# Patient Record
Sex: FEMALE | URBAN - METROPOLITAN AREA
[De-identification: names, ages, dates, MRNs, and addresses within clinical notes are randomized per-mention and may not be internally consistent; named-entity substitution may affect disease eponyms.]

---

## 2017-03-19 ENCOUNTER — EMERGENCY (EMERGENCY)
Facility: HOSPITAL | Age: 72
LOS: 1 days | Discharge: HOME | End: 2017-03-19
Attending: EMERGENCY MEDICINE | Admitting: EMERGENCY MEDICINE
Payer: MEDICARE

## 2017-03-19 VITALS
TEMPERATURE: 98 F | OXYGEN SATURATION: 98 % | HEART RATE: 89 BPM | DIASTOLIC BLOOD PRESSURE: 83 MMHG | SYSTOLIC BLOOD PRESSURE: 165 MMHG | RESPIRATION RATE: 16 BRPM

## 2017-03-19 VITALS
TEMPERATURE: 97 F | RESPIRATION RATE: 18 BRPM | OXYGEN SATURATION: 97 % | HEART RATE: 91 BPM | SYSTOLIC BLOOD PRESSURE: 153 MMHG | DIASTOLIC BLOOD PRESSURE: 90 MMHG

## 2017-03-19 LAB
ALBUMIN SERPL ELPH-MCNC: 4.4 G/DL — SIGNIFICANT CHANGE UP (ref 3.3–5)
ALP SERPL-CCNC: 55 U/L — SIGNIFICANT CHANGE UP (ref 40–120)
ALT FLD-CCNC: 35 U/L — HIGH (ref 4–33)
APTT BLD: 33.7 SEC — SIGNIFICANT CHANGE UP (ref 27.5–37.4)
AST SERPL-CCNC: 29 U/L — SIGNIFICANT CHANGE UP (ref 4–32)
BASE EXCESS BLDV CALC-SCNC: 4.4 MMOL/L — SIGNIFICANT CHANGE UP
BASOPHILS # BLD AUTO: 0.03 K/UL — SIGNIFICANT CHANGE UP (ref 0–0.2)
BASOPHILS NFR BLD AUTO: 0.5 % — SIGNIFICANT CHANGE UP (ref 0–2)
BILIRUB SERPL-MCNC: 0.2 MG/DL — SIGNIFICANT CHANGE UP (ref 0.2–1.2)
BLOOD GAS VENOUS - CREATININE: 0.69 MG/DL — SIGNIFICANT CHANGE UP (ref 0.5–1.3)
BUN SERPL-MCNC: 14 MG/DL — SIGNIFICANT CHANGE UP (ref 7–23)
CALCIUM SERPL-MCNC: 10.1 MG/DL — SIGNIFICANT CHANGE UP (ref 8.4–10.5)
CHLORIDE BLDV-SCNC: 101 MMOL/L — SIGNIFICANT CHANGE UP (ref 96–108)
CHLORIDE SERPL-SCNC: 96 MMOL/L — LOW (ref 98–107)
CO2 SERPL-SCNC: 24 MMOL/L — SIGNIFICANT CHANGE UP (ref 22–31)
CREAT SERPL-MCNC: 0.78 MG/DL — SIGNIFICANT CHANGE UP (ref 0.5–1.3)
EOSINOPHIL # BLD AUTO: 0.08 K/UL — SIGNIFICANT CHANGE UP (ref 0–0.5)
EOSINOPHIL NFR BLD AUTO: 1.2 % — SIGNIFICANT CHANGE UP (ref 0–6)
GAS PNL BLDV: 132 MMOL/L — LOW (ref 136–146)
GLUCOSE BLDV-MCNC: 135 — HIGH (ref 70–99)
GLUCOSE SERPL-MCNC: 136 MG/DL — HIGH (ref 70–99)
HCO3 BLDV-SCNC: 27 MMOL/L — SIGNIFICANT CHANGE UP (ref 20–27)
HCT VFR BLD CALC: 36.7 % — SIGNIFICANT CHANGE UP (ref 34.5–45)
HCT VFR BLDV CALC: 36.2 % — SIGNIFICANT CHANGE UP (ref 34.5–45)
HGB BLD-MCNC: 11.7 G/DL — SIGNIFICANT CHANGE UP (ref 11.5–15.5)
HGB BLDV-MCNC: 11.7 G/DL — SIGNIFICANT CHANGE UP (ref 11.5–15.5)
IMM GRANULOCYTES NFR BLD AUTO: 0.3 % — SIGNIFICANT CHANGE UP (ref 0–1.5)
INR BLD: 0.98 — SIGNIFICANT CHANGE UP (ref 0.88–1.17)
LACTATE BLDV-MCNC: 1.3 MMOL/L — SIGNIFICANT CHANGE UP (ref 0.5–2)
LYMPHOCYTES # BLD AUTO: 1.6 K/UL — SIGNIFICANT CHANGE UP (ref 1–3.3)
LYMPHOCYTES # BLD AUTO: 24.6 % — SIGNIFICANT CHANGE UP (ref 13–44)
MCHC RBC-ENTMCNC: 27.7 PG — SIGNIFICANT CHANGE UP (ref 27–34)
MCHC RBC-ENTMCNC: 31.9 % — LOW (ref 32–36)
MCV RBC AUTO: 86.8 FL — SIGNIFICANT CHANGE UP (ref 80–100)
MONOCYTES # BLD AUTO: 0.47 K/UL — SIGNIFICANT CHANGE UP (ref 0–0.9)
MONOCYTES NFR BLD AUTO: 7.2 % — SIGNIFICANT CHANGE UP (ref 2–14)
NEUTROPHILS # BLD AUTO: 4.3 K/UL — SIGNIFICANT CHANGE UP (ref 1.8–7.4)
NEUTROPHILS NFR BLD AUTO: 66.2 % — SIGNIFICANT CHANGE UP (ref 43–77)
PCO2 BLDV: 52 MMHG — HIGH (ref 41–51)
PH BLDV: 7.37 PH — SIGNIFICANT CHANGE UP (ref 7.32–7.43)
PLATELET # BLD AUTO: 245 K/UL — SIGNIFICANT CHANGE UP (ref 150–400)
PMV BLD: 10.1 FL — SIGNIFICANT CHANGE UP (ref 7–13)
PO2 BLDV: 27 MMHG — LOW (ref 35–40)
POTASSIUM BLDV-SCNC: 3.9 MMOL/L — SIGNIFICANT CHANGE UP (ref 3.4–4.5)
POTASSIUM SERPL-MCNC: 4.3 MMOL/L — SIGNIFICANT CHANGE UP (ref 3.5–5.3)
POTASSIUM SERPL-SCNC: 4.3 MMOL/L — SIGNIFICANT CHANGE UP (ref 3.5–5.3)
PROT SERPL-MCNC: 7.6 G/DL — SIGNIFICANT CHANGE UP (ref 6–8.3)
PROTHROM AB SERPL-ACNC: 11 SEC — SIGNIFICANT CHANGE UP (ref 9.8–13.1)
RBC # BLD: 4.23 M/UL — SIGNIFICANT CHANGE UP (ref 3.8–5.2)
RBC # FLD: 14.6 % — HIGH (ref 10.3–14.5)
SAO2 % BLDV: 43.3 % — LOW (ref 60–85)
SODIUM SERPL-SCNC: 138 MMOL/L — SIGNIFICANT CHANGE UP (ref 135–145)
WBC # BLD: 6.5 K/UL — SIGNIFICANT CHANGE UP (ref 3.8–10.5)
WBC # FLD AUTO: 6.5 K/UL — SIGNIFICANT CHANGE UP (ref 3.8–10.5)

## 2017-03-19 PROCEDURE — 71020: CPT | Mod: 26

## 2017-03-19 PROCEDURE — 99284 EMERGENCY DEPT VISIT MOD MDM: CPT | Mod: GC

## 2017-03-19 RX ORDER — LEVOFLOXACIN 5 MG/ML
1 INJECTION, SOLUTION INTRAVENOUS
Qty: 5 | Refills: 0
Start: 2017-03-19 | End: 2017-03-24

## 2017-03-19 NOTE — ED ADULT NURSE NOTE - OBJECTIVE STATEMENT
Received in spot 26. Alert and oriented x3. Primarily Lithuanian speaking, granddaughter at bedside to translate. Co weakness, sore throat, productive cough with blood tinged sputum, and intermittent nose bleeds since Monday. Denies taking anticoagulants. Slight wheezing noted. Denies fevers, chills, n/v/d. Temp 98 in exam room. Labs sent. IV placed. VSS. Comfort measures provided. Awaiting MD walker.

## 2017-03-19 NOTE — ED PROVIDER NOTE - PLAN OF CARE
Please follow up with your primary care doctor this week  Take levaquin once a day for 5 days  Return to ED for worsening symptoms

## 2017-03-19 NOTE — ED ADULT NURSE NOTE - CHIEF COMPLAINT QUOTE
Pt c/o cough with blood in phlegm, sore throat, nose bleed, weakness, labored breathing and chill  since Monday.  Denies chest pain, dizziness

## 2017-03-19 NOTE — ED PROVIDER NOTE - CARE PLAN
Principal Discharge DX:	Pneumonia  Instructions for follow-up, activity and diet:	Please follow up with your primary care doctor this week  Take levaquin once a day for 5 days  Return to ED for worsening symptoms

## 2017-03-19 NOTE — ED ADULT TRIAGE NOTE - CHIEF COMPLAINT QUOTE
Pt c/o cough with blood in phlegm, nose bleed, weakness, labored breathing and chill  since Monday.  Denies chest pain, dizziness Pt c/o cough with blood in phlegm, sore throat, nose bleed, weakness, labored breathing and chill  since Monday.  Denies chest pain, dizziness

## 2017-03-19 NOTE — ED PROVIDER NOTE - OBJECTIVE STATEMENT
71yo female h/o DM, htn p/w 1 week of cough with blood streaked mucus, runny nose, sore throat, shortness of breath, waekness. Pt initially had fevers but has not had fever for last 4 days but has persistent cough. Did not see PMD bec of snow. Pt currently not having shortness of breath. No chest pain. 73yo female h/o DM, htn p/w 1 week of cough with blood streaked mucus, runny nose, sore throat, shortness of breath, waekness. Pt initially had fevers but has not had fever for last 4 days but has persistent cough. Did not see PMD bec of snow. Pt currently not having shortness of breath. No chest pain.    Attendinyo female presents with generalized weakness.  Has had cough, congestion for a few days.  Did not come to hospital because of the bad weather.  Feeling better today and weather has improved so she came in for evaluation.

## 2017-03-20 LAB
SPECIMEN SOURCE: SIGNIFICANT CHANGE UP
SPECIMEN SOURCE: SIGNIFICANT CHANGE UP

## 2017-03-24 LAB
BACTERIA BLD CULT: SIGNIFICANT CHANGE UP
BACTERIA BLD CULT: SIGNIFICANT CHANGE UP

## 2017-12-23 ENCOUNTER — INPATIENT (INPATIENT)
Facility: HOSPITAL | Age: 72
LOS: 2 days | Discharge: HOME | End: 2017-12-26
Attending: INTERNAL MEDICINE | Admitting: INTERNAL MEDICINE
Payer: MEDICARE

## 2017-12-23 VITALS
SYSTOLIC BLOOD PRESSURE: 165 MMHG | HEART RATE: 111 BPM | DIASTOLIC BLOOD PRESSURE: 83 MMHG | TEMPERATURE: 99 F | OXYGEN SATURATION: 100 % | RESPIRATION RATE: 20 BRPM

## 2017-12-23 DIAGNOSIS — H26.9 UNSPECIFIED CATARACT: Chronic | ICD-10-CM

## 2017-12-23 DIAGNOSIS — R06.2 WHEEZING: ICD-10-CM

## 2017-12-23 DIAGNOSIS — I10 ESSENTIAL (PRIMARY) HYPERTENSION: ICD-10-CM

## 2017-12-23 DIAGNOSIS — Z29.9 ENCOUNTER FOR PROPHYLACTIC MEASURES, UNSPECIFIED: ICD-10-CM

## 2017-12-23 DIAGNOSIS — J18.9 PNEUMONIA, UNSPECIFIED ORGANISM: ICD-10-CM

## 2017-12-23 DIAGNOSIS — E03.9 HYPOTHYROIDISM, UNSPECIFIED: ICD-10-CM

## 2017-12-23 DIAGNOSIS — E11.9 TYPE 2 DIABETES MELLITUS WITHOUT COMPLICATIONS: ICD-10-CM

## 2017-12-23 DIAGNOSIS — R07.89 OTHER CHEST PAIN: ICD-10-CM

## 2017-12-23 DIAGNOSIS — E78.5 HYPERLIPIDEMIA, UNSPECIFIED: ICD-10-CM

## 2017-12-23 LAB
ALBUMIN SERPL ELPH-MCNC: 4.1 G/DL — SIGNIFICANT CHANGE UP (ref 3.3–5)
ALP SERPL-CCNC: 64 U/L — SIGNIFICANT CHANGE UP (ref 40–120)
ALT FLD-CCNC: 21 U/L — SIGNIFICANT CHANGE UP (ref 4–33)
AST SERPL-CCNC: 18 U/L — SIGNIFICANT CHANGE UP (ref 4–32)
BASE EXCESS BLDV CALC-SCNC: -0.2 MMOL/L — SIGNIFICANT CHANGE UP
BASE EXCESS BLDV CALC-SCNC: 1 MMOL/L — SIGNIFICANT CHANGE UP
BASOPHILS # BLD AUTO: 0.07 K/UL — SIGNIFICANT CHANGE UP (ref 0–0.2)
BASOPHILS NFR BLD AUTO: 0.7 % — SIGNIFICANT CHANGE UP (ref 0–2)
BILIRUB SERPL-MCNC: 0.3 MG/DL — SIGNIFICANT CHANGE UP (ref 0.2–1.2)
BLOOD GAS VENOUS - CREATININE: 0.76 MG/DL — SIGNIFICANT CHANGE UP (ref 0.5–1.3)
BLOOD GAS VENOUS - CREATININE: 0.77 MG/DL — SIGNIFICANT CHANGE UP (ref 0.5–1.3)
BUN SERPL-MCNC: 12 MG/DL — SIGNIFICANT CHANGE UP (ref 7–23)
CALCIUM SERPL-MCNC: 9.3 MG/DL — SIGNIFICANT CHANGE UP (ref 8.4–10.5)
CHLORIDE BLDV-SCNC: 104 MMOL/L — SIGNIFICANT CHANGE UP (ref 96–108)
CHLORIDE BLDV-SCNC: 97 MMOL/L — SIGNIFICANT CHANGE UP (ref 96–108)
CHLORIDE SERPL-SCNC: 95 MMOL/L — LOW (ref 98–107)
CK MB BLD-MCNC: 4.53 NG/ML — SIGNIFICANT CHANGE UP (ref 1–4.7)
CK MB BLD-MCNC: SIGNIFICANT CHANGE UP (ref 0–2.5)
CK SERPL-CCNC: 109 U/L — SIGNIFICANT CHANGE UP (ref 25–170)
CK SERPL-CCNC: 115 U/L — SIGNIFICANT CHANGE UP (ref 25–170)
CO2 SERPL-SCNC: 24 MMOL/L — SIGNIFICANT CHANGE UP (ref 22–31)
CREAT SERPL-MCNC: 0.91 MG/DL — SIGNIFICANT CHANGE UP (ref 0.5–1.3)
EOSINOPHIL # BLD AUTO: 0.24 K/UL — SIGNIFICANT CHANGE UP (ref 0–0.5)
EOSINOPHIL NFR BLD AUTO: 2.3 % — SIGNIFICANT CHANGE UP (ref 0–6)
GAS PNL BLDV: 131 MMOL/L — LOW (ref 136–146)
GAS PNL BLDV: 132 MMOL/L — LOW (ref 136–146)
GLUCOSE BLDC GLUCOMTR-MCNC: 370 MG/DL — HIGH (ref 70–99)
GLUCOSE BLDV-MCNC: 226 — HIGH (ref 70–99)
GLUCOSE BLDV-MCNC: 345 — HIGH (ref 70–99)
GLUCOSE SERPL-MCNC: 357 MG/DL — HIGH (ref 70–99)
HCO3 BLDV-SCNC: 23 MMOL/L — SIGNIFICANT CHANGE UP (ref 20–27)
HCO3 BLDV-SCNC: 24 MMOL/L — SIGNIFICANT CHANGE UP (ref 20–27)
HCT VFR BLD CALC: 37.8 % — SIGNIFICANT CHANGE UP (ref 34.5–45)
HCT VFR BLDV CALC: 38.3 % — SIGNIFICANT CHANGE UP (ref 34.5–45)
HCT VFR BLDV CALC: 38.6 % — SIGNIFICANT CHANGE UP (ref 34.5–45)
HGB BLD-MCNC: 12.4 G/DL — SIGNIFICANT CHANGE UP (ref 11.5–15.5)
HGB BLDV-MCNC: 12.4 G/DL — SIGNIFICANT CHANGE UP (ref 11.5–15.5)
HGB BLDV-MCNC: 12.5 G/DL — SIGNIFICANT CHANGE UP (ref 11.5–15.5)
IMM GRANULOCYTES # BLD AUTO: 0.04 # — SIGNIFICANT CHANGE UP
IMM GRANULOCYTES NFR BLD AUTO: 0.4 % — SIGNIFICANT CHANGE UP (ref 0–1.5)
LACTATE BLDV-MCNC: 2.2 MMOL/L — HIGH (ref 0.5–2)
LACTATE BLDV-MCNC: 3.8 MMOL/L — HIGH (ref 0.5–2)
LYMPHOCYTES # BLD AUTO: 1.04 K/UL — SIGNIFICANT CHANGE UP (ref 1–3.3)
LYMPHOCYTES # BLD AUTO: 10 % — LOW (ref 13–44)
MCHC RBC-ENTMCNC: 29.5 PG — SIGNIFICANT CHANGE UP (ref 27–34)
MCHC RBC-ENTMCNC: 32.8 % — SIGNIFICANT CHANGE UP (ref 32–36)
MCV RBC AUTO: 89.8 FL — SIGNIFICANT CHANGE UP (ref 80–100)
MONOCYTES # BLD AUTO: 0.69 K/UL — SIGNIFICANT CHANGE UP (ref 0–0.9)
MONOCYTES NFR BLD AUTO: 6.6 % — SIGNIFICANT CHANGE UP (ref 2–14)
NEUTROPHILS # BLD AUTO: 8.31 K/UL — HIGH (ref 1.8–7.4)
NEUTROPHILS NFR BLD AUTO: 80 % — HIGH (ref 43–77)
NRBC # FLD: 0 — SIGNIFICANT CHANGE UP
PCO2 BLDV: 46 MMHG — SIGNIFICANT CHANGE UP (ref 41–51)
PCO2 BLDV: 46 MMHG — SIGNIFICANT CHANGE UP (ref 41–51)
PH BLDV: 7.35 PH — SIGNIFICANT CHANGE UP (ref 7.32–7.43)
PH BLDV: 7.37 PH — SIGNIFICANT CHANGE UP (ref 7.32–7.43)
PLATELET # BLD AUTO: 196 K/UL — SIGNIFICANT CHANGE UP (ref 150–400)
PMV BLD: 10 FL — SIGNIFICANT CHANGE UP (ref 7–13)
PO2 BLDV: 28 MMHG — LOW (ref 35–40)
PO2 BLDV: 31 MMHG — LOW (ref 35–40)
POTASSIUM BLDV-SCNC: 3.6 MMOL/L — SIGNIFICANT CHANGE UP (ref 3.4–4.5)
POTASSIUM BLDV-SCNC: 4 MMOL/L — SIGNIFICANT CHANGE UP (ref 3.4–4.5)
POTASSIUM SERPL-MCNC: 4.4 MMOL/L — SIGNIFICANT CHANGE UP (ref 3.5–5.3)
POTASSIUM SERPL-SCNC: 4.4 MMOL/L — SIGNIFICANT CHANGE UP (ref 3.5–5.3)
PROT SERPL-MCNC: 7.3 G/DL — SIGNIFICANT CHANGE UP (ref 6–8.3)
RBC # BLD: 4.21 M/UL — SIGNIFICANT CHANGE UP (ref 3.8–5.2)
RBC # FLD: 13.8 % — SIGNIFICANT CHANGE UP (ref 10.3–14.5)
SAO2 % BLDV: 43.2 % — LOW (ref 60–85)
SAO2 % BLDV: 54.3 % — LOW (ref 60–85)
SODIUM SERPL-SCNC: 134 MMOL/L — LOW (ref 135–145)
TROPONIN T SERPL-MCNC: < 0.06 NG/ML — SIGNIFICANT CHANGE UP (ref 0–0.06)
TROPONIN T SERPL-MCNC: < 0.06 NG/ML — SIGNIFICANT CHANGE UP (ref 0–0.06)
WBC # BLD: 10.39 K/UL — SIGNIFICANT CHANGE UP (ref 3.8–10.5)
WBC # FLD AUTO: 10.39 K/UL — SIGNIFICANT CHANGE UP (ref 3.8–10.5)

## 2017-12-23 PROCEDURE — 93010 ELECTROCARDIOGRAM REPORT: CPT

## 2017-12-23 PROCEDURE — 99223 1ST HOSP IP/OBS HIGH 75: CPT | Mod: AI

## 2017-12-23 PROCEDURE — 71020: CPT | Mod: 26

## 2017-12-23 RX ORDER — ACETAMINOPHEN 500 MG
650 TABLET ORAL EVERY 6 HOURS
Refills: 0 | Status: DISCONTINUED | OUTPATIENT
Start: 2017-12-23 | End: 2017-12-26

## 2017-12-23 RX ORDER — SODIUM CHLORIDE 9 MG/ML
500 INJECTION INTRAMUSCULAR; INTRAVENOUS; SUBCUTANEOUS ONCE
Refills: 0 | Status: COMPLETED | OUTPATIENT
Start: 2017-12-23 | End: 2017-12-23

## 2017-12-23 RX ORDER — SODIUM CHLORIDE 9 MG/ML
1000 INJECTION INTRAMUSCULAR; INTRAVENOUS; SUBCUTANEOUS
Refills: 0 | Status: DISCONTINUED | OUTPATIENT
Start: 2017-12-23 | End: 2017-12-25

## 2017-12-23 RX ORDER — DEXTROSE 50 % IN WATER 50 %
1 SYRINGE (ML) INTRAVENOUS ONCE
Refills: 0 | Status: DISCONTINUED | OUTPATIENT
Start: 2017-12-23 | End: 2017-12-26

## 2017-12-23 RX ORDER — LEVOTHYROXINE SODIUM 125 MCG
50 TABLET ORAL DAILY
Refills: 0 | Status: DISCONTINUED | OUTPATIENT
Start: 2017-12-23 | End: 2017-12-26

## 2017-12-23 RX ORDER — AZITHROMYCIN 500 MG/1
500 TABLET, FILM COATED ORAL ONCE
Refills: 0 | Status: COMPLETED | OUTPATIENT
Start: 2017-12-23 | End: 2017-12-23

## 2017-12-23 RX ORDER — AZITHROMYCIN 500 MG/1
500 TABLET, FILM COATED ORAL EVERY 24 HOURS
Refills: 0 | Status: DISCONTINUED | OUTPATIENT
Start: 2017-12-24 | End: 2017-12-26

## 2017-12-23 RX ORDER — GLUCAGON INJECTION, SOLUTION 0.5 MG/.1ML
1 INJECTION, SOLUTION SUBCUTANEOUS ONCE
Refills: 0 | Status: DISCONTINUED | OUTPATIENT
Start: 2017-12-23 | End: 2017-12-26

## 2017-12-23 RX ORDER — ALBUTEROL 90 UG/1
1 AEROSOL, METERED ORAL EVERY 4 HOURS
Refills: 0 | Status: DISCONTINUED | OUTPATIENT
Start: 2017-12-23 | End: 2017-12-26

## 2017-12-23 RX ORDER — HEPARIN SODIUM 5000 [USP'U]/ML
5000 INJECTION INTRAVENOUS; SUBCUTANEOUS EVERY 12 HOURS
Refills: 0 | Status: DISCONTINUED | OUTPATIENT
Start: 2017-12-23 | End: 2017-12-26

## 2017-12-23 RX ORDER — DEXTROSE 50 % IN WATER 50 %
25 SYRINGE (ML) INTRAVENOUS ONCE
Refills: 0 | Status: DISCONTINUED | OUTPATIENT
Start: 2017-12-23 | End: 2017-12-26

## 2017-12-23 RX ORDER — DEXTROSE 50 % IN WATER 50 %
12.5 SYRINGE (ML) INTRAVENOUS ONCE
Refills: 0 | Status: DISCONTINUED | OUTPATIENT
Start: 2017-12-23 | End: 2017-12-26

## 2017-12-23 RX ORDER — SIMVASTATIN 20 MG/1
20 TABLET, FILM COATED ORAL AT BEDTIME
Refills: 0 | Status: DISCONTINUED | OUTPATIENT
Start: 2017-12-23 | End: 2017-12-26

## 2017-12-23 RX ORDER — SODIUM CHLORIDE 9 MG/ML
1000 INJECTION, SOLUTION INTRAVENOUS
Refills: 0 | Status: DISCONTINUED | OUTPATIENT
Start: 2017-12-23 | End: 2017-12-26

## 2017-12-23 RX ORDER — CEFTRIAXONE 500 MG/1
1 INJECTION, POWDER, FOR SOLUTION INTRAMUSCULAR; INTRAVENOUS EVERY 24 HOURS
Refills: 0 | Status: DISCONTINUED | OUTPATIENT
Start: 2017-12-24 | End: 2017-12-26

## 2017-12-23 RX ORDER — LOSARTAN POTASSIUM 100 MG/1
50 TABLET, FILM COATED ORAL DAILY
Refills: 0 | Status: DISCONTINUED | OUTPATIENT
Start: 2017-12-23 | End: 2017-12-26

## 2017-12-23 RX ORDER — CEFTRIAXONE 500 MG/1
1 INJECTION, POWDER, FOR SOLUTION INTRAMUSCULAR; INTRAVENOUS ONCE
Refills: 0 | Status: COMPLETED | OUTPATIENT
Start: 2017-12-23 | End: 2017-12-23

## 2017-12-23 RX ORDER — IPRATROPIUM/ALBUTEROL SULFATE 18-103MCG
3 AEROSOL WITH ADAPTER (GRAM) INHALATION ONCE
Refills: 0 | Status: COMPLETED | OUTPATIENT
Start: 2017-12-23 | End: 2017-12-23

## 2017-12-23 RX ORDER — INSULIN LISPRO 100/ML
VIAL (ML) SUBCUTANEOUS
Refills: 0 | Status: DISCONTINUED | OUTPATIENT
Start: 2017-12-23 | End: 2017-12-24

## 2017-12-23 RX ORDER — ALBUTEROL 90 UG/1
2.5 AEROSOL, METERED ORAL EVERY 6 HOURS
Refills: 0 | Status: DISCONTINUED | OUTPATIENT
Start: 2017-12-23 | End: 2017-12-26

## 2017-12-23 RX ORDER — INSULIN GLARGINE 100 [IU]/ML
5 INJECTION, SOLUTION SUBCUTANEOUS AT BEDTIME
Refills: 0 | Status: DISCONTINUED | OUTPATIENT
Start: 2017-12-23 | End: 2017-12-24

## 2017-12-23 RX ADMIN — AZITHROMYCIN 250 MILLIGRAM(S): 500 TABLET, FILM COATED ORAL at 14:44

## 2017-12-23 RX ADMIN — INSULIN GLARGINE 5 UNIT(S): 100 INJECTION, SOLUTION SUBCUTANEOUS at 22:37

## 2017-12-23 RX ADMIN — Medication 125 MILLIGRAM(S): at 12:23

## 2017-12-23 RX ADMIN — SODIUM CHLORIDE 70 MILLILITER(S): 9 INJECTION INTRAMUSCULAR; INTRAVENOUS; SUBCUTANEOUS at 21:20

## 2017-12-23 RX ADMIN — Medication 5: at 22:38

## 2017-12-23 RX ADMIN — SODIUM CHLORIDE 500 MILLILITER(S): 9 INJECTION INTRAMUSCULAR; INTRAVENOUS; SUBCUTANEOUS at 14:13

## 2017-12-23 RX ADMIN — Medication 3 MILLILITER(S): at 12:23

## 2017-12-23 RX ADMIN — SODIUM CHLORIDE 500 MILLILITER(S): 9 INJECTION INTRAMUSCULAR; INTRAVENOUS; SUBCUTANEOUS at 12:23

## 2017-12-23 RX ADMIN — HEPARIN SODIUM 5000 UNIT(S): 5000 INJECTION INTRAVENOUS; SUBCUTANEOUS at 18:04

## 2017-12-23 RX ADMIN — CEFTRIAXONE 100 GRAM(S): 500 INJECTION, POWDER, FOR SOLUTION INTRAMUSCULAR; INTRAVENOUS at 14:22

## 2017-12-23 RX ADMIN — ALBUTEROL 2.5 MILLIGRAM(S): 90 AEROSOL, METERED ORAL at 22:14

## 2017-12-23 RX ADMIN — SIMVASTATIN 20 MILLIGRAM(S): 20 TABLET, FILM COATED ORAL at 22:38

## 2017-12-23 NOTE — ED PROVIDER NOTE - ATTENDING CONTRIBUTION TO CARE
Pt was seen and evaluated by me. Pt states over the past few months having a cough which at times has been productive. Pt was diagnosed with PNA in March but continues to have cough and episodes of SOB. Pt denies any fever, chills, nausea, vomiting, or abd pain. Pt admits to episodes of chest discomfort with cough. Expiratory wheezing with tachycardia. Abd soft, non-tender.

## 2017-12-23 NOTE — ED PROVIDER NOTE - OBJECTIVE STATEMENT
The patient reports that she has had an ongoing cough for multiple months that is typically productive of clear/whitish sputum. However, the past two days the patient has also had a runny nose and her cough has become productive of a darker colored sputum which is new. She has been taking tylenol for fevers but notes that she still feels very feverish. She has a history of pneumonia and notes that this feels similar to previous pneumonia. No hemoptysis, no neck rigidity, no recent medication changes, no recent ill contacts. She lives at home with her family. She has never had TB before and has not had any weight loss. The patient reports that she has had an ongoing cough for multiple months that is typically productive of clear/whitish sputum. However, the past two days the patient has also had a runny nose and her cough has become productive of a darker colored sputum which is new. She has been taking Tylenol for fevers but notes that she still feels very feverish. She has a history of pneumonia and notes that this feels similar to previous pneumonia. No hemoptysis, no neck rigidity, no recent medication changes, no recent ill contacts. She lives at home with her family. She has never had TB before and has not had any weight loss.

## 2017-12-23 NOTE — ED PROVIDER NOTE - CHIEF COMPLAINT
The patient is a 72y Female complaining of The patient is a 72y Female complaining of cough for months

## 2017-12-23 NOTE — H&P ADULT - HISTORY OF PRESENT ILLNESS
73 yo Arabic speaking female seen with Grand-daughter Magdalena in Ed who translated,  h/o DM II, HTN, HLD, Hypothyroidism c/o pNA 3 months ago and sick since.  C/o R sided chesty pain/ epigastric pain w/o N/V/D/C.  Patient notes wheezing with SOB but denies Tobacco/ Asthma or CHF.  CXR in ED- R Mid Lung PNA  In ED -Patient started on Ceftriaxone and Azythro.

## 2017-12-23 NOTE — H&P ADULT - ATTENDING COMMENTS
patient seen and examined in ED patient seen and examined in ED.  CXR- R mid lung PNA- Clear costrophrnic angles- await official results.  ID PNA Ceftriaxone and Azithro. iVf hydration.  Dyspnea sec to Wheezing with reactive airways vs chf- echo.  [facility] Card enz neg x 2..Echo eval.  DVT proph hep sq

## 2017-12-23 NOTE — H&P ADULT - NSHPREVIEWOFSYSTEMS_GEN_ALL_CORE
CONSTITUTIONAL: No fever, weight loss, or fatigue  EYES: No eye pain, visual disturbances, or discharge  ENMT:  No difficulty hearing, tinnitus, vertigo; No sinus or throat pain  NECK: No pain or stiffness  BREASTS: No pain, masses, or nipple discharge  RESPIRATORY: POS cough, POS wheezing, NO chills or hemoptysis; No shortness of breath  CARDIOVASCULAR: c/o R sided  chest pain 5/10- non NOW, No palpitations, dizziness, or leg swelling  GASTROINTESTINAL: No abdominal or epigastric pain. No nausea, vomiting, or hematemesis; No diarrhea or constipation. No melena or hematochezia.  GENITOURINARY: No dysuria, frequency, hematuria, or incontinence  NEUROLOGICAL: No headaches, memory loss, loss of strength, numbness, or tremors  SKIN: No itching, burning, rashes, or lesions   LYMPH NODES: No enlarged glands  ENDOCRINE: No heat or cold intolerance; No hair loss  MUSCULOSKELETAL: No muscle or back pain  PSYCHIATRIC: No depression, anxiety, mood swings, or difficulty sleeping  HEME/LYMPH: No easy bruising, or bleeding gums  ALLERGY AND IMMUNOLOGIC: No hives or eczema

## 2017-12-23 NOTE — H&P ADULT - NSHPPHYSICALEXAM_GEN_ALL_CORE
PHYSICAL EXAM:  Vital Signs Last 24 Hrs  T(C): 37.4 (23 Dec 2017 12:22), Max: 37.4 (23 Dec 2017 12:22)  T(F): 99.4 (23 Dec 2017 12:22), Max: 99.4 (23 Dec 2017 12:22)  HR: 104 (23 Dec 2017 12:22) (104 - 111)  BP: 157/78 (23 Dec 2017 12:22) (157/78 - 165/83)  BP(mean): --  RR: 20 (23 Dec 2017 12:22) (20 - 20)  SpO2: 98% (23 Dec 2017 12:22) (98% - 100%)  GENERAL: NAD, well-developed, No Chest pain or SOB now  HEAD:  Atraumatic, Normocephalic  EYES: EOMI, PERRLA, conjunctiva and sclera clear  NECK: Supple, no JVD  CHEST/LUNG: Wheezzing B/L  HEART: Regular rate and rhythm; no murmurs, rubs, or gallops  ABDOMEN: Soft, Nontender, Nondistended; Bowel sounds present  EXTREMITIES:  warm and well perfused, no clubbing, cyanosis, or edema  PSYCH: AAOx3  NEUROLOGY: non-focal  SKIN: no rashes or lesions

## 2017-12-23 NOTE — ED PROVIDER NOTE - PHYSICAL EXAMINATION
Gen: NAD, chronically ill appearing, conversational  Eyes: PERRL, EOMI   HENT: Normocephalic, atraumatic. External ears normal, sinus non-ttp, + rhinorrhea in bilateral nares with pale posterior oropharyngeal tissue and post-nasal drainage, no cobblestoning, no erythema, moist mucous membranes.   CV: RRR, no M/R/G  Resp: CTAB, non-labored, speaking without difficulty on room air but occasionally coughing up white sputum  Abd: soft, non tender, non rigid, no guarding or rebound tenderness  Skin: dry, wwp   Neuro: AOx3, speech is fluent and appropriate  Psych: Mood concerned, affect euthymic Gen: NAD, chronically ill appearing, conversational  Eyes: PERRL, EOMI   HENT: Normocephalic, atraumatic. External ears normal, sinus non-ttp, + rhinorrhea in bilateral nares with pale posterior oropharyngeal tissue and post-nasal drainage, no cobblestoning, no erythema, moist mucous membranes.   CV: tachycardic rate, regular rhythm, no M/R/G  Resp: CTAB, non-labored, speaking without difficulty on room air but occasionally coughing up white sputum  Abd: soft, non tender, non rigid, no guarding or rebound tenderness  Skin: dry, wwp   Neuro: AOx3, speech is fluent and appropriate  Psych: Mood concerned, affect euthymic

## 2017-12-23 NOTE — ED ADULT TRIAGE NOTE - CHIEF COMPLAINT QUOTE
pt coming for increase SOB/prod. cough/ with body aches/ some dizziness as per her daughter pt was Dx. with PNA in March was sent home and now pt's symptoms increase.

## 2017-12-23 NOTE — ED PROVIDER NOTE - MEDICAL DECISION MAKING DETAILS
72yF presenting with acute on chronic cough w/ subjective fevers, currently taking tylenol for symptom relief. Is tachycardic on examination with other post-nasal drainage more consistent with URI/sinus infxn. Given hx of pneumonia will order x-ray for further evaluation as well as basic labs. Pt denies urinary sx at this time. F/u studies, reassess, dispo. 72yF presenting with acute on chronic cough w/ subjective fevers, currently taking tylenol for symptom relief. Is tachycardic on examination with other post-nasal drainage more consistent with URI/sinus infxn. Given hx of pneumonia will order x-ray for further evaluation as well as basic labs. Pt denies urinary sx at this time. F/u studies, reassess, dispo pending xr.

## 2017-12-23 NOTE — H&P ADULT - ASSESSMENT
73 yo Croatian speaking female seen with Grand-daughter Magdalena in Ed who translated,  h/o DM II, HTN, HLD, Hypothyroidism c/o pNA 3 months ago and sick since.  C/o R sided chesty pain/ epigastric pain w/o N/V/D/C.  Patient notes wheezing with SOB but denies Tobacco/ Asthma or CHF.  CXR in ED- R Mid Lung PNA  In ED -Patient started on Ceftriaxone and Azythro.    R PNa  SOB and Wheezing  R chest Pain.  DM/HTN/HLD

## 2017-12-23 NOTE — ED PROVIDER NOTE - PROGRESS NOTE DETAILS
Pt done with nebulizer tx, does feel improved states she can breath easier. Has finished 0.5L ivf, still tachycardic @~110 bpm. TIFFANY Reyes PGY1 Pt x-ray consistent w/ pna, will order abs, admission. TRAVIS Erickson, TIFFANY PGY1

## 2017-12-23 NOTE — ED PROVIDER NOTE - NS ED ROS FT
General: + fevers and chills  HENT: No head trauma, ear pain, runny nose, or sore throat  Eyes: No visual changes  CP: No chest pain, palpitations, or light headedness  Resp: + shortness of breath, +cough  GI: No abdominal pain, nausea, or vomiting  : No urinary fz, dysuria  Neuro: No numbness, tingling, or weakness  Endo: + hx of diabetes  Heme: No hx of easy bleeding or bruising

## 2017-12-23 NOTE — H&P ADULT - NSHPLABSRESULTS_GEN_ALL_CORE
LABS:                        12.4   10.39 )-----------( 196      ( 23 Dec 2017 12:00 )             37.8     12-23    134<L>  |  95<L>  |  12  ----------------------------<  357<H>  4.4   |  24  |  0.91    Ca    9.3      23 Dec 2017 12:00    TPro  7.3  /  Alb  4.1  /  TBili  0.3  /  DBili  x   /  AST  18  /  ALT  21  /  AlkPhos  64  12-23      VBG 12-23 @ 15:30  pH: 7.35/pCO2: 46 /pO2: 28/HCO3: 23/lactate: 3.8  VBG 12-23 @ 12:00  pH: 7.37/pCO2: 46 /pO2: 31/HCO3: 24/lactate: 2.2    Microbiology     EKG:ord    RADIOLOGY & ADDITIONAL TESTS:  < from: Xray Chest 2 Views PA/Lat (12.23.17 @ 13:28) >    INTERPRETATION:  Patchy right middle lobe opacity, compatible with   pneumonia.

## 2017-12-24 LAB
ALBUMIN SERPL ELPH-MCNC: 3.9 G/DL — SIGNIFICANT CHANGE UP (ref 3.3–5)
ALP SERPL-CCNC: 53 U/L — SIGNIFICANT CHANGE UP (ref 40–120)
ALT FLD-CCNC: 20 U/L — SIGNIFICANT CHANGE UP (ref 4–33)
AST SERPL-CCNC: 15 U/L — SIGNIFICANT CHANGE UP (ref 4–32)
BASOPHILS # BLD AUTO: 0.03 K/UL — SIGNIFICANT CHANGE UP (ref 0–0.2)
BASOPHILS NFR BLD AUTO: 0.3 % — SIGNIFICANT CHANGE UP (ref 0–2)
BILIRUB SERPL-MCNC: 0.2 MG/DL — SIGNIFICANT CHANGE UP (ref 0.2–1.2)
BUN SERPL-MCNC: 18 MG/DL — SIGNIFICANT CHANGE UP (ref 7–23)
BUN SERPL-MCNC: 18 MG/DL — SIGNIFICANT CHANGE UP (ref 7–23)
CALCIUM SERPL-MCNC: 8.5 MG/DL — SIGNIFICANT CHANGE UP (ref 8.4–10.5)
CALCIUM SERPL-MCNC: 8.5 MG/DL — SIGNIFICANT CHANGE UP (ref 8.4–10.5)
CHLORIDE SERPL-SCNC: 100 MMOL/L — SIGNIFICANT CHANGE UP (ref 98–107)
CHLORIDE SERPL-SCNC: 100 MMOL/L — SIGNIFICANT CHANGE UP (ref 98–107)
CK SERPL-CCNC: 116 U/L — SIGNIFICANT CHANGE UP (ref 25–170)
CO2 SERPL-SCNC: 21 MMOL/L — LOW (ref 22–31)
CO2 SERPL-SCNC: 21 MMOL/L — LOW (ref 22–31)
CREAT SERPL-MCNC: 0.75 MG/DL — SIGNIFICANT CHANGE UP (ref 0.5–1.3)
CREAT SERPL-MCNC: 0.75 MG/DL — SIGNIFICANT CHANGE UP (ref 0.5–1.3)
EOSINOPHIL # BLD AUTO: 0 K/UL — SIGNIFICANT CHANGE UP (ref 0–0.5)
EOSINOPHIL NFR BLD AUTO: 0 % — SIGNIFICANT CHANGE UP (ref 0–6)
GLUCOSE BLDC GLUCOMTR-MCNC: 176 MG/DL — HIGH (ref 70–99)
GLUCOSE BLDC GLUCOMTR-MCNC: 228 MG/DL — HIGH (ref 70–99)
GLUCOSE BLDC GLUCOMTR-MCNC: 314 MG/DL — HIGH (ref 70–99)
GLUCOSE BLDC GLUCOMTR-MCNC: 456 MG/DL — CRITICAL HIGH (ref 70–99)
GLUCOSE SERPL-MCNC: 358 MG/DL — HIGH (ref 70–99)
GLUCOSE SERPL-MCNC: 358 MG/DL — HIGH (ref 70–99)
HBA1C BLD-MCNC: 7.8 % — HIGH (ref 4–5.6)
HCT VFR BLD CALC: 35 % — SIGNIFICANT CHANGE UP (ref 34.5–45)
HCT VFR BLD CALC: 35 % — SIGNIFICANT CHANGE UP (ref 34.5–45)
HGB BLD-MCNC: 11 G/DL — LOW (ref 11.5–15.5)
HGB BLD-MCNC: 11 G/DL — LOW (ref 11.5–15.5)
IMM GRANULOCYTES # BLD AUTO: 0.06 # — SIGNIFICANT CHANGE UP
IMM GRANULOCYTES NFR BLD AUTO: 0.5 % — SIGNIFICANT CHANGE UP (ref 0–1.5)
LYMPHOCYTES # BLD AUTO: 1.21 K/UL — SIGNIFICANT CHANGE UP (ref 1–3.3)
LYMPHOCYTES # BLD AUTO: 11 % — LOW (ref 13–44)
MAGNESIUM SERPL-MCNC: 1.8 MG/DL — SIGNIFICANT CHANGE UP (ref 1.6–2.6)
MCHC RBC-ENTMCNC: 27.8 PG — SIGNIFICANT CHANGE UP (ref 27–34)
MCHC RBC-ENTMCNC: 27.8 PG — SIGNIFICANT CHANGE UP (ref 27–34)
MCHC RBC-ENTMCNC: 31.4 % — LOW (ref 32–36)
MCHC RBC-ENTMCNC: 31.4 % — LOW (ref 32–36)
MCV RBC AUTO: 88.6 FL — SIGNIFICANT CHANGE UP (ref 80–100)
MCV RBC AUTO: 88.6 FL — SIGNIFICANT CHANGE UP (ref 80–100)
MONOCYTES # BLD AUTO: 0.34 K/UL — SIGNIFICANT CHANGE UP (ref 0–0.9)
MONOCYTES NFR BLD AUTO: 3.1 % — SIGNIFICANT CHANGE UP (ref 2–14)
NEUTROPHILS # BLD AUTO: 9.32 K/UL — HIGH (ref 1.8–7.4)
NEUTROPHILS NFR BLD AUTO: 85.1 % — HIGH (ref 43–77)
NRBC # FLD: 0 — SIGNIFICANT CHANGE UP
NRBC # FLD: 0 — SIGNIFICANT CHANGE UP
PHOSPHATE SERPL-MCNC: 2.5 MG/DL — SIGNIFICANT CHANGE UP (ref 2.5–4.5)
PLATELET # BLD AUTO: 187 K/UL — SIGNIFICANT CHANGE UP (ref 150–400)
PLATELET # BLD AUTO: 187 K/UL — SIGNIFICANT CHANGE UP (ref 150–400)
PMV BLD: 10.9 FL — SIGNIFICANT CHANGE UP (ref 7–13)
PMV BLD: 10.9 FL — SIGNIFICANT CHANGE UP (ref 7–13)
POTASSIUM SERPL-MCNC: 4 MMOL/L — SIGNIFICANT CHANGE UP (ref 3.5–5.3)
POTASSIUM SERPL-MCNC: 4 MMOL/L — SIGNIFICANT CHANGE UP (ref 3.5–5.3)
POTASSIUM SERPL-SCNC: 4 MMOL/L — SIGNIFICANT CHANGE UP (ref 3.5–5.3)
POTASSIUM SERPL-SCNC: 4 MMOL/L — SIGNIFICANT CHANGE UP (ref 3.5–5.3)
PROT SERPL-MCNC: 6.8 G/DL — SIGNIFICANT CHANGE UP (ref 6–8.3)
RBC # BLD: 3.95 M/UL — SIGNIFICANT CHANGE UP (ref 3.8–5.2)
RBC # BLD: 3.95 M/UL — SIGNIFICANT CHANGE UP (ref 3.8–5.2)
RBC # FLD: 13.8 % — SIGNIFICANT CHANGE UP (ref 10.3–14.5)
RBC # FLD: 13.8 % — SIGNIFICANT CHANGE UP (ref 10.3–14.5)
SODIUM SERPL-SCNC: 135 MMOL/L — SIGNIFICANT CHANGE UP (ref 135–145)
SODIUM SERPL-SCNC: 135 MMOL/L — SIGNIFICANT CHANGE UP (ref 135–145)
SPECIMEN SOURCE: SIGNIFICANT CHANGE UP
SPECIMEN SOURCE: SIGNIFICANT CHANGE UP
TROPONIN T SERPL-MCNC: < 0.06 NG/ML — SIGNIFICANT CHANGE UP (ref 0–0.06)
WBC # BLD: 10.96 K/UL — HIGH (ref 3.8–10.5)
WBC # BLD: 10.96 K/UL — HIGH (ref 3.8–10.5)
WBC # FLD AUTO: 10.96 K/UL — HIGH (ref 3.8–10.5)
WBC # FLD AUTO: 10.96 K/UL — HIGH (ref 3.8–10.5)

## 2017-12-24 PROCEDURE — 99233 SBSQ HOSP IP/OBS HIGH 50: CPT

## 2017-12-24 RX ORDER — INSULIN LISPRO 100/ML
VIAL (ML) SUBCUTANEOUS AT BEDTIME
Refills: 0 | Status: DISCONTINUED | OUTPATIENT
Start: 2017-12-24 | End: 2017-12-26

## 2017-12-24 RX ORDER — INSULIN GLARGINE 100 [IU]/ML
10 INJECTION, SOLUTION SUBCUTANEOUS AT BEDTIME
Refills: 0 | Status: DISCONTINUED | OUTPATIENT
Start: 2017-12-24 | End: 2017-12-26

## 2017-12-24 RX ORDER — INSULIN LISPRO 100/ML
VIAL (ML) SUBCUTANEOUS
Refills: 0 | Status: DISCONTINUED | OUTPATIENT
Start: 2017-12-24 | End: 2017-12-26

## 2017-12-24 RX ADMIN — Medication 1 TABLET(S): at 12:33

## 2017-12-24 RX ADMIN — ALBUTEROL 2.5 MILLIGRAM(S): 90 AEROSOL, METERED ORAL at 10:01

## 2017-12-24 RX ADMIN — SIMVASTATIN 20 MILLIGRAM(S): 20 TABLET, FILM COATED ORAL at 22:08

## 2017-12-24 RX ADMIN — Medication 2: at 08:54

## 2017-12-24 RX ADMIN — ALBUTEROL 2.5 MILLIGRAM(S): 90 AEROSOL, METERED ORAL at 15:51

## 2017-12-24 RX ADMIN — AZITHROMYCIN 250 MILLIGRAM(S): 500 TABLET, FILM COATED ORAL at 15:32

## 2017-12-24 RX ADMIN — Medication 3: at 12:33

## 2017-12-24 RX ADMIN — ALBUTEROL 2.5 MILLIGRAM(S): 90 AEROSOL, METERED ORAL at 03:55

## 2017-12-24 RX ADMIN — ALBUTEROL 2.5 MILLIGRAM(S): 90 AEROSOL, METERED ORAL at 22:06

## 2017-12-24 RX ADMIN — Medication 6: at 17:30

## 2017-12-24 RX ADMIN — LOSARTAN POTASSIUM 50 MILLIGRAM(S): 100 TABLET, FILM COATED ORAL at 05:04

## 2017-12-24 RX ADMIN — HEPARIN SODIUM 5000 UNIT(S): 5000 INJECTION INTRAVENOUS; SUBCUTANEOUS at 05:04

## 2017-12-24 RX ADMIN — INSULIN GLARGINE 10 UNIT(S): 100 INJECTION, SOLUTION SUBCUTANEOUS at 22:14

## 2017-12-24 RX ADMIN — Medication 50 MICROGRAM(S): at 05:04

## 2017-12-24 RX ADMIN — CEFTRIAXONE 100 GRAM(S): 500 INJECTION, POWDER, FOR SOLUTION INTRAMUSCULAR; INTRAVENOUS at 15:32

## 2017-12-25 ENCOUNTER — TRANSCRIPTION ENCOUNTER (OUTPATIENT)
Age: 72
End: 2017-12-25

## 2017-12-25 DIAGNOSIS — C34.11 MALIGNANT NEOPLASM OF UPPER LOBE, RIGHT BRONCHUS OR LUNG: ICD-10-CM

## 2017-12-25 LAB
BASOPHILS # BLD AUTO: 0.05 K/UL — SIGNIFICANT CHANGE UP (ref 0–0.2)
BASOPHILS NFR BLD AUTO: 0.5 % — SIGNIFICANT CHANGE UP (ref 0–2)
BUN SERPL-MCNC: 15 MG/DL — SIGNIFICANT CHANGE UP (ref 7–23)
CALCIUM SERPL-MCNC: 8.6 MG/DL — SIGNIFICANT CHANGE UP (ref 8.4–10.5)
CHLORIDE SERPL-SCNC: 103 MMOL/L — SIGNIFICANT CHANGE UP (ref 98–107)
CO2 SERPL-SCNC: 24 MMOL/L — SIGNIFICANT CHANGE UP (ref 22–31)
CREAT SERPL-MCNC: 0.76 MG/DL — SIGNIFICANT CHANGE UP (ref 0.5–1.3)
EOSINOPHIL # BLD AUTO: 0.29 K/UL — SIGNIFICANT CHANGE UP (ref 0–0.5)
EOSINOPHIL NFR BLD AUTO: 3.1 % — SIGNIFICANT CHANGE UP (ref 0–6)
GLUCOSE BLDC GLUCOMTR-MCNC: 151 MG/DL — HIGH (ref 70–99)
GLUCOSE BLDC GLUCOMTR-MCNC: 162 MG/DL — HIGH (ref 70–99)
GLUCOSE BLDC GLUCOMTR-MCNC: 351 MG/DL — HIGH (ref 70–99)
GLUCOSE SERPL-MCNC: 198 MG/DL — HIGH (ref 70–99)
HCT VFR BLD CALC: 33.8 % — LOW (ref 34.5–45)
HGB BLD-MCNC: 10.9 G/DL — LOW (ref 11.5–15.5)
IMM GRANULOCYTES # BLD AUTO: 0.04 # — SIGNIFICANT CHANGE UP
IMM GRANULOCYTES NFR BLD AUTO: 0.4 % — SIGNIFICANT CHANGE UP (ref 0–1.5)
LYMPHOCYTES # BLD AUTO: 2.21 K/UL — SIGNIFICANT CHANGE UP (ref 1–3.3)
LYMPHOCYTES # BLD AUTO: 23.8 % — SIGNIFICANT CHANGE UP (ref 13–44)
MCHC RBC-ENTMCNC: 28.4 PG — SIGNIFICANT CHANGE UP (ref 27–34)
MCHC RBC-ENTMCNC: 32.2 % — SIGNIFICANT CHANGE UP (ref 32–36)
MCV RBC AUTO: 88 FL — SIGNIFICANT CHANGE UP (ref 80–100)
MONOCYTES # BLD AUTO: 0.72 K/UL — SIGNIFICANT CHANGE UP (ref 0–0.9)
MONOCYTES NFR BLD AUTO: 7.7 % — SIGNIFICANT CHANGE UP (ref 2–14)
NEUTROPHILS # BLD AUTO: 5.99 K/UL — SIGNIFICANT CHANGE UP (ref 1.8–7.4)
NEUTROPHILS NFR BLD AUTO: 64.5 % — SIGNIFICANT CHANGE UP (ref 43–77)
NRBC # FLD: 0 — SIGNIFICANT CHANGE UP
PLATELET # BLD AUTO: 201 K/UL — SIGNIFICANT CHANGE UP (ref 150–400)
PMV BLD: 10.3 FL — SIGNIFICANT CHANGE UP (ref 7–13)
POTASSIUM SERPL-MCNC: 3.5 MMOL/L — SIGNIFICANT CHANGE UP (ref 3.5–5.3)
POTASSIUM SERPL-SCNC: 3.5 MMOL/L — SIGNIFICANT CHANGE UP (ref 3.5–5.3)
RBC # BLD: 3.84 M/UL — SIGNIFICANT CHANGE UP (ref 3.8–5.2)
RBC # FLD: 14 % — SIGNIFICANT CHANGE UP (ref 10.3–14.5)
SODIUM SERPL-SCNC: 140 MMOL/L — SIGNIFICANT CHANGE UP (ref 135–145)
WBC # BLD: 9.3 K/UL — SIGNIFICANT CHANGE UP (ref 3.8–10.5)
WBC # FLD AUTO: 9.3 K/UL — SIGNIFICANT CHANGE UP (ref 3.8–10.5)

## 2017-12-25 PROCEDURE — 99233 SBSQ HOSP IP/OBS HIGH 50: CPT

## 2017-12-25 PROCEDURE — 71250 CT THORAX DX C-: CPT | Mod: 26

## 2017-12-25 RX ADMIN — SODIUM CHLORIDE 70 MILLILITER(S): 9 INJECTION INTRAMUSCULAR; INTRAVENOUS; SUBCUTANEOUS at 02:04

## 2017-12-25 RX ADMIN — Medication 50 MICROGRAM(S): at 05:43

## 2017-12-25 RX ADMIN — ALBUTEROL 2.5 MILLIGRAM(S): 90 AEROSOL, METERED ORAL at 10:50

## 2017-12-25 RX ADMIN — Medication 2: at 13:21

## 2017-12-25 RX ADMIN — Medication 1 TABLET(S): at 13:21

## 2017-12-25 RX ADMIN — HEPARIN SODIUM 5000 UNIT(S): 5000 INJECTION INTRAVENOUS; SUBCUTANEOUS at 05:43

## 2017-12-25 RX ADMIN — ALBUTEROL 2.5 MILLIGRAM(S): 90 AEROSOL, METERED ORAL at 04:03

## 2017-12-25 RX ADMIN — ALBUTEROL 2.5 MILLIGRAM(S): 90 AEROSOL, METERED ORAL at 21:52

## 2017-12-25 RX ADMIN — AZITHROMYCIN 250 MILLIGRAM(S): 500 TABLET, FILM COATED ORAL at 15:43

## 2017-12-25 RX ADMIN — LOSARTAN POTASSIUM 50 MILLIGRAM(S): 100 TABLET, FILM COATED ORAL at 05:43

## 2017-12-25 RX ADMIN — Medication 10: at 08:50

## 2017-12-25 RX ADMIN — CEFTRIAXONE 100 GRAM(S): 500 INJECTION, POWDER, FOR SOLUTION INTRAMUSCULAR; INTRAVENOUS at 15:43

## 2017-12-25 RX ADMIN — ALBUTEROL 2.5 MILLIGRAM(S): 90 AEROSOL, METERED ORAL at 16:49

## 2017-12-25 RX ADMIN — INSULIN GLARGINE 10 UNIT(S): 100 INJECTION, SOLUTION SUBCUTANEOUS at 22:56

## 2017-12-25 RX ADMIN — Medication 6: at 18:03

## 2017-12-25 RX ADMIN — SIMVASTATIN 20 MILLIGRAM(S): 20 TABLET, FILM COATED ORAL at 22:55

## 2017-12-25 NOTE — DISCHARGE NOTE ADULT - MEDICATION SUMMARY - MEDICATIONS TO TAKE
I will START or STAY ON the medications listed below when I get home from the hospital:    acetaminophen 325 mg oral tablet  -- 2 tab(s) by mouth every 6 hours, As needed, Mild Pain (1 - 3) and moderate pain  -- Indication: For Mild pain    losartan 50 mg oral tablet  -- 1 tab(s) by mouth once a day  -- Indication: For HTN (Hypertension)    repaglinide 2 mg oral tablet  -- 1 tab(s) by mouth 3 times a day (before meals)  -- Indication: For Diabetes    Janumet 50 mg-1000 mg oral tablet  -- 1 tab(s) by mouth 2 times a day  -- Indication: For Diabetes    simvastatin 20 mg oral tablet  -- 1 tab(s) by mouth once a day (at bedtime)  -- Indication: For Hyperlipidemia    albuterol 90 mcg/inh inhalation aerosol  -- 1 puff(s) inhaled every 4 hours  -- Indication: For Shortness of breath    ferrous sulfate 325 mg (65 mg elemental iron) oral tablet  -- 1 tab(s) by mouth once a day  -- Indication: For Supplement    levothyroxine 50 mcg (0.05 mg) oral tablet  -- 1 tab(s) by mouth once a day  -- Indication: For Hypothyroidism    HYDROcodone-homatropine 5 mg-1.5 mg/5 mL oral syrup  -- 5 milliliter(s) by mouth every 6 hours, As needed, Cough MDD:5 mL  -- Indication: For Cough     Calcium 600+D oral tablet  -- 1 tab(s) by mouth 2 times a day  -- Indication: For Supplement

## 2017-12-25 NOTE — DISCHARGE NOTE ADULT - HOSPITAL COURSE
71 yo Czech speaking female seen with Grand-daughter Magdalena in Ed who translated,  h/o DM II, HTN, HLD, Hypothyroidism c/o pNA 3 months ago and sick since.    Hospital Course       Community acquired pneumonia  - Ceftrixone and Azythro.   - Bcx negative   - CT chest - Right hilar/right upper lobe consolidative opacity, with partial right upper lobe collapse and narrowing of the right upper lobe bronchus. Findings are suspicious for malignancy.  - Dr Liao discussed results  w/ pt and family. They do not want any intervention. "What will be will be"        Chest pain, atypical.  - EKG  - CE negative  - Likely related to cough       Acquired hypothyroidism.  - Levothyroxane.       DM  - ISS  - HA1C HA1C 7.8       HTN  - Losartan.       HLD  - simvastatin.      Wheezing  - albuterol neb prn- reactive airways.       Prophylactic measure  - hep sq.       Pt medically stable for discharge home w/ PCP f/u 73 yo Danish speaking female seen with Grand-daughter Magdalena in Ed who translated,  h/o DM II, HTN, HLD, Hypothyroidism c/o pNA 3 months ago and sick since.    Hospital Course     Community acquired pneumonia  -S/P IV Solumedrol in ED  -CTX/Azithromycin  -BCx negative   -CT chest - R hilar/ upper lobe consolidative opacity, with partial RUL collapse and narrowing of the RUL bronchus. Findings are suspicious for malignancy.  -Dr. Liao discussed results  w/ pt and family. They do not want any intervention. "What will be will be"  -No further intervention  -Stable, F/U outpatient PCP    Chest pain, atypical.  -EKG, CE negative  -Likely related to cough   -Stable, F/U outpatient PCP    Acquired hypothyroidism.  -Synthroid    -Stable, F/U outpatient PCP    DM  -ISS  -HgbA1c 7.8   -Stable, F/U outpatient PCP    HTN  -Losartan.   -Stable, F/U outpatient PCP    HLD  -Simvastatin.  -Stable, F/U outpatient PCP    Wheezing  -Albuterol nebulizers/inhalers PRN - reactive airways.   -Stable, F/U outpatient PCP    Pt medically stable for discharge home w/ PCP F/U 71 yo Faroese speaking female seen with Grand-daughter Magdalena in Ed who translated,  h/o DM II, HTN, HLD, Hypothyroidism c/o pNA 3 months ago and sick since.    Hospital Course     Lung CA  -CT chest - R hilar/ upper lobe consolidative opacity, with partial RUL collapse and narrowing of the RUL bronchus. Findings are suspicious for malignancy.  -Dr. Liao discussed results  w/ pt and family. They do not want any intervention. "What will be will be"  -No further intervention  -Stable, F/U outpatient PCP    Chest pain, atypical.  -EKG, CE negative  -Likely related to cough   -Stable, F/U outpatient PCP  resolved prior to discharge     Acquired hypothyroidism.  -Synthroid    -Stable, F/U outpatient PCP    DM  -ISS  -HgbA1c 7.8   -Stable, F/U outpatient PCP    HTN  -Losartan.   -Stable, F/U outpatient PCP    HLD  -Simvastatin.  -Stable, F/U outpatient PCP    Wheezing  -Albuterol nebulizers/inhalers PRN - reactive airways.   -Stable, F/U outpatient PCP    Pt medically stable for discharge home w/ PCP F/U

## 2017-12-25 NOTE — DISCHARGE NOTE ADULT - SECONDARY DIAGNOSIS.
Lung abnormality HTN (Hypertension) Acquired hypothyroidism Type 2 diabetes mellitus without complication, without long-term current use of insulin

## 2017-12-25 NOTE — DISCHARGE NOTE ADULT - PLAN OF CARE
Please continue and complete your entire course of antibiotic. Please follow up with your primary care provider within 1 week of discharge. Call to schedule your appointment At this time you decline biopsy for further work up.   Please follow up with your primary care provider if you choose to have this evaluated in the future Suspicious for malignancy Continue synthroid Your HA1C on admission was   Continue a consistent carb diet Resolution Continue current medication regimen At this time you decline biopsy for further work up. Please follow up with your primary care provider if you choose to have this evaluated in the future. Continue current medication regimen. Stable. Monitor blood pressure routinely. Please follow up with your primary care provider within 1 week of discharge. Call to schedule your appointment. Please continue and complete your entire course of antibiotic. Please follow up with your primary care provider within 1 week of discharge. Call to schedule your appointment. Your HA1C on admission was 7.8. Continue a consistent carb diet. Continue home medications. Please follow up with your primary care provider within 1 week of discharge. Call to schedule your appointment. Continue Synthroid. Please follow up with your primary care provider within 1 week of discharge. Call to schedule your appointment.

## 2017-12-25 NOTE — DISCHARGE NOTE ADULT - PATIENT PORTAL LINK FT
“You can access the [facility] Patient Portal, offered by [facility], by registering with the following website: [email or web address]”

## 2017-12-25 NOTE — DISCHARGE NOTE ADULT - PROVIDER TOKENS
FREE:[LAST:[Dr. Lindsay Herrmann],PHONE:[(   )    -],FAX:[(   )    -],ADDRESS:[[facility]   PCP]]

## 2017-12-25 NOTE — DISCHARGE NOTE ADULT - CARE PLAN
Principal Discharge DX:	Community acquired pneumonia  Goal:	Resolution  Instructions for follow-up, activity and diet:	Please continue and complete your entire course of antibiotic. Please follow up with your primary care provider within 1 week of discharge. Call to schedule your appointment  Secondary Diagnosis:	Type 2 diabetes mellitus without complication, without long-term current use of insulin  Instructions for follow-up, activity and diet:	Your HA1C on admission was   Continue a consistent carb diet  Secondary Diagnosis:	Acquired hypothyroidism  Instructions for follow-up, activity and diet:	Continue synthroid  Secondary Diagnosis:	HTN (Hypertension)  Instructions for follow-up, activity and diet:	Continue current medication regimen  Secondary Diagnosis:	Lung abnormality  Goal:	Suspicious for malignancy  Instructions for follow-up, activity and diet:	At this time you decline biopsy for further work up.   Please follow up with your primary care provider if you choose to have this evaluated in the future Principal Discharge DX:	Community acquired pneumonia  Goal:	Resolution  Instructions for follow-up, activity and diet:	Please continue and complete your entire course of antibiotic. Please follow up with your primary care provider within 1 week of discharge. Call to schedule your appointment.  Secondary Diagnosis:	Type 2 diabetes mellitus without complication, without long-term current use of insulin  Instructions for follow-up, activity and diet:	Your HA1C on admission was 7.8. Continue a consistent carb diet. Continue home medications. Please follow up with your primary care provider within 1 week of discharge. Call to schedule your appointment.  Secondary Diagnosis:	Acquired hypothyroidism  Instructions for follow-up, activity and diet:	Continue Synthroid. Please follow up with your primary care provider within 1 week of discharge. Call to schedule your appointment.  Secondary Diagnosis:	HTN (Hypertension)  Instructions for follow-up, activity and diet:	Continue current medication regimen. Stable. Monitor blood pressure routinely. Please follow up with your primary care provider within 1 week of discharge. Call to schedule your appointment.  Secondary Diagnosis:	Lung abnormality  Goal:	Suspicious for malignancy  Instructions for follow-up, activity and diet:	At this time you decline biopsy for further work up. Please follow up with your primary care provider if you choose to have this evaluated in the future.

## 2017-12-26 VITALS
HEART RATE: 97 BPM | OXYGEN SATURATION: 99 % | SYSTOLIC BLOOD PRESSURE: 148 MMHG | DIASTOLIC BLOOD PRESSURE: 85 MMHG | TEMPERATURE: 99 F | RESPIRATION RATE: 18 BRPM

## 2017-12-26 LAB
GLUCOSE BLDC GLUCOMTR-MCNC: 178 MG/DL — HIGH (ref 70–99)
GLUCOSE BLDC GLUCOMTR-MCNC: 298 MG/DL — HIGH (ref 70–99)

## 2017-12-26 PROCEDURE — 99239 HOSP IP/OBS DSCHRG MGMT >30: CPT

## 2017-12-26 RX ORDER — ACETAMINOPHEN 500 MG
2 TABLET ORAL
Qty: 0 | Refills: 0 | DISCHARGE
Start: 2017-12-26

## 2017-12-26 RX ORDER — HYDROCODONE BITARTRATE AND HOMATROPINE METHYLBROMIDE 5; 1.5 MG/5ML; MG/5ML
5 SOLUTION ORAL
Qty: 5 | Refills: 0
Start: 2017-12-26

## 2017-12-26 RX ORDER — ALBUTEROL 90 UG/1
1 AEROSOL, METERED ORAL
Qty: 1 | Refills: 0
Start: 2017-12-26

## 2017-12-26 RX ADMIN — ALBUTEROL 2.5 MILLIGRAM(S): 90 AEROSOL, METERED ORAL at 10:19

## 2017-12-26 RX ADMIN — LOSARTAN POTASSIUM 50 MILLIGRAM(S): 100 TABLET, FILM COATED ORAL at 05:34

## 2017-12-26 RX ADMIN — ALBUTEROL 2.5 MILLIGRAM(S): 90 AEROSOL, METERED ORAL at 04:34

## 2017-12-26 RX ADMIN — HEPARIN SODIUM 5000 UNIT(S): 5000 INJECTION INTRAVENOUS; SUBCUTANEOUS at 05:34

## 2017-12-26 RX ADMIN — Medication 50 MICROGRAM(S): at 05:34

## 2017-12-26 RX ADMIN — Medication 6: at 12:39

## 2017-12-26 RX ADMIN — Medication 1 TABLET(S): at 11:02

## 2017-12-26 RX ADMIN — Medication 2: at 08:37

## 2017-12-26 NOTE — PROGRESS NOTE ADULT - PROBLEM SELECTOR PROBLEM 5
HTN (Hypertension)
Type 2 diabetes mellitus without complication, without long-term current use of insulin
Type 2 diabetes mellitus without complication, without long-term current use of insulin

## 2017-12-26 NOTE — PROGRESS NOTE ADULT - PROBLEM SELECTOR PLAN 5
c/w Losartan  monitor bp
uncontrolled A1C 7.8  Hyperglycemia, likely also due to solumedrol given in ER  monitor FS  hold oral hypoglycemics in the hospital   c/w lantus for now   corrective insulin coverage  resume home regimen upon d/c
uncontrolled A1C 7.8  Hyperglycemia, likely also due to solumedrol given in ER  monitor FS  hold oral hypoglycemics in the hospital   c/w lantus for now   corrective insulin coverage

## 2017-12-26 NOTE — PROGRESS NOTE ADULT - ASSESSMENT
73 yo Latvian speaking female h/o DM II, HTN, HLD, Hypothyroidism c/o pNA 3 months ago and sick since.  C/o R sided chesty pain/ epigastric pain w/o N/V/D/C.
71 yo Romansh speaking female h/o DM II, HTN, HLD, Hypothyroidism c/o pNA 3 months ago and sick since.  C/o R sided chesty pain/ epigastric pain w/o N/V/D/C.
73 yo Romansh speaking female h/o DM II, HTN, HLD, Hypothyroidism c/o pNA 3 months ago and sick since.  C/o R sided chesty pain/ epigastric pain w/o N/V/D/C.

## 2017-12-26 NOTE — DIETITIAN INITIAL EVALUATION ADULT. - OTHER INFO
Nutrition consult ordered for Nutrition education. 73 y/o female admitted with the DX of pneumonia, DM, HTN , observed to have 100% po, with no N/V/D or significant weight changes reported   at this time. Pt seems to have no prior knowledge about therapeutic diet, nutrition  education provided in detail  in [facility] to the pt. Diet/medication  compliance encouraged.  Labs reviewed, recommend to add DASH/TLC to current diet.

## 2017-12-26 NOTE — PROGRESS NOTE ADULT - SUBJECTIVE AND OBJECTIVE BOX
Patient is a 72y old  Female who presents with a chief complaint of Sob and chest pain (25 Dec 2017 18:03)      SUBJECTIVE / OVERNIGHT EVENTS:  Pt feels well, no complaints     MEDICATIONS  (STANDING):  ALBUTerol    0.083% 2.5 milliGRAM(s) Nebulizer every 6 hours  ALBUTerol    90 MICROgram(s) HFA Inhaler 1 Puff(s) Inhalation every 4 hours  azithromycin  IVPB 500 milliGRAM(s) IV Intermittent every 24 hours  calcium carbonate 1250 mG + Vitamin D (OsCal 500 + D) 1 Tablet(s) Oral daily  cefTRIAXone   IVPB 1 Gram(s) IV Intermittent every 24 hours  dextrose 5%. 1000 milliLiter(s) (50 mL/Hr) IV Continuous <Continuous>  dextrose 50% Injectable 12.5 Gram(s) IV Push once  dextrose 50% Injectable 25 Gram(s) IV Push once  dextrose 50% Injectable 25 Gram(s) IV Push once  heparin  Injectable 5000 Unit(s) SubCutaneous every 12 hours  insulin glargine Injectable (LANTUS) 10 Unit(s) SubCutaneous at bedtime  insulin lispro (HumaLOG) corrective regimen sliding scale   SubCutaneous three times a day before meals  insulin lispro (HumaLOG) corrective regimen sliding scale   SubCutaneous at bedtime  levothyroxine 50 MICROGram(s) Oral daily  losartan 50 milliGRAM(s) Oral daily  simvastatin 20 milliGRAM(s) Oral at bedtime    MEDICATIONS  (PRN):  acetaminophen   Tablet 650 milliGRAM(s) Oral every 6 hours PRN For Temp greater than 38 C (100.4 F)  acetaminophen   Tablet. 650 milliGRAM(s) Oral every 6 hours PRN Mild Pain (1 - 3) and moderate pain  dextrose Gel 1 Dose(s) Oral once PRN Blood Glucose LESS THAN 70 milliGRAM(s)/deciliter  glucagon  Injectable 1 milliGRAM(s) IntraMuscular once PRN Glucose LESS THAN 70 milligrams/deciliter  HYDROcodone/homatropine Syrup 5 milliLiter(s) Oral every 6 hours PRN Cough      T(C): 36.7 (12-25-17 @ 12:16), Max: 36.9 (12-24-17 @ 22:28)  HR: 87 (12-25-17 @ 16:49) (80 - 102)  BP: 154/73 (12-25-17 @ 12:16) (145/73 - 156/68)  RR: 17 (12-25-17 @ 12:16) (17 - 18)  SpO2: 97% (12-25-17 @ 16:49) (97% - 99%)  CAPILLARY BLOOD GLUCOSE      POCT Blood Glucose.: 283 mg/dL (25 Dec 2017 17:02)  POCT Blood Glucose.: 162 mg/dL (25 Dec 2017 11:14)  POCT Blood Glucose.: 351 mg/dL (25 Dec 2017 08:24)  POCT Blood Glucose.: 151 mg/dL (25 Dec 2017 08:21)  POCT Blood Glucose.: 176 mg/dL (24 Dec 2017 22:05)    I&O's Summary      PHYSICAL EXAM:  GENERAL: NAD, well-developed  HEAD:  Atraumatic, Normocephalic  EYES: EOMI, PERRLA, conjunctiva and sclera clear  NECK: Supple, No JVD  CHEST/LUNG: Clear to auscultation bilaterally; No wheeze  HEART: s1 s2, regular rhythm and rate   ABDOMEN: Soft, Nontender, Nondistended; Bowel sounds present  EXTREMITIES:  2+ Peripheral Pulses, No clubbing, cyanosis, or edema  PSYCH: AAOx3, calm   NEUROLOGY: non-focal  SKIN: No rashes or lesions    LABS:                        10.9   9.30  )-----------( 201      ( 25 Dec 2017 05:35 )             33.8     12-25    140  |  103  |  15  ----------------------------<  198<H>  3.5   |  24  |  0.76    Ca    8.6      25 Dec 2017 05:35  Phos  2.5     12-24  Mg     1.8     12-24    TPro  6.8  /  Alb  3.9  /  TBili  0.2  /  DBili  x   /  AST  15  /  ALT  20  /  AlkPhos  53  12-24      CARDIAC MARKERS ( 24 Dec 2017 05:40 )  x     / < 0.06 ng/mL / 116 u/L / x     / x              RADIOLOGY & ADDITIONAL TESTS:    Imaging Personally Reviewed:    Consultant(s) Notes Reviewed:      Care Discussed with Consultants/Other Providers:
Patient is a 72y old  Female who presents with a chief complaint of Shortness of breath and chest pain (25 Dec 2017 18:03)      SUBJECTIVE / OVERNIGHT EVENTS:  Pt was interviewed via translation service # [id number]. Pt feels well, wants to go home. no sob/cp/fever/cough. ambulating independently     MEDICATIONS  (STANDING):  ALBUTerol    0.083% 2.5 milliGRAM(s) Nebulizer every 6 hours  ALBUTerol    90 MICROgram(s) HFA Inhaler 1 Puff(s) Inhalation every 4 hours  azithromycin  IVPB 500 milliGRAM(s) IV Intermittent every 24 hours  calcium carbonate 1250 mG + Vitamin D (OsCal 500 + D) 1 Tablet(s) Oral daily  cefTRIAXone   IVPB 1 Gram(s) IV Intermittent every 24 hours  dextrose 5%. 1000 milliLiter(s) (50 mL/Hr) IV Continuous <Continuous>  dextrose 50% Injectable 12.5 Gram(s) IV Push once  dextrose 50% Injectable 25 Gram(s) IV Push once  dextrose 50% Injectable 25 Gram(s) IV Push once  heparin  Injectable 5000 Unit(s) SubCutaneous every 12 hours  insulin glargine Injectable (LANTUS) 10 Unit(s) SubCutaneous at bedtime  insulin lispro (HumaLOG) corrective regimen sliding scale   SubCutaneous three times a day before meals  insulin lispro (HumaLOG) corrective regimen sliding scale   SubCutaneous at bedtime  levothyroxine 50 MICROGram(s) Oral daily  losartan 50 milliGRAM(s) Oral daily  simvastatin 20 milliGRAM(s) Oral at bedtime    MEDICATIONS  (PRN):  acetaminophen   Tablet 650 milliGRAM(s) Oral every 6 hours PRN For Temp greater than 38 C (100.4 F)  acetaminophen   Tablet. 650 milliGRAM(s) Oral every 6 hours PRN Mild Pain (1 - 3) and moderate pain  dextrose Gel 1 Dose(s) Oral once PRN Blood Glucose LESS THAN 70 milliGRAM(s)/deciliter  glucagon  Injectable 1 milliGRAM(s) IntraMuscular once PRN Glucose LESS THAN 70 milligrams/deciliter  HYDROcodone/homatropine Syrup 5 milliLiter(s) Oral every 6 hours PRN Cough      T(C): 37.2 (12-26-17 @ 14:23), Max: 37.2 (12-26-17 @ 14:23)  HR: 97 (12-26-17 @ 14:23) (80 - 100)  BP: 148/85 (12-26-17 @ 14:23) (148/85 - 168/83)  RR: 18 (12-26-17 @ 14:23) (18 - 18)  SpO2: 99% (12-26-17 @ 14:23) (93% - 99%)  CAPILLARY BLOOD GLUCOSE      POCT Blood Glucose.: 298 mg/dL (26 Dec 2017 11:56)  POCT Blood Glucose.: 178 mg/dL (26 Dec 2017 08:16)  POCT Blood Glucose.: 169 mg/dL (25 Dec 2017 22:45)  POCT Blood Glucose.: 283 mg/dL (25 Dec 2017 17:02)    I&O's Summary      PHYSICAL EXAM:  GENERAL: NAD, well-developed  HEAD:  Atraumatic, Normocephalic  EYES: EOMI, PERRLA, conjunctiva and sclera clear  NECK: Supple, No JVD  CHEST/LUNG: Clear to auscultation bilaterally; No wheeze  HEART: s1 s2, regular rhythm and rate   ABDOMEN: Soft, Nontender, Nondistended; Bowel sounds present  EXTREMITIES:  2+ Peripheral Pulses, No clubbing, cyanosis, or edema  PSYCH: AAOx3, calm   NEUROLOGY: non-focal  SKIN: No rashes or lesions    LABS:                        10.9   9.30  )-----------( 201      ( 25 Dec 2017 05:35 )             33.8     12-25    140  |  103  |  15  ----------------------------<  198<H>  3.5   |  24  |  0.76    Ca    8.6      25 Dec 2017 05:35                RADIOLOGY & ADDITIONAL TESTS:    Imaging Personally Reviewed:    Consultant(s) Notes Reviewed:      Care Discussed with Consultants/Other Providers:
Patient is a 72y old  Female who presents with a chief complaint of Sob and chest pain (23 Dec 2017 16:47)      SUBJECTIVE / OVERNIGHT EVENTS:  Pt feels well, no complaints.     MEDICATIONS  (STANDING):  ALBUTerol    0.083% 2.5 milliGRAM(s) Nebulizer every 6 hours  ALBUTerol    90 MICROgram(s) HFA Inhaler 1 Puff(s) Inhalation every 4 hours  azithromycin  IVPB 500 milliGRAM(s) IV Intermittent every 24 hours  calcium carbonate 1250 mG + Vitamin D (OsCal 500 + D) 1 Tablet(s) Oral daily  cefTRIAXone   IVPB 1 Gram(s) IV Intermittent every 24 hours  dextrose 5%. 1000 milliLiter(s) (50 mL/Hr) IV Continuous <Continuous>  dextrose 50% Injectable 12.5 Gram(s) IV Push once  dextrose 50% Injectable 25 Gram(s) IV Push once  dextrose 50% Injectable 25 Gram(s) IV Push once  heparin  Injectable 5000 Unit(s) SubCutaneous every 12 hours  insulin glargine Injectable (LANTUS) 5 Unit(s) SubCutaneous at bedtime  insulin lispro (HumaLOG) corrective regimen sliding scale   SubCutaneous three times a day before meals  levothyroxine 50 MICROGram(s) Oral daily  losartan 50 milliGRAM(s) Oral daily  simvastatin 20 milliGRAM(s) Oral at bedtime  sodium chloride 0.9%. 1000 milliLiter(s) (70 mL/Hr) IV Continuous <Continuous>    MEDICATIONS  (PRN):  acetaminophen   Tablet 650 milliGRAM(s) Oral every 6 hours PRN For Temp greater than 38 C (100.4 F)  acetaminophen   Tablet. 650 milliGRAM(s) Oral every 6 hours PRN Mild Pain (1 - 3) and moderate pain  dextrose Gel 1 Dose(s) Oral once PRN Blood Glucose LESS THAN 70 milliGRAM(s)/deciliter  glucagon  Injectable 1 milliGRAM(s) IntraMuscular once PRN Glucose LESS THAN 70 milligrams/deciliter  HYDROcodone/homatropine Syrup 5 milliLiter(s) Oral every 6 hours PRN Cough      T(C): 36.8 (12-24-17 @ 15:08), Max: 36.8 (12-24-17 @ 15:08)  HR: 79 (12-24-17 @ 15:51) (72 - 100)  BP: 166/78 (12-24-17 @ 15:08) (133/71 - 166/78)  RR: 18 (12-24-17 @ 15:08) (18 - 18)  SpO2: 97% (12-24-17 @ 15:08) (97% - 100%)  CAPILLARY BLOOD GLUCOSE      POCT Blood Glucose.: 314 mg/dL (24 Dec 2017 17:59)  POCT Blood Glucose.: 456 mg/dL (24 Dec 2017 16:55)  POCT Blood Glucose.: 256 mg/dL (24 Dec 2017 12:09)  POCT Blood Glucose.: 228 mg/dL (24 Dec 2017 08:29)  POCT Blood Glucose.: 370 mg/dL (23 Dec 2017 21:10)    I&O's Summary      PHYSICAL EXAM:  GENERAL: NAD, well-developed  HEAD:  Atraumatic, Normocephalic  EYES: EOMI, PERRLA, conjunctiva and sclera clear  NECK: Supple, No JVD  CHEST/LUNG: Clear to auscultation bilaterally; No wheeze  HEART: s1 s2, regular rhythm and rate   ABDOMEN: Soft, Nontender, Nondistended; Bowel sounds present  EXTREMITIES:  2+ Peripheral Pulses, No clubbing, cyanosis, or edema  PSYCH: AAOx3, calm   NEUROLOGY: non-focal  SKIN: No rashes or lesions    LABS:                        11.0   10.96 )-----------( 187      ( 24 Dec 2017 05:40 )             35.0     12-24    135  |  100  |  18  ----------------------------<  358<H>  4.0   |  21<L>  |  0.75    Ca    8.5      24 Dec 2017 05:40  Phos  2.5     12-24  Mg     1.8     12-24    TPro  6.8  /  Alb  3.9  /  TBili  0.2  /  DBili  x   /  AST  15  /  ALT  20  /  AlkPhos  53  12-24      CARDIAC MARKERS ( 24 Dec 2017 05:40 )  x     / < 0.06 ng/mL / 116 u/L / x     / x      CARDIAC MARKERS ( 23 Dec 2017 18:05 )  x     / < 0.06 ng/mL / 109 u/L / x     / x      CARDIAC MARKERS ( 23 Dec 2017 12:00 )  x     / < 0.06 ng/mL / 115 u/L / 4.53 ng/mL / x              RADIOLOGY & ADDITIONAL TESTS:    Imaging Personally Reviewed:    Consultant(s) Notes Reviewed:      Care Discussed with Consultants/Other Providers:

## 2017-12-26 NOTE — PROGRESS NOTE ADULT - PROBLEM SELECTOR PLAN 2
Pt was treated with Ceftrixone and Azithro  no clinical signs of PNA  no indication for further abx
likely muscle skeletal from cough
c/w Ceftrixone and Azithro

## 2017-12-26 NOTE — PROGRESS NOTE ADULT - PROBLEM SELECTOR PLAN 1
c/w Ceftrixone and Azithro  CT chest to further characterize opacity in the lung
CT -chest suspecting lung ca  I discussed with pt again today via translation phone. She understands this is likely cancer. But she still refused further work up. She deems to have a capacity to make her medical decisions. Family also agrees that no bx or further work up.
CT -chest suspecting lung ca  I d/w pt and her son together at bedside. Pt refused further work up. Agreed by her son

## 2017-12-26 NOTE — DIETITIAN INITIAL EVALUATION ADULT. - NS AS NUTRI INTERV MEALS SNACK
General/healthful diet/Composition of meals/snacks/Energy - modified diet/Carbohydrate - modified diet

## 2017-12-26 NOTE — PROGRESS NOTE ADULT - PROBLEM SELECTOR PROBLEM 4
Acquired hypothyroidism
Type 2 diabetes mellitus without complication, without long-term current use of insulin
Acquired hypothyroidism

## 2017-12-26 NOTE — PROGRESS NOTE ADULT - PROBLEM SELECTOR PLAN 4
c/w levothyroxine
c/w levothyroxine
uncontrolled A1C 7.8  Hyperglycemia, likely also due to solumedrol given in ER  monitor FS  hold oral hypoglycemics in the hospital   c/w lantus for now   corrective insulin coverage

## 2017-12-26 NOTE — PROGRESS NOTE ADULT - PROBLEM SELECTOR PROBLEM 1
Malignant neoplasm of upper lobe of right lung
Community acquired pneumonia
Malignant neoplasm of upper lobe of right lung

## 2017-12-26 NOTE — PROGRESS NOTE ADULT - PROBLEM SELECTOR PLAN 3
likely muscle skeletal from cough vs pain related to cancer  no pain now
likely muscle skeletal from cough vs pain related to cancer
c/w levothyroxine

## 2017-12-26 NOTE — PROGRESS NOTE ADULT - PROBLEM SELECTOR PLAN 8
albuterol neb prn- reactive airways  no wheezing at this time
albuterol neb prn- reactive airways  no wheezing at this time
hep sq

## 2017-12-27 RX ORDER — HYDROCODONE BITARTRATE AND HOMATROPINE METHYLBROMIDE 5; 1.5 MG/5ML; MG/5ML
5 SOLUTION ORAL
Qty: 120 | Refills: 0
Start: 2017-12-27 | End: 2017-12-31

## 2017-12-28 LAB
BACTERIA BLD CULT: SIGNIFICANT CHANGE UP
BACTERIA BLD CULT: SIGNIFICANT CHANGE UP

## 2018-01-01 NOTE — DIETITIAN INITIAL EVALUATION ADULT. - PROBLEM SELECTOR PROBLEM 7
Occupational Therapy   Progress Note     Nani Sow   MRN: [id number]     OT Date of Treatment: 18   OT Start Time: 925  OT Stop Time: 945  OT Total Time (min): 20 min    Billable Minutes:  Therapeutic Activity 20    Precautions: standard,      Subjective   RN reports that patient is ok for OT.    Objective   Patient found with: PEG Tube, pulse ox (continuous), telemetry; Pt found supine in open crib.    Pain Assessment:  Crying: none  HR:WFL  O2 Sats:WFL  Expression: neutral     No apparent pain noted throughout session    Eye openin% of session  States of alertness:Active alert, quiet alert,   Stress signs: flailing of UE's    Treatment: Provided static touch for positive sensory input and containment. Transitioned pt to supported sitting on therapist lap and in crib for increased head control. Provided visual and auditory stimulation with therapist face and voice to promote attention and tracking.  Faciliated B LE gentle posterior pelvic tilts with B hip adduction and ankle dorsiflexion to promote flexion x10 reps.  Pacifier provided for positive oral stimulation and for calming and organizing. Pt positioned in sidelying to transition in prone. Provided containment in prone and facilitated cervical rotation and weight bearing of BUE's.  Placed crib toy to L side and provided gentle stretch to  encourage turning head to L side. Pt re-swaddled and positioned in L sidelying in open crib.   No family present for education.     Assessment   Summary/Analysis of evaluation: Pt continues to prefer to keep her head rotated to the right however tolerated gentle stretch and gazed towards crib toy when repositioned. Pt's head control in supported sitting was fair. She demonstrated hands to midline with facilitation of shoulder protraction in supported sitting. Pt demonstrated sucking and hands to mouth in prone and improved head control with added chest support. Pt tolerated handling fairly well with  minimal stress cues and vitals remained WFL.   Progress toward previous goals: Continue goals; progressing  Multidisciplinary Problems     Occupational Therapy Goals        Problem: Occupational Therapy Goal    Goal Priority Disciplines Outcome Interventions   Occupational Therapy Goal     OT, PT/OT Ongoing (interventions implemented as appropriate)    Description:  Goals to be met by: 9/12/18    Pt to be properly positioned 100% of time by family & staff   Pt will remain in quiet organized state for 50% of session   Pt will tolerate tactile stimulation with <50% signs of stress during 3 consecutive sessions   Pt eyes will remain open for 25% of session   Parents will demonstrate dev handling caregiving techniques while pt is calm & organized   Pt will tolerate prom to all 4 extremities with no tightness noted   Pt will bring hands to mouth & midline 2-3 times per session   Pt will maintain eye contact for 3-5 seconds for 3 trials in a session     Added nippling goals 8/18/18  PT WILL NIPPLE 100% OF FEEDS WITH GOOD SUCK & COORDINATION    PT WILL NIPPLE WITH 100% OF FEEDS WITH GOOD LATCH & SEAL       FAMILY WILL INDEPENDENTLY NIPPLE PT WITH ORAL STIMULATION AS NEEDED                              Patient would benefit from continued OT for oral/developmental stimulation, positioning, ROM, and family training.    Plan   Continue OT a minimum of 5 x/week to address oral/dev stimulation, positioning, family training, PROM.    Plan of Care Expires: 11/11/18    Shahab Kumari OT 2018   Wheezing

## 2019-05-01 ENCOUNTER — OUTPATIENT (OUTPATIENT)
Dept: OUTPATIENT SERVICES | Facility: HOSPITAL | Age: 74
LOS: 1 days | End: 2019-05-01
Payer: MEDICARE

## 2019-05-01 DIAGNOSIS — H26.9 UNSPECIFIED CATARACT: Chronic | ICD-10-CM

## 2019-05-05 ENCOUNTER — EMERGENCY (EMERGENCY)
Facility: HOSPITAL | Age: 74
LOS: 1 days | Discharge: HOME | End: 2019-05-05
Attending: EMERGENCY MEDICINE | Admitting: EMERGENCY MEDICINE
Payer: MEDICARE

## 2019-05-05 VITALS
DIASTOLIC BLOOD PRESSURE: 82 MMHG | SYSTOLIC BLOOD PRESSURE: 153 MMHG | TEMPERATURE: 98 F | OXYGEN SATURATION: 96 % | HEART RATE: 92 BPM | RESPIRATION RATE: 18 BRPM

## 2019-05-05 DIAGNOSIS — H26.9 UNSPECIFIED CATARACT: Chronic | ICD-10-CM

## 2019-05-05 PROCEDURE — 99285 EMERGENCY DEPT VISIT HI MDM: CPT | Mod: GC,25

## 2019-05-05 NOTE — ED ADULT TRIAGE NOTE - CHIEF COMPLAINT QUOTE
Pt who completed a course of rx'ed medications for TB presents with c/o sob and productive cough, denies hemoptysis.

## 2019-05-06 VITALS
DIASTOLIC BLOOD PRESSURE: 75 MMHG | OXYGEN SATURATION: 98 % | HEART RATE: 85 BPM | RESPIRATION RATE: 17 BRPM | SYSTOLIC BLOOD PRESSURE: 139 MMHG | TEMPERATURE: 98 F

## 2019-05-06 LAB
ALBUMIN SERPL ELPH-MCNC: 4 G/DL — SIGNIFICANT CHANGE UP (ref 3.3–5)
ALP SERPL-CCNC: 54 U/L — SIGNIFICANT CHANGE UP (ref 40–120)
ALT FLD-CCNC: 16 U/L — SIGNIFICANT CHANGE UP (ref 4–33)
ANION GAP SERPL CALC-SCNC: 16 MMO/L — HIGH (ref 7–14)
APPEARANCE UR: CLEAR — SIGNIFICANT CHANGE UP
AST SERPL-CCNC: 18 U/L — SIGNIFICANT CHANGE UP (ref 4–32)
B PERT DNA SPEC QL NAA+PROBE: NOT DETECTED — SIGNIFICANT CHANGE UP
BASE EXCESS BLDV CALC-SCNC: 2.6 MMOL/L — SIGNIFICANT CHANGE UP
BASOPHILS # BLD AUTO: 0.03 K/UL — SIGNIFICANT CHANGE UP (ref 0–0.2)
BASOPHILS NFR BLD AUTO: 0.4 % — SIGNIFICANT CHANGE UP (ref 0–2)
BILIRUB SERPL-MCNC: < 0.2 MG/DL — LOW (ref 0.2–1.2)
BILIRUB UR-MCNC: NEGATIVE — SIGNIFICANT CHANGE UP
BLOOD GAS VENOUS - CREATININE: 0.52 MG/DL — SIGNIFICANT CHANGE UP (ref 0.5–1.3)
BLOOD GAS VENOUS - FIO2: 21 — SIGNIFICANT CHANGE UP
BLOOD UR QL VISUAL: NEGATIVE — SIGNIFICANT CHANGE UP
BUN SERPL-MCNC: 20 MG/DL — SIGNIFICANT CHANGE UP (ref 7–23)
C PNEUM DNA SPEC QL NAA+PROBE: NOT DETECTED — SIGNIFICANT CHANGE UP
CALCIUM SERPL-MCNC: 9.4 MG/DL — SIGNIFICANT CHANGE UP (ref 8.4–10.5)
CHLORIDE BLDV-SCNC: 102 MMOL/L — SIGNIFICANT CHANGE UP (ref 96–108)
CHLORIDE SERPL-SCNC: 103 MMOL/L — SIGNIFICANT CHANGE UP (ref 98–107)
CO2 SERPL-SCNC: 25 MMOL/L — SIGNIFICANT CHANGE UP (ref 22–31)
COLOR SPEC: COLORLESS — SIGNIFICANT CHANGE UP
CREAT SERPL-MCNC: 0.67 MG/DL — SIGNIFICANT CHANGE UP (ref 0.5–1.3)
EOSINOPHIL # BLD AUTO: 0.08 K/UL — SIGNIFICANT CHANGE UP (ref 0–0.5)
EOSINOPHIL NFR BLD AUTO: 1.1 % — SIGNIFICANT CHANGE UP (ref 0–6)
FLUAV H1 2009 PAND RNA SPEC QL NAA+PROBE: NOT DETECTED — SIGNIFICANT CHANGE UP
FLUAV H1 RNA SPEC QL NAA+PROBE: NOT DETECTED — SIGNIFICANT CHANGE UP
FLUAV H3 RNA SPEC QL NAA+PROBE: NOT DETECTED — SIGNIFICANT CHANGE UP
FLUAV SUBTYP SPEC NAA+PROBE: NOT DETECTED — SIGNIFICANT CHANGE UP
FLUBV RNA SPEC QL NAA+PROBE: NOT DETECTED — SIGNIFICANT CHANGE UP
GAS PNL BLDV: 134 MMOL/L — LOW (ref 136–146)
GLUCOSE BLDV-MCNC: 204 MG/DL — HIGH (ref 70–99)
GLUCOSE SERPL-MCNC: 217 MG/DL — HIGH (ref 70–99)
GLUCOSE UR-MCNC: 30 — SIGNIFICANT CHANGE UP
HADV DNA SPEC QL NAA+PROBE: NOT DETECTED — SIGNIFICANT CHANGE UP
HCO3 BLDV-SCNC: 26 MMOL/L — SIGNIFICANT CHANGE UP (ref 20–27)
HCOV PNL SPEC NAA+PROBE: SIGNIFICANT CHANGE UP
HCT VFR BLD CALC: 34.7 % — SIGNIFICANT CHANGE UP (ref 34.5–45)
HCT VFR BLDV CALC: 33.8 % — LOW (ref 34.5–45)
HGB BLD-MCNC: 11 G/DL — LOW (ref 11.5–15.5)
HGB BLDV-MCNC: 10.9 G/DL — LOW (ref 11.5–15.5)
HMPV RNA SPEC QL NAA+PROBE: NOT DETECTED — SIGNIFICANT CHANGE UP
HPIV1 RNA SPEC QL NAA+PROBE: NOT DETECTED — SIGNIFICANT CHANGE UP
HPIV2 RNA SPEC QL NAA+PROBE: NOT DETECTED — SIGNIFICANT CHANGE UP
HPIV3 RNA SPEC QL NAA+PROBE: NOT DETECTED — SIGNIFICANT CHANGE UP
HPIV4 RNA SPEC QL NAA+PROBE: NOT DETECTED — SIGNIFICANT CHANGE UP
IMM GRANULOCYTES NFR BLD AUTO: 0.4 % — SIGNIFICANT CHANGE UP (ref 0–1.5)
KETONES UR-MCNC: NEGATIVE — SIGNIFICANT CHANGE UP
LACTATE BLDV-MCNC: 2.7 MMOL/L — HIGH (ref 0.5–2)
LEUKOCYTE ESTERASE UR-ACNC: NEGATIVE — SIGNIFICANT CHANGE UP
LYMPHOCYTES # BLD AUTO: 1.47 K/UL — SIGNIFICANT CHANGE UP (ref 1–3.3)
LYMPHOCYTES # BLD AUTO: 19.9 % — SIGNIFICANT CHANGE UP (ref 13–44)
MCHC RBC-ENTMCNC: 27.4 PG — SIGNIFICANT CHANGE UP (ref 27–34)
MCHC RBC-ENTMCNC: 31.7 % — LOW (ref 32–36)
MCV RBC AUTO: 86.5 FL — SIGNIFICANT CHANGE UP (ref 80–100)
MONOCYTES # BLD AUTO: 0.82 K/UL — SIGNIFICANT CHANGE UP (ref 0–0.9)
MONOCYTES NFR BLD AUTO: 11.1 % — SIGNIFICANT CHANGE UP (ref 2–14)
NEUTROPHILS # BLD AUTO: 4.95 K/UL — SIGNIFICANT CHANGE UP (ref 1.8–7.4)
NEUTROPHILS NFR BLD AUTO: 67.1 % — SIGNIFICANT CHANGE UP (ref 43–77)
NITRITE UR-MCNC: NEGATIVE — SIGNIFICANT CHANGE UP
NRBC # FLD: 0 K/UL — SIGNIFICANT CHANGE UP (ref 0–0)
NT-PROBNP SERPL-SCNC: 12.09 PG/ML — SIGNIFICANT CHANGE UP
PCO2 BLDV: 49 MMHG — SIGNIFICANT CHANGE UP (ref 41–51)
PH BLDV: 7.37 PH — SIGNIFICANT CHANGE UP (ref 7.32–7.43)
PH UR: 6.5 — SIGNIFICANT CHANGE UP (ref 5–8)
PLATELET # BLD AUTO: 216 K/UL — SIGNIFICANT CHANGE UP (ref 150–400)
PMV BLD: 10.2 FL — SIGNIFICANT CHANGE UP (ref 7–13)
PO2 BLDV: 39 MMHG — SIGNIFICANT CHANGE UP (ref 35–40)
POTASSIUM BLDV-SCNC: 3.6 MMOL/L — SIGNIFICANT CHANGE UP (ref 3.4–4.5)
POTASSIUM SERPL-MCNC: 4.2 MMOL/L — SIGNIFICANT CHANGE UP (ref 3.5–5.3)
POTASSIUM SERPL-SCNC: 4.2 MMOL/L — SIGNIFICANT CHANGE UP (ref 3.5–5.3)
PROT SERPL-MCNC: 6.9 G/DL — SIGNIFICANT CHANGE UP (ref 6–8.3)
PROT UR-MCNC: NEGATIVE — SIGNIFICANT CHANGE UP
RBC # BLD: 4.01 M/UL — SIGNIFICANT CHANGE UP (ref 3.8–5.2)
RBC # FLD: 13.8 % — SIGNIFICANT CHANGE UP (ref 10.3–14.5)
RSV RNA SPEC QL NAA+PROBE: NOT DETECTED — SIGNIFICANT CHANGE UP
RV+EV RNA SPEC QL NAA+PROBE: NOT DETECTED — SIGNIFICANT CHANGE UP
SAO2 % BLDV: 68.4 % — SIGNIFICANT CHANGE UP (ref 60–85)
SODIUM SERPL-SCNC: 144 MMOL/L — SIGNIFICANT CHANGE UP (ref 135–145)
SP GR SPEC: 1.01 — SIGNIFICANT CHANGE UP (ref 1–1.04)
TROPONIN T, HIGH SENSITIVITY: 11 NG/L — SIGNIFICANT CHANGE UP (ref ?–14)
TROPONIN T, HIGH SENSITIVITY: 12 NG/L — SIGNIFICANT CHANGE UP (ref ?–14)
UROBILINOGEN FLD QL: NORMAL — SIGNIFICANT CHANGE UP
WBC # BLD: 7.38 K/UL — SIGNIFICANT CHANGE UP (ref 3.8–10.5)
WBC # FLD AUTO: 7.38 K/UL — SIGNIFICANT CHANGE UP (ref 3.8–10.5)

## 2019-05-06 PROCEDURE — 71045 X-RAY EXAM CHEST 1 VIEW: CPT | Mod: 26

## 2019-05-06 PROCEDURE — 99234 HOSP IP/OBS SM DT SF/LOW 45: CPT

## 2019-05-06 PROCEDURE — 71275 CT ANGIOGRAPHY CHEST: CPT | Mod: 26

## 2019-05-06 PROCEDURE — 99285 EMERGENCY DEPT VISIT HI MDM: CPT | Mod: GC

## 2019-05-06 RX ORDER — AZITHROMYCIN 500 MG/1
1 TABLET, FILM COATED ORAL
Qty: 4 | Refills: 0
Start: 2019-05-06 | End: 2019-05-09

## 2019-05-06 RX ORDER — SITAGLIPTIN 50 MG/1
50 TABLET, FILM COATED ORAL
Refills: 0 | Status: DISCONTINUED | OUTPATIENT
Start: 2019-05-06 | End: 2019-05-09

## 2019-05-06 RX ORDER — LEVOTHYROXINE SODIUM 125 MCG
88 TABLET ORAL DAILY
Refills: 0 | Status: DISCONTINUED | OUTPATIENT
Start: 2019-05-06 | End: 2019-05-09

## 2019-05-06 RX ORDER — SODIUM CHLORIDE 9 MG/ML
1000 INJECTION INTRAMUSCULAR; INTRAVENOUS; SUBCUTANEOUS ONCE
Refills: 0 | Status: COMPLETED | OUTPATIENT
Start: 2019-05-06 | End: 2019-05-06

## 2019-05-06 RX ORDER — METFORMIN HYDROCHLORIDE 850 MG/1
1000 TABLET ORAL
Refills: 0 | Status: DISCONTINUED | OUTPATIENT
Start: 2019-05-06 | End: 2019-05-09

## 2019-05-06 RX ORDER — SIMVASTATIN 20 MG/1
20 TABLET, FILM COATED ORAL AT BEDTIME
Refills: 0 | Status: DISCONTINUED | OUTPATIENT
Start: 2019-05-06 | End: 2019-05-09

## 2019-05-06 RX ORDER — LOSARTAN POTASSIUM 100 MG/1
25 TABLET, FILM COATED ORAL DAILY
Refills: 0 | Status: DISCONTINUED | OUTPATIENT
Start: 2019-05-06 | End: 2019-05-09

## 2019-05-06 RX ORDER — AZITHROMYCIN 500 MG/1
500 TABLET, FILM COATED ORAL ONCE
Refills: 0 | Status: COMPLETED | OUTPATIENT
Start: 2019-05-06 | End: 2019-05-06

## 2019-05-06 RX ADMIN — AZITHROMYCIN 500 MILLIGRAM(S): 500 TABLET, FILM COATED ORAL at 16:40

## 2019-05-06 RX ADMIN — LOSARTAN POTASSIUM 25 MILLIGRAM(S): 100 TABLET, FILM COATED ORAL at 07:34

## 2019-05-06 RX ADMIN — Medication 88 MICROGRAM(S): at 07:33

## 2019-05-06 RX ADMIN — METFORMIN HYDROCHLORIDE 1000 MILLIGRAM(S): 850 TABLET ORAL at 09:36

## 2019-05-06 RX ADMIN — SODIUM CHLORIDE 2000 MILLILITER(S): 9 INJECTION INTRAMUSCULAR; INTRAVENOUS; SUBCUTANEOUS at 00:53

## 2019-05-06 RX ADMIN — SITAGLIPTIN 50 MILLIGRAM(S): 50 TABLET, FILM COATED ORAL at 09:36

## 2019-05-06 NOTE — ED ADULT NURSE NOTE - OBJECTIVE STATEMENT
Pt with TB being treated, presents to ED with new onset CP and cough that has become worse today. pt a&ox4 and ambulatory with assistance. Pt skin intact, respirations even and unlabored, abd soft and non-distended. will draw labs and medicate as per ordered. will continue to monitor.

## 2019-05-06 NOTE — ED CDU PROVIDER INITIAL DAY NOTE - OBJECTIVE STATEMENT
translation performed by granddaughter as per pt request:  74 yoF, PMHx of htn, hld, dm, 10 months of TB tx finishing end of april, had a prior admission few months back for possible lung CA workup, however at that time pt  refused furtehr testing,  presents to ED with report of CP+ sobthat started around 22:30. Has had cough for months know with clear sputum but worse over last 24 hours. no change in sputum.  denies any headaches, neck pain, fevers, chills, n/v/d, abdominal pain, urinary symptoms, numbness/weakness/tingling, recent travel, weight loss, night sweats, sick contacts, social history. CTA in er showing multiple nodules, sent to cdu for stress test, pulm consult

## 2019-05-06 NOTE — ED CDU PROVIDER DISPOSITION NOTE - ATTENDING CONTRIBUTION TO CARE
Dr. Miller: I performed a face to face bedside interview with patient regarding history of present illness, review of symptoms and past medical history. I completed an independent physical exam.  I have discussed patient's plan of care with PA.   I agree with note as stated above, having amended the EMR as needed to reflect my findings.   This includes HISTORY OF PRESENT ILLNESS, HIV, PAST MEDICAL/SURGICAL/FAMILY/SOCIAL HISTORY, ALLERGIES AND HOME MEDICATIONS, REVIEW OF SYSTEMS, PHYSICAL EXAM, and any PROGRESS NOTES during the time I functioned as the attending physician for this patient.

## 2019-05-06 NOTE — CONSULT NOTE ADULT - SUBJECTIVE AND OBJECTIVE BOX
Forrest Rosenthal MD  Interventional Cardiology / Advance Heart Failure and Cardiac Transplant Specialist  [place] Office : [address] [place] [address] [place] [address]  Tel:   [place] Office : [address] [facility] [place] [address]  Tel: [phone]  Cell : [phone]    HISTORY OF PRESENT ILLNESS:  74F PMH HTN, HLD, DMT2 Pulmonary TB (s/p 10 months of TB tx finishing end of april; Has letter from [facility] stating this) presents to ED with report of CP and SOB that started last night. Patient states that over the last couple of weeks to months she has been having cough productive of clear sputum with associated SOB; however, over the last 24 hours, she feels these symptoms have worsened. She recently completed TB treatment with a physician in [place]. She has not experienced any known weight gain or loss, no hemoptysis, no night sweats.     In ED: T: 97.9  BP: 166/87  HR: 85  RR: 18  SpO2: 98% RA. Patient was provided with CTangio of the chest which demonstrates worsening Right sided consolidation, a new spiculated mass with central cavitation and RUL mass lesion. These findings are compared to her last CT chest from 2017. Patient states that she does not wish or any invasive diagnostic testing including bronchoscopy, VATS, or biopsy.     Cardiology called for chest pains, pt says that she doesnt have exertional chest pains    PAST MEDICAL & SURGICAL HISTORY:  Hypothyroidism  DM (Diabetes Mellitus)  Dyslipidemia  HTN (Hypertension)  Cataract of left eye    	    MEDICATIONS:  losartan 25 milliGRAM(s) Oral daily            levothyroxine 88 MICROGram(s) Oral daily  metFORMIN 1000 milliGRAM(s) Oral two times a day  simvastatin 20 milliGRAM(s) Oral at bedtime  sitaGLIPtin 50 milliGRAM(s) Oral <User Schedule>        FAMILY HISTORY:  Family history of heart attack (Mother): mother        Allergies    No Known Allergies    Intolerances    	      PHYSICAL EXAM:  T(C): --  HR: --  BP: --  RR: --  SpO2: --  Wt(kg): --  I&O's Summary      Appearance: Normal	  HEENT:   Normal oral mucosa, PERRL, EOMI	  Cardiovascular: Normal S1 S2, No JVD, No murmurs, No edema  Respiratory: Lungs clear to auscultation	  Gastrointestinal:  Soft, Non-tender, + BS	  Extremities: Normal range of motion, No clubbing, cyanosis or edema    LABS:	 	    CARDIAC MARKERS:                    proBNP:   Lipid Profile:   HgA1c:   TSH:     ASSESSMENT/PLAN:
HPI:  74F PMH HTN, HLD, DMT2 Pulmonary TB (s/p 10 months of TB tx finishing end of april; Has letter from [facility] stating this) presents to ED with report of CP and SOB that started last night. Patient states that over the last couple of weeks to months she has been having cough productive of clear sputum with associated SOB; however, over the last 24 hours, she feels these symptoms have worsened. She recently completed TB treatment with a physician in [place]. She has not experienced any known weight gain or loss, no hemoptysis, no night sweats.     In ED: T: 97.9  BP: 166/87  HR: 85  RR: 18  SpO2: 98% RA. Patient was provided with CTangio of the chest which demonstrates worsening Right sided consolidation, a new spiculated mass with central cavitation and RUL mass lesion. These findings are compared to her last CT chest from . Patient states that she does not wish or any invasive diagnostic testing including bronchoscopy, VATS, or biopsy. Pulmonary was consulted for further management options.     PAST MEDICAL & SURGICAL HISTORY:  Hypothyroidism  DM (Diabetes Mellitus)  Dyslipidemia  HTN (Hypertension)  Cataract of left eye      FAMILY HISTORY:  Family history of heart attack (Mother): mother      SOCIAL HISTORY:  Smoking: Denies  EtOH Use: Denies  Marital Status:   Occupation: None  Exposures: Wood and Coal burning in [place]  Recent Travel: No international travel. Has been to [place] recently to be treated for pulmonary TB    Allergies    No Known Allergies    Intolerances        HOME MEDICATIONS:    REVIEW OF SYSTEMS:  CONSTITUTIONAL: No weakness, fevers or chills  EYES/ENT: No visual changes;  No vertigo or throat pain   NECK: No pain or stiffness  RESPIRATORY: No cough, wheezing, hemoptysis; No shortness of breath  CARDIOVASCULAR: No chest pain or palpitations  GASTROINTESTINAL: No abdominal or epigastric pain. No nausea, vomiting, or hematemesis; No diarrhea or constipation. No melena or hematochezia.  GENITOURINARY: No dysuria, frequency or hematuria  NEUROLOGICAL: No numbness or weakness  SKIN: No itching, burning, rashes, or lesions   All other review of systems is negative unless indicated above.    OBJECTIVE:  ICU Vital Signs Last 24 Hrs  T(C): 36.6 (06 May 2019 15:12), Max: 36.8 (05 May 2019 23:41)  T(F): 97.9 (06 May 2019 15:12), Max: 98.2 (05 May 2019 23:41)  HR: 85 (06 May 2019 15:12) (77 - 93)  BP: 139/75 (06 May 2019 15:12) (139/75 - 166/87)  BP(mean): --  ABP: --  ABP(mean): --  RR: 17 (06 May 2019 15:12) (16 - 18)  SpO2: 98% (06 May 2019 15:12) (96% - 100%)        CAPILLARY BLOOD GLUCOSE      POCT Blood Glucose.: 161 mg/dL (06 May 2019 12:45)      PHYSICAL EXAM:  General: WN/WD. Frail and tired appearing  Neurology: A&Ox3, nonfocal, MOHR x 4  Eyes: PERRLA/ EOMI, Gross vision intact  ENT/Neck: Neck supple, trachea midline, No JVD, Gross hearing intact  Respiratory: CTA B/L, No wheezing, rales, rhonchi  CV: RRR, +S1/S2, -S3/S4, no murmurs, rubs or gallops  Abdominal: Soft, NT, ND +BS, No HSM  MSK: 4+/5 strength UE/LE bilaterally  Extremities: No edema, 2+ peripheral pulses  Skin: No Rashes, Hematoma, Ecchymosis      HOSPITAL MEDICATIONS:  MEDICATIONS  (STANDING):  levothyroxine 88 MICROGram(s) Oral daily  losartan 25 milliGRAM(s) Oral daily  metFORMIN 1000 milliGRAM(s) Oral two times a day  simvastatin 20 milliGRAM(s) Oral at bedtime  sitaGLIPtin 50 milliGRAM(s) Oral <User Schedule>    MEDICATIONS  (PRN):      LABS:                        11.0   7.38  )-----------( 216      ( 06 May 2019 00:53 )             34.7         144  |  103  |  20  ----------------------------<  217<H>  4.2   |  25  |  0.67    Ca    9.4      06 May 2019 00:53    TPro  6.9  /  Alb  4.0  /  TBili  < 0.2<L>  /  DBili  x   /  AST  18  /  ALT  16  /  AlkPhos  54  -06      Urinalysis Basic - ( 06 May 2019 03:30 )    Color: COLORLESS / Appearance: CLEAR / S.015 / pH: 6.5  Gluc: 30 / Ketone: NEGATIVE  / Bili: NEGATIVE / Urobili: NORMAL   Blood: NEGATIVE / Protein: NEGATIVE / Nitrite: NEGATIVE   Leuk Esterase: NEGATIVE / RBC: x / WBC x   Sq Epi: x / Non Sq Epi: x / Bacteria: x        Venous Blood Gas:  05-06 @ 00:53  7.37/49/39/26/68.4  VBG Lactate: 2.7      MICROBIOLOGY:     RADIOLOGY:  [x] Reviewed by me    CTA 2019  - No PE  - Increased RUL consolidation  - RUL spiculated lesion with cavitation  - RUL mass

## 2019-05-06 NOTE — ED CDU PROVIDER DISPOSITION NOTE - CLINICAL COURSE
74F with pmh of HTN, HL, TB (finished treatment 1 month ago, s/p negative TB testing later ) p/w chest pain . also with chronic cough, increased x 1d. No fever. In ED pt had CT showing chronic lung disease, multiple nodules, EKG nonischemic, trop 11->12. Pt accepted to CDU  for tele monitoring, stress test and pulmonary consult. Pt was evaluated by pulm,  recommended discharge home on Zpack and outpatient f/u. Pt was unable to get a stress test as machine was down and won't be fixed for another 2 days. Pt was seen by cardiology and was cleared for discharge as can get outpatient stress test scheduled. Pt discharged home with Z-pack and cardiology and pulmonary follow up.

## 2019-05-06 NOTE — CONSULT NOTE ADULT - ATTENDING COMMENTS
Patient is poor historian but hx of recently treated TB for ten months and followed by [facility], now comes to ER with acutely worsening cough. CT chest shows diffuse nodular disease, cavity RUL, and atelectesis. Last film at [facility] from 2017. Patient is afebfile, denies fevers, chills, sweats, weight loss. Patient refusing any invasive workup at this time. Recommend empiric treatment of bronchitis with zithromax, patient should be given copy of recent CT scan to show Dept of [facility] physician. I called and left message with [facility] physician.

## 2019-05-06 NOTE — ED PROVIDER NOTE - CLINICAL SUMMARY MEDICAL DECISION MAKING FREE TEXT BOX
Mariya PGY1- 74 yoF, recent tb tx, 10 months, finished in april, new cp and sob tonight, pain with coughing, sob at rest, no fever, +N/V  lungs cta, heart rrr, abd soft, extremities wwp, normal neuro exam  cxr, ekg, labs, ivf, rvp, CTAPE  likely admit for r/o ACS, eval for pna, uti, flu, PE

## 2019-05-06 NOTE — ED CDU PROVIDER INITIAL DAY NOTE - PROGRESS NOTE DETAILS
Received signout from ERIS Alegria: 74yF w/pmhx HTN, HLD, DM, recently finished 10 month treatment for TB with negative repeat labwork and sputum cultures presenting with shortness of breath. CTa showing multiple nodules, sent tot CDU for stress test (given chest pain as well) and pulmonary consult. This morning pt is feeling well, paged pulm fellow. re-paged pulm fellow Spoke with pulm fellow who will see pt in the CDU Pt seen by Pulm, pt is refusing any invasive diagnostic procedures, recommend d/c home with z-pack. She can follow up with pulm clinic and also would her to bring CD images to the [facility] provider she saw we diagnosed and treated her TB. Cardiologist  saw pt in the ED< unable to have nuclear stress test today as equipment is not functioning. He feels pt can have outpt stress test scheduled and provided her with his office information, no need for further CDU stay. Discussed d/c home with pt who agrees with this plan.

## 2019-05-06 NOTE — ED PROVIDER NOTE - OBJECTIVE STATEMENT
74 yoF, PMHx of htn, hld, dm, 10 months of TB tx finishing end of april presents to ED with report of CP that started around 22:30. Associated with SOB. Has had cough for months know with clear sputum but worse over last 24 hours. no change in sputum. no fever. n/v over last 24 hours. Eating as normal. no abd pain. no changes in bowel/bladder. no recent travel. No hx of VTE. no calf pain/swelling. no recent cardiac w/u

## 2019-05-06 NOTE — CONSULT NOTE ADULT - ASSESSMENT
1) Chest pains - atypical with cough, EKG ok , stress test machine broken , can get stress test as out pt    2) DM2 - on metformin     3) Hypothyroidism - on synthroid
74F PMH HTN, HLD, DMT2 Pulmonary TB (s/p 10 months of TB tx finishing end of april; Has letter from [facility] stating this) presents to ED with report of CP and SOB with CTA demonstrating no evidence of PE and worsening right sided consolidation, a new spiculated mass with central cavitation and RUL mass lesion. These findings are difficult to interpret in the context of her recent pulmonary TB infection and treatment; however, are concerning for neoplastic and infectious causes.     Patient was adamant  that she would not want any invasive testing to elucidate these new CT chest findings. She understands that this may mean that she has a diagnosis of cancer and will not get treatment, or, will get delayed treatment if she changes her mind later. For now, due to increasing consolidation, SOB, and cough, we will attempt to allay her symptoms with antibiotic treatment.     # SOB, Cough  - Will treat with 7d of Azithromycin po, this can be done as outpatient  - Would recommend further neoplastic workup for the RUL mass and cavitary lesion; however, patient is deferring for now  - Spoke with patient about getting in touch with her Pulmonologist/[facility] and relay these new findings  - Spoke with CDU provider regarding obtaining medical records        Tex Lopez MD  PGY-4  Pulmonary and Critical Care Fellow  Pager: [phone]

## 2019-05-06 NOTE — ED PROVIDER NOTE - PHYSICAL EXAMINATION
PHYSICAL EXAM:  GENERAL: NAD, well-groomed, well-developed  HEAD:  Atraumatic, Normocephalic  EYES: EOMI, PERRLA, conjunctiva and sclera clear  ENMT: No tonsillar erythema, exudates, or enlargement; tacky mucous membranes  NECK: Supple, No JVD  HEART: Regular rate and rhythm; No murmurs, rubs, or gallops  RESPIRATORY: CTA B/L, No W/R/R, coughng during exam  ABDOMEN: Soft, Nontender, Nondistended  NEURO: A&Ox3, nonfocal, moving all extremities  EXTREMITIES:  2+ Peripheral Pulses, No clubbing, cyanosis, or edema  SKIN: warm, dry, normal color, no rash

## 2019-05-06 NOTE — ED PROVIDER NOTE - PROGRESS NOTE DETAILS
Haver[place] PGY1- CT concerning for malignant mass, pt and family aware as that had been discussed after abnormal ct scan 1 year prior, will CDU for stress test, pulm consult, 2nd trop pending

## 2019-05-06 NOTE — ED PROVIDER NOTE - ATTENDING CONTRIBUTION TO CARE
MD Perez:  I performed a face to face bedside interview with patient regarding history of present illness, review of symptoms and past medical history. I completed an independent physical exam(documented below).  I have discussed patient's plan of care with resident.   I agree with note as stated above, having amended the EMR as needed to reflect my findings. I have discussed the assessment and plan of care.  This includes during the time I functioned as the attending physician for this patient.  PE:  Gen: Alert, NAD  Head: NC, AT,  EOMI, normal lids/conjunctiva  ENT:  normal hearing, patent oropharynx without erythema/exudate  Neck: +supple, no tenderness/meningismus/JVD, +Trachea midline  Chest: no chest wall tenderness, equal chest rise  Pulm: Bilateral BS, normal resp effort, mild crackles in bases  CV: tachy, no M/R/G, +dist pulses  Abd: +BS, soft, NT/ND  Rectal: deferred  Mskel: no edema/erythema/cyanosis  Skin: no rash  Neuro: AAOx3  MDM:   73yo F w/ pmh o htn, hld, DM, s/p recent completion of 10month TB treatment, presents c/o acute on chronic cough productive of clear sputum, associated w/ chills (no fevers), chest pain only when coughing, and sob even at rest. No hx of dvt/pe, but pt's CT chest from 2017 had findings concerning for malignancy and partial R upper lobe collapse and pt and her granddaughter are stating they were not aware of these findings, and it is unclear if these findings represented what was eventually treated as TB therefore will get labs and CTA here to r/o PE and evaluate lung parenchyma. If symptoms cannot be explained with workup, likely admit for r/o atypical acs.

## 2019-05-06 NOTE — ED CDU PROVIDER DISPOSITION NOTE - NS_OBSORDERDATE_ED_A_ED
Problem: BH Patient Care Overview (Adult)  Goal: Plan of Care Review  Outcome: Ongoing (interventions implemented as appropriate)    12/01/17 0228   Coping/Psychosocial Response Interventions   Plan Of Care Reviewed With patient   Coping/Psychosocial   Patient Agreement with Plan of Care agrees   Patient Care Overview   Progress no change         Problem:  Overarching Goals  Goal: Adheres to Safety Considerations for Self and Others  Outcome: Ongoing (interventions implemented as appropriate)  Goal: Optimized Coping Skills in Response to Life Stressors  Outcome: Ongoing (interventions implemented as appropriate)  Goal: Develops/Participates in Therapeutic [place] to Support Successful Transition  Outcome: Ongoing (interventions implemented as appropriate)       06-May-2019 05:11

## 2019-05-06 NOTE — ED CDU PROVIDER INITIAL DAY NOTE - DETAILS
pt with known possible lung CA, cta showing multiple nodules, presented to ER  for c/p, sob, sent to cdu for stress, Pulm consult

## 2019-05-06 NOTE — ED CDU PROVIDER INITIAL DAY NOTE - ATTENDING CONTRIBUTION TO CARE
CDU MD Perez:  I performed a face to face bedside interview with patient regarding history of present illness, review of symptoms and past medical history. I completed an independent physical exam.  I have discussed patient's plan of care with PA.   I agree with note as stated above, having amended the EMR as needed to reflect my findings. I have discussed the assessment and plan of care.  This includes during the time I functioned as the attending physician for this patient.   PE:  Gen: Alert, NAD  Head: NC, AT,  EOMI, normal lids/conjunctiva  ENT:  normal hearing, patent oropharynx without erythema/exudate  Neck: +supple, no tenderness/meningismus/JVD, +Trachea midline  Chest: no chest wall tenderness, equal chest rise  Pulm: Bilateral BS, normal resp effort, mild crackles in bases  CV: tachy, no M/R/G, +dist pulses  Abd: +BS, soft, NT/ND  Rectal: deferred  Mskel: no edema/erythema/cyanosis  Skin: no rash  Neuro: AAOx3

## 2019-05-06 NOTE — ED CDU PROVIDER DISPOSITION NOTE - NSFOLLOWUPINSTRUCTIONS_ED_ALL_ED_FT
Follow up in the Pulmonary clinic within one week, call [phone] to schedule an appointment   Follow up with  within one week to schedule a stress test, referral information provided  Follow up with your primary care provider within 48 hours  Take Azithromycin 250mg once daily for 4 days  Return to the ER with any worsening or concerning symptoms, increased chest pain, shortness of breath, cough, weakness, fever/chills or any other concerns

## 2019-05-07 DIAGNOSIS — Z71.89 OTHER SPECIFIED COUNSELING: ICD-10-CM

## 2019-05-07 LAB
BACTERIA UR CULT: SIGNIFICANT CHANGE UP
SPECIMEN SOURCE: SIGNIFICANT CHANGE UP

## 2019-10-25 ENCOUNTER — EMERGENCY (EMERGENCY)
Facility: HOSPITAL | Age: 74
LOS: 1 days | Discharge: HOME | End: 2019-10-25
Attending: EMERGENCY MEDICINE | Admitting: EMERGENCY MEDICINE
Payer: MEDICARE

## 2019-10-25 VITALS
RESPIRATION RATE: 28 BRPM | DIASTOLIC BLOOD PRESSURE: 70 MMHG | SYSTOLIC BLOOD PRESSURE: 170 MMHG | OXYGEN SATURATION: 98 % | HEART RATE: 103 BPM | TEMPERATURE: 98 F

## 2019-10-25 DIAGNOSIS — H26.9 UNSPECIFIED CATARACT: Chronic | ICD-10-CM

## 2019-10-25 PROCEDURE — 99284 EMERGENCY DEPT VISIT MOD MDM: CPT | Mod: GC

## 2019-10-25 RX ORDER — ACETYLCYSTEINE 200 MG/ML
4 VIAL (ML) MISCELLANEOUS ONCE
Refills: 0 | Status: COMPLETED | OUTPATIENT
Start: 2019-10-25 | End: 2019-10-25

## 2019-10-25 RX ORDER — ACETAMINOPHEN WITH CODEINE 300MG-30MG
15 TABLET ORAL ONCE
Refills: 0 | Status: DISCONTINUED | OUTPATIENT
Start: 2019-10-25 | End: 2019-10-25

## 2019-10-25 RX ADMIN — Medication 100 MILLIGRAM(S): at 23:13

## 2019-10-25 RX ADMIN — Medication 15 MILLILITER(S): at 23:26

## 2019-10-25 RX ADMIN — Medication 4 MILLILITER(S): at 23:42

## 2019-10-25 NOTE — ED PROVIDER NOTE - OBJECTIVE STATEMENT
Rhea: 74 F, trouble breathing, coughing clear sputum. Cough x years (h/o TB dx'd by sputum expectoration, s/p 10-month treatment ended 6 months ago (had test-of-cure), had yellow sputum, now has clear sputum). Mild F last night. Chills. Myalgias. Constipation. Throat hurts when coughs. No travel. CP worse with cough. Pt. had RUL mass in 5/19; refused bronch. Rhea: 74 F, trouble breathing, coughing clear sputum. Cough x years (h/o TB dx'd by sputum expectoration, s/p 10-month treatment ended 6 months ago (had test-of-cure), had yellow sputum, now has clear sputum). Mild F last night. Chills. Myalgias. Constipation. Throat hurts when coughs. No travel. CP worse with cough. Pt. had RUL mass in 5/19; refused bronch. No F/night sweats/wt. loss.

## 2019-10-25 NOTE — ED PROVIDER NOTE - PHYSICAL EXAMINATION
Well appearing, well nourished, awake, alert, oriented to person, place, time/situation and in mild cough distress.    Airway patent    Eyes without scleral injection. No jaundice.    Strong pulse.    Respirations unlabored.    Abdomen soft, non-tender, no guarding.    Spine appears normal, range of motion is not limited, no muscle or joint tenderness    Alert and oriented, no gross motor or sensory deficits.    Skin normal color for race, warm, dry and intact. No evidence of rash.    No SI/HI.

## 2019-10-25 NOTE — ED PROVIDER NOTE - CLINICAL SUMMARY MEDICAL DECISION MAKING FREE TEXT BOX
Rhea: Likely lung mass causing cough. Refusing Bx. DDx includes recurrent TB. Check CT angio. Symptom control.

## 2019-10-25 NOTE — ED PROVIDER NOTE - NS ED ROS FT
Well appearing, well nourished, awake, alert, oriented to person, place, time/situation and in no apparent distress.    Airway patent    Eyes without scleral injection. No jaundice.    Strong pulse.    Respirations unlabored. Decreased breath sounds in L lung.    Abdomen soft, non-tender, no guarding.    Spine appears normal, range of motion is not limited, no muscle or joint tenderness    Alert and oriented, no gross motor or sensory deficits.    Skin normal color for race, warm, dry and intact. No evidence of rash.    No SI/HI.

## 2019-10-25 NOTE — ED PROVIDER NOTE - NSFOLLOWUPCLINICS_GEN_ALL_ED_FT
[facility]  Pulmonary Medicine  [address] [place] [address]  Phone: [phone]  Fax: [phone]

## 2019-10-25 NOTE — ED ADULT NURSE NOTE - OBJECTIVE STATEMENT
Pt received in rm #1 74Y F Aox3, ambulatory, primarily carleen speaking- son at bedside for translation. Pt c/o intermittent fevers, cough x2 days and chest pain starting today. Pmhx HTN, DM. Pt son reports pt thinks she may have the flu. Pt now endorsing chest pain when coughing. Pt denies any sob, dizziness, n/v/d. Pt VS as charted, NSR on cardiac monitor, pt resp even and unlabored b/l. Pt appears in NAD, son at bedside at this time, waiting on MD walker will continue to monitor.

## 2019-10-25 NOTE — ED PROVIDER NOTE - PATIENT PORTAL LINK FT
You can access the FollowMyHealth Patient Portal offered by [facility] by registering at the following website: [email or web address]. By joining [facility] FollowMyHealth portal, you will also be able to view your health information using other applications (apps) compatible with our system.

## 2019-10-25 NOTE — ED PROVIDER NOTE - NSFOLLOWUPINSTRUCTIONS_ED_ALL_ED_FT
You were evaluated for a cough, it was found that your lung mass has increased and is likely the source of your cough.     Please follow up with a pulmonologist, a number You were evaluated for a cough, it was found that your lung mass has increased and is likely the source of your cough.     Please follow up with a pulmonologist, a number has been provided above.    Take all medications as prescribed.    Take the Tylenol and Codeine syrup 15ml every 6 hours as needed for cough.    Return to the emergency department for any new or worsening symptoms, such as: fevers, shortness of breath or chest pain.

## 2019-10-25 NOTE — ED PROVIDER NOTE - ATTENDING CONTRIBUTION TO CARE
I performed a face-to-face evaluation of the patient and performed a history and physical examination. I agree with the history and physical examination.    Rhea: Likely lung mass causing cough. Refusing Bx. DDx includes recurrent TB. Check CT angio. Symptom control.

## 2019-10-26 VITALS
RESPIRATION RATE: 17 BRPM | DIASTOLIC BLOOD PRESSURE: 94 MMHG | OXYGEN SATURATION: 98 % | TEMPERATURE: 98 F | HEART RATE: 105 BPM | SYSTOLIC BLOOD PRESSURE: 177 MMHG

## 2019-10-26 LAB
ALBUMIN SERPL ELPH-MCNC: 4.4 G/DL — SIGNIFICANT CHANGE UP (ref 3.3–5)
ALP SERPL-CCNC: 72 U/L — SIGNIFICANT CHANGE UP (ref 40–120)
ALT FLD-CCNC: 13 U/L — SIGNIFICANT CHANGE UP (ref 4–33)
ANION GAP SERPL CALC-SCNC: 15 MMO/L — HIGH (ref 7–14)
APTT BLD: 37.2 SEC — HIGH (ref 27.5–36.3)
AST SERPL-CCNC: 15 U/L — SIGNIFICANT CHANGE UP (ref 4–32)
B PERT DNA SPEC QL NAA+PROBE: NOT DETECTED — SIGNIFICANT CHANGE UP
BASOPHILS # BLD AUTO: 0.06 K/UL — SIGNIFICANT CHANGE UP (ref 0–0.2)
BASOPHILS NFR BLD AUTO: 0.6 % — SIGNIFICANT CHANGE UP (ref 0–2)
BILIRUB SERPL-MCNC: < 0.2 MG/DL — LOW (ref 0.2–1.2)
BUN SERPL-MCNC: 20 MG/DL — SIGNIFICANT CHANGE UP (ref 7–23)
C PNEUM DNA SPEC QL NAA+PROBE: NOT DETECTED — SIGNIFICANT CHANGE UP
CALCIUM SERPL-MCNC: 9.5 MG/DL — SIGNIFICANT CHANGE UP (ref 8.4–10.5)
CHLORIDE SERPL-SCNC: 95 MMOL/L — LOW (ref 98–107)
CO2 SERPL-SCNC: 25 MMOL/L — SIGNIFICANT CHANGE UP (ref 22–31)
CREAT SERPL-MCNC: 0.83 MG/DL — SIGNIFICANT CHANGE UP (ref 0.5–1.3)
EOSINOPHIL # BLD AUTO: 0.1 K/UL — SIGNIFICANT CHANGE UP (ref 0–0.5)
EOSINOPHIL NFR BLD AUTO: 1 % — SIGNIFICANT CHANGE UP (ref 0–6)
FLUAV H1 2009 PAND RNA SPEC QL NAA+PROBE: NOT DETECTED — SIGNIFICANT CHANGE UP
FLUAV H1 RNA SPEC QL NAA+PROBE: NOT DETECTED — SIGNIFICANT CHANGE UP
FLUAV H3 RNA SPEC QL NAA+PROBE: NOT DETECTED — SIGNIFICANT CHANGE UP
FLUAV SUBTYP SPEC NAA+PROBE: NOT DETECTED — SIGNIFICANT CHANGE UP
FLUBV RNA SPEC QL NAA+PROBE: NOT DETECTED — SIGNIFICANT CHANGE UP
GLUCOSE SERPL-MCNC: 301 MG/DL — HIGH (ref 70–99)
HADV DNA SPEC QL NAA+PROBE: NOT DETECTED — SIGNIFICANT CHANGE UP
HCOV PNL SPEC NAA+PROBE: SIGNIFICANT CHANGE UP
HCT VFR BLD CALC: 40.2 % — SIGNIFICANT CHANGE UP (ref 34.5–45)
HGB BLD-MCNC: 12 G/DL — SIGNIFICANT CHANGE UP (ref 11.5–15.5)
HMPV RNA SPEC QL NAA+PROBE: NOT DETECTED — SIGNIFICANT CHANGE UP
HPIV1 RNA SPEC QL NAA+PROBE: NOT DETECTED — SIGNIFICANT CHANGE UP
HPIV2 RNA SPEC QL NAA+PROBE: NOT DETECTED — SIGNIFICANT CHANGE UP
HPIV3 RNA SPEC QL NAA+PROBE: NOT DETECTED — SIGNIFICANT CHANGE UP
HPIV4 RNA SPEC QL NAA+PROBE: NOT DETECTED — SIGNIFICANT CHANGE UP
IMM GRANULOCYTES NFR BLD AUTO: 0.3 % — SIGNIFICANT CHANGE UP (ref 0–1.5)
INR BLD: 1.02 — SIGNIFICANT CHANGE UP (ref 0.88–1.17)
LYMPHOCYTES # BLD AUTO: 1.67 K/UL — SIGNIFICANT CHANGE UP (ref 1–3.3)
LYMPHOCYTES # BLD AUTO: 17 % — SIGNIFICANT CHANGE UP (ref 13–44)
MCHC RBC-ENTMCNC: 26 PG — LOW (ref 27–34)
MCHC RBC-ENTMCNC: 29.9 % — LOW (ref 32–36)
MCV RBC AUTO: 87.2 FL — SIGNIFICANT CHANGE UP (ref 80–100)
MONOCYTES # BLD AUTO: 0.89 K/UL — SIGNIFICANT CHANGE UP (ref 0–0.9)
MONOCYTES NFR BLD AUTO: 9 % — SIGNIFICANT CHANGE UP (ref 2–14)
NEUTROPHILS # BLD AUTO: 7.09 K/UL — SIGNIFICANT CHANGE UP (ref 1.8–7.4)
NEUTROPHILS NFR BLD AUTO: 72.1 % — SIGNIFICANT CHANGE UP (ref 43–77)
NRBC # FLD: 0 K/UL — SIGNIFICANT CHANGE UP (ref 0–0)
PLATELET # BLD AUTO: 242 K/UL — SIGNIFICANT CHANGE UP (ref 150–400)
PMV BLD: 10.5 FL — SIGNIFICANT CHANGE UP (ref 7–13)
POTASSIUM SERPL-MCNC: 4.4 MMOL/L — SIGNIFICANT CHANGE UP (ref 3.5–5.3)
POTASSIUM SERPL-SCNC: 4.4 MMOL/L — SIGNIFICANT CHANGE UP (ref 3.5–5.3)
PROT SERPL-MCNC: 7.7 G/DL — SIGNIFICANT CHANGE UP (ref 6–8.3)
PROTHROM AB SERPL-ACNC: 11.3 SEC — SIGNIFICANT CHANGE UP (ref 9.8–13.1)
RBC # BLD: 4.61 M/UL — SIGNIFICANT CHANGE UP (ref 3.8–5.2)
RBC # FLD: 14.6 % — HIGH (ref 10.3–14.5)
RSV RNA SPEC QL NAA+PROBE: NOT DETECTED — SIGNIFICANT CHANGE UP
RV+EV RNA SPEC QL NAA+PROBE: DETECTED — HIGH
SODIUM SERPL-SCNC: 135 MMOL/L — SIGNIFICANT CHANGE UP (ref 135–145)
WBC # BLD: 9.84 K/UL — SIGNIFICANT CHANGE UP (ref 3.8–10.5)
WBC # FLD AUTO: 9.84 K/UL — SIGNIFICANT CHANGE UP (ref 3.8–10.5)

## 2019-10-26 PROCEDURE — 71275 CT ANGIOGRAPHY CHEST: CPT | Mod: 26

## 2019-10-26 PROCEDURE — 71046 X-RAY EXAM CHEST 2 VIEWS: CPT | Mod: 26

## 2019-10-26 RX ORDER — ACETAMINOPHEN WITH CODEINE 300MG-30MG
15 TABLET ORAL
Qty: 250 | Refills: 0
Start: 2019-10-26

## 2019-10-26 NOTE — ED ADULT NURSE REASSESSMENT NOTE - NS ED NURSE REASSESS COMMENT FT1
Contact  via phone. Pt blood gas results unable to cross over onto lab results. Pt blood gas printed by lab and sent via tube system. MD Machuca aware and placed in chart.

## 2019-10-26 NOTE — ED POST DISCHARGE NOTE - REASON FOR FOLLOW-UP
Other Pt family contacted ED.  Rx for acetaminophin-codeine was electronically sent to [facility] pharmacy but pharmacy did not receive prescription.  Called [facility] pharmacy and confirmed Rx was not received.  ERIS Patel resent Rx to [facility].

## 2019-10-26 NOTE — ED ADULT NURSE REASSESSMENT NOTE - NS ED NURSE REASSESS COMMENT FT1
Received report from [facility] [place]. Pt respirations even and unlabored b/l. Pt appears in NAD, pt taken to CXR by transport at this time, will continue to monitor upon return.

## 2021-05-29 ENCOUNTER — INPATIENT (INPATIENT)
Facility: HOSPITAL | Age: 76
LOS: 6 days | Discharge: HOME HEALTH | DRG: 205 | End: 2021-06-05
Attending: INTERNAL MEDICINE | Admitting: INTERNAL MEDICINE
Payer: COMMERCIAL

## 2021-05-29 VITALS
RESPIRATION RATE: 24 BRPM | DIASTOLIC BLOOD PRESSURE: 80 MMHG | OXYGEN SATURATION: 95 % | TEMPERATURE: 98 F | HEART RATE: 110 BPM | SYSTOLIC BLOOD PRESSURE: 124 MMHG

## 2021-05-29 DIAGNOSIS — R06.02 SHORTNESS OF BREATH: ICD-10-CM

## 2021-05-29 DIAGNOSIS — H26.9 UNSPECIFIED CATARACT: Chronic | ICD-10-CM

## 2021-05-29 LAB
ALBUMIN SERPL ELPH-MCNC: 4.2 G/DL — SIGNIFICANT CHANGE UP (ref 3.3–5)
ALP SERPL-CCNC: 62 U/L — SIGNIFICANT CHANGE UP (ref 40–120)
ALT FLD-CCNC: 15 U/L — SIGNIFICANT CHANGE UP (ref 10–45)
ANION GAP SERPL CALC-SCNC: 17 MMOL/L — SIGNIFICANT CHANGE UP (ref 5–17)
APPEARANCE UR: CLEAR — SIGNIFICANT CHANGE UP
AST SERPL-CCNC: 19 U/L — SIGNIFICANT CHANGE UP (ref 10–40)
BACTERIA # UR AUTO: NEGATIVE — SIGNIFICANT CHANGE UP
BASE EXCESS BLDV CALC-SCNC: -1.8 MMOL/L — SIGNIFICANT CHANGE UP (ref -2–2)
BASOPHILS # BLD AUTO: 0.04 K/UL — SIGNIFICANT CHANGE UP (ref 0–0.2)
BASOPHILS NFR BLD AUTO: 0.4 % — SIGNIFICANT CHANGE UP (ref 0–2)
BILIRUB SERPL-MCNC: 0.2 MG/DL — SIGNIFICANT CHANGE UP (ref 0.2–1.2)
BILIRUB UR-MCNC: NEGATIVE — SIGNIFICANT CHANGE UP
BUN SERPL-MCNC: 20 MG/DL — SIGNIFICANT CHANGE UP (ref 7–23)
CA-I SERPL-SCNC: 1.19 MMOL/L — SIGNIFICANT CHANGE UP (ref 1.12–1.3)
CALCIUM SERPL-MCNC: 9.1 MG/DL — SIGNIFICANT CHANGE UP (ref 8.4–10.5)
CHLORIDE BLDV-SCNC: 102 MMOL/L — SIGNIFICANT CHANGE UP (ref 96–108)
CHLORIDE SERPL-SCNC: 97 MMOL/L — SIGNIFICANT CHANGE UP (ref 96–108)
CO2 BLDV-SCNC: 27 MMOL/L — SIGNIFICANT CHANGE UP (ref 22–30)
CO2 SERPL-SCNC: 20 MMOL/L — LOW (ref 22–31)
COLOR SPEC: SIGNIFICANT CHANGE UP
CREAT SERPL-MCNC: 0.68 MG/DL — SIGNIFICANT CHANGE UP (ref 0.5–1.3)
DIFF PNL FLD: NEGATIVE — SIGNIFICANT CHANGE UP
EOSINOPHIL # BLD AUTO: 0.07 K/UL — SIGNIFICANT CHANGE UP (ref 0–0.5)
EOSINOPHIL NFR BLD AUTO: 0.6 % — SIGNIFICANT CHANGE UP (ref 0–6)
EPI CELLS # UR: 1 /HPF — SIGNIFICANT CHANGE UP
GAS PNL BLDA: SIGNIFICANT CHANGE UP
GAS PNL BLDV: 132 MMOL/L — LOW (ref 135–145)
GAS PNL BLDV: SIGNIFICANT CHANGE UP
GAS PNL BLDV: SIGNIFICANT CHANGE UP
GLUCOSE BLDC GLUCOMTR-MCNC: 384 MG/DL — HIGH (ref 70–99)
GLUCOSE BLDC GLUCOMTR-MCNC: 413 MG/DL — HIGH (ref 70–99)
GLUCOSE BLDV-MCNC: 301 MG/DL — HIGH (ref 70–99)
GLUCOSE SERPL-MCNC: 313 MG/DL — HIGH (ref 70–99)
GLUCOSE UR QL: ABNORMAL
HCO3 BLDV-SCNC: 26 MMOL/L — SIGNIFICANT CHANGE UP (ref 21–29)
HCT VFR BLD CALC: 36.8 % — SIGNIFICANT CHANGE UP (ref 34.5–45)
HCT VFR BLDA CALC: 34 % — LOW (ref 39–50)
HGB BLD CALC-MCNC: 11.1 G/DL — LOW (ref 11.5–15.5)
HGB BLD-MCNC: 10.5 G/DL — LOW (ref 11.5–15.5)
HYALINE CASTS # UR AUTO: 1 /LPF — SIGNIFICANT CHANGE UP (ref 0–2)
IMM GRANULOCYTES NFR BLD AUTO: 0.7 % — SIGNIFICANT CHANGE UP (ref 0–1.5)
KETONES UR-MCNC: SIGNIFICANT CHANGE UP
LACTATE BLDV-MCNC: 3.8 MMOL/L — HIGH (ref 0.7–2)
LEUKOCYTE ESTERASE UR-ACNC: NEGATIVE — SIGNIFICANT CHANGE UP
LYMPHOCYTES # BLD AUTO: 1.51 K/UL — SIGNIFICANT CHANGE UP (ref 1–3.3)
LYMPHOCYTES # BLD AUTO: 14 % — SIGNIFICANT CHANGE UP (ref 13–44)
MCHC RBC-ENTMCNC: 22.4 PG — LOW (ref 27–34)
MCHC RBC-ENTMCNC: 28.5 GM/DL — LOW (ref 32–36)
MCV RBC AUTO: 78.6 FL — LOW (ref 80–100)
MONOCYTES # BLD AUTO: 0.36 K/UL — SIGNIFICANT CHANGE UP (ref 0–0.9)
MONOCYTES NFR BLD AUTO: 3.3 % — SIGNIFICANT CHANGE UP (ref 2–14)
NEUTROPHILS # BLD AUTO: 8.73 K/UL — HIGH (ref 1.8–7.4)
NEUTROPHILS NFR BLD AUTO: 81 % — HIGH (ref 43–77)
NITRITE UR-MCNC: NEGATIVE — SIGNIFICANT CHANGE UP
NRBC # BLD: 0 /100 WBCS — SIGNIFICANT CHANGE UP (ref 0–0)
PCO2 BLDV: 59 MMHG — HIGH (ref 35–50)
PH BLDV: 7.26 — LOW (ref 7.35–7.45)
PH UR: 6 — SIGNIFICANT CHANGE UP (ref 5–8)
PLATELET # BLD AUTO: 272 K/UL — SIGNIFICANT CHANGE UP (ref 150–400)
PO2 BLDV: 34 MMHG — SIGNIFICANT CHANGE UP (ref 25–45)
POTASSIUM BLDV-SCNC: 3.6 MMOL/L — SIGNIFICANT CHANGE UP (ref 3.5–5.3)
POTASSIUM SERPL-MCNC: 3.7 MMOL/L — SIGNIFICANT CHANGE UP (ref 3.5–5.3)
POTASSIUM SERPL-SCNC: 3.7 MMOL/L — SIGNIFICANT CHANGE UP (ref 3.5–5.3)
PROT SERPL-MCNC: 7.1 G/DL — SIGNIFICANT CHANGE UP (ref 6–8.3)
PROT UR-MCNC: ABNORMAL
RAPID RVP RESULT: SIGNIFICANT CHANGE UP
RBC # BLD: 4.68 M/UL — SIGNIFICANT CHANGE UP (ref 3.8–5.2)
RBC # FLD: 19.7 % — HIGH (ref 10.3–14.5)
RBC CASTS # UR COMP ASSIST: 2 /HPF — SIGNIFICANT CHANGE UP (ref 0–4)
SAO2 % BLDV: 49 % — LOW (ref 67–88)
SARS-COV-2 RNA SPEC QL NAA+PROBE: SIGNIFICANT CHANGE UP
SODIUM SERPL-SCNC: 134 MMOL/L — LOW (ref 135–145)
SP GR SPEC: 1.05 — HIGH (ref 1.01–1.02)
TROPONIN T, HIGH SENSITIVITY RESULT: 20 NG/L — SIGNIFICANT CHANGE UP (ref 0–51)
UROBILINOGEN FLD QL: NEGATIVE — SIGNIFICANT CHANGE UP
WBC # BLD: 10.79 K/UL — HIGH (ref 3.8–10.5)
WBC # FLD AUTO: 10.79 K/UL — HIGH (ref 3.8–10.5)
WBC UR QL: 1 /HPF — SIGNIFICANT CHANGE UP (ref 0–5)

## 2021-05-29 PROCEDURE — 71275 CT ANGIOGRAPHY CHEST: CPT | Mod: 26,MA

## 2021-05-29 PROCEDURE — 99291 CRITICAL CARE FIRST HOUR: CPT

## 2021-05-29 PROCEDURE — 71045 X-RAY EXAM CHEST 1 VIEW: CPT | Mod: 26

## 2021-05-29 PROCEDURE — 93010 ELECTROCARDIOGRAM REPORT: CPT

## 2021-05-29 PROCEDURE — 99233 SBSQ HOSP IP/OBS HIGH 50: CPT

## 2021-05-29 RX ORDER — HEPARIN SODIUM 5000 [USP'U]/ML
5000 INJECTION INTRAVENOUS; SUBCUTANEOUS EVERY 12 HOURS
Refills: 0 | Status: DISCONTINUED | OUTPATIENT
Start: 2021-05-29 | End: 2021-05-30

## 2021-05-29 RX ORDER — IPRATROPIUM/ALBUTEROL SULFATE 18-103MCG
3 AEROSOL WITH ADAPTER (GRAM) INHALATION ONCE
Refills: 0 | Status: COMPLETED | OUTPATIENT
Start: 2021-05-29 | End: 2021-05-29

## 2021-05-29 RX ORDER — LOSARTAN POTASSIUM 100 MG/1
100 TABLET, FILM COATED ORAL DAILY
Refills: 0 | Status: DISCONTINUED | OUTPATIENT
Start: 2021-05-29 | End: 2021-06-05

## 2021-05-29 RX ORDER — FUROSEMIDE 40 MG
20 TABLET ORAL DAILY
Refills: 0 | Status: DISCONTINUED | OUTPATIENT
Start: 2021-05-29 | End: 2021-06-03

## 2021-05-29 RX ORDER — IPRATROPIUM/ALBUTEROL SULFATE 18-103MCG
3 AEROSOL WITH ADAPTER (GRAM) INHALATION EVERY 6 HOURS
Refills: 0 | Status: DISCONTINUED | OUTPATIENT
Start: 2021-05-29 | End: 2021-06-01

## 2021-05-29 RX ORDER — INSULIN GLARGINE 100 [IU]/ML
10 INJECTION, SOLUTION SUBCUTANEOUS AT BEDTIME
Refills: 0 | Status: DISCONTINUED | OUTPATIENT
Start: 2021-05-29 | End: 2021-05-30

## 2021-05-29 RX ORDER — AMLODIPINE BESYLATE 2.5 MG/1
2.5 TABLET ORAL DAILY
Refills: 0 | Status: DISCONTINUED | OUTPATIENT
Start: 2021-05-29 | End: 2021-06-05

## 2021-05-29 RX ORDER — SODIUM CHLORIDE 9 MG/ML
500 INJECTION INTRAMUSCULAR; INTRAVENOUS; SUBCUTANEOUS ONCE
Refills: 0 | Status: DISCONTINUED | OUTPATIENT
Start: 2021-05-29 | End: 2021-05-29

## 2021-05-29 RX ORDER — MAGNESIUM SULFATE 500 MG/ML
2 VIAL (ML) INJECTION ONCE
Refills: 0 | Status: DISCONTINUED | OUTPATIENT
Start: 2021-05-29 | End: 2021-05-29

## 2021-05-29 RX ORDER — GLUCAGON INJECTION, SOLUTION 0.5 MG/.1ML
1 INJECTION, SOLUTION SUBCUTANEOUS ONCE
Refills: 0 | Status: DISCONTINUED | OUTPATIENT
Start: 2021-05-29 | End: 2021-06-05

## 2021-05-29 RX ORDER — ATORVASTATIN CALCIUM 80 MG/1
20 TABLET, FILM COATED ORAL AT BEDTIME
Refills: 0 | Status: DISCONTINUED | OUTPATIENT
Start: 2021-05-29 | End: 2021-06-05

## 2021-05-29 RX ORDER — CEFTRIAXONE 500 MG/1
1000 INJECTION, POWDER, FOR SOLUTION INTRAMUSCULAR; INTRAVENOUS ONCE
Refills: 0 | Status: COMPLETED | OUTPATIENT
Start: 2021-05-29 | End: 2021-05-29

## 2021-05-29 RX ORDER — INSULIN LISPRO 100/ML
VIAL (ML) SUBCUTANEOUS
Refills: 0 | Status: DISCONTINUED | OUTPATIENT
Start: 2021-05-29 | End: 2021-06-05

## 2021-05-29 RX ORDER — AZITHROMYCIN 500 MG/1
500 TABLET, FILM COATED ORAL ONCE
Refills: 0 | Status: COMPLETED | OUTPATIENT
Start: 2021-05-29 | End: 2021-05-29

## 2021-05-29 RX ORDER — DEXTROSE 50 % IN WATER 50 %
25 SYRINGE (ML) INTRAVENOUS ONCE
Refills: 0 | Status: DISCONTINUED | OUTPATIENT
Start: 2021-05-29 | End: 2021-06-05

## 2021-05-29 RX ORDER — SODIUM CHLORIDE 9 MG/ML
1000 INJECTION, SOLUTION INTRAVENOUS
Refills: 0 | Status: DISCONTINUED | OUTPATIENT
Start: 2021-05-29 | End: 2021-06-05

## 2021-05-29 RX ORDER — DEXTROSE 50 % IN WATER 50 %
15 SYRINGE (ML) INTRAVENOUS ONCE
Refills: 0 | Status: DISCONTINUED | OUTPATIENT
Start: 2021-05-29 | End: 2021-06-05

## 2021-05-29 RX ORDER — DEXTROSE 50 % IN WATER 50 %
12.5 SYRINGE (ML) INTRAVENOUS ONCE
Refills: 0 | Status: DISCONTINUED | OUTPATIENT
Start: 2021-05-29 | End: 2021-06-05

## 2021-05-29 RX ADMIN — Medication 3 MILLILITER(S): at 23:32

## 2021-05-29 RX ADMIN — Medication 3 MILLILITER(S): at 06:53

## 2021-05-29 RX ADMIN — AZITHROMYCIN 500 MILLIGRAM(S): 500 TABLET, FILM COATED ORAL at 11:41

## 2021-05-29 RX ADMIN — Medication 3 MILLILITER(S): at 06:37

## 2021-05-29 RX ADMIN — CEFTRIAXONE 1000 MILLIGRAM(S): 500 INJECTION, POWDER, FOR SOLUTION INTRAMUSCULAR; INTRAVENOUS at 07:55

## 2021-05-29 RX ADMIN — AZITHROMYCIN 250 MILLIGRAM(S): 500 TABLET, FILM COATED ORAL at 08:01

## 2021-05-29 RX ADMIN — ATORVASTATIN CALCIUM 20 MILLIGRAM(S): 80 TABLET, FILM COATED ORAL at 21:45

## 2021-05-29 RX ADMIN — Medication 12: at 22:01

## 2021-05-29 RX ADMIN — Medication 3 MILLILITER(S): at 05:55

## 2021-05-29 RX ADMIN — CEFTRIAXONE 100 MILLIGRAM(S): 500 INJECTION, POWDER, FOR SOLUTION INTRAMUSCULAR; INTRAVENOUS at 06:44

## 2021-05-29 RX ADMIN — INSULIN GLARGINE 10 UNIT(S): 100 INJECTION, SOLUTION SUBCUTANEOUS at 21:46

## 2021-05-29 NOTE — CONSULT NOTE ADULT - ASSESSMENT
76 F originally from [place] PMHx of NIDDMII, Hypertension, previous pulmonary TB (2019) s/p 10 months of treatment presented with acute on chronic SOB, found to have cavitary lesions of upper lobe concerning for reactivation of pulmonary TB.    Cavitary pneumonia  -hx of treated TB per granddaughter in 2019, followed with [facility] in [facility], can obtain records  -reportedly admitted to [facility] last year, no records in system, must have alternate MRN? Please obtain, previous CT would be useful to evaluate  -recommend ruling out pulmonary TB, collect 3 AFB sputum, at least 8 hours apart, at least one early morning sample, induce sputum if needed  -check fungitell, galactomannan, histoplasma ag to rule out fungal etiology  -pt afebrile, without significant leukocytosis, non-toxic appearing, do not need antibiotics at this time. Reconsider if patient spikes fever or decompensates  -could this be COLE? Follow up AFB sputum  -f/u Bcx 76 F originally from [place] PMHx of NIDDMII, Hypertension, previous pulmonary TB (2019) s/p 10 months of treatment presented with acute on chronic SOB, found to have cavitary lesions of upper lobe concerning for reactivation of pulmonary TB.    Cavitary pneumonia  -hx of treated TB per granddaughter in 2019, followed with [facility] in [place], can obtain records  -reportedly admitted to [facility] last year, no records in system, must have alternate MRN? Please obtain, previous CT would be useful to evaluate  -recommend ruling out pulmonary TB, collect 3 AFB sputum, at least 8 hours apart, at least one early morning sample, induce sputum if needed  -check fungitell, galactomannan, histoplasma ag to rule out fungal etiology  -pt afebrile, without significant leukocytosis, non-toxic appearing, do not need antibiotics at this time. Reconsider if patient spikes fever or decompensates  -could this be COLE? Follow up AFB sputum  -f/u Bcx  -recommend Airborne Isolation until TB ruled out

## 2021-05-29 NOTE — H&P ADULT - PROBLEM SELECTOR PLAN 1
CT lungs in ED shows cavitary lesion   ID consulted   as per ID : hold off on abx   pul consult Dr. welsh called

## 2021-05-29 NOTE — H&P ADULT - NSHPPHYSICALEXAM_GEN_ALL_CORE
pt. seen and examined , NAD     Vital Signs Last 24 Hrs  T(C): 36.9 (29 May 2021 21:24), Max: 36.9 (29 May 2021 05:30)  T(F): 98.5 (29 May 2021 21:24), Max: 98.5 (29 May 2021 21:24)  HR: 106 (29 May 2021 23:50) (98 - 110)  BP: 134/75 (29 May 2021 21:24) (124/80 - 152/82)  BP(mean): --  RR: 18 (29 May 2021 21:24) (18 - 40)  SpO2: 96% (29 May 2021 23:50) (93% - 105%)    heent: nc/at, no pallor  neck: supple, no JVD  lungs: B/L clear, no w/r/r  heart: s1s2 nml  abd : soft, NABS, NT/ND  ext: no e/c/c, pulses 1+  neuro: aaox3 , no focal deficit

## 2021-05-29 NOTE — ED PROVIDER NOTE - CLINICAL SUMMARY MEDICAL DECISION MAKING FREE TEXT BOX
75yo F PMH TB (s/p tx in 2019) presents with difficulty breathing. Rhonchorous breath sounds. Tachy to 110s, 85% on RA, 100% on 4L NC.     S/p 150 solumedrol, albuterol x2 by EMS    Plan: basic blood work, CXR, ECG, CT chest, duonebs, reassess

## 2021-05-29 NOTE — ED PROVIDER NOTE - PHYSICAL EXAMINATION
CONSTITUTIONAL: NAD. Awake and conversive, cooperative with exam  EYES: EOMI, conjunctiva and sclera clear  ENMT: MMM   RESPIRATORY: Normal respiratory effort, CTAB, rhonchorous breath sounds throughout all lung fields   CARDIOVASCULAR: RRR, normal S1 and S2, no MRG, no peripheral edema  ABDOMEN: Soft, non-distended, no TTP, no rebound/guarding  MUSCULOSKELETAL:  no joint swelling or tenderness  NEURO: following commands, moving all extremities spontaneously  PSYCH: appropriate affect

## 2021-05-29 NOTE — H&P ADULT - HISTORY OF PRESENT ILLNESS
76 F originally from [place] PMHx of NIDDMII, Hypertension, previous pulmonary TB (2019) s/p 10 months of treatment presented with acute on chronic SOB. Attempted to speak to patient using pacific  for carleen ([id number]), however BIPAP made it impossible for patient to communicate with phone. Granddaughter at bedside aided in translation.    Pt was dx with pulmonary TB in [place] in 2019 while visiting her son there. She was reportedly hospitalized for approximately one month, discharged with two medications (granddaughter is not sure how many exactly) which she took for 10 months. Followed with [facility] in [facility] [place] and was cleared. At the time pt was having purulent green sputum and hemoptysis which resolved. SOB from that time improved but never resolved. In the interim, pt has had worsening dyspnea for greater than one year. Not associated with fever,chills, night sweats, weight loss. Denies diarrhea, abdominal pain, dysuria. Minimally productive cough of clear sputum worsening over last few months. Today pt asked for help from son due to SOB and weakness, prompting ED visit.    In the hospital pt afebrile, WBC 10.79, Lactate 4.7 (albuterol?) RVP negative, CTA chest without pulmonary embolus but with right apical cavitary lesion and upper lobe predominant patchy opacities c/f reactivation TB. Pt was given CTX/Azithromycin.

## 2021-05-29 NOTE — CONSULT NOTE ADULT - SUBJECTIVE AND OBJECTIVE BOX
Patient is a 76y old  Female who presents with a chief complaint of   HPI: 76 F originally from [place] PMHx of NIDDMII, Hypertension, previous pulmonary TB (2019) s/p 10 months of treatment presented with acute on chronic SOB. Attempted to speak to patient using pacific  for carleen ([id number]), however BIPAP made it impossible for patient to communicate with phone. Granddaughter at bedside aided in translation.    Pt was dx with pulmonary TB in [place] in 2019 while visiting her son there. She was reportedly hospitalized for approximately one month, discharged with two medications (granddaughter is not sure how many exactly) which she took for 10 months. Followed with [facility] in [facility] [place] and was cleared. At the time pt was having purulent green sputum and hemoptysis which resolved. SOB from that time improved but never resolved. In th     prior hospital charts reviewed [  ]  primary team notes reviewed [  ]  other consultant notes reviewed [  ]  PAST MEDICAL & SURGICAL HISTORY:    Allergies  No Known Allergies    ANTIMICROBIALS (past 90 days)  MEDICATIONS  (STANDING):  azithromycin  IVPB   250 mL/Hr IV Intermittent (05-29-21 @ 08:01)    cefTRIAXone   IVPB   100 mL/Hr IV Intermittent (05-29-21 @ 06:44)      ANTIMICROBIALS:      OTHER MEDS: MEDICATIONS  (STANDING):    SOCIAL HISTORY:   hx smoking  non-smoker    FAMILY HISTORY:    REVIEW OF SYSTEMS  [  ] ROS unobtainable because:    [  ] All other systems negative except as noted below:	    Constitutional:  [ ] fever [ ] chills  [ ] weight loss  [ ] weakness  Skin:  [ ] rash [ ] phlebitis	  Eyes: [ ] icterus [ ] pain  [ ] discharge	  ENMT: [ ] sore throat  [ ] thrush [ ] ulcers [ ] exudates  Respiratory: [ ] dyspnea [ ] hemoptysis [ ] cough [ ] sputum	  Cardiovascular:  [ ] chest pain [ ] palpitations [ ] edema	  Gastrointestinal:  [ ] nausea [ ] vomiting [ ] diarrhea [ ] constipation [ ] pain	  Genitourinary:  [ ] dysuria [ ] frequency [ ] hematuria [ ] discharge [ ] flank pain  [ ] incontinence  Musculoskeletal:  [ ] myalgias [ ] arthralgias [ ] arthritis  [ ] back pain  Neurological:  [ ] headache [ ] seizures  [ ] confusion/altered mental status  Psychiatric:  [ ] anxiety [ ] depression	  Hematology/Lymphatics:  [ ] lymphadenopathy  Endocrine:  [ ] adrenal [ ] thyroid  Allergic/Immunologic:	 [ ] transplant [ ] seasonal    Vital Signs Last 24 Hrs  T(F): 97.2 (05-29-21 @ 06:40), Max: 98.4 (05-29-21 @ 05:30)  Vital Signs Last 24 Hrs  HR: 110 (05-29-21 @ 07:00) (109 - 110)  BP: 140/76 (05-29-21 @ 07:00) (124/80 - 140/76)  RR: 25 (05-29-21 @ 07:00)  SpO2: 100% (05-29-21 @ 07:00) (95% - 100%)  Wt(kg): --    PHYSICAL EXAM:  Constitutional: non-toxic, no distress  HEAD/EYES: anicteric, no conjunctival injection  ENT:  supple, no thrush  Cardiovascular:   normal S1, S2, no murmur, no edema  Respiratory:  clear BS bilaterally, no wheezes, no rales  GI:  soft, non-tender, normal bowel sounds  :  no jesus, no CVA tenderness  Musculoskeletal:  no synovitis, normal ROM  Neurologic: awake and alert, normal strength, no focal findings  Skin:  no rash, no erythema, no phlebitis  Heme/Onc: no lymphadenopathy   Psychiatric:  awake, alert, appropriate mood                            10.5   10.79 )-----------( 272      ( 29 May 2021 06:15 )             36.8     05-29    134<L>  |  97  |  20  ----------------------------<  313<H>  3.7   |  20<L>  |  0.68    Ca    9.1      29 May 2021 06:15    TPro  7.1  /  Alb  4.2  /  TBili  0.2  /  DBili  x   /  AST  19  /  ALT  15  /  AlkPhos  62  05-29    MICROBIOLOGY:          Rapid RVP Result: NotDetec (05-29 @ 06:15)      RADIOLOGY:  imaging below personally reviewed     Patient is a 76y old  Female who presents with a chief complaint of   HPI: 76 F originally from [place] PMHx of NIDDMII, Hypertension, previous pulmonary TB (2019) s/p 10 months of treatment presented with acute on chronic SOB. Attempted to speak to patient using pacific  for carleen ([id number]), however BIPAP made it impossible for patient to communicate with phone. Granddaughter at bedside aided in translation.    Pt was dx with pulmonary TB in [place] in 2019 while visiting her son there. She was reportedly hospitalized for approximately one month, discharged with two medications (granddaughter is not sure how many exactly) which she took for 10 months. [place] [facility] [place] [facility] [place] and was cleared. At the time pt was having purulent green sputum and hemoptysis which resolved. SOB from that time improved but never resolved. In the interim, pt has had worsening dyspnea for greater than one year. Not associated with fever,chills, night sweats, weight loss. Denies diarrhea, abdominal pain, dysuria. Minimally productive cough of clear sputum worsening over last few months. Today pt asked for help from son due to SOB and weakness, prompting ED visit.    In the hospital pt afebrile, WBC 10.79, Lactate 4.7 (albuterol?) RVP negative, CTA chest without pulmonary embolus but with right apical cavitary lesion and upper lobe predominant patchy opacities c/f reactivation TB. Pt was given CTX/Azithromycin.     prior hospital charts reviewed [x  ]  primary team notes reviewed [ x ]  other consultant notes reviewed [ x ]  PAST MEDICAL & SURGICAL HISTORY:    Allergies  No Known Allergies    ANTIMICROBIALS (past 90 days)  MEDICATIONS  (STANDING):  azithromycin  IVPB   250 mL/Hr IV Intermittent (05-29-21 @ 08:01)    cefTRIAXone   IVPB   100 mL/Hr IV Intermittent (05-29-21 @ 06:44)      ANTIMICROBIALS:      OTHER MEDS: MEDICATIONS  (STANDING):    SOCIAL HISTORY:   from [place], lives with family including son and granddaugther, non-smoker, never lived outside [place] and [facility], no travel to [place], some travel to [place]    FAMILY HISTORY: Heart disease, unclear in who    REVIEW OF SYSTEMS  [  ] ROS unobtainable because:    [  x] All other systems negative except as noted below:	    Constitutional:  [ ] fever [ ] chills  [ ] weight loss  [ ] weakness  Skin:  [ ] rash [ ] phlebitis	  Eyes: [ ] icterus [ ] pain  [ ] discharge	  ENMT: [ ] sore throat  [ ] thrush [ ] ulcers [ ] exudates  Respiratory: [x ] dyspnea [ ] hemoptysis [x ] cough [x ] sputum	  Cardiovascular:  [ ] chest pain [ ] palpitations [ ] edema	  Gastrointestinal:  [ ] nausea [ ] vomiting [ ] diarrhea [ ] constipation [ ] pain	  Genitourinary:  [ ] dysuria [ ] frequency [ ] hematuria [ ] discharge [ ] flank pain  [ ] incontinence  Musculoskeletal:  [ ] myalgias [ ] arthralgias [ ] arthritis  [ ] back pain  Neurological:  [ ] headache [ ] seizures  [ ] confusion/altered mental status  Psychiatric:  [ ] anxiety [ ] depression	  Hematology/Lymphatics:  [ ] lymphadenopathy  Endocrine:  [ ] adrenal [ ] thyroid  Allergic/Immunologic:	 [ ] transplant [ ] seasonal    Vital Signs Last 24 Hrs  T(F): 97.2 (05-29-21 @ 06:40), Max: 98.4 (05-29-21 @ 05:30)  Vital Signs Last 24 Hrs  HR: 110 (05-29-21 @ 07:00) (109 - 110)  BP: 140/76 (05-29-21 @ 07:00) (124/80 - 140/76)  RR: 25 (05-29-21 @ 07:00)  SpO2: 100% (05-29-21 @ 07:00) (95% - 100%)  Wt(kg): --    PHYSICAL EXAM:  Constitutional: non-toxic, on BIPAP  HEAD/EYES: anicteric, no conjunctival injection  ENT: BIPAP  Cardiovascular:   normal S1, S2, no murmur, no edema  Respiratory:  exp wheezing predominant in upper lobes, no crackles  GI:  soft, non-tender, normal bowel sounds  :  no jesus, no CVA tenderness  Musculoskeletal:  no synovitis, normal ROM  Neurologic: awake and alert, normal strength, no focal findings  Skin:  no rash, no erythema, no phlebitis  Heme/Onc: no lymphadenopathy   Psychiatric:  awake, alert, appropriate mood                            10.5   10.79 )-----------( 272      ( 29 May 2021 06:15 )             36.8     05-29    134<L>  |  97  |  20  ----------------------------<  313<H>  3.7   |  20<L>  |  0.68    Ca    9.1      29 May 2021 06:15    TPro  7.1  /  Alb  4.2  /  TBili  0.2  /  DBili  x   /  AST  19  /  ALT  15  /  AlkPhos  62  05-29    MICROBIOLOGY:          Rapid RVP Result: NotDetec (05-29 @ 06:15)      RADIOLOGY:  < from: CT Angio Chest PE Protocol w/ IV Cont (05.29.21 @ 07:46) >  FINDINGS:    LUNGS AND AIRWAYS: Bronchial secretions. Diffuse interlobular septal thickening. Bilateral diffuse groundglass opacities with more confluent opacities in the upper lobes, right greater than left. There is volume loss in the right upper lobe. Right apical cavitary lesion measures 3.1 x 1.2 x 2.0 cm (TR, AP, CC). Right upper lobe nodule measures 1.2 cm (2:28).  PLEURA: Small left pleural effusion. No pneumothorax.  MEDIASTINUM AND KAILA: Slightly enlarged subcarinal lymph node.  VESSELS: No main, right, left or lobar pulmonary arterial filling defect. Evaluation of the segmental and subsegmental pulmonary arteries is suboptimal secondary to parenchymal opacities and motion artifacts. Aortic and coronary artery calcifications.  HEART: Heart size is normal. No pericardial effusion. No CT evidence of right heart strain.  CHEST WALL AND LOWER NECK: Within normal limits.  VISUALIZED UPPER ABDOMEN: Small hiatal hernia. Partially visualized left lower pole exophyticcyst.  BONES: Degenerative changes of the thoracic spine.    IMPRESSION:  1.  No central pulmonary emboli. Segmental and subsegmental pulmonary arteries are not well evaluated.  2.  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  Superimposed pulmonary edema and small left pleural effusion.      < end of copied text >

## 2021-05-29 NOTE — ED PROVIDER NOTE - PROGRESS NOTE DETAILS
Resident: Carolyn Paiz - Pt with RR in 40s intermittently, requiring 5L NC. Persistent wheezing despite 2 rounds of duonebs. VBG c/f respiratory acidosis, pH 7.29, pCO2 59, BiPAP ordered, RT made aware. RT is setting up BiPAP at this moment. Asked RT for blood gas in 20 minutes post bipap time. Vijay Martínez MD. pt improving in bipap. CTA pe study negative however, b/l upper opacities and r apical opacity concerning for Vijay Martínez MD. pt improving in bipap 12/5. not hypoxic. RR low 20s. still w/ mild exp wheeze. CTA pe study negative however, b/l upper opacities and r apical opacity concerning for reactivation TB..   spoke w/ Dr. Baires, unattached Vijay Martínez MD. pt improving in bipap 12/5. not hypoxic. RR low 20s. still w/ mild exp wheeze. CTA pe study negative however, b/l upper opacities and r apical opacity concerning for reactivation TB..   spoke w/ Dr. Baires, unattached; he requested ID consultation. I spoke w/ ID and they are aware of the patient received sign out from Dr Dillon pending admission. briefly, undifferentiated wheezing, no hx of chf. lab notable for lactic acidosis, and CO2 retention. on re eval improved on bipap. but lactate slightly higher, inpatient will follow up. DJ Wilma MOREIRA: (Back Charting) Pt signed out to my colleague Dr. Michel pending CT read, clinical reassessment, pt now on bipap, and eventual admission.

## 2021-05-29 NOTE — H&P ADULT - NSHPLABSRESULTS_GEN_ALL_CORE
10.5   10.79 )-----------( 272      ( 29 May 2021 06:15 )             36.8     05-29    134<L>  |  97  |  20  ----------------------------<  313<H>  3.7   |  20<L>  |  0.68    Ca    9.1      29 May 2021 06:15    TPro  7.1  /  Alb  4.2  /  TBili  0.2  /  DBili  x   /  AST  19  /  ALT  15  /  AlkPhos  62  05-29

## 2021-05-29 NOTE — ED ADULT NURSE NOTE - OBJECTIVE STATEMENT
77 yo female with pmh asthma, HTN, HLD, DM, TB (s/p tx 2019) presents to ED by EMS c/o SOB. As per granddaughter at bedside, pt has difficulty breathing for approximately 1 year and is prescribed albuterol. As per granddaughter, pt has cough. As per EMS, pt sating 80% RA upon arrival, received albuterol, solumedrol, and magnesium PTA with no change in O2 sat. Pt denies CP, palpitations, abdominal pain, n/v/d, urinary symptoms. Upon assessment, pt a&ox3, tachypnea noted, sating 97% on 5L NC, audible wheezing noted, becomes winded when speaking, skin warm and appropriate color, able to follow commands and move all extremities. Pt sinus tach on cardiac monitor. MD at bedside. Pt safety and comfort provided.

## 2021-05-29 NOTE — ED PROVIDER NOTE - CARE PLAN
Principal Discharge DX:	Shortness of breath   Principal Discharge DX:	Shortness of breath  Secondary Diagnosis:	Wheezing  Secondary Diagnosis:	Abnormal CT of the chest

## 2021-05-29 NOTE — CONSULT NOTE ADULT - ATTENDING COMMENTS
76 F originally from [place] PMHx of NIDDMII, Hypertension, previous pulmonary TB (2019) s/p 10 months of treatment presented with acute on chronic SOB  Leukocytosis, no fever  S/p recent treatment course for pulmonary TB at [facility]  CT with apical cavitary lesion, upper lobe opacities  Here presenting with shortness of breath, no fevers, mild leukocytosis  In PACS no prior CT to compare to  Uncertain if the cavitary lesion is new or present on prior imaging  Overall,  1) Cavitary lesion  - Uncertain if new or longstanding, perhaps persistent sequelae from prior TB episode  - Monitor off abx for now  - Airborne iso, check AFB sputums q 8 hours x 3  - Check Galactomannan, Fungitell, Histo urine ag  - [facility]jigna prior CT imaging and treatment history)  - If infectious workup negative, malignancy workup per primary team  2) Leukocytosis  - Trend to normal  - Monitor for signs/symptoms infection  3) SOB  - Noninfectious causes per primary team  - Workup as above    Jacek Angel MD  Pager [phone]  After 5pm and on weekends call [phone]    I was physically present for the key portions of the evaluation and management service provided. I saw and examined the patient. I agree with the above history, physical, and plan except for any discrepancies which I have documented in “Attending Statements.” Please refer to “Attending Statements” for final plan.

## 2021-05-29 NOTE — ED PROVIDER NOTE - OBJECTIVE STATEMENT
75yo F PMH TB (s/p tx in 2019) presents with difficulty breathing. History elicited from granddaughter and patient herself. Patient states she has had difficulty breathing for >1 year. Was prescribed albuterol but does not know how to use it therefore has not been using it. Dx with TB in 2019, completed tx for 10 months with two medications. Hospitalized last year at [facility] at which time patient refused bronchoscopy.   **Patient and family would not like a bronchoscopy in the event that is needed during this hospitalization**

## 2021-05-29 NOTE — PATIENT PROFILE ADULT - TRANSPORTATION
no Dapsone Pregnancy And Lactation Text: This medication is Pregnancy Category C and is not considered safe during pregnancy or breast feeding.

## 2021-05-29 NOTE — ED ADULT NURSE NOTE - NSIMPLEMENTINTERV_GEN_ALL_ED
Implemented All Fall Risk Interventions:  [place] to call system. Call bell, personal items and telephone within reach. Instruct patient to call for assistance. Room bathroom lighting operational. Non-slip footwear when patient is off stretcher. Physically safe environment: no spills, clutter or unnecessary equipment. Stretcher in lowest position, wheels locked, appropriate side rails in place. Provide visual cue, wrist band, yellow gown, etc. Monitor gait and stability. Monitor for mental status changes and reorient to person, place, and time. Review medications for side effects contributing to fall risk. Reinforce activity limits and safety measures with patient and family.

## 2021-05-29 NOTE — H&P ADULT - PROBLEM SELECTOR PLAN 2
hx of TB in 2019: treated for 10 months as per family   -ID consulted   air borne precaution   AFB sputum x 3 days   r/o MAC

## 2021-05-30 ENCOUNTER — TRANSCRIPTION ENCOUNTER (OUTPATIENT)
Age: 76
End: 2021-05-30

## 2021-05-30 DIAGNOSIS — J98.4 OTHER DISORDERS OF LUNG: ICD-10-CM

## 2021-05-30 DIAGNOSIS — I10 ESSENTIAL (PRIMARY) HYPERTENSION: ICD-10-CM

## 2021-05-30 DIAGNOSIS — R73.9 HYPERGLYCEMIA, UNSPECIFIED: ICD-10-CM

## 2021-05-30 DIAGNOSIS — A15.9 RESPIRATORY TUBERCULOSIS UNSPECIFIED: ICD-10-CM

## 2021-05-30 DIAGNOSIS — E11.9 TYPE 2 DIABETES MELLITUS WITHOUT COMPLICATIONS: ICD-10-CM

## 2021-05-30 LAB
A1C WITH ESTIMATED AVERAGE GLUCOSE RESULT: 9.9 % — HIGH (ref 4–5.6)
ALBUMIN SERPL ELPH-MCNC: 4.2 G/DL — SIGNIFICANT CHANGE UP (ref 3.3–5)
ALP SERPL-CCNC: 62 U/L — SIGNIFICANT CHANGE UP (ref 40–120)
ALT FLD-CCNC: 13 U/L — SIGNIFICANT CHANGE UP (ref 10–45)
ANION GAP SERPL CALC-SCNC: 15 MMOL/L — SIGNIFICANT CHANGE UP (ref 5–17)
AST SERPL-CCNC: 16 U/L — SIGNIFICANT CHANGE UP (ref 10–40)
B-OH-BUTYR SERPL-SCNC: 0.1 MMOL/L — SIGNIFICANT CHANGE UP
BASE EXCESS BLDV CALC-SCNC: 1.7 MMOL/L — SIGNIFICANT CHANGE UP (ref -2–2)
BILIRUB SERPL-MCNC: 0.2 MG/DL — SIGNIFICANT CHANGE UP (ref 0.2–1.2)
BUN SERPL-MCNC: 24 MG/DL — HIGH (ref 7–23)
CA-I SERPL-SCNC: 1.18 MMOL/L — SIGNIFICANT CHANGE UP (ref 1.12–1.3)
CALCIUM SERPL-MCNC: 9.1 MG/DL — SIGNIFICANT CHANGE UP (ref 8.4–10.5)
CHLORIDE BLDV-SCNC: 101 MMOL/L — SIGNIFICANT CHANGE UP (ref 96–108)
CHLORIDE SERPL-SCNC: 99 MMOL/L — SIGNIFICANT CHANGE UP (ref 96–108)
CO2 BLDV-SCNC: 29 MMOL/L — SIGNIFICANT CHANGE UP (ref 22–30)
CO2 SERPL-SCNC: 21 MMOL/L — LOW (ref 22–31)
COVID-19 SPIKE DOMAIN AB INTERP: POSITIVE
COVID-19 SPIKE DOMAIN ANTIBODY RESULT: >250 U/ML — HIGH
CREAT SERPL-MCNC: 0.68 MG/DL — SIGNIFICANT CHANGE UP (ref 0.5–1.3)
CULTURE RESULTS: SIGNIFICANT CHANGE UP
ESTIMATED AVERAGE GLUCOSE: 237 MG/DL — HIGH (ref 68–114)
GAS PNL BLDV: 133 MMOL/L — LOW (ref 135–145)
GAS PNL BLDV: SIGNIFICANT CHANGE UP
GAS PNL BLDV: SIGNIFICANT CHANGE UP
GLUCOSE BLDC GLUCOMTR-MCNC: 110 MG/DL — HIGH (ref 70–99)
GLUCOSE BLDC GLUCOMTR-MCNC: 203 MG/DL — HIGH (ref 70–99)
GLUCOSE BLDC GLUCOMTR-MCNC: 278 MG/DL — HIGH (ref 70–99)
GLUCOSE BLDC GLUCOMTR-MCNC: 286 MG/DL — HIGH (ref 70–99)
GLUCOSE BLDC GLUCOMTR-MCNC: 320 MG/DL — HIGH (ref 70–99)
GLUCOSE BLDV-MCNC: 373 MG/DL — HIGH (ref 70–99)
GLUCOSE SERPL-MCNC: 363 MG/DL — HIGH (ref 70–99)
HCO3 BLDV-SCNC: 28 MMOL/L — SIGNIFICANT CHANGE UP (ref 21–29)
HCT VFR BLD CALC: 32.7 % — LOW (ref 34.5–45)
HCT VFR BLDA CALC: 31 % — LOW (ref 39–50)
HGB BLD CALC-MCNC: 10 G/DL — LOW (ref 11.5–15.5)
HGB BLD-MCNC: 9.7 G/DL — LOW (ref 11.5–15.5)
LACTATE BLDV-MCNC: 3.3 MMOL/L — HIGH (ref 0.7–2)
LACTATE BLDV-MCNC: 3.3 MMOL/L — HIGH (ref 0.7–2)
MAGNESIUM SERPL-MCNC: 2.2 MG/DL — SIGNIFICANT CHANGE UP (ref 1.6–2.6)
MCHC RBC-ENTMCNC: 23.1 PG — LOW (ref 27–34)
MCHC RBC-ENTMCNC: 29.7 GM/DL — LOW (ref 32–36)
MCV RBC AUTO: 77.9 FL — LOW (ref 80–100)
NRBC # BLD: 0 /100 WBCS — SIGNIFICANT CHANGE UP (ref 0–0)
OSMOLALITY SERPL: 306 MOSMOL/KG — HIGH (ref 280–301)
OTHER CELLS CSF MANUAL: 12 ML/DL — LOW (ref 18–22)
PCO2 BLDV: 54 MMHG — HIGH (ref 35–50)
PH BLDV: 7.33 — LOW (ref 7.35–7.45)
PHOSPHATE SERPL-MCNC: 2.3 MG/DL — LOW (ref 2.5–4.5)
PLATELET # BLD AUTO: 275 K/UL — SIGNIFICANT CHANGE UP (ref 150–400)
PO2 BLDV: 52 MMHG — HIGH (ref 25–45)
POTASSIUM BLDV-SCNC: 4.2 MMOL/L — SIGNIFICANT CHANGE UP (ref 3.5–5.3)
POTASSIUM SERPL-MCNC: 4.5 MMOL/L — SIGNIFICANT CHANGE UP (ref 3.5–5.3)
POTASSIUM SERPL-SCNC: 4.5 MMOL/L — SIGNIFICANT CHANGE UP (ref 3.5–5.3)
PROT SERPL-MCNC: 7.1 G/DL — SIGNIFICANT CHANGE UP (ref 6–8.3)
RBC # BLD: 4.2 M/UL — SIGNIFICANT CHANGE UP (ref 3.8–5.2)
RBC # FLD: 20 % — HIGH (ref 10.3–14.5)
SAO2 % BLDV: 84 % — SIGNIFICANT CHANGE UP (ref 67–88)
SARS-COV-2 IGG+IGM SERPL QL IA: >250 U/ML — HIGH
SARS-COV-2 IGG+IGM SERPL QL IA: POSITIVE
SODIUM SERPL-SCNC: 135 MMOL/L — SIGNIFICANT CHANGE UP (ref 135–145)
SPECIMEN SOURCE: SIGNIFICANT CHANGE UP
WBC # BLD: 13.11 K/UL — HIGH (ref 3.8–10.5)
WBC # FLD AUTO: 13.11 K/UL — HIGH (ref 3.8–10.5)

## 2021-05-30 RX ORDER — ACETAMINOPHEN 500 MG
650 TABLET ORAL EVERY 6 HOURS
Refills: 0 | Status: DISCONTINUED | OUTPATIENT
Start: 2021-05-30 | End: 2021-06-05

## 2021-05-30 RX ORDER — SODIUM CHLORIDE 9 MG/ML
1000 INJECTION INTRAMUSCULAR; INTRAVENOUS; SUBCUTANEOUS
Refills: 0 | Status: DISCONTINUED | OUTPATIENT
Start: 2021-05-30 | End: 2021-05-30

## 2021-05-30 RX ORDER — INSULIN GLARGINE 100 [IU]/ML
13 INJECTION, SOLUTION SUBCUTANEOUS AT BEDTIME
Refills: 0 | Status: DISCONTINUED | OUTPATIENT
Start: 2021-05-30 | End: 2021-05-31

## 2021-05-30 RX ORDER — INSULIN LISPRO 100/ML
5 VIAL (ML) SUBCUTANEOUS
Refills: 0 | Status: DISCONTINUED | OUTPATIENT
Start: 2021-05-30 | End: 2021-06-01

## 2021-05-30 RX ORDER — SODIUM,POTASSIUM PHOSPHATES 278-250MG
1 POWDER IN PACKET (EA) ORAL
Refills: 0 | Status: COMPLETED | OUTPATIENT
Start: 2021-05-30 | End: 2021-05-31

## 2021-05-30 RX ORDER — INSULIN LISPRO 100/ML
VIAL (ML) SUBCUTANEOUS AT BEDTIME
Refills: 0 | Status: DISCONTINUED | OUTPATIENT
Start: 2021-05-30 | End: 2021-06-05

## 2021-05-30 RX ORDER — HEPARIN SODIUM 5000 [USP'U]/ML
5000 INJECTION INTRAVENOUS; SUBCUTANEOUS EVERY 8 HOURS
Refills: 0 | Status: DISCONTINUED | OUTPATIENT
Start: 2021-05-30 | End: 2021-06-02

## 2021-05-30 RX ORDER — LEVOTHYROXINE SODIUM 125 MCG
125 TABLET ORAL DAILY
Refills: 0 | Status: DISCONTINUED | OUTPATIENT
Start: 2021-05-30 | End: 2021-06-05

## 2021-05-30 RX ADMIN — LOSARTAN POTASSIUM 100 MILLIGRAM(S): 100 TABLET, FILM COATED ORAL at 05:45

## 2021-05-30 RX ADMIN — Medication 1 PACKET(S): at 17:54

## 2021-05-30 RX ADMIN — Medication 20 MILLIGRAM(S): at 05:45

## 2021-05-30 RX ADMIN — Medication 5 UNIT(S): at 17:53

## 2021-05-30 RX ADMIN — Medication 3 MILLILITER(S): at 06:12

## 2021-05-30 RX ADMIN — Medication 650 MILLIGRAM(S): at 02:39

## 2021-05-30 RX ADMIN — Medication 6: at 12:57

## 2021-05-30 RX ADMIN — HEPARIN SODIUM 5000 UNIT(S): 5000 INJECTION INTRAVENOUS; SUBCUTANEOUS at 05:45

## 2021-05-30 RX ADMIN — ATORVASTATIN CALCIUM 20 MILLIGRAM(S): 80 TABLET, FILM COATED ORAL at 22:48

## 2021-05-30 RX ADMIN — Medication 4: at 17:52

## 2021-05-30 RX ADMIN — INSULIN GLARGINE 13 UNIT(S): 100 INJECTION, SOLUTION SUBCUTANEOUS at 22:48

## 2021-05-30 RX ADMIN — AMLODIPINE BESYLATE 2.5 MILLIGRAM(S): 2.5 TABLET ORAL at 05:45

## 2021-05-30 RX ADMIN — Medication 5 UNIT(S): at 12:56

## 2021-05-30 RX ADMIN — Medication 125 MICROGRAM(S): at 05:45

## 2021-05-30 RX ADMIN — HEPARIN SODIUM 5000 UNIT(S): 5000 INJECTION INTRAVENOUS; SUBCUTANEOUS at 22:48

## 2021-05-30 RX ADMIN — HEPARIN SODIUM 5000 UNIT(S): 5000 INJECTION INTRAVENOUS; SUBCUTANEOUS at 17:55

## 2021-05-30 RX ADMIN — Medication 6: at 09:00

## 2021-05-30 NOTE — CONSULT NOTE ADULT - ASSESSMENT
76 F originally from [place] PMHx of NIDDMII, Hypertension, previous pulmonary TB (2019) s/p 10 months of treatment presented with acute on chronic SOB. Attempted to speak to patient using pacific  for carleen ([id number]), however BIPAP made it impossible for patient to communicate with phone. Granddaughter at bedside aided in translation. Pt was dx with pulmonary TB in [place] in 2019 while visiting her son there. She was reportedly hospitalized for approximately one month, discharged with two medications (granddaughter is not sure how many exactly) which she took for 10 months. [place] [facility] [place] [facility] [place] and was [place]. At the time pt was having purulent green sputum and hemoptysis which resolved. SOB from that time improved but never resolved. In the interim, pt has had worsening dyspnea for greater than one year. Not associated with fever,chills, night sweats, weight loss. Denies diarrhea, abdominal pain, dysuria. Minimally productive cough of clear sputum worsening over last few months. Today pt asked for help from son due to SOB and weakness, prompting ED visit.  In the hospital pt afebrile, WBC 10.79, Lactate 4.7 (albuterol?) RVP negative, CTA chest without pulmonary embolus but with right apical cavitary lesion and upper lobe predominant patchy opacities c/f reactivation TB. Pt was given CTX/Azithromycin. (29 May 2021 16:24)  above confirmed with son:  he says she was SOB at home and hence was brought to the hospital: she did complete the rx for active tb:  she has no hemoptysis: currently being ruled out for tb     5/30:    r/o tb: in isolation: she was treated for tuberculosis in the past; now presented with SOB: she has no major pe: But she has acute resp acidosis: she would need bipap in the night time: Though currently she looks comfortable: Add BD / Symbicort   on empiric antibiotics for now:   dvt propayxis\  await three sputum cultures   DM: cont to control  HTN: stable   paged team   DW team

## 2021-05-30 NOTE — DISCHARGE NOTE PROVIDER - HOSPITAL COURSE
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 Had worsening dyspnea for greater than one year. Not associated with fever,chills, night sweats, weight loss. Denies diarrhea, abdominal pain, dysuria. Minimally productive cough of clear sputum worsening over last few months. In the hospital pt afebrile, WBC 10.79, Lactate 4.7 (albuterol?) RVP negative, CTA chest without pulmonary embolus but with right apical cavitary lesion and upper lobe predominant patchy opacities c/f reactivation TB. Pt was given CTX/Azithromycin. ID consulted. Recommend monitor off abx. Sent galactomannan, 3x sputum induction for TB, fungitell. Also noted to have elevated FS, glucosuria, and elevated specific gravity. Conservative use of IVF as concern of heart failure.     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 Had worsening dyspnea for greater than one year. Not associated with fever,chills, night sweats, weight loss. Denies diarrhea, abdominal pain, dysuria. Minimally productive cough of clear sputum worsening over last few months. In the hospital pt afebrile, WBC 10.79, Lactate 4.7 (albuterol?) RVP negative, CTA chest without pulmonary embolus but with right apical cavitary lesion and upper lobe predominant patchy opacities c/f reactivation TB. Pt was given CTX/Azithromycin. ID consulted. Recommend monitor off abx. Sent galactomannan, 3x sputum induction for TB, fungitell. Also noted to have elevated FS, glucosuria, and elevated specific gravity. Conservative use of IVF as concern of heart failure.     Hospital Course:    Here in the hospital the patient's AFB sputum's were negative x4 and she was taken off isolation. Per collateral obtained [facility] an X-ray in 2019 patient appeared to have the RUL cavitary lesion/opacity indicating chronic lung changes that seem unchanged at this time. Patient was also treated with 10 months of therapy per collateral obtained from [id number]. After the patient's concern for tuberculosis was ruled out her course was complicated by persistent hyperglycemia and continued need for supplemental oxygen. In terms of her hyperglycemia the  patient's A1c this admission was 9.7 and she was initiated on insulin therapy given her poor control at home with her oral agents. The patient's insulin dose was uptitrated she was counseled on her diet and proper insulin administration along with her family members this admission and educated on the signs and symptoms of hypoglycemia as well with close follow up with her PCP to adjust her dosing accordingly. The patient's course was also complicated by persistent hypoxic respiratory failure requiring about 2 L of NC. The patient was followed by pulmonology who believe that her new oxygen requirements are most likely I/s/o of her chronic COPD vs her cavitary lung lesions. She was prescribed duonebs and diuresed with lasix daily for concern for possible volume overload. Imaging was not suggestive of any evidence of PNA and she was monitored off abx. The patient's respiratory status failed to improve with diuresis and nebs and shew as s4t-up with home oxygen 2L. Today the patient is hemodynamically stable, tolerating her diet and ready for discharge from the hospital with close follow up.

## 2021-05-30 NOTE — PROVIDER CONTACT NOTE (OTHER) - REASON
repeat f/s 320
pt repeat fingerstick 384
pt fingerstick 413
pt complaining of pain in legs requesting tylenol

## 2021-05-30 NOTE — PROGRESS NOTE ADULT - PROBLEM SELECTOR PLAN 4
c/w home meds c/w home meds, losartan  - lipitor, lasix hold off on oral meds   start lantus 10 units at night, 5u premeals with ISS    c/w FSBS with coverage

## 2021-05-30 NOTE — PROGRESS NOTE ADULT - PROBLEM SELECTOR PLAN 3
hold off on oral meds   start lantus 10 units at night   c/w FSBS with coverage hold off on oral meds   start lantus 10 units at night, 5u premeals with ISS    c/w FSBS with coverage patient with elevated FS up to 400s. Not c/w HHNS or DKA. Likely provoked in setting of infection. Mentating well.   - glucosuria, elevated specific gravity   - IVF, uptitrate insulin requirements

## 2021-05-30 NOTE — DISCHARGE NOTE PROVIDER - NSDCCPTREATMENT_GEN_ALL_CORE_FT
PRINCIPAL PROCEDURE  Procedure: CT chest w con  Findings and Treatment:   < end of copied text >  IMPRESSION:  1.  No central pulmonary emboli. Segmental and subsegmental pulmonary arteries are not well evaluated.  2.  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  Superimposed pulmonary edema and small left pleural effusion.  < from: CT Angio Chest PE Protocol w/ IV Cont (05.29.21 @ 07:46) >

## 2021-05-30 NOTE — DISCHARGE NOTE PROVIDER - NSDCCPCAREPLAN_GEN_ALL_CORE_FT
PRINCIPAL DISCHARGE DIAGNOSIS  Diagnosis: Shortness of breath  Assessment and Plan of Treatment: You presented to the hospital in the setting of worsening shortness of breath. Your imaging was significant for possible cavitary lesion of your right lung. Given your history of tuberculosis we were not sure if you were having another active infection. Your sputum studies show no evidence of active infection and given that your previous imaging also appears to show similar findings of your lung and given that there have been no changes we are not concerned about active tuberculosis in your case. You have also had an adequate course of treatment for latent TB in the past. Other infectious causes for your lung lesion like galactomannan and urine histoplasma have also come back negative. 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 Please continue taking your nebulizers, continue wearing your oxygen at home, and follow up with your pulmonologist in order to obtain outpatient PFTs. Should you have any worsening shortness of breath, nausea, vomiting, cough, blood in your cough, chills, fevers, chest pain, weight loss, night sweats please come back to the hospital as this may require urgent re-evaluation of your symptoms.      SECONDARY DISCHARGE DIAGNOSES  Diagnosis: Diabetes  Assessment and Plan of Treatment: Your diabetes was uncontrolled here in the hospital. Since starting you on insulin we are better able to control your sugars. You have been taught how to adminsiter insulin and how to go about checking your blood sugar and giving yourself insulin. Please follow up with your primary care physician Dr. Lindsay Herrmann regarding your regimen and follow up with your new insulin requirements. Please be mindful that if you are having dizziness, nausea, vomiting, vision changes, headaches, fatigue, tremors, that you should immediately check your finger stick and if it is less than 60 you should get some juice of food in your body immediately. Please carry around chocolate/food with you when you leave the house. In addition, please know that you must eat your meals consistently with insulin and skipping meals can be detrimental to your health.

## 2021-05-30 NOTE — DISCHARGE NOTE PROVIDER - CARE PROVIDER_API CALL
Blaise Hartman)  Critical Care Medicine; Internal Medicine; Pulmonary Disease  [address] [place] [address]  Phone: [phone]  Fax: [phone]  Follow Up Time:     Jacek Angel)  Infectious Disease; Internal Medicine  [address] [place] [id number]  Phone: [phone]  Fax: [phone]  Follow Up Time:

## 2021-05-30 NOTE — PROGRESS NOTE ADULT - PROBLEM SELECTOR PLAN 1
CT lungs in ED shows cavitary lesion   ID consulted   as per ID : hold off on abx   pul consult Dr. welsh called CT lungs in ED shows cavitary lesion   ID consulted, appreciate recs. See below   as per ID : hold off on abx   pul consult Dr. welsh called

## 2021-05-30 NOTE — PROGRESS NOTE ADULT - ASSESSMENT
Patient is a 77yo F originally from [place] PMHx of NIDDMII, HTN, previous pulmonary TB (2019) s/p 10 months of treatment presented with acute on chronic SOB, with concern of reactivation TB.

## 2021-05-30 NOTE — DISCHARGE NOTE PROVIDER - NSDCMRMEDTOKEN_GEN_ALL_CORE_FT
amLODIPine 2.5 mg oral tablet: 1 tab(s) orally once a day    Note:family unsure if taking  Janumet 50 mg-1000 mg oral tablet: 1 tab(s) orally 2 times a day  losartan 100 mg oral tablet: 1 tab(s) orally once a day  Prandin 2 mg oral tablet: 1 tab(s) orally 3 times a day (before meals)  Synthroid 125 mcg (0.125 mg) oral tablet: 1 tab(s) orally once a day  Zocor 20 mg oral tablet: 1 tab(s) orally once a day (at bedtime)    Note:Last filled in dec for 3 month supply. Pharmacy last filled lipitor 40mg 1 tab once a day in march   amLODIPine 2.5 mg oral tablet: 1 tab(s) orally once a day    Note:family unsure if taking  Basaglar KwikPen 100 units/mL subcutaneous solution: 24 unit(s) subcutaneous once a day   budesonide-formoterol 80 mcg-4.5 mcg/inh inhalation aerosol: 2 puff(s) inhaled 2 times a day   furosemide 20 mg oral tablet: 1 tab(s) orally every other day  glucometer (per patient&#x27;s insurance): Test blood sugars four times a day. Dispense #1 glucometer.  Insulin Pen Needles, 4mm: 1 application subcutaneously 4 times a day. ** Use with insulin pen ** Please price per insurance and page back [phone] with amount.   lancets: 1 application subcutaneously 4 times a day   losartan 100 mg oral tablet: 1 tab(s) orally once a day  NovoLOG FlexPen 100 units/mL injectable solution: 14 unit(s) injectable 3 times a day   Rolling walker: Rolling Walker  ICD: R26.2  Difficulty Walking  CISCO:999  Synthroid 125 mcg (0.125 mg) oral tablet: 1 tab(s) orally once a day  test strips (per patient&#x27;s insurance): 1 application subcutaneously 4 times a day. ** Compatible with patient&#x27;s glucometer **  Zocor 20 mg oral tablet: 1 tab(s) orally once a day (at bedtime)    Note:Last filled in dec for 3 month supply. Pharmacy last filled lipitor 40mg 1 tab once a day in march   Albuterol (Eqv-ProAir HFA) 90 mcg/inh inhalation aerosol: 1 puff(s) inhaled every 4 hours, As Needed -for shortness of breath and/or wheezing   amLODIPine 2.5 mg oral tablet: 1 tab(s) orally once a day  atorvastatin 20 mg oral tablet: 1 tab(s) orally once a day (at bedtime)  Basaglar KwikPen 100 units/mL subcutaneous solution: 24 unit(s) subcutaneous once a day   budesonide-formoterol 80 mcg-4.5 mcg/inh inhalation aerosol: 2 puff(s) inhaled 2 times a day   furosemide 20 mg oral tablet: 1 tab(s) orally every other day  glucometer (per patient&#x27;s insurance): Test blood sugars four times a day. Dispense #1 glucometer.  Insulin Pen Needles, 4mm: 1 application subcutaneously 4 times a day. ** Use with insulin pen ** Please price per insurance and page back [phone] with amount.   lancets: 1 application subcutaneously 4 times a day   losartan 100 mg oral tablet: 1 tab(s) orally once a day  NovoLOG FlexPen 100 units/mL injectable solution: 14 unit(s) injectable 3 times a day   Rolling walker: Rolling Walker  ICD: R26.2  Difficulty Walking  CISCO:999  Synthroid 125 mcg (0.125 mg) oral tablet: 1 tab(s) orally once a day  test strips (per patient&#x27;s insurance): 1 application subcutaneously 4 times a day. ** Compatible with patient&#x27;s glucometer **

## 2021-05-30 NOTE — PROGRESS NOTE ADULT - PROBLEM SELECTOR PLAN 2
hx of TB in 2019: treated for 10 months as per family   -ID consulted   air borne precaution   AFB sputum x 3 days   r/o MAC    - Airborne iso, check AFB sputums q 8 hours x 3  - Check Galactomannan, Fungitell, Histo urine ag  - Please obtain records from [facility] (jigna prior CT imaging and treatment history)  - If infectious workup negative, malignancy workup per primary team hx of TB in 2019: treated for 10 months as per family   -ID consulted   - air borne precaution   - AFB sputum q8 hrs x3 days (pt not making much sputum)  - r/o MAC  - f/u Galactomannan, Fungitell, Histo urine ag  - Will attempt to obtain records from [facility] (jigna prior CT imaging and treatment history) during weekdays  - If infectious workup negative, will consider malignancy workup as per ID

## 2021-05-30 NOTE — DISCHARGE NOTE PROVIDER - NSDCFUADDAPPT_GEN_ALL_CORE_FT
Follow up with your PCP Dr. Lindsay Herrmann with regards to your new insulin regimen and if you need to see an endocrinologist on the outside. Your A1c was 9.9 here in the hospital and we are sending you out on insulin.     Please schedule an appointment with your pulmonologist Dr. Blaise Hartman, I have provided you with his address and phone number on this discharge note.     If you would like to follow up with infectious diseases regarding your latent TB you were seen by Dr. Jacek Angel here in the hospital and his number has also been provided in the discharge paperwork.

## 2021-05-30 NOTE — PROGRESS NOTE ADULT - SUBJECTIVE AND OBJECTIVE BOX
PROGRESS NOTE:     CONTACT INFO:  Jean Wilkes MD PGY-1  Internal Medicine  [facility]: [phone]  [facility] [address]  If no response, please check Resident assignment   section of [facility] for most up-to-date contact info  After 7PM, please page night float    Patient is a 76y old  Female who presents with a chief complaint of SOB and weakness / CT chest shows cavitary lesion in lung (29 May 2021 16:24)    Used [facility]  for [facility] [id number].     SUBJECTIVE / OVERNIGHT EVENTS: Patient seen and evaluated at bedside. Feels generally well. No fever/ chills, chest pain, worsening shortness of breath. Has cough but unable to produce much sputum. Was transitioned from BIPAP to venti face mask during the AM. Otherwise, denies headaches, fever, chills, nausea, vomiting, dizziness, lightheadedness, SOB at rest, chest pain, palpitations, abdominal pain, acute onset focal weakness or sensory changes. Lives at home and has support of family.    MEDICATIONS  (STANDING):  albuterol/ipratropium for Nebulization 3 milliLiter(s) Nebulizer every 6 hours  amLODIPine   Tablet 2.5 milliGRAM(s) Oral daily  atorvastatin 20 milliGRAM(s) Oral at bedtime  dextrose 40% Gel 15 Gram(s) Oral once  dextrose 5%. 1000 milliLiter(s) (50 mL/Hr) IV Continuous <Continuous>  dextrose 5%. 1000 milliLiter(s) (100 mL/Hr) IV Continuous <Continuous>  dextrose 50% Injectable 25 Gram(s) IV Push once  dextrose 50% Injectable 12.5 Gram(s) IV Push once  dextrose 50% Injectable 25 Gram(s) IV Push once  furosemide    Tablet 20 milliGRAM(s) Oral daily  glucagon  Injectable 1 milliGRAM(s) IntraMuscular once  heparin   Injectable 5000 Unit(s) SubCutaneous every 8 hours  insulin glargine Injectable (LANTUS) 10 Unit(s) SubCutaneous at bedtime  insulin lispro (ADMELOG) corrective regimen sliding scale   SubCutaneous three times a day before meals  insulin lispro (ADMELOG) corrective regimen sliding scale   SubCutaneous at bedtime  insulin lispro Injectable (ADMELOG) 5 Unit(s) SubCutaneous three times a day before meals  levothyroxine 125 MICROGram(s) Oral daily  losartan 100 milliGRAM(s) Oral daily    MEDICATIONS  (PRN):  acetaminophen   Tablet .. 650 milliGRAM(s) Oral every 6 hours PRN Temp greater or equal to 38.5C (101.3F), Mild Pain (1 - 3)    CAPILLARY BLOOD GLUCOSE    POCT Blood Glucose.: 320 mg/dL (30 May 2021 04:44)  POCT Blood Glucose.: 384 mg/dL (29 May 2021 23:53)  POCT Blood Glucose.: 413 mg/dL (29 May 2021 21:44)    I&O's Summary    29 May 2021 07:01  -  30 May 2021 07:00  --------------------------------------------------------  IN: 150 mL / OUT: 400 mL / NET: -250 mL    PHYSICAL EXAM:  Vital Signs Last 24 Hrs  T(C): 36.5 (30 May 2021 04:55), Max: 36.9 (29 May 2021 21:24)  T(F): 97.7 (30 May 2021 04:55), Max: 98.5 (29 May 2021 21:24)  HR: 83 (30 May 2021 06:29) (83 - 106)  BP: 149/83 (30 May 2021 04:55) (134/75 - 152/82)  BP(mean): --  RR: 19 (30 May 2021 04:55) (18 - 23)  SpO2: 100% (30 May 2021 06:29) (93% - 105%)    CONSTITUTIONAL: NAD, well-developed  RESPIRATORY: Normal respiratory effort; lungs with mild wheezing likely from not taking deep breaths. Comfortably breathing  CARDIOVASCULAR: Regular rate and rhythm, normal S1 and S2, no murmur/rub/gallop; No lower extremity edema; Peripheral pulses are 2+ bilaterally  ABDOMEN: Nontender to palpation, normoactive bowel sounds, no rebound/guarding;  MUSCLOSKELETAL: no clubbing or cyanosis of digits; no joint swelling or tenderness to palpation  PSYCH: A+O to person, place, and time; affect appropriate    LABS:                        10.5   10.79 )-----------( 272      ( 29 May 2021 06:15 )             36.8         134<L>  |  97  |  20  ----------------------------<  313<H>  3.7   |  20<L>  |  0.68    Ca    9.1      29 May 2021 06:15    TPro  7.1  /  Alb  4.2  /  TBili  0.2  /  DBili  x   /  AST  19  /  ALT  15  /  AlkPhos  62      Urinalysis Basic - ( 29 May 2021 13:30 )    Color: Light Yellow / Appearance: Clear / S.048 / pH: x  Gluc: x / Ketone: Trace  / Bili: Negative / Urobili: Negative   Blood: x / Protein: 30 mg/dL / Nitrite: Negative   Leuk Esterase: Negative / RBC: 2 /hpf / WBC 1 /HPF   Sq Epi: x / Non Sq Epi: 1 /hpf / Bacteria: Negative    RADIOLOGY & ADDITIONAL TESTS:    < from: CT Angio Chest PE Protocol w/ IV Cont (21 @ 07:46) >    EXAM:  CT ANGIO CHEST PULM [facility]                          PROCEDURE DATE:  2021    INTERPRETATION:  CLINICAL INFORMATION: Shortness of breath. Tuberculosis status post treatment .    COMPARISON: None.    CONTRAST/COMPLICATIONS:  IV Contrast: Omnipaque 350  90 cc administered   10 cc discarded  Oral Contrast: None  Complications: None reported at study completion    PROCEDURE:  CT Angiography of the Chest.  Sagittal and coronal reformats were performed as well as 3D (MIP)reconstructions.    FINDINGS:    LUNGS AND AIRWAYS: Bronchial secretions. Diffuse interlobular septal thickening. Bilateral diffuse groundglass opacities with more confluent opacities in the upper lobes, right greater than left. There is volume loss in the right upper lobe. Right apical cavitary lesion measures 3.1 x 1.2 x 2.0 cm (TR, AP, CC). Right upper lobe nodule measures 1.2 cm (2:28).  PLEURA: Small left pleural effusion. No pneumothorax.  MEDIASTINUM AND KAILA: Slightly enlarged subcarinal lymph node.  VESSELS: No main, right, left or lobar pulmonary arterial filling defect. Evaluation of the segmental and subsegmental pulmonary arteries is suboptimal secondary to parenchymal opacities and motion artifacts. Aortic and coronary artery calcifications.  HEART: Heart size is normal. No pericardial effusion. No CT evidence of right heart strain.  CHEST WALL AND LOWER NECK: Within normal limits.  VISUALIZED UPPER ABDOMEN: Small hiatal hernia. Partially visualized left lower pole exophyticcyst.  BONES: Degenerative changes of the thoracic spine.    IMPRESSION:  1.  No central pulmonary emboli. Segmental and subsegmental pulmonary arteries are not well evaluated.  2.  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  Superimposed pulmonary edema and small left pleural effusion.    JAMILAH JIMENEZ MD; Resident Radiology  This document has been electronically signed.  JESSICA BUCHANAN MD; Attending Radiologist  This document has been electronically signed. May 29 2021  9:18AM    < end of copied text >

## 2021-05-30 NOTE — CONSULT NOTE ADULT - SUBJECTIVE AND OBJECTIVE BOX
05-30-21 @ 14:53    Patient is a 76y old  Female who presents with a chief complaint of SOB and weakness / CT chest shows cavitary lesion in lung (30 May 2021 09:57)      HPI:   76 F originally from [place] PMHx of NIDDMII, Hypertension, previous pulmonary TB (2019) s/p 10 months of treatment presented with acute on chronic SOB. Attempted to speak to patient using pacific  for [employer] ([id number]), however BIPAP made it impossible for patient to communicate with phone. Granddaughter at bedside aided in translation.    Pt was dx with pulmonary TB in [place] in 2019 while visiting her son there. She was reportedly hospitalized for approximately one month, discharged with two medications (granddaughter is not sure how many exactly) which she took for 10 months. Followed with [facility] in [facility] [place] and was cleared. At the time pt was having purulent green sputum and hemoptysis which resolved. SOB from that time improved but never resolved. In the interim, pt has had worsening dyspnea for greater than one year. Not associated with fever,chills, night sweats, weight loss. Denies diarrhea, abdominal pain, dysuria. Minimally productive cough of clear sputum worsening over last few months. Today pt asked for help from son due to SOB and weakness, prompting ED visit.    In the hospital pt afebrile, WBC 10.79, Lactate 4.7 (albuterol?) RVP negative, CTA chest without pulmonary embolus but with right apical cavitary lesion and upper lobe predominant patchy opacities c/f reactivation TB. Pt was given CTX/Azithromycin. (29 May 2021 16:24)    above confirmed with son:  he says she was SOB at home andh ecne was brought to the hospital: she did complete the rx for active tb:  she has no hemoptysis: currently being ruled out for tb       ?FOLLOWING PRESENTx  [x ] Hx of PE/DVT, [x ] Hx COPD, [ ] Hx of Asthma, [ x] Hx of Hospitalization, [ x]  Hx of BiPAP/CPAP use, [x ] Hx of GALO    Allergies    No Known Allergies    Intolerances        PAST MEDICAL & SURGICAL HISTORY:      FAMILY HISTORY:      Social History: [ x ] TOBACCO                  [ x ] ETOH                                 [ x ] IVDA/DRUGS    REVIEW OF SYSTEMS      General:	x    Skin/Breast:x  	  Ophthalmologic:x  	x  ENMT:	    Respiratory and Thorax: pinzon: sob, cough with phelgm   	  Cardiovascular:	x    Gastrointestinal:	x    Genitourinary:	x    Musculoskeletal:	x    Neurological:	x    Psychiatric:	x    Hematology/Lymphatics:	x    Endocrine:	x    Allergic/Immunologic:	x    MEDICATIONS  (STANDING):  albuterol/ipratropium for Nebulization 3 milliLiter(s) Nebulizer every 6 hours  amLODIPine   Tablet 2.5 milliGRAM(s) Oral daily  atorvastatin 20 milliGRAM(s) Oral at bedtime  dextrose 40% Gel 15 Gram(s) Oral once  dextrose 5%. 1000 milliLiter(s) (50 mL/Hr) IV Continuous <Continuous>  dextrose 5%. 1000 milliLiter(s) (100 mL/Hr) IV Continuous <Continuous>  dextrose 50% Injectable 25 Gram(s) IV Push once  dextrose 50% Injectable 12.5 Gram(s) IV Push once  dextrose 50% Injectable 25 Gram(s) IV Push once  furosemide    Tablet 20 milliGRAM(s) Oral daily  glucagon  Injectable 1 milliGRAM(s) IntraMuscular once  heparin   Injectable 5000 Unit(s) SubCutaneous every 8 hours  insulin glargine Injectable (LANTUS) 13 Unit(s) SubCutaneous at bedtime  insulin lispro (ADMELOG) corrective regimen sliding scale   SubCutaneous three times a day before meals  insulin lispro (ADMELOG) corrective regimen sliding scale   SubCutaneous at bedtime  insulin lispro Injectable (ADMELOG) 5 Unit(s) SubCutaneous three times a day before meals  levothyroxine 125 MICROGram(s) Oral daily  losartan 100 milliGRAM(s) Oral daily  potassium phosphate / sodium phosphate Powder (PHOS-NaK) 1 Packet(s) Oral two times a day before meals    MEDICATIONS  (PRN):  acetaminophen   Tablet .. 650 milliGRAM(s) Oral every 6 hours PRN Temp greater or equal to 38.5C (101.3F), Mild Pain (1 - 3)       Vital Signs Last 24 Hrs  T(C): 36.6 (30 May 2021 13:44), Max: 36.9 (29 May 2021 21:24)  T(F): 97.9 (30 May 2021 13:44), Max: 98.5 (29 May 2021 21:24)  HR: 84 (30 May 2021 13:44) (83 - 106)  BP: 121/72 (30 May 2021 13:44) (121/72 - 149/83)  BP(mean): --  RR: 20 (30 May 2021 13:44) (18 - 20)  SpO2: 100% (30 May 2021 13:44) (93% - 105%)Orthostatic VS          I&O's Summary    29 May 2021 07:01  -  30 May 2021 07:00  --------------------------------------------------------  IN: 150 mL / OUT: 400 mL / NET: -250 mL        Physical Exam:   GENERAL: NAD, well-groomed, well-developed  HEENT: GIAN/   Atraumatic, Normocephalic  ENMT: No tonsillar erythema, exudates, or enlargement; Moist mucous membranes, Good dentition, No lesions  NECK: Supple, No JVD, Normal thyroid  CHEST/LUNG: bibasilar crcekls+  CVS: Regular rate and rhythm; No murmurs, rubs, or gallops  GI: : Soft, Nontender, Nondistended; Bowel sounds present  NERVOUS SYSTEM:  Alert & Oriented X3  EXTREMITIES:  2+ Peripheral Pulses, No clubbing, cyanosis, or edema  LYMPH: No lymphadenopathy noted  SKIN: No rashes or lesions  ENDOCRINOLOGY: No Thyromegaly  PSYCH: Appropriate    Labs:  ABG - ( 29 May 2021 09:18 )  pH, Arterial: 7.27  pH, Blood: x     /  pCO2: 50    /  pO2: 120   / HCO3: 22    / Base Excess: -4.2  /  SaO2: 98              Venous<54<4>>52<<7.335>>Venous<<3><<4><<5<<529>>, Venous<59<4>>34<<7.265>>Venous<<3><<4><<5<<349>>SARS-CoV-2: NotDetec (29 May 2021 06:15)                              9.7    13.11 )-----------( 275      ( 30 May 2021 10:06 )             32.7                         10.5   10.79 )-----------( 272      ( 29 May 2021 06:15 )             36.8     05-30    135  |  99  |  24<H>  ----------------------------<  363<H>  4.5   |  21<L>  |  0.68  -29    134<L>  |  97  |  20  ----------------------------<  313<H>  3.7   |  20<L>  |  0.68    Ca    9.1      30 May 2021 10:06  Ca    9.1      29 May 2021 06:15  Phos  2.3     -  Mg     2.2         TPro  7.1  /  Alb  4.2  /  TBili  0.2  /  DBili  x   /  AST  16  /  ALT  13  /  AlkPhos  62  05-30  TPro  7.1  /  Alb  4.2  /  TBili  0.2  /  DBili  x   /  AST  19  /  ALT  15  /  AlkPhos  62  05-29    CAPILLARY BLOOD GLUCOSE      POCT Blood Glucose.: 278 mg/dL (30 May 2021 12:54)  POCT Blood Glucose.: 286 mg/dL (30 May 2021 08:37)  POCT Blood Glucose.: 320 mg/dL (30 May 2021 04:44)  POCT Blood Glucose.: 384 mg/dL (29 May 2021 23:53)  POCT Blood Glucose.: 413 mg/dL (29 May 2021 21:44)    LIVER FUNCTIONS - ( 30 May 2021 10:06 )  Alb: 4.2 g/dL / Pro: 7.1 g/dL / ALK PHOS: 62 U/L / ALT: 13 U/L / AST: 16 U/L / GGT: x             Urinalysis Basic - ( 29 May 2021 13:30 )    Color: Light Yellow / Appearance: Clear / S.048 / pH: x  Gluc: x / Ketone: Trace  / Bili: Negative / Urobili: Negative   Blood: x / Protein: 30 mg/dL / Nitrite: Negative   Leuk Esterase: Negative / RBC: 2 /hpf / WBC 1 /HPF   Sq Epi: x / Non Sq Epi: 1 /hpf / Bacteria: Negative      Culture - Urine (collected 29 May 2021 16:49)  Source: .Urine Clean Catch (Midstream)  Final Report (30 May 2021 13:14):    <10,000 CFU/mL Normal Urogenital Kinza      D DImer  Serum Pro-Brain Natriuretic Peptide: 399 pg/mL ( @ 06:15)      Studies  Chest X-RAY  CT SCAN Chest   CT Abdomen  Venous Dopplers: LE:   Others        r

## 2021-05-31 LAB
A1C WITH ESTIMATED AVERAGE GLUCOSE RESULT: 9.7 % — HIGH (ref 4–5.6)
ANION GAP SERPL CALC-SCNC: 13 MMOL/L — SIGNIFICANT CHANGE UP (ref 5–17)
BASE EXCESS BLDA CALC-SCNC: 4.8 MMOL/L — HIGH (ref -2–2)
BASE EXCESS BLDV CALC-SCNC: 4.2 MMOL/L — HIGH (ref -2–2)
BUN SERPL-MCNC: 26 MG/DL — HIGH (ref 7–23)
CA-I SERPL-SCNC: 1.2 MMOL/L — SIGNIFICANT CHANGE UP (ref 1.12–1.3)
CALCIUM SERPL-MCNC: 9.1 MG/DL — SIGNIFICANT CHANGE UP (ref 8.4–10.5)
CHLORIDE BLDV-SCNC: 103 MMOL/L — SIGNIFICANT CHANGE UP (ref 96–108)
CHLORIDE SERPL-SCNC: 98 MMOL/L — SIGNIFICANT CHANGE UP (ref 96–108)
CO2 BLDA-SCNC: 33 MMOL/L — HIGH (ref 22–30)
CO2 BLDV-SCNC: 33 MMOL/L — HIGH (ref 22–30)
CO2 SERPL-SCNC: 26 MMOL/L — SIGNIFICANT CHANGE UP (ref 22–31)
CREAT SERPL-MCNC: 0.71 MG/DL — SIGNIFICANT CHANGE UP (ref 0.5–1.3)
ESTIMATED AVERAGE GLUCOSE: 232 MG/DL — HIGH (ref 68–114)
GAS PNL BLDA: SIGNIFICANT CHANGE UP
GAS PNL BLDV: 137 MMOL/L — SIGNIFICANT CHANGE UP (ref 135–145)
GAS PNL BLDV: SIGNIFICANT CHANGE UP
GAS PNL BLDV: SIGNIFICANT CHANGE UP
GLUCOSE BLDC GLUCOMTR-MCNC: 157 MG/DL — HIGH (ref 70–99)
GLUCOSE BLDC GLUCOMTR-MCNC: 174 MG/DL — HIGH (ref 70–99)
GLUCOSE BLDC GLUCOMTR-MCNC: 179 MG/DL — HIGH (ref 70–99)
GLUCOSE BLDC GLUCOMTR-MCNC: 209 MG/DL — HIGH (ref 70–99)
GLUCOSE BLDC GLUCOMTR-MCNC: 378 MG/DL — HIGH (ref 70–99)
GLUCOSE BLDC GLUCOMTR-MCNC: 421 MG/DL — HIGH (ref 70–99)
GLUCOSE BLDV-MCNC: 272 MG/DL — HIGH (ref 70–99)
GLUCOSE SERPL-MCNC: 262 MG/DL — HIGH (ref 70–99)
HCO3 BLDA-SCNC: 31 MMOL/L — HIGH (ref 21–29)
HCO3 BLDV-SCNC: 31 MMOL/L — HIGH (ref 21–29)
HCT VFR BLD CALC: 35.1 % — SIGNIFICANT CHANGE UP (ref 34.5–45)
HCT VFR BLDA CALC: 30 % — LOW (ref 39–50)
HGB BLD CALC-MCNC: 9.8 G/DL — LOW (ref 11.5–15.5)
HGB BLD-MCNC: 9.9 G/DL — LOW (ref 11.5–15.5)
LACTATE BLDV-MCNC: 2.8 MMOL/L — HIGH (ref 0.7–2)
LACTATE SERPL-SCNC: 2.7 MMOL/L — HIGH (ref 0.7–2)
MAGNESIUM SERPL-MCNC: 1.9 MG/DL — SIGNIFICANT CHANGE UP (ref 1.6–2.6)
MCHC RBC-ENTMCNC: 22.6 PG — LOW (ref 27–34)
MCHC RBC-ENTMCNC: 28.2 GM/DL — LOW (ref 32–36)
MCV RBC AUTO: 80 FL — SIGNIFICANT CHANGE UP (ref 80–100)
NIGHT BLUE STAIN TISS: SIGNIFICANT CHANGE UP
NRBC # BLD: 0 /100 WBCS — SIGNIFICANT CHANGE UP (ref 0–0)
OTHER CELLS CSF MANUAL: 12 ML/DL — LOW (ref 18–22)
PCO2 BLDA: 60 MMHG — HIGH (ref 32–46)
PCO2 BLDV: 63 MMHG — HIGH (ref 35–50)
PH BLDA: 7.34 — LOW (ref 7.35–7.45)
PH BLDV: 7.31 — LOW (ref 7.35–7.45)
PHOSPHATE SERPL-MCNC: 3 MG/DL — SIGNIFICANT CHANGE UP (ref 2.5–4.5)
PLATELET # BLD AUTO: 269 K/UL — SIGNIFICANT CHANGE UP (ref 150–400)
PO2 BLDA: 62 MMHG — LOW (ref 74–108)
PO2 BLDV: 58 MMHG — HIGH (ref 25–45)
POTASSIUM BLDV-SCNC: 3.6 MMOL/L — SIGNIFICANT CHANGE UP (ref 3.5–5.3)
POTASSIUM SERPL-MCNC: 3.8 MMOL/L — SIGNIFICANT CHANGE UP (ref 3.5–5.3)
POTASSIUM SERPL-SCNC: 3.8 MMOL/L — SIGNIFICANT CHANGE UP (ref 3.5–5.3)
RBC # BLD: 4.39 M/UL — SIGNIFICANT CHANGE UP (ref 3.8–5.2)
RBC # FLD: 20.2 % — HIGH (ref 10.3–14.5)
SAO2 % BLDA: 90 % — LOW (ref 92–96)
SAO2 % BLDV: 85 % — SIGNIFICANT CHANGE UP (ref 67–88)
SODIUM SERPL-SCNC: 137 MMOL/L — SIGNIFICANT CHANGE UP (ref 135–145)
SPECIMEN SOURCE: SIGNIFICANT CHANGE UP
WBC # BLD: 9.94 K/UL — SIGNIFICANT CHANGE UP (ref 3.8–10.5)
WBC # FLD AUTO: 9.94 K/UL — SIGNIFICANT CHANGE UP (ref 3.8–10.5)

## 2021-05-31 RX ORDER — BUDESONIDE AND FORMOTEROL FUMARATE DIHYDRATE 160; 4.5 UG/1; UG/1
2 AEROSOL RESPIRATORY (INHALATION)
Refills: 0 | Status: DISCONTINUED | OUTPATIENT
Start: 2021-05-31 | End: 2021-06-05

## 2021-05-31 RX ORDER — INSULIN GLARGINE 100 [IU]/ML
15 INJECTION, SOLUTION SUBCUTANEOUS AT BEDTIME
Refills: 0 | Status: DISCONTINUED | OUTPATIENT
Start: 2021-05-31 | End: 2021-06-01

## 2021-05-31 RX ADMIN — HEPARIN SODIUM 5000 UNIT(S): 5000 INJECTION INTRAVENOUS; SUBCUTANEOUS at 13:25

## 2021-05-31 RX ADMIN — Medication 5 UNIT(S): at 13:30

## 2021-05-31 RX ADMIN — Medication 1 PACKET(S): at 06:09

## 2021-05-31 RX ADMIN — HEPARIN SODIUM 5000 UNIT(S): 5000 INJECTION INTRAVENOUS; SUBCUTANEOUS at 06:09

## 2021-05-31 RX ADMIN — AMLODIPINE BESYLATE 2.5 MILLIGRAM(S): 2.5 TABLET ORAL at 06:17

## 2021-05-31 RX ADMIN — Medication 5 UNIT(S): at 17:33

## 2021-05-31 RX ADMIN — ATORVASTATIN CALCIUM 20 MILLIGRAM(S): 80 TABLET, FILM COATED ORAL at 22:21

## 2021-05-31 RX ADMIN — INSULIN GLARGINE 15 UNIT(S): 100 INJECTION, SOLUTION SUBCUTANEOUS at 22:19

## 2021-05-31 RX ADMIN — Medication 3 MILLILITER(S): at 17:38

## 2021-05-31 RX ADMIN — Medication 3 MILLILITER(S): at 12:27

## 2021-05-31 RX ADMIN — Medication 2: at 13:30

## 2021-05-31 RX ADMIN — BUDESONIDE AND FORMOTEROL FUMARATE DIHYDRATE 2 PUFF(S): 160; 4.5 AEROSOL RESPIRATORY (INHALATION) at 17:38

## 2021-05-31 RX ADMIN — Medication 125 MICROGRAM(S): at 06:17

## 2021-05-31 RX ADMIN — Medication 5 UNIT(S): at 08:42

## 2021-05-31 RX ADMIN — LOSARTAN POTASSIUM 100 MILLIGRAM(S): 100 TABLET, FILM COATED ORAL at 06:17

## 2021-05-31 RX ADMIN — HEPARIN SODIUM 5000 UNIT(S): 5000 INJECTION INTRAVENOUS; SUBCUTANEOUS at 22:20

## 2021-05-31 RX ADMIN — Medication 2: at 17:33

## 2021-05-31 RX ADMIN — Medication 20 MILLIGRAM(S): at 06:17

## 2021-05-31 RX ADMIN — Medication 4: at 08:41

## 2021-05-31 RX ADMIN — Medication 3 MILLILITER(S): at 06:11

## 2021-05-31 NOTE — PROGRESS NOTE ADULT - PROBLEM SELECTOR PLAN 2
hx of TB in 2019: treated for 10 months as per family   -ID consulted   - air borne precaution   - AFB sputum q8 hrs x3 days (pt not making much sputum)  - r/o MAC  - f/u Galactomannan, Fungitell, Histo urine ag  - Will attempt to obtain records from [place] [facility] (jigna prior CT imaging and treatment history) during weekdays  - If infectious workup negative, will consider malignancy workup as per ID hx of TB in 2019: treated for 10 months as per family   -ID consulted   - air borne precaution   - AFB sputum q8 hrs x3 pending in lab.   - r/o MAC  - f/u Galactomannan, Fungitell, Histo urine ag  - Will attempt to obtain records from [place] [facility] (jigna prior CT imaging and treatment history) during weekdays  - If infectious workup negative, will consider malignancy workup as per ID

## 2021-05-31 NOTE — PROGRESS NOTE ADULT - PROBLEM SELECTOR PLAN 1
CT lungs in ED shows cavitary lesion   ID consulted, appreciate recs. See below   as per ID : hold off on abx   pul consult Dr. welsh called CT lungs in ED shows cavitary lesion   -ID consulted, AFB sputum negative x2 pending result of third sputum. Galacto, fungitell, and Urine histoplasma pending.   -monitoring off abx   -pulm consulted, following patient.  -ABG with evidence of respiratory acidosis with metabolic compensation (bicarb of 31 pCO2 about 60), patient mentating appropriate, may be in the setting of diuretic use, will hold off on further doses of abx at this time. Bilevel ventilation at night if patient tolerates for hypercapnia.

## 2021-05-31 NOTE — PROGRESS NOTE ADULT - SUBJECTIVE AND OBJECTIVE BOX
Nicolas Castro M.D.  Internal Medicine PGY-1  [phone] / [id number]     Patient is a 76y old  Female who presents with a chief complaint of SOB and weakness / CT chest shows cavitary lesion in lung (30 May 2021 14:52)      SUBJECTIVE / OVERNIGHT EVENTS:          MEDICATIONS  (STANDING):  albuterol/ipratropium for Nebulization 3 milliLiter(s) Nebulizer every 6 hours  amLODIPine   Tablet 2.5 milliGRAM(s) Oral daily  atorvastatin 20 milliGRAM(s) Oral at bedtime  dextrose 40% Gel 15 Gram(s) Oral once  dextrose 5%. 1000 milliLiter(s) (50 mL/Hr) IV Continuous <Continuous>  dextrose 5%. 1000 milliLiter(s) (100 mL/Hr) IV Continuous <Continuous>  dextrose 50% Injectable 25 Gram(s) IV Push once  dextrose 50% Injectable 12.5 Gram(s) IV Push once  dextrose 50% Injectable 25 Gram(s) IV Push once  furosemide    Tablet 20 milliGRAM(s) Oral daily  glucagon  Injectable 1 milliGRAM(s) IntraMuscular once  heparin   Injectable 5000 Unit(s) SubCutaneous every 8 hours  insulin glargine Injectable (LANTUS) 13 Unit(s) SubCutaneous at bedtime  insulin lispro (ADMELOG) corrective regimen sliding scale   SubCutaneous three times a day before meals  insulin lispro (ADMELOG) corrective regimen sliding scale   SubCutaneous at bedtime  insulin lispro Injectable (ADMELOG) 5 Unit(s) SubCutaneous three times a day before meals  levothyroxine 125 MICROGram(s) Oral daily  losartan 100 milliGRAM(s) Oral daily    MEDICATIONS  (PRN):  acetaminophen   Tablet .. 650 milliGRAM(s) Oral every 6 hours PRN Temp greater or equal to 38.5C (101.3F), Mild Pain (1 - 3)      Vital Signs Last 24 Hrs  T(C): 36.8 (31 May 2021 05:37), Max: 36.8 (31 May 2021 05:37)  T(F): 98.3 (31 May 2021 05:37), Max: 98.3 (31 May 2021 05:37)  HR: 79 (31 May 2021 06:15) (78 - 84)  BP: 129/77 (31 May 2021 05:37) (121/72 - 129/77)  BP(mean): --  RR: 18 (31 May 2021 05:37) (18 - 20)  SpO2: 100% (31 May 2021 06:15) (95% - 100%)      PHYSICAL EXAM  GENERAL: NAD, well-developed  HEAD:  Atraumatic, Normocephalic  EYES: EOMI, PERRLA, conjunctiva and sclera clear  NECK: Supple, No JVD  CHEST/LUNG: Clear to auscultation bilaterally; No wheeze  HEART: Regular rate and rhythm; No murmurs, rubs, or gallops  ABDOMEN: Soft, Nontender, Nondistended; Bowel sounds present  EXTREMITIES:  2+ Peripheral Pulses, No clubbing, cyanosis, or edema  PSYCH: AAOx3  SKIN: No rashes or lesions    CAPILLARY BLOOD GLUCOSE      POCT Blood Glucose.: 110 mg/dL (30 May 2021 21:38)  POCT Blood Glucose.: 203 mg/dL (30 May 2021 17:34)  POCT Blood Glucose.: 278 mg/dL (30 May 2021 12:54)  POCT Blood Glucose.: 286 mg/dL (30 May 2021 08:37)    I&O's Summary    29 May 2021 07:01  -  30 May 2021 07:00  --------------------------------------------------------  IN: 150 mL / OUT: 400 mL / NET: -250 mL    30 May 2021 07:01  -  31 May 2021 06:55  --------------------------------------------------------  IN: 150 mL / OUT: 700 mL / NET: -550 mL        LABS:                        9.7    13.11 )-----------( 275      ( 30 May 2021 10:06 )             32.7     0530    135  |  99  |  24<H>  ----------------------------<  363<H>  4.5   |  21<L>  |  0.68    Ca    9.1      30 May 2021 10:06  Phos  2.3     05-30  Mg     2.2     -30    TPro  7.1  /  Alb  4.2  /  TBili  0.2  /  DBili  x   /  AST  16  /  ALT  13  /  AlkPhos  62  05-30          Urinalysis Basic - ( 29 May 2021 13:30 )    Color: Light Yellow / Appearance: Clear / S.048 / pH: x  Gluc: x / Ketone: Trace  / Bili: Negative / Urobili: Negative   Blood: x / Protein: 30 mg/dL / Nitrite: Negative   Leuk Esterase: Negative / RBC: 2 /hpf / WBC 1 /HPF   Sq Epi: x / Non Sq Epi: 1 /hpf / Bacteria: Negative        RADIOLOGY & ADDITIONAL TESTS:     MICROBIOLOGY:    ANTIMICROBIALS:    CONSULTS: Nicolas Castro M.D.  Internal Medicine PGY-1  [phone] / [id number]     Patient is a 76y old  Female who presents with a chief complaint of SOB and weakness / CT chest shows cavitary lesion in lung (30 May 2021 14:52)      SUBJECTIVE / OVERNIGHT EVENTS:    Patient seen and examined at the bedside this am. Per nursing no acute events overnight. Patient this morning awake, alert, and oriented. Denies any n/v/d. States that sometimes she has some back pain and numbness and tingling 2/2 to her       MEDICATIONS  (STANDING):  albuterol/ipratropium for Nebulization 3 milliLiter(s) Nebulizer every 6 hours  amLODIPine   Tablet 2.5 milliGRAM(s) Oral daily  atorvastatin 20 milliGRAM(s) Oral at bedtime  dextrose 40% Gel 15 Gram(s) Oral once  dextrose 5%. 1000 milliLiter(s) (50 mL/Hr) IV Continuous <Continuous>  dextrose 5%. 1000 milliLiter(s) (100 mL/Hr) IV Continuous <Continuous>  dextrose 50% Injectable 25 Gram(s) IV Push once  dextrose 50% Injectable 12.5 Gram(s) IV Push once  dextrose 50% Injectable 25 Gram(s) IV Push once  furosemide    Tablet 20 milliGRAM(s) Oral daily  glucagon  Injectable 1 milliGRAM(s) IntraMuscular once  heparin   Injectable 5000 Unit(s) SubCutaneous every 8 hours  insulin glargine Injectable (LANTUS) 13 Unit(s) SubCutaneous at bedtime  insulin lispro (ADMELOG) corrective regimen sliding scale   SubCutaneous three times a day before meals  insulin lispro (ADMELOG) corrective regimen sliding scale   SubCutaneous at bedtime  insulin lispro Injectable (ADMELOG) 5 Unit(s) SubCutaneous three times a day before meals  levothyroxine 125 MICROGram(s) Oral daily  losartan 100 milliGRAM(s) Oral daily    MEDICATIONS  (PRN):  acetaminophen   Tablet .. 650 milliGRAM(s) Oral every 6 hours PRN Temp greater or equal to 38.5C (101.3F), Mild Pain (1 - 3)      Vital Signs Last 24 Hrs  T(C): 36.8 (31 May 2021 05:37), Max: 36.8 (31 May 2021 05:37)  T(F): 98.3 (31 May 2021 05:37), Max: 98.3 (31 May 2021 05:37)  HR: 79 (31 May 2021 06:15) (78 - 84)  BP: 129/77 (31 May 2021 05:37) (121/72 - 129/77)  BP(mean): --  RR: 18 (31 May 2021 05:37) (18 - 20)  SpO2: 100% (31 May 2021 06:15) (95% - 100%)      PHYSICAL EXAM  GENERAL: NAD, well-developed  HEAD:  Atraumatic, Normocephalic  EYES: EOMI, PERRLA, conjunctiva and sclera clear  NECK: Supple, No JVD  CHEST/LUNG: Clear to auscultation bilaterally; No wheeze  HEART: Regular rate and rhythm; No murmurs, rubs, or gallops  ABDOMEN: Soft, Nontender, Nondistended; Bowel sounds present  EXTREMITIES:  2+ Peripheral Pulses, No clubbing, cyanosis, or edema  PSYCH: AAOx3  SKIN: No rashes or lesions    CAPILLARY BLOOD GLUCOSE      POCT Blood Glucose.: 110 mg/dL (30 May 2021 21:38)  POCT Blood Glucose.: 203 mg/dL (30 May 2021 17:34)  POCT Blood Glucose.: 278 mg/dL (30 May 2021 12:54)  POCT Blood Glucose.: 286 mg/dL (30 May 2021 08:37)    I&O's Summary    29 May 2021 07:01  -  30 May 2021 07:00  --------------------------------------------------------  IN: 150 mL / OUT: 400 mL / NET: -250 mL    30 May 2021 07:01  -  31 May 2021 06:55  --------------------------------------------------------  IN: 150 mL / OUT: 700 mL / NET: -550 mL        LABS:                        9.7    13.11 )-----------( 275      ( 30 May 2021 10:06 )             32.7     05-30    135  |  99  |  24<H>  ----------------------------<  363<H>  4.5   |  21<L>  |  0.68    Ca    9.1      30 May 2021 10:06  Phos  2.3       Mg     2.2         TPro  7.1  /  Alb  4.2  /  TBili  0.2  /  DBili  x   /  AST  16  /  ALT  13  /  AlkPhos  62            Urinalysis Basic - ( 29 May 2021 13:30 )    Color: Light Yellow / Appearance: Clear / S.048 / pH: x  Gluc: x / Ketone: Trace  / Bili: Negative / Urobili: Negative   Blood: x / Protein: 30 mg/dL / Nitrite: Negative   Leuk Esterase: Negative / RBC: 2 /hpf / WBC 1 /HPF   Sq Epi: x / Non Sq Epi: 1 /hpf / Bacteria: Negative        RADIOLOGY & ADDITIONAL TESTS:     MICROBIOLOGY:    ANTIMICROBIALS:    CONSULTS: Nicolas Castro M.D.  Internal Medicine PGY-1  [phone] / [id number]     Patient is a 76y old  Female who presents with a chief complaint of SOB and weakness / CT chest shows cavitary lesion in lung (30 May 2021 14:52)      SUBJECTIVE / OVERNIGHT EVENTS:    Patient seen and examined at the bedside this am. Per nursing no acute events overnight. Patient this morning awake, alert, and oriented. Denies any n/v/d. States that sometimes she has some back pain and numbness and tingling which self resolves with tylenol. Patient denies further ROS.       MEDICATIONS  (STANDING):  albuterol/ipratropium for Nebulization 3 milliLiter(s) Nebulizer every 6 hours  amLODIPine   Tablet 2.5 milliGRAM(s) Oral daily  atorvastatin 20 milliGRAM(s) Oral at bedtime  dextrose 40% Gel 15 Gram(s) Oral once  dextrose 5%. 1000 milliLiter(s) (50 mL/Hr) IV Continuous <Continuous>  dextrose 5%. 1000 milliLiter(s) (100 mL/Hr) IV Continuous <Continuous>  dextrose 50% Injectable 25 Gram(s) IV Push once  dextrose 50% Injectable 12.5 Gram(s) IV Push once  dextrose 50% Injectable 25 Gram(s) IV Push once  furosemide    Tablet 20 milliGRAM(s) Oral daily  glucagon  Injectable 1 milliGRAM(s) IntraMuscular once  heparin   Injectable 5000 Unit(s) SubCutaneous every 8 hours  insulin glargine Injectable (LANTUS) 13 Unit(s) SubCutaneous at bedtime  insulin lispro (ADMELOG) corrective regimen sliding scale   SubCutaneous three times a day before meals  insulin lispro (ADMELOG) corrective regimen sliding scale   SubCutaneous at bedtime  insulin lispro Injectable (ADMELOG) 5 Unit(s) SubCutaneous three times a day before meals  levothyroxine 125 MICROGram(s) Oral daily  losartan 100 milliGRAM(s) Oral daily    MEDICATIONS  (PRN):  acetaminophen   Tablet .. 650 milliGRAM(s) Oral every 6 hours PRN Temp greater or equal to 38.5C (101.3F), Mild Pain (1 - 3)      Vital Signs Last 24 Hrs  T(C): 36.8 (31 May 2021 05:37), Max: 36.8 (31 May 2021 05:37)  T(F): 98.3 (31 May 2021 05:37), Max: 98.3 (31 May 2021 05:37)  HR: 79 (31 May 2021 06:15) (78 - 84)  BP: 129/77 (31 May 2021 05:37) (121/72 - 129/77)  BP(mean): --  RR: 18 (31 May 2021 05:37) (18 - 20)  SpO2: 100% (31 May 2021 06:15) (95% - 100%)      CONSTITUTIONAL: NAD, well-developed  RESPIRATORY: Normal respiratory effort; +3L NC.  CARDIOVASCULAR: Regular rate and rhythm, normal S1 and S2, no murmur/rub/gallop; No lower extremity edema; Peripheral pulses are 2+ bilaterally  ABDOMEN: Nontender to palpation, normoactive bowel sounds, no rebound/guarding;  MUSCLOSKELETAL: no clubbing or cyanosis of digits; no joint swelling or tenderness to palpation  PSYCH: A+O to person, place, and time; affect appropriate    CAPILLARY BLOOD GLUCOSE      POCT Blood Glucose.: 110 mg/dL (30 May 2021 21:38)  POCT Blood Glucose.: 203 mg/dL (30 May 2021 17:34)  POCT Blood Glucose.: 278 mg/dL (30 May 2021 12:54)  POCT Blood Glucose.: 286 mg/dL (30 May 2021 08:37)    I&O's Summary    29 May 2021 07:01  -  30 May 2021 07:00  --------------------------------------------------------  IN: 150 mL / OUT: 400 mL / NET: -250 mL    30 May 2021 07:01  -  31 May 2021 06:55  --------------------------------------------------------  IN: 150 mL / OUT: 700 mL / NET: -550 mL        LABS:                        9.7    13.11 )-----------( 275      ( 30 May 2021 10:06 )             32.7     05-30    135  |  99  |  24<H>  ----------------------------<  363<H>  4.5   |  21<L>  |  0.68    Ca    9.1      30 May 2021 10:06  Phos  2.3       Mg     2.2         TPro  7.1  /  Alb  4.2  /  TBili  0.2  /  DBili  x   /  AST  16  /  ALT  13  /  AlkPhos  62            Urinalysis Basic - ( 29 May 2021 13:30 )    Color: Light Yellow / Appearance: Clear / S.048 / pH: x  Gluc: x / Ketone: Trace  / Bili: Negative / Urobili: Negative   Blood: x / Protein: 30 mg/dL / Nitrite: Negative   Leuk Esterase: Negative / RBC: 2 /hpf / WBC 1 /HPF   Sq Epi: x / Non Sq Epi: 1 /hpf / Bacteria: Negative        RADIOLOGY & ADDITIONAL TESTS:     MICROBIOLOGY:    ANTIMICROBIALS:    CONSULTS:

## 2021-05-31 NOTE — DIETITIAN INITIAL EVALUATION ADULT. - OTHER INFO
Dosing wt (5/29): 139.8 lbs. UBW unknown. Will monitor.     Pt currently prescribed a DASH/TLC, consistent carbohydrate diet; Halal. As noted above, pt reports improving appetite since admission, however unable to obtain further subjective information at this time d/t respiratory status. Will continue to monitor. A1c 9.9%, indicating poor glycemic control PTA. DM education deferred. Currently prescribed Insulin Lispro, Lantus to aid in optimal glycemic control. Per H&P, pt with hx of taking Janumet PTA.     Skin: no pressure injuries noted  Edema: none noted  GI: (5/30): x 1. Not on apparent bowel regimen.

## 2021-05-31 NOTE — DIETITIAN INITIAL EVALUATION ADULT. - PROBLEM SELECTOR PLAN 2
hx of TB in 2019: treated for 10 months as per family   -ID consulted   air borne precaution   AFB sputum x 3 days   r/o MAC [phone]

## 2021-05-31 NOTE — DIETITIAN INITIAL EVALUATION ADULT. - ORAL INTAKE PTA/DIET HISTORY
Pt observed resting in bed; consent provided for free- services (ID#[id number]). Pt endorsed appetite improving slightly since admission. During RD visit, pt began coughing, spitting up sputum. RT present and aided to patient; began nebulizer treatment. Interview limited due to same. Unable to obtain further subjective wt/diet history.

## 2021-05-31 NOTE — DIETITIAN INITIAL EVALUATION ADULT. - CHIEF COMPLAINT
The patient is a 75 yo F with PMH: previous pulmonary TB (2019) s/o 10 months of treatment HTN, DM. Presented with acute on chronic SOB. Noted with have worsening CUNHA for about a year. CTA chest with no PE, but with R apical cavitary lesion and upper lobe predominant patchy opacities c/f reactivation TB. ID following.

## 2021-05-31 NOTE — DIETITIAN INITIAL EVALUATION ADULT. - SIGNS/SYMPTOMS
pt with intermittent use of BiPAP, nebulizer treatments, SOB, worsening CUNHA; pt with fair appetite

## 2021-05-31 NOTE — PROGRESS NOTE ADULT - ASSESSMENT
Patient is a 75yo F originally from [place] PMHx of NIDDMII, HTN, previous pulmonary TB (2019) s/p 10 months of treatment presented with acute on chronic SOB, with concern of reactivation TB. Patient is a 77yo F originally from [place] PMHx of TIIDM, HTN, previous pulmonary TB (2019) s/p 10 months of treatment presented with acute on chronic SOB, imaging with evidence of cavitary lung lesion most concern for reactivation of tuberculosis, now s/p isolation AFB negative x2.

## 2021-05-31 NOTE — DIETITIAN INITIAL EVALUATION ADULT. - ADD RECOMMEND
We will remake with the seg heights equal;  And we will also restyle for a better fit closer to face 1. If suboptimal PO intake noted, consider liberalize diet to consistent carbohydrate (d/c DASH/TLC). Defer consistency to medical team, SLP. 2. Monitor and encourage PO intake. Encourage use of daily menus as able. Honor dietary preferences as expressed as able. 3. Obtain further subjective wt/diet history from pt/family PRN. 4. Monitor wt trends, nutrition related labs, skin integrity, hydration status and bowel regularity and hydration status. 5. RD to add diet health shakes twice daily. Monitor acceptance/tolerance.

## 2021-05-31 NOTE — DIETITIAN INITIAL EVALUATION ADULT. - REASON INDICATOR FOR ASSESSMENT
Nutrition Assessment warranted for length of stay.  Information obtained from: RN, comprehensive chart review, interdisciplinary medical rounds, pt

## 2021-05-31 NOTE — PROGRESS NOTE ADULT - PROBLEM SELECTOR PLAN 3
patient with elevated FS up to 400s. Not c/w HHNS or DKA. Likely provoked in setting of infection. Mentating well.   - glucosuria, elevated specific gravity   - IVF, uptitrate insulin requirements

## 2021-05-31 NOTE — DIETITIAN INITIAL EVALUATION ADULT. - PERTINENT LABORATORY DATA
(5/31): Hgb 9.9, BUN 26, Glu 262, POCT Blood Glucose 209, 110, 203  (5/30): A1c 9.9%, Estimated Average Glucose 237

## 2021-05-31 NOTE — PROGRESS NOTE ADULT - SUBJECTIVE AND OBJECTIVE BOX
Date of Service: 21 @ 11:26    Patient is a 75 y/o female who presents with a chief complaint of SOB and weakness / CT chest shows cavitary lesion in lung (31 May 2021 06:54)    Any change in ROS:  Resting comfortably, in no acute distress  O2 sats 100% on 4L N/C, lowered to 3L now  Intermittent cough with minimal sputum production    MEDICATIONS  (STANDING):  albuterol/ipratropium for Nebulization 3 milliLiter(s) Nebulizer every 6 hours  amLODIPine   Tablet 2.5 milliGRAM(s) Oral daily  atorvastatin 20 milliGRAM(s) Oral at bedtime  budesonide  80 MICROgram(s)/formoterol 4.5 MICROgram(s) Inhaler 2 Puff(s) Inhalation two times a day  dextrose 40% Gel 15 Gram(s) Oral once  dextrose 5%. 1000 milliLiter(s) (50 mL/Hr) IV Continuous <Continuous>  dextrose 5%. 1000 milliLiter(s) (100 mL/Hr) IV Continuous <Continuous>  dextrose 50% Injectable 25 Gram(s) IV Push once  dextrose 50% Injectable 12.5 Gram(s) IV Push once  dextrose 50% Injectable 25 Gram(s) IV Push once  furosemide    Tablet 20 milliGRAM(s) Oral daily  glucagon  Injectable 1 milliGRAM(s) IntraMuscular once  heparin   Injectable 5000 Unit(s) SubCutaneous every 8 hours  insulin glargine Injectable (LANTUS) 15 Unit(s) SubCutaneous at bedtime  insulin lispro (ADMELOG) corrective regimen sliding scale   SubCutaneous three times a day before meals  insulin lispro (ADMELOG) corrective regimen sliding scale   SubCutaneous at bedtime  insulin lispro Injectable (ADMELOG) 5 Unit(s) SubCutaneous three times a day before meals  levothyroxine 125 MICROGram(s) Oral daily  losartan 100 milliGRAM(s) Oral daily    MEDICATIONS  (PRN):  acetaminophen   Tablet .. 650 milliGRAM(s) Oral every 6 hours PRN Temp greater or equal to 38.5C (101.3F), Mild Pain (1 - 3)    Vital Signs Last 24 Hrs  T(C): 36.7 (31 May 2021 11:05), Max: 36.8 (31 May 2021 05:37)  T(F): 98.1 (31 May 2021 11:05), Max: 98.3 (31 May 2021 05:37)  HR: 90 (31 May 2021 11:05) (78 - 90)  BP: 120/74 (31 May 2021 11:05) (120/74 - 129/77)  BP(mean): --  RR: 20 (31 May 2021 11:05) (18 - 20)  SpO2: 100% (31 May 2021 11:05) (95% - 100%)    I&O's Summary    30 May 2021 07:01  -  31 May 2021 07:00  --------------------------------------------------------  IN: 150 mL / OUT: 1200 mL / NET: -1050 mL    Physical Exam:   GENERAL: NAD  HEENT: GIAN/   Atraumatic, Normocephalic  ENMT: No tonsillar erythema, exudates, or enlargement  NECK: Supple, No JVD  CHEST/LUNG: CTA bilaterally   CVS: Regular rate and rhythm  GI: : Soft, Nontender, Nondistended; Bowel sounds present  NERVOUS SYSTEM:  Alert & Oriented X3  EXTREMITIES:  2+ Peripheral Pulses, No clubbing, cyanosis, or edema  LYMPH: No lymphadenopathy noted  SKIN: No rashes or lesions  PSYCH: Appropriate    Labs:  ABG - ( 31 May 2021 07:48 )  pH, Arterial: 7.34  pH, Blood: x     /  pCO2: 60    /  pO2: 62    / HCO3: 31    / Base Excess: 4.8   /  SaO2: 90        31, 28, 26                            9.9    9.94  )-----------( 269      ( 31 May 2021 10:57 )             35.1                         9.7    13.11 )-----------( 275      ( 30 May 2021 10:06 )             32.7                         10.5   10.79 )-----------( 272      ( 29 May 2021 06:15 )             36.8     05-30    135  |  99  |  24<H>  ----------------------------<  363<H>  4.5   |  21<L>  |  0.68  05-29    134<L>  |  97  |  20  ----------------------------<  313<H>  3.7   |  20<L>  |  0.68    Ca    9.1      30 May 2021 10:06  Phos  2.3       Mg     2.2         TPro  7.1  /  Alb  4.2  /  TBili  0.2  /  DBili  x   /  AST  16  /  ALT  13  /  AlkPhos  62    TPro  7.1  /  Alb  4.2  /  TBili  0.2  /  DBili  x   /  AST  19  /  ALT  15  /  AlkPhos  62  05-29    CAPILLARY BLOOD GLUCOSE  POCT Blood Glucose.: 209 mg/dL (31 May 2021 08:31)  POCT Blood Glucose.: 110 mg/dL (30 May 2021 21:38)  POCT Blood Glucose.: 203 mg/dL (30 May 2021 17:34)  POCT Blood Glucose.: 278 mg/dL (30 May 2021 12:54)      LIVER FUNCTIONS - ( 30 May 2021 10:06 )  Alb: 4.2 g/dL / Pro: 7.1 g/dL / ALK PHOS: 62 U/L / ALT: 13 U/L / AST: 16 U/L / GGT: x           Urinalysis Basic - ( 29 May 2021 13:30 )    Color: Light Yellow / Appearance: Clear / S.048 / pH: x  Gluc: x / Ketone: Trace  / Bili: Negative / Urobili: Negative   Blood: x / Protein: 30 mg/dL / Nitrite: Negative   Leuk Esterase: Negative / RBC: 2 /hpf / WBC 1 /HPF   Sq Epi: x / Non Sq Epi: 1 /hpf / Bacteria: Negative      Serum Pro-Brain Natriuretic Peptide: 399 pg/mL ( @ 06:15)      RECENT CULTURES:   @ 17:23 .Sputum Sputum    @ 13:00 .Sputum Sputum    @ 16:49 .Urine Clean Catch (Midstream)   <10,000 CFU/mL Normal Urogenital Kinza   @ 16:47 .Blood Blood   No growth to date.      Studies    CT SCAN Chest < from: CT Angio Chest PE Protocol w/ IV Cont (21 @ 07:46) >  FINDINGS:    LUNGS AND AIRWAYS: Bronchial secretions. Diffuse interlobular septal thickening. Bilateral diffuse groundglass opacities with more confluent opacities in the upper lobes, right greater than left. There is volume loss in the right upper lobe. Right apical cavitary lesion measures 3.1 x 1.2 x 2.0 cm (TR, AP, CC). Right upper lobe nodule measures 1.2 cm (2:28).  PLEURA: Small left pleural effusion. No pneumothorax.  MEDIASTINUM AND KAILA: Slightly enlarged subcarinal lymph node.  VESSELS: No main, right, left or lobar pulmonary arterial filling defect. Evaluation of the segmental and subsegmental pulmonary arteries is suboptimal secondary to parenchymal opacities and motion artifacts. Aortic and coronary artery calcifications.  HEART: Heart size is normal. No pericardial effusion. No CT evidence of right heart strain.  CHEST WALL AND LOWER NECK: Within normal limits.  VISUALIZED UPPER ABDOMEN: Small hiatal hernia. Partially visualized left lower pole exophyticcyst.  BONES: Degenerative changes of the thoracic spine.    IMPRESSION:  1.  No central pulmonary emboli. Segmental and subsegmental pulmonary arteries are not well evaluated.  2.  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  Superimposed pulmonary edema and small left pleural effusion.    < end of copied text >

## 2021-05-31 NOTE — PROGRESS NOTE ADULT - ASSESSMENT
77 y/o F originally from [place] with PMH of previous pulmonary TB (2019) s/o 10 months of treatment, HTN, DM. Presented with acute on chronic SOB. Pt reportedly diagnosed with with pulmonary TB in [place] in 2019 while visiting her son there. She was hospitalized for approximately one month, discharged with two medications (granddaughter is not sure how many exactly) which she took for 10 months. Followed with [facility] in [place] ([place] and was [place]. SOB noted to improve, but not completely resolve. Now, pt notes to have worsening CUNHA for about a year. On admisison, afebrile. RVP negative. CTA chest with no PE, but with R apical cavitary lesion and upper lobe predominant patchy opacities c/f reactivation TB. Pt was given CTX/Azithromycin. (29 May 2021 16:24)  above confirmed with son:  He says she was SOB at home and hence was brought to the hospital: she did complete the rx for active tb:  she has no hemoptysis: currently being ruled out for tb     5/30:  r/o tb: in isolation: she was treated for tuberculosis in the past; now presented with SOB: she has no major pe: But she has acute resp acidosis: she would need bipap in the night time: Though currently she looks comfortable: Add BD / Symbicort   on empiric antibiotics for now:   dvt propayxis\  await three sputum cultures   DM: cont to control  HTN: stable   paged team   DW team     5/31  R/O TB, cavitary PNA  -treated for pulmonary TB (2019), now presents with SOB, CTA chest with R apical cavitary lesion and upper lobe predominant patchy opacities c/f reactivation TB  -remains in isolation  -ID recs noted -F/u fungitell, galactomannan, histoplasma Ag  -F/u AFB sputum  -F/u BC   -Afebrile w/ no significant leukocytosis, off ABX at this time   Shortness of breath  -Pt reports for >1 year - Chronic, 2nd to pulmonary TB?  -O2 sats 100% on 4L N/C, lowered to 3L N/C now  -Continue to wean fio2 as tolerated to maintain O2 sats >90%  -Acute resp acidosis on ABG, appears comfortable now but may require nocturnal Bipap   -C/w Symbicort 80/4.5 mcg 2puffs BID  -C/w duonebs q6

## 2021-05-31 NOTE — PROGRESS NOTE ADULT - PROBLEM SELECTOR PLAN 4
hold off on oral meds   start lantus 10 units at night, 5u premeals with ISS    c/w FSBS with coverage hold off on oral meds   -c/w lantus 15 units at night, 5u premeals with ISS    c/w FSBS with coverage

## 2021-06-01 LAB
ANION GAP SERPL CALC-SCNC: 14 MMOL/L — SIGNIFICANT CHANGE UP (ref 5–17)
BASE EXCESS BLDV CALC-SCNC: 4.1 MMOL/L — HIGH (ref -2–2)
BASOPHILS # BLD AUTO: 0.06 K/UL — SIGNIFICANT CHANGE UP (ref 0–0.2)
BASOPHILS NFR BLD AUTO: 0.7 % — SIGNIFICANT CHANGE UP (ref 0–2)
BUN SERPL-MCNC: 21 MG/DL — SIGNIFICANT CHANGE UP (ref 7–23)
CA-I SERPL-SCNC: 1.23 MMOL/L — SIGNIFICANT CHANGE UP (ref 1.12–1.3)
CALCIUM SERPL-MCNC: 9.4 MG/DL — SIGNIFICANT CHANGE UP (ref 8.4–10.5)
CHLORIDE BLDV-SCNC: 100 MMOL/L — SIGNIFICANT CHANGE UP (ref 96–108)
CHLORIDE SERPL-SCNC: 98 MMOL/L — SIGNIFICANT CHANGE UP (ref 96–108)
CO2 BLDV-SCNC: 32 MMOL/L — HIGH (ref 22–30)
CO2 SERPL-SCNC: 25 MMOL/L — SIGNIFICANT CHANGE UP (ref 22–31)
CREAT SERPL-MCNC: 0.67 MG/DL — SIGNIFICANT CHANGE UP (ref 0.5–1.3)
EOSINOPHIL # BLD AUTO: 0.26 K/UL — SIGNIFICANT CHANGE UP (ref 0–0.5)
EOSINOPHIL NFR BLD AUTO: 2.8 % — SIGNIFICANT CHANGE UP (ref 0–6)
GAS PNL BLDV: 138 MMOL/L — SIGNIFICANT CHANGE UP (ref 135–145)
GAS PNL BLDV: SIGNIFICANT CHANGE UP
GAS PNL BLDV: SIGNIFICANT CHANGE UP
GLUCOSE BLDC GLUCOMTR-MCNC: 159 MG/DL — HIGH (ref 70–99)
GLUCOSE BLDC GLUCOMTR-MCNC: 255 MG/DL — HIGH (ref 70–99)
GLUCOSE BLDC GLUCOMTR-MCNC: 260 MG/DL — HIGH (ref 70–99)
GLUCOSE BLDC GLUCOMTR-MCNC: 266 MG/DL — HIGH (ref 70–99)
GLUCOSE BLDV-MCNC: 234 MG/DL — HIGH (ref 70–99)
GLUCOSE SERPL-MCNC: 233 MG/DL — HIGH (ref 70–99)
H CAPSUL AG SPEC-ACNC: SIGNIFICANT CHANGE UP
H CAPSUL AG UR QL IA: SIGNIFICANT CHANGE UP
HCO3 BLDV-SCNC: 30 MMOL/L — HIGH (ref 21–29)
HCT VFR BLD CALC: 36.6 % — SIGNIFICANT CHANGE UP (ref 34.5–45)
HCT VFR BLDA CALC: 33 % — LOW (ref 39–50)
HGB BLD CALC-MCNC: 10.7 G/DL — LOW (ref 11.5–15.5)
HGB BLD-MCNC: 10.3 G/DL — LOW (ref 11.5–15.5)
IMM GRANULOCYTES NFR BLD AUTO: 0.3 % — SIGNIFICANT CHANGE UP (ref 0–1.5)
LACTATE BLDV-MCNC: 2.5 MMOL/L — HIGH (ref 0.7–2)
LACTATE SERPL-SCNC: 2.2 MMOL/L — HIGH (ref 0.7–2)
LYMPHOCYTES # BLD AUTO: 2.04 K/UL — SIGNIFICANT CHANGE UP (ref 1–3.3)
LYMPHOCYTES # BLD AUTO: 22.1 % — SIGNIFICANT CHANGE UP (ref 13–44)
MAGNESIUM SERPL-MCNC: 2 MG/DL — SIGNIFICANT CHANGE UP (ref 1.6–2.6)
MCHC RBC-ENTMCNC: 22.4 PG — LOW (ref 27–34)
MCHC RBC-ENTMCNC: 28.1 GM/DL — LOW (ref 32–36)
MCV RBC AUTO: 79.7 FL — LOW (ref 80–100)
MONOCYTES # BLD AUTO: 0.68 K/UL — SIGNIFICANT CHANGE UP (ref 0–0.9)
MONOCYTES NFR BLD AUTO: 7.4 % — SIGNIFICANT CHANGE UP (ref 2–14)
NEUTROPHILS # BLD AUTO: 6.14 K/UL — SIGNIFICANT CHANGE UP (ref 1.8–7.4)
NEUTROPHILS NFR BLD AUTO: 66.7 % — SIGNIFICANT CHANGE UP (ref 43–77)
NRBC # BLD: 0 /100 WBCS — SIGNIFICANT CHANGE UP (ref 0–0)
OTHER CELLS CSF MANUAL: 11 ML/DL — LOW (ref 18–22)
PCO2 BLDV: 53 MMHG — HIGH (ref 35–50)
PH BLDV: 7.37 — SIGNIFICANT CHANGE UP (ref 7.35–7.45)
PHOSPHATE SERPL-MCNC: 3 MG/DL — SIGNIFICANT CHANGE UP (ref 2.5–4.5)
PLATELET # BLD AUTO: 273 K/UL — SIGNIFICANT CHANGE UP (ref 150–400)
PO2 BLDV: 44 MMHG — SIGNIFICANT CHANGE UP (ref 25–45)
POTASSIUM BLDV-SCNC: 3.6 MMOL/L — SIGNIFICANT CHANGE UP (ref 3.5–5.3)
POTASSIUM SERPL-MCNC: 4.1 MMOL/L — SIGNIFICANT CHANGE UP (ref 3.5–5.3)
POTASSIUM SERPL-SCNC: 4.1 MMOL/L — SIGNIFICANT CHANGE UP (ref 3.5–5.3)
RBC # BLD: 4.59 M/UL — SIGNIFICANT CHANGE UP (ref 3.8–5.2)
RBC # FLD: 20.2 % — HIGH (ref 10.3–14.5)
SAO2 % BLDV: 76 % — SIGNIFICANT CHANGE UP (ref 67–88)
SODIUM SERPL-SCNC: 137 MMOL/L — SIGNIFICANT CHANGE UP (ref 135–145)
WBC # BLD: 9.21 K/UL — SIGNIFICANT CHANGE UP (ref 3.8–10.5)
WBC # FLD AUTO: 9.21 K/UL — SIGNIFICANT CHANGE UP (ref 3.8–10.5)

## 2021-06-01 PROCEDURE — 99232 SBSQ HOSP IP/OBS MODERATE 35: CPT

## 2021-06-01 RX ORDER — INSULIN LISPRO 100/ML
8 VIAL (ML) SUBCUTANEOUS
Refills: 0 | Status: DISCONTINUED | OUTPATIENT
Start: 2021-06-01 | End: 2021-06-02

## 2021-06-01 RX ORDER — ALBUTEROL 90 UG/1
1 AEROSOL, METERED ORAL EVERY 4 HOURS
Refills: 0 | Status: DISCONTINUED | OUTPATIENT
Start: 2021-06-01 | End: 2021-06-02

## 2021-06-01 RX ORDER — IPRATROPIUM/ALBUTEROL SULFATE 18-103MCG
3 AEROSOL WITH ADAPTER (GRAM) INHALATION EVERY 6 HOURS
Refills: 0 | Status: DISCONTINUED | OUTPATIENT
Start: 2021-06-01 | End: 2021-06-02

## 2021-06-01 RX ORDER — INSULIN GLARGINE 100 [IU]/ML
18 INJECTION, SOLUTION SUBCUTANEOUS AT BEDTIME
Refills: 0 | Status: DISCONTINUED | OUTPATIENT
Start: 2021-06-01 | End: 2021-06-02

## 2021-06-01 RX ADMIN — AMLODIPINE BESYLATE 2.5 MILLIGRAM(S): 2.5 TABLET ORAL at 06:52

## 2021-06-01 RX ADMIN — ATORVASTATIN CALCIUM 20 MILLIGRAM(S): 80 TABLET, FILM COATED ORAL at 22:43

## 2021-06-01 RX ADMIN — Medication 5 UNIT(S): at 09:15

## 2021-06-01 RX ADMIN — Medication 6: at 17:47

## 2021-06-01 RX ADMIN — BUDESONIDE AND FORMOTEROL FUMARATE DIHYDRATE 2 PUFF(S): 160; 4.5 AEROSOL RESPIRATORY (INHALATION) at 18:55

## 2021-06-01 RX ADMIN — LOSARTAN POTASSIUM 100 MILLIGRAM(S): 100 TABLET, FILM COATED ORAL at 06:52

## 2021-06-01 RX ADMIN — Medication 6: at 09:13

## 2021-06-01 RX ADMIN — Medication 8 UNIT(S): at 17:47

## 2021-06-01 RX ADMIN — INSULIN GLARGINE 18 UNIT(S): 100 INJECTION, SOLUTION SUBCUTANEOUS at 22:41

## 2021-06-01 RX ADMIN — Medication 6: at 14:04

## 2021-06-01 RX ADMIN — Medication 20 MILLIGRAM(S): at 06:51

## 2021-06-01 RX ADMIN — HEPARIN SODIUM 5000 UNIT(S): 5000 INJECTION INTRAVENOUS; SUBCUTANEOUS at 22:33

## 2021-06-01 RX ADMIN — HEPARIN SODIUM 5000 UNIT(S): 5000 INJECTION INTRAVENOUS; SUBCUTANEOUS at 06:52

## 2021-06-01 RX ADMIN — HEPARIN SODIUM 5000 UNIT(S): 5000 INJECTION INTRAVENOUS; SUBCUTANEOUS at 15:49

## 2021-06-01 RX ADMIN — Medication 3 MILLILITER(S): at 05:22

## 2021-06-01 RX ADMIN — Medication 125 MICROGRAM(S): at 06:52

## 2021-06-01 RX ADMIN — BUDESONIDE AND FORMOTEROL FUMARATE DIHYDRATE 2 PUFF(S): 160; 4.5 AEROSOL RESPIRATORY (INHALATION) at 05:21

## 2021-06-01 RX ADMIN — Medication 650 MILLIGRAM(S): at 23:35

## 2021-06-01 NOTE — PROGRESS NOTE ADULT - ASSESSMENT
Patient is a 77yo F originally from [place] PMHx of TIIDM, HTN, previous pulmonary TB (2019) s/p 10 months of treatment presented with acute on chronic SOB, imaging with evidence of cavitary lung lesion most concern for reactivation of tuberculosis, now s/p isolation AFB negative x2.  Patient is a 77yo F originally from [place] PMHx of TIIDM, HTN, previous pulmonary TB (2019) s/p 10 months of treatment presented with acute on chronic SOB, imaging with evidence of cavitary lung lesion most concern for reactivation of tuberculosis, now s/p isolation AFB negative x4, isolation precautions discontinued.

## 2021-06-01 NOTE — PROGRESS NOTE ADULT - PROBLEM SELECTOR PLAN 1
CT lungs in ED shows cavitary lesion   -ID consulted, AFB sputum negative x2 pending result of third sputum. Galacto, fungitell, and Urine histoplasma pending.   -monitoring off abx   -pulm consulted, following patient.  -ABG with evidence of respiratory acidosis with metabolic compensation (bicarb of 31 pCO2 about 60), patient mentating appropriate, may be in the setting of diuretic use, will hold off on further doses of abx at this time. Bilevel ventilation at night if patient tolerates for hypercapnia. CT lungs in ED shows cavitary lesion   -ID consulted, AFB sputum negative x3 pending result of third sputum. Galacto, fungitell, and Urine histoplasma pending.   -monitoring off abx   -pulm consulted, following patient.  -VBG improved at this point refusing bilevel overnight.   -Called and left a few VMs for [place] [facility] to obtain collateral information (previous imaging and tx history).   -Pulm not concerned regarding a vasculitis as cause of cavitary lesion.

## 2021-06-01 NOTE — PHYSICAL THERAPY INITIAL EVALUATION ADULT - PLANNED THERAPY INTERVENTIONS, PT EVAL
GOAL: Pt will negotiate one step independently with unilateral railing within 2 weeks./balance training/gait training/transfer training

## 2021-06-01 NOTE — PROGRESS NOTE ADULT - PROBLEM SELECTOR PLAN 2
hx of TB in 2019: treated for 10 months as per family   -ID consulted   - air borne precaution   - AFB sputum q8 hrs x3 pending in lab.   - r/o MAC  - f/u Galactomannan, Fungitell, Histo urine ag  - Will attempt to obtain records from [place] [facility] ([facility] prior CT imaging and treatment history) during weekdays  - If infectious workup negative, will consider malignancy workup as per ID hx of TB in 2019: treated for 10 months as per family   -ID consulted   - air borne precaution   - AFB sputum q8 hrs x3 negative, isolation precautions negative.   - f/u Galactomannan, Fungitell, Histo urine ag  - Called [place] [facility] ([facility] prior CT imaging and treatment history) to obtain collateral however only able to leave a .   - F/u remaining infectious workup.

## 2021-06-01 NOTE — PROGRESS NOTE ADULT - ASSESSMENT
75 y/o F originally from [place] with PMH of previous pulmonary TB (2019) s/o 10 months of treatment, HTN, DM. Presented with acute on chronic SOB. Pt reportedly diagnosed with with pulmonary TB in [place] in 2019 while visiting her son there. She was hospitalized for approximately one month, discharged with two medications (granddaughter is not sure how many exactly) which she took for 10 months. Followed with [facility] in [place] ([place]) and was cleared. SOB noted to improve, but not completely resolve. Now, pt notes to have worsening CUNHA for about a year. On admisison, afebrile. RVP negative. CTA chest with no PE, but with R apical cavitary lesion and upper lobe predominant patchy opacities c/f reactivation TB. Pt was given CTX/Azithromycin. (29 May 2021 16:24)  above confirmed with son:  He says she was SOB at home and hence was brought to the hospital: she did complete the rx for active tb:  she has no hemoptysis: currently being ruled out for tb     5/30:  r/o tb: in isolation: she was treated for tuberculosis in the past; now presented with SOB: she has no major pe: But she has acute resp acidosis: she would need bipap in the night time: Though currently she looks comfortable: Add BD / Symbicort   on empiric antibiotics for now:   dvt propayxis\  await three sputum cultures   DM: cont to control  HTN: stable   paged team   DW team     5/31  R/O TB, cavitary PNA  -treated for pulmonary TB (2019), now presents with SOB, CTA chest with R apical cavitary lesion and upper lobe predominant patchy opacities with c/f reactivation TB  -AFB sputum cultures negative x3 - discontinuation of isolation per ID ?  -ID recs noted -F/u fungitell, galactomannan, histoplasma Ag  -F/u BC   -Afebrile w/ no significant leukocytosis, off ABX at this time   Shortness of breath  -Pt reports for >1 year - Chronic, may have underlying COPD  -O2 sats 95% on 2L NC -Continue to wean fio2 as tolerated to maintain O2 sats >90%  -Refusing Bipap at night, but appears comfortable on NC - VBG noted   -C/w Symbicort 80/4.5 mcg 2puffs BID  -C/w duonebs q6 75 y/o F originally from [place] with PMH of previous pulmonary TB (2019) s/o 10 months of treatment, HTN, DM. Presented with acute on chronic SOB. Pt reportedly diagnosed with with pulmonary TB in [place] in 2019 while visiting her son there. She was hospitalized for approximately one month, discharged with two medications (granddaughter is not sure how many exactly) which she took for 10 months. Followed with [facility] in [place] ([place] and was cleared. SOB noted to improve, but not completely resolve. Now, pt notes to have worsening CUNHA for about a year. On admisison, afebrile. RVP negative. CTA chest with no PE, but with R apical cavitary lesion and upper lobe predominant patchy opacities c/f reactivation TB. Pt was given CTX/Azithromycin. (29 May 2021 16:24)  above confirmed with son:  He says she was SOB at home and hence was brought to the hospital: she did complete the rx for active tb:  she has no hemoptysis: currently being ruled out for tb     5/30:  r/o tb: in isolation: she was treated for tuberculosis in the past; now presented with SOB: she has no major pe: But she has acute resp acidosis: she would need bipap in the night time: Though currently she looks comfortable: Add BD / Symbicort   on empiric antibiotics for now:   dvt propayxis\  await three sputum cultures   DM: cont to control  HTN: stable   paged team   DW team     5/31  R/O TB, cavitary PNA  -treated for pulmonary TB (2019), now presents with SOB, CTA chest with R apical cavitary lesion and upper lobe predominant patchy opacities with c/f reactivation TB  -AFB sputum cultures negative x3 - discontinuation of isolation per ID/infection control?  -ID recs noted -F/u fungitell, galactomannan, histoplasma Ag  -F/u BC   -Afebrile w/ no significant leukocytosis, off ABX at this time   Shortness of breath  -Pt reports for >1 year - Chronic, may have underlying COPD  -O2 sats 95% on 2L NC -Continue to wean fio2 as tolerated to maintain O2 sats >90%  -Refusing Bipap at night, but appears comfortable on NC - VBG noted, CO2 retention improved with normal ph  -C/w Symbicort 80/4.5 mcg 2puffs BID  -C/w duonebs q6

## 2021-06-01 NOTE — PHYSICAL THERAPY INITIAL EVALUATION ADULT - ADDITIONAL COMMENTS
Pt lives with her son and his family in his home, one step to enter, all needs on one level. PTA pt was I with all functional mobility and ADLs.

## 2021-06-01 NOTE — PROGRESS NOTE ADULT - SUBJECTIVE AND OBJECTIVE BOX
Nicolas Castro M.D.  Internal Medicine PGY-1  [phone] / [id number]     Patient is a 76y old  Female who presents with a chief complaint of SOB and weakness / CT chest shows cavitary lesion in lung (31 May 2021 12:21)      SUBJECTIVE / OVERNIGHT EVENTS:          MEDICATIONS  (STANDING):  albuterol/ipratropium for Nebulization 3 milliLiter(s) Nebulizer every 6 hours  amLODIPine   Tablet 2.5 milliGRAM(s) Oral daily  atorvastatin 20 milliGRAM(s) Oral at bedtime  budesonide  80 MICROgram(s)/formoterol 4.5 MICROgram(s) Inhaler 2 Puff(s) Inhalation two times a day  dextrose 40% Gel 15 Gram(s) Oral once  dextrose 5%. 1000 milliLiter(s) (50 mL/Hr) IV Continuous <Continuous>  dextrose 5%. 1000 milliLiter(s) (100 mL/Hr) IV Continuous <Continuous>  dextrose 50% Injectable 25 Gram(s) IV Push once  dextrose 50% Injectable 12.5 Gram(s) IV Push once  dextrose 50% Injectable 25 Gram(s) IV Push once  furosemide    Tablet 20 milliGRAM(s) Oral daily  glucagon  Injectable 1 milliGRAM(s) IntraMuscular once  heparin   Injectable 5000 Unit(s) SubCutaneous every 8 hours  insulin glargine Injectable (LANTUS) 15 Unit(s) SubCutaneous at bedtime  insulin lispro (ADMELOG) corrective regimen sliding scale   SubCutaneous three times a day before meals  insulin lispro (ADMELOG) corrective regimen sliding scale   SubCutaneous at bedtime  insulin lispro Injectable (ADMELOG) 5 Unit(s) SubCutaneous three times a day before meals  levothyroxine 125 MICROGram(s) Oral daily  losartan 100 milliGRAM(s) Oral daily    MEDICATIONS  (PRN):  acetaminophen   Tablet .. 650 milliGRAM(s) Oral every 6 hours PRN Temp greater or equal to 38.5C (101.3F), Mild Pain (1 - 3)      Vital Signs Last 24 Hrs  T(C): 36.7 (01 Jun 2021 04:59), Max: 36.8 (31 May 2021 21:50)  T(F): 98.1 (01 Jun 2021 04:59), Max: 98.3 (31 May 2021 21:50)  HR: 85 (01 Jun 2021 06:07) (85 - 94)  BP: 133/75 (01 Jun 2021 04:59) (111/68 - 133/75)  BP(mean): --  RR: 18 (01 Jun 2021 04:59) (18 - 20)  SpO2: 99% (01 Jun 2021 06:07) (97% - 100%)      PHYSICAL EXAM  GENERAL: NAD, well-developed  HEAD:  Atraumatic, Normocephalic  EYES: EOMI, PERRLA, conjunctiva and sclera clear  NECK: Supple, No JVD  CHEST/LUNG: Clear to auscultation bilaterally; No wheeze  HEART: Regular rate and rhythm; No murmurs, rubs, or gallops  ABDOMEN: Soft, Nontender, Nondistended; Bowel sounds present  EXTREMITIES:  2+ Peripheral Pulses, No clubbing, cyanosis, or edema  PSYCH: AAOx3  SKIN: No rashes or lesions    CAPILLARY BLOOD GLUCOSE      POCT Blood Glucose.: 157 mg/dL (31 May 2021 21:48)  POCT Blood Glucose.: 179 mg/dL (31 May 2021 17:20)  POCT Blood Glucose.: 174 mg/dL (31 May 2021 12:42)  POCT Blood Glucose.: 209 mg/dL (31 May 2021 08:31)    I&O's Summary    31 May 2021 07:01  -  01 Jun 2021 07:00  --------------------------------------------------------  IN: 840 mL / OUT: 750 mL / NET: 90 mL        LABS:                        9.9    9.94  )-----------( 269      ( 31 May 2021 10:57 )             35.1     05-31    137  |  98  |  26<H>  ----------------------------<  262<H>  3.8   |  26  |  0.71    Ca    9.1      31 May 2021 10:57  Phos  3.0     05-31  Mg     1.9     05-31    TPro  7.1  /  Alb  4.2  /  TBili  0.2  /  DBili  x   /  AST  16  /  ALT  13  /  AlkPhos  62  05-30              RADIOLOGY & ADDITIONAL TESTS:     MICROBIOLOGY:    ANTIMICROBIALS:    CONSULTS: Nicolas Castro M.D.  Internal Medicine PGY-1  [phone] / [id number]     Patient is a 76y old  Female who presents with a chief complaint of SOB and weakness / CT chest shows cavitary lesion in lung (31 May 2021 12:21)      SUBJECTIVE / OVERNIGHT EVENTS:    Patient seen and examined at the bedside this am. Per nursing no acute events       MEDICATIONS  (STANDING):  albuterol/ipratropium for Nebulization 3 milliLiter(s) Nebulizer every 6 hours  amLODIPine   Tablet 2.5 milliGRAM(s) Oral daily  atorvastatin 20 milliGRAM(s) Oral at bedtime  budesonide  80 MICROgram(s)/formoterol 4.5 MICROgram(s) Inhaler 2 Puff(s) Inhalation two times a day  dextrose 40% Gel 15 Gram(s) Oral once  dextrose 5%. 1000 milliLiter(s) (50 mL/Hr) IV Continuous <Continuous>  dextrose 5%. 1000 milliLiter(s) (100 mL/Hr) IV Continuous <Continuous>  dextrose 50% Injectable 25 Gram(s) IV Push once  dextrose 50% Injectable 12.5 Gram(s) IV Push once  dextrose 50% Injectable 25 Gram(s) IV Push once  furosemide    Tablet 20 milliGRAM(s) Oral daily  glucagon  Injectable 1 milliGRAM(s) IntraMuscular once  heparin   Injectable 5000 Unit(s) SubCutaneous every 8 hours  insulin glargine Injectable (LANTUS) 15 Unit(s) SubCutaneous at bedtime  insulin lispro (ADMELOG) corrective regimen sliding scale   SubCutaneous three times a day before meals  insulin lispro (ADMELOG) corrective regimen sliding scale   SubCutaneous at bedtime  insulin lispro Injectable (ADMELOG) 5 Unit(s) SubCutaneous three times a day before meals  levothyroxine 125 MICROGram(s) Oral daily  losartan 100 milliGRAM(s) Oral daily    MEDICATIONS  (PRN):  acetaminophen   Tablet .. 650 milliGRAM(s) Oral every 6 hours PRN Temp greater or equal to 38.5C (101.3F), Mild Pain (1 - 3)      Vital Signs Last 24 Hrs  T(C): 36.7 (01 Jun 2021 04:59), Max: 36.8 (31 May 2021 21:50)  T(F): 98.1 (01 Jun 2021 04:59), Max: 98.3 (31 May 2021 21:50)  HR: 85 (01 Jun 2021 06:07) (85 - 94)  BP: 133/75 (01 Jun 2021 04:59) (111/68 - 133/75)  BP(mean): --  RR: 18 (01 Jun 2021 04:59) (18 - 20)  SpO2: 99% (01 Jun 2021 06:07) (97% - 100%)      PHYSICAL EXAM  GENERAL: NAD, well-developed  HEAD:  Atraumatic, Normocephalic  EYES: EOMI, PERRLA, conjunctiva and sclera clear  NECK: Supple, No JVD  CHEST/LUNG: Clear to auscultation bilaterally; No wheeze  HEART: Regular rate and rhythm; No murmurs, rubs, or gallops  ABDOMEN: Soft, Nontender, Nondistended; Bowel sounds present  EXTREMITIES:  2+ Peripheral Pulses, No clubbing, cyanosis, or edema  PSYCH: AAOx3  SKIN: No rashes or lesions    CAPILLARY BLOOD GLUCOSE      POCT Blood Glucose.: 157 mg/dL (31 May 2021 21:48)  POCT Blood Glucose.: 179 mg/dL (31 May 2021 17:20)  POCT Blood Glucose.: 174 mg/dL (31 May 2021 12:42)  POCT Blood Glucose.: 209 mg/dL (31 May 2021 08:31)    I&O's Summary    31 May 2021 07:01  -  01 Jun 2021 07:00  --------------------------------------------------------  IN: 840 mL / OUT: 750 mL / NET: 90 mL        LABS:                        9.9    9.94  )-----------( 269      ( 31 May 2021 10:57 )             35.1     05-31    137  |  98  |  26<H>  ----------------------------<  262<H>  3.8   |  26  |  0.71    Ca    9.1      31 May 2021 10:57  Phos  3.0     05-31  Mg     1.9     05-31    TPro  7.1  /  Alb  4.2  /  TBili  0.2  /  DBili  x   /  AST  16  /  ALT  13  /  AlkPhos  62  05-30              RADIOLOGY & ADDITIONAL TESTS:     MICROBIOLOGY:    ANTIMICROBIALS:    CONSULTS: Nicolas Castro M.D.  Internal Medicine PGY-1  [phone] / [id number]     Patient is a 76y old  Female who presents with a chief complaint of SOB and weakness / CT chest shows cavitary lesion in lung (31 May 2021 12:21)      SUBJECTIVE / OVERNIGHT EVENTS:    Patient seen and examined at the bedside this am. Per nursing no acute events overnight. Patient refusing bilevel ventilation overnight will d/c today. States that she feels well overall, back pain resolved, denies any chest pain, hemoptysis, dyspnea.       MEDICATIONS  (STANDING):  albuterol/ipratropium for Nebulization 3 milliLiter(s) Nebulizer every 6 hours  amLODIPine   Tablet 2.5 milliGRAM(s) Oral daily  atorvastatin 20 milliGRAM(s) Oral at bedtime  budesonide  80 MICROgram(s)/formoterol 4.5 MICROgram(s) Inhaler 2 Puff(s) Inhalation two times a day  dextrose 40% Gel 15 Gram(s) Oral once  dextrose 5%. 1000 milliLiter(s) (50 mL/Hr) IV Continuous <Continuous>  dextrose 5%. 1000 milliLiter(s) (100 mL/Hr) IV Continuous <Continuous>  dextrose 50% Injectable 25 Gram(s) IV Push once  dextrose 50% Injectable 12.5 Gram(s) IV Push once  dextrose 50% Injectable 25 Gram(s) IV Push once  furosemide    Tablet 20 milliGRAM(s) Oral daily  glucagon  Injectable 1 milliGRAM(s) IntraMuscular once  heparin   Injectable 5000 Unit(s) SubCutaneous every 8 hours  insulin glargine Injectable (LANTUS) 15 Unit(s) SubCutaneous at bedtime  insulin lispro (ADMELOG) corrective regimen sliding scale   SubCutaneous three times a day before meals  insulin lispro (ADMELOG) corrective regimen sliding scale   SubCutaneous at bedtime  insulin lispro Injectable (ADMELOG) 5 Unit(s) SubCutaneous three times a day before meals  levothyroxine 125 MICROGram(s) Oral daily  losartan 100 milliGRAM(s) Oral daily    MEDICATIONS  (PRN):  acetaminophen   Tablet .. 650 milliGRAM(s) Oral every 6 hours PRN Temp greater or equal to 38.5C (101.3F), Mild Pain (1 - 3)      Vital Signs Last 24 Hrs  T(C): 36.7 (01 Jun 2021 04:59), Max: 36.8 (31 May 2021 21:50)  T(F): 98.1 (01 Jun 2021 04:59), Max: 98.3 (31 May 2021 21:50)  HR: 85 (01 Jun 2021 06:07) (85 - 94)  BP: 133/75 (01 Jun 2021 04:59) (111/68 - 133/75)  BP(mean): --  RR: 18 (01 Jun 2021 04:59) (18 - 20)  SpO2: 99% (01 Jun 2021 06:07) (97% - 100%)      CONSTITUTIONAL: NAD, well-developed  RESPIRATORY: Normal respiratory effort; +3L NC.  CARDIOVASCULAR: Regular rate and rhythm, normal S1 and S2, no murmur/rub/gallop; No lower extremity edema; Peripheral pulses are 2+ bilaterally  ABDOMEN: Nontender to palpation, normoactive bowel sounds, no rebound/guarding;  MUSCLOSKELETAL: no clubbing or cyanosis of digits; no joint swelling or tenderness to palpation  PSYCH: A+O to person, place, and time; affect appropriate    CAPILLARY BLOOD GLUCOSE      POCT Blood Glucose.: 157 mg/dL (31 May 2021 21:48)  POCT Blood Glucose.: 179 mg/dL (31 May 2021 17:20)  POCT Blood Glucose.: 174 mg/dL (31 May 2021 12:42)  POCT Blood Glucose.: 209 mg/dL (31 May 2021 08:31)    I&O's Summary    31 May 2021 07:01  -  01 Jun 2021 07:00  --------------------------------------------------------  IN: 840 mL / OUT: 750 mL / NET: 90 mL        LABS:                        9.9    9.94  )-----------( 269      ( 31 May 2021 10:57 )             35.1     05-31    137  |  98  |  26<H>  ----------------------------<  262<H>  3.8   |  26  |  0.71    Ca    9.1      31 May 2021 10:57  Phos  3.0     05-31  Mg     1.9     05-31    TPro  7.1  /  Alb  4.2  /  TBili  0.2  /  DBili  x   /  AST  16  /  ALT  13  /  AlkPhos  62  05-30              RADIOLOGY & ADDITIONAL TESTS:     MICROBIOLOGY:    ANTIMICROBIALS:    CONSULTS:

## 2021-06-01 NOTE — PROGRESS NOTE ADULT - SUBJECTIVE AND OBJECTIVE BOX
CC: F/U for Cavitary lesion    Saw/spoke to patient. No fevers, no chills. No new complaints. Unchanged. Spoke to her via .    Allergies  No Known Allergies    ANTIMICROBIALS:  Off    PE:    Vital Signs Last 24 Hrs  T(C): 36.7 (01 Jun 2021 04:59), Max: 36.8 (31 May 2021 21:50)  T(F): 98.1 (01 Jun 2021 04:59), Max: 98.3 (31 May 2021 21:50)  HR: 85 (01 Jun 2021 06:07) (85 - 94)  BP: 133/75 (01 Jun 2021 04:59) (111/68 - 133/75)  RR: 18 (01 Jun 2021 04:59) (18 - 18)  SpO2: 99% (01 Jun 2021 06:07) (97% - 100%)    Gen: AOx3, NAD, non-toxic  CV: S1+S2 normal, nontachycardic  Resp: Clear bilat, no resp distress, no crackles/wheezes  Abd: Soft, nontender, +BS  Ext: No LE edema, no wounds    LABS:                        10.3   9.21  )-----------( 273      ( 01 Jun 2021 10:20 )             36.6     06-01    137  |  98  |  21  ----------------------------<  233<H>  4.1   |  25  |  0.67    Ca    9.4      01 Jun 2021 07:11  Phos  3.0     06-01  Mg     2.0     06-01    MICROBIOLOGY:    .Sputum  05-31-21 AFB neg    .Sputum Sputum  05-31-21 AFB neg    .Sputum Sputum  05-30-21 AFB neg    .Sputum Sputum  05-30-21 AFB neg    .Urine Clean Catch (Midstream)  05-29-21   <10,000 CFU/mL Normal Urogenital Kinza    Rapid RVP Result: NotDetec (05-29 @ 06:15)    RADIOLOGY:    5/29 CT:    IMPRESSION:  1.  No central pulmonary emboli. Segmental and subsegmental pulmonary arteries are not well evaluated.  2.  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  Superimposed pulmonary edema and small left pleural effusion.

## 2021-06-01 NOTE — PROGRESS NOTE ADULT - ASSESSMENT
76 F originally from [place] PMHx of NIDDMII, Hypertension, previous pulmonary TB (2019) s/p 10 months of treatment presented with acute on chronic SOB  Leukocytosis, no fever  S/p recent treatment course for pulmonary TB at [facility]  CT with apical cavitary lesion, upper lobe opacities  Here presenting with shortness of breath, no fevers, mild leukocytosis  In PACS no prior CT to compare to  AFB negative x4, cavities representative of residual cavity/scarring?  Overall,  1) Cavitary lesion  - Uncertain if new or longstanding, perhaps persistent sequelae from prior TB episode  - Monitor off abx for now  - AFBs negative, okay to DC pulmonary isolation from ID perspective as long as no reservations from infection control team  - F/U Galactomannan, Fungitell, Histo urine ag  - Please obtain records from [facility] (jigna prior CT imaging and treatment history)  - F/U Pulm if any role for biopsy/BAL to evaluate for noninfectious etiologies  2) Leukocytosis  - Trend to normal  - Monitor for signs/symptoms infection  3) SOB  - Noninfectious causes per primary team  - Workup as above    My colleagues will be covering this patient on 6/2/21, I will return 6/3/21.  Please call [phone] or on call fellow with any questions or change in status.     Jacek Angel MD  Pager [phone]  After 5pm and on weekends call [phone]

## 2021-06-01 NOTE — PHYSICAL THERAPY INITIAL EVALUATION ADULT - GENERAL OBSERVATIONS, REHAB EVAL
Pt received semi supine in bed, (+) O2 NC, (+) cont pulse ox, (+) primifit, in NAD and agreeable to PT evaluation.

## 2021-06-01 NOTE — CHART NOTE - NSCHARTNOTEFT_GEN_A_CORE
Spoke to [facility] Dr. Smith regarding patient's hx of TB. Patient diagnosed in June 2018 (smear and culture positive pansensitive). Completed course of INH and Rifampin from September 2018-May 2019. Imaging w/ CXR in March 2019 with evidence of RUL infiltrate concerning for cavitary lesion in RUL and reticular nodular infiltrates. Per Dr. Smith patient seems to have this RUL opacity/lesion since 2019.     Nicolas Castro M.D.  Internal Medicine PGY-1  [phone] / [phone]

## 2021-06-01 NOTE — PROGRESS NOTE ADULT - PROBLEM SELECTOR PLAN 4
hold off on oral meds   -c/w lantus 15 units at night, 5u premeals with ISS    c/w FSBS with coverage hold off on oral meds   -c/w lantus 18 units at night, 8u premeals with ISS    c/w FSBS with coverage

## 2021-06-01 NOTE — PHYSICAL THERAPY INITIAL EVALUATION ADULT - PERTINENT HX OF CURRENT PROBLEM, REHAB EVAL
77 yo F ([place] or [place] speaking) PMH of NIDDMII, HTN, previous pulmonary TB (2019) s/p 10 months of treatment presented with acute on chronic SOB admitted with R/o TB. CT lungs (+) cavitary lesion.

## 2021-06-02 LAB
ALBUMIN SERPL ELPH-MCNC: 4 G/DL — SIGNIFICANT CHANGE UP (ref 3.3–5)
ALP SERPL-CCNC: 68 U/L — SIGNIFICANT CHANGE UP (ref 40–120)
ALT FLD-CCNC: 11 U/L — SIGNIFICANT CHANGE UP (ref 10–45)
ANION GAP SERPL CALC-SCNC: 14 MMOL/L — SIGNIFICANT CHANGE UP (ref 5–17)
AST SERPL-CCNC: 14 U/L — SIGNIFICANT CHANGE UP (ref 10–40)
BILIRUB SERPL-MCNC: 0.2 MG/DL — SIGNIFICANT CHANGE UP (ref 0.2–1.2)
BUN SERPL-MCNC: 24 MG/DL — HIGH (ref 7–23)
CALCIUM SERPL-MCNC: 9.3 MG/DL — SIGNIFICANT CHANGE UP (ref 8.4–10.5)
CHLORIDE SERPL-SCNC: 97 MMOL/L — SIGNIFICANT CHANGE UP (ref 96–108)
CO2 SERPL-SCNC: 25 MMOL/L — SIGNIFICANT CHANGE UP (ref 22–31)
CREAT SERPL-MCNC: 0.72 MG/DL — SIGNIFICANT CHANGE UP (ref 0.5–1.3)
FUNGITELL: <31 PG/ML — SIGNIFICANT CHANGE UP
GALACTOMANNAN AG SERPL-ACNC: 0.04 INDEX — SIGNIFICANT CHANGE UP (ref 0–0.49)
GLUCOSE BLDC GLUCOMTR-MCNC: 184 MG/DL — HIGH (ref 70–99)
GLUCOSE BLDC GLUCOMTR-MCNC: 230 MG/DL — HIGH (ref 70–99)
GLUCOSE BLDC GLUCOMTR-MCNC: 255 MG/DL — HIGH (ref 70–99)
GLUCOSE BLDC GLUCOMTR-MCNC: 338 MG/DL — HIGH (ref 70–99)
GLUCOSE SERPL-MCNC: 260 MG/DL — HIGH (ref 70–99)
HCT VFR BLD CALC: 36.7 % — SIGNIFICANT CHANGE UP (ref 34.5–45)
HGB BLD-MCNC: 10.5 G/DL — LOW (ref 11.5–15.5)
LACTATE SERPL-SCNC: 1.9 MMOL/L — SIGNIFICANT CHANGE UP (ref 0.7–2)
MAGNESIUM SERPL-MCNC: 2 MG/DL — SIGNIFICANT CHANGE UP (ref 1.6–2.6)
MCHC RBC-ENTMCNC: 22.7 PG — LOW (ref 27–34)
MCHC RBC-ENTMCNC: 28.6 GM/DL — LOW (ref 32–36)
MCV RBC AUTO: 79.4 FL — LOW (ref 80–100)
NRBC # BLD: 0 /100 WBCS — SIGNIFICANT CHANGE UP (ref 0–0)
PHOSPHATE SERPL-MCNC: 3.7 MG/DL — SIGNIFICANT CHANGE UP (ref 2.5–4.5)
PLATELET # BLD AUTO: 256 K/UL — SIGNIFICANT CHANGE UP (ref 150–400)
POTASSIUM SERPL-MCNC: 4.3 MMOL/L — SIGNIFICANT CHANGE UP (ref 3.5–5.3)
POTASSIUM SERPL-SCNC: 4.3 MMOL/L — SIGNIFICANT CHANGE UP (ref 3.5–5.3)
PROT SERPL-MCNC: 7 G/DL — SIGNIFICANT CHANGE UP (ref 6–8.3)
RBC # BLD: 4.62 M/UL — SIGNIFICANT CHANGE UP (ref 3.8–5.2)
RBC # FLD: 19.9 % — HIGH (ref 10.3–14.5)
SODIUM SERPL-SCNC: 136 MMOL/L — SIGNIFICANT CHANGE UP (ref 135–145)
WBC # BLD: 7.61 K/UL — SIGNIFICANT CHANGE UP (ref 3.8–10.5)
WBC # FLD AUTO: 7.61 K/UL — SIGNIFICANT CHANGE UP (ref 3.8–10.5)

## 2021-06-02 RX ORDER — INSULIN GLARGINE 100 [IU]/ML
20 INJECTION, SOLUTION SUBCUTANEOUS AT BEDTIME
Refills: 0 | Status: DISCONTINUED | OUTPATIENT
Start: 2021-06-02 | End: 2021-06-03

## 2021-06-02 RX ORDER — INSULIN LISPRO 100/ML
14 VIAL (ML) SUBCUTANEOUS
Refills: 0 | Status: DISCONTINUED | OUTPATIENT
Start: 2021-06-02 | End: 2021-06-05

## 2021-06-02 RX ORDER — ENOXAPARIN SODIUM 100 MG/ML
40 INJECTION SUBCUTANEOUS DAILY
Refills: 0 | Status: DISCONTINUED | OUTPATIENT
Start: 2021-06-02 | End: 2021-06-05

## 2021-06-02 RX ORDER — INSULIN GLARGINE 100 [IU]/ML
20 INJECTION, SOLUTION SUBCUTANEOUS
Qty: 10 | Refills: 0
Start: 2021-06-02 | End: 2021-07-01

## 2021-06-02 RX ORDER — IPRATROPIUM/ALBUTEROL SULFATE 18-103MCG
3 AEROSOL WITH ADAPTER (GRAM) INHALATION EVERY 6 HOURS
Refills: 0 | Status: DISCONTINUED | OUTPATIENT
Start: 2021-06-02 | End: 2021-06-03

## 2021-06-02 RX ADMIN — Medication 0: at 10:03

## 2021-06-02 RX ADMIN — Medication 14 UNIT(S): at 12:53

## 2021-06-02 RX ADMIN — LOSARTAN POTASSIUM 100 MILLIGRAM(S): 100 TABLET, FILM COATED ORAL at 06:15

## 2021-06-02 RX ADMIN — Medication 650 MILLIGRAM(S): at 23:35

## 2021-06-02 RX ADMIN — BUDESONIDE AND FORMOTEROL FUMARATE DIHYDRATE 2 PUFF(S): 160; 4.5 AEROSOL RESPIRATORY (INHALATION) at 05:50

## 2021-06-02 RX ADMIN — ENOXAPARIN SODIUM 40 MILLIGRAM(S): 100 INJECTION SUBCUTANEOUS at 12:54

## 2021-06-02 RX ADMIN — Medication 6: at 17:40

## 2021-06-02 RX ADMIN — Medication 14 UNIT(S): at 17:41

## 2021-06-02 RX ADMIN — Medication 20 MILLIGRAM(S): at 06:15

## 2021-06-02 RX ADMIN — Medication 3 MILLILITER(S): at 11:39

## 2021-06-02 RX ADMIN — Medication 3 MILLILITER(S): at 17:22

## 2021-06-02 RX ADMIN — BUDESONIDE AND FORMOTEROL FUMARATE DIHYDRATE 2 PUFF(S): 160; 4.5 AEROSOL RESPIRATORY (INHALATION) at 17:22

## 2021-06-02 RX ADMIN — INSULIN GLARGINE 20 UNIT(S): 100 INJECTION, SOLUTION SUBCUTANEOUS at 21:36

## 2021-06-02 RX ADMIN — HEPARIN SODIUM 5000 UNIT(S): 5000 INJECTION INTRAVENOUS; SUBCUTANEOUS at 06:14

## 2021-06-02 RX ADMIN — Medication 8: at 12:52

## 2021-06-02 RX ADMIN — Medication 3 MILLILITER(S): at 23:26

## 2021-06-02 RX ADMIN — Medication 125 MICROGRAM(S): at 06:15

## 2021-06-02 RX ADMIN — AMLODIPINE BESYLATE 2.5 MILLIGRAM(S): 2.5 TABLET ORAL at 06:15

## 2021-06-02 RX ADMIN — ATORVASTATIN CALCIUM 20 MILLIGRAM(S): 80 TABLET, FILM COATED ORAL at 21:18

## 2021-06-02 NOTE — PROGRESS NOTE ADULT - PROBLEM SELECTOR PLAN 4
hold off on oral meds   -c/w lantus 18 units at night, 8u premeals with ISS    c/w FSBS with coverage

## 2021-06-02 NOTE — PROGRESS NOTE ADULT - PROBLEM SELECTOR PLAN 1
CT lungs in ED shows cavitary lesion   -ID consulted, AFB sputum negative x3 pending result of third sputum. Galacto, fungitell, and Urine histoplasma pending.   -monitoring off abx   -pulm consulted, following patient.  -VBG improved at this point refusing bilevel overnight.   -Called and left a few VMs for [facility] to obtain collateral information (previous imaging and tx history).   -Pulm not concerned regarding a vasculitis as cause of cavitary lesion.

## 2021-06-02 NOTE — PROGRESS NOTE ADULT - SUBJECTIVE AND OBJECTIVE BOX
Date of Service: 06-02-21 @ 10:12    Patient is a 76y old  Female who presents with a chief complaint of SOB and weakness / CT chest shows cavitary lesion in lung (02 Jun 2021 06:57)    Any change in ROS:    phone used to assist with exam  Reports feeling better today  Still with productive cough  O2 sats mid 90s on 1 L N/C  Denies SOB, CP    MEDICATIONS  (STANDING):  albuterol/ipratropium for Nebulization 3 milliLiter(s) Nebulizer every 6 hours  amLODIPine   Tablet 2.5 milliGRAM(s) Oral daily  atorvastatin 20 milliGRAM(s) Oral at bedtime  budesonide  80 MICROgram(s)/formoterol 4.5 MICROgram(s) Inhaler 2 Puff(s) Inhalation two times a day  dextrose 40% Gel 15 Gram(s) Oral once  dextrose 5%. 1000 milliLiter(s) (50 mL/Hr) IV Continuous <Continuous>  dextrose 5%. 1000 milliLiter(s) (100 mL/Hr) IV Continuous <Continuous>  dextrose 50% Injectable 25 Gram(s) IV Push once  dextrose 50% Injectable 12.5 Gram(s) IV Push once  dextrose 50% Injectable 25 Gram(s) IV Push once  furosemide    Tablet 20 milliGRAM(s) Oral daily  glucagon  Injectable 1 milliGRAM(s) IntraMuscular once  heparin   Injectable 5000 Unit(s) SubCutaneous every 8 hours  insulin glargine Injectable (LANTUS) 20 Unit(s) SubCutaneous at bedtime  insulin lispro (ADMELOG) corrective regimen sliding scale   SubCutaneous three times a day before meals  insulin lispro (ADMELOG) corrective regimen sliding scale   SubCutaneous at bedtime  insulin lispro Injectable (ADMELOG) 14 Unit(s) SubCutaneous three times a day before meals  levothyroxine 125 MICROGram(s) Oral daily  losartan 100 milliGRAM(s) Oral daily    MEDICATIONS  (PRN):  acetaminophen   Tablet .. 650 milliGRAM(s) Oral every 6 hours PRN Temp greater or equal to 38.5C (101.3F), Mild Pain (1 - 3)    Vital Signs Last 24 Hrs  T(C): 36.4 (02 Jun 2021 04:22), Max: 36.8 (01 Jun 2021 18:49)  T(F): 97.6 (02 Jun 2021 04:22), Max: 98.3 (01 Jun 2021 18:49)  HR: 90 (02 Jun 2021 06:07) (90 - 99)  BP: 139/71 (02 Jun 2021 04:22) (119/76 - 139/71)  BP(mean): --  RR: 18 (02 Jun 2021 04:22) (18 - 18)  SpO2: 99% (02 Jun 2021 06:07) (95% - 99%)    I&O's Summary    01 Jun 2021 07:01  -  02 Jun 2021 07:00  --------------------------------------------------------  IN: 0 mL / OUT: 900 mL / NET: -900 mL    Physical Exam:   GENERAL: NAD  HEENT: GIAN  ENMT: No tonsillar erythema, exudates, or enlargement  NECK: Supple, No JVD  CHEST/LUNG: Clear to auscultation B/L  CVS: Regular rate and rhythm  GI: : Soft, Nontender, Nondistended  NERVOUS SYSTEM:  Alert & Oriented X3  EXTREMITIES:  2+ Peripheral Pulses, No clubbing, cyanosis, or edema  LYMPH: No lymphadenopathy noted  SKIN: No rashes or lesions  PSYCH: Appropriate    Labs:  30, 31, 28, 26                            10.5   7.61  )-----------( 256      ( 02 Jun 2021 07:16 )             36.7                         10.3   9.21  )-----------( 273      ( 01 Jun 2021 10:20 )             36.6                         9.9    9.94  )-----------( 269      ( 31 May 2021 10:57 )             35.1                         9.7    13.11 )-----------( 275      ( 30 May 2021 10:06 )             32.7     06-02    136  |  97  |  24<H>  ----------------------------<  260<H>  4.3   |  25  |  0.72  06-01    137  |  98  |  21  ----------------------------<  233<H>  4.1   |  25  |  0.67  05-31    137  |  98  |  26<H>  ----------------------------<  262<H>  3.8   |  26  |  0.71  05-30    135  |  99  |  24<H>  ----------------------------<  363<H>  4.5   |  21<L>  |  0.68    Ca    9.3      02 Jun 2021 07:16  Ca    9.4      01 Jun 2021 07:11  Ca    9.1      31 May 2021 10:57  Phos  3.7     06-02  Phos  3.0     06-01  Phos  3.0     05-31  Mg     2.0     06-02  Mg     2.0     06-01  Mg     1.9     05-31    TPro  7.0  /  Alb  4.0  /  TBili  0.2  /  DBili  x   /  AST  14  /  ALT  11  /  AlkPhos  68  06-02  TPro  7.1  /  Alb  4.2  /  TBili  0.2  /  DBili  x   /  AST  16  /  ALT  13  /  AlkPhos  62  05-30    CAPILLARY BLOOD GLUCOSE  POCT Blood Glucose.: 230 mg/dL (02 Jun 2021 08:37)  POCT Blood Glucose.: 159 mg/dL (01 Jun 2021 21:18)  POCT Blood Glucose.: 260 mg/dL (01 Jun 2021 17:24)  POCT Blood Glucose.: 255 mg/dL (01 Jun 2021 13:10)      LIVER FUNCTIONS - ( 02 Jun 2021 07:16 )  Alb: 4.0 g/dL / Pro: 7.0 g/dL / ALK PHOS: 68 U/L / ALT: 11 U/L / AST: 14 U/L / GGT: x             Lactate, Blood: 1.9 mmol/L (06-02 @ 07:16)  Lactate, Blood: 2.2 mmol/L (06-01 @ 07:12)  Lactate, Blood: 2.7 mmol/L (05-31 @ 10:57)        RECENT CULTURES:  05-31 @ 13:40 .Sputum   05-31 @ 09:25 .Sputum Sputum   05-30 @ 17:23 .Sputum Sputum   05-30 @ 13:00 .Sputum Sputum     05-29 @ 16:49 .Urine Clean Catch (Midstream)  <10,000 CFU/mL Normal Urogenital Kinza    05-29 @ 16:47 .Blood Blood   No growth to date.    Studies    CT SCAN Chest < from: CT Angio Chest PE Protocol w/ IV Cont (05.29.21 @ 07:46) >  FINDINGS:    LUNGS AND AIRWAYS: Bronchial secretions. Diffuse interlobular septal thickening. Bilateral diffuse groundglass opacities with more confluent opacities in the upper lobes, right greater than left. There is volume loss in the right upper lobe. Right apical cavitary lesion measures 3.1 x 1.2 x 2.0 cm (TR, AP, CC). Right upper lobe nodule measures 1.2 cm (2:28).  PLEURA: Small left pleural effusion. No pneumothorax.  MEDIASTINUM AND KAILA: Slightly enlarged subcarinal lymph node.  VESSELS: No main, right, left or lobar pulmonary arterial filling defect. Evaluation of the segmental and subsegmental pulmonary arteries is suboptimal secondary to parenchymal opacities and motion artifacts. Aortic and coronary artery calcifications.  HEART: Heart size is normal. No pericardial effusion. No CT evidence of right heart strain.  CHEST WALL AND LOWER NECK: Within normal limits.  VISUALIZED UPPER ABDOMEN: Small hiatal hernia. Partially visualized left lower pole exophyticcyst.  BONES: Degenerative changes of the thoracic spine.    IMPRESSION:  1.  No central pulmonary emboli. Segmental and subsegmental pulmonary arteries are not well evaluated.  2.  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  Superimposed pulmonary edema and small left pleural effusion.

## 2021-06-02 NOTE — PROGRESS NOTE ADULT - ASSESSMENT
77 y/o F originally from [place] with PMH of previous pulmonary TB (2019) s/o 10 months of treatment, HTN, DM. Presented with acute on chronic SOB. Pt reportedly diagnosed with with pulmonary TB in [place] in 2019 while visiting her son there. She was hospitalized for approximately one month, discharged with two medications (granddaughter is not sure how many exactly) which she took for 10 months. Followed with [facility] in [place] ([facility] and was cleared. SOB noted to improve, but not completely resolve. Now, pt notes to have worsening CUNHA for about a year. On admisison, afebrile. RVP negative. CTA chest with no PE, but with R apical cavitary lesion and upper lobe predominant patchy opacities c/f reactivation TB. Pt was given CTX/Azithromycin. (29 May 2021 16:24)  above confirmed with son:  He says she was SOB at home and hence was brought to the hospital: she did complete the rx for active tb:  she has no hemoptysis: currently being ruled out for tb     5/30:  r/o tb: in isolation: she was treated for tuberculosis in the past; now presented with SOB: she has no major pe: But she has acute resp acidosis: she would need bipap in the night time: Though currently she looks comfortable: Add BD / Symbicort   on empiric antibiotics for now:   dvt propayxis\  await three sputum cultures   DM: cont to control  HTN: stable   paged team   DW team     5/31  R/O TB, cavitary PNA  -Chart note regarding hx of pulmonary TB noted - Pt reportedly diagnosed in 6/2018, completed course of INH and Rifampin from 9/2018 to 5/2019. Chest imaging in 3/2019 with reported RUL infiltrate/cavitary lesion   -CTA chest now remains with R apical cavitary lesion and upper lobe predominant patchy opacities - may be residual from prior TB  -AFB sputum cultures negative x4 - now off isolation   -ID recs noted -F/u fungitell, galactomannan  -Histoplasma Ag negative  -F/u BC - prelim 5/29 NGTD  -Afebrile w/ no significant leukocytosis, off ABX at this time   Shortness of breath  -Pt reports for >1 year - Chronic, may have underlying COPD  -O2 sats mid 90s on 1L NC -Continue to wean to room air as tolerated to maintain O2 sats >90%  -VBG 6/1 with normal ph, likely will not require home bipap   -C/w Symbicort 80/4.5 mcg 2puffs BID  -C/w duonebs q6

## 2021-06-02 NOTE — PROGRESS NOTE ADULT - ASSESSMENT
Patient is a 77yo F originally from [place] PMHx of TIIDM, HTN, previous pulmonary TB (2019) s/p 10 months of treatment presented with acute on chronic SOB, imaging with evidence of cavitary lung lesion most concern for reactivation of tuberculosis, now s/p isolation AFB negative x4, isolation precautions discontinued.

## 2021-06-02 NOTE — PROGRESS NOTE ADULT - SUBJECTIVE AND OBJECTIVE BOX
Nicolsa Castro M.D.  Internal Medicine PGY-1  [phone] / [phone]     Patient is a 76y old  Female who presents with a chief complaint of SOB and weakness / CT chest shows cavitary lesion in lung (01 Jun 2021 12:27)      SUBJECTIVE / OVERNIGHT EVENTS:          MEDICATIONS  (STANDING):  amLODIPine   Tablet 2.5 milliGRAM(s) Oral daily  atorvastatin 20 milliGRAM(s) Oral at bedtime  budesonide  80 MICROgram(s)/formoterol 4.5 MICROgram(s) Inhaler 2 Puff(s) Inhalation two times a day  dextrose 40% Gel 15 Gram(s) Oral once  dextrose 5%. 1000 milliLiter(s) (50 mL/Hr) IV Continuous <Continuous>  dextrose 5%. 1000 milliLiter(s) (100 mL/Hr) IV Continuous <Continuous>  dextrose 50% Injectable 25 Gram(s) IV Push once  dextrose 50% Injectable 12.5 Gram(s) IV Push once  dextrose 50% Injectable 25 Gram(s) IV Push once  furosemide    Tablet 20 milliGRAM(s) Oral daily  glucagon  Injectable 1 milliGRAM(s) IntraMuscular once  heparin   Injectable 5000 Unit(s) SubCutaneous every 8 hours  insulin glargine Injectable (LANTUS) 18 Unit(s) SubCutaneous at bedtime  insulin lispro (ADMELOG) corrective regimen sliding scale   SubCutaneous three times a day before meals  insulin lispro (ADMELOG) corrective regimen sliding scale   SubCutaneous at bedtime  insulin lispro Injectable (ADMELOG) 8 Unit(s) SubCutaneous three times a day before meals  levothyroxine 125 MICROGram(s) Oral daily  losartan 100 milliGRAM(s) Oral daily    MEDICATIONS  (PRN):  acetaminophen   Tablet .. 650 milliGRAM(s) Oral every 6 hours PRN Temp greater or equal to 38.5C (101.3F), Mild Pain (1 - 3)  ALBUTerol    90 MICROgram(s) HFA Inhaler 1 Puff(s) Inhalation every 4 hours PRN Shortness of Breath and/or Wheezing  albuterol/ipratropium for Nebulization 3 milliLiter(s) Nebulizer every 6 hours PRN Shortness of Breath and/or Wheezing      Vital Signs Last 24 Hrs  T(C): 36.4 (02 Jun 2021 04:22), Max: 36.8 (01 Jun 2021 18:49)  T(F): 97.6 (02 Jun 2021 04:22), Max: 98.3 (01 Jun 2021 18:49)  HR: 90 (02 Jun 2021 06:07) (90 - 99)  BP: 139/71 (02 Jun 2021 04:22) (119/76 - 139/71)  BP(mean): --  RR: 18 (02 Jun 2021 04:22) (18 - 18)  SpO2: 99% (02 Jun 2021 06:07) (95% - 99%)      PHYSICAL EXAM  GENERAL: NAD, well-developed  HEAD:  Atraumatic, Normocephalic  EYES: EOMI, PERRLA, conjunctiva and sclera clear  NECK: Supple, No JVD  CHEST/LUNG: Clear to auscultation bilaterally; No wheeze  HEART: Regular rate and rhythm; No murmurs, rubs, or gallops  ABDOMEN: Soft, Nontender, Nondistended; Bowel sounds present  EXTREMITIES:  2+ Peripheral Pulses, No clubbing, cyanosis, or edema  PSYCH: AAOx3  SKIN: No rashes or lesions    CAPILLARY BLOOD GLUCOSE      POCT Blood Glucose.: 159 mg/dL (01 Jun 2021 21:18)  POCT Blood Glucose.: 260 mg/dL (01 Jun 2021 17:24)  POCT Blood Glucose.: 255 mg/dL (01 Jun 2021 13:10)  POCT Blood Glucose.: 266 mg/dL (01 Jun 2021 08:41)    I&O's Summary    31 May 2021 07:01  -  01 Jun 2021 07:00  --------------------------------------------------------  IN: 840 mL / OUT: 750 mL / NET: 90 mL    01 Jun 2021 07:01  -  02 Jun 2021 06:57  --------------------------------------------------------  IN: 0 mL / OUT: 900 mL / NET: -900 mL        LABS:                        10.3   9.21  )-----------( 273      ( 01 Jun 2021 10:20 )             36.6     06-01    137  |  98  |  21  ----------------------------<  233<H>  4.1   |  25  |  0.67    Ca    9.4      01 Jun 2021 07:11  Phos  3.0     06-01  Mg     2.0     06-01                RADIOLOGY & ADDITIONAL TESTS:     MICROBIOLOGY:    ANTIMICROBIALS:    CONSULTS:

## 2021-06-02 NOTE — PROGRESS NOTE ADULT - PROBLEM SELECTOR PLAN 2
hx of TB in 2019: treated for 10 months as per family   -ID consulted   - air borne precaution   - AFB sputum q8 hrs x3 negative, isolation precautions negative.   - f/u Galactomannan, Fungitell, Histo urine ag  - [facility] (jigna prior CT imaging and treatment history) to obtain collateral however only able to leave a .   - F/u remaining infectious workup.

## 2021-06-03 DIAGNOSIS — R00.0 TACHYCARDIA, UNSPECIFIED: ICD-10-CM

## 2021-06-03 LAB
CULTURE RESULTS: SIGNIFICANT CHANGE UP
CULTURE RESULTS: SIGNIFICANT CHANGE UP
FUNGITELL: <31 PG/ML — SIGNIFICANT CHANGE UP
GLUCOSE BLDC GLUCOMTR-MCNC: 117 MG/DL — HIGH (ref 70–99)
GLUCOSE BLDC GLUCOMTR-MCNC: 192 MG/DL — HIGH (ref 70–99)
GLUCOSE BLDC GLUCOMTR-MCNC: 318 MG/DL — HIGH (ref 70–99)
GLUCOSE BLDC GLUCOMTR-MCNC: 78 MG/DL — SIGNIFICANT CHANGE UP (ref 70–99)
SPECIMEN SOURCE: SIGNIFICANT CHANGE UP
SPECIMEN SOURCE: SIGNIFICANT CHANGE UP

## 2021-06-03 PROCEDURE — 93010 ELECTROCARDIOGRAM REPORT: CPT

## 2021-06-03 PROCEDURE — 99232 SBSQ HOSP IP/OBS MODERATE 35: CPT

## 2021-06-03 RX ORDER — IPRATROPIUM/ALBUTEROL SULFATE 18-103MCG
3 AEROSOL WITH ADAPTER (GRAM) INHALATION EVERY 6 HOURS
Refills: 0 | Status: DISCONTINUED | OUTPATIENT
Start: 2021-06-03 | End: 2021-06-05

## 2021-06-03 RX ORDER — SODIUM CHLORIDE 9 MG/ML
500 INJECTION, SOLUTION INTRAVENOUS ONCE
Refills: 0 | Status: COMPLETED | OUTPATIENT
Start: 2021-06-03 | End: 2021-06-03

## 2021-06-03 RX ORDER — INSULIN GLARGINE 100 [IU]/ML
24 INJECTION, SOLUTION SUBCUTANEOUS AT BEDTIME
Refills: 0 | Status: DISCONTINUED | OUTPATIENT
Start: 2021-06-03 | End: 2021-06-05

## 2021-06-03 RX ADMIN — ENOXAPARIN SODIUM 40 MILLIGRAM(S): 100 INJECTION SUBCUTANEOUS at 13:10

## 2021-06-03 RX ADMIN — Medication 3 MILLILITER(S): at 11:52

## 2021-06-03 RX ADMIN — BUDESONIDE AND FORMOTEROL FUMARATE DIHYDRATE 2 PUFF(S): 160; 4.5 AEROSOL RESPIRATORY (INHALATION) at 05:48

## 2021-06-03 RX ADMIN — SODIUM CHLORIDE 500 MILLILITER(S): 9 INJECTION, SOLUTION INTRAVENOUS at 16:51

## 2021-06-03 RX ADMIN — Medication 3 MILLILITER(S): at 05:48

## 2021-06-03 RX ADMIN — Medication 2: at 12:55

## 2021-06-03 RX ADMIN — Medication 8: at 08:53

## 2021-06-03 RX ADMIN — Medication 125 MICROGRAM(S): at 05:27

## 2021-06-03 RX ADMIN — Medication 14 UNIT(S): at 18:03

## 2021-06-03 RX ADMIN — Medication 14 UNIT(S): at 08:54

## 2021-06-03 RX ADMIN — ATORVASTATIN CALCIUM 20 MILLIGRAM(S): 80 TABLET, FILM COATED ORAL at 22:05

## 2021-06-03 RX ADMIN — INSULIN GLARGINE 24 UNIT(S): 100 INJECTION, SOLUTION SUBCUTANEOUS at 22:12

## 2021-06-03 RX ADMIN — BUDESONIDE AND FORMOTEROL FUMARATE DIHYDRATE 2 PUFF(S): 160; 4.5 AEROSOL RESPIRATORY (INHALATION) at 17:35

## 2021-06-03 RX ADMIN — Medication 650 MILLIGRAM(S): at 00:05

## 2021-06-03 RX ADMIN — Medication 100 MILLIGRAM(S): at 05:27

## 2021-06-03 RX ADMIN — Medication 650 MILLIGRAM(S): at 22:06

## 2021-06-03 RX ADMIN — LOSARTAN POTASSIUM 100 MILLIGRAM(S): 100 TABLET, FILM COATED ORAL at 05:28

## 2021-06-03 RX ADMIN — Medication 14 UNIT(S): at 12:56

## 2021-06-03 RX ADMIN — AMLODIPINE BESYLATE 2.5 MILLIGRAM(S): 2.5 TABLET ORAL at 05:28

## 2021-06-03 RX ADMIN — Medication 100 MILLIGRAM(S): at 22:04

## 2021-06-03 RX ADMIN — Medication 20 MILLIGRAM(S): at 05:27

## 2021-06-03 NOTE — PROGRESS NOTE ADULT - PROBLEM SELECTOR PLAN 3
patient with elevated FS up to 400s. Not c/w HHNS or DKA. Likely provoked in setting of infection. Mentating well.   - glucosuria, elevated specific gravity   - IVF, uptitrate insulin requirements hx of TB in 2019: treated for 10 months as per collateral obtained from [facility]. Patient treated from Sept 2018-May 2019.   -ID consulted   - air borne precaution discontinued.   - AFB sputum q8 hrs x4 negative, isolation precautions negative.   - Galactomannan, fungitell, histoplasma negative.   - BCx: NGTD  - Patient with chronic cough most likely 2/2 COPD from cooking on "[employer]" per pulm, no concern for active TB from pulm or ID standpoint at this point.

## 2021-06-03 NOTE — PROGRESS NOTE ADULT - PROBLEM SELECTOR PLAN 2
hx of TB in 2019: treated for 10 months as per family   -ID consulted   - air borne precaution   - AFB sputum q8 hrs x3 negative, isolation precautions negative.   - f/u Galactomannan, Fungitell, Histo urine ag  - [facility] (jigna prior CT imaging and treatment history) to obtain collateral however only able to leave a .   - F/u remaining infectious workup. CT lungs in ED shows cavitary lesion   -ID consulted, AFB sputum negative x4.   -monitoring off abx   -pulm consulted, following patient.  -VBG improved at this point refusing bilevel overnight.   -Able to obtain  collateral information (previous imaging and tx history) documented in chart note from 6/1.   -Pulm not concerned regarding a vasculitis as cause of cavitary lesion or malignancy at this point appears to be chronic known changes in the lung from previous imaging as well.

## 2021-06-03 NOTE — CHART NOTE - NSCHARTNOTEFT_GEN_A_CORE
Patient is in need of supplemental oxygen. On resting room air the patient desatted to 86% at rest and during exercise. Upon administering 2L of supplemental NC the patient's saturation on room air was 98%. Upon exercise the patient's saturation on 2L NC was 93%.     THe patient is in need of home oxygen and the patient and family members are aware. The patient has a diagnosis of COPD. Oxygen will improve the patient's condition. Other treatments  have not improved the patient's condition such as nebs and oxygen is needed.     Nicolas Castro M.D.  Internal Medicine PGY-1  [phone] / [phone]

## 2021-06-03 NOTE — PROGRESS NOTE ADULT - PROBLEM SELECTOR PLAN 4
hold off on oral meds   -c/w lantus 18 units at night, 8u premeals with ISS    c/w FSBS with coverage patient with elevated FS up to 400s. Not c/w HHNS or DKA. Likely provoked in setting of infection. Mentating well.   - glucosuria, elevated specific gravity   - IVF, uptitrate insulin requirements  - Patient does not desire to be on insulin at home 2/2 to pain from the insulin needles. Has had diabetic teaching, family has had diabetic teaching, still patient refusing. Risks and benefits of insulin therapy in the setting of the patient's hyperglycemia explained to the patient who states that she would prefer an oral agent and most likely will not use insulin at home. At this time spoke to family and granddaughter in person to tell her our medical recommendation at this point is to start insulin however if the patient will not use insulin to ensure close follow up with patient's PCP Dr. Lindsay ABDI and discharge on her home oral agents with consistent finger sticks to check.

## 2021-06-03 NOTE — PROGRESS NOTE ADULT - SUBJECTIVE AND OBJECTIVE BOX
Date of Service: 06-03-21 @ 14:01    Patient is a 76y old  Female who presents with a chief complaint of SOB and weakness / CT chest shows cavitary lesion in lung (03 Jun 2021 07:23)      Any change in ROS: Sometimes she appears labored: but she says sheis doing ok and is not SOB:     MEDICATIONS  (STANDING):  albuterol/ipratropium for Nebulization 3 milliLiter(s) Nebulizer every 6 hours  amLODIPine   Tablet 2.5 milliGRAM(s) Oral daily  atorvastatin 20 milliGRAM(s) Oral at bedtime  budesonide  80 MICROgram(s)/formoterol 4.5 MICROgram(s) Inhaler 2 Puff(s) Inhalation two times a day  dextrose 40% Gel 15 Gram(s) Oral once  dextrose 5%. 1000 milliLiter(s) (50 mL/Hr) IV Continuous <Continuous>  dextrose 5%. 1000 milliLiter(s) (100 mL/Hr) IV Continuous <Continuous>  dextrose 50% Injectable 25 Gram(s) IV Push once  dextrose 50% Injectable 12.5 Gram(s) IV Push once  dextrose 50% Injectable 25 Gram(s) IV Push once  enoxaparin Injectable 40 milliGRAM(s) SubCutaneous daily  furosemide    Tablet 20 milliGRAM(s) Oral daily  glucagon  Injectable 1 milliGRAM(s) IntraMuscular once  insulin glargine Injectable (LANTUS) 20 Unit(s) SubCutaneous at bedtime  insulin lispro (ADMELOG) corrective regimen sliding scale   SubCutaneous three times a day before meals  insulin lispro (ADMELOG) corrective regimen sliding scale   SubCutaneous at bedtime  insulin lispro Injectable (ADMELOG) 14 Unit(s) SubCutaneous three times a day before meals  levothyroxine 125 MICROGram(s) Oral daily  losartan 100 milliGRAM(s) Oral daily    MEDICATIONS  (PRN):  acetaminophen   Tablet .. 650 milliGRAM(s) Oral every 6 hours PRN Temp greater or equal to 38.5C (101.3F), Mild Pain (1 - 3)  benzonatate 100 milliGRAM(s) Oral three times a day PRN Cough    Vital Signs Last 24 Hrs  T(C): 36.5 (03 Jun 2021 10:58), Max: 36.8 (02 Jun 2021 14:25)  T(F): 97.7 (03 Jun 2021 10:58), Max: 98.3 (02 Jun 2021 14:25)  HR: 120 (03 Jun 2021 13:53) (92 - 120)  BP: 94/66 (03 Jun 2021 10:58) (94/66 - 129/70)  BP(mean): --  RR: 18 (03 Jun 2021 10:58) (18 - 18)  SpO2: 86% (03 Jun 2021 13:53) (86% - 99%)    I&O's Summary    02 Jun 2021 07:01  -  03 Jun 2021 07:00  --------------------------------------------------------  IN: 0 mL / OUT: 1700 mL / NET: -1700 mL          Physical Exam:   GENERAL: NAD, well-groomed, well-developed  HEENT: GIAN/   Atraumatic, Normocephalic  ENMT: No tonsillar erythema, exudates, or enlargement; Moist mucous membranes, Good dentition, No lesions  NECK: Supple, No JVD, Normal thyroid  CHEST/LUNG:bibasilar cracekls+  CVS: Regular rate and rhythm; No murmurs, rubs, or gallops  GI: : Soft, Nontender, Nondistended; Bowel sounds present  NERVOUS SYSTEM:  Alert & Oriented X3  EXTREMITIES:   edema  LYMPH: No lymphadenopathy noted  SKIN: No rashes or lesions  ENDOCRINOLOGY: No Thyromegaly  PSYCH: Appropriate    Labs:  30, 31, 28, 26                            10.5   7.61  )-----------( 256      ( 02 Jun 2021 07:16 )             36.7                         10.3   9.21  )-----------( 273      ( 01 Jun 2021 10:20 )             36.6                         9.9    9.94  )-----------( 269      ( 31 May 2021 10:57 )             35.1     06-02    136  |  97  |  24<H>  ----------------------------<  260<H>  4.3   |  25  |  0.72  06-01    137  |  98  |  21  ----------------------------<  233<H>  4.1   |  25  |  0.67  05-31    137  |  98  |  26<H>  ----------------------------<  262<H>  3.8   |  26  |  0.71    Ca    9.3      02 Jun 2021 07:16  Phos  3.7     06-02  Mg     2.0     06-02    TPro  7.0  /  Alb  4.0  /  TBili  0.2  /  DBili  x   /  AST  14  /  ALT  11  /  AlkPhos  68  06-02    CAPILLARY BLOOD GLUCOSE      POCT Blood Glucose.: 192 mg/dL (03 Jun 2021 12:09)  POCT Blood Glucose.: 318 mg/dL (03 Jun 2021 08:42)  POCT Blood Glucose.: 184 mg/dL (02 Jun 2021 21:03)  POCT Blood Glucose.: 255 mg/dL (02 Jun 2021 17:23)      LIVER FUNCTIONS - ( 02 Jun 2021 07:16 )  Alb: 4.0 g/dL / Pro: 7.0 g/dL / ALK PHOS: 68 U/L / ALT: 11 U/L / AST: 14 U/L / GGT: x               Lactate, Blood: 1.9 mmol/L (06-02 @ 07:16)  Lactate, Blood: 2.2 mmol/L (06-01 @ 07:12)  Lactate, Blood: 2.7 mmol/L (05-31 @ 10:57)        RECENT CULTURES:  05-31 @ 13:40 .Sputum                Culture is being performed.    05-31 @ 09:25 .Sputum Sputum                Culture is being performed.    05-30 @ 17:23 .Sputum Sputum         rad< from: CT Angio Chest PE Protocol w/ IV Cont (05.29.21 @ 07:46) >  cavitary lesion measures 3.1 x 1.2 x 2.0 cm (TR, AP, CC). Right upper lobe nodule measures 1.2 cm (2:28).  PLEURA: Small left pleural effusion. No pneumothorax.  MEDIASTINUM AND KAILA: Slightly enlarged subcarinal lymph node.  VESSELS: No main, right, left or lobar pulmonary arterial filling defect. Evaluation of the segmental and subsegmental pulmonary arteries is suboptimal secondary to parenchymal opacities and motion artifacts. Aortic and coronary artery calcifications.  HEART: Heart size is normal. No pericardial effusion. No CT evidence of right heart strain.  CHEST WALL AND LOWER NECK: Within normal limits.  VISUALIZED UPPER ABDOMEN: Small hiatal hernia. Partially visualized left lower pole exophyticcyst.  BONES: Degenerative changes of the thoracic spine.    IMPRESSION:  1.  No central pulmonary emboli. Segmental and subsegmental pulmonary arteries are not well evaluated.  2.  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  Superimposed pulmonary edema and small left pleural effusion.              JAMILAH JIMENEZ MD; Resident Radiology  This document has been electronically signed.  JESSICA BUCHANAN MD; Attending Radiologist  This document has been electronically signed. May 29 2021  9:18AM    < end of copied text >         Culture is being performed.    05-30 @ 13:00 .Sputum Sputum                Culture is being performed.    05-29 @ 16:49 .Urine Clean Catch (Midstream)                <10,000 CFU/mL Normal Urogenital Kinza    05-29 @ 16:47 .Blood Blood                No growth to date.          RESPIRATORY CULTURES:          Studies  Chest X-RAY  CT SCAN Chest   Venous Dopplers: LE:   CT Abdomen  Others

## 2021-06-03 NOTE — PROGRESS NOTE ADULT - PROBLEM SELECTOR PLAN 2
CT lungs in ED shows cavitary lesion   -ID consulted, AFB sputum negative x4.   -monitoring off abx   -pulm consulted, following patient.  -VBG improved at this point refusing bilevel overnight.   -Able to obtain  collateral information (previous imaging and tx history) documented in chart note from 6/1.   -Pulm not concerned regarding a vasculitis as cause of cavitary lesion or malignancy at this point appears to be chronic known changes in the lung from previous imaging as well.

## 2021-06-03 NOTE — PROGRESS NOTE ADULT - PROBLEM SELECTOR PLAN 5
c/w home meds, losartan  - lipitor, lasix hold off on oral meds   -c/w lantus 25 units at night, 14u premeals with ISS    c/w FSBS with coverage  - Risks and benefits of deferring insulin treatment explained to the family and the patient.

## 2021-06-03 NOTE — PROGRESS NOTE ADULT - PROBLEM SELECTOR PLAN 5
hold off on oral meds   -c/w lantus 25 units at night, 14u premeals with ISS    c/w FSBS with coverage  - Risks and benefits of deferring insulin treatment explained to the family and the patient.

## 2021-06-03 NOTE — PROGRESS NOTE ADULT - SUBJECTIVE AND OBJECTIVE BOX
CC: F/U for cavitary lesion    Saw/spoke to patient. No fevers, no chills. No new complaints. Unchanged.    Allergies  No Known Allergies    ANTIMICROBIALS:  Off    PE:    Vital Signs Last 24 Hrs  T(C): 36.5 (03 Jun 2021 10:58), Max: 36.8 (02 Jun 2021 14:25)  T(F): 97.7 (03 Jun 2021 10:58), Max: 98.3 (02 Jun 2021 14:25)  HR: 110 (03 Jun 2021 11:57) (92 - 114)  BP: 94/66 (03 Jun 2021 10:58) (94/66 - 129/70)  RR: 18 (03 Jun 2021 10:58) (18 - 18)  SpO2: 98% (03 Jun 2021 11:57) (93% - 99%)    Gen: AOx3, NAD, non-toxic  CV: S1+S2 normal, tachycardic  Resp: Clear bilat, no resp distress, no crackles/wheezes  Abd: Soft, nontender, +BS  Ext: No LE edema, no wounds    LABS:                        10.5   7.61  )-----------( 256      ( 02 Jun 2021 07:16 )             36.7     06-02    136  |  97  |  24<H>  ----------------------------<  260<H>  4.3   |  25  |  0.72    Ca    9.3      02 Jun 2021 07:16  Phos  3.7     06-02  Mg     2.0     06-02    TPro  7.0  /  Alb  4.0  /  TBili  0.2  /  DBili  x   /  AST  14  /  ALT  11  /  AlkPhos  68  06-02    MICROBIOLOGY:    .Sputum  05-31-21   Culture is being performed.    .Sputum Sputum  05-31-21   Culture is being performed.     .Sputum Sputum  05-30-21   Culture is being performed.    .Sputum Sputum  05-30-21   Culture is being performed.     .Urine Clean Catch (Midstream)  05-29-21   <10,000 CFU/mL Normal Urogenital Kinza     .Blood Blood  05-29-21   No growth to date.      Rapid RVP Result: NotDetec (05-29 @ 06:15)    (otherwise reviewed)    RADIOLOGY:    5/29 CT:    IMPRESSION:  1.  No central pulmonary emboli. Segmental and subsegmental pulmonary arteries are not well evaluated.  2.  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  Superimposed pulmonary edema and small left pleural effusion.

## 2021-06-03 NOTE — PROGRESS NOTE ADULT - SUBJECTIVE AND OBJECTIVE BOX
Patient is a 76y old  Female who presents with a chief complaint of SOB and weakness / CT chest shows cavitary lesion in lung (03 Jun 2021 14:00)      INTERVAL HPI/OVERNIGHT EVENTS: doing better , grand daughter bedside. now need  home o2   T(C): 36.7 (06-03-21 @ 21:02), Max: 36.7 (06-03-21 @ 21:02)  HR: 99 (06-03-21 @ 21:02) (93 - 120)  BP: 144/76 (06-03-21 @ 21:02) (94/66 - 144/76)  RR: 18 (06-03-21 @ 21:02) (18 - 18)  SpO2: 99% (06-03-21 @ 21:02) (86% - 99%)  Wt(kg): --  I&O's Summary    02 Jun 2021 07:01  -  03 Jun 2021 07:00  --------------------------------------------------------  IN: 0 mL / OUT: 1700 mL / NET: -1700 mL    03 Jun 2021 07:01  -  04 Jun 2021 02:21  --------------------------------------------------------  IN: 500 mL / OUT: 1250 mL / NET: -750 mL        PAST MEDICAL & SURGICAL HISTORY:      SOCIAL HISTORY  Alcohol:  Tobacco:  Illicit substance use:    FAMILY HISTORY:    REVIEW OF SYSTEMS:  CONSTITUTIONAL: No fever, weight loss, or fatigue  EYES: No eye pain, visual disturbances, or discharge  ENMT:  No difficulty hearing, tinnitus, vertigo; No sinus or throat pain  NECK: No pain or stiffness  RESPIRATORY: No cough, wheezing, chills or hemoptysis; No shortness of breath  CARDIOVASCULAR: No chest pain, palpitations, dizziness, or leg swelling  GASTROINTESTINAL: No abdominal or epigastric pain. No nausea, vomiting, or hematemesis; No diarrhea or constipation. No melena or hematochezia.  GENITOURINARY: No dysuria, frequency, hematuria, or incontinence  NEUROLOGICAL: No headaches, memory loss, loss of strength, numbness, or tremors  SKIN: No itching, burning, rashes, or lesions   LYMPH NODES: No enlarged glands  ENDOCRINE: No heat or cold intolerance; No hair loss  MUSCULOSKELETAL: No joint pain or swelling; No muscle, back, or extremity pain  PSYCHIATRIC: No depression, anxiety, mood swings, or difficulty sleeping  HEME/LYMPH: No easy bruising, or bleeding gums  ALLERY AND IMMUNOLOGIC: No hives or eczema    RADIOLOGY & ADDITIONAL TESTS:    Imaging Personally Reviewed:  [ ] YES  [ ] NO    Consultant(s) Notes Reviewed:  [ ] YES  [ ] NO    PHYSICAL EXAM:  GENERAL: NAD, well-groomed, well-developed  HEAD:  Atraumatic, Normocephalic  EYES: EOMI, PERRLA, conjunctiva and sclera clear  ENMT: No tonsillar erythema, exudates, or enlargement; Moist mucous membranes, Good dentition, No lesions  NECK: Supple, No JVD, Normal thyroid  NERVOUS SYSTEM:  Alert & Oriented X3, Good concentration; Motor Strength 5/5 B/L upper and lower extremities; DTRs 2+ intact and symmetric  CHEST/LUNG: Clear to percussion bilaterally; No rales, rhonchi, wheezing, or rubs  HEART: Regular rate and rhythm; No murmurs, rubs, or gallops  ABDOMEN: Soft, Nontender, Nondistended; Bowel sounds present  EXTREMITIES:  2+ Peripheral Pulses, No clubbing, cyanosis, or edema  LYMPH: No lymphadenopathy noted  SKIN: No rashes or lesions    LABS:                        10.5   7.61  )-----------( 256      ( 02 Jun 2021 07:16 )             36.7     06-02    136  |  97  |  24<H>  ----------------------------<  260<H>  4.3   |  25  |  0.72    Ca    9.3      02 Jun 2021 07:16  Phos  3.7     06-02  Mg     2.0     06-02    TPro  7.0  /  Alb  4.0  /  TBili  0.2  /  DBili  x   /  AST  14  /  ALT  11  /  AlkPhos  68  06-02        CAPILLARY BLOOD GLUCOSE      POCT Blood Glucose.: 78 mg/dL (03 Jun 2021 21:51)  POCT Blood Glucose.: 117 mg/dL (03 Jun 2021 17:30)  POCT Blood Glucose.: 192 mg/dL (03 Jun 2021 12:09)  POCT Blood Glucose.: 318 mg/dL (03 Jun 2021 08:42)            MEDICATIONS  (STANDING):  amLODIPine   Tablet 2.5 milliGRAM(s) Oral daily  atorvastatin 20 milliGRAM(s) Oral at bedtime  budesonide  80 MICROgram(s)/formoterol 4.5 MICROgram(s) Inhaler 2 Puff(s) Inhalation two times a day  dextrose 40% Gel 15 Gram(s) Oral once  dextrose 5%. 1000 milliLiter(s) (50 mL/Hr) IV Continuous <Continuous>  dextrose 5%. 1000 milliLiter(s) (100 mL/Hr) IV Continuous <Continuous>  dextrose 50% Injectable 25 Gram(s) IV Push once  dextrose 50% Injectable 12.5 Gram(s) IV Push once  dextrose 50% Injectable 25 Gram(s) IV Push once  enoxaparin Injectable 40 milliGRAM(s) SubCutaneous daily  glucagon  Injectable 1 milliGRAM(s) IntraMuscular once  insulin glargine Injectable (LANTUS) 24 Unit(s) SubCutaneous at bedtime  insulin lispro (ADMELOG) corrective regimen sliding scale   SubCutaneous three times a day before meals  insulin lispro (ADMELOG) corrective regimen sliding scale   SubCutaneous at bedtime  insulin lispro Injectable (ADMELOG) 14 Unit(s) SubCutaneous three times a day before meals  levothyroxine 125 MICROGram(s) Oral daily  losartan 100 milliGRAM(s) Oral daily    MEDICATIONS  (PRN):  acetaminophen   Tablet .. 650 milliGRAM(s) Oral every 6 hours PRN Temp greater or equal to 38.5C (101.3F), Mild Pain (1 - 3)  albuterol/ipratropium for Nebulization 3 milliLiter(s) Nebulizer every 6 hours PRN Shortness of Breath and/or Wheezing  benzonatate 100 milliGRAM(s) Oral three times a day PRN Cough      Care Discussed with Consultants/Other Providers [ ] YES  [ ] NO

## 2021-06-03 NOTE — PROVIDER CONTACT NOTE (OTHER) - ACTION/TREATMENT ORDERED:
MD to put in tylenol order
EKG done; LR bolus ordered & given; will continue to monitor
RN to call MD at 4 am for an order for repeat fingerstick
no further interventions., next fingerstick at breakfast
give insulin according to sliding scale, recheck fingerstick at midnight

## 2021-06-03 NOTE — PROGRESS NOTE ADULT - ASSESSMENT
Patient is a 75yo F originally from [place] PMHx of TIIDM, HTN, previous pulmonary TB (2019) s/p 10 months of treatment presented with acute on chronic SOB, imaging with evidence of cavitary lung lesion most concern for reactivation of tuberculosis, now s/p isolation AFB negative x4, isolation precautions discontinued.

## 2021-06-03 NOTE — PROGRESS NOTE ADULT - SUBJECTIVE AND OBJECTIVE BOX
Nicolas Castro M.D.  Internal Medicine PGY-1  [phone] / [id number]     Patient is a 76y old  Female who presents with a chief complaint of SOB and weakness / CT chest shows cavitary lesion in lung (02 Jun 2021 10:12)      SUBJECTIVE / OVERNIGHT EVENTS:          MEDICATIONS  (STANDING):  albuterol/ipratropium for Nebulization 3 milliLiter(s) Nebulizer every 6 hours  amLODIPine   Tablet 2.5 milliGRAM(s) Oral daily  atorvastatin 20 milliGRAM(s) Oral at bedtime  budesonide  80 MICROgram(s)/formoterol 4.5 MICROgram(s) Inhaler 2 Puff(s) Inhalation two times a day  dextrose 40% Gel 15 Gram(s) Oral once  dextrose 5%. 1000 milliLiter(s) (50 mL/Hr) IV Continuous <Continuous>  dextrose 5%. 1000 milliLiter(s) (100 mL/Hr) IV Continuous <Continuous>  dextrose 50% Injectable 25 Gram(s) IV Push once  dextrose 50% Injectable 12.5 Gram(s) IV Push once  dextrose 50% Injectable 25 Gram(s) IV Push once  enoxaparin Injectable 40 milliGRAM(s) SubCutaneous daily  furosemide    Tablet 20 milliGRAM(s) Oral daily  glucagon  Injectable 1 milliGRAM(s) IntraMuscular once  insulin glargine Injectable (LANTUS) 20 Unit(s) SubCutaneous at bedtime  insulin lispro (ADMELOG) corrective regimen sliding scale   SubCutaneous three times a day before meals  insulin lispro (ADMELOG) corrective regimen sliding scale   SubCutaneous at bedtime  insulin lispro Injectable (ADMELOG) 14 Unit(s) SubCutaneous three times a day before meals  levothyroxine 125 MICROGram(s) Oral daily  losartan 100 milliGRAM(s) Oral daily    MEDICATIONS  (PRN):  acetaminophen   Tablet .. 650 milliGRAM(s) Oral every 6 hours PRN Temp greater or equal to 38.5C (101.3F), Mild Pain (1 - 3)  benzonatate 100 milliGRAM(s) Oral three times a day PRN Cough      Vital Signs Last 24 Hrs  T(C): 36.5 (03 Jun 2021 04:21), Max: 36.8 (02 Jun 2021 14:25)  T(F): 97.7 (03 Jun 2021 04:21), Max: 98.3 (02 Jun 2021 14:25)  HR: 93 (03 Jun 2021 06:01) (88 - 100)  BP: 129/70 (03 Jun 2021 04:21) (122/68 - 129/70)  BP(mean): --  RR: 18 (03 Jun 2021 04:21) (18 - 18)  SpO2: 99% (03 Jun 2021 06:01) (95% - 99%)      PHYSICAL EXAM  GENERAL: NAD, well-developed  HEAD:  Atraumatic, Normocephalic  EYES: EOMI, PERRLA, conjunctiva and sclera clear  NECK: Supple, No JVD  CHEST/LUNG: Clear to auscultation bilaterally; No wheeze  HEART: Regular rate and rhythm; No murmurs, rubs, or gallops  ABDOMEN: Soft, Nontender, Nondistended; Bowel sounds present  EXTREMITIES:  2+ Peripheral Pulses, No clubbing, cyanosis, or edema  PSYCH: AAOx3  SKIN: No rashes or lesions    CAPILLARY BLOOD GLUCOSE      POCT Blood Glucose.: 184 mg/dL (02 Jun 2021 21:03)  POCT Blood Glucose.: 255 mg/dL (02 Jun 2021 17:23)  POCT Blood Glucose.: 338 mg/dL (02 Jun 2021 12:19)  POCT Blood Glucose.: 230 mg/dL (02 Jun 2021 08:37)    I&O's Summary    02 Jun 2021 07:01  -  03 Jun 2021 07:00  --------------------------------------------------------  IN: 0 mL / OUT: 1700 mL / NET: -1700 mL        LABS:                        10.5   7.61  )-----------( 256      ( 02 Jun 2021 07:16 )             36.7     06-02    136  |  97  |  24<H>  ----------------------------<  260<H>  4.3   |  25  |  0.72    Ca    9.3      02 Jun 2021 07:16  Phos  3.7     06-02  Mg     2.0     06-02    TPro  7.0  /  Alb  4.0  /  TBili  0.2  /  DBili  x   /  AST  14  /  ALT  11  /  AlkPhos  68  06-02              RADIOLOGY & ADDITIONAL TESTS:     MICROBIOLOGY:    ANTIMICROBIALS:    CONSULTS: Nicolas Castro M.D.  Internal Medicine PGY-1  [phone] / [id number]     Patient is a 76y old  Female who presents with a chief complaint of SOB and weakness / CT chest shows cavitary lesion in lung (02 Jun 2021 10:12)      SUBJECTIVE / OVERNIGHT EVENTS:    Patient seen and examined at the bedside this am. Per nursing no acute events overnight. Patient this morning denies any n/v/d, shortness of breath, chest pain, states that her cough is persistent.       MEDICATIONS  (STANDING):  albuterol/ipratropium for Nebulization 3 milliLiter(s) Nebulizer every 6 hours  amLODIPine   Tablet 2.5 milliGRAM(s) Oral daily  atorvastatin 20 milliGRAM(s) Oral at bedtime  budesonide  80 MICROgram(s)/formoterol 4.5 MICROgram(s) Inhaler 2 Puff(s) Inhalation two times a day  dextrose 40% Gel 15 Gram(s) Oral once  dextrose 5%. 1000 milliLiter(s) (50 mL/Hr) IV Continuous <Continuous>  dextrose 5%. 1000 milliLiter(s) (100 mL/Hr) IV Continuous <Continuous>  dextrose 50% Injectable 25 Gram(s) IV Push once  dextrose 50% Injectable 12.5 Gram(s) IV Push once  dextrose 50% Injectable 25 Gram(s) IV Push once  enoxaparin Injectable 40 milliGRAM(s) SubCutaneous daily  furosemide    Tablet 20 milliGRAM(s) Oral daily  glucagon  Injectable 1 milliGRAM(s) IntraMuscular once  insulin glargine Injectable (LANTUS) 20 Unit(s) SubCutaneous at bedtime  insulin lispro (ADMELOG) corrective regimen sliding scale   SubCutaneous three times a day before meals  insulin lispro (ADMELOG) corrective regimen sliding scale   SubCutaneous at bedtime  insulin lispro Injectable (ADMELOG) 14 Unit(s) SubCutaneous three times a day before meals  levothyroxine 125 MICROGram(s) Oral daily  losartan 100 milliGRAM(s) Oral daily    MEDICATIONS  (PRN):  acetaminophen   Tablet .. 650 milliGRAM(s) Oral every 6 hours PRN Temp greater or equal to 38.5C (101.3F), Mild Pain (1 - 3)  benzonatate 100 milliGRAM(s) Oral three times a day PRN Cough      Vital Signs Last 24 Hrs  T(C): 36.5 (03 Jun 2021 04:21), Max: 36.8 (02 Jun 2021 14:25)  T(F): 97.7 (03 Jun 2021 04:21), Max: 98.3 (02 Jun 2021 14:25)  HR: 93 (03 Jun 2021 06:01) (88 - 100)  BP: 129/70 (03 Jun 2021 04:21) (122/68 - 129/70)  BP(mean): --  RR: 18 (03 Jun 2021 04:21) (18 - 18)  SpO2: 99% (03 Jun 2021 06:01) (95% - 99%)      PHYSICAL EXAM  GENERAL: NAD, well-developed  HEAD:  Atraumatic, Normocephalic  EYES: EOMI, PERRLA, conjunctiva and sclera clear  NECK: Supple, No JVD  CHEST/LUNG: Clear to auscultation bilaterally; No wheeze; +2L NC  HEART: Regular rate and rhythm; No murmurs, rubs, or gallops  ABDOMEN: Soft, Nontender, Nondistended; Bowel sounds present  EXTREMITIES:  2+ Peripheral Pulses, No clubbing, cyanosis, or edema  PSYCH: AAOx3  SKIN: No rashes or lesions      CAPILLARY BLOOD GLUCOSE      POCT Blood Glucose.: 184 mg/dL (02 Jun 2021 21:03)  POCT Blood Glucose.: 255 mg/dL (02 Jun 2021 17:23)  POCT Blood Glucose.: 338 mg/dL (02 Jun 2021 12:19)  POCT Blood Glucose.: 230 mg/dL (02 Jun 2021 08:37)    I&O's Summary    02 Jun 2021 07:01  -  03 Jun 2021 07:00  --------------------------------------------------------  IN: 0 mL / OUT: 1700 mL / NET: -1700 mL        LABS:                        10.5   7.61  )-----------( 256      ( 02 Jun 2021 07:16 )             36.7     06-02    136  |  97  |  24<H>  ----------------------------<  260<H>  4.3   |  25  |  0.72    Ca    9.3      02 Jun 2021 07:16  Phos  3.7     06-02  Mg     2.0     06-02    TPro  7.0  /  Alb  4.0  /  TBili  0.2  /  DBili  x   /  AST  14  /  ALT  11  /  AlkPhos  68  06-02              RADIOLOGY & ADDITIONAL TESTS:     MICROBIOLOGY:    ANTIMICROBIALS:    CONSULTS:

## 2021-06-03 NOTE — PROGRESS NOTE ADULT - PROBLEM SELECTOR PLAN 3
hx of TB in 2019: treated for 10 months as per collateral obtained [facility]. Patient treated from Sept 2018-May 2019.   -ID consulted   - air borne precaution discontinued.   - AFB sputum q8 hrs x4 negative, isolation precautions negative.   - Galactomannan, fungitell, histoplasma negative.   - BCx: NGTD  - Patient with chronic cough most likely 2/2 COPD from cooking on "[employer]" per pulm, no concern for active TB from pulm or ID standpoint at this point.

## 2021-06-03 NOTE — PROGRESS NOTE ADULT - ASSESSMENT
76 F originally from [place] PMHx of NIDDMII, Hypertension, previous pulmonary TB (2019) s/p 10 months of treatment presented with acute on chronic SOB  Leukocytosis, no fever  S/p recent treatment course for pulmonary TB at [facility]  CT with apical cavitary lesion, upper lobe opacities  Here presenting with shortness of breath, no fevers, mild leukocytosis  In PACS no prior CT to compare to  AFB negative x4, cavities representative of residual cavity/scarring?  Overall,  1) Cavitary lesion  - Likely sequelae of prior TB; AFBs negative  - Monitor off abx for now  - Please obtain records from [facility] (jigna prior CT imaging and treatment history)  - F/U Pulm  2) Leukocytosis  - Monitor for signs/symptoms infection  3) SOB  - Noninfectious causes per primary team    Jacek Angel MD  Pager [phone]  After 5pm and on weekends call [phone]

## 2021-06-03 NOTE — CHART NOTE - NSCHARTNOTEFT_GEN_A_CORE
Nutrition Follow Up Note  Patient seen for: consult for assessment, education    Hospital course as per chart: 76y Female with PMH of NIDDMII, HTN, previous pulmonary TB (2019) s/p 10 months of treatment who presented with acute on chronic SOB. Admitted 5/29. "Imaging with evidence of cavitary lung lesion most concern for reactivation of tuberculosis, now s/p isolation AFB negative x4, isolation precautions discontinued." Chart reviewed, events noted.    Source: [] Patient       [x] EMR        [] RN        [] Family at bedside       [] Other:    -If unable to interview patient: [] Trach/Vent/BiPAP  [] Disoriented/confused/inappropriate to interview    [facility]  used to facilitate interview:     Diet Order:   Diet, DASH/TLC:   Sodium & Cholesterol Restricted  Consistent Carbohydrate {Evening Snack} (CSTCHOSN)  Ellen (05-29-21)    - Is current order appropriate/adequate? [x] Yes  []  No:     - PO intake :   [] >75%  Adequate    [] 50-75%  Fair       [] <50%  Poor    - Nutrition-related concerns:    GI:  Last BM today 6/3.   Bowel Regimen? [] Yes   [x] No    Weights:   Daily   Will continue to monitor and trend weights.    Nutritionally Pertinent MEDICATIONS  (STANDING):  amLODIPine   Tablet  atorvastatin  dextrose 40% Gel  dextrose 5%.  dextrose 5%.  dextrose 50% Injectable  dextrose 50% Injectable  dextrose 50% Injectable  furosemide    Tablet  glucagon  Injectable  insulin glargine Injectable (LANTUS)  insulin lispro (ADMELOG) corrective regimen sliding scale  insulin lispro (ADMELOG) corrective regimen sliding scale  insulin lispro Injectable (ADMELOG)  levothyroxine  losartan    Pertinent Labs:   A1C with Estimated Average Glucose Result: 9.7 % (05-31-21 @ 12:10)  A1C with Estimated Average Glucose Result: 9.9 % (05-30-21 @ 12:19)    Finger Sticks:  POCT Blood Glucose.: 184 mg/dL (06-02 @ 21:03)  POCT Blood Glucose.: 255 mg/dL (06-02 @ 17:23)  POCT Blood Glucose.: 338 mg/dL (06-02 @ 12:19)    Skin per nursing documentation: no pressure injuries per flowsheets   Edema per flowsheets: none    Estimated Needs: no change since previous assessment  Based on dosing wt 63.2 kg  Estimated Energy Needs (25-30 kcal/kg): 5760-1561 kcal/day  Estimated Protein Needs (1-1.2 g/kg): 63-76 g/day  Estimated Fluid Needs (25-30 ml/kg): 3759-5000 ml/day    Previous Nutrition Diagnosis: Inadequate Energy Intake  Nutrition Diagnosis is: [] ongoing  [] resolved [] not applicable     New Nutrition Diagnosis: not applicable  Unintended Weight Loss   Food & Nutrition Related Knowledge Deficit  Altered Nutrition-Related Lab Values   Malnutrition    Recommendations:     Monitoring and Evaluation: Monitor PO intake, weight, labs, skin, GI status, diet     RD remains available upon request and will follow up per protocol  Wendy Gardiner MS RD CDN Pager #[phone] Nutrition Follow Up Note  Patient seen for: consult for assessment, education    Hospital course as per chart: 76y Female with PMH of NIDDMII, HTN, previous pulmonary TB (2019) s/p 10 months of treatment who presented with acute on chronic SOB. Admitted 5/29. "Imaging with evidence of cavitary lung lesion most concern for reactivation of tuberculosis, now s/p isolation AFB negative x4, isolation precautions discontinued." Chart reviewed, events noted.    Source: [] Patient       [x] EMR        [x] RN        [] Family at bedside       [x] Other: Son (Tish Burciaga) via phone ([phone])    -If unable to interview patient: [] Trach/Vent/BiPAP  [x] Disoriented/confused/inappropriate to interview (per nursing staff)    Diet Order:   Diet, DASH/TLC:   Sodium & Cholesterol Restricted  Consistent Carbohydrate {Evening Snack} (CSTCHOSN)  Ellen (05-29-21)    - Is current order appropriate/adequate? [x] Yes  []  No:     - PO intake :   [] >75%  Adequate    [x] 50-75%  Fair       [] <50%  Poor    - Spoke to son Tish Burciaga over phone. Consistent Carbohydrate/DM diet education provided. Encouraged avoidance of concentrated sweets and sugar sweetened beverages as able. Encouraged consumption of whole grains as able and discussed role of fiber. All nutrition-related questions answered. Son made aware RD remains available. Handouts left at bedside per request.     GI:  Last BM today 6/3.   Bowel Regimen? [] Yes   [x] No    Weights: no new weights to assess. Pt in chair at time of visit, unable to obtain new weight. Will continue to monitor and trend weights.    Nutritionally Pertinent MEDICATIONS  (STANDING):  amLODIPine   Tablet  atorvastatin  dextrose 40% Gel  dextrose 5%.  dextrose 5%.  dextrose 50% Injectable  dextrose 50% Injectable  dextrose 50% Injectable  furosemide    Tablet  glucagon  Injectable  insulin glargine Injectable (LANTUS)  insulin lispro (ADMELOG) corrective regimen sliding scale  insulin lispro (ADMELOG) corrective regimen sliding scale  insulin lispro Injectable (ADMELOG)  levothyroxine  losartan    Pertinent Labs:   A1C with Estimated Average Glucose Result: 9.7 % (05-31-21 @ 12:10)  A1C with Estimated Average Glucose Result: 9.9 % (05-30-21 @ 12:19)    Finger Sticks:  POCT Blood Glucose.: 184 mg/dL (06-02 @ 21:03)  POCT Blood Glucose.: 255 mg/dL (06-02 @ 17:23)  POCT Blood Glucose.: 338 mg/dL (06-02 @ 12:19)    Skin per nursing documentation: no pressure injuries per flowsheets   Edema per flowsheets: none    Estimated Needs: no change since previous assessment  Based on dosing wt 63.2 kg  Estimated Energy Needs (25-30 kcal/kg): 4301-8174 kcal/day  Estimated Protein Needs (1-1.2 g/kg): 63-76 g/day  Estimated Fluid Needs (25-30 ml/kg): 4275-4701 ml/day    Previous Nutrition Diagnosis: Inadequate Energy Intake  Nutrition Diagnosis is: [x] ongoing - improving, pt with fair intake    New Nutrition Diagnosis: Altered Nutrition-Related Lab Values related to suboptimal glycemic control as evidenced by HbA1c 9.7%.   Goal: Pt's blood glucose to trend towards goal in-house.     Recommendations:   1) Continue DASH, Consistent Carbohydrate, Halal diet. Monitor/adjust PRN.  2) Diet education provided to son. Handouts left at bedside per request. Pt's son made aware RD remains available.     Monitoring and Evaluation: Monitor PO intake, weight, labs, skin, GI status, diet     RD remains available upon request and will follow up per protocol  Wendy Gardiner MS RD CDN Pager #[phone]

## 2021-06-03 NOTE — PROGRESS NOTE ADULT - ASSESSMENT
75 y/o F originally from [place] with PMH of previous pulmonary TB (2019) s/o 10 months of treatment, HTN, DM. Presented with acute on chronic SOB. Pt reportedly diagnosed with with pulmonary TB in [place] in 2019 while visiting her son there. She was hospitalized for approximately one month, discharged with two medications (granddaughter is not sure how many exactly) which she took for 10 months. Followed with [facility] [place] and was cleared. SOB noted to improve, but not completely resolve. Now, pt notes to have worsening CUNHA for about a year. On admisison, afebrile. RVP negative. CTA chest with no PE, but with R apical cavitary lesion and upper lobe predominant patchy opacities c/f reactivation TB. Pt was given CTX/Azithromycin. (29 May 2021 16:24)  above confirmed with son:  He says she was SOB at home and hence was brought to the hospital: she did complete the rx for active tb:  she has no hemoptysis: currently being ruled out for tb     5/30:  r/o tb: in isolation: she was treated for tuberculosis in the past; now presented with SOB: she has no major pe: But she has acute resp acidosis: she would need bipap in the night time: Though currently she looks comfortable: Add BD / Symbicort   on empiric antibiotics for now:   dvt propayxis\  await three sputum cultures   DM: cont to control  HTN: stable   paged team   DW team     6/2  R/O TB, cavitary PNA  -Chart note regarding hx of pulmonary TB noted - Pt reportedly diagnosed in 6/2018, completed course of INH and Rifampin from 9/2018 to 5/2019. Chest imaging in 3/2019 with reported RUL infiltrate/cavitary lesion   -CTA chest now remains with R apical cavitary lesion and upper lobe predominant patchy opacities - may be residual from prior TB  -AFB sputum cultures negative x4 - now off isolation   -ID recs noted -F/u fungitell, galactomannan  -Histoplasma Ag negative  -F/u BC - prelim 5/29 NGTD  -Afebrile w/ no significant leukocytosis, off ABX at this time   Shortness of breath  -Pt reports for >1 year - Chronic, may have underlying COPD  -O2 sats mid 90s on 1L NC -Continue to wean to room air as tolerated to maintain O2 sats >90%  -VBG 6/1 with normal ph, likely will not require home bipap   -C/w Symbicort 80/4.5 mcg 2puffs BID  -C/w duonebs q6    6/3:    R/O TB, cavitary PNA  -Chart note regarding hx of pulmonary TB noted - Pt reportedly diagnosed in 6/2018, completed course of INH and Rifampin from 9/2018 to 5/2019. Chest imaging in 3/2019 with reported RUL infiltrate/cavitary lesion   -CTA chest now remains with R apical cavitary lesion and upper lobe predominant patchy opacities - likely residual from prior TB  -AFB sputum cultures negative x4 - now off isolation   -Histoplasma Ag negative  -F/u BC - prelim 5/29 NGTD  -Afebrile w/ no significant leukocytosis, off ABX at this time   Shortness of breath  -Pt reports for >1 year - Chronic, may have underlying COPD- likely " chullah smoking"   -O2 sats mid 90s on 1L NC -Continue to wean to room air as tolerated to maintain O2 sats >90%  -VBG 6/1 with normal ph, likely will not require home bipap   -C/w Symbicort 80/4.5 mcg 2puffs BID  -C/w duonebs q6  oupt PFT :  can oder CVD workup but suspicion is very low:   ALEJANDRA resident

## 2021-06-03 NOTE — PROVIDER CONTACT NOTE (OTHER) - SITUATION
pt complaining of pain in legs requesting tylenol
repeat f/s 320
pt tachy GW=[phone]
pt fingerstick 413
pt repeat fingerstick 384

## 2021-06-03 NOTE — PROGRESS NOTE ADULT - PROBLEM SELECTOR PLAN 1
CT lungs in ED shows cavitary lesion   -ID consulted, AFB sputum negative x3 pending result of third sputum. Galacto, fungitell, and Urine histoplasma pending.   -monitoring off abx   -pulm consulted, following patient.  -VBG improved at this point refusing bilevel overnight.   -Called and left a few VMs for [facility] to obtain collateral information (previous imaging and tx history).   -Pulm not concerned regarding a vasculitis as cause of cavitary lesion. New onset tachycardia on 6/3 to ~100-110. SBPs in the 90s as well. EKG with sinus tachycardia. Patient asymptomatic otherwise. Has been diuresed with lasix 20mg qdaily i/s/o possible volume overload and optimization of patient's breathing.   -Will give 500 cc bolus, encourage hydration i/s/o daily diuresis.   -CTM tachycardia, possible CXR to ensure no evidence of consolidation or brewing pna.   -Defer infectious w/u at this time.

## 2021-06-03 NOTE — PROGRESS NOTE ADULT - PROBLEM SELECTOR PLAN 1
New onset tachycardia on 6/3 to ~100-110. SBPs in the 90s as well. EKG with sinus tachycardia. Patient asymptomatic otherwise. Has been diuresed with lasix 20mg qdaily i/s/o possible volume overload and optimization of patient's breathing.   -Will give 500 cc bolus, encourage hydration i/s/o daily diuresis.   -CTM tachycardia, possible CXR to ensure no evidence of consolidation or brewing pna.   -Defer infectious w/u at this time.

## 2021-06-04 ENCOUNTER — TRANSCRIPTION ENCOUNTER (OUTPATIENT)
Age: 76
End: 2021-06-04

## 2021-06-04 LAB
ANION GAP SERPL CALC-SCNC: 13 MMOL/L — SIGNIFICANT CHANGE UP (ref 5–17)
BASOPHILS # BLD AUTO: 0.07 K/UL — SIGNIFICANT CHANGE UP (ref 0–0.2)
BASOPHILS NFR BLD AUTO: 0.8 % — SIGNIFICANT CHANGE UP (ref 0–2)
BUN SERPL-MCNC: 22 MG/DL — SIGNIFICANT CHANGE UP (ref 7–23)
CALCIUM SERPL-MCNC: 9.1 MG/DL — SIGNIFICANT CHANGE UP (ref 8.4–10.5)
CHLORIDE SERPL-SCNC: 96 MMOL/L — SIGNIFICANT CHANGE UP (ref 96–108)
CO2 SERPL-SCNC: 26 MMOL/L — SIGNIFICANT CHANGE UP (ref 22–31)
CREAT SERPL-MCNC: 0.68 MG/DL — SIGNIFICANT CHANGE UP (ref 0.5–1.3)
EOSINOPHIL # BLD AUTO: 0.31 K/UL — SIGNIFICANT CHANGE UP (ref 0–0.5)
EOSINOPHIL NFR BLD AUTO: 3.5 % — SIGNIFICANT CHANGE UP (ref 0–6)
GLUCOSE BLDC GLUCOMTR-MCNC: 116 MG/DL — HIGH (ref 70–99)
GLUCOSE BLDC GLUCOMTR-MCNC: 175 MG/DL — HIGH (ref 70–99)
GLUCOSE BLDC GLUCOMTR-MCNC: 189 MG/DL — HIGH (ref 70–99)
GLUCOSE BLDC GLUCOMTR-MCNC: 282 MG/DL — HIGH (ref 70–99)
GLUCOSE BLDC GLUCOMTR-MCNC: 54 MG/DL — CRITICAL LOW (ref 70–99)
GLUCOSE SERPL-MCNC: 263 MG/DL — HIGH (ref 70–99)
HCT VFR BLD CALC: 34.4 % — LOW (ref 34.5–45)
HGB BLD-MCNC: 9.8 G/DL — LOW (ref 11.5–15.5)
IMM GRANULOCYTES NFR BLD AUTO: 0.5 % — SIGNIFICANT CHANGE UP (ref 0–1.5)
LYMPHOCYTES # BLD AUTO: 1.83 K/UL — SIGNIFICANT CHANGE UP (ref 1–3.3)
LYMPHOCYTES # BLD AUTO: 20.8 % — SIGNIFICANT CHANGE UP (ref 13–44)
MAGNESIUM SERPL-MCNC: 1.8 MG/DL — SIGNIFICANT CHANGE UP (ref 1.6–2.6)
MCHC RBC-ENTMCNC: 22.7 PG — LOW (ref 27–34)
MCHC RBC-ENTMCNC: 28.5 GM/DL — LOW (ref 32–36)
MCV RBC AUTO: 79.8 FL — LOW (ref 80–100)
MONOCYTES # BLD AUTO: 0.71 K/UL — SIGNIFICANT CHANGE UP (ref 0–0.9)
MONOCYTES NFR BLD AUTO: 8.1 % — SIGNIFICANT CHANGE UP (ref 2–14)
NEUTROPHILS # BLD AUTO: 5.82 K/UL — SIGNIFICANT CHANGE UP (ref 1.8–7.4)
NEUTROPHILS NFR BLD AUTO: 66.3 % — SIGNIFICANT CHANGE UP (ref 43–77)
NRBC # BLD: 0 /100 WBCS — SIGNIFICANT CHANGE UP (ref 0–0)
PHOSPHATE SERPL-MCNC: 4.8 MG/DL — HIGH (ref 2.5–4.5)
PLATELET # BLD AUTO: 227 K/UL — SIGNIFICANT CHANGE UP (ref 150–400)
POTASSIUM SERPL-MCNC: 4.6 MMOL/L — SIGNIFICANT CHANGE UP (ref 3.5–5.3)
POTASSIUM SERPL-SCNC: 4.6 MMOL/L — SIGNIFICANT CHANGE UP (ref 3.5–5.3)
RBC # BLD: 4.31 M/UL — SIGNIFICANT CHANGE UP (ref 3.8–5.2)
RBC # FLD: 19.7 % — HIGH (ref 10.3–14.5)
SODIUM SERPL-SCNC: 135 MMOL/L — SIGNIFICANT CHANGE UP (ref 135–145)
WBC # BLD: 8.78 K/UL — SIGNIFICANT CHANGE UP (ref 3.8–10.5)
WBC # FLD AUTO: 8.78 K/UL — SIGNIFICANT CHANGE UP (ref 3.8–10.5)

## 2021-06-04 PROCEDURE — 99232 SBSQ HOSP IP/OBS MODERATE 35: CPT

## 2021-06-04 RX ORDER — AMLODIPINE BESYLATE 2.5 MG/1
1 TABLET ORAL
Qty: 30 | Refills: 0
Start: 2021-06-04 | End: 2021-07-03

## 2021-06-04 RX ORDER — BUDESONIDE AND FORMOTEROL FUMARATE DIHYDRATE 160; 4.5 UG/1; UG/1
2 AEROSOL RESPIRATORY (INHALATION)
Qty: 1 | Refills: 0
Start: 2021-06-04 | End: 2021-07-03

## 2021-06-04 RX ORDER — INSULIN ASPART 100 [IU]/ML
14 INJECTION, SOLUTION SUBCUTANEOUS
Qty: 10 | Refills: 0
Start: 2021-06-04 | End: 2021-07-03

## 2021-06-04 RX ORDER — LEVOTHYROXINE SODIUM 125 MCG
1 TABLET ORAL
Qty: 30 | Refills: 0
Start: 2021-06-04 | End: 2021-07-03

## 2021-06-04 RX ORDER — INSULIN GLARGINE 100 [IU]/ML
24 INJECTION, SOLUTION SUBCUTANEOUS
Qty: 1 | Refills: 0
Start: 2021-06-04 | End: 2021-07-03

## 2021-06-04 RX ORDER — FUROSEMIDE 40 MG
20 TABLET ORAL
Refills: 0 | Status: DISCONTINUED | OUTPATIENT
Start: 2021-06-05 | End: 2021-06-05

## 2021-06-04 RX ORDER — FUROSEMIDE 40 MG
1 TABLET ORAL
Qty: 15 | Refills: 0
Start: 2021-06-04 | End: 2021-07-03

## 2021-06-04 RX ORDER — LOSARTAN POTASSIUM 100 MG/1
1 TABLET, FILM COATED ORAL
Qty: 30 | Refills: 0
Start: 2021-06-04 | End: 2021-07-03

## 2021-06-04 RX ORDER — FUROSEMIDE 40 MG
1 TABLET ORAL
Qty: 0 | Refills: 0 | DISCHARGE
Start: 2021-06-04

## 2021-06-04 RX ORDER — ATORVASTATIN CALCIUM 80 MG/1
1 TABLET, FILM COATED ORAL
Qty: 30 | Refills: 0
Start: 2021-06-04 | End: 2021-07-03

## 2021-06-04 RX ORDER — ALBUTEROL 90 UG/1
1 AEROSOL, METERED ORAL
Qty: 1 | Refills: 0
Start: 2021-06-04 | End: 2021-07-03

## 2021-06-04 RX ADMIN — LOSARTAN POTASSIUM 100 MILLIGRAM(S): 100 TABLET, FILM COATED ORAL at 05:11

## 2021-06-04 RX ADMIN — AMLODIPINE BESYLATE 2.5 MILLIGRAM(S): 2.5 TABLET ORAL at 05:11

## 2021-06-04 RX ADMIN — Medication 2: at 12:38

## 2021-06-04 RX ADMIN — Medication 14 UNIT(S): at 09:06

## 2021-06-04 RX ADMIN — ATORVASTATIN CALCIUM 20 MILLIGRAM(S): 80 TABLET, FILM COATED ORAL at 22:03

## 2021-06-04 RX ADMIN — Medication 650 MILLIGRAM(S): at 05:11

## 2021-06-04 RX ADMIN — Medication 100 MILLIGRAM(S): at 22:03

## 2021-06-04 RX ADMIN — BUDESONIDE AND FORMOTEROL FUMARATE DIHYDRATE 2 PUFF(S): 160; 4.5 AEROSOL RESPIRATORY (INHALATION) at 05:57

## 2021-06-04 RX ADMIN — Medication 2: at 09:06

## 2021-06-04 RX ADMIN — Medication 14 UNIT(S): at 12:38

## 2021-06-04 RX ADMIN — ENOXAPARIN SODIUM 40 MILLIGRAM(S): 100 INJECTION SUBCUTANEOUS at 12:39

## 2021-06-04 RX ADMIN — Medication 125 MICROGRAM(S): at 05:12

## 2021-06-04 RX ADMIN — Medication 1: at 22:02

## 2021-06-04 RX ADMIN — INSULIN GLARGINE 24 UNIT(S): 100 INJECTION, SOLUTION SUBCUTANEOUS at 22:02

## 2021-06-04 RX ADMIN — BUDESONIDE AND FORMOTEROL FUMARATE DIHYDRATE 2 PUFF(S): 160; 4.5 AEROSOL RESPIRATORY (INHALATION) at 18:05

## 2021-06-04 NOTE — PROGRESS NOTE ADULT - PROBLEM SELECTOR PLAN 2
CT lungs in ED shows cavitary lesion   -ID consulted, AFB sputum negative x4.   -monitoring off abx   -pulm consulted, following patient.  -VBG improved at this point refusing bilevel overnight.   -Able to obtain  collateral information (previous imaging and tx history) documented in chart note from 6/1.   -Pulm not concerned regarding a vasculitis as cause of cavitary lesion or malignancy at this point appears to be chronic known changes in the lung from previous imaging as well. CT lungs in ED shows cavitary lesion most likely 2/2 to chronic TB previously treated now with established imaging changes.  -ID consulted, AFB sputum negative x4.   -monitoring off abx   -pulm consulted, following patient. Patient will require pulm f/u outpatient and home O2 set-up with 2L.   -VBG improved at this point refusing bilevel overnight.   -Able to obtain  collateral information (previous imaging and tx history) documented in chart note from 6/1.   -Pulm not concerned regarding a vasculitis as cause of cavitary lesion or malignancy at this point appears to be chronic known changes in the lung from previous imaging as well.

## 2021-06-04 NOTE — PROGRESS NOTE ADULT - PROBLEM SELECTOR PLAN 3
hx of TB in 2019: treated for 10 months as per collateral obtained [facility]. Patient treated from Sept 2018-May 2019.   -ID consulted   - air borne precaution discontinued.   - AFB sputum q8 hrs x4 negative, isolation precautions negative.   - Galactomannan, fungitell, histoplasma negative.   - BCx: NGTD  - Patient with chronic cough most likely 2/2 COPD from cooking on "[employer]" per pulm, no concern for active TB from pulm or ID standpoint at this point. hx of TB in 2019: treated for 10 months as per collateral obtained [facility]. Patient treated from Sept 2018-May 2019.   -ID consulted   - air borne precaution discontinued.   - AFB sputum q8 hrs x4 negative, isolation precautions negative.   - Galactomannan, fungitell, histoplasma negative.   - BCx: NGTD  - Patient with chronic cough most likely 2/2 COPD from cooking on ""[employer] per pulm, no concern for active TB from pulm or ID standpoint at this point. D/c with home oxygen and budesonide.

## 2021-06-04 NOTE — PROGRESS NOTE ADULT - ASSESSMENT
76 F originally from [place] PMHx of NIDDMII, Hypertension, previous pulmonary TB (2019) s/p 10 months of treatment presented with acute on chronic SOB  Leukocytosis, no fever  S/p recent treatment course for pulmonary TB at [facility]  CT with apical cavitary lesion, upper lobe opacities  Here presenting with shortness of breath, no fevers, mild leukocytosis  In PACS no prior CT to compare to  AFB negative x4, cavities representative of residual cavity/scarring?  Overall,  1) Cavitary lesion  - Likely sequelae of prior TB; AFBs negative  - Monitor off abx  - F/U Pulm  2) Leukocytosis  - Monitor for signs/symptoms infection  3) SOB  - Noninfectious causes per primary team    Jacek Angel MD  Pager [phone]  After 5pm and on weekends call [phone]

## 2021-06-04 NOTE — DISCHARGE NOTE NURSING/CASE MANAGEMENT/SOCIAL WORK - PATIENT PORTAL LINK FT
You can access the FollowMyHealth Patient Portal offered by [facility] by registering at the following website: [email or web address]. By joining [employer]’s FollowMyHealth portal, you will also be able to view your health information using other applications (apps) compatible with our system.

## 2021-06-04 NOTE — PROGRESS NOTE ADULT - PROBLEM SELECTOR PLAN 1
New onset tachycardia on 6/3 to ~100-110. SBPs in the 90s as well. EKG with sinus tachycardia. Patient asymptomatic otherwise. Has been diuresed with lasix 20mg qdaily i/s/o possible volume overload and optimization of patient's breathing.   -Will give 500 cc bolus, encourage hydration i/s/o daily diuresis.   -CTM tachycardia, possible CXR to ensure no evidence of consolidation or brewing pna.   -Defer infectious w/u at this time. New onset tachycardia on 6/3 to ~100-110. SBPs in the 90s as well. EKG with sinus tachycardia. Patient asymptomatic otherwise. Has been diuresed with lasix 20mg qdaily i/s/o possible volume overload and optimization of patient's breathing.   -Will give 500 cc bolus, encourage hydration i/s/o daily diuresis.   -CTM tachycardia, possible CXR to ensure no evidence of consolidation or brewing pna.   -Defer infectious w/u at this time.  - Resolved, will have patient on lasix qod upon discharge to f/u with PCP and pulmonologist.

## 2021-06-04 NOTE — PROVIDER CONTACT NOTE (HYPOGLYCEMIA EVENT) - NS PROVIDER CONTACT RECOMMEND-HYPO
pt consume 2-4oz of apple juice  PM dose of pre-meal insulin held as per MD (Dr. Rose)
Continue Regimen: Clobetasol scalp solution bid prn\\nCiclopirox shampoo QOD \\nKetoconazole cream mix 50/50 with OTC hydrocortisone cream and apply bid prn x2 weeks
Detail Level: Zone

## 2021-06-04 NOTE — DISCHARGE NOTE NURSING/CASE MANAGEMENT/SOCIAL WORK - NSDCCRTYPESERV_GEN_ALL_CORE_FT
INSULIN PENS W/ NEEDLES, GLUCOMETER W/ TEST STRIPS/LNCETS ORDERED AND TO BE PICKED UP BEFORE DISCHARGE.

## 2021-06-04 NOTE — DISCHARGE NOTE NURSING/CASE MANAGEMENT/SOCIAL WORK - NSDCFUADDAPPT_GEN_ALL_CORE_FT
"  SUBJECTIVE:   Adonay Serrano is a 17 year old male who presents to clinic today for the following health issues:    Chief Complaint   Patient presents with     Urinary Problem     Patient is having urinary frequency with some incontinency.  He states, \"It just comes out.\"  Symptoms started about 1 month ago.         Problem list and histories reviewed & adjusted, as indicated.  Additional history:         Reviewed and updated as needed this visit by clinical staff  Tobacco  Allergies  Meds  Med Hx  Surg Hx  Fam Hx  Soc Hx      Reviewed and updated as needed this visit by Provider      Further history obtained, clarified or corrected by physician:  In the last week he has developed urinary incontinence 2 or 3 times per week. He says he suddenly feels the urge to urinate and he leaks before he can get to the toilet. He denies dysuria hematuria GI complaints or any other symptoms. He has not had any urethral discharge. He is not sexually active.    OBJECTIVE:  /60  Pulse 73  Temp 98.4  F (36.9  C) (Tympanic)  Wt 107 lb 9.6 oz (48.8 kg)  BMI 18.91 kg/m2  LUNGS: clear to auscultation, normal breath sounds  CV: RRR without murmur  ABD: BS+, soft, nontender, no masses, no hepatosplenomegaly  EXTREMITIES: without joint tenderness, swelling or erythema.  No muscle tenderness or abnormality.  SKIN: No rashes or abnormalities  NEURO:non focal exam    UA: NEG    ASSESSMENT:     Urinary problem  Urgency incontinence      PLAN:  We will await urine culture results  If culture normal then he would likely need see the urologist.    Orders Placed This Encounter     *UA reflex to Microscopic and Culture ([facility] (except [place] and [place])     Urine Microscopic       "
Follow up with your PCP Dr. Lindsay Herrmann with regards to your new insulin regimen and if you need to see an endocrinologist on the outside. Your A1c was 9.9 here in the hospital and we are sending you out on insulin.     Please schedule an appointment with your pulmonologist Dr. Blaise Hartman, I have provided you with his address and phone number on this discharge note.     If you would like to follow up with infectious diseases regarding your latent TB you were seen by Dr. Jacek Angel here in the hospital and his number has also been provided in the discharge paperwork.

## 2021-06-04 NOTE — PROGRESS NOTE ADULT - PROBLEM SELECTOR PLAN 4
patient with elevated FS up to 400s. Not c/w HHNS or DKA. Likely provoked in setting of infection. Mentating well.   - glucosuria, elevated specific gravity   - IVF, uptitrate insulin requirements  - Patient does not desire to be on insulin at home 2/2 to pain from the insulin needles. Has had diabetic teaching, family has had diabetic teaching, still patient refusing. Risks and benefits of insulin therapy in the setting of the patient's hyperglycemia explained to the patient who states that she would prefer an oral agent and most likely will not use insulin at home. At this time spoke to family and granddaughter in person to tell her our medical recommendation at this point is to start insulin however if the patient will not use insulin to ensure close follow up with patient's PCP Dr. Lindsay ABDI and discharge on her home oral agents with consistent finger sticks to check. patient with elevated FS up to 400s. Not c/w HHNS or DKA. Likely provoked in setting of infection. Mentating well.   - glucosuria, elevated specific gravity   - IVF, uptitrate insulin requirements  - Patient to be discharged on home insulin. Teaching to be provided to patient's son, granddaughter, and patient. Scripts sent to [facility] for insulin and covered by patient's insurance, patient's son confirmed getting the medications on 6/4. Patient to f/u with PCP, attempted to call x2 on 6/4 unable to reach to make appointment. Family aware that close f/u is necessary with initiation of insulin.

## 2021-06-04 NOTE — DISCHARGE NOTE NURSING/CASE MANAGEMENT/SOCIAL WORK - CAREGIVER RELATION TO PATIENT
Grand daughter Additional Notes: Rt upper lip: Suggestive of epidermal/pilar cyst although neoplasm can not be ruled out. Will take care at next visit. Additional Notes: Lt Upper lip: morphology is s/o BCC vs nevus. Plan biopsy at next visit.

## 2021-06-04 NOTE — PROVIDER CONTACT NOTE (HYPOGLYCEMIA EVENT) - NS PROVIDER CONTACT BACKGROUND-HYPO
Age: 76y    Gender: Female    POCT Blood Glucose:  116 mg/dL (06-04-21 @ 18:05)  54 mg/dL (06-04-21 @ 17:40)  175 mg/dL (06-04-21 @ 12:32)  189 mg/dL (06-04-21 @ 08:59)  78 mg/dL (06-03-21 @ 21:51)      eMAR:atorvastatin   20 milliGRAM(s) Oral (06-03-21 @ 22:05)    insulin glargine Injectable (LANTUS)   24 Unit(s) SubCutaneous (06-03-21 @ 22:12)    insulin lispro (ADMELOG) corrective regimen sliding scale   2 Unit(s) SubCutaneous (06-04-21 @ 12:38)   2 Unit(s) SubCutaneous (06-04-21 @ 09:06)    insulin lispro Injectable (ADMELOG)   14 Unit(s) SubCutaneous (06-04-21 @ 12:38)   14 Unit(s) SubCutaneous (06-04-21 @ 09:06)    levothyroxine   125 MICROGram(s) Oral (06-04-21 @ 05:12)

## 2021-06-04 NOTE — PROGRESS NOTE ADULT - ASSESSMENT
Patient is a 75yo F originally from [place] PMHx of TIIDM, HTN, previous pulmonary TB (2019) s/p 10 months of treatment presented with acute on chronic SOB, imaging with evidence of cavitary lung lesion most concern for reactivation of tuberculosis, now s/p isolation AFB negative x4, isolation precautions discontinued.  Patient is a 75yo F originally from [place] PMHx of TIIDM, HTN, previous pulmonary TB (2019) s/p 10 months of treatment presented with acute on chronic SOB, imaging with evidence of cavitary lung lesion most concern for reactivation of tuberculosis, now s/p isolation AFB negative x4, isolation precautions discontinued with a course complicated by hyperglycemia with poorly managed TIIDM at home with A1c ~10 now requiring insulin therapy at home.

## 2021-06-04 NOTE — PROGRESS NOTE ADULT - ASSESSMENT
77 y/o F originally from [place] with PMH of previous pulmonary TB (2019) s/o 10 months of treatment, HTN, DM. Presented with acute on chronic SOB. Pt reportedly diagnosed with with pulmonary TB in [place] in 2019 while visiting her son there. She was hospitalized for approximately one month, discharged with two medications (granddaughter is not sure how many exactly) which she took for 10 months. Followed with [facility] in [place] ([place]) and was cleared. SOB noted to improve, but not completely resolve. Now, pt notes to have worsening CUNHA for about a year. On admisison, afebrile. RVP negative. CTA chest with no PE, but with R apical cavitary lesion and upper lobe predominant patchy opacities c/f reactivation TB. Pt was given CTX/Azithromycin. (29 May 2021 16:24)  above confirmed with son:  He says she was SOB at home and hence was brought to the hospital: she did complete the rx for active tb:  she has no hemoptysis: currently being ruled out for tb     5/30:  r/o tb: in isolation: she was treated for tuberculosis in the past; now presented with SOB: she has no major pe: But she has acute resp acidosis: she would need bipap in the night time: Though currently she looks comfortable: Add BD / Symbicort   on empiric antibiotics for now:   dvt propayxis\  await three sputum cultures   DM: cont to control  HTN: stable   paged team   DW team     6/2  R/O TB, cavitary PNA  -Chart note regarding hx of pulmonary TB noted - Pt reportedly diagnosed in 6/2018, completed course of INH and Rifampin from 9/2018 to 5/2019. Chest imaging in 3/2019 with reported RUL infiltrate/cavitary lesion   -CTA chest now remains with R apical cavitary lesion and upper lobe predominant patchy opacities - may be residual from prior TB  -AFB sputum cultures negative x4 - now off isolation   -ID recs noted -F/u fungitell, galactomannan  -Histoplasma Ag negative  -F/u BC - prelim 5/29 NGTD  -Afebrile w/ no significant leukocytosis, off ABX at this time   Shortness of breath  -Pt reports for >1 year - Chronic, may have underlying COPD  -O2 sats mid 90s on 1L NC -Continue to wean to room air as tolerated to maintain O2 sats >90%  -VBG 6/1 with normal ph, likely will not require home bipap   -C/w Symbicort 80/4.5 mcg 2puffs BID  -C/w duonebs q6    6/3:    R/O TB, cavitary PNA  -Chart note regarding hx of pulmonary TB noted - Pt reportedly diagnosed in 6/2018, completed course of INH and Rifampin from 9/2018 to 5/2019. Chest imaging in 3/2019 with reported RUL infiltrate/cavitary lesion   -CTA chest now remains with R apical cavitary lesion and upper lobe predominant patchy opacities - likely residual from prior TB  -AFB sputum cultures negative x4 - now off isolation   -Histoplasma Ag negative  -F/u BC - prelim 5/29 NGTD  -Afebrile w/ no significant leukocytosis, off ABX at this time   Shortness of breath  -Pt reports for >1 year - Chronic, may have underlying COPD- likely " chullah smoking"   -O2 sats mid 90s on 1L NC -Continue to wean to room air as tolerated to maintain O2 sats >90%  -VBG 6/1 with normal ph, likely will not require home bipap   -C/w Symbicort 80/4.5 mcg 2puffs BID  -C/w duonebs q6  oupt PFT :  can oder CVD workup but suspicion is very low:   DW resident     6/4  R/O TB, cavitary PNA -Chart note regarding hx of pulmonary TB noted - Pt reportedly diagnosed in 6/2018, completed course of INH and Rifampin from 9/2018 to 5/2019. Chest imaging in 3/2019 with reported RUL infiltrate/cavitary lesion   -CTA chest now remains with R apical cavitary lesion and upper lobe predominant patchy opacities - likely residual from prior TB  -AFB sputum cultures negative x4  -Histoplasma Ag, fungitell, galactomannan negative   -BC with NGTD, Afebrile w/ no significant leukocytosis  Shortness of breath  -Pt reports for >1 year - Chronic, may have underlying COPD- likely r/t " chullah smoking"   -O2 sats high 90s on 3LNC - noted to be hypoxic to 88% on room air, will likely need home O2  -Continue to wean to room air as tolerated maintain O2 sats >90%  -C/w Symbicort 80/4.5 mcg 2puffs BID  -Duonebs q6 PRN  -Recommend outpt PFTs   Discharge planning per primary team

## 2021-06-04 NOTE — PROGRESS NOTE ADULT - PROBLEM SELECTOR PLAN 5
hold off on oral meds   -c/w lantus 25 units at night, 14u premeals with ISS    c/w FSBS with coverage  - Risks and benefits of deferring insulin treatment explained to the family and the patient. hold off on oral meds (prandin, hailey-met). A1c on this admission 9.9, patient's goal given her age should be 7-7.5,however patient is on multiple oral agents with poor control, will probably need insulin upon discharge.   -c/w lantus 25 units at night, 14u premeals with ISS    c/w FSBS with coverage  -To be discharged with insulin. PCP f/u endorsed to family.

## 2021-06-04 NOTE — PROGRESS NOTE ADULT - SUBJECTIVE AND OBJECTIVE BOX
Date of Service: 06-04-21 @ 14:10    Patient is a 76y old  Female who presents with a chief complaint of SOB and weakness / CT chest shows cavitary lesion in lung (04 Jun 2021 07:12)    Any change in ROS:   Seen OOB to chair, in no acute distress  O2 sats high 90s on 3LNC    MEDICATIONS  (STANDING):  amLODIPine   Tablet 2.5 milliGRAM(s) Oral daily  atorvastatin 20 milliGRAM(s) Oral at bedtime  budesonide  80 MICROgram(s)/formoterol 4.5 MICROgram(s) Inhaler 2 Puff(s) Inhalation two times a day  dextrose 40% Gel 15 Gram(s) Oral once  dextrose 5%. 1000 milliLiter(s) (50 mL/Hr) IV Continuous <Continuous>  dextrose 5%. 1000 milliLiter(s) (100 mL/Hr) IV Continuous <Continuous>  dextrose 50% Injectable 25 Gram(s) IV Push once  dextrose 50% Injectable 12.5 Gram(s) IV Push once  dextrose 50% Injectable 25 Gram(s) IV Push once  enoxaparin Injectable 40 milliGRAM(s) SubCutaneous daily  glucagon  Injectable 1 milliGRAM(s) IntraMuscular once  insulin glargine Injectable (LANTUS) 24 Unit(s) SubCutaneous at bedtime  insulin lispro (ADMELOG) corrective regimen sliding scale   SubCutaneous three times a day before meals  insulin lispro (ADMELOG) corrective regimen sliding scale   SubCutaneous at bedtime  insulin lispro Injectable (ADMELOG) 14 Unit(s) SubCutaneous three times a day before meals  levothyroxine 125 MICROGram(s) Oral daily  losartan 100 milliGRAM(s) Oral daily    MEDICATIONS  (PRN):  acetaminophen   Tablet .. 650 milliGRAM(s) Oral every 6 hours PRN Temp greater or equal to 38.5C (101.3F), Mild Pain (1 - 3)  albuterol/ipratropium for Nebulization 3 milliLiter(s) Nebulizer every 6 hours PRN Shortness of Breath and/or Wheezing  benzonatate 100 milliGRAM(s) Oral three times a day PRN Cough    Vital Signs Last 24 Hrs  T(C): 36.7 (04 Jun 2021 13:54), Max: 36.7 (03 Jun 2021 21:02)  T(F): 98 (04 Jun 2021 13:54), Max: 98.1 (04 Jun 2021 03:31)  HR: 94 (04 Jun 2021 13:54) (84 - 112)  BP: 124/72 (04 Jun 2021 13:54) (98/65 - 144/76)  BP(mean): --  RR: 18 (04 Jun 2021 13:54) (18 - 18)  SpO2: 99% (04 Jun 2021 13:54) (95% - 99%)    I&O's Summary    03 Jun 2021 07:01  -  04 Jun 2021 07:00  --------------------------------------------------------  IN: 500 mL / OUT: 1250 mL / NET: -750 mL    Physical Exam:   GENERAL: NAD  HEENT: GIAN  ENMT: No tonsillar erythema, exudates, or enlargement  NECK: Supple, No JVD  CHEST/LUNG: Clear to auscultation B/L  CVS: Regular rate and rhythm  GI: : Soft, Nontender, Nondistended  NERVOUS SYSTEM:  Alert & Oriented X3  EXTREMITIES:  2+ Peripheral Pulses, No clubbing, cyanosis, or edema  LYMPH: No lymphadenopathy noted  SKIN: No rashes or lesions  PSYCH: Appropriate    Labs:  30, 31, 28, 26                            9.8    8.78  )-----------( 227      ( 04 Jun 2021 07:51 )             34.4                         10.5   7.61  )-----------( 256      ( 02 Jun 2021 07:16 )             36.7                         10.3   9.21  )-----------( 273      ( 01 Jun 2021 10:20 )             36.6     06-04    135  |  96  |  22  ----------------------------<  263<H>  4.6   |  26  |  0.68  06-02    136  |  97  |  24<H>  ----------------------------<  260<H>  4.3   |  25  |  0.72  06-01    137  |  98  |  21  ----------------------------<  233<H>  4.1   |  25  |  0.67    Ca    9.1      04 Jun 2021 07:51  Phos  4.8     06-04  Mg     1.8     06-04    TPro  7.0  /  Alb  4.0  /  TBili  0.2  /  DBili  x   /  AST  14  /  ALT  11  /  AlkPhos  68  06-02    CAPILLARY BLOOD GLUCOSE  POCT Blood Glucose.: 175 mg/dL (04 Jun 2021 12:32)  POCT Blood Glucose.: 189 mg/dL (04 Jun 2021 08:59)  POCT Blood Glucose.: 78 mg/dL (03 Jun 2021 21:51)  POCT Blood Glucose.: 117 mg/dL (03 Jun 2021 17:30)      Lactate, Blood: 1.9 mmol/L (06-02 @ 07:16)  Lactate, Blood: 2.2 mmol/L (06-01 @ 07:12)    RECENT CULTURES:  05-31 @ 13:40 .Sputum   Culture is being performed.  05-31 @ 09:25 .Sputum Sputum   Culture is being performed.  05-30 @ 17:23 .Sputum Sputum   Culture is being performed.  05-30 @ 13:00 .Sputum Sputum   Culture is being performed.    05-29 @ 16:49 .Urine Clean Catch (Midstream)   <10,000 CFU/mL Normal Urogenital Kinza    05-29 @ 16:47 .Blood Blood   No Growth Final    Studies    CT SCAN Chest  < from: CT Angio Chest PE Protocol w/ IV Cont (05.29.21 @ 07:46) >    FINDINGS:    LUNGS AND AIRWAYS: Bronchial secretions. Diffuse interlobular septal thickening. Bilateral diffuse groundglass opacities with more confluent opacities in the upper lobes, right greater than left. There is volume loss in the right upper lobe. Right apical cavitary lesion measures 3.1 x 1.2 x 2.0 cm (TR, AP, CC). Right upper lobe nodule measures 1.2 cm (2:28).  PLEURA: Small left pleural effusion. No pneumothorax.  MEDIASTINUM AND KAILA: Slightly enlarged subcarinal lymph node.  VESSELS: No main, right, left or lobar pulmonary arterial filling defect. Evaluation of the segmental and subsegmental pulmonary arteries is suboptimal secondary to parenchymal opacities and motion artifacts. Aortic and coronary artery calcifications.  HEART: Heart size is normal. No pericardial effusion. No CT evidence of right heart strain.  CHEST WALL AND LOWER NECK: Within normal limits.  VISUALIZED UPPER ABDOMEN: Small hiatal hernia. Partially visualized left lower pole exophyticcyst.  BONES: Degenerative changes of the thoracic spine.    IMPRESSION:  1.  No central pulmonary emboli. Segmental and subsegmental pulmonary arteries are not well evaluated.  2.  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  Superimposed pulmonary edema and small left pleural effusion.          < end of copied text >

## 2021-06-04 NOTE — PROGRESS NOTE ADULT - ATTENDING COMMENTS
I think this is residuae from previous tb infection: she also likely has copd: vbg today is pretty good with normal ph without using bipap in the night ti me: I don't think she has Wegener's: her UA is clean with no blood and creat is nromal  : no need for bipap at home: she may need oxygen at home:
doing ok : o SB: no cough : afb is negative: dio sequela of old tuberculosis: doubt ANCA associated vasculitis: her UA is benign : she likely has copd:
doing pretty good: no SOBB : no cough : outpt follow up : need to c ont BD
she has resp acidosis but does not  use bipap: she likely has underlying copd too in addition to old tb : cont to tule out tb
pt. seen and examined, off isolation   vasculitis w/u neg   plan for d/c home in am   agree with above resident note
pt. seen and examined, clinically better , off isolation , if pul / ID clears , plan for d/c home in am and f/u with pul as out pt
pt. seen and examined, doing fair , denies any SOB , AFB sputum pending
seen and examined, started on insulin for better control of sugars , family going thru teaching  can be d/c home in am   agree with above note

## 2021-06-04 NOTE — PROGRESS NOTE ADULT - SUBJECTIVE AND OBJECTIVE BOX
Nicolas Castro M.D.  Internal Medicine PGY-1  [phone] / [phone]     Patient is a 76y old  Female who presents with a chief complaint of SOB and weakness / CT chest shows cavitary lesion in lung (03 Jun 2021 20:21)      SUBJECTIVE / OVERNIGHT EVENTS:          MEDICATIONS  (STANDING):  amLODIPine   Tablet 2.5 milliGRAM(s) Oral daily  atorvastatin 20 milliGRAM(s) Oral at bedtime  budesonide  80 MICROgram(s)/formoterol 4.5 MICROgram(s) Inhaler 2 Puff(s) Inhalation two times a day  dextrose 40% Gel 15 Gram(s) Oral once  dextrose 5%. 1000 milliLiter(s) (50 mL/Hr) IV Continuous <Continuous>  dextrose 5%. 1000 milliLiter(s) (100 mL/Hr) IV Continuous <Continuous>  dextrose 50% Injectable 25 Gram(s) IV Push once  dextrose 50% Injectable 12.5 Gram(s) IV Push once  dextrose 50% Injectable 25 Gram(s) IV Push once  enoxaparin Injectable 40 milliGRAM(s) SubCutaneous daily  glucagon  Injectable 1 milliGRAM(s) IntraMuscular once  insulin glargine Injectable (LANTUS) 24 Unit(s) SubCutaneous at bedtime  insulin lispro (ADMELOG) corrective regimen sliding scale   SubCutaneous three times a day before meals  insulin lispro (ADMELOG) corrective regimen sliding scale   SubCutaneous at bedtime  insulin lispro Injectable (ADMELOG) 14 Unit(s) SubCutaneous three times a day before meals  levothyroxine 125 MICROGram(s) Oral daily  losartan 100 milliGRAM(s) Oral daily    MEDICATIONS  (PRN):  acetaminophen   Tablet .. 650 milliGRAM(s) Oral every 6 hours PRN Temp greater or equal to 38.5C (101.3F), Mild Pain (1 - 3)  albuterol/ipratropium for Nebulization 3 milliLiter(s) Nebulizer every 6 hours PRN Shortness of Breath and/or Wheezing  benzonatate 100 milliGRAM(s) Oral three times a day PRN Cough      Vital Signs Last 24 Hrs  T(C): 36.7 (04 Jun 2021 04:36), Max: 36.7 (03 Jun 2021 21:02)  T(F): 98 (04 Jun 2021 04:36), Max: 98.1 (04 Jun 2021 03:31)  HR: 98 (04 Jun 2021 04:36) (84 - 120)  BP: 120/73 (04 Jun 2021 04:36) (94/66 - 144/76)  BP(mean): --  RR: 18 (04 Jun 2021 04:36) (18 - 18)  SpO2: 97% (04 Jun 2021 04:36) (86% - 99%)      PHYSICAL EXAM  GENERAL: NAD, well-developed  HEAD:  Atraumatic, Normocephalic  EYES: EOMI, PERRLA, conjunctiva and sclera clear  NECK: Supple, No JVD  CHEST/LUNG: Clear to auscultation bilaterally; No wheeze  HEART: Regular rate and rhythm; No murmurs, rubs, or gallops  ABDOMEN: Soft, Nontender, Nondistended; Bowel sounds present  EXTREMITIES:  2+ Peripheral Pulses, No clubbing, cyanosis, or edema  PSYCH: AAOx3  SKIN: No rashes or lesions    CAPILLARY BLOOD GLUCOSE      POCT Blood Glucose.: 78 mg/dL (03 Jun 2021 21:51)  POCT Blood Glucose.: 117 mg/dL (03 Jun 2021 17:30)  POCT Blood Glucose.: 192 mg/dL (03 Jun 2021 12:09)  POCT Blood Glucose.: 318 mg/dL (03 Jun 2021 08:42)    I&O's Summary    03 Jun 2021 07:01  -  04 Jun 2021 07:00  --------------------------------------------------------  IN: 500 mL / OUT: 1250 mL / NET: -750 mL        LABS:                        10.5   7.61  )-----------( 256      ( 02 Jun 2021 07:16 )             36.7     06-02    136  |  97  |  24<H>  ----------------------------<  260<H>  4.3   |  25  |  0.72    Ca    9.3      02 Jun 2021 07:16  Phos  3.7     06-02  Mg     2.0     06-02    TPro  7.0  /  Alb  4.0  /  TBili  0.2  /  DBili  x   /  AST  14  /  ALT  11  /  AlkPhos  68  06-02              RADIOLOGY & ADDITIONAL TESTS:     MICROBIOLOGY:    ANTIMICROBIALS:    CONSULTS: Nicolas Castro M.D.  Internal Medicine PGY-1  [phone] / [phone]     Patient is a 76y old  Female who presents with a chief complaint of SOB and weakness / CT chest shows cavitary lesion in lung (03 Jun 2021 20:21)      SUBJECTIVE / OVERNIGHT EVENTS:    Seen and examined at the bedside this am. Per nursing no acute events overnight. Patient this morning denies any chest pain, shortness of breath, worsening cough. She desires to go home.       MEDICATIONS  (STANDING):  amLODIPine   Tablet 2.5 milliGRAM(s) Oral daily  atorvastatin 20 milliGRAM(s) Oral at bedtime  budesonide  80 MICROgram(s)/formoterol 4.5 MICROgram(s) Inhaler 2 Puff(s) Inhalation two times a day  dextrose 40% Gel 15 Gram(s) Oral once  dextrose 5%. 1000 milliLiter(s) (50 mL/Hr) IV Continuous <Continuous>  dextrose 5%. 1000 milliLiter(s) (100 mL/Hr) IV Continuous <Continuous>  dextrose 50% Injectable 25 Gram(s) IV Push once  dextrose 50% Injectable 12.5 Gram(s) IV Push once  dextrose 50% Injectable 25 Gram(s) IV Push once  enoxaparin Injectable 40 milliGRAM(s) SubCutaneous daily  glucagon  Injectable 1 milliGRAM(s) IntraMuscular once  insulin glargine Injectable (LANTUS) 24 Unit(s) SubCutaneous at bedtime  insulin lispro (ADMELOG) corrective regimen sliding scale   SubCutaneous three times a day before meals  insulin lispro (ADMELOG) corrective regimen sliding scale   SubCutaneous at bedtime  insulin lispro Injectable (ADMELOG) 14 Unit(s) SubCutaneous three times a day before meals  levothyroxine 125 MICROGram(s) Oral daily  losartan 100 milliGRAM(s) Oral daily    MEDICATIONS  (PRN):  acetaminophen   Tablet .. 650 milliGRAM(s) Oral every 6 hours PRN Temp greater or equal to 38.5C (101.3F), Mild Pain (1 - 3)  albuterol/ipratropium for Nebulization 3 milliLiter(s) Nebulizer every 6 hours PRN Shortness of Breath and/or Wheezing  benzonatate 100 milliGRAM(s) Oral three times a day PRN Cough      Vital Signs Last 24 Hrs  T(C): 36.7 (04 Jun 2021 04:36), Max: 36.7 (03 Jun 2021 21:02)  T(F): 98 (04 Jun 2021 04:36), Max: 98.1 (04 Jun 2021 03:31)  HR: 98 (04 Jun 2021 04:36) (84 - 120)  BP: 120/73 (04 Jun 2021 04:36) (94/66 - 144/76)  BP(mean): --  RR: 18 (04 Jun 2021 04:36) (18 - 18)  SpO2: 97% (04 Jun 2021 04:36) (86% - 99%)      PHYSICAL EXAM  GENERAL: NAD, well-developed  HEAD:  Atraumatic, Normocephalic  EYES: EOMI, PERRLA, conjunctiva and sclera clear  NECK: Supple, No JVD  CHEST/LUNG: +2L CTAB.   HEART: Regular rate and rhythm; No murmurs, rubs, or gallops  ABDOMEN: Soft, Nontender, Nondistended; Bowel sounds present  EXTREMITIES:  no edema.   PSYCH: AAOx3  SKIN: No rashes or lesions    CAPILLARY BLOOD GLUCOSE      POCT Blood Glucose.: 78 mg/dL (03 Jun 2021 21:51)  POCT Blood Glucose.: 117 mg/dL (03 Jun 2021 17:30)  POCT Blood Glucose.: 192 mg/dL (03 Jun 2021 12:09)  POCT Blood Glucose.: 318 mg/dL (03 Jun 2021 08:42)    I&O's Summary    03 Jun 2021 07:01  -  04 Jun 2021 07:00  --------------------------------------------------------  IN: 500 mL / OUT: 1250 mL / NET: -750 mL        LABS:                        10.5   7.61  )-----------( 256      ( 02 Jun 2021 07:16 )             36.7     06-02    136  |  97  |  24<H>  ----------------------------<  260<H>  4.3   |  25  |  0.72    Ca    9.3      02 Jun 2021 07:16  Phos  3.7     06-02  Mg     2.0     06-02    TPro  7.0  /  Alb  4.0  /  TBili  0.2  /  DBili  x   /  AST  14  /  ALT  11  /  AlkPhos  68  06-02              RADIOLOGY & ADDITIONAL TESTS:     MICROBIOLOGY:    ANTIMICROBIALS:    CONSULTS:

## 2021-06-04 NOTE — DISCHARGE NOTE NURSING/CASE MANAGEMENT/SOCIAL WORK - NSDPADDLCOMM_GEN_ALL_CORE
Pt requested her son to speak on her behalf. Pt's Son is [employer] Care Proxy, speaks and understands english.

## 2021-06-04 NOTE — PROGRESS NOTE ADULT - SUBJECTIVE AND OBJECTIVE BOX
CC: F/U for Cavitary lesion    Saw/spoke to patient. No fevers, no chills. No new complaints.    Allergies  No Known Allergies    ANTIMICROBIALS:  Off    PE:    Vital Signs Last 24 Hrs  T(C): 36.7 (04 Jun 2021 13:54), Max: 36.7 (03 Jun 2021 21:02)  T(F): 98 (04 Jun 2021 13:54), Max: 98.1 (04 Jun 2021 03:31)  HR: 94 (04 Jun 2021 13:54) (84 - 112)  BP: 124/72 (04 Jun 2021 13:54) (98/65 - 144/76)  RR: 18 (04 Jun 2021 13:54) (18 - 18)  SpO2: 99% (04 Jun 2021 13:54) (95% - 99%)    Gen: AOx3, NAD, non-toxic  CV: S1+S2 normal, nontachycardic  Resp: Clear bilat, no resp distress, no crackles/wheezes  Abd: Soft, nontender, +BS  Ext: No LE edema, no wounds    LABS:                        9.8    8.78  )-----------( 227      ( 04 Jun 2021 07:51 )             34.4     06-04    135  |  96  |  22  ----------------------------<  263<H>  4.6   |  26  |  0.68    Ca    9.1      04 Jun 2021 07:51  Phos  4.8     06-04  Mg     1.8     06-04    MICROBIOLOGY:    .Sputum  05-31-21   Culture is being performed.     .Sputum Sputum  05-31-21   Culture is being performed.     .Sputum Sputum  05-30-21   Culture is being performed.     .Sputum Sputum  05-30-21   Culture is being performed.     .Urine Clean Catch (Midstream)  05-29-21   <10,000 CFU/mL Normal Urogenital Kinza      .Blood Blood  05-29-21   No Growth Final     Rapid RVP Result: NotDetec (05-29 @ 06:15)    (otherwise reviewed)    RADIOLOGY:    5/29 CT:    IMPRESSION:  1.  No central pulmonary emboli. Segmental and subsegmental pulmonary arteries are not well evaluated.  2.  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  Superimposed pulmonary edema and small left pleural effusion.

## 2021-06-05 VITALS
HEART RATE: 87 BPM | RESPIRATION RATE: 18 BRPM | DIASTOLIC BLOOD PRESSURE: 71 MMHG | TEMPERATURE: 98 F | OXYGEN SATURATION: 100 % | SYSTOLIC BLOOD PRESSURE: 127 MMHG

## 2021-06-05 LAB
ANION GAP SERPL CALC-SCNC: 13 MMOL/L — SIGNIFICANT CHANGE UP (ref 5–17)
BUN SERPL-MCNC: 19 MG/DL — SIGNIFICANT CHANGE UP (ref 7–23)
CALCIUM SERPL-MCNC: 9.3 MG/DL — SIGNIFICANT CHANGE UP (ref 8.4–10.5)
CHLORIDE SERPL-SCNC: 96 MMOL/L — SIGNIFICANT CHANGE UP (ref 96–108)
CO2 SERPL-SCNC: 23 MMOL/L — SIGNIFICANT CHANGE UP (ref 22–31)
CREAT SERPL-MCNC: 0.63 MG/DL — SIGNIFICANT CHANGE UP (ref 0.5–1.3)
GLUCOSE BLDC GLUCOMTR-MCNC: 123 MG/DL — HIGH (ref 70–99)
GLUCOSE BLDC GLUCOMTR-MCNC: 159 MG/DL — HIGH (ref 70–99)
GLUCOSE SERPL-MCNC: 232 MG/DL — HIGH (ref 70–99)
HCT VFR BLD CALC: 32.7 % — LOW (ref 34.5–45)
HGB BLD-MCNC: 9.4 G/DL — LOW (ref 11.5–15.5)
MAGNESIUM SERPL-MCNC: 1.8 MG/DL — SIGNIFICANT CHANGE UP (ref 1.6–2.6)
MCHC RBC-ENTMCNC: 22.7 PG — LOW (ref 27–34)
MCHC RBC-ENTMCNC: 28.7 GM/DL — LOW (ref 32–36)
MCV RBC AUTO: 78.8 FL — LOW (ref 80–100)
NRBC # BLD: 0 /100 WBCS — SIGNIFICANT CHANGE UP (ref 0–0)
PHOSPHATE SERPL-MCNC: 3.3 MG/DL — SIGNIFICANT CHANGE UP (ref 2.5–4.5)
PLATELET # BLD AUTO: 204 K/UL — SIGNIFICANT CHANGE UP (ref 150–400)
POTASSIUM SERPL-MCNC: 4.3 MMOL/L — SIGNIFICANT CHANGE UP (ref 3.5–5.3)
POTASSIUM SERPL-SCNC: 4.3 MMOL/L — SIGNIFICANT CHANGE UP (ref 3.5–5.3)
RBC # BLD: 4.15 M/UL — SIGNIFICANT CHANGE UP (ref 3.8–5.2)
RBC # FLD: 19.6 % — HIGH (ref 10.3–14.5)
SODIUM SERPL-SCNC: 132 MMOL/L — LOW (ref 135–145)
WBC # BLD: 9.35 K/UL — SIGNIFICANT CHANGE UP (ref 3.8–10.5)
WBC # FLD AUTO: 9.35 K/UL — SIGNIFICANT CHANGE UP (ref 3.8–10.5)

## 2021-06-05 RX ADMIN — Medication 2: at 09:10

## 2021-06-05 RX ADMIN — AMLODIPINE BESYLATE 2.5 MILLIGRAM(S): 2.5 TABLET ORAL at 05:25

## 2021-06-05 RX ADMIN — Medication 14 UNIT(S): at 09:36

## 2021-06-05 RX ADMIN — ENOXAPARIN SODIUM 40 MILLIGRAM(S): 100 INJECTION SUBCUTANEOUS at 13:17

## 2021-06-05 RX ADMIN — Medication 14 UNIT(S): at 13:16

## 2021-06-05 RX ADMIN — Medication 100 MILLIGRAM(S): at 05:25

## 2021-06-05 RX ADMIN — Medication 125 MICROGRAM(S): at 05:25

## 2021-06-05 RX ADMIN — BUDESONIDE AND FORMOTEROL FUMARATE DIHYDRATE 2 PUFF(S): 160; 4.5 AEROSOL RESPIRATORY (INHALATION) at 05:25

## 2021-06-05 RX ADMIN — LOSARTAN POTASSIUM 100 MILLIGRAM(S): 100 TABLET, FILM COATED ORAL at 05:24

## 2021-06-05 RX ADMIN — Medication 20 MILLIGRAM(S): at 09:37

## 2021-06-05 NOTE — PROGRESS NOTE ADULT - PROBLEM SELECTOR PLAN 4
patient with elevated FS up to 400s. Not c/w HHNS or DKA. Likely provoked in setting of infection. Mentating well.   - glucosuria, elevated specific gravity   - IVF, uptitrate insulin requirements  - Patient to be discharged on home insulin. Teaching to be provided to patient's son, granddaughter, and patient. Scripts sent to [facility] for insulin and covered by patient's insurance, patient's son confirmed getting the medications on 6/4. Patient to f/u with PCP, attempted to call x2 on 6/4 unable to reach to make appointment. Family aware that close f/u is necessary with initiation of insulin.

## 2021-06-05 NOTE — PROGRESS NOTE ADULT - SUBJECTIVE AND OBJECTIVE BOX
Nickie Bhatti M.D.  Beeper: [phone] ([facility])/ [phone] ([facility])     INTERNAL MEDICINE PROGRESS NOTE    Patient is a 76y old  Female who presents with a chief complaint of SOB and weakness / CT chest shows cavitary lesion in lung (05 Jun 2021 06:20)    Overnight events: none  Subjective: complaints  Language Line interpreters Yoruba, [phone]    Medications:  MEDICATIONS  (STANDING):  amLODIPine   Tablet 2.5 milliGRAM(s) Oral daily  atorvastatin 20 milliGRAM(s) Oral at bedtime  budesonide  80 MICROgram(s)/formoterol 4.5 MICROgram(s) Inhaler 2 Puff(s) Inhalation two times a day  dextrose 40% Gel 15 Gram(s) Oral once  dextrose 5%. 1000 milliLiter(s) (50 mL/Hr) IV Continuous <Continuous>  dextrose 5%. 1000 milliLiter(s) (100 mL/Hr) IV Continuous <Continuous>  dextrose 50% Injectable 25 Gram(s) IV Push once  dextrose 50% Injectable 12.5 Gram(s) IV Push once  dextrose 50% Injectable 25 Gram(s) IV Push once  enoxaparin Injectable 40 milliGRAM(s) SubCutaneous daily  furosemide    Tablet 20 milliGRAM(s) Oral <User Schedule>  glucagon  Injectable 1 milliGRAM(s) IntraMuscular once  insulin glargine Injectable (LANTUS) 24 Unit(s) SubCutaneous at bedtime  insulin lispro (ADMELOG) corrective regimen sliding scale   SubCutaneous three times a day before meals  insulin lispro (ADMELOG) corrective regimen sliding scale   SubCutaneous at bedtime  insulin lispro Injectable (ADMELOG) 14 Unit(s) SubCutaneous three times a day before meals  levothyroxine 125 MICROGram(s) Oral daily  losartan 100 milliGRAM(s) Oral daily    MEDICATIONS  (PRN):  acetaminophen   Tablet .. 650 milliGRAM(s) Oral every 6 hours PRN Temp greater or equal to 38.5C (101.3F), Mild Pain (1 - 3)  albuterol/ipratropium for Nebulization 3 milliLiter(s) Nebulizer every 6 hours PRN Shortness of Breath and/or Wheezing  benzonatate 100 milliGRAM(s) Oral three times a day PRN Cough      Objective:    Vitals: Vital Signs Last 24 Hrs  T(C): 36.7 (06-05-21 @ 05:08), Max: 36.7 (06-04-21 @ 13:54)  T(F): 98.1 (06-05-21 @ 05:08), Max: 98.1 (06-05-21 @ 05:08)  HR: 90 (06-05-21 @ 05:08) (90 - 99)  BP: 138/71 (06-05-21 @ 05:08) (124/72 - 150/77)  BP(mean): --  RR: 17 (06-05-21 @ 05:08) (17 - 18)  SpO2: 100% (06-05-21 @ 05:08) (96% - 100%)              I&O's Summary    04 Jun 2021 07:01  -  05 Jun 2021 07:00  --------------------------------------------------------  IN: 60 mL / OUT: 650 mL / NET: -590 mL    PHYSICAL EXAM:  GENERAL: NAD, conversant, intermittent dry cough  CHEST/LUNG: Clear to auscultation bilaterally; No crackles, rhonchi, wheezing, or rubs  HEART: Regular rate and rhythm; No murmurs, rubs, or gallops  ABDOMEN: Soft, Nontender, Nondistended; Bowel sounds present  EXTREMITIES:  2+ Peripheral Pulses, No clubbing, cyanosis, or edema  SKIN: No rashes or lesions  NERVOUS SYSTEM:  Alert & Oriented, Good concentration                                                                                  LABS:  06-05    132<L>  |  96  |  19  ----------------------------<  232<H>  4.3   |  23  |  0.63  06-04    135  |  96  |  22  ----------------------------<  263<H>  4.6   |  26  |  0.68    Ca    9.3      05 Jun 2021 07:02  Ca    9.1      04 Jun 2021 07:51  Phos  3.3     06-05  Mg     1.8     06-05                                                      9.4    9.35  )-----------( 204      ( 05 Jun 2021 07:02 )             32.7                         9.8    8.78  )-----------( 227      ( 04 Jun 2021 07:51 )             34.4     CAPILLARY BLOOD GLUCOSE      POCT Blood Glucose.: 159 mg/dL (05 Jun 2021 08:45)  POCT Blood Glucose.: 282 mg/dL (04 Jun 2021 21:54)  POCT Blood Glucose.: 116 mg/dL (04 Jun 2021 18:05)  POCT Blood Glucose.: 54 mg/dL (04 Jun 2021 17:40)  POCT Blood Glucose.: 175 mg/dL (04 Jun 2021 12:32)        RECENT CULTURES:  05-31 @ 13:40  .Sputum  --  --  --    Culture is being performed.  --  05-31 @ 09:25  .Sputum Sputum  --  --  --    Culture is being performed.  --  05-30 @ 17:23  .Sputum Sputum  --  --  --    Culture is being performed.  --  05-30 @ 13:00  .Sputum Sputum  --  --  --    Culture is being performed.  --  05-29 @ 16:49  .Urine Clean Catch (Midstream)  --  --  --    <10,000 CFU/mL Normal Urogenital Kinza  --  05-29 @ 16:47  .Blood Blood  --  --  --    No Growth Final  --     RADIOLOGY & ADDITIONAL TESTS: N  Consultants involved in case: N

## 2021-06-05 NOTE — PROGRESS NOTE ADULT - PROBLEM SELECTOR PLAN 1
New onset tachycardia on 6/3 to ~100-110. SBPs in the 90s as well. EKG with sinus tachycardia. Patient asymptomatic otherwise. Has been diuresed with lasix 20mg qdaily i/s/o possible volume overload and optimization of patient's breathing.   -Will give 500 cc bolus, encourage hydration i/s/o daily diuresis.   -CTM tachycardia, possible CXR to ensure no evidence of consolidation or brewing pna.   -Defer infectious w/u at this time.  - Resolved, will have patient on lasix qod upon discharge to f/u with PCP and pulmonologist.

## 2021-06-05 NOTE — PROGRESS NOTE ADULT - PROBLEM SELECTOR PLAN 6
c/w home meds, losartan  - lipitor, lasix

## 2021-06-05 NOTE — PROGRESS NOTE ADULT - REASON FOR ADMISSION
SOB and weakness / CT chest shows cavitary lesion in lung

## 2021-06-05 NOTE — PROGRESS NOTE ADULT - PROVIDER SPECIALTY LIST ADULT
Pulmonology
Infectious Disease
Infectious Disease
Internal Medicine
Pulmonology
Infectious Disease
Pulmonology
Internal Medicine

## 2021-06-05 NOTE — PROGRESS NOTE ADULT - PROBLEM SELECTOR PLAN 5
hold off on oral meds (prandin, hailey-met). A1c on this admission 9.9, patient's goal given her age should be 7-7.5,however patient is on multiple oral agents with poor control, will probably need insulin upon discharge.   -c/w lantus 25 units at night, 14u premeals with ISS    c/w FSBS with coverage  -To be discharged with insulin. PCP f/u endorsed to family.

## 2021-06-05 NOTE — PROGRESS NOTE ADULT - PROBLEM SELECTOR PLAN 2
CT lungs in ED shows cavitary lesion most likely 2/2 to chronic TB previously treated now with established imaging changes.  -ID consulted, AFB sputum negative x4.   -monitoring off abx   -pulm consulted, following patient. Patient will require pulm f/u outpatient and home O2 set-up with 2L.   -VBG improved at this point refusing bilevel overnight.   -Able to obtain  collateral information (previous imaging and tx history) documented in chart note from 6/1.   -Pulm not concerned regarding a vasculitis as cause of cavitary lesion or malignancy at this point appears to be chronic known changes in the lung from previous imaging as well.

## 2021-06-05 NOTE — PROGRESS NOTE ADULT - NSICDXPILOT_GEN_ALL_CORE
[place]

## 2021-06-05 NOTE — PROGRESS NOTE ADULT - ASSESSMENT
Patient is a 77yo F originally from [place] PMHx of TIIDM, HTN, previous pulmonary TB (2019) s/p 10 months of treatment presented with acute on chronic SOB, imaging with evidence of cavitary lung lesion most concern for reactivation of tuberculosis, now s/p isolation AFB negative x4, isolation precautions discontinued with a course complicated by hyperglycemia with poorly managed TIIDM at home with A1c ~10 now requiring insulin therapy at home.

## 2021-06-05 NOTE — PROGRESS NOTE ADULT - PROBLEM SELECTOR PLAN 3
hx of TB in 2019: treated for 10 months as per collateral obtained from [facility]. Patient treated from Sept 2018-May 2019.   -ID consulted   - air borne precaution discontinued.   - AFB sputum q8 hrs x4 negative, isolation precautions negative.   - Galactomannan, fungitell, histoplasma negative.   - BCx: NGTD  - Patient with chronic cough most likely 2/2 COPD from cooking on "[employer]" per pulm, no concern for active TB from pulm or ID standpoint at this point. D/c with home oxygen and budesonide.

## 2021-07-21 LAB
CULTURE RESULTS: SIGNIFICANT CHANGE UP
SPECIMEN SOURCE: SIGNIFICANT CHANGE UP

## 2021-12-06 ENCOUNTER — EMERGENCY (EMERGENCY)
Facility: HOSPITAL | Age: 76
LOS: 1 days | Discharge: HOME | End: 2021-12-06
Attending: EMERGENCY MEDICINE
Payer: COMMERCIAL

## 2021-12-06 VITALS
HEIGHT: 62 IN | RESPIRATION RATE: 16 BRPM | HEART RATE: 84 BPM | TEMPERATURE: 97 F | SYSTOLIC BLOOD PRESSURE: 154 MMHG | WEIGHT: 149.91 LBS | DIASTOLIC BLOOD PRESSURE: 83 MMHG | OXYGEN SATURATION: 100 %

## 2021-12-06 VITALS
OXYGEN SATURATION: 99 % | HEART RATE: 95 BPM | DIASTOLIC BLOOD PRESSURE: 85 MMHG | SYSTOLIC BLOOD PRESSURE: 129 MMHG | RESPIRATION RATE: 20 BRPM

## 2021-12-06 DIAGNOSIS — H26.9 UNSPECIFIED CATARACT: Chronic | ICD-10-CM

## 2021-12-06 LAB
ALBUMIN SERPL ELPH-MCNC: 4.1 G/DL — SIGNIFICANT CHANGE UP (ref 3.3–5)
ALP SERPL-CCNC: 95 U/L — SIGNIFICANT CHANGE UP (ref 40–120)
ALT FLD-CCNC: 13 U/L — SIGNIFICANT CHANGE UP (ref 10–45)
ANION GAP SERPL CALC-SCNC: 11 MMOL/L — SIGNIFICANT CHANGE UP (ref 5–17)
ANISOCYTOSIS BLD QL: SLIGHT — SIGNIFICANT CHANGE UP
APPEARANCE UR: CLEAR — SIGNIFICANT CHANGE UP
AST SERPL-CCNC: 15 U/L — SIGNIFICANT CHANGE UP (ref 10–40)
B-OH-BUTYR SERPL-SCNC: 0.2 MMOL/L — SIGNIFICANT CHANGE UP
BACTERIA # UR AUTO: NEGATIVE — SIGNIFICANT CHANGE UP
BASE EXCESS BLDV CALC-SCNC: 3.1 MMOL/L — HIGH (ref -2–2)
BASOPHILS # BLD AUTO: 0 K/UL — SIGNIFICANT CHANGE UP (ref 0–0.2)
BASOPHILS NFR BLD AUTO: 0 % — SIGNIFICANT CHANGE UP (ref 0–2)
BILIRUB SERPL-MCNC: 0.2 MG/DL — SIGNIFICANT CHANGE UP (ref 0.2–1.2)
BILIRUB UR-MCNC: NEGATIVE — SIGNIFICANT CHANGE UP
BUN SERPL-MCNC: 23 MG/DL — SIGNIFICANT CHANGE UP (ref 7–23)
CA-I SERPL-SCNC: 1.25 MMOL/L — SIGNIFICANT CHANGE UP (ref 1.15–1.33)
CALCIUM SERPL-MCNC: 9.1 MG/DL — SIGNIFICANT CHANGE UP (ref 8.4–10.5)
CHLORIDE BLDV-SCNC: 101 MMOL/L — SIGNIFICANT CHANGE UP (ref 96–108)
CHLORIDE SERPL-SCNC: 97 MMOL/L — SIGNIFICANT CHANGE UP (ref 96–108)
CO2 BLDV-SCNC: 32 MMOL/L — HIGH (ref 22–26)
CO2 SERPL-SCNC: 26 MMOL/L — SIGNIFICANT CHANGE UP (ref 22–31)
COLOR SPEC: SIGNIFICANT CHANGE UP
CREAT SERPL-MCNC: 0.66 MG/DL — SIGNIFICANT CHANGE UP (ref 0.5–1.3)
DIFF PNL FLD: NEGATIVE — SIGNIFICANT CHANGE UP
ELLIPTOCYTES BLD QL SMEAR: SLIGHT — SIGNIFICANT CHANGE UP
EOSINOPHIL # BLD AUTO: 0.09 K/UL — SIGNIFICANT CHANGE UP (ref 0–0.5)
EOSINOPHIL NFR BLD AUTO: 0.9 % — SIGNIFICANT CHANGE UP (ref 0–6)
EPI CELLS # UR: 1 /HPF — SIGNIFICANT CHANGE UP
GAS PNL BLDV: 134 MMOL/L — LOW (ref 136–145)
GAS PNL BLDV: SIGNIFICANT CHANGE UP
GAS PNL BLDV: SIGNIFICANT CHANGE UP
GIANT PLATELETS BLD QL SMEAR: PRESENT — SIGNIFICANT CHANGE UP
GLUCOSE BLDV-MCNC: 351 MG/DL — HIGH (ref 70–99)
GLUCOSE SERPL-MCNC: 336 MG/DL — HIGH (ref 70–99)
GLUCOSE UR QL: ABNORMAL
HCO3 BLDV-SCNC: 30 MMOL/L — HIGH (ref 22–29)
HCT VFR BLD CALC: 32.6 % — LOW (ref 34.5–45)
HCT VFR BLDA CALC: 28 % — LOW (ref 34.5–46.5)
HGB BLD CALC-MCNC: 9.4 G/DL — LOW (ref 11.7–16.1)
HGB BLD-MCNC: 9.2 G/DL — LOW (ref 11.5–15.5)
HYALINE CASTS # UR AUTO: 0 /LPF — SIGNIFICANT CHANGE UP (ref 0–2)
HYPOCHROMIA BLD QL: SLIGHT — SIGNIFICANT CHANGE UP
KETONES UR-MCNC: NEGATIVE — SIGNIFICANT CHANGE UP
LACTATE BLDV-MCNC: 1.2 MMOL/L — SIGNIFICANT CHANGE UP (ref 0.7–2)
LEUKOCYTE ESTERASE UR-ACNC: NEGATIVE — SIGNIFICANT CHANGE UP
LYMPHOCYTES # BLD AUTO: 1.06 K/UL — SIGNIFICANT CHANGE UP (ref 1–3.3)
LYMPHOCYTES # BLD AUTO: 10.5 % — LOW (ref 13–44)
MAGNESIUM SERPL-MCNC: 1.9 MG/DL — SIGNIFICANT CHANGE UP (ref 1.6–2.6)
MANUAL SMEAR VERIFICATION: SIGNIFICANT CHANGE UP
MCHC RBC-ENTMCNC: 21 PG — LOW (ref 27–34)
MCHC RBC-ENTMCNC: 28.2 GM/DL — LOW (ref 32–36)
MCV RBC AUTO: 74.4 FL — LOW (ref 80–100)
MICROCYTES BLD QL: SLIGHT — SIGNIFICANT CHANGE UP
MONOCYTES # BLD AUTO: 0.44 K/UL — SIGNIFICANT CHANGE UP (ref 0–0.9)
MONOCYTES NFR BLD AUTO: 4.4 % — SIGNIFICANT CHANGE UP (ref 2–14)
NEUTROPHILS # BLD AUTO: 8.48 K/UL — HIGH (ref 1.8–7.4)
NEUTROPHILS NFR BLD AUTO: 84.2 % — HIGH (ref 43–77)
NITRITE UR-MCNC: NEGATIVE — SIGNIFICANT CHANGE UP
PCO2 BLDV: 62 MMHG — HIGH (ref 39–42)
PH BLDV: 7.3 — LOW (ref 7.32–7.43)
PH UR: 6.5 — SIGNIFICANT CHANGE UP (ref 5–8)
PHOSPHATE SERPL-MCNC: 3.6 MG/DL — SIGNIFICANT CHANGE UP (ref 2.5–4.5)
PLAT MORPH BLD: NORMAL — SIGNIFICANT CHANGE UP
PLATELET # BLD AUTO: 256 K/UL — SIGNIFICANT CHANGE UP (ref 150–400)
PO2 BLDV: 49 MMHG — HIGH (ref 25–45)
POIKILOCYTOSIS BLD QL AUTO: SLIGHT — SIGNIFICANT CHANGE UP
POLYCHROMASIA BLD QL SMEAR: SLIGHT — SIGNIFICANT CHANGE UP
POTASSIUM BLDV-SCNC: 5.4 MMOL/L — HIGH (ref 3.5–5.1)
POTASSIUM SERPL-MCNC: 4.1 MMOL/L — SIGNIFICANT CHANGE UP (ref 3.5–5.3)
POTASSIUM SERPL-SCNC: 4.1 MMOL/L — SIGNIFICANT CHANGE UP (ref 3.5–5.3)
PROT SERPL-MCNC: 7.1 G/DL — SIGNIFICANT CHANGE UP (ref 6–8.3)
PROT UR-MCNC: ABNORMAL
RBC # BLD: 4.38 M/UL — SIGNIFICANT CHANGE UP (ref 3.8–5.2)
RBC # FLD: 17.6 % — HIGH (ref 10.3–14.5)
RBC BLD AUTO: ABNORMAL
RBC CASTS # UR COMP ASSIST: 1 /HPF — SIGNIFICANT CHANGE UP (ref 0–4)
SAO2 % BLDV: 79.7 % — SIGNIFICANT CHANGE UP (ref 67–88)
SARS-COV-2 RNA SPEC QL NAA+PROBE: SIGNIFICANT CHANGE UP
SCHISTOCYTES BLD QL AUTO: SLIGHT — SIGNIFICANT CHANGE UP
SODIUM SERPL-SCNC: 134 MMOL/L — LOW (ref 135–145)
SP GR SPEC: 1.03 — HIGH (ref 1.01–1.02)
UROBILINOGEN FLD QL: NEGATIVE — SIGNIFICANT CHANGE UP
WBC # BLD: 10.07 K/UL — SIGNIFICANT CHANGE UP (ref 3.8–10.5)
WBC # FLD AUTO: 10.07 K/UL — SIGNIFICANT CHANGE UP (ref 3.8–10.5)
WBC UR QL: 0 /HPF — SIGNIFICANT CHANGE UP (ref 0–5)

## 2021-12-06 PROCEDURE — 74177 CT ABD & PELVIS W/CONTRAST: CPT | Mod: 26,MD,59

## 2021-12-06 PROCEDURE — 99285 EMERGENCY DEPT VISIT HI MDM: CPT

## 2021-12-06 PROCEDURE — 93010 ELECTROCARDIOGRAM REPORT: CPT

## 2021-12-06 RX ORDER — SODIUM CHLORIDE 9 MG/ML
1000 INJECTION INTRAMUSCULAR; INTRAVENOUS; SUBCUTANEOUS ONCE
Refills: 0 | Status: COMPLETED | OUTPATIENT
Start: 2021-12-06 | End: 2021-12-06

## 2021-12-06 RX ORDER — INSULIN LISPRO 100/ML
4 VIAL (ML) SUBCUTANEOUS ONCE
Refills: 0 | Status: COMPLETED | OUTPATIENT
Start: 2021-12-06 | End: 2021-12-06

## 2021-12-06 RX ADMIN — SODIUM CHLORIDE 1000 MILLILITER(S): 9 INJECTION INTRAMUSCULAR; INTRAVENOUS; SUBCUTANEOUS at 14:59

## 2021-12-06 RX ADMIN — Medication 4 UNIT(S): at 17:23

## 2021-12-06 NOTE — ED PROVIDER NOTE - PHYSICAL EXAMINATION
Gen: WDWN, NAD  HEENT: EOMI, no nasal discharge, mucous membranes mildly dry  CV: 2+ radial pulses b/l  Resp: no accessory muscle use, no increased work of breathing  GI: Abdomen soft non-distended, NTTP  MSK: No open wounds, no bruising, no LE edema  Neuro: A&Ox4, following commands, moving all four extremities spontaneously  Psych: appropriate mood

## 2021-12-06 NOTE — ED PROVIDER NOTE - CLINICAL SUMMARY MEDICAL DECISION MAKING FREE TEXT BOX
Daksha Dennis MD, PGY-2: 76O F hx hypothyroidism, HTN, DM, TB, p/w nausea/vomiting. VSS, PE no abd ttp. suspect DKA, consider gastritis, low suspicion sbo given no abdominal ttp, no surgical hx. plan for basic labs, dka labs, ua, ct a/p. Daksha Dennis MD, PGY-2: 76O F hx hypothyroidism, HTN, DM, TB, p/w nausea/vomiting. VSS, PE no abd ttp. suspect DKA, consider gastritis, low suspicion sbo given no abdominal ttp, no surgical hx. plan for basic labs, dka labs, ua, ct a/p.    NADIA Calzada MD: Agree with resident/ACP MDM, assessment and plan as above.

## 2021-12-06 NOTE — ED PROVIDER NOTE - PROGRESS NOTE DETAILS
Daksha Dennis MD, PGY-2: pt with improved nausea s/p zofran; ct is negative, pt ready for dc home. pt with hyperglycemia, given admelog, will recheck and dc.

## 2021-12-06 NOTE — ED PROVIDER NOTE - OBJECTIVE STATEMENT
76O F hx hypothyroidism, HTN, DM, TB, p/w nausea/vomiting since hours prior this am. BIBA from home. denies abdominal pain, recent infectious symptoms, recent trauma. initial glucose by . no hx of abdominal surgeries

## 2021-12-06 NOTE — ED PROVIDER NOTE - NSICDXPASTMEDICALHX_GEN_ALL_CORE_FT
PAST MEDICAL HISTORY:  DM (Diabetes Mellitus)     Dyslipidemia     HTN (Hypertension)     Hypothyroidism

## 2021-12-06 NOTE — ED PROVIDER NOTE - NSFOLLOWUPINSTRUCTIONS_ED_ALL_ED_FT
--today, the lab tests we did include basic blood and urine studies, the imaging tests we did include ct abdomen/pelvis. results significant for high glucose; no abnormalities in the abdomen. we have included these test results in your paperwork. please take them to your follow-up appointment  --given that you were in the ED today, we recommend a followup visit with your general doctor (primary care doctor) within 7 days.   --your diagnosis is: dehydration; hyperglycemia   --return to the ED if your current symptoms worsen, if passing out from weakness/dehydration, if vomiting does not resolve in 2-3 days.

## 2021-12-06 NOTE — ED ADULT NURSE NOTE - OBJECTIVE STATEMENT
77 y/o female presents to ED via EMS from home for vomiting. [facility]  used, [place] ID [id number]. Pt is very poor historian,  unable to appreciate what patient is saying. On exam, awake and alert. Unlabored, spontaneous respirations, NAD, placed on 4L NC, unknown if pt is on home oxygen. Abdomen soft, non-tender, non-distended. Heplock placed, labs sent.

## 2021-12-06 NOTE — ED ADULT NURSE REASSESSMENT NOTE - NS ED NURSE REASSESS COMMENT FT1
Pt straight cathed for residual urine. ED tech Michelle at bedside to confirm sterility. Approx. 250cc urine drained. UA and UC sent to lab. Pt tolerated procedure well.

## 2021-12-06 NOTE — ED PROVIDER NOTE - PATIENT PORTAL LINK FT
You can access the FollowMyHealth Patient Portal offered by [facility] by registering at the following website: [email or web address]. By joining [facility]’s FollowMyHealth portal, you will also be able to view your health information using other applications (apps) compatible with our system.

## 2021-12-07 LAB
CULTURE RESULTS: NO GROWTH — SIGNIFICANT CHANGE UP
SPECIMEN SOURCE: SIGNIFICANT CHANGE UP

## 2022-04-07 NOTE — ED PROVIDER NOTE - NSTOBACCONEVERSMOKERY/N_GEN_A
1035: patient arrived to room via chair, report received from Janeth Warren. Vital signs stable, eye patch in place, patient denies needs. Snack and drink provided,  at bedside. Lights dimmed for comfort. Call light within reach. 1045: patient resting in chair    1100: patient getting dressed,  at bedside. 1120: Discharge instructions reviewed with patient and family member. All questions were addressed and answered. Patient and family member verbalized understanding of discharge plan. 1135: patient ambulated to discharge lobby in stable condition. patient discharged home with .
Proparacaine 2 drops prior to prep of left eye. Tobramycin eye drop one and Lotemax one drop at end of case.
No

## 2022-07-18 NOTE — ED ADULT TRIAGE NOTE - INTERPRETATION SERVICES DECLINED
SUBJECTIVE: Seen and examined in ICU. Daughter and wife  at bedside. Afib on tele, HR 90s. Denies CP, SOB. Appears comfortable.     RRT two days ago due to afib w/RVR and hypoxia. Now seen in ICU.cards consulted, on cardizem gtt.   WBCs up trending 26k<<13k<< 26<<25        Physical Exam     Visit Vitals  /64   Pulse 94   Temp 98.2 °F (36.8 °C) (Axillary)   Resp (!) 21   Ht 5' 11\" (1.803 m)   Wt 82.5 kg (181 lb 14.1 oz)   SpO2 96%   BMI 25.37 kg/m²       General:   Adult male in no acute distress, frail  HEENT:   Normocephalic atraumatic  Cardio:   S1-S2, Irr  Resp:   Diminished to auscultation bilaterally  Abdomen:   soft nontender positive bowel sounds  MS:    Nontender no edema.  Skin:   Warm.  No jaundice.  Neuro:   Moving all extremities  Psych:   Cooperative    Body mass index is 25.37 kg/m². Weight    07/14/22 2330 07/15/22 1627 07/17/22 0400 07/18/22 0500   Weight: 85.8 kg (189 lb 2.5 oz) 85.7 kg (188 lb 15 oz) 80.9 kg (178 lb 5.6 oz) 82.5 kg (181 lb 14.1 oz)        Labs / Imaging (Abridged)     Recent Results (from the past 24 hour(s))   Electrocardiogram 12-Lead    Collection Time: 07/17/22 11:27 AM   Result Value Ref Range    Ventricular Rate EKG/Min (BPM) 94     Atrial Rate (BPM) 83     QRS-Interval (MSEC) 84     QT-Interval (MSEC) 364     QTc 455     R Axis (Degrees) -29     T Axis (Degrees) 25     REPORT TEXT       Atrial fibrillation  with premature ventricular or aberrantly conducted complexes  Low voltage QRS  Septal infarct  (cited on or before  27-JUN-2022)  Abnormal ECG  When compared with ECG of  16-JUL-2022 14:40,  Previous ECG has undetermined rhythm, needs review  QRS axis  shifted left  Questionable change in initial forces of  Septal leads  Confirmed by PAULINE PLEITEZ, ALYSA ([id number]) on 7/17/2022 1:54:10 PM     GLUCOSE, BEDSIDE - POINT OF CARE    Collection Time: 07/17/22 11:32 AM   Result Value Ref Range    GLUCOSE, BEDSIDE - POINT OF CARE 140 (H) 70 - 99 mg/dL   GLUCOSE, BEDSIDE - POINT  OF CARE    Collection Time: 07/17/22  5:12 PM   Result Value Ref Range    GLUCOSE, BEDSIDE - POINT OF CARE 137 (H) 70 - 99 mg/dL       Lab Results   Component Value Date    SODIUM 134 (L) 07/17/2022    POTASSIUM 3.9 07/17/2022    BUN 21 (H) 07/17/2022    CREATININE 0.68 07/17/2022    WBC 26.8 (H) 07/17/2022    HCT 25.5 (L) 07/17/2022    HGB 8.3 (L) 07/17/2022     07/17/2022    INR 1.1 07/17/2022    GLUCOSE 170 (H) 07/17/2022    MG 1.8 07/17/2022       XR CHEST PA OR AP 1 VIEW    Result Date: 7/17/2022  EXAM: XR CHEST PA OR AP 1 VIEW     DATE: 07/17/2022 at 0604 hours CLINICAL INDICATION: Fever COMPARISON: 07/16/2022 PROCEDURE: One view chest FINDINGS:  Overlying monitor wires.   Gross stable cardiomediastinal silhouette and pulmonary vasculature. Stable cardiac silhouette. The lungs are well-inflated. Increase in patchy airspace opacity RIGHT lower lung field. LEFT basilar retrocardiac airspace opacity is less prominent.  Correlate for multifocal infection-pneumonia.  Upper lung fields no new consolidation. No large volume pleural fluid.  This     1.    Size increased opacity RIGHT lower lung field.  The LEFT basilar retrocardiac opacity appears less prominent.  Suggest continued follow-up. Electronically Signed by: SANJAY REYNOSO D.O. Signed on: 7/17/2022 7:57 AM     XR CHEST PA OR AP 1 VIEW    Result Date: 7/16/2022  EXAM: PORTABLE CHEST, 1 VIEW CLINICAL INDICATION:  Shortness of breath COMPARISON: 07/16/2022     FINDINGS/IMPRESSION:  Interval development of patchy airspace opacities in the mid and lower lungs bilaterally.  Main differential considerations include pulmonary edema and pneumonitis.  Clinical correlation and follow-up is recommended. No pneumothorax or large pleural effusion. Stable cardiac size. Electronically Signed by: YANG MATHIAS M.D. Signed on: 7/16/2022 3:11 PM           Assessment and Plan     Parmjit Dickson is a 76 year old male admitted 7/14/2022 for:  Urinary tract  infection without hematuria, site unspecified [N39.0]  Pneumonia of right lung due to infectious organism, unspecified part of lung [J18.9]  Sepsis, due to unspecified organism, unspecified whether acute organ dysfunction present (CMS/[facility]) [A41.9]  Further evaluation and work up as follows:      Pneumonia/ UTI pripr to admission due to chronic whittaker catheter - continue on cefepime and flagyl , ID consulted due to frequent abx tx. ID input appreciated.      Leukocytosis- WBCs up trending 26k<<13k<<26k<<25K, continue empiric abx. ID on consult. On dual therapy of abx.  concern for superimposed aspiration.     HypoNA- , IVF     CVA- will transfer back to inpatient rehab to complete rehabilitation      GERD- continue ppi.      HTN- continue current PTA mgt. Amlodipine, bb. Monitor BP and HR     HLD- continue statin     Hypoxia/new O2 need- continue O2, monitor O2 sats, wean off as colby. Pulmonology consulting. Input appreciated.      Dysphagia- s/p peg tube placement. Can resume tube feeds. Aspiration precautions . RD consult for tube feeds dosing. Monitor tolerance      Afib- rate controlled, no AC due to falls, cont bb. cardizem gtt     Hx of ETOH abuse- continue supplements. Cessation encouraged.      DVT ppx: lovenox     Chronic anemia- hgb 8.3. monitor on labs        Tube Feeding Diet Jevity 1.5 Calorie/1.5 Calorie Formula - With Fiber; Without Diet Tray  IP CONSULT TO NUTRITION SERVICES  IP CONSULT TO PULMONOLOGY  IP CONSULT TO INFECTIOUS DISEASES  IP CONSULT TO CARDIOLOGY    Therapy Orders:  No orders of the defined types were placed in this encounter.        __________________________________  Marina Rolon MSN,APRN, FPA, FNP-C  Internal Medicine Nurse Practitioner   C/o Charlie Hankins.   Text via [employer] secure text message         Patient/Caregiver requests family/friend to interpret.

## 2022-11-08 ENCOUNTER — INPATIENT (INPATIENT)
Facility: HOSPITAL | Age: 77
LOS: 10 days | Discharge: HOME | End: 2022-11-19
Attending: INTERNAL MEDICINE | Admitting: INTERNAL MEDICINE

## 2022-11-08 VITALS — HEART RATE: 30 BPM | RESPIRATION RATE: 30 BRPM

## 2022-11-08 DIAGNOSIS — Z29.9 ENCOUNTER FOR PROPHYLACTIC MEASURES, UNSPECIFIED: ICD-10-CM

## 2022-11-08 DIAGNOSIS — R79.89 OTHER SPECIFIED ABNORMAL FINDINGS OF BLOOD CHEMISTRY: ICD-10-CM

## 2022-11-08 DIAGNOSIS — E11.00 TYPE 2 DIABETES MELLITUS WITH HYPEROSMOLARITY WITHOUT NONKETOTIC HYPERGLYCEMIC-HYPEROSMOLAR COMA (NKHHC): ICD-10-CM

## 2022-11-08 DIAGNOSIS — R65.10 SYSTEMIC INFLAMMATORY RESPONSE SYNDROME (SIRS) OF NON-INFECTIOUS ORIGIN WITHOUT ACUTE ORGAN DYSFUNCTION: ICD-10-CM

## 2022-11-08 DIAGNOSIS — I10 ESSENTIAL (PRIMARY) HYPERTENSION: ICD-10-CM

## 2022-11-08 DIAGNOSIS — E03.9 HYPOTHYROIDISM, UNSPECIFIED: ICD-10-CM

## 2022-11-08 DIAGNOSIS — R73.9 HYPERGLYCEMIA, UNSPECIFIED: ICD-10-CM

## 2022-11-08 DIAGNOSIS — R06.82 TACHYPNEA, NOT ELSEWHERE CLASSIFIED: ICD-10-CM

## 2022-11-08 DIAGNOSIS — H26.9 UNSPECIFIED CATARACT: Chronic | ICD-10-CM

## 2022-11-08 DIAGNOSIS — R41.82 ALTERED MENTAL STATUS, UNSPECIFIED: ICD-10-CM

## 2022-11-08 LAB
ALBUMIN SERPL ELPH-MCNC: 3.5 G/DL — SIGNIFICANT CHANGE UP (ref 3.3–5)
ALBUMIN SERPL ELPH-MCNC: 4.1 G/DL — SIGNIFICANT CHANGE UP (ref 3.3–5)
ALP SERPL-CCNC: 123 U/L — HIGH (ref 40–120)
ALP SERPL-CCNC: 138 U/L — HIGH (ref 40–120)
ALT FLD-CCNC: 57 U/L — HIGH (ref 4–33)
ALT FLD-CCNC: 58 U/L — HIGH (ref 4–33)
ANION GAP SERPL CALC-SCNC: 15 MMOL/L — HIGH (ref 7–14)
ANION GAP SERPL CALC-SCNC: 19 MMOL/L — HIGH (ref 7–14)
APPEARANCE UR: CLEAR — SIGNIFICANT CHANGE UP
APTT BLD: 34.7 SEC — SIGNIFICANT CHANGE UP (ref 27–36.3)
AST SERPL-CCNC: 82 U/L — HIGH (ref 4–32)
AST SERPL-CCNC: 84 U/L — HIGH (ref 4–32)
B PERT DNA SPEC QL NAA+PROBE: SIGNIFICANT CHANGE UP
B PERT+PARAPERT DNA PNL SPEC NAA+PROBE: SIGNIFICANT CHANGE UP
B-OH-BUTYR SERPL-SCNC: 0.3 MMOL/L — SIGNIFICANT CHANGE UP (ref 0–0.4)
BASE EXCESS BLDV CALC-SCNC: -0.5 MMOL/L — SIGNIFICANT CHANGE UP (ref -2–3)
BASE EXCESS BLDV CALC-SCNC: -5.5 MMOL/L — LOW (ref -2–3)
BASE EXCESS BLDV CALC-SCNC: -7.5 MMOL/L — LOW (ref -2–3)
BASOPHILS # BLD AUTO: 0.07 K/UL — SIGNIFICANT CHANGE UP (ref 0–0.2)
BASOPHILS NFR BLD AUTO: 0.6 % — SIGNIFICANT CHANGE UP (ref 0–2)
BILIRUB SERPL-MCNC: 0.4 MG/DL — SIGNIFICANT CHANGE UP (ref 0.2–1.2)
BILIRUB SERPL-MCNC: 0.4 MG/DL — SIGNIFICANT CHANGE UP (ref 0.2–1.2)
BILIRUB UR-MCNC: NEGATIVE — SIGNIFICANT CHANGE UP
BLOOD GAS VENOUS COMPREHENSIVE RESULT: SIGNIFICANT CHANGE UP
BORDETELLA PARAPERTUSSIS (RAPRVP): SIGNIFICANT CHANGE UP
BUN SERPL-MCNC: 27 MG/DL — HIGH (ref 7–23)
BUN SERPL-MCNC: 28 MG/DL — HIGH (ref 7–23)
C PNEUM DNA SPEC QL NAA+PROBE: SIGNIFICANT CHANGE UP
CALCIUM SERPL-MCNC: 8.6 MG/DL — SIGNIFICANT CHANGE UP (ref 8.4–10.5)
CALCIUM SERPL-MCNC: 9.5 MG/DL — SIGNIFICANT CHANGE UP (ref 8.4–10.5)
CHLORIDE BLDV-SCNC: 103 MMOL/L — SIGNIFICANT CHANGE UP (ref 96–108)
CHLORIDE BLDV-SCNC: 103 MMOL/L — SIGNIFICANT CHANGE UP (ref 96–108)
CHLORIDE BLDV-SCNC: 105 MMOL/L — SIGNIFICANT CHANGE UP (ref 96–108)
CHLORIDE SERPL-SCNC: 102 MMOL/L — SIGNIFICANT CHANGE UP (ref 98–107)
CHLORIDE SERPL-SCNC: 96 MMOL/L — LOW (ref 98–107)
CK MB BLD-MCNC: 4.5 % — HIGH (ref 0–2.5)
CK MB CFR SERPL CALC: 3.4 NG/ML — SIGNIFICANT CHANGE UP
CK SERPL-CCNC: 75 U/L — SIGNIFICANT CHANGE UP (ref 25–170)
CO2 BLDV-SCNC: 22.3 MMOL/L — SIGNIFICANT CHANGE UP (ref 22–26)
CO2 BLDV-SCNC: 24.7 MMOL/L — SIGNIFICANT CHANGE UP (ref 22–26)
CO2 BLDV-SCNC: 27.3 MMOL/L — HIGH (ref 22–26)
CO2 SERPL-SCNC: 16 MMOL/L — LOW (ref 22–31)
CO2 SERPL-SCNC: 18 MMOL/L — LOW (ref 22–31)
COLOR SPEC: SIGNIFICANT CHANGE UP
CREAT SERPL-MCNC: 1.03 MG/DL — SIGNIFICANT CHANGE UP (ref 0.5–1.3)
CREAT SERPL-MCNC: 1.1 MG/DL — SIGNIFICANT CHANGE UP (ref 0.5–1.3)
DIFF PNL FLD: NEGATIVE — SIGNIFICANT CHANGE UP
EGFR: 51 ML/MIN/1.73M2 — LOW
EGFR: 55 ML/MIN/1.73M2 — LOW
EOSINOPHIL # BLD AUTO: 0.1 K/UL — SIGNIFICANT CHANGE UP (ref 0–0.5)
EOSINOPHIL NFR BLD AUTO: 0.9 % — SIGNIFICANT CHANGE UP (ref 0–6)
FLUAV SUBTYP SPEC NAA+PROBE: SIGNIFICANT CHANGE UP
FLUBV RNA SPEC QL NAA+PROBE: SIGNIFICANT CHANGE UP
GAS PNL BLDV: 135 MMOL/L — LOW (ref 136–145)
GAS PNL BLDV: 136 MMOL/L — SIGNIFICANT CHANGE UP (ref 136–145)
GAS PNL BLDV: 137 MMOL/L — SIGNIFICANT CHANGE UP (ref 136–145)
GLUCOSE BLDC GLUCOMTR-MCNC: 244 MG/DL — HIGH (ref 70–99)
GLUCOSE BLDC GLUCOMTR-MCNC: 294 MG/DL — HIGH (ref 70–99)
GLUCOSE BLDV-MCNC: 214 MG/DL — HIGH (ref 70–99)
GLUCOSE BLDV-MCNC: 390 MG/DL — HIGH (ref 70–99)
GLUCOSE BLDV-MCNC: 476 MG/DL — CRITICAL HIGH (ref 70–99)
GLUCOSE SERPL-MCNC: 519 MG/DL — CRITICAL HIGH (ref 70–99)
GLUCOSE SERPL-MCNC: 627 MG/DL — CRITICAL HIGH (ref 70–99)
GLUCOSE UR QL: ABNORMAL
HADV DNA SPEC QL NAA+PROBE: SIGNIFICANT CHANGE UP
HCO3 BLDV-SCNC: 21 MMOL/L — LOW (ref 22–29)
HCO3 BLDV-SCNC: 23 MMOL/L — SIGNIFICANT CHANGE UP (ref 22–29)
HCO3 BLDV-SCNC: 26 MMOL/L — SIGNIFICANT CHANGE UP (ref 22–29)
HCOV 229E RNA SPEC QL NAA+PROBE: SIGNIFICANT CHANGE UP
HCOV HKU1 RNA SPEC QL NAA+PROBE: SIGNIFICANT CHANGE UP
HCOV NL63 RNA SPEC QL NAA+PROBE: SIGNIFICANT CHANGE UP
HCOV OC43 RNA SPEC QL NAA+PROBE: SIGNIFICANT CHANGE UP
HCT VFR BLD CALC: 38.4 % — SIGNIFICANT CHANGE UP (ref 34.5–45)
HCT VFR BLDA CALC: 26 % — LOW (ref 34.5–46.5)
HCT VFR BLDA CALC: 27 % — LOW (ref 34.5–46.5)
HCT VFR BLDA CALC: 28 % — LOW (ref 34.5–46.5)
HGB BLD CALC-MCNC: 8.8 G/DL — LOW (ref 11.5–15.5)
HGB BLD CALC-MCNC: 9.1 G/DL — LOW (ref 11.5–15.5)
HGB BLD CALC-MCNC: 9.2 G/DL — LOW (ref 11.5–15.5)
HGB BLD-MCNC: 10.2 G/DL — LOW (ref 11.5–15.5)
HMPV RNA SPEC QL NAA+PROBE: SIGNIFICANT CHANGE UP
HPIV1 RNA SPEC QL NAA+PROBE: SIGNIFICANT CHANGE UP
HPIV2 RNA SPEC QL NAA+PROBE: SIGNIFICANT CHANGE UP
HPIV3 RNA SPEC QL NAA+PROBE: SIGNIFICANT CHANGE UP
HPIV4 RNA SPEC QL NAA+PROBE: SIGNIFICANT CHANGE UP
IANC: 7 K/UL — SIGNIFICANT CHANGE UP (ref 1.8–7.4)
IMM GRANULOCYTES NFR BLD AUTO: 0.5 % — SIGNIFICANT CHANGE UP (ref 0–0.9)
INR BLD: 1.14 RATIO — SIGNIFICANT CHANGE UP (ref 0.88–1.16)
KETONES UR-MCNC: NEGATIVE — SIGNIFICANT CHANGE UP
LACTATE BLDV-MCNC: 1.5 MMOL/L — SIGNIFICANT CHANGE UP (ref 0.5–2)
LACTATE BLDV-MCNC: 1.8 MMOL/L — SIGNIFICANT CHANGE UP (ref 0.5–2)
LACTATE BLDV-MCNC: 4.4 MMOL/L — CRITICAL HIGH (ref 0.5–2)
LEUKOCYTE ESTERASE UR-ACNC: NEGATIVE — SIGNIFICANT CHANGE UP
LYMPHOCYTES # BLD AUTO: 3.51 K/UL — HIGH (ref 1–3.3)
LYMPHOCYTES # BLD AUTO: 31.2 % — SIGNIFICANT CHANGE UP (ref 13–44)
M PNEUMO DNA SPEC QL NAA+PROBE: SIGNIFICANT CHANGE UP
MAGNESIUM SERPL-MCNC: 2 MG/DL — SIGNIFICANT CHANGE UP (ref 1.6–2.6)
MCHC RBC-ENTMCNC: 19.5 PG — LOW (ref 27–34)
MCHC RBC-ENTMCNC: 26.6 GM/DL — LOW (ref 32–36)
MCV RBC AUTO: 73.3 FL — LOW (ref 80–100)
MONOCYTES # BLD AUTO: 0.52 K/UL — SIGNIFICANT CHANGE UP (ref 0–0.9)
MONOCYTES NFR BLD AUTO: 4.6 % — SIGNIFICANT CHANGE UP (ref 2–14)
NEUTROPHILS # BLD AUTO: 7 K/UL — SIGNIFICANT CHANGE UP (ref 1.8–7.4)
NEUTROPHILS NFR BLD AUTO: 62.2 % — SIGNIFICANT CHANGE UP (ref 43–77)
NITRITE UR-MCNC: NEGATIVE — SIGNIFICANT CHANGE UP
NRBC # BLD: 0 /100 WBCS — SIGNIFICANT CHANGE UP (ref 0–0)
NRBC # FLD: 0 K/UL — SIGNIFICANT CHANGE UP (ref 0–0)
NT-PROBNP SERPL-SCNC: 2309 PG/ML — HIGH
PCO2 BLDV: 50 MMHG — HIGH (ref 39–42)
PCO2 BLDV: 54 MMHG — HIGH (ref 39–42)
PCO2 BLDV: 60 MMHG — HIGH (ref 39–42)
PH BLDV: 7.19 — LOW (ref 7.32–7.43)
PH BLDV: 7.19 — LOW (ref 7.32–7.43)
PH BLDV: 7.32 — SIGNIFICANT CHANGE UP (ref 7.32–7.43)
PH UR: 6.5 — SIGNIFICANT CHANGE UP (ref 5–8)
PLATELET # BLD AUTO: 300 K/UL — SIGNIFICANT CHANGE UP (ref 150–400)
PO2 BLDV: 29 MMHG — SIGNIFICANT CHANGE UP
PO2 BLDV: 44 MMHG — SIGNIFICANT CHANGE UP
PO2 BLDV: 47 MMHG — SIGNIFICANT CHANGE UP
POTASSIUM BLDV-SCNC: 4.1 MMOL/L — SIGNIFICANT CHANGE UP (ref 3.5–5.1)
POTASSIUM BLDV-SCNC: 4.3 MMOL/L — SIGNIFICANT CHANGE UP (ref 3.5–5.1)
POTASSIUM BLDV-SCNC: 5 MMOL/L — SIGNIFICANT CHANGE UP (ref 3.5–5.1)
POTASSIUM SERPL-MCNC: 4.4 MMOL/L — SIGNIFICANT CHANGE UP (ref 3.5–5.3)
POTASSIUM SERPL-MCNC: 4.8 MMOL/L — SIGNIFICANT CHANGE UP (ref 3.5–5.3)
POTASSIUM SERPL-SCNC: 4.4 MMOL/L — SIGNIFICANT CHANGE UP (ref 3.5–5.3)
POTASSIUM SERPL-SCNC: 4.8 MMOL/L — SIGNIFICANT CHANGE UP (ref 3.5–5.3)
PROT SERPL-MCNC: 6.1 G/DL — SIGNIFICANT CHANGE UP (ref 6–8.3)
PROT SERPL-MCNC: 6.9 G/DL — SIGNIFICANT CHANGE UP (ref 6–8.3)
PROT UR-MCNC: ABNORMAL
PROTHROM AB SERPL-ACNC: 13.3 SEC — SIGNIFICANT CHANGE UP (ref 10.5–13.4)
RAPID RVP RESULT: SIGNIFICANT CHANGE UP
RBC # BLD: 5.24 M/UL — HIGH (ref 3.8–5.2)
RBC # FLD: 19.8 % — HIGH (ref 10.3–14.5)
RSV RNA SPEC QL NAA+PROBE: SIGNIFICANT CHANGE UP
RV+EV RNA SPEC QL NAA+PROBE: SIGNIFICANT CHANGE UP
SAO2 % BLDV: 37.7 % — SIGNIFICANT CHANGE UP
SAO2 % BLDV: 69.1 % — SIGNIFICANT CHANGE UP
SAO2 % BLDV: 69.7 % — SIGNIFICANT CHANGE UP
SARS-COV-2 RNA SPEC QL NAA+PROBE: SIGNIFICANT CHANGE UP
SODIUM SERPL-SCNC: 131 MMOL/L — LOW (ref 135–145)
SODIUM SERPL-SCNC: 135 MMOL/L — SIGNIFICANT CHANGE UP (ref 135–145)
SP GR SPEC: 1.02 — SIGNIFICANT CHANGE UP (ref 1.01–1.05)
TROPONIN T, HIGH SENSITIVITY RESULT: 58 NG/L — CRITICAL HIGH
TROPONIN T, HIGH SENSITIVITY RESULT: 68 NG/L — CRITICAL HIGH
TSH SERPL-MCNC: 2.23 UIU/ML — SIGNIFICANT CHANGE UP (ref 0.27–4.2)
UROBILINOGEN FLD QL: SIGNIFICANT CHANGE UP
WBC # BLD: 11.26 K/UL — HIGH (ref 3.8–10.5)
WBC # FLD AUTO: 11.26 K/UL — HIGH (ref 3.8–10.5)

## 2022-11-08 PROCEDURE — 74177 CT ABD & PELVIS W/CONTRAST: CPT | Mod: 26,MB

## 2022-11-08 PROCEDURE — 71045 X-RAY EXAM CHEST 1 VIEW: CPT | Mod: 26

## 2022-11-08 PROCEDURE — 71275 CT ANGIOGRAPHY CHEST: CPT | Mod: 26,ME

## 2022-11-08 PROCEDURE — 93010 ELECTROCARDIOGRAM REPORT: CPT

## 2022-11-08 PROCEDURE — 99291 CRITICAL CARE FIRST HOUR: CPT

## 2022-11-08 PROCEDURE — 70450 CT HEAD/BRAIN W/O DYE: CPT | Mod: 26,MA

## 2022-11-08 PROCEDURE — G1004: CPT

## 2022-11-08 PROCEDURE — 99284 EMERGENCY DEPT VISIT MOD MDM: CPT | Mod: GC

## 2022-11-08 RX ORDER — SIMVASTATIN 20 MG/1
20 TABLET, FILM COATED ORAL AT BEDTIME
Refills: 0 | Status: DISCONTINUED | OUTPATIENT
Start: 2022-11-08 | End: 2022-11-19

## 2022-11-08 RX ORDER — IPRATROPIUM/ALBUTEROL SULFATE 18-103MCG
3 AEROSOL WITH ADAPTER (GRAM) INHALATION ONCE
Refills: 0 | Status: COMPLETED | OUTPATIENT
Start: 2022-11-08 | End: 2022-11-08

## 2022-11-08 RX ORDER — INSULIN LISPRO 100/ML
VIAL (ML) SUBCUTANEOUS
Refills: 0 | Status: DISCONTINUED | OUTPATIENT
Start: 2022-11-08 | End: 2022-11-14

## 2022-11-08 RX ORDER — SODIUM CHLORIDE 9 MG/ML
1000 INJECTION INTRAMUSCULAR; INTRAVENOUS; SUBCUTANEOUS ONCE
Refills: 0 | Status: COMPLETED | OUTPATIENT
Start: 2022-11-08 | End: 2022-11-08

## 2022-11-08 RX ORDER — SODIUM CHLORIDE 9 MG/ML
1000 INJECTION, SOLUTION INTRAVENOUS
Refills: 0 | Status: DISCONTINUED | OUTPATIENT
Start: 2022-11-08 | End: 2022-11-19

## 2022-11-08 RX ORDER — DEXTROSE 50 % IN WATER 50 %
25 SYRINGE (ML) INTRAVENOUS ONCE
Refills: 0 | Status: DISCONTINUED | OUTPATIENT
Start: 2022-11-08 | End: 2022-11-19

## 2022-11-08 RX ORDER — DEXTROSE 50 % IN WATER 50 %
12.5 SYRINGE (ML) INTRAVENOUS ONCE
Refills: 0 | Status: DISCONTINUED | OUTPATIENT
Start: 2022-11-08 | End: 2022-11-19

## 2022-11-08 RX ORDER — LOSARTAN POTASSIUM 100 MG/1
100 TABLET, FILM COATED ORAL DAILY
Refills: 0 | Status: DISCONTINUED | OUTPATIENT
Start: 2022-11-08 | End: 2022-11-19

## 2022-11-08 RX ORDER — INSULIN GLARGINE 100 [IU]/ML
12 INJECTION, SOLUTION SUBCUTANEOUS ONCE
Refills: 0 | Status: COMPLETED | OUTPATIENT
Start: 2022-11-08 | End: 2022-11-08

## 2022-11-08 RX ORDER — GLUCAGON INJECTION, SOLUTION 0.5 MG/.1ML
1 INJECTION, SOLUTION SUBCUTANEOUS ONCE
Refills: 0 | Status: DISCONTINUED | OUTPATIENT
Start: 2022-11-08 | End: 2022-11-19

## 2022-11-08 RX ORDER — INSULIN GLARGINE 100 [IU]/ML
12 INJECTION, SOLUTION SUBCUTANEOUS AT BEDTIME
Refills: 0 | Status: COMPLETED | OUTPATIENT
Start: 2022-11-08 | End: 2022-11-08

## 2022-11-08 RX ORDER — DEXTROSE 50 % IN WATER 50 %
15 SYRINGE (ML) INTRAVENOUS ONCE
Refills: 0 | Status: DISCONTINUED | OUTPATIENT
Start: 2022-11-08 | End: 2022-11-19

## 2022-11-08 RX ORDER — LEVOTHYROXINE SODIUM 125 MCG
50 TABLET ORAL DAILY
Refills: 0 | Status: DISCONTINUED | OUTPATIENT
Start: 2022-11-08 | End: 2022-11-19

## 2022-11-08 RX ORDER — INSULIN LISPRO 100/ML
VIAL (ML) SUBCUTANEOUS AT BEDTIME
Refills: 0 | Status: DISCONTINUED | OUTPATIENT
Start: 2022-11-08 | End: 2022-11-19

## 2022-11-08 RX ORDER — LOSARTAN POTASSIUM 100 MG/1
100 TABLET, FILM COATED ORAL DAILY
Refills: 0 | Status: DISCONTINUED | OUTPATIENT
Start: 2022-11-08 | End: 2022-11-08

## 2022-11-08 RX ORDER — IPRATROPIUM/ALBUTEROL SULFATE 18-103MCG
3 AEROSOL WITH ADAPTER (GRAM) INHALATION EVERY 6 HOURS
Refills: 0 | Status: DISCONTINUED | OUTPATIENT
Start: 2022-11-08 | End: 2022-11-19

## 2022-11-08 RX ORDER — INSULIN LISPRO 100/ML
8 VIAL (ML) SUBCUTANEOUS ONCE
Refills: 0 | Status: COMPLETED | OUTPATIENT
Start: 2022-11-08 | End: 2022-11-08

## 2022-11-08 RX ORDER — INSULIN LISPRO 100/ML
6 VIAL (ML) SUBCUTANEOUS ONCE
Refills: 0 | Status: COMPLETED | OUTPATIENT
Start: 2022-11-08 | End: 2022-11-08

## 2022-11-08 RX ADMIN — INSULIN GLARGINE 12 UNIT(S): 100 INJECTION, SOLUTION SUBCUTANEOUS at 17:58

## 2022-11-08 RX ADMIN — Medication 3 MILLILITER(S): at 13:06

## 2022-11-08 RX ADMIN — SODIUM CHLORIDE 1000 MILLILITER(S): 9 INJECTION INTRAMUSCULAR; INTRAVENOUS; SUBCUTANEOUS at 10:30

## 2022-11-08 RX ADMIN — Medication 8 UNIT(S): at 13:10

## 2022-11-08 RX ADMIN — SODIUM CHLORIDE 75 MILLILITER(S): 9 INJECTION, SOLUTION INTRAVENOUS at 19:46

## 2022-11-08 RX ADMIN — INSULIN GLARGINE 12 UNIT(S): 100 INJECTION, SOLUTION SUBCUTANEOUS at 23:20

## 2022-11-08 RX ADMIN — SODIUM CHLORIDE 1000 MILLILITER(S): 9 INJECTION INTRAMUSCULAR; INTRAVENOUS; SUBCUTANEOUS at 09:26

## 2022-11-08 NOTE — ED PROVIDER NOTE - CLINICAL SUMMARY MEDICAL DECISION MAKING FREE TEXT BOX
77-year-old female presenting to the ED after called EMS for concern of hyperglycemia, with episode of noted bradycardia by EMS as well as syncopal episode prior to arrival.  In the ED heart rate within normal limits blood pressure stable moderately elevated currently on nonrebreather with 100% O2 sat, awake, alert, protecting airway, at times thrashing on bed able to be verbally redirected, unable to determine what is causing her to thrash though when asked she denies pain nausea.  Fingerstick elevated.  Lungs with air movement in all fields occasional rhonchi, dry mucous membrane, abdomen soft and nontender, no edema, using all extremities equally and not fully participatory in neuro exam.  Plan for labs, ua/cx,ekg chest x-ray CT brain begin hydration to treat hyperglycemia pending labs, evaluate for infections electrolyte disturbances organ dysfunction neuropathology DKA. EKG sinus rhythm at normal rate, no stemi.

## 2022-11-08 NOTE — GOALS OF CARE CONVERSATION - ADVANCED CARE PLANNING - CONVERSATION DETAILS
Discussed GOC with patient and her son. Patient not demonstrating capacity to understand our discussion at this time. Per son-patient has specifically said she doesn't want tubes down her throat or shock/compression to start her heart. Does not have any documented molst or advanced directives prior. Per son she would want full care up until those interventions-confirmed patient is DNR/DNI but fluids/abx/and other medical measures are ok. Molst filled out with patients son at bedside. Confirmed with her daughter as well by phone.

## 2022-11-08 NOTE — ED ADULT NURSE NOTE - NSIMPLEMENTINTERV_GEN_ALL_ED
Implemented All Fall Risk Interventions:  [place] to call system. Call bell, personal items and telephone within reach. Instruct patient to call for assistance. Room bathroom lighting operational. Non-slip footwear when patient is off stretcher. Physically safe environment: no spills, clutter or unnecessary equipment. Stretcher in lowest position, wheels locked, appropriate side rails in place. Provide visual cue, wrist band, yellow gown, etc. Monitor gait and stability. Monitor for mental status changes and reorient to person, place, and time. Review medications for side effects contributing to fall risk. Reinforce activity limits and safety measures with patient and family.

## 2022-11-08 NOTE — H&P ADULT - NSHPPHYSICALEXAM_GEN_ALL_CORE
VITALS:   T(C): 36.7 (11-08-22 @ 17:00), Max: 36.7 (11-08-22 @ 17:00)  HR: 95 (11-08-22 @ 17:00) (30 - 98)  BP: 133/66 (11-08-22 @ 17:00) (133/66 - 165/86)  RR: 20 (11-08-22 @ 17:00) (20 - 30)  SpO2: 100% (11-08-22 @ 17:00) (84% - 100%)    GENERAL: NAD, lying in bed comfortably  HEAD:  Atraumatic, normocephalic  EYES: EOMI, PERRLA, conjunctiva and sclera clear  ENT: Moist mucous membranes  NECK: Supple, no JVD  HEART: Regular rate and rhythm, no murmurs, rubs, or gallops  LUNGS: Labored respirations while speaking, takes breath between sentences. Coarse sounds in all lung fields bilaterally, no crackles, wheezing, or rhonchi  ABDOMEN: Soft, nontender, nondistended, +BS  EXTREMITIES: 2+ peripheral pulses bilaterally. No clubbing, cyanosis, or edema  NERVOUS SYSTEM:  A&Ox2, no focal deficits   SKIN: No rashes or lesions

## 2022-11-08 NOTE — CONSULT NOTE ADULT - ATTENDING COMMENTS
pt seen and examined as above. Pt is a 77 yo female with hx  HTN< DM< pulmonary tb who presents with weakness, in the  er noted elevated glucose 500, bhb 0.3 pt given iv f boluse 2 liters  normal saline , basal lantus insulin. asked to evaluate for hyperglycemia  HHS,  PE bp 120/74 rr 18 heent dry mucosa , neck supple,   abdomen benign, ext no edema    labs as above        10.2   11.26 )-----------( 300      ( 08 Nov 2022 09:00 )             38.4     11-08    135  |  102  |  27<H>  ----------------------------<  519<HH>   abg ph 7.19  4.8   |  18<L>  |  1.03    A/P 77 yo female with hyperglycemia, metabolic acidosis  -continue iv f lr at 50 cc/hr  -agree with lantus, sliding scale  -monitor urine output  -consider bipap for work of breathing, pending resolution  of metabolic acidosis  -replete, mg, phos  -check echo to evaluate lv function  -endocrine consult  -pt does not require icu level care

## 2022-11-08 NOTE — H&P ADULT - ASSESSMENT
Ms. 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

## 2022-11-08 NOTE — H&P ADULT - PROBLEM SELECTOR PLAN 2
-pt. with Hx T2DM, on novolog and basaglar at home  -BG in 500's this AM, confirmed on labs, BHB negative indicative of HHS  -MICU consulted, recommended stat 1/2 dose of home insulin (12U) with additional 12U at bedtime, improvement in BG  -pt. acidotic on VBG 7.19 unchanged after insulin admin and fluids. Pending repeat VBG, low threshold for BIPAP if pH unchanged.  -unclear inciting factor for HHS, BCx/UCx sent, will f/u cultures however UA negative, RVP negative.  -monitor BG after administration of 12U insulin -pt. with Hx T2DM, on novolog and basaglar at home  -BG in 500's this AM, confirmed on labs, BHB negative indicative of HHS  -MICU consulted, recommended stat 1/2 dose of home insulin (12U) with additional 12U at bedtime, improvement in BG  -pt. acidotic on VBG 7.19 unchanged after insulin admin and fluids. Pending repeat VBG, low threshold for BIPAP if pH unchanged.  -unclear inciting factor for HHS, BCx/UCx sent, will f/u cultures however UA negative, RVP negative.  -monitor BG after administration of 12U insulin, will continue home dose of 24 units tomorrow  -can consider endo consult if sugars not controlled.

## 2022-11-08 NOTE — ED PROVIDER NOTE - PHYSICAL EXAMINATION
GEN - awake, alert, responds/yes/no to some questions, at times thrashing aggressively on bed  HEAD - NC/AT   EYES- Pupils 3mm, ERRL, EOMI  ENT: Airway patent, dry  mm, Oral cavity and pharynx normal. No inflammation, swelling, exudate, or lesions.    NECK: Neck supple  PULMONARY - CTA b/l, symmetric breath sounds.   CARDIAC -s1s2, RRR, no M,G,R  ABDOMEN - +BS, ND, NT, soft, no guarding  BACK - no CVA tenderness, Normal  spine   EXTREMITIES - FROM, symmetric pulses, capillary refill < 2 seconds, no edema   SKIN - no rash or bruising   NEUROLOGIC - alert, moves all extremities at times aggressively, does not participate in full neuro exam

## 2022-11-08 NOTE — H&P ADULT - PROBLEM SELECTOR PLAN 1
-Pt. hypothermic and tachypneic with WBC 11 on arrival to ED, meeting SIRS criteria  -s/p 2L NS and c/w maintenance LR  -BCx sent, UA negative, patient now at normal temperature with improved RR, though still SOB with speaking  -RVP negative, imaging negative for new pneumonia  -pt. 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  -will f/u BCx, trend WBC and fever curve.

## 2022-11-08 NOTE — H&P ADULT - PROBLEM SELECTOR PLAN 8
Diet: NPO for now, can transition to CC if BG improved  DVT: Lovenox 40  Dispo: Pending PT    DNR/DNI, MOLST filled out

## 2022-11-08 NOTE — H&P ADULT - PROBLEM SELECTOR PLAN 4
-pt. tachypneic to 20's with increased work of breathing while speaking  -given duonebs with mild improvement in symptoms, MICU concerned for possible COPD  -low threshold for BIPAP pending repeat labs and VBG

## 2022-11-08 NOTE — ED PROVIDER NOTE - PROGRESS NOTE DETAILS
Ramo Gallegos PGY3: Spoke w/ granddaughter via phone call, states she was w/ pt at time of incident. Reports pt was going to bathroom, stood up from toilet, then did not feel well, sat down, and subsequently lost consciousness in chair for ~30 seconds. She did not report any complaints at that time. Son Nasar at bedside updated regarding labs and CT results, pt pending insulin, repeat vbg, tba. - Renard Valerio, PGY-2 glucose level improving-micu consulted, patient appears comfortable, vs stable. Rpt VBG stable w/uptrending lactate likely 2/2 duonebs x3 for acute hypoxia and wheezing, glucose improved after fluids and insulin, MICU recommending giving home dose long-acting subq insulin 1/2 now and 1/2 at bedtime. MICU to re-evaluate given repeat pH on VBG unchanged, recs pending. - Renard Valerio, PGY-2

## 2022-11-08 NOTE — ED ADULT NURSE NOTE - CHIEF COMPLAINT QUOTE
EMS states" Patient is bradycardic 20-20 and hyperglycemic and in respiratory distress' h/o DM, hyperlipidemia. patient is taken to room13 for treatment.

## 2022-11-08 NOTE — H&P ADULT - PROBLEM SELECTOR PLAN 5
-pt. had episode of syncope for appx. 30 seconds duration prior to arrival to ED  -noted to be answering questions inappropriately in ED on arrival, now mental status at baseline per family member at bedside.  -Likely i/s/o acidosis due to elevated lactate vs. hyperglycemia  -EKG negative for acute findings  -monitor on tele, routine mental status checks

## 2022-11-08 NOTE — ED ADULT NURSE NOTE - OBJECTIVE STATEMENT
Float: Received patient to room 13 for hyperglycemia. Pt thrashing in stretcher, uncooperative, information obtained by son at bedside stating checked pt's FS this AM and noted to be >500, per EMS pt HR in low 40's, pt o2 in room on RA low 80%, placed on NRB with positive outcome, pt trying to take off mask, replaced on 4L NC, placed on zoll, NSR on monitor. Appears tachypneic, RR ranging 20-30;s at this time. MD at bedside, EKG obtained, BL 20G AC placed, fluids running per MD orders. Straight cathed for urine, vitals as noted. Report given to primary RN. Son remains at bedside. Float: Received patient to room 13 for hyperglycemia. Pt thrashing in stretcher, uncooperative, information obtained by son at bedside stating checked pt's FS this AM and noted to be >500, per EMS pt HR in low 30's, pt o2 in room on RA low 80%, placed on NRB with positive outcome, pt trying to take off mask, replaced on 4L NC, placed on zoll, NSR on monitor. Appears tachypneic, RR ranging 20-30;s at this time. MD at bedside, EKG obtained, BL 20G AC placed, fluids running per MD orders. Straight cathed for urine, vitals as noted. Skin intact, Report given to primary RN. Son remains at bedside.

## 2022-11-08 NOTE — H&P ADULT - PROBLEM SELECTOR PLAN 3
-Pt. with elevated lactate to 4.4 up from 1.8   -pt. did receive albuterol as part of duoneb treatment however significant rise not explained with medication adinistration  -c/w maintenance LR, trend lactate

## 2022-11-08 NOTE — ED PROVIDER NOTE - OBJECTIVE STATEMENT
[place]  sam [id number] used for evaluation  77-year-old female presents to the ED by EMS for elevated fingersticks, syncopal episode, bradycardic episode.  Patient's son at bedside providing history.  Patient has history of diabetes on insulin, has been at her baseline state of health with an intermittent cough otherwise feeling fine.  This morning was noted to have an elevated fingerstick into the 500s, EMS was called.  When EMS arrived patient was declining to come to the hospital they were on the phone with their telemetry and waiting for the ALS team to arrive for approximately 40 minutes when the patient went to the bathroom.  EMS went to check on patient who was with her granddaughter at the time, and found her to be unconscious, lasted a few seconds, was sitting down, did not fall or hit her head.  At that point patient was transported to the hospital, and in route was noted to have a heart rate between the 20s and 30s.  On arrival to the ED patient is awake, not properly responding to all questions, though does respond yes or no to some.  Per son patient has had no recent fevers chills headaches no complaints of chest pain shortness of breath or abdominal pain no recent vomiting or diarrhea.  At this point patient indicates she is not in any pain though repeatedly thrashes on the bed and when asked why does not answer.

## 2022-11-08 NOTE — H&P ADULT - NSICDXPASTMEDICALHX_GEN_ALL_CORE_FT
PAST MEDICAL HISTORY:  DM (Diabetes Mellitus)     Dyslipidemia     HTN (Hypertension)     Hypothyroidism     Pulmonary TB Dx 2019, completed 10 month treatment

## 2022-11-08 NOTE — H&P ADULT - NSHPREVIEWOFSYSTEMS_GEN_ALL_CORE
REVIEW OF SYSTEMS:    CONSTITUTIONAL: No weakness, fevers or chills  EYES/ENT: No visual changes;  No vertigo or throat pain   NECK: No pain or stiffness  RESPIRATORY: No cough, wheezing, hemoptysis; +SOB while speaking  CARDIOVASCULAR: No chest pain or palpitations  GASTROINTESTINAL: No abdominal or epigastric pain. No nausea, vomiting, or hematemesis; No diarrhea or constipation. No melena or hematochezia.  GENITOURINARY: No dysuria, frequency or hematuria  NEUROLOGICAL: No numbness or weakness  SKIN: No itching, rashes

## 2022-11-08 NOTE — H&P ADULT - HISTORY OF PRESENT ILLNESS
Ms. 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 She was reportedly at baseline health with intermittent cough and was found to have BG in 500's this AM. EMS was called and upon arrival patient initially declined to go to the hospital. EMS waited with patient on scene for appx. 40 minutes for ALS team to arrive for telemetry monitoring at which point patient went to bathroom with granddaughter and was found to be unconscious for appx. 30 seconds in seated position without falling or head involvement. Patient was transported to the hospital and en route was found to have HR in 20's-30's. On arrival to ED patient was awake but improperly answering questions.    In the ED the patient was found to have BG of 554 on arrival. VBg showed acidosis to 7.19, BHB negative, lactate found to be 7.0. pCO2 elevated 57, Bicarb low at 19. She was given 2L NS and continued on LR 75cc/hr. MICU was consulted and recommended she receive half her basal insulin stat with the other half given at bedtime.  She was also given duonebs, EKG showed sinus rhythm w/ PVC's. CT scan was negative for PE but showed increase in HERON nonspecific opacity. CTH was negative for acute infarct or hemorrhage but also generalized volume loss.

## 2022-11-08 NOTE — CONSULT NOTE ADULT - ASSESSMENT
Recommendations:   - please repeat VBG  - additional insulin coverage needed  75 y/o F originally from [place] with PMH of previous pulmonary TB (2019) s/o 10 months of treatment, DM2, COPD? here for hyperglycemia without DKA - concerning for HHS. s/p 8u admelog with glucose from 600s to 400s. s/p 2L NS.     Recommendations:   - maintenance lactated ringer (not normal saline)   - give additional insulin for glc 400s since today am (admelog additional units now, NPO, q6h sliding scale moderate insulin, lantus bedtime)   - vbg trend lactate, pH   - continue duonebs for COPD, pCO2 elevated  75 y/o F originally from [place] with PMH of previous pulmonary TB (2019) s/o 10 months of treatment, DM2, COPD? here for hyperglycemia without DKA - concerning for HHS. s/p 8u admelog with glucose from 600s to 400s. s/p 2L NS.     Recommendations:   - maintenance lactated ringer (not normal saline)   - give additional insulin for glc 400s (admelog additional units, NPO, q6h sliding scale moderate insulin, lantus bedtime)   - trend VBG for lactate, pH    77 y/o F originally from [place] with PMH of previous pulmonary TB (2019) s/o 10 months of treatment, DM2, COPD? here for hyperglycemia without DKA - concerning for HHS. s/p 8u admelog with glucose from 600s to 400s. s/p 2L NS.     Recommendations:   - maintenance lactated ringer (not normal saline)   - give additional insulin for glc 400s (admelog additional units, NPO, q6h sliding scale moderate insulin, lantus bedtime)   - trend VBG for lactate, pH   - elevated pCO2 likely in setting of COPD - consider BiPAP overnight    77 y/o F originally from [place] with PMH of previous pulmonary TB (2019) s/o 10 months of treatment, DM2, COPD? here for hyperglycemia without DKA - concerning for HHS. s/p 8u admelog with glucose from 600s to 400s. s/p 2L NS.     Recommendations:   - maintenance lactated ringer (not normal saline)   - give additional insulin for glc 400s (admelog additional units, NPO, q6h sliding scale moderate insulin, lantus bedtime)   - trend VBG for lactate, pH   - elevated pCO2 likely in setting of COPD - consider BiPAP

## 2022-11-08 NOTE — ED ADULT TRIAGE NOTE - CHIEF COMPLAINT QUOTE
EMS states" Patient is bradycardic 20-20 and hypoglycemic and in respiratory distress' h/o DM, hyperlipidemia. patient is taken to room13 for treatment. EMS states" Patient is bradycardic 20-20 and hyperglycemic and in respiratory distress' h/o DM, hyperlipidemia. patient is taken to room13 for treatment.

## 2022-11-08 NOTE — H&P ADULT - NSHPLABSRESULTS_GEN_ALL_CORE
LABS:  11-08 @ 09:30 Creatine 75 U/L [25 - 170]                          10.2   11.26 )-----------( 300      ( 08 Nov 2022 09:00 )             38.4     11-08    135  |  102  |  27<H>  ----------------------------<  519<HH>  4.8   |  18<L>  |  1.03    Ca    8.6      08 Nov 2022 10:30  Mg     2.00     11-08    TPro  6.1  /  Alb  3.5  /  TBili  0.4  /  DBili  x   /  AST  84<H>  /  ALT  58<H>  /  AlkPhos  123<H>  11-08    PT/INR - ( 08 Nov 2022 09:00 )   PT: 13.3 sec;   INR: 1.14 ratio         PTT - ( 08 Nov 2022 09:00 )  PTT:34.7 sec

## 2022-11-08 NOTE — CONSULT NOTE ADULT - SUBJECTIVE AND OBJECTIVE BOX
CHIEF COMPLAINT:    HPI:    PAST MEDICAL & SURGICAL HISTORY:  HTN (Hypertension)      Dyslipidemia      DM (Diabetes Mellitus)      Hypothyroidism      Cataract of left eye          FAMILY HISTORY:  Family history of heart attack (Mother)  mother        SOCIAL HISTORY:  Smoking: __ packs x ___ years  EtOH Use:  Marital Status:  Occupation:  Recent Travel:  Country of Birth:  Advance Directives:    Allergies    No Known Allergies    Intolerances        HOME MEDICATIONS:    REVIEW OF SYSTEMS:  Constitutional:   Eyes:  ENT:  CV:  Resp:  GI:  :  MSK:  Integumentary:  Neurological:  Psychiatric:  Endocrine:  Hematologic/Lymphatic:  Allergic/Immunologic:  [ ] All other systems negative  [ ] Unable to assess ROS because ________    OBJECTIVE:  ICU Vital Signs Last 24 Hrs  T(C): 35.6 (2022 09:28), Max: 35.6 (2022 09:28)  T(F): 96.1 (2022 09:28), Max: 96.1 (2022 09:28)  HR: 85 (2022 13:04) (30 - 98)  BP: 140/94 (2022 13:04) (133/75 - 165/86)  BP(mean): --  ABP: --  ABP(mean): --  RR: 26 (2022 13:04) (22 - 30)  SpO2: 92% (2022 13:04) (84% - 100%)    O2 Parameters below as of 2022 13:04  Patient On (Oxygen Delivery Method): nasal cannula  O2 Flow (L/min): 4            CAPILLARY BLOOD GLUCOSE      POCT Blood Glucose.: 404 mg/dL (2022 14:42)      PHYSICAL EXAM:  General:   HEENT:   Lymph Nodes:  Neck:   Respiratory:   Cardiovascular:   Abdomen:   Extremities:   Skin:   Neurological:  Psychiatry:    HOSPITAL MEDICATIONS:  MEDICATIONS  (STANDING):    MEDICATIONS  (PRN):      LABS:                        10.2   11.26 )-----------( 300      ( 2022 09:00 )             38.4     11    135  |  102  |  27<H>  ----------------------------<  519<HH>  4.8   |  18<L>  |  1.03    Ca    8.6      2022 10:30  Mg     2.00     11-08    TPro  6.1  /  Alb  3.5  /  TBili  0.4  /  DBili  x   /  AST  84<H>  /  ALT  58<H>  /  AlkPhos  123<H>      PT/INR - ( 2022 09:00 )   PT: 13.3 sec;   INR: 1.14 ratio         PTT - ( 2022 09:00 )  PTT:34.7 sec  Urinalysis Basic - ( 2022 09:20 )    Color: Light Yellow / Appearance: Clear / S.022 / pH: x  Gluc: x / Ketone: Negative  / Bili: Negative / Urobili: <2 mg/dL   Blood: x / Protein: 100 mg/dL / Nitrite: Negative   Leuk Esterase: Negative / RBC: 1 /HPF / WBC 3 /HPF   Sq Epi: x / Non Sq Epi: 2 /HPF / Bacteria: Negative        Venous Blood Gas:   @ 13:02  7.19/60/29/23/37.7  VBG Lactate: 1.8  Venous Blood Gas:   @ 09:00  7.13/57/56/19/80.2  VBG Lactate: 7.0      MICROBIOLOGY:     RADIOLOGY:  [ ] Reviewed and interpreted by me    EKG: CHIEF COMPLAINT:    HPI:  77 y/o F originally from [place] with PMH of previous pulmonary TB (2019) s/o 10 months of treatment, HTN, DM. Presented with acute on chronic SOB. Pt reportedly diagnosed with with pulmonary TB in [place] in 2019 while visiting her son there. She was hospitalized for approximately one month, discharged with two medications (granddaughter is not sure how many exactly) which she took for 10 months. Followed with [facility] in [place] ([place]) and was cleared. SOB noted to improve, but not completely resolve. She is on home oxygen. Possible COPD vs old TB sequelae.       Per family, patient has had dyspnea, unclear if taking insulin. On admission concern for HHS, hyperglycemia without elevated beta hydroxybutyrate. Acidotic with elevate lactate and elevated CO2. At bedside patient is tachypneic, on 3L NC.     PAST MEDICAL & SURGICAL HISTORY:  HTN (Hypertension)      Dyslipidemia      DM (Diabetes Mellitus)      Hypothyroidism      Cataract of left eye          FAMILY HISTORY:  Family history of heart attack (Mother)  mother            Allergies    No Known Allergies    Intolerances    OBJECTIVE:  ICU Vital Signs Last 24 Hrs  T(C): 35.6 (2022 09:28), Max: 35.6 (2022 09:28)  T(F): 96.1 (2022 09:28), Max: 96.1 (2022 09:28)  HR: 85 (2022 13:04) (30 - 98)  BP: 140/94 (2022 13:04) (133/75 - 165/86)  BP(mean): --  ABP: --  ABP(mean): --  RR: 26 (2022 13:04) (22 - 30)  SpO2: 92% (2022 13:04) (84% - 100%)    O2 Parameters below as of 2022 13:04  Patient On (Oxygen Delivery Method): nasal cannula  O2 Flow (L/min): 4            CAPILLARY BLOOD GLUCOSE      POCT Blood Glucose.: 404 mg/dL (2022 14:42)      PHYSICAL EXAM:  General: lying down with head of bed elevated, tachypneic, on 3L NC  Respiratory: ronchi and wheeze b/l lungs   Cardiovascular: regular rate and rhythm   Abdomen: soft  Extremities:   Skin:   Neurological:  Psychiatry:    HOSPITAL MEDICATIONS:  MEDICATIONS  (STANDING):    MEDICATIONS  (PRN):      LABS:                        10.2   11.26 )-----------( 300      ( 2022 09:00 )             38.4         135  |  102  |  27<H>  ----------------------------<  519<HH>  4.8   |  18<L>  |  1.03    Ca    8.6      2022 10:30  Mg     2.00         TPro  6.1  /  Alb  3.5  /  TBili  0.4  /  DBili  x   /  AST  84<H>  /  ALT  58<H>  /  AlkPhos  123<H>      PT/INR - ( 2022 09:00 )   PT: 13.3 sec;   INR: 1.14 ratio         PTT - ( 2022 09:00 )  PTT:34.7 sec  Urinalysis Basic - ( 2022 09:20 )    Color: Light Yellow / Appearance: Clear / S.022 / pH: x  Gluc: x / Ketone: Negative  / Bili: Negative / Urobili: <2 mg/dL   Blood: x / Protein: 100 mg/dL / Nitrite: Negative   Leuk Esterase: Negative / RBC: 1 /HPF / WBC 3 /HPF   Sq Epi: x / Non Sq Epi: 2 /HPF / Bacteria: Negative        Venous Blood Gas:   @ 13:02  7.19/60/29/23/37.7  VBG Lactate: 1.8  Venous Blood Gas:   @ 09:00  7.13/57/56/19/80.2  VBG Lactate: 7.0      MICROBIOLOGY:     RADIOLOGY:  [ ] Reviewed and interpreted by me    EKG:

## 2022-11-09 DIAGNOSIS — R19.7 DIARRHEA, UNSPECIFIED: ICD-10-CM

## 2022-11-09 DIAGNOSIS — E11.9 TYPE 2 DIABETES MELLITUS WITHOUT COMPLICATIONS: ICD-10-CM

## 2022-11-09 LAB
ALBUMIN SERPL ELPH-MCNC: 3.8 G/DL — SIGNIFICANT CHANGE UP (ref 3.3–5)
ALP SERPL-CCNC: 110 U/L — SIGNIFICANT CHANGE UP (ref 40–120)
ALT FLD-CCNC: 49 U/L — HIGH (ref 4–33)
ANION GAP SERPL CALC-SCNC: 10 MMOL/L — SIGNIFICANT CHANGE UP (ref 7–14)
ANION GAP SERPL CALC-SCNC: 11 MMOL/L — SIGNIFICANT CHANGE UP (ref 7–14)
AST SERPL-CCNC: 34 U/L — HIGH (ref 4–32)
BILIRUB SERPL-MCNC: 0.3 MG/DL — SIGNIFICANT CHANGE UP (ref 0.2–1.2)
BUN SERPL-MCNC: 20 MG/DL — SIGNIFICANT CHANGE UP (ref 7–23)
BUN SERPL-MCNC: 21 MG/DL — SIGNIFICANT CHANGE UP (ref 7–23)
CALCIUM SERPL-MCNC: 8.7 MG/DL — SIGNIFICANT CHANGE UP (ref 8.4–10.5)
CALCIUM SERPL-MCNC: 8.9 MG/DL — SIGNIFICANT CHANGE UP (ref 8.4–10.5)
CHLORIDE SERPL-SCNC: 103 MMOL/L — SIGNIFICANT CHANGE UP (ref 98–107)
CHLORIDE SERPL-SCNC: 105 MMOL/L — SIGNIFICANT CHANGE UP (ref 98–107)
CO2 SERPL-SCNC: 24 MMOL/L — SIGNIFICANT CHANGE UP (ref 22–31)
CO2 SERPL-SCNC: 24 MMOL/L — SIGNIFICANT CHANGE UP (ref 22–31)
CREAT SERPL-MCNC: 0.88 MG/DL — SIGNIFICANT CHANGE UP (ref 0.5–1.3)
CREAT SERPL-MCNC: 0.89 MG/DL — SIGNIFICANT CHANGE UP (ref 0.5–1.3)
CULTURE RESULTS: NO GROWTH — SIGNIFICANT CHANGE UP
EGFR: 66 ML/MIN/1.73M2 — SIGNIFICANT CHANGE UP
EGFR: 67 ML/MIN/1.73M2 — SIGNIFICANT CHANGE UP
GLUCOSE BLDC GLUCOMTR-MCNC: 100 MG/DL — HIGH (ref 70–99)
GLUCOSE BLDC GLUCOMTR-MCNC: 126 MG/DL — HIGH (ref 70–99)
GLUCOSE BLDC GLUCOMTR-MCNC: 80 MG/DL — SIGNIFICANT CHANGE UP (ref 70–99)
GLUCOSE BLDC GLUCOMTR-MCNC: 81 MG/DL — SIGNIFICANT CHANGE UP (ref 70–99)
GLUCOSE BLDC GLUCOMTR-MCNC: 91 MG/DL — SIGNIFICANT CHANGE UP (ref 70–99)
GLUCOSE SERPL-MCNC: 217 MG/DL — HIGH (ref 70–99)
GLUCOSE SERPL-MCNC: 67 MG/DL — LOW (ref 70–99)
HCT VFR BLD CALC: 29.3 % — LOW (ref 34.5–45)
HGB BLD-MCNC: 8 G/DL — LOW (ref 11.5–15.5)
MAGNESIUM SERPL-MCNC: 1.7 MG/DL — SIGNIFICANT CHANGE UP (ref 1.6–2.6)
MCHC RBC-ENTMCNC: 19.2 PG — LOW (ref 27–34)
MCHC RBC-ENTMCNC: 27.3 GM/DL — LOW (ref 32–36)
MCV RBC AUTO: 70.4 FL — LOW (ref 80–100)
NRBC # BLD: 0 /100 WBCS — SIGNIFICANT CHANGE UP (ref 0–0)
NRBC # FLD: 0 K/UL — SIGNIFICANT CHANGE UP (ref 0–0)
PHOSPHATE SERPL-MCNC: 3.1 MG/DL — SIGNIFICANT CHANGE UP (ref 2.5–4.5)
PLATELET # BLD AUTO: 256 K/UL — SIGNIFICANT CHANGE UP (ref 150–400)
POTASSIUM SERPL-MCNC: 3.9 MMOL/L — SIGNIFICANT CHANGE UP (ref 3.5–5.3)
POTASSIUM SERPL-MCNC: 4.2 MMOL/L — SIGNIFICANT CHANGE UP (ref 3.5–5.3)
POTASSIUM SERPL-SCNC: 3.9 MMOL/L — SIGNIFICANT CHANGE UP (ref 3.5–5.3)
POTASSIUM SERPL-SCNC: 4.2 MMOL/L — SIGNIFICANT CHANGE UP (ref 3.5–5.3)
PROT SERPL-MCNC: 6.3 G/DL — SIGNIFICANT CHANGE UP (ref 6–8.3)
RBC # BLD: 4.16 M/UL — SIGNIFICANT CHANGE UP (ref 3.8–5.2)
RBC # FLD: 19.2 % — HIGH (ref 10.3–14.5)
SODIUM SERPL-SCNC: 138 MMOL/L — SIGNIFICANT CHANGE UP (ref 135–145)
SODIUM SERPL-SCNC: 139 MMOL/L — SIGNIFICANT CHANGE UP (ref 135–145)
SPECIMEN SOURCE: SIGNIFICANT CHANGE UP
WBC # BLD: 10.87 K/UL — HIGH (ref 3.8–10.5)
WBC # FLD AUTO: 10.87 K/UL — HIGH (ref 3.8–10.5)

## 2022-11-09 PROCEDURE — 99222 1ST HOSP IP/OBS MODERATE 55: CPT

## 2022-11-09 PROCEDURE — 99223 1ST HOSP IP/OBS HIGH 75: CPT

## 2022-11-09 RX ORDER — INSULIN GLARGINE 100 [IU]/ML
5 INJECTION, SOLUTION SUBCUTANEOUS
Refills: 0 | Status: DISCONTINUED | OUTPATIENT
Start: 2022-11-09 | End: 2022-11-09

## 2022-11-09 RX ORDER — INSULIN GLARGINE 100 [IU]/ML
8 INJECTION, SOLUTION SUBCUTANEOUS
Refills: 0 | Status: DISCONTINUED | OUTPATIENT
Start: 2022-11-09 | End: 2022-11-10

## 2022-11-09 RX ORDER — ACETAMINOPHEN 500 MG
650 TABLET ORAL EVERY 6 HOURS
Refills: 0 | Status: DISCONTINUED | OUTPATIENT
Start: 2022-11-09 | End: 2022-11-19

## 2022-11-09 RX ORDER — ACETAMINOPHEN 500 MG
650 TABLET ORAL ONCE
Refills: 0 | Status: COMPLETED | OUTPATIENT
Start: 2022-11-09 | End: 2022-11-09

## 2022-11-09 RX ORDER — ENOXAPARIN SODIUM 100 MG/ML
40 INJECTION SUBCUTANEOUS EVERY 24 HOURS
Refills: 0 | Status: DISCONTINUED | OUTPATIENT
Start: 2022-11-09 | End: 2022-11-19

## 2022-11-09 RX ADMIN — Medication 650 MILLIGRAM(S): at 13:04

## 2022-11-09 RX ADMIN — SIMVASTATIN 20 MILLIGRAM(S): 20 TABLET, FILM COATED ORAL at 21:32

## 2022-11-09 RX ADMIN — LOSARTAN POTASSIUM 100 MILLIGRAM(S): 100 TABLET, FILM COATED ORAL at 05:28

## 2022-11-09 RX ADMIN — SODIUM CHLORIDE 75 MILLILITER(S): 9 INJECTION, SOLUTION INTRAVENOUS at 19:49

## 2022-11-09 RX ADMIN — SODIUM CHLORIDE 75 MILLILITER(S): 9 INJECTION, SOLUTION INTRAVENOUS at 05:31

## 2022-11-09 RX ADMIN — Medication 650 MILLIGRAM(S): at 03:05

## 2022-11-09 RX ADMIN — INSULIN GLARGINE 8 UNIT(S): 100 INJECTION, SOLUTION SUBCUTANEOUS at 17:46

## 2022-11-09 RX ADMIN — SODIUM CHLORIDE 75 MILLILITER(S): 9 INJECTION, SOLUTION INTRAVENOUS at 21:32

## 2022-11-09 RX ADMIN — Medication 50 MICROGRAM(S): at 05:28

## 2022-11-09 NOTE — CONSULT NOTE ADULT - SUBJECTIVE AND OBJECTIVE BOX
Pulmonary Consult Note    MARTIR NÚÑEZ  MRN-[id number]    Chief Complaint: Patient is a 77y old  Female who presents with a chief complaint of Hyperglycemia, Syncope (08 Nov 2022 20:34)      HPI:  per chart review:  Ms. 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 She was reportedly at baseline health with intermittent cough and was found to have BG in 500's this AM. EMS was called and upon arrival patient initially declined to go to the hospital. EMS waited with patient on scene for appx. 40 minutes for ALS team to arrive for telemetry monitoring at which point patient went to bathroom with granddaughter and was found to be unconscious for appx. 30 seconds in seated position without falling or head involvement. Patient was transported to the hospital and en route was found to have HR in 20's-30's. On arrival to ED patient was awake but improperly answering questions.    In the ED the patient was found to have BG of 554 on arrival. VBg showed acidosis to 7.19, BHB negative, lactate found to be 7.0. pCO2 elevated 57, Bicarb low at 19. She was given 2L NS and continued on LR 75cc/hr. MICU was consulted and recommended she receive half her basal insulin stat with the other half given at bedtime.  She was also given duonebs, EKG showed sinus rhythm w/ PVC's. CT scan was negative for PE but showed increase in HERON nonspecific opacity. CTH was negative for acute infarct or hemorrhage but also generalized volume loss.      -  -  -on my exam:  -daughter in law at bedside, pt moaning, denies cp/sob at the moment.     ROS:  -  -  All other systems reviewed and negative    PAST MEDICAL HISTORY: HEALTH ISSUES - PROBLEM Dx:  Hyperosmolar hyperglycemic state (HHS)    High serum lactate    Tachypnea    Preventive measure    Altered mental status    SIRS (systemic inflammatory response syndrome)    Hypothyroid    Hypertension            SOCIAL HISTORY:     ACTIVE MEDICATION LIST:  MEDICATIONS  (STANDING):  dextrose 5%. 1000 milliLiter(s) (100 mL/Hr) IV Continuous <Continuous>  dextrose 5%. 1000 milliLiter(s) (50 mL/Hr) IV Continuous <Continuous>  dextrose 50% Injectable 25 Gram(s) IV Push once  dextrose 50% Injectable 12.5 Gram(s) IV Push once  dextrose 50% Injectable 25 Gram(s) IV Push once  enoxaparin Injectable 40 milliGRAM(s) SubCutaneous every 24 hours  glucagon  Injectable 1 milliGRAM(s) IntraMuscular once  insulin glargine Injectable (LANTUS) 5 Unit(s) SubCutaneous <User Schedule>  insulin lispro (ADMELOG) corrective regimen sliding scale   SubCutaneous three times a day before meals  insulin lispro (ADMELOG) corrective regimen sliding scale   SubCutaneous at bedtime  lactated ringers. 1000 milliLiter(s) (75 mL/Hr) IV Continuous <Continuous>  levothyroxine 50 MICROGram(s) Oral daily  losartan 100 milliGRAM(s) Oral daily  simvastatin 20 milliGRAM(s) Oral at bedtime    MEDICATIONS  (PRN):  albuterol/ipratropium for Nebulization.. 3 milliLiter(s) Nebulizer every 6 hours PRN Shortness of Breath and/or Wheezing  dextrose Oral Gel 15 Gram(s) Oral once PRN Blood Glucose LESS THAN 70 milliGRAM(s)/deciliter      EXAM:  Vital Signs Last 24 Hrs  T(C): 36.7 (09 Nov 2022 09:07), Max: 36.7 (08 Nov 2022 17:00)  T(F): 98 (09 Nov 2022 09:07), Max: 98.1 (08 Nov 2022 17:00)  HR: 86 (09 Nov 2022 09:07) (73 - 95)  BP: 117/63 (09 Nov 2022 09:07) (116/40 - 140/94)  BP(mean): --  RR: 25 (09 Nov 2022 09:07) (18 - 26)  SpO2: 100% (09 Nov 2022 09:07) (84% - 100%)    Parameters below as of 09 Nov 2022 09:07  Patient On (Oxygen Delivery Method): nasal cannula      GENERAL: No acute distress  NEURO: Alert   LUNGS: Clear to auscultation bilaterally, no rales or wheezes  CV: S1/S2      LABS/IMAGING: reviewed                        8.0    10.87 )-----------( 256      ( 09 Nov 2022 06:42 )             29.3   11-09    139  |  105  |  20  ----------------------------<  67<L>  3.9   |  24  |  0.89    Ca    8.9      09 Nov 2022 06:42  Phos  3.1     11-08  Mg     1.70     11-08    TPro  6.3  /  Alb  3.8  /  TBili  0.3  /  DBili  x   /  AST  34<H>  /  ALT  49<H>  /  AlkPhos  110  11-09    < from: CT Angio Chest PE Protocol w/ IV Cont (11.08.22 @ 11:39) >    ACC: [id number] EXAM:  CT ABDOMEN AND PELVIS IC                        ACC: [id number] EXAM:  CT ANGIO CHEST PULM ART [facility]                          PROCEDURE DATE:  11/08/2022          INTERPRETATION:  CTA CHEST AND CT ABDOMEN AND PELVIS    INDICATION: Syncope. Evaluate for pneumonia.    TECHNIQUE: Enhanced helical images were obtained of the chest, abdomen   and pelvis. Coronal and sagittal images were reconstructed. Maximum   intensity projection images were generated.    CONTRAST/COMPLICATIONS:  IV Contrast: Omnipaque 350 (accession [id number]), IV contrast documented   in associated exam (accession [id number])  90 cc administered   10 cc   discarded  Oral Contrast: NONE  Complications: None reported at time of study completion    COMPARISON: 5/29/2021 CT chest and 12/6/2021 CT abdomen.    FINDINGS:    PULMONARY ARTERIES: No pulmonary embolism detected. Please note that   multiple distal segmental and subsegmental pulmonary arterial branches   are not adequately assessed due to motion.    MEDIASTINUM: The right ventricle is qualitatively mildly dilated and   there is suggestion of flattening of the interventricular septum. No   pericardial effusion. Thoracic aorta normal caliber.  No large   mediastinal lymph nodes.    AIRWAYS, LUNGS, PLEURA: Severe narrowing of the right mainstem bronchus   and bronchus intermedius.    Right apical soft tissue opacification measuring 2.8 x 1.6 cm (image 13,   series 2) is similar to marginally decreased in size compared to   5/29/2021 where it was 2.9 x 1.8 cm when remeasured. An additional   anterior right upper lobe 1.3 cm nodular opacity (image 19, series 2) is   unchanged. Right apical cystic/cavitary opacity is likewise similar in   appearance. No significant interval change in right upperlobe   opacification along the minor fissure.    Left upper lobe parenchymal opacification measuring 3.6 x 2.2 cm (image   156, series 4) is without significant interval change compared to   5/29/2021, but progressed compared to an even earlier examination dated   10/26/2019.    No pleural effusion.    ABDOMEN and PELVIS: Peripheral enhancement of hepatic segment 7 (image   22, series 3) may be perfusional. Gallbladder, pancreas, spleen, adrenal   glands unremarkable. No hydronephrosis.    Unremarkable urinary bladder and uterus.    No bowel obstruction. Ascending and transverse colon are decompressed. No   free fluid. No abdominal or pelvic lymphadenopathy. Abdominal aorta   normal caliber.    BONES: Unremarkable.    SOFT TISSUES: Unremarkable.    IMPRESSION:    No pulmonary embolism detected.    No new lung parenchymal opacity to suggest acute pneumonia.    Indeterminate left upper lobe 3.6 x 2.2 cm opacity is similar compared to   5/29/2021, but progressed compared to 10/26/2019; the exact etiology is   unclear, but it is difficult to exclude lung neoplasm.    Additional right upper lobe opacities also unchanged compared to   5/29/2021.    Peripheral enhancement of the liver as described above may represent   perfusional abnormality. Otherwise, unremarkable evaluation of the   abdomen and pelvis.    --- End of Report ---            DONIS YATES MD; Attending Radiologist  This document has been electronically signed. Nov 8 2022 12:06PM    < end of copied text >    PROBLEM LIST:  77yFemale with HEALTH ISSUES - PROBLEM Dx:  Hyperosmolar hyperglycemic state (HHS)    High serum lactate    Tachypnea    Preventive measure    Altered mental status    SIRS (systemic inflammatory response syndrome)    Hypothyroid    Hypertension    RECS:  -currently resting comfortably on room air  -prn nebs if sob/wheezing  -pt DNR  -lung masses stabe from 2021 related to TB history? malignancy? would need GOC discussion in regards to further work up.  -appreciate MICU consult    Thank you for this consultation, please feel free to call with any questions [phone]  Kathleen Bashir MD

## 2022-11-09 NOTE — CONSULT NOTE ADULT - SUBJECTIVE AND OBJECTIVE BOX
Forrest Rosenthal MD  Interventional Cardiology / Endovascular Specialist  [place] Office : [phone] [place] [address] [place] [address]  Tel:   [place] Office : [address] [facility] [place] [address]  Tel: [phone]      HISTORY OF PRESENTING ILLNESS:  76YOF from [place] with PMH pulmonary TB (2019, treated 10 months), HTN, DM2 who was BIBEMS for elevated blood glucose. She was reportedly at baseline health with intermittent cough and was found to have BG in 500's this AM. EMS was called and upon arrival patient initially declined to go to the hospital. EMS waited with patient on scene for appx. 40 minutes for ALS team to arrive for telemetry monitoring at which point patient went to bathroom with granddaughter and was found to be unconscious for appx. 30 seconds in seated position without falling or head involvement. Patient was transported to the hospital and en route was found to have HR in 20's-30's. On arrival to ED patient was awake but improperly answering questions.    In the ED the patient was found to have BG of 554 on arrival. VBg showed acidosis to 7.19, BHB negative, lactate found to be 7.0. pCO2 elevated 57, Bicarb low at 19. She was given 2L NS and continued on LR 75cc/hr. MICU was consulted and recommended she receive half her basal insulin stat with the other half given at bedtime.  She was also given duonebs, EKG showed sinus rhythm w/ PVC's. CT scan was negative for PE but showed increase in HERON nonspecific opacity. CTH was negative for acute infarct or hemorrhage but also generalized volume loss.   	  MEDICATIONS:  enoxaparin Injectable 40 milliGRAM(s) SubCutaneous every 24 hours  losartan 100 milliGRAM(s) Oral daily      albuterol/ipratropium for Nebulization.. 3 milliLiter(s) Nebulizer every 6 hours PRN    acetaminophen     Tablet .. 650 milliGRAM(s) Oral every 6 hours PRN      dextrose 50% Injectable 25 Gram(s) IV Push once  dextrose 50% Injectable 12.5 Gram(s) IV Push once  dextrose 50% Injectable 25 Gram(s) IV Push once  dextrose Oral Gel 15 Gram(s) Oral once PRN  glucagon  Injectable 1 milliGRAM(s) IntraMuscular once  insulin glargine Injectable (LANTUS) 5 Unit(s) SubCutaneous <User Schedule>  insulin lispro (ADMELOG) corrective regimen sliding scale   SubCutaneous three times a day before meals  insulin lispro (ADMELOG) corrective regimen sliding scale   SubCutaneous at bedtime  levothyroxine 50 MICROGram(s) Oral daily  simvastatin 20 milliGRAM(s) Oral at bedtime    dextrose 5%. 1000 milliLiter(s) IV Continuous <Continuous>  dextrose 5%. 1000 milliLiter(s) IV Continuous <Continuous>  lactated ringers. 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 No nausea, vomiting, or hematemesis; No diarrhea or constipation. No melena or hematochezia.  GENITOURINARY: No dysuria, frequency, hematuria, or incontinence  NEUROLOGICAL: No headaches, memory loss, loss of strength, numbness, or tremors  ENDOCRINE: No heat or cold intolerance; No hair loss  MUSCULOSKELETAL: No joint pain or swelling; No muscle, back, or extremity pain  PSYCHIATRIC: No depression, anxiety, mood swings, or difficulty sleeping  HEME/LYMPH: No easy bruising, or bleeding gums  All others negative    PHYSICAL EXAM:  T(C): 36.7 (11-09-22 @ 09:07), Max: 36.7 (11-08-22 @ 17:00)  HR: 86 (11-09-22 @ 09:07) (73 - 95)  BP: 117/63 (11-09-22 @ 09:07) (116/40 - 133/66)  RR: 25 (11-09-22 @ 09:07) (18 - 25)  SpO2: 100% (11-09-22 @ 09:07) (100% - 100%)  Wt(kg): --  I&O's Summary        GENERAL: NAD  EYES: EOMI, PERRLA, conjunctiva and sclera clear  ENMT: No tonsillar erythema, exudates, or enlargement  Cardiovascular: Normal S1 S2, No JVD, No murmurs, No edema  Respiratory: Lungs clear to auscultation	  Gastrointestinal:  Soft, Non-tender, + BS	  Extremities: No edema                                     8.0    10.87 )-----------( 256      ( 09 Nov 2022 06:42 )             29.3     11-09    139  |  105  |  20  ----------------------------<  67<L>  3.9   |  24  |  0.89    Ca    8.9      09 Nov 2022 06:42  Phos  3.1     11-08  Mg     1.70     11-08    TPro  6.3  /  Alb  3.8  /  TBili  0.3  /  DBili  x   /  AST  34<H>  /  ALT  49<H>  /  AlkPhos  110  11-09    proBNP:   Lipid Profile:   HgA1c:   TSH:     Consultant(s) Notes Reviewed:  [x ] YES  [ ] NO    Care Discussed with Consultants/Other Providers [ x] YES  [ ] NO    Imaging Personally Reviewed independently:  [x] YES  [ ] NO    All labs, radiologic studies, vitals, orders and medications list reviewed. Patient is seen and examined at bedside. Case discussed with medical team.

## 2022-11-09 NOTE — DISCHARGE NOTE ADULT - VISION (WITH CORRECTIVE LENSES IF THE PATIENT USUALLY WEARS THEM):
Normal vision: sees adequately in most situations; can see medication labels, newsprint Wound Care: No ointment

## 2022-11-09 NOTE — CONSULT NOTE ADULT - SUBJECTIVE AND OBJECTIVE BOX
HPI:  Ms. Núñez is a 77YOF from [place] with PMH pulmonary TB (2019, treated 10 months), HTN, DM2 who was BIBEMS for elevated blood glucose. She was reportedly at baseline health with intermittent cough and was found to have BG in 500's this AM. EMS was called and upon arrival patient initially declined to go to the hospital. EMS waited with patient on scene for appx. 40 minutes for ALS team to arrive for telemetry monitoring at which point patient went to bathroom with granddaughter and was found to be unconscious for appx. 30 seconds in seated position without falling or head involvement. Patient was transported to the hospital and en route was found to have HR in 20's-30's. On arrival to ED patient was awake but improperly answering questions.    In the ED the patient was found to have BG of 554 on arrival. VBg showed acidosis to 7.19, BHB negative, lactate found to be 7.0. pCO2 elevated 57, Bicarb low at 19. She was given 2L NS and continued on LR 75cc/hr. MICU was consulted and recommended she receive half her basal insulin stat with the other half given at bedtime.  She was also given duonebs, EKG showed sinus rhythm w/ PVC's. CT scan was negative for PE but showed increase in HERON nonspecific opacity. CTH was negative for acute infarct or hemorrhage but also generalized volume loss.  (08 Nov 2022 20:34)    ENDOCRINE HISTORY: 78 yo female, Sinhala speaking, with PMH pulm TB (2019), HTN, DM2, BIBEMS for hyperglycemia.  Most of history obtained from granddaughter, Magdalena at patients request.  Per Magdalena, patient lives with her and Tish who is the patient's son.  Prefers all communication regarding patient be directed towards them since they are primary caregivers.  Patient woke up yesterday morning with high bg in 500s. Patient did not eat breakfast. EMS was called and patient did not want to go to hospital, however patient "passed out" abd EMS brought patient in.   Patient has diabetes type 2 x 20 years.  was on janumet in past.  About one year started on basal bolus insulin - Basaglar 22-24 units qhs and Novolog 14 units pre breakfast only.  FS are checked before meals and range from 200-300. Patient's granddaughter and son check FS and administers insulin.  And doses are not skipped.  Patient appetite is poor.  Patient eats mostly cake, cereal, milk, muffins, mostly carbs.  Whenever patient eats vegetable and fish, patient has episodes of diarrhea. Patient usually eats in morning, and in evening.  Night before admission patient ate cake at 930pm.  Novolog not given.  But Basaglar was. Patient does not have an endocrinologist, podiatrist or ophthalmologist. In ED patient received 12 units of Lantus at 5pm yesterday,  Patient had serum glucose 67 this AM.      PAST MEDICAL & SURGICAL HISTORY:  HTN (Hypertension)      Dyslipidemia      DM (Diabetes Mellitus)      Hypothyroidism      Pulmonary TB  Dx 2019, completed 10 month treatment      Cataract of left eye          FAMILY HISTORY:  Family history of heart attack (Mother)  mother        Social History:  lives with son and granddaughter    Outpatient Medications:  Basaglar/Novolog as above    MEDICATIONS  (STANDING):  dextrose 5%. 1000 milliLiter(s) (100 mL/Hr) IV Continuous <Continuous>  dextrose 5%. 1000 milliLiter(s) (50 mL/Hr) IV Continuous <Continuous>  dextrose 50% Injectable 25 Gram(s) IV Push once  dextrose 50% Injectable 12.5 Gram(s) IV Push once  dextrose 50% Injectable 25 Gram(s) IV Push once  enoxaparin Injectable 40 milliGRAM(s) SubCutaneous every 24 hours  glucagon  Injectable 1 milliGRAM(s) IntraMuscular once  insulin glargine Injectable (LANTUS) 5 Unit(s) SubCutaneous <User Schedule>  insulin lispro (ADMELOG) corrective regimen sliding scale   SubCutaneous three times a day before meals  insulin lispro (ADMELOG) corrective regimen sliding scale   SubCutaneous at bedtime  lactated ringers. 1000 milliLiter(s) (75 mL/Hr) IV Continuous <Continuous>  levothyroxine 50 MICROGram(s) Oral daily  losartan 100 milliGRAM(s) Oral daily  simvastatin 20 milliGRAM(s) Oral at bedtime    MEDICATIONS  (PRN):  acetaminophen     Tablet .. 650 milliGRAM(s) Oral every 6 hours PRN Mild Pain (1 - 3)  albuterol/ipratropium for Nebulization.. 3 milliLiter(s) Nebulizer every 6 hours PRN Shortness of Breath and/or Wheezing  dextrose Oral Gel 15 Gram(s) Oral once PRN Blood Glucose LESS THAN 70 milliGRAM(s)/deciliter      Allergies    No Known Allergies    Intolerances      Review of Systems:  UNABLE TO OBTAIN    PHYSICAL EXAM:  VITALS: T(C): 36.3 (11-09-22 @ 15:38)  T(F): 97.3 (11-09-22 @ 15:38), Max: 98.1 (11-08-22 @ 17:00)  HR: 78 (11-09-22 @ 15:38) (73 - 95)  BP: 143/77 (11-09-22 @ 15:38) (116/40 - 143/77)  RR:  (18 - 25)  SpO2:  (100% - 100%)  Wt(kg): --  GENERAL: NAD, well-groomed, well-developed  EYES: No proptosis, no lid lag, anicteric  HEENT:  Atraumatic, Normocephalic, moist mucous membranes  THYROID: Normal size, no palpable nodules  RESPIRATORY: Clear to auscultation bilaterally; No rales, rhonchi, wheezing, or rubs  CARDIOVASCULAR: Regular rate and rhythm; No murmurs; no peripheral edema  GI: Soft, nontender, non distended, normal bowel sounds  SKIN: Dry, intact, No rashes or lesions  MUSCULOSKELETAL: Full range of motion, normal strength  NEURO: sensation intact, extraocular movements intact, no tremor, normal reflexes  PSYCH: Alert and oriented x 3, normal affect, normal mood  CUSHING'S SIGNS: no striae    POCT Blood Glucose.: 126 mg/dL (11-09-22 @ 12:55)  POCT Blood Glucose.: 100 mg/dL (11-09-22 @ 08:56)  POCT Blood Glucose.: 81 mg/dL (11-09-22 @ 05:14)  POCT Blood Glucose.: 244 mg/dL (11-08-22 @ 23:19)  POCT Blood Glucose.: 294 mg/dL (11-08-22 @ 19:44)  POCT Blood Glucose.: 318 mg/dL (11-08-22 @ 17:38)  POCT Blood Glucose.: 404 mg/dL (11-08-22 @ 14:42)  POCT Blood Glucose.: 450 mg/dL (11-08-22 @ 12:58)  POCT Blood Glucose.: 499 mg/dL (11-08-22 @ 09:26)  POCT Blood Glucose.: 554 mg/dL (11-08-22 @ 08:37)                            8.0    10.87 )-----------( 256      ( 09 Nov 2022 06:42 )             29.3       11-09    139  |  105  |  20  ----------------------------<  67<L>  3.9   |  24  |  0.89    eGFR: 66    Ca    8.9      11-09  Mg     1.70     11-08  Phos  3.1     11-08    TPro  6.3  /  Alb  3.8  /  TBili  0.3  /  DBili  x   /  AST  34<H>  /  ALT  49<H>  /  AlkPhos  110  11-09      Thyroid Function Tests:  11-08 @ 09:00 TSH 2.23 FreeT4 -- T3 -- Anti TPO -- Anti Thyroglobulin Ab -- TSI --      A1C with Estimated Average Glucose (11.08.22 @ 09:00)    A1C with Estimated Average Glucose Result: 10.5%    Estimated Average Glucose: 255      Beta Hydroxy-Butyrate: 0.3 mmol/L (11.08.22 @ 09:00)    Anion Gap, Serum: 19 mmol/L (11.08.22 @ 09:00)  Anion Gap, Serum: 10 mmol/L (11.09.22 @ 06:42)

## 2022-11-09 NOTE — CONSULT NOTE ADULT - SUBJECTIVE AND OBJECTIVE BOX
Neurology consult    MARTIR [id number]     Patient is a 77y old  Female who presents with a chief complaint of Hyperglycemia, Syncope (2022 11:05)      HPI:  Ms. 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 She was reportedly at baseline health with intermittent cough and was found to have BG in 500's this AM. EMS was called and upon arrival patient initially declined to go to the hospital. EMS waited with patient on scene for appx. 40 minutes for ALS team to arrive for telemetry monitoring at which point patient went to bathroom with granddaughter and was found to be unconscious for appx. 30 seconds in seated position without falling or head involvement. Patient was transported to the hospital and en route was found to have HR in 20's-30's. On arrival to ED patient was awake but improperly answering questions.    In the ED the patient was found to have BG of 554 on arrival. VBg showed acidosis to 7.19, BHB negative, lactate found to be 7.0. pCO2 elevated 57, Bicarb low at 19. She was given 2L NS and continued on LR 75cc/hr. MICU was consulted and recommended she receive half her basal insulin stat with the other half given at bedtime.  She was also given duonebs, EKG showed sinus rhythm w/ PVC's. CT scan was negative for PE but showed increase in HERON nonspecific opacity. CTH was negative for acute infarct or hemorrhage but also generalized volume loss.  (2022 20:34)          MEDICATIONS    acetaminophen     Tablet .. 650 milliGRAM(s) Oral every 6 hours PRN  albuterol/ipratropium for Nebulization.. 3 milliLiter(s) Nebulizer every 6 hours PRN  dextrose 5%. 1000 milliLiter(s) IV Continuous <Continuous>  dextrose 5%. 1000 milliLiter(s) IV Continuous <Continuous>  dextrose 50% Injectable 25 Gram(s) IV Push once  dextrose 50% Injectable 12.5 Gram(s) IV Push once  dextrose 50% Injectable 25 Gram(s) IV Push once  dextrose Oral Gel 15 Gram(s) Oral once PRN  enoxaparin Injectable 40 milliGRAM(s) SubCutaneous every 24 hours  glucagon  Injectable 1 milliGRAM(s) IntraMuscular once  insulin glargine Injectable (LANTUS) 5 Unit(s) SubCutaneous <User Schedule>  insulin lispro (ADMELOG) corrective regimen sliding scale   SubCutaneous three times a day before meals  insulin lispro (ADMELOG) corrective regimen sliding scale   SubCutaneous at bedtime  lactated ringers. 1000 milliLiter(s) IV Continuous <Continuous>  levothyroxine 50 MICROGram(s) Oral daily  losartan 100 milliGRAM(s) Oral daily  simvastatin 20 milliGRAM(s) Oral at bedtime      PMH: HTN (Hypertension)    Dyslipidemia    DM (Diabetes Mellitus)    Hypothyroidism    Pulmonary TB         PSH: Cataract of left eye        Family history: No history of dementia, strokes, or seizures   FAMILY HISTORY:  Family history of heart attack (Mother)  mother        SOCIAL HISTORY:  No history of tobacco or alcohol use     Allergies    No Known Allergies    Intolerances            Vital Signs Last 24 Hrs  T(C): 36.7 (2022 09:07), Max: 36.7 (2022 17:00)  T(F): 98 (2022 09:07), Max: 98.1 (2022 17:00)  HR: 86 (2022 09:07) (73 - 95)  BP: 117/63 (2022 09:07) (116/40 - 133/66)  BP(mean): --  RR: 25 (2022 09:07) (18 - 25)  SpO2: 100% (2022 09:07) (100% - 100%)    Parameters below as of 2022 09:07  Patient On (Oxygen Delivery Method): nasal cannula          On Neurological Examination:    Mental Status - eyes closed opens to voice thin appearing oriented X3. fluent, names, no dysarthria no aphasia Follows commands well    Cranial Nerves - PERRL, EOMI, VFF, V1-V3 intact, no gross facial asymmetry, tongue/uvula midline    Motor Exam -   no drift    Sensory    Intact to light touch and pinprick bilaterally    Coord: FTN intact bilaterally     Gait -  deferred       LABS:  CBC Full  -  ( 2022 06:42 )  WBC Count : 10.87 K/uL  RBC Count : 4.16 M/uL  Hemoglobin : 8.0 g/dL  Hematocrit : 29.3 %  Platelet Count - Automated : 256 K/uL  Mean Cell Volume : 70.4 fL  Mean Cell Hemoglobin : 19.2 pg  Mean Cell Hemoglobin Concentration : 27.3 gm/dL  Auto Neutrophil # : x  Auto Lymphocyte # : x  Auto Monocyte # : x  Auto Eosinophil # : x  Auto Basophil # : x  Auto Neutrophil % : x  Auto Lymphocyte % : x  Auto Monocyte % : x  Auto Eosinophil % : x  Auto Basophil % : x    Urinalysis Basic - ( 2022 09:20 )    Color: Light Yellow / Appearance: Clear / S.022 / pH: x  Gluc: x / Ketone: Negative  / Bili: Negative / Urobili: <2 mg/dL   Blood: x / Protein: 100 mg/dL / Nitrite: Negative   Leuk Esterase: Negative / RBC: 1 /HPF / WBC 3 /HPF   Sq Epi: x / Non Sq Epi: 2 /HPF / Bacteria: Negative          139  |  105  |  20  ----------------------------<  67<L>  3.9   |  24  |  0.89    Ca    8.9      2022 06:42  Phos  3.1       Mg     1.70         TPro  6.3  /  Alb  3.8  /  TBili  0.3  /  DBili  x   /  AST  34<H>  /  ALT  49<H>  /  AlkPhos  110      LIVER FUNCTIONS - ( 2022 06:42 )  Alb: 3.8 g/dL / Pro: 6.3 g/dL / ALK PHOS: 110 U/L / ALT: 49 U/L / AST: 34 U/L / GGT: x           Hemoglobin A1C:       PT/INR - ( 2022 09:00 )   PT: 13.3 sec;   INR: 1.14 ratio         PTT - ( 2022 09:00 )  PTT:34.7 sec      RADIOLOGY  CTH < from: CT Head No Cont (22 @ 11:38) >    IMPRESSION:    -No acute transcortical infarct or intracranial hemorrhage.  -There has been interval generalized parenchymal volume loss.    --- End of Report ---          < end of copied text >

## 2022-11-09 NOTE — CONSULT NOTE ADULT - ASSESSMENT
76YOF from [place] with PMH pulmonary TB (2019, treated 10 months), HTN, DM2 here with AMs, elevated FS to 500s and episdoe of LOC, found to be damaris in EMS. PE rfamily MS now at BL. Neuro exam non-focal CTH no acute finding parenchymal loss      Impression AMS in setting of metabolic abnormalities      Continue to manage underlying medical issues  Can follow up with Neurology, Dr. Jose De Jesus Richards at [phone]

## 2022-11-09 NOTE — PATIENT PROFILE ADULT - FALL HARM RISK - HARM RISK INTERVENTIONS

## 2022-11-09 NOTE — CONSULT NOTE ADULT - ASSESSMENT
78 yo female with uncontrolled DM2, HTN, BIBEMS for hyperglycemia 500s, and syncope.  Found to be bradycardic.  A1c 10.5%    Endocrine team consulted for uncontrolled diabetes. Patient is high risk with high level decision making due to uncontrolled diabetes which places patient at high risk for cardiovascular and cerebrovascular events. Patient with lability of glucose requiring close monitoring and insulin adjustments.    Uncontrolled Dm2 c/b HHS  Target a1c 7-8%  HOME REGIMEN: Basaglar 22-24 units daily plus novolog 14 units pre meals  INPATIENT PLAN:  target bg 100-200 mg/dl give patients age  Patient had serum bg 67 this AM, received 12 units lantus yesterday around 5pm, therefore, recommend to give Lantus 8 units at 6pm tonight and then daily.  Once patient is eating, recommend to start Admelog 2 units tid ac- hold if skips meal/NPO  Continue with low dose admelog correction scale for premeal and also low scale for bedtime  RD consult  Bedside RN: please review family insulin pen injection technique.  If there are any concerns, please inform endocrine team.    DC plan:  Full plan TBD with more data once patient is in room and eating.  Likely will continue basal bolus insulin - with novolog being given with other meals when patient eats.      Diarrhea  Escalated to primary team  Consider GI consult    HTN  target /80  Continue Losartan   check urine microalbumin outpatient    Hypothyroid  continue synthroid 50 mcg po daily  Check TSH, free t4          Xochilt Gutiérrez  Nurse Practitioner  Division of Endocrinology & Diabetes  Pager # [phone]      If after 6PM or before 9AM, or on weekends/holidays, please call endocrine answering service for assistance ([phone]).  For nonurgent matters email [email or web address] for assistance.

## 2022-11-09 NOTE — PHYSICAL THERAPY INITIAL EVALUATION ADULT - PERTINENT HX OF CURRENT PROBLEM, REHAB EVAL
77 year old female admitted with AMs, elevated blood glucose to 500s and episdoe of LOC, found to be bradycardic in EMS. CT scan was negative for PE but showed increase in Left Upper Lobe nonspecific opacity. CTH was negative for acute infarct or hemorrhage but also generalized volume loss.

## 2022-11-09 NOTE — CONSULT NOTE ADULT - ASSESSMENT
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 Syncope  -HR reported to be 20-30s after, likely vasovagal  -obtain orthos and echo  -monitor on tele   -CT head unremarkable    2. Hyperglycemia  -improving  -f/u endo recs    3. SIRS  -WBC elevated  -f/u cultures    4. HTN  -controlled  -c/w losartan  -continue to monitor BP     5. DVT prophylaxis  -lovenox subq

## 2022-11-09 NOTE — PHYSICAL THERAPY INITIAL EVALUATION ADULT - ADDITIONAL COMMENTS
Prior level of function gathered through son at bedside. Pt lives with her son in a house with 15 steps to negotiate, ambulated with a rolling walker prior to admission and was independent in ADLs.    Following evaluation, pt was left semireclined on stretcher in no distress, all lines in tact, bed rails up. TRACEY gonzalez.

## 2022-11-10 LAB
ALBUMIN SERPL ELPH-MCNC: 3.4 G/DL — SIGNIFICANT CHANGE UP (ref 3.3–5)
ALP SERPL-CCNC: 98 U/L — SIGNIFICANT CHANGE UP (ref 40–120)
ALT FLD-CCNC: 40 U/L — HIGH (ref 4–33)
ANION GAP SERPL CALC-SCNC: 9 MMOL/L — SIGNIFICANT CHANGE UP (ref 7–14)
AST SERPL-CCNC: 32 U/L — SIGNIFICANT CHANGE UP (ref 4–32)
BASOPHILS # BLD AUTO: 0.04 K/UL — SIGNIFICANT CHANGE UP (ref 0–0.2)
BASOPHILS NFR BLD AUTO: 0.5 % — SIGNIFICANT CHANGE UP (ref 0–2)
BILIRUB SERPL-MCNC: 0.3 MG/DL — SIGNIFICANT CHANGE UP (ref 0.2–1.2)
BUN SERPL-MCNC: 13 MG/DL — SIGNIFICANT CHANGE UP (ref 7–23)
CALCIUM SERPL-MCNC: 8.3 MG/DL — LOW (ref 8.4–10.5)
CHLORIDE SERPL-SCNC: 104 MMOL/L — SIGNIFICANT CHANGE UP (ref 98–107)
CO2 SERPL-SCNC: 24 MMOL/L — SIGNIFICANT CHANGE UP (ref 22–31)
CREAT SERPL-MCNC: 0.82 MG/DL — SIGNIFICANT CHANGE UP (ref 0.5–1.3)
EGFR: 73 ML/MIN/1.73M2 — SIGNIFICANT CHANGE UP
EOSINOPHIL # BLD AUTO: 0.12 K/UL — SIGNIFICANT CHANGE UP (ref 0–0.5)
EOSINOPHIL NFR BLD AUTO: 1.6 % — SIGNIFICANT CHANGE UP (ref 0–6)
GLUCOSE BLDC GLUCOMTR-MCNC: 106 MG/DL — HIGH (ref 70–99)
GLUCOSE BLDC GLUCOMTR-MCNC: 119 MG/DL — HIGH (ref 70–99)
GLUCOSE BLDC GLUCOMTR-MCNC: 158 MG/DL — HIGH (ref 70–99)
GLUCOSE BLDC GLUCOMTR-MCNC: 195 MG/DL — HIGH (ref 70–99)
GLUCOSE BLDC GLUCOMTR-MCNC: 64 MG/DL — LOW (ref 70–99)
GLUCOSE BLDC GLUCOMTR-MCNC: 76 MG/DL — SIGNIFICANT CHANGE UP (ref 70–99)
GLUCOSE SERPL-MCNC: 72 MG/DL — SIGNIFICANT CHANGE UP (ref 70–99)
HCT VFR BLD CALC: 30.1 % — LOW (ref 34.5–45)
HGB BLD-MCNC: 8.1 G/DL — LOW (ref 11.5–15.5)
IANC: 5.42 K/UL — SIGNIFICANT CHANGE UP (ref 1.8–7.4)
IMM GRANULOCYTES NFR BLD AUTO: 0.4 % — SIGNIFICANT CHANGE UP (ref 0–0.9)
LYMPHOCYTES # BLD AUTO: 1.45 K/UL — SIGNIFICANT CHANGE UP (ref 1–3.3)
LYMPHOCYTES # BLD AUTO: 19 % — SIGNIFICANT CHANGE UP (ref 13–44)
MAGNESIUM SERPL-MCNC: 1.7 MG/DL — SIGNIFICANT CHANGE UP (ref 1.6–2.6)
MCHC RBC-ENTMCNC: 19.4 PG — LOW (ref 27–34)
MCHC RBC-ENTMCNC: 26.9 GM/DL — LOW (ref 32–36)
MCV RBC AUTO: 72.2 FL — LOW (ref 80–100)
MONOCYTES # BLD AUTO: 0.59 K/UL — SIGNIFICANT CHANGE UP (ref 0–0.9)
MONOCYTES NFR BLD AUTO: 7.7 % — SIGNIFICANT CHANGE UP (ref 2–14)
NEUTROPHILS # BLD AUTO: 5.42 K/UL — SIGNIFICANT CHANGE UP (ref 1.8–7.4)
NEUTROPHILS NFR BLD AUTO: 70.8 % — SIGNIFICANT CHANGE UP (ref 43–77)
NRBC # BLD: 0 /100 WBCS — SIGNIFICANT CHANGE UP (ref 0–0)
NRBC # FLD: 0 K/UL — SIGNIFICANT CHANGE UP (ref 0–0)
PHOSPHATE SERPL-MCNC: 2.5 MG/DL — SIGNIFICANT CHANGE UP (ref 2.5–4.5)
PLATELET # BLD AUTO: 222 K/UL — SIGNIFICANT CHANGE UP (ref 150–400)
POTASSIUM SERPL-MCNC: 3.9 MMOL/L — SIGNIFICANT CHANGE UP (ref 3.5–5.3)
POTASSIUM SERPL-SCNC: 3.9 MMOL/L — SIGNIFICANT CHANGE UP (ref 3.5–5.3)
PROT SERPL-MCNC: 5.9 G/DL — LOW (ref 6–8.3)
RBC # BLD: 4.17 M/UL — SIGNIFICANT CHANGE UP (ref 3.8–5.2)
RBC # FLD: 19.5 % — HIGH (ref 10.3–14.5)
SODIUM SERPL-SCNC: 137 MMOL/L — SIGNIFICANT CHANGE UP (ref 135–145)
T4 FREE SERPL-MCNC: 1.3 NG/DL — SIGNIFICANT CHANGE UP (ref 0.9–1.8)
TSH SERPL-MCNC: 1.08 UIU/ML — SIGNIFICANT CHANGE UP (ref 0.27–4.2)
WBC # BLD: 7.65 K/UL — SIGNIFICANT CHANGE UP (ref 3.8–10.5)
WBC # FLD AUTO: 7.65 K/UL — SIGNIFICANT CHANGE UP (ref 3.8–10.5)

## 2022-11-10 PROCEDURE — 93306 TTE W/DOPPLER COMPLETE: CPT | Mod: 26

## 2022-11-10 PROCEDURE — 99232 SBSQ HOSP IP/OBS MODERATE 35: CPT

## 2022-11-10 RX ORDER — INSULIN GLARGINE 100 [IU]/ML
6 INJECTION, SOLUTION SUBCUTANEOUS
Refills: 0 | Status: DISCONTINUED | OUTPATIENT
Start: 2022-11-10 | End: 2022-11-10

## 2022-11-10 RX ORDER — INSULIN GLARGINE 100 [IU]/ML
5 INJECTION, SOLUTION SUBCUTANEOUS
Refills: 0 | Status: DISCONTINUED | OUTPATIENT
Start: 2022-11-10 | End: 2022-11-11

## 2022-11-10 RX ADMIN — Medication 1: at 18:17

## 2022-11-10 RX ADMIN — SIMVASTATIN 20 MILLIGRAM(S): 20 TABLET, FILM COATED ORAL at 22:00

## 2022-11-10 RX ADMIN — Medication 50 MICROGRAM(S): at 06:40

## 2022-11-10 RX ADMIN — ENOXAPARIN SODIUM 40 MILLIGRAM(S): 100 INJECTION SUBCUTANEOUS at 13:09

## 2022-11-10 RX ADMIN — Medication 650 MILLIGRAM(S): at 04:26

## 2022-11-10 RX ADMIN — INSULIN GLARGINE 5 UNIT(S): 100 INJECTION, SOLUTION SUBCUTANEOUS at 18:16

## 2022-11-10 RX ADMIN — LOSARTAN POTASSIUM 100 MILLIGRAM(S): 100 TABLET, FILM COATED ORAL at 06:40

## 2022-11-10 RX ADMIN — Medication 650 MILLIGRAM(S): at 23:15

## 2022-11-10 RX ADMIN — SODIUM CHLORIDE 75 MILLILITER(S): 9 INJECTION, SOLUTION INTRAVENOUS at 18:15

## 2022-11-10 RX ADMIN — Medication 650 MILLIGRAM(S): at 04:56

## 2022-11-10 RX ADMIN — Medication 1: at 13:10

## 2022-11-10 NOTE — PROGRESS NOTE ADULT - ASSESSMENT
78 yo female with uncontrolled DM2, HTN, BIBEMS for hyperglycemia 500s, and syncope.  Found to be bradycardic.  A1c 10.5%    Endocrine team consulted for uncontrolled diabetes. Patient is high risk with high level decision making due to uncontrolled diabetes which places patient at high risk for cardiovascular and cerebrovascular events. Patient with lability of glucose requiring close monitoring and insulin adjustments.    Uncontrolled Dm2 c/b HHS  Target a1c 7-8%  HOME REGIMEN: Basaglar 22-24 units daily plus novolog 14 units pre meals  INPATIENT PLAN:  Target bg 100-200 mg/dl given patients age: below goal this am. Patient had FS of 64 this AM  Decrease Lantus to 5 units sq q 6pm  Hold off on standing premeal for now if pre-prandial above 200 start Admelog 2 units tid ac- hold if skips meal/NPO  Continue with low dose Admelog correction scale for premeal and also low dose Admelog correction scale for bedtime  Consistent Carbohydrate diet   Check FS before meals and at bedtime; please check 3 am FS   RD consult  Bedside RN: please review family insulin pen injection technique.  If there are any concerns, please inform endocrine team.    DC plan:  Full plan TBD with more data once patient is in room and eating.  Likely will continue basal bolus insulin - with novolog being given with other meals when patient eats.      Diarrhea  Escalated to primary team  Consider GI consult    HTN  Target /80  Continue Losartan   check urine microalbumin outpatient    Hypothyroid  continue synthroid 50 mcg po daily  TSH 11/8 2.28 WNL, 11/10 TCH 1.08, free t4 1.3 WNL   Repeat TFTs in 4-6 weeks       Randa Lu  Nurse Practitioner  Division of Endocrinology & Diabetes  In house pager #[phone]    If before 9AM or after 6PM, or on weekends/holidays, please call endocrine answering service for assistance ([phone]).For nonurgent matters email [email or web address] for assistance.  78 yo female with uncontrolled DM2, HTN, BIBEMS for hyperglycemia 500s, and syncope.  Found to be bradycardic.  A1c 10.5%    Endocrine team consulted for uncontrolled diabetes. Patient is high risk with high level decision making due to uncontrolled diabetes which places patient at high risk for cardiovascular and cerebrovascular events. Patient with lability of glucose requiring close monitoring and insulin adjustments.    Uncontrolled Dm2 c/b HHS  Target a1c 7-8%  HOME REGIMEN: Basaglar 22-24 units daily plus novolog 14 units pre meals  INPATIENT PLAN:  Target bg 100-200 mg/dl given patients age: below goal this am. Patient had FS of 64 this AM  Decrease Lantus to 5 units sq q 6pm  Hold off on standing premeal for now if pre-prandial above 200 start Admelog 2 units tid ac- hold if skips meal/NPO  Continue with low dose Admelog correction scale for premeal and also low dose Admelog correction scale for bedtime  Consistent Carbohydrate diet   Check FS before meals and at bedtime; please check 3 am FS   RD consult  Bedside RN: please review family insulin pen injection technique.  If there are any concerns, please inform endocrine team.    DC plan:  Full plan TBD with more data once patient is in room and eating.  Likely will continue basal bolus insulin - with novolog being given with other meals when patient eats.      Diarrhea  Escalated to primary team  Consider GI consult    HTN  Target /80  Continue Losartan   check urine microalbumin outpatient    Hypothyroid  continue synthroid 50 mcg po daily  TSH 11/8 2.28 WNL, 11/10 TCH 1.08, free t4 1.3 WNL   Repeat TFTs in 4-6 weeks       Hypocalcemia   Calcium 8.3  Check VIT D, 25 Hydroxy (ordered for am )      Randa Lu  Nurse Practitioner  Division of Endocrinology & Diabetes  In house pager #[phone]    If before 9AM or after 6PM, or on weekends/holidays, please call endocrine answering service for assistance ([phone]).For nonurgent matters email [email or web address] for assistance.  78 yo female with uncontrolled DM2, HTN, BIBEMS for hyperglycemia 500s, and syncope.  Found to be bradycardic.  A1c 10.5%    Endocrine team consulted for uncontrolled diabetes. Patient is high risk with high level decision making due to uncontrolled diabetes which places patient at high risk for cardiovascular and cerebrovascular events. Patient with lability of glucose requiring close monitoring and insulin adjustments.    Uncontrolled Dm2 c/b HHS  Target a1c 7-8%  HOME REGIMEN: Basaglar 22-24 units daily plus novolog 14 units pre meals  INPATIENT PLAN:  Target bg 100-200 mg/dl given patients age: below goal this am. Patient had FS of 64 this AM  Decrease Lantus to 5 units sq q 6pm  Hold off on standing premeal for now if pre-prandial above 200 start Admelog 2 units tid ac- hold if skips meal/NPO  Continue with low dose Admelog correction scale for premeal and also low dose Admelog correction scale for bedtime  Consistent Carbohydrate diet   Check FS before meals and at bedtime; please check 3 am FS   RD consult  Bedside RN: please review family insulin pen injection technique.  If there are any concerns, please inform endocrine team.    DC plan:  Full plan TBD with more data once patient is in room and eating.  Likely will continue basal bolus insulin - with novolog being given with other meals when patient eats.    Jeannie prefers to follow up [facility] PCP for DM mgmt  Please follow up with podiatry, pcp, and opthalmology as an outpt     Diarrhea  Escalated to primary team  Consider GI consult    HTN  Target /80  Continue Losartan   check urine microalbumin outpatient    Hypothyroid  continue synthroid 50 mcg po daily  TSH 11/8 2.28 WNL, 11/10 TCH 1.08, free t4 1.3 WNL   Repeat TFTs in 4-6 weeks       Hypocalcemia   Calcium 8.3  Check VIT D, 25 Hydroxy (ordered for am )      Randa Lu  Nurse Practitioner  Division of Endocrinology & Diabetes  In house pager #[phone]    If before 9AM or after 6PM, or on weekends/holidays, please call endocrine answering service for assistance ([phone]).For nonurgent matters email [email or web address] for assistance.  76 yo female with uncontrolled DM2, HTN, BIBEMS for hyperglycemia 500s, and syncope.  Found to be bradycardic.  A1c 10.5%    Endocrine team consulted for uncontrolled diabetes. Patient is high risk with high level decision making due to uncontrolled diabetes which places patient at high risk for cardiovascular and cerebrovascular events. Patient with lability of glucose requiring close monitoring and insulin adjustments.    Uncontrolled Dm2 c/b HHS  Target a1c 7-8%  HOME REGIMEN: Basaglar 22-24 units daily plus novolog 14 units pre meals  INPATIENT PLAN:  Target bg 100-200 mg/dl given patients age: below goal this am. Patient had FS of 64 this AM  Decrease Lantus to 5 units sq q 6pm  Hold off on standing premeal for now if pre-prandial glucose consistently above 200 start Admelog 2 units tid ac- hold if skips meal/NPO  Continue with low dose Admelog correction scale for premeal and also low dose Admelog correction scale for bedtime  Consistent Carbohydrate diet   Check FS before meals and at bedtime; please check 3 am FS   RD consult  Bedside RN: please review family insulin pen injection technique.  If there are any concerns, please inform endocrine team.    DC plan:  Full plan TBD with more data once patient is in room and eating.  Likely will continue basal bolus insulin - with novolog being given with other meals when patient eats.    Camdenparminderter prefers to follow up [facility] PCP for DM mgmt  Please follow up with podiatry, pcp, and opthalmology as an outpt     Diarrhea  Escalated to primary team  Consider GI consult    HTN  Target /80  Continue Losartan   check urine microalbumin outpatient    Hypothyroid  continue synthroid 50 mcg po daily  TSH 11/8 2.28 WNL, 11/10 TCH 1.08, free t4 1.3 WNL   Repeat TFTs in 4-6 weeks       Hypocalcemia   Calcium 8.3  Check VIT D, 25 Hydroxy (ordered for am )  Trend Calcium      Randa Lu  Nurse Practitioner  Division of Endocrinology & Diabetes  In house pager #[phone]    If before 9AM or after 6PM, or on weekends/holidays, please call endocrine answering service for assistance ([phone]).For nonurgent matters email [email or web address] for assistance.

## 2022-11-10 NOTE — DIETITIAN INITIAL EVALUATION ADULT - PERTINENT MEDS FT
MEDICATIONS  (STANDING):  dextrose 5%. 1000 milliLiter(s) (100 mL/Hr) IV Continuous <Continuous>  dextrose 5%. 1000 milliLiter(s) (50 mL/Hr) IV Continuous <Continuous>  dextrose 50% Injectable 25 Gram(s) IV Push once  dextrose 50% Injectable 12.5 Gram(s) IV Push once  dextrose 50% Injectable 25 Gram(s) IV Push once  enoxaparin Injectable 40 milliGRAM(s) SubCutaneous every 24 hours  glucagon  Injectable 1 milliGRAM(s) IntraMuscular once  insulin glargine Injectable (LANTUS) 6 Unit(s) SubCutaneous <User Schedule>  insulin lispro (ADMELOG) corrective regimen sliding scale   SubCutaneous three times a day before meals  insulin lispro (ADMELOG) corrective regimen sliding scale   SubCutaneous at bedtime  lactated ringers. 1000 milliLiter(s) (75 mL/Hr) IV Continuous <Continuous>  levothyroxine 50 MICROGram(s) Oral daily  losartan 100 milliGRAM(s) Oral daily  simvastatin 20 milliGRAM(s) Oral at bedtime    MEDICATIONS  (PRN):  acetaminophen     Tablet .. 650 milliGRAM(s) Oral every 6 hours PRN Mild Pain (1 - 3)  albuterol/ipratropium for Nebulization.. 3 milliLiter(s) Nebulizer every 6 hours PRN Shortness of Breath and/or Wheezing  dextrose Oral Gel 15 Gram(s) Oral once PRN Blood Glucose LESS THAN 70 milliGRAM(s)/deciliter

## 2022-11-10 NOTE — DIETITIAN INITIAL EVALUATION ADULT - PERTINENT LABORATORY DATA
11-10    137  |  104  |  13  ----------------------------<  72  3.9   |  24  |  0.82    Ca    8.3<L>      10 Nov 2022 06:48  Phos  2.5     11-10  Mg     1.70     11-10    TPro  5.9<L>  /  Alb  3.4  /  TBili  0.3  /  DBili  x   /  AST  32  /  ALT  40<H>  /  AlkPhos  98  11-10  POCT Blood Glucose.: 119 mg/dL (11-10-22 @ 10:20)  A1C with Estimated Average Glucose Result: 10.5 % (11-08-22 @ 09:00)

## 2022-11-10 NOTE — DIETITIAN INITIAL EVALUATION ADULT - NS FNS DIET ORDER
Diet, Consistent Carbohydrate w/Evening Snack:   DASH/TLC {Sodium & Cholesterol Restricted} (DASH) (11-09-22 @ 09:29)

## 2022-11-10 NOTE — DIETITIAN NUTRITION RISK NOTIFICATION - TREATMENT: THE FOLLOWING DIET HAS BEEN RECOMMENDED
Diet, Consistent Carbohydrate w/Evening Snack:   DASH/TLC {Sodium & Cholesterol Restricted} (DASH) (11-09-22 @ 09:29) [Active]

## 2022-11-10 NOTE — PROGRESS NOTE ADULT - ASSESSMENT
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 Syncope  -HR reported to be 20-30s after, likely vasovagal  -orthos negative  -echo normal LV systolic function, no WMA. mild diastolic dysfunction. decreased RV function   -monitor on tele   -CT head unremarkable    2. Hyperglycemia  -improving  -f/u endo recs    3. SIRS  -WBC elevated  -f/u cultures    4. HTN  -controlled  -c/w losartan  -continue to monitor BP     5. DVT prophylaxis  -lovenox subq

## 2022-11-10 NOTE — DIETITIAN INITIAL EVALUATION ADULT - OTHER INFO
Pt has a history of Pulmonary TB (2019), HTN and DM2. Pt presented with elevated blood glucose with a syncopal episode.   Spoke with Pt's granddaughter Magdalena to obtain information. Pt has a small appetite and eats a small amount of food at home. Pt primarily eat carbohydrates. Pt has diarrhea after she eats chicken, fish or vegetables. Granddaughter states that Pt refused to eat those foods because of the diarrhea. Carbohydrates do not cause any GI distress.   Granddaughter reports that she knows consistent carbohydrate diet but cannot get the Pt to change her eating habits.   As per HIE, Pt's weight was 68 kg in 12/2021. Her weight on 11/10/22 was 59.5 kg. Pt had an 8.5 kg/12.66%  weight loss in 11 mo.

## 2022-11-10 NOTE — DIETITIAN INITIAL EVALUATION ADULT - ORAL INTAKE PTA/DIET HISTORY
Pt is eating small amounts of food. She eats cake, bread with brown sugar, ice cream and other carbohydrates.

## 2022-11-11 ENCOUNTER — TRANSCRIPTION ENCOUNTER (OUTPATIENT)
Age: 77
End: 2022-11-11

## 2022-11-11 LAB
24R-OH-CALCIDIOL SERPL-MCNC: 23.1 NG/ML — LOW (ref 30–80)
ANION GAP SERPL CALC-SCNC: 9 MMOL/L — SIGNIFICANT CHANGE UP (ref 7–14)
BUN SERPL-MCNC: 12 MG/DL — SIGNIFICANT CHANGE UP (ref 7–23)
CALCIUM SERPL-MCNC: 8.4 MG/DL — SIGNIFICANT CHANGE UP (ref 8.4–10.5)
CHLORIDE SERPL-SCNC: 106 MMOL/L — SIGNIFICANT CHANGE UP (ref 98–107)
CO2 SERPL-SCNC: 26 MMOL/L — SIGNIFICANT CHANGE UP (ref 22–31)
CREAT SERPL-MCNC: 0.75 MG/DL — SIGNIFICANT CHANGE UP (ref 0.5–1.3)
EGFR: 81 ML/MIN/1.73M2 — SIGNIFICANT CHANGE UP
GLUCOSE BLDC GLUCOMTR-MCNC: 105 MG/DL — HIGH (ref 70–99)
GLUCOSE BLDC GLUCOMTR-MCNC: 148 MG/DL — HIGH (ref 70–99)
GLUCOSE BLDC GLUCOMTR-MCNC: 172 MG/DL — HIGH (ref 70–99)
GLUCOSE BLDC GLUCOMTR-MCNC: 216 MG/DL — HIGH (ref 70–99)
GLUCOSE BLDC GLUCOMTR-MCNC: 92 MG/DL — SIGNIFICANT CHANGE UP (ref 70–99)
GLUCOSE SERPL-MCNC: 103 MG/DL — HIGH (ref 70–99)
HCT VFR BLD CALC: 30.3 % — LOW (ref 34.5–45)
HGB BLD-MCNC: 8.1 G/DL — LOW (ref 11.5–15.5)
MAGNESIUM SERPL-MCNC: 1.8 MG/DL — SIGNIFICANT CHANGE UP (ref 1.6–2.6)
MCHC RBC-ENTMCNC: 19.2 PG — LOW (ref 27–34)
MCHC RBC-ENTMCNC: 26.7 GM/DL — LOW (ref 32–36)
MCV RBC AUTO: 72 FL — LOW (ref 80–100)
NRBC # BLD: 0 /100 WBCS — SIGNIFICANT CHANGE UP (ref 0–0)
NRBC # FLD: 0 K/UL — SIGNIFICANT CHANGE UP (ref 0–0)
PHOSPHATE SERPL-MCNC: 2.9 MG/DL — SIGNIFICANT CHANGE UP (ref 2.5–4.5)
PLATELET # BLD AUTO: 236 K/UL — SIGNIFICANT CHANGE UP (ref 150–400)
POTASSIUM SERPL-MCNC: 3.9 MMOL/L — SIGNIFICANT CHANGE UP (ref 3.5–5.3)
POTASSIUM SERPL-SCNC: 3.9 MMOL/L — SIGNIFICANT CHANGE UP (ref 3.5–5.3)
RBC # BLD: 4.21 M/UL — SIGNIFICANT CHANGE UP (ref 3.8–5.2)
RBC # FLD: 19.2 % — HIGH (ref 10.3–14.5)
SODIUM SERPL-SCNC: 141 MMOL/L — SIGNIFICANT CHANGE UP (ref 135–145)
WBC # BLD: 6.95 K/UL — SIGNIFICANT CHANGE UP (ref 3.8–10.5)
WBC # FLD AUTO: 6.95 K/UL — SIGNIFICANT CHANGE UP (ref 3.8–10.5)

## 2022-11-11 PROCEDURE — 93010 ELECTROCARDIOGRAM REPORT: CPT

## 2022-11-11 PROCEDURE — 71045 X-RAY EXAM CHEST 1 VIEW: CPT | Mod: 26

## 2022-11-11 PROCEDURE — 99232 SBSQ HOSP IP/OBS MODERATE 35: CPT

## 2022-11-11 RX ORDER — INSULIN GLARGINE 100 [IU]/ML
4 INJECTION, SOLUTION SUBCUTANEOUS
Refills: 0 | Status: DISCONTINUED | OUTPATIENT
Start: 2022-11-11 | End: 2022-11-14

## 2022-11-11 RX ORDER — PIPERACILLIN AND TAZOBACTAM 4; .5 G/20ML; G/20ML
3.38 INJECTION, POWDER, LYOPHILIZED, FOR SOLUTION INTRAVENOUS EVERY 8 HOURS
Refills: 0 | Status: DISCONTINUED | OUTPATIENT
Start: 2022-11-12 | End: 2022-11-15

## 2022-11-11 RX ORDER — PIPERACILLIN AND TAZOBACTAM 4; .5 G/20ML; G/20ML
3.38 INJECTION, POWDER, LYOPHILIZED, FOR SOLUTION INTRAVENOUS ONCE
Refills: 0 | Status: COMPLETED | OUTPATIENT
Start: 2022-11-11 | End: 2022-11-11

## 2022-11-11 RX ORDER — PIPERACILLIN AND TAZOBACTAM 4; .5 G/20ML; G/20ML
3.38 INJECTION, POWDER, LYOPHILIZED, FOR SOLUTION INTRAVENOUS ONCE
Refills: 0 | Status: COMPLETED | OUTPATIENT
Start: 2022-11-12 | End: 2022-11-12

## 2022-11-11 RX ADMIN — SODIUM CHLORIDE 75 MILLILITER(S): 9 INJECTION, SOLUTION INTRAVENOUS at 12:23

## 2022-11-11 RX ADMIN — SODIUM CHLORIDE 75 MILLILITER(S): 9 INJECTION, SOLUTION INTRAVENOUS at 21:40

## 2022-11-11 RX ADMIN — Medication 1: at 13:16

## 2022-11-11 RX ADMIN — PIPERACILLIN AND TAZOBACTAM 200 GRAM(S): 4; .5 INJECTION, POWDER, LYOPHILIZED, FOR SOLUTION INTRAVENOUS at 22:14

## 2022-11-11 RX ADMIN — SIMVASTATIN 20 MILLIGRAM(S): 20 TABLET, FILM COATED ORAL at 21:40

## 2022-11-11 RX ADMIN — Medication 50 MICROGRAM(S): at 06:01

## 2022-11-11 RX ADMIN — LOSARTAN POTASSIUM 100 MILLIGRAM(S): 100 TABLET, FILM COATED ORAL at 06:01

## 2022-11-11 RX ADMIN — Medication 30 MILLILITER(S): at 12:22

## 2022-11-11 RX ADMIN — INSULIN GLARGINE 4 UNIT(S): 100 INJECTION, SOLUTION SUBCUTANEOUS at 17:43

## 2022-11-11 RX ADMIN — ENOXAPARIN SODIUM 40 MILLIGRAM(S): 100 INJECTION SUBCUTANEOUS at 13:16

## 2022-11-11 NOTE — PROGRESS NOTE ADULT - TIME BILLING
-lt.lower chest area pain-likely musculoskeletal-cxr.pulm,.f/u noted  d/w family at bedside.Answered all questions -lt.lower chest area pain-likely vufauhyqotppfmv-spy-fabpiypq multifocal pneumonia . off antibiotic .pulm,.f/u   d/w family at bedside.Answered all questions -lt.lower chest area pain-likely czyuazuhqgvykgr-qwl-klkdctvb multifocal pneumonia . -consider empiric antibiotic-zosyn.d/w ACP .pulm,.f/u   d/w family at bedside.Answered all questions

## 2022-11-11 NOTE — DISCHARGE NOTE PROVIDER - CARE PROVIDERS DIRECT ADDRESSES
,DirectAddress_Unknown,[email or web address],DirectAddress_Unknown,DirectAddress_Unknown

## 2022-11-11 NOTE — DISCHARGE NOTE PROVIDER - NSDCCPCAREPLAN_GEN_ALL_CORE_FT
PRINCIPAL DISCHARGE DIAGNOSIS  Diagnosis: Hyperglycemia  Assessment and Plan of Treatment: You had elevated gulcose reading at time of admission. You were followed by the endocrinology team.      SECONDARY DISCHARGE DIAGNOSES  Diagnosis: Hyperosmolar hyperglycemic state (HHS)  Assessment and Plan of Treatment: Hyperosmolar hyperglycemic state (HHS).   pt. with Hx T2DM, on novolog and basaglar at home  Endocrinology followed and gave recommemdations for inpatient and home regimen.    Diagnosis: Syncope  Assessment and Plan of Treatment: HR reported to be 20-30s after, likely vasovagal  -echo normal LV systolic function, no WMA. mild diastolic dysfunction. decreased RV function   -CT head unremarkable.    Diagnosis: Hypothyroid  Assessment and Plan of Treatment: Continue levothyroxine    Diagnosis: Hypertension  Assessment and Plan of Treatment: Continue blood pressure medication regimen as directed. Monitor for any visual changes, headaches or dizziness.  Monitor blood pressure regularly.  Follow up with your PCP for further management for high blood pressure.      Diagnosis: Altered mental status  Assessment and Plan of Treatment: At baseline per family    Diagnosis: Pneumonia  Assessment and Plan of Treatment:      PRINCIPAL DISCHARGE DIAGNOSIS  Diagnosis: Hyperosmolar hyperglycemic state (HHS)  Assessment and Plan of Treatment: Your HgA1C during hospitalization was noted to be 10.5 (Provide such information to your primary care). Your diabetes medications were adjusted to Basaglar Kwik Pen 10 units nightly, Novolog Flex Pen 4 units twice a day pre meal and Metformin 500 mg by mouth twice daily.   Continue your medication regimen and a consistent carbohydrate diet (Meaning eating the same amount of carbohydrates at the same time each day). Monitor blood glucose levels throughout the day before meals and at bedtime. Record blood sugars and bring to outpatient providers appointment in order to be reviewed by your doctor for management modifications. If your sugars are more than 400 or less than 70 you should contact your PCP immediately.   Monitor for signs/symptoms of low blood glucose, such as, dizziness, altered mental status, or cool/clammy skin. In addition, monitor for signs/symptoms of high blood glucose, such as, feeling hot, dry, fatigued, or with increased thirst/urination.   Make regular podiatry appointments in order to have feet checked for wounds and uncontrolled toe nail growth to prevent infections, as well as, appointments with an ophthalmologist to monitor your vision.        SECONDARY DISCHARGE DIAGNOSES  Diagnosis: Pneumonia  Assessment and Plan of Treatment: Pulmonology was consulted for CAT scan findings that may be due to old TB. You were treated previously for TB as per the [facility] records in 2018. You were given a short course of antibiotics while in the hospital for possible pneumonia. You were found to have increased oxygen requirements while admitted and are being discharged on home oxygen. Please use oxygen to maintain an O2 saturation above 92 %.    Diagnosis: Syncope  Assessment and Plan of Treatment: You were evaluated in the hospital for a syncopal episode. You had a CAT scan of your head which was unremarkable and an echocardiogram which showed normal left ventricular systolic function but did show mild diastolic dysfunction and decreased right ventricular function. Please follow up with your PCP within 1-2 weeks.    Diagnosis: Hypertension  Assessment and Plan of Treatment: Continue blood pressure medication regimen as directed. Monitor for any visual changes, headaches or dizziness.  Monitor blood pressure regularly.  Follow up with your PCP for further management for high blood pressure.      Diagnosis: Hypothyroid  Assessment and Plan of Treatment: Continue with Levothyroxine and follow up with your PCP. Repeat thyroid function test in 4-6 weeks.

## 2022-11-11 NOTE — DISCHARGE NOTE PROVIDER - NSDCFUADDAPPT_GEN_ALL_CORE_FT
Please follow up with PCP within 1-2 weeks.    Follow up with:   - Dr Bashir Pulmonology  - Dr Rosenthal Cardiology  - Dr Richards Neurology

## 2022-11-11 NOTE — CHART NOTE - NSCHARTNOTEFT_GEN_A_CORE
Chart reviewed   See last progress note for complete plan of care including discharge planning   Fasting serum glucose 103  Will decrease Lantus slightly to 4 units   Continue Admelog correctional scales as ordered below   Endocrine      CAPILLARY BLOOD GLUCOSE      POCT Blood Glucose.: 172 mg/dL (11 Nov 2022 12:30)  POCT Blood Glucose.: 92 mg/dL (11 Nov 2022 10:48)  POCT Blood Glucose.: 105 mg/dL (11 Nov 2022 08:22)  POCT Blood Glucose.: 106 mg/dL (10 Nov 2022 22:26)  POCT Blood Glucose.: 158 mg/dL (10 Nov 2022 17:44)      11-11    141  |  106  |  12  ----------------------------<  103<H>  3.9   |  26  |  0.75    Ca    8.4      11 Nov 2022 05:35  Phos  2.9     11-11  Mg     1.80     11-11    TPro  5.9<L>  /  Alb  3.4  /  TBili  0.3  /  DBili  x   /  AST  32  /  ALT  40<H>  /  AlkPhos  98  11-10      MEDICATIONS  (STANDING):  dextrose 5%. 1000 milliLiter(s) (100 mL/Hr) IV Continuous <Continuous>  dextrose 5%. 1000 milliLiter(s) (50 mL/Hr) IV Continuous <Continuous>  dextrose 50% Injectable 25 Gram(s) IV Push once  dextrose 50% Injectable 12.5 Gram(s) IV Push once  dextrose 50% Injectable 25 Gram(s) IV Push once  enoxaparin Injectable 40 milliGRAM(s) SubCutaneous every 24 hours  glucagon  Injectable 1 milliGRAM(s) IntraMuscular once  insulin glargine Injectable (LANTUS) 5 Unit(s) SubCutaneous <User Schedule>  insulin lispro (ADMELOG) corrective regimen sliding scale   SubCutaneous three times a day before meals  insulin lispro (ADMELOG) corrective regimen sliding scale   SubCutaneous at bedtime  lactated ringers. 1000 milliLiter(s) (75 mL/Hr) IV Continuous <Continuous>  levothyroxine 50 MICROGram(s) Oral daily  losartan 100 milliGRAM(s) Oral daily  simvastatin 20 milliGRAM(s) Oral at bedtime      Diet, Consistent Carbohydrate w/Evening Snack:   DASH/TLC {Sodium & Cholesterol Restricted} (DASH) (11-09-22 @ 09:29)      A1C with Estimated Average Glucose Result: 10.5 % (11-08-22 @ 09:00) Chart reviewed   See last progress note for complete plan of care including discharge planning   Note: reported home regimen is much higher than current requirements and patient presented with [facility]  Fasting serum glucose 103  Will decrease Lantus slightly to 4 units   Continue Admelog correctional scales as ordered below   Endocrine      CAPILLARY BLOOD GLUCOSE      POCT Blood Glucose.: 172 mg/dL (11 Nov 2022 12:30)  POCT Blood Glucose.: 92 mg/dL (11 Nov 2022 10:48)  POCT Blood Glucose.: 105 mg/dL (11 Nov 2022 08:22)  POCT Blood Glucose.: 106 mg/dL (10 Nov 2022 22:26)  POCT Blood Glucose.: 158 mg/dL (10 Nov 2022 17:44)      11-11    141  |  106  |  12  ----------------------------<  103<H>  3.9   |  26  |  0.75    Ca    8.4      11 Nov 2022 05:35  Phos  2.9     11-11  Mg     1.80     11-11    TPro  5.9<L>  /  Alb  3.4  /  TBili  0.3  /  DBili  x   /  AST  32  /  ALT  40<H>  /  AlkPhos  98  11-10      MEDICATIONS  (STANDING):  dextrose 5%. 1000 milliLiter(s) (100 mL/Hr) IV Continuous <Continuous>  dextrose 5%. 1000 milliLiter(s) (50 mL/Hr) IV Continuous <Continuous>  dextrose 50% Injectable 25 Gram(s) IV Push once  dextrose 50% Injectable 12.5 Gram(s) IV Push once  dextrose 50% Injectable 25 Gram(s) IV Push once  enoxaparin Injectable 40 milliGRAM(s) SubCutaneous every 24 hours  glucagon  Injectable 1 milliGRAM(s) IntraMuscular once  insulin glargine Injectable (LANTUS) 5 Unit(s) SubCutaneous <User Schedule>  insulin lispro (ADMELOG) corrective regimen sliding scale   SubCutaneous three times a day before meals  insulin lispro (ADMELOG) corrective regimen sliding scale   SubCutaneous at bedtime  lactated ringers. 1000 milliLiter(s) (75 mL/Hr) IV Continuous <Continuous>  levothyroxine 50 MICROGram(s) Oral daily  losartan 100 milliGRAM(s) Oral daily  simvastatin 20 milliGRAM(s) Oral at bedtime      Diet, Consistent Carbohydrate w/Evening Snack:   DASH/TLC {Sodium & Cholesterol Restricted} (DASH) (11-09-22 @ 09:29)      A1C with Estimated Average Glucose Result: 10.5 % (11-08-22 @ 09:00)

## 2022-11-11 NOTE — DISCHARGE NOTE PROVIDER - PROVIDER TOKENS
PROVIDER:[TOKEN:[[id number]],ESTABLISHEDPATIENT:[[id number]]],PROVIDER:[TOKEN:[[id number]]],PROVIDER:[TOKEN:[[id number]]],PROVIDER:[TOKEN:[[id number]]]

## 2022-11-11 NOTE — DISCHARGE NOTE PROVIDER - NSDCMRMEDTOKEN_GEN_ALL_CORE_FT
Basaglar KwikPen 100 units/mL subcutaneous solution: 24 unit(s) subcutaneous once a day   glucometer (per patient&#x27;s insurance): Test blood sugars four times a day. Dispense #1 glucometer.  Insulin Pen Needles, 4mm: 1 application subcutaneously 4 times a day. ** Use with insulin pen ** Please price per insurance and page back [phone] with amount.   lancets: 1 application subcutaneously 4 times a day   levothyroxine 50 mcg (0.05 mg) oral tablet: 1 tab(s) orally once a day  losartan 100 mg oral tablet: 1 tab(s) orally once a day  NovoLOG FlexPen 100 units/mL injectable solution: 14 unit(s) injectable 3 times a day   Rolling walker: Rolling Walker  ICD: R26.2  Difficulty Walking  CISCO:999  simvastatin 20 mg oral tablet: 1 tab(s) orally once a day (at bedtime)  Synthroid 125 mcg (0.125 mg) oral tablet: 1 tab(s) orally once a day  test strips (per patient&#x27;s insurance): 1 application subcutaneously 4 times a day. ** Compatible with patient&#x27;s glucometer **   Admelog SoloStar 100 units/mL injectable solution: 5 unit(s) subcutaneous 3 times a day (with meals)   alcohol swabs : Apply topically to affected area 4 times a day   Basaglar KwikPen 100 units/mL subcutaneous solution: 24 unit(s) subcutaneous once a day   Basaglar KwikPen 100 units/mL subcutaneous solution: 10 unit(s) subcutaneous once a day   glucometer (per patient&#x27;s insurance): Test blood sugars four times a day. Dispense #1 glucometer.  glucometer (per patient&#x27;s insurance): Test blood sugars four times a day. Dispense #1 glucometer.  Insulin Pen Needles, 4mm: 1 application subcutaneously 4 times a day. ** Use with insulin pen ** Please price per insurance and page back [phone] with amount.   Insulin Pen Needles, 4mm: 1 application subcutaneously 4 times a day. ** Use with insulin pen **   lancets: 1 application subcutaneously 4 times a day   lancets: 1 application subcutaneously 4 times a day   levothyroxine 50 mcg (0.05 mg) oral tablet: 1 tab(s) orally once a day  losartan 100 mg oral tablet: 1 tab(s) orally once a day  NovoLOG FlexPen 100 units/mL injectable solution: 14 unit(s) injectable 3 times a day   Rolling walker: Rolling Walker  ICD: R26.2  Difficulty Walking  CISCO:999  simvastatin 20 mg oral tablet: 1 tab(s) orally once a day (at bedtime)  Synthroid 125 mcg (0.125 mg) oral tablet: 1 tab(s) orally once a day  test strips (per patient&#x27;s insurance): 1 application subcutaneously 4 times a day. ** Compatible with patient&#x27;s glucometer **  test strips (per patient&#x27;s insurance): 1 application subcutaneously 4 times a day. ** Compatible with patient&#x27;s glucometer **   Admelog SoloStar 100 units/mL injectable solution: 5 unit(s) subcutaneous 3 times a day (with meals)   alcohol swabs : Apply topically to affected area 4 times a day   Basaglar KwikPen 100 units/mL subcutaneous solution: 24 unit(s) subcutaneous once a day   Basaglar KwikPen 100 units/mL subcutaneous solution: 10 unit(s) subcutaneous once a day (at bedtime)   glucometer (per patient&#x27;s insurance): Test blood sugars four times a day. Dispense #1 glucometer.  glucometer (per patient&#x27;s insurance): Test blood sugars four times a day. Dispense #1 glucometer.  Insulin Pen Needles, 4mm: 1 application subcutaneously 4 times a day. ** Use with insulin pen ** Please price per insurance and page back [phone] with amount.   Insulin Pen Needles, 4mm: 1 application subcutaneously 4 times a day. ** Use with insulin pen **   lancets: 1 application subcutaneously 4 times a day   lancets: 1 application subcutaneously 4 times a day   levothyroxine 50 mcg (0.05 mg) oral tablet: 1 tab(s) orally once a day  losartan 100 mg oral tablet: 1 tab(s) orally once a day  NovoLOG FlexPen 100 units/mL injectable solution: 14 unit(s) injectable 3 times a day   Rolling walker: Rolling Walker  ICD: R26.2  Difficulty Walking  CISCO:999  simvastatin 20 mg oral tablet: 1 tab(s) orally once a day (at bedtime)  Synthroid 125 mcg (0.125 mg) oral tablet: 1 tab(s) orally once a day  test strips (per patient&#x27;s insurance): 1 application subcutaneously 4 times a day. ** Compatible with patient&#x27;s glucometer **  test strips (per patient&#x27;s insurance): 1 application subcutaneously 4 times a day. ** Compatible with patient&#x27;s glucometer **   Admelog SoloStar 100 units/mL injectable solution: 5 unit(s) subcutaneous 3 times a day (with meals)   alcohol swabs : Apply topically to affected area 4 times a day   Basaglar KwikPen 100 units/mL subcutaneous solution: 10 unit(s) subcutaneous once a day (at bedtime)   glucometer (per patient&#x27;s insurance): Test blood sugars four times a day. Dispense #1 glucometer.  Insulin Pen Needles, 4mm: 1 application subcutaneously 4 times a day. ** Use with insulin pen **   lancets: 1 application subcutaneously 4 times a day   levothyroxine 50 mcg (0.05 mg) oral tablet: 1 tab(s) orally once a day  losartan 100 mg oral tablet: 1 tab(s) orally once a day  NovoLOG FlexPen 100 units/mL injectable solution: 14 unit(s) injectable 3 times a day   Oxygen: 2L Nasal Cannula  Continuous Daily  J18.9  [id number]  simvastatin 20 mg oral tablet: 1 tab(s) orally once a day (at bedtime)  Synthroid 125 mcg (0.125 mg) oral tablet: 1 tab(s) orally once a day  test strips (per patient&#x27;s insurance): 1 application subcutaneously 4 times a day. ** Compatible with patient&#x27;s glucometer **   alcohol swabs : Apply topically to affected area 4 times a day   Basaglar KwikPen 100 units/mL subcutaneous solution: 10 unit(s) subcutaneous once a day (at bedtime)   glucometer (per patient&#x27;s insurance): Test blood sugars four times a day. Dispense #1 glucometer.  Insulin Pen Needles, 4mm: 1 application subcutaneously 4 times a day. ** Use with insulin pen **   lancets: 1 application subcutaneously 4 times a day   levothyroxine 50 mcg (0.05 mg) oral tablet: 1 tab(s) orally once a day  losartan 100 mg oral tablet: 1 tab(s) orally once a day  metFORMIN 500 mg oral tablet: 1 tab(s) orally 2 times a day   NovoLOG FlexPen 100 units/mL injectable solution: 4 unit(s) injectable 2 times a day before breakfast and dinner  Oxygen: 2L Nasal Cannula  Continuous Daily  J18.9  [id number]  simvastatin 20 mg oral tablet: 1 tab(s) orally once a day (at bedtime)  Synthroid 125 mcg (0.125 mg) oral tablet: 1 tab(s) orally once a day  test strips (per patient&#x27;s insurance): 1 application subcutaneously 4 times a day. ** Compatible with patient&#x27;s glucometer **   alcohol swabs : Apply topically to affected area 4 times a day   Basaglar KwikPen 100 units/mL subcutaneous solution: 10 unit(s) subcutaneous once a day (at bedtime)   Glucagon Emergency Kit for Low Blood Sugar 1 mg injection: 1 milligram(s) intramuscular once as needed for severe hypoglycemia, then call 911.  glucometer (per patient&#x27;s insurance): Test blood sugars four times a day. Dispense #1 glucometer.  glucose tablets: Follow instructions on bottle when sugar is low.  Insulin Pen Needles, 4mm: 1 application subcutaneously 4 times a day. ** Use with insulin pen **   lancets: 1 application subcutaneously 4 times a day   levothyroxine 50 mcg (0.05 mg) oral tablet: 1 tab(s) orally once a day  losartan 100 mg oral tablet: 1 tab(s) orally once a day  metFORMIN 500 mg oral tablet: 1 tab(s) orally 2 times a day   NovoLOG FlexPen 100 units/mL injectable solution: 4 unit(s) injectable 2 times a day before breakfast and dinner  Oxygen: 2L Nasal Cannula  Continuous Daily  J18.9  [id number]  simvastatin 20 mg oral tablet: 1 tab(s) orally once a day (at bedtime)  test strips (per patient&#x27;s insurance): 1 application subcutaneously 4 times a day. ** Compatible with patient&#x27;s glucometer **

## 2022-11-11 NOTE — DISCHARGE NOTE PROVIDER - DETAILS OF MALNUTRITION DIAGNOSIS/DIAGNOSES
This patient has been assessed with a concern for Malnutrition and was treated during this hospitalization for the following Nutrition diagnosis/diagnoses:     -  11/10/2022: Severe protein-calorie malnutrition

## 2022-11-11 NOTE — DISCHARGE NOTE PROVIDER - CARE PROVIDER_API CALL
Alfredo Hannah)  Internal Medicine  [address] [place] [address]  Phone: [phone]  Fax: [phone]  Established Patient  Follow Up Time:     Kathleen Bashir)  Critical Care Medicine; Internal Medicine; Pulmonary Disease  [address] [facility], [address]  [place] [address]  Phone: [phone]  Fax: [phone]  Follow Up Time:     Forrest Rosenthal)  Cardiovascular Disease; Internal Medicine  [address] [place] [address]  Phone: [phone]  Fax: [phone]  Follow Up Time:     Jose De Jesus Richards)  Neurology; Neurophysiology  [address] [facility], [address]  [place] [address]  Phone: [phone]  Fax: [phone]  Follow Up Time:

## 2022-11-11 NOTE — DISCHARGE NOTE PROVIDER - HOSPITAL COURSE
Ms. 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    Pneumonia.   Pulmonology consulted- CT findings may be due to old TB  EMR review state she had treatment in 2018 by [facility]  s/p short course abx for possible pna  wean O2 as tolerated  Patient meets criteria for home oxygen    Syncope.   HR reported to be 20-30s after, likely vasovagal  -orthostatics negative  -echo normal LV systolic function, no WMA. mild diastolic dysfunction. decreased RV function   -CT head unremarkable.    Hyperosmolar hyperglycemic state (HHS).   pt. with Hx T2DM, on novolog and basaglar at home  Endocrinology followed  INPATIENT PLAN:  Continue Lantus to 5 units sq at bedtime   Continue Admelog 2 units tid ac- hold if skips meal/NPO.    Altered mental status.   at baseline mental status as per pts family.    Hypothyroid.   on levothyroxine at home.    Hypertension.   losartan 100 daily, can continue while inpatient.    Hospital course discussed with medical attending. Patient is medically stable for discharge home with home oxygen.     Ms. 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    Pneumonia.   Pulmonology consulted- CT findings may be due to old TB  EMR review state she had treatment in 2018 by [facility]  s/p short course abx for possible pna  wean O2 as tolerated  Patient meets criteria for home oxygen  Home oxygen delivered 11/18/22 d/w family at bedside     Syncope.   HR reported to be 20-30s after, likely vasovagal  -orthostatics negative  -echo normal LV systolic function, no WMA. mild diastolic dysfunction. decreased RV function   -CT head unremarkable.    Hyperosmolar hyperglycemic state (HHS).   pt. with Hx T2DM, on novolog and basaglar at home  Endocrinology followed  INPATIENT PLAN:  Continue Lantus to 5 units sq at bedtime   Continue Admelog 2 units tid ac- hold if skips meal/NPO.  - DC plan with Endocrinology, Basaglar Kwik pen 10 units Qhs, Novolog Flex pen 4 units BID, Metformin 500 mg PO BID     Altered mental status.   at baseline mental status as per pts family.    Hypothyroid.   on levothyroxine at home.    Hypertension.   losartan 100 daily, can continue while inpatient.    On 11/18/22 this case was reviewed with Dr. Gonzalez, the patient is medically stable and optimized for discharge. All medications were reviewed and prescriptions were sent to mutually agreed upon pharmacy.    Ms. 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    Pneumonia.   - Pulmonology consulted- CT findings may be due to old TB  - EMR review state she had treatment in 2018 by [facility]  - s/p short course abx for possible pna  - Patient meets criteria for home oxygen  - Home oxygen set up with social work and case management     Syncope.   - HR reported to be 20-30s after, likely vasovagal  - orthostatics negative  - echo normal LV systolic function, no WMA. mild diastolic dysfunction. decreased RV function   - CT head unremarkable.    Hyperosmolar hyperglycemic state (HHS).   - pt. with Hx T2DM, on novolog and basaglar at home  - Endocrinology followed patient's course  - DC plan with Basaglar Kwik pen 10 units Qhs, Novolog Flex pen 4 units BID, Metformin 500 mg PO BID.   - Patient will follow up with PCP for diabetes management as per family     Altered mental status.   - at baseline mental status as per pts family.    Hypothyroid.   - Continue with Levothyroxine     Hypertension.   - Losartan 100 daily, can continue while inpatient.    On 11/18/22 this case was reviewed with Dr. Gonzalez, the patient is medically stable and optimized for discharge. All medications were reviewed and prescriptions were sent to mutually agreed upon pharmacy.

## 2022-11-12 LAB
ANION GAP SERPL CALC-SCNC: 7 MMOL/L — SIGNIFICANT CHANGE UP (ref 7–14)
BUN SERPL-MCNC: 10 MG/DL — SIGNIFICANT CHANGE UP (ref 7–23)
CALCIUM SERPL-MCNC: 8.3 MG/DL — LOW (ref 8.4–10.5)
CHLORIDE SERPL-SCNC: 101 MMOL/L — SIGNIFICANT CHANGE UP (ref 98–107)
CO2 SERPL-SCNC: 28 MMOL/L — SIGNIFICANT CHANGE UP (ref 22–31)
CREAT SERPL-MCNC: 0.73 MG/DL — SIGNIFICANT CHANGE UP (ref 0.5–1.3)
EGFR: 84 ML/MIN/1.73M2 — SIGNIFICANT CHANGE UP
GLUCOSE BLDC GLUCOMTR-MCNC: 144 MG/DL — HIGH (ref 70–99)
GLUCOSE BLDC GLUCOMTR-MCNC: 185 MG/DL — HIGH (ref 70–99)
GLUCOSE BLDC GLUCOMTR-MCNC: 186 MG/DL — HIGH (ref 70–99)
GLUCOSE BLDC GLUCOMTR-MCNC: 196 MG/DL — HIGH (ref 70–99)
GLUCOSE SERPL-MCNC: 210 MG/DL — HIGH (ref 70–99)
HCT VFR BLD CALC: 30 % — LOW (ref 34.5–45)
HGB BLD-MCNC: 8 G/DL — LOW (ref 11.5–15.5)
MAGNESIUM SERPL-MCNC: 1.9 MG/DL — SIGNIFICANT CHANGE UP (ref 1.6–2.6)
MCHC RBC-ENTMCNC: 19.4 PG — LOW (ref 27–34)
MCHC RBC-ENTMCNC: 26.7 GM/DL — LOW (ref 32–36)
MCV RBC AUTO: 72.8 FL — LOW (ref 80–100)
NRBC # BLD: 0 /100 WBCS — SIGNIFICANT CHANGE UP (ref 0–0)
NRBC # FLD: 0 K/UL — SIGNIFICANT CHANGE UP (ref 0–0)
PHOSPHATE SERPL-MCNC: 2.2 MG/DL — LOW (ref 2.5–4.5)
PLATELET # BLD AUTO: 208 K/UL — SIGNIFICANT CHANGE UP (ref 150–400)
POTASSIUM SERPL-MCNC: 3.9 MMOL/L — SIGNIFICANT CHANGE UP (ref 3.5–5.3)
POTASSIUM SERPL-SCNC: 3.9 MMOL/L — SIGNIFICANT CHANGE UP (ref 3.5–5.3)
RBC # BLD: 4.12 M/UL — SIGNIFICANT CHANGE UP (ref 3.8–5.2)
RBC # FLD: 19.3 % — HIGH (ref 10.3–14.5)
SODIUM SERPL-SCNC: 136 MMOL/L — SIGNIFICANT CHANGE UP (ref 135–145)
WBC # BLD: 7.75 K/UL — SIGNIFICANT CHANGE UP (ref 3.8–10.5)
WBC # FLD AUTO: 7.75 K/UL — SIGNIFICANT CHANGE UP (ref 3.8–10.5)

## 2022-11-12 RX ADMIN — Medication 100 MILLIGRAM(S): at 04:19

## 2022-11-12 RX ADMIN — Medication 1: at 13:33

## 2022-11-12 RX ADMIN — LOSARTAN POTASSIUM 100 MILLIGRAM(S): 100 TABLET, FILM COATED ORAL at 06:24

## 2022-11-12 RX ADMIN — Medication 650 MILLIGRAM(S): at 23:28

## 2022-11-12 RX ADMIN — SIMVASTATIN 20 MILLIGRAM(S): 20 TABLET, FILM COATED ORAL at 22:10

## 2022-11-12 RX ADMIN — Medication 650 MILLIGRAM(S): at 00:20

## 2022-11-12 RX ADMIN — ENOXAPARIN SODIUM 40 MILLIGRAM(S): 100 INJECTION SUBCUTANEOUS at 12:36

## 2022-11-12 RX ADMIN — PIPERACILLIN AND TAZOBACTAM 25 GRAM(S): 4; .5 INJECTION, POWDER, LYOPHILIZED, FOR SOLUTION INTRAVENOUS at 06:23

## 2022-11-12 RX ADMIN — INSULIN GLARGINE 4 UNIT(S): 100 INJECTION, SOLUTION SUBCUTANEOUS at 22:44

## 2022-11-12 RX ADMIN — PIPERACILLIN AND TAZOBACTAM 25 GRAM(S): 4; .5 INJECTION, POWDER, LYOPHILIZED, FOR SOLUTION INTRAVENOUS at 15:18

## 2022-11-12 RX ADMIN — PIPERACILLIN AND TAZOBACTAM 25 GRAM(S): 4; .5 INJECTION, POWDER, LYOPHILIZED, FOR SOLUTION INTRAVENOUS at 01:55

## 2022-11-12 RX ADMIN — Medication 50 MICROGRAM(S): at 06:23

## 2022-11-12 RX ADMIN — PIPERACILLIN AND TAZOBACTAM 25 GRAM(S): 4; .5 INJECTION, POWDER, LYOPHILIZED, FOR SOLUTION INTRAVENOUS at 22:10

## 2022-11-12 RX ADMIN — Medication 1: at 10:00

## 2022-11-12 NOTE — PROGRESS NOTE ADULT - TIME BILLING
-lt.lower chest area pain-likely dlojnsxcdsehnom-vjg-lmquwofq multifocal pneumonia . -C/W empiric antibiotic-zosyn.d/w ACP .pulm,.f/u   d/w family at bedside.Answered all questions

## 2022-11-12 NOTE — CHART NOTE - NSCHARTNOTEFT_GEN_A_CORE
Informed by RN that patient had an unplanned descent to the floor. RN found patient on floor with legs crossed and son besides her.     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 Pt seen and assessed at bedside. As per patient's son, patient went to the bathroom. As she got out of the bathroom, her son was helping her to walk when she felt like she couldn't walk any more. Pt slid down to the floor with the assistance of her son and sat down with her legs crossed. Did not hit her head, have any LOC, or have other trauma. Patient states she feels well and denies any pain at this time. States she had difficulty walking at the time of incident. Denies chest pain, shortness of breath, dizziness, headaches, back pain, leg pain or other complaints.    Vital Signs Last 24 Hrs  T(C): 36.4 (12 Nov 2022 00:56), Max: 36.8 (11 Nov 2022 17:43)  T(F): 97.5 (12 Nov 2022 00:56), Max: 98.2 (11 Nov 2022 17:43)  HR: 83 (12 Nov 2022 00:56) (77 - 87)  BP: 167/75 (12 Nov 2022 00:56) (157/75 - 171/76)  RR: 18 (12 Nov 2022 00:56) (16 - 20)  SpO2: 96% (12 Nov 2022 00:56) (96% - 100%)    PHYSICAL EXAM:  Constitutional Well groomed, no apparent distress  HEENT: atraumatic  Respiratory: No respiratory distress, no use of accessory muscles; CTA b/l, no WRR  CV: RRR, +S1S2, no JVD; no peripheral edema  GI: Soft, NT, ND, no rebound, no guarding;   PSYCH: Appropriate insight/judgment; A+O x 2 Informed by RN that patient had an unplanned descent to the floor. RN found patient on floor with legs crossed and son besides her.     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 Pt seen and assessed at bedside. As per patient's son, patient went to the bathroom. As she got out of the bathroom, her son was helping her to walk when she felt like she couldn't walk any more. Pt slid down to the floor with the assistance of her son and sat down with her legs crossed. Did not hit her head, have any LOC, or have other trauma. Patient states she feels well and denies any pain at this time. States she had difficulty walking at the time of incident. Denies chest pain, shortness of breath, dizziness, headaches, back pain, leg pain or other complaints.    Vital Signs Last 24 Hrs  T(C): 36.4 (12 Nov 2022 00:56), Max: 36.8 (11 Nov 2022 17:43)  T(F): 97.5 (12 Nov 2022 00:56), Max: 98.2 (11 Nov 2022 17:43)  HR: 83 (12 Nov 2022 00:56) (77 - 87)  BP: 167/75 (12 Nov 2022 00:56) (157/75 - 171/76)  RR: 18 (12 Nov 2022 00:56) (16 - 20)  SpO2: 96% (12 Nov 2022 00:56) (96% - 100%)    PHYSICAL EXAM:  Constitutional Well groomed, no apparent distress  HEENT: atraumatic  Respiratory: No respiratory distress, no use of accessory muscles; CTA b/l, no WRR  CV: RRR, +S1S2, no JVD; no peripheral edema  GI: Soft, NT, ND, no rebound, no guarding;   PSYCH: A+O x 2    Plan:  - VSS  - Pt denies head trauma, LOC or any injuries  - Fall precautions  - Advised RN to have pt ambulate with assistance  - Commode placed at bedside  - Will continue to monitor patient

## 2022-11-12 NOTE — PROVIDER CONTACT NOTE (FALL NOTIFICATION) - ASSESSMENT
Son notified staff that he needed help. Patient found sitting on the floor in front of the bed. Myself and another nurse assisted the patient back to the bed. Son says he assisted mother down to the floor and she didn't hit anything. Patient denied any pain.

## 2022-11-12 NOTE — PROVIDER CONTACT NOTE (FALL NOTIFICATION) - BACKGROUND
Son notified nurse (myself) that patient needed to go to the restroom. Patient was disconnected from fluids and son assisted mother to the restroom.

## 2022-11-13 LAB
ANION GAP SERPL CALC-SCNC: 9 MMOL/L — SIGNIFICANT CHANGE UP (ref 7–14)
BUN SERPL-MCNC: 8 MG/DL — SIGNIFICANT CHANGE UP (ref 7–23)
CALCIUM SERPL-MCNC: 8.5 MG/DL — SIGNIFICANT CHANGE UP (ref 8.4–10.5)
CHLORIDE SERPL-SCNC: 101 MMOL/L — SIGNIFICANT CHANGE UP (ref 98–107)
CO2 SERPL-SCNC: 30 MMOL/L — SIGNIFICANT CHANGE UP (ref 22–31)
CREAT SERPL-MCNC: 0.73 MG/DL — SIGNIFICANT CHANGE UP (ref 0.5–1.3)
CULTURE RESULTS: SIGNIFICANT CHANGE UP
CULTURE RESULTS: SIGNIFICANT CHANGE UP
EGFR: 84 ML/MIN/1.73M2 — SIGNIFICANT CHANGE UP
GLUCOSE BLDC GLUCOMTR-MCNC: 133 MG/DL — HIGH (ref 70–99)
GLUCOSE BLDC GLUCOMTR-MCNC: 186 MG/DL — HIGH (ref 70–99)
GLUCOSE BLDC GLUCOMTR-MCNC: 221 MG/DL — HIGH (ref 70–99)
GLUCOSE BLDC GLUCOMTR-MCNC: 228 MG/DL — HIGH (ref 70–99)
GLUCOSE SERPL-MCNC: 141 MG/DL — HIGH (ref 70–99)
HCT VFR BLD CALC: 30.4 % — LOW (ref 34.5–45)
HGB BLD-MCNC: 8.2 G/DL — LOW (ref 11.5–15.5)
MAGNESIUM SERPL-MCNC: 1.8 MG/DL — SIGNIFICANT CHANGE UP (ref 1.6–2.6)
MCHC RBC-ENTMCNC: 19.5 PG — LOW (ref 27–34)
MCHC RBC-ENTMCNC: 27 GM/DL — LOW (ref 32–36)
MCV RBC AUTO: 72.2 FL — LOW (ref 80–100)
NRBC # BLD: 0 /100 WBCS — SIGNIFICANT CHANGE UP (ref 0–0)
NRBC # FLD: 0 K/UL — SIGNIFICANT CHANGE UP (ref 0–0)
PHOSPHATE SERPL-MCNC: 1.7 MG/DL — LOW (ref 2.5–4.5)
PLATELET # BLD AUTO: 208 K/UL — SIGNIFICANT CHANGE UP (ref 150–400)
POTASSIUM SERPL-MCNC: 3.8 MMOL/L — SIGNIFICANT CHANGE UP (ref 3.5–5.3)
POTASSIUM SERPL-SCNC: 3.8 MMOL/L — SIGNIFICANT CHANGE UP (ref 3.5–5.3)
RBC # BLD: 4.21 M/UL — SIGNIFICANT CHANGE UP (ref 3.8–5.2)
RBC # FLD: 19.7 % — HIGH (ref 10.3–14.5)
SODIUM SERPL-SCNC: 140 MMOL/L — SIGNIFICANT CHANGE UP (ref 135–145)
SPECIMEN SOURCE: SIGNIFICANT CHANGE UP
SPECIMEN SOURCE: SIGNIFICANT CHANGE UP
WBC # BLD: 6.79 K/UL — SIGNIFICANT CHANGE UP (ref 3.8–10.5)
WBC # FLD AUTO: 6.79 K/UL — SIGNIFICANT CHANGE UP (ref 3.8–10.5)

## 2022-11-13 RX ADMIN — ENOXAPARIN SODIUM 40 MILLIGRAM(S): 100 INJECTION SUBCUTANEOUS at 14:12

## 2022-11-13 RX ADMIN — Medication 650 MILLIGRAM(S): at 23:08

## 2022-11-13 RX ADMIN — PIPERACILLIN AND TAZOBACTAM 25 GRAM(S): 4; .5 INJECTION, POWDER, LYOPHILIZED, FOR SOLUTION INTRAVENOUS at 22:43

## 2022-11-13 RX ADMIN — SIMVASTATIN 20 MILLIGRAM(S): 20 TABLET, FILM COATED ORAL at 23:07

## 2022-11-13 RX ADMIN — Medication 2: at 18:28

## 2022-11-13 RX ADMIN — PIPERACILLIN AND TAZOBACTAM 25 GRAM(S): 4; .5 INJECTION, POWDER, LYOPHILIZED, FOR SOLUTION INTRAVENOUS at 05:32

## 2022-11-13 RX ADMIN — PIPERACILLIN AND TAZOBACTAM 25 GRAM(S): 4; .5 INJECTION, POWDER, LYOPHILIZED, FOR SOLUTION INTRAVENOUS at 14:15

## 2022-11-13 RX ADMIN — LOSARTAN POTASSIUM 100 MILLIGRAM(S): 100 TABLET, FILM COATED ORAL at 05:29

## 2022-11-13 RX ADMIN — SODIUM CHLORIDE 75 MILLILITER(S): 9 INJECTION, SOLUTION INTRAVENOUS at 00:30

## 2022-11-13 RX ADMIN — Medication 50 MICROGRAM(S): at 05:29

## 2022-11-13 RX ADMIN — INSULIN GLARGINE 4 UNIT(S): 100 INJECTION, SOLUTION SUBCUTANEOUS at 22:44

## 2022-11-13 RX ADMIN — Medication 650 MILLIGRAM(S): at 01:10

## 2022-11-13 NOTE — PROGRESS NOTE ADULT - ASSESSMENT
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 Syncope  -HR reported to be 20-30s after, likely vasovagal  -orthos negative  -echo normal LV systolic function, no WMA. mild diastolic dysfunction. decreased RV function   -monitor on tele   -CT head unremarkable    2. Hyperglycemia  -improving  -f/u endo recs    3. SIRS  -WBC elevated  -f/u cultures    4. HTN  -controlled  -c/w losartan  -continue to monitor BP     5. DVT prophylaxis  -lovenox subq

## 2022-11-14 LAB
ANION GAP SERPL CALC-SCNC: 10 MMOL/L — SIGNIFICANT CHANGE UP (ref 7–14)
BUN SERPL-MCNC: 7 MG/DL — SIGNIFICANT CHANGE UP (ref 7–23)
CALCIUM SERPL-MCNC: 8.9 MG/DL — SIGNIFICANT CHANGE UP (ref 8.4–10.5)
CHLORIDE SERPL-SCNC: 98 MMOL/L — SIGNIFICANT CHANGE UP (ref 98–107)
CO2 SERPL-SCNC: 30 MMOL/L — SIGNIFICANT CHANGE UP (ref 22–31)
CREAT SERPL-MCNC: 0.79 MG/DL — SIGNIFICANT CHANGE UP (ref 0.5–1.3)
EGFR: 76 ML/MIN/1.73M2 — SIGNIFICANT CHANGE UP
GLUCOSE BLDC GLUCOMTR-MCNC: 100 MG/DL — HIGH (ref 70–99)
GLUCOSE BLDC GLUCOMTR-MCNC: 200 MG/DL — HIGH (ref 70–99)
GLUCOSE BLDC GLUCOMTR-MCNC: 202 MG/DL — HIGH (ref 70–99)
GLUCOSE BLDC GLUCOMTR-MCNC: 215 MG/DL — HIGH (ref 70–99)
GLUCOSE SERPL-MCNC: 215 MG/DL — HIGH (ref 70–99)
HCT VFR BLD CALC: 33.4 % — LOW (ref 34.5–45)
HGB BLD-MCNC: 9 G/DL — LOW (ref 11.5–15.5)
MAGNESIUM SERPL-MCNC: 1.8 MG/DL — SIGNIFICANT CHANGE UP (ref 1.6–2.6)
MCHC RBC-ENTMCNC: 19.8 PG — LOW (ref 27–34)
MCHC RBC-ENTMCNC: 26.9 GM/DL — LOW (ref 32–36)
MCV RBC AUTO: 73.4 FL — LOW (ref 80–100)
NRBC # BLD: 0 /100 WBCS — SIGNIFICANT CHANGE UP (ref 0–0)
NRBC # FLD: 0 K/UL — SIGNIFICANT CHANGE UP (ref 0–0)
PHOSPHATE SERPL-MCNC: 2 MG/DL — LOW (ref 2.5–4.5)
PLATELET # BLD AUTO: 234 K/UL — SIGNIFICANT CHANGE UP (ref 150–400)
POTASSIUM SERPL-MCNC: 4.1 MMOL/L — SIGNIFICANT CHANGE UP (ref 3.5–5.3)
POTASSIUM SERPL-SCNC: 4.1 MMOL/L — SIGNIFICANT CHANGE UP (ref 3.5–5.3)
RBC # BLD: 4.55 M/UL — SIGNIFICANT CHANGE UP (ref 3.8–5.2)
RBC # FLD: 19.9 % — HIGH (ref 10.3–14.5)
SODIUM SERPL-SCNC: 138 MMOL/L — SIGNIFICANT CHANGE UP (ref 135–145)
WBC # BLD: 8.6 K/UL — SIGNIFICANT CHANGE UP (ref 3.8–10.5)
WBC # FLD AUTO: 8.6 K/UL — SIGNIFICANT CHANGE UP (ref 3.8–10.5)

## 2022-11-14 PROCEDURE — 99232 SBSQ HOSP IP/OBS MODERATE 35: CPT

## 2022-11-14 RX ORDER — INSULIN LISPRO 100/ML
2 VIAL (ML) SUBCUTANEOUS
Refills: 0 | Status: DISCONTINUED | OUTPATIENT
Start: 2022-11-14 | End: 2022-11-17

## 2022-11-14 RX ORDER — INSULIN GLARGINE 100 [IU]/ML
4 INJECTION, SOLUTION SUBCUTANEOUS AT BEDTIME
Refills: 0 | Status: DISCONTINUED | OUTPATIENT
Start: 2022-11-14 | End: 2022-11-15

## 2022-11-14 RX ORDER — INSULIN GLARGINE 100 [IU]/ML
5 INJECTION, SOLUTION SUBCUTANEOUS
Refills: 0 | Status: DISCONTINUED | OUTPATIENT
Start: 2022-11-14 | End: 2022-11-14

## 2022-11-14 RX ORDER — INSULIN GLARGINE 100 [IU]/ML
5 INJECTION, SOLUTION SUBCUTANEOUS AT BEDTIME
Refills: 0 | Status: DISCONTINUED | OUTPATIENT
Start: 2022-11-14 | End: 2022-11-14

## 2022-11-14 RX ORDER — SODIUM,POTASSIUM PHOSPHATES 278-250MG
1 POWDER IN PACKET (EA) ORAL ONCE
Refills: 0 | Status: COMPLETED | OUTPATIENT
Start: 2022-11-14 | End: 2022-11-14

## 2022-11-14 RX ORDER — INSULIN LISPRO 100/ML
VIAL (ML) SUBCUTANEOUS
Refills: 0 | Status: DISCONTINUED | OUTPATIENT
Start: 2022-11-14 | End: 2022-11-15

## 2022-11-14 RX ADMIN — ENOXAPARIN SODIUM 40 MILLIGRAM(S): 100 INJECTION SUBCUTANEOUS at 13:35

## 2022-11-14 RX ADMIN — PIPERACILLIN AND TAZOBACTAM 25 GRAM(S): 4; .5 INJECTION, POWDER, LYOPHILIZED, FOR SOLUTION INTRAVENOUS at 13:35

## 2022-11-14 RX ADMIN — INSULIN GLARGINE 4 UNIT(S): 100 INJECTION, SOLUTION SUBCUTANEOUS at 18:00

## 2022-11-14 RX ADMIN — Medication 2 UNIT(S): at 18:00

## 2022-11-14 RX ADMIN — PIPERACILLIN AND TAZOBACTAM 25 GRAM(S): 4; .5 INJECTION, POWDER, LYOPHILIZED, FOR SOLUTION INTRAVENOUS at 06:46

## 2022-11-14 RX ADMIN — PIPERACILLIN AND TAZOBACTAM 25 GRAM(S): 4; .5 INJECTION, POWDER, LYOPHILIZED, FOR SOLUTION INTRAVENOUS at 22:08

## 2022-11-14 RX ADMIN — Medication 1: at 13:05

## 2022-11-14 RX ADMIN — Medication 50 MICROGRAM(S): at 06:46

## 2022-11-14 RX ADMIN — LOSARTAN POTASSIUM 100 MILLIGRAM(S): 100 TABLET, FILM COATED ORAL at 06:46

## 2022-11-14 RX ADMIN — Medication 2: at 09:05

## 2022-11-14 RX ADMIN — Medication 1: at 18:00

## 2022-11-14 RX ADMIN — SIMVASTATIN 20 MILLIGRAM(S): 20 TABLET, FILM COATED ORAL at 22:09

## 2022-11-14 RX ADMIN — Medication 1 PACKET(S): at 13:36

## 2022-11-14 RX ADMIN — Medication 650 MILLIGRAM(S): at 00:00

## 2022-11-14 NOTE — PROGRESS NOTE ADULT - ASSESSMENT
76 yo female with uncontrolled DM2, HTN, BIBEMS for hyperglycemia 500s, and syncope.  Found to be bradycardic.  A1c 10.5%    Endocrine team consulted for uncontrolled diabetes. Patient is high risk with high level decision making due to uncontrolled diabetes which places patient at high risk for cardiovascular and cerebrovascular events. Patient with lability of glucose requiring close monitoring and insulin adjustments.    Uncontrolled Dm2 c/b HHS  Target a1c 7-8%  HOME REGIMEN: Basaglar 22-24 units daily plus novolog 14 units pre meals  INPATIENT PLAN:  Target bg 100-200 mg/dl given patients age: above goal   Increase Lantus to 5 units sq q 6pm  Start Admelog 2 units tid ac- hold if skips meal/NPO  Continue with low dose Admelog correction scale for premeal and also low dose Admelog correction scale for bedtime  Consistent Carbohydrate diet   Check FS before meals and at bedtime; please check 3 am FS   RD consult  Bedside RN: please review family insulin pen injection technique.  If there are any concerns, please inform endocrine team.  Please consider changing abx solution from dextrose to sodium chloride    DC plan:  Likely will continue basal bolus insulin (will determine insulin dose based on inpt insulin requirements)  [facility] prefers to follow up with PCP for DM mgmt  Please follow up with podiatry, pcp, and opthalmology as an outpt   Ensure patient has working glucometer, test strips, lancets, alcohol pads, and BD scarlett pen needles  Please also prescribe glucose tabs, Baqsimi nasal spray or glucagon emergency kit for hypoglycemia risk       HTN  Target /80  Continue Losartan   check urine microalbumin outpatient    Hypothyroid  continue synthroid 50 mcg po daily  TSH 11/8 2.28 WNL, 11/10 TSH 1.08, free t4 1.3 WNL   Repeat TFTs in 4-6 weeks       Hypocalcemia   11/10 8.3 decreased Calcium today 8.9   VIT D, 25 Hydroxy 23.1 decreased ; Please add Vitamin D 1,000 iu daily   Trend Calcium    D/w Primary team     Randa Lu  Nurse Practitioner  Division of Endocrinology & Diabetes  In house pager #[phone]    If before 9AM or after 6PM, or on weekends/holidays, please call endocrine answering service for assistance ([phone]).For nonurgent matters email [email or web address] for assistance.  78 yo female with uncontrolled DM2, HTN, BIBEMS for hyperglycemia 500s, and syncope.  Found to be bradycardic.  A1c 10.5%    Endocrine team consulted for uncontrolled diabetes. Patient is high risk with high level decision making due to uncontrolled diabetes which places patient at high risk for cardiovascular and cerebrovascular events. Patient with lability of glucose requiring close monitoring and insulin adjustments.    Uncontrolled Dm2 c/b HHS  Target a1c 7-8%  HOME REGIMEN: Basaglar 22-24 units daily plus novolog 14 units pre meals  INPATIENT PLAN:  Target bg 100-200 mg/dl given patients age: above goal   Continue Lantus 4 units sq change to qhs (as pt received Lantus at 10 pm yesterday)  Start Admelog 2 units tid ac- hold if skips meal/NPO  Change from low to customized low Admelog correction scale for premeal and continue low dose Admelog correction scale for bedtime  Consistent Carbohydrate diet   Check FS before meals and at bedtime; please check 3 am FS   RD consult  Bedside RN: please review family insulin pen injection technique.  If there are any concerns, please inform endocrine team.  Please consider changing abx solution from dextrose to sodium chloride    DC plan:  Likely will continue basal bolus insulin (will determine insulin dose based on inpt insulin requirements)  [facility] prefers to follow up with PCP for DM mgmt  Please follow up with podiatry, pcp, and opthalmology as an outpt   Ensure patient has working glucometer, test strips, lancets, alcohol pads, and BD scarlett pen needles  Please also prescribe glucose tabs, Baqsimi nasal spray or glucagon emergency kit for hypoglycemia risk       HTN  Target /80  Continue Losartan   check urine microalbumin outpatient    Hypothyroid  continue synthroid 50 mcg po daily  TSH 11/8 2.28 WNL, 11/10 TSH 1.08, free t4 1.3 WNL   Repeat TFTs in 4-6 weeks       Hypocalcemia   11/10 8.3 decreased Calcium today 8.9   VIT D, 25 Hydroxy 23.1 decreased ; Please add Vitamin D 1,000 iu daily   Trend Calcium    D/w Primary team     Randa Lu  Nurse Practitioner  Division of Endocrinology & Diabetes  In house pager #[phone]    If before 9AM or after 6PM, or on weekends/holidays, please call endocrine answering service for assistance ([phone]).For nonurgent matters email [email or web address] for assistance.

## 2022-11-14 NOTE — PROGRESS NOTE ADULT - ASSESSMENT
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 Syncope  -HR reported to be 20-30s after, likely vasovagal  -orthos negative  -echo normal LV systolic function, no WMA. mild diastolic dysfunction. decreased RV function   -monitor on tele   -CT head unremarkable    2. Hyperglycemia  -improving  -f/u endo recs    3. SIRS  -WBC elevated  -f/u cultures  -on abx for PNA    4. HTN  -controlled  -c/w losartan  -continue to monitor BP     5. DVT prophylaxis  -lovenox subq

## 2022-11-15 ENCOUNTER — TRANSCRIPTION ENCOUNTER (OUTPATIENT)
Age: 77
End: 2022-11-15

## 2022-11-15 LAB
ANION GAP SERPL CALC-SCNC: 10 MMOL/L — SIGNIFICANT CHANGE UP (ref 7–14)
BUN SERPL-MCNC: 6 MG/DL — LOW (ref 7–23)
CALCIUM SERPL-MCNC: 8.7 MG/DL — SIGNIFICANT CHANGE UP (ref 8.4–10.5)
CHLORIDE SERPL-SCNC: 96 MMOL/L — LOW (ref 98–107)
CO2 SERPL-SCNC: 32 MMOL/L — HIGH (ref 22–31)
CREAT SERPL-MCNC: 0.82 MG/DL — SIGNIFICANT CHANGE UP (ref 0.5–1.3)
EGFR: 73 ML/MIN/1.73M2 — SIGNIFICANT CHANGE UP
GLUCOSE BLDC GLUCOMTR-MCNC: 112 MG/DL — HIGH (ref 70–99)
GLUCOSE BLDC GLUCOMTR-MCNC: 193 MG/DL — HIGH (ref 70–99)
GLUCOSE BLDC GLUCOMTR-MCNC: 193 MG/DL — HIGH (ref 70–99)
GLUCOSE BLDC GLUCOMTR-MCNC: 265 MG/DL — HIGH (ref 70–99)
GLUCOSE BLDC GLUCOMTR-MCNC: 53 MG/DL — CRITICAL LOW (ref 70–99)
GLUCOSE BLDC GLUCOMTR-MCNC: 56 MG/DL — LOW (ref 70–99)
GLUCOSE SERPL-MCNC: 216 MG/DL — HIGH (ref 70–99)
HCT VFR BLD CALC: 30.8 % — LOW (ref 34.5–45)
HGB BLD-MCNC: 8.2 G/DL — LOW (ref 11.5–15.5)
MAGNESIUM SERPL-MCNC: 1.8 MG/DL — SIGNIFICANT CHANGE UP (ref 1.6–2.6)
MCHC RBC-ENTMCNC: 19.3 PG — LOW (ref 27–34)
MCHC RBC-ENTMCNC: 26.6 GM/DL — LOW (ref 32–36)
MCV RBC AUTO: 72.5 FL — LOW (ref 80–100)
NRBC # BLD: 0 /100 WBCS — SIGNIFICANT CHANGE UP (ref 0–0)
NRBC # FLD: 0 K/UL — SIGNIFICANT CHANGE UP (ref 0–0)
PHOSPHATE SERPL-MCNC: 2.3 MG/DL — LOW (ref 2.5–4.5)
PLATELET # BLD AUTO: 228 K/UL — SIGNIFICANT CHANGE UP (ref 150–400)
POTASSIUM SERPL-MCNC: 3.7 MMOL/L — SIGNIFICANT CHANGE UP (ref 3.5–5.3)
POTASSIUM SERPL-SCNC: 3.7 MMOL/L — SIGNIFICANT CHANGE UP (ref 3.5–5.3)
RBC # BLD: 4.25 M/UL — SIGNIFICANT CHANGE UP (ref 3.8–5.2)
RBC # FLD: 19.9 % — HIGH (ref 10.3–14.5)
SODIUM SERPL-SCNC: 138 MMOL/L — SIGNIFICANT CHANGE UP (ref 135–145)
WBC # BLD: 6.08 K/UL — SIGNIFICANT CHANGE UP (ref 3.8–10.5)
WBC # FLD AUTO: 6.08 K/UL — SIGNIFICANT CHANGE UP (ref 3.8–10.5)

## 2022-11-15 PROCEDURE — 99232 SBSQ HOSP IP/OBS MODERATE 35: CPT

## 2022-11-15 RX ORDER — SENNA PLUS 8.6 MG/1
2 TABLET ORAL AT BEDTIME
Refills: 0 | Status: DISCONTINUED | OUTPATIENT
Start: 2022-11-15 | End: 2022-11-19

## 2022-11-15 RX ORDER — INSULIN GLARGINE 100 [IU]/ML
5 INJECTION, SOLUTION SUBCUTANEOUS
Refills: 0 | Status: DISCONTINUED | OUTPATIENT
Start: 2022-11-15 | End: 2022-11-15

## 2022-11-15 RX ORDER — POLYETHYLENE GLYCOL 3350 17 G/17G
17 POWDER, FOR SOLUTION ORAL DAILY
Refills: 0 | Status: DISCONTINUED | OUTPATIENT
Start: 2022-11-15 | End: 2022-11-19

## 2022-11-15 RX ORDER — CHOLECALCIFEROL (VITAMIN D3) 125 MCG
1000 CAPSULE ORAL DAILY
Refills: 0 | Status: DISCONTINUED | OUTPATIENT
Start: 2022-11-15 | End: 2022-11-19

## 2022-11-15 RX ORDER — INSULIN LISPRO 100/ML
VIAL (ML) SUBCUTANEOUS
Refills: 0 | Status: DISCONTINUED | OUTPATIENT
Start: 2022-11-15 | End: 2022-11-16

## 2022-11-15 RX ORDER — INSULIN GLARGINE 100 [IU]/ML
5 INJECTION, SOLUTION SUBCUTANEOUS
Refills: 0 | Status: DISCONTINUED | OUTPATIENT
Start: 2022-11-15 | End: 2022-11-19

## 2022-11-15 RX ORDER — PIPERACILLIN AND TAZOBACTAM 4; .5 G/20ML; G/20ML
3.38 INJECTION, POWDER, LYOPHILIZED, FOR SOLUTION INTRAVENOUS EVERY 8 HOURS
Refills: 0 | Status: DISCONTINUED | OUTPATIENT
Start: 2022-11-15 | End: 2022-11-15

## 2022-11-15 RX ADMIN — PIPERACILLIN AND TAZOBACTAM 25 GRAM(S): 4; .5 INJECTION, POWDER, LYOPHILIZED, FOR SOLUTION INTRAVENOUS at 06:53

## 2022-11-15 RX ADMIN — Medication 2 UNIT(S): at 09:20

## 2022-11-15 RX ADMIN — Medication 2 UNIT(S): at 13:01

## 2022-11-15 RX ADMIN — ENOXAPARIN SODIUM 40 MILLIGRAM(S): 100 INJECTION SUBCUTANEOUS at 13:03

## 2022-11-15 RX ADMIN — INSULIN GLARGINE 5 UNIT(S): 100 INJECTION, SOLUTION SUBCUTANEOUS at 22:12

## 2022-11-15 RX ADMIN — PIPERACILLIN AND TAZOBACTAM 25 GRAM(S): 4; .5 INJECTION, POWDER, LYOPHILIZED, FOR SOLUTION INTRAVENOUS at 13:02

## 2022-11-15 RX ADMIN — LOSARTAN POTASSIUM 100 MILLIGRAM(S): 100 TABLET, FILM COATED ORAL at 06:53

## 2022-11-15 RX ADMIN — Medication 2: at 13:01

## 2022-11-15 RX ADMIN — POLYETHYLENE GLYCOL 3350 17 GRAM(S): 17 POWDER, FOR SOLUTION ORAL at 13:02

## 2022-11-15 RX ADMIN — Medication 50 MICROGRAM(S): at 06:53

## 2022-11-15 RX ADMIN — SIMVASTATIN 20 MILLIGRAM(S): 20 TABLET, FILM COATED ORAL at 22:13

## 2022-11-15 NOTE — PROGRESS NOTE ADULT - ASSESSMENT
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 Syncope  -HR reported to be 20-30s after, likely vasovagal  -orthos negative  -echo normal LV systolic function, no WMA. mild diastolic dysfunction. decreased RV function   -monitor on tele   -CT head unremarkable    2. Hyperglycemia  -improving  -f/u endo recs    3. SIRS  -WBC elevated  -f/u cultures  -on abx for PNA    4. HTN  -controlled  -c/w losartan  -continue to monitor BP     5. DVT prophylaxis  -lovenox subq

## 2022-11-15 NOTE — PROGRESS NOTE ADULT - ASSESSMENT
76 yo female with uncontrolled DM2, HTN, BIBEMS for hyperglycemia 500s, and syncope.  Found to be bradycardic.  A1c 10.5%    Endocrine team consulted for uncontrolled diabetes. Patient is high risk with high level decision making due to uncontrolled diabetes which places patient at high risk for cardiovascular and cerebrovascular events. Patient with lability of glucose requiring close monitoring and insulin adjustments.    Uncontrolled Dm2 c/b HHS  Target a1c 7-8%  HOME REGIMEN: Basaglar 22-24 units daily plus novolog 14 units pre meals  INPATIENT PLAN:  Target bg 100-200 mg/dl given patients age: above goal this am : Bedtime  maybe due to pt receiving lantus 4 hours earlier than day before  Increase Lantus to 5 units sq change to q8pm then will move 2 hours later on 11/6 to bedtime  Continue Admelog 2 units tid ac- hold if skips meal/NPO  Change customized low to low Admelog correction scale for premeal and continue low dose Admelog correction scale for bedtime  Consistent Carbohydrate diet   Check FS before meals and at bedtime; please check 3 am FS   RD consult  Bedside RN: please review family insulin pen injection technique.  If there are any concerns, please inform endocrine team.  Please consider changing abx solution from dextrose to sodium chloride    DC plan:  Likely will continue basal bolus insulin (will determine insulin dose based on inpt insulin requirements)  [facility] prefers to follow up with PCP for DM mgmt  Please follow up with podiatry, pcp, and opthalmology as an outpt   Ensure patient has working glucometer, test strips, lancets, alcohol pads, and BD scarlett pen needles  Please also prescribe glucose tabs, Baqsimi nasal spray or glucagon emergency kit for hypoglycemia risk     HTN  Target /80  Continue Losartan   check urine microalbumin outpatient    Hypothyroid  continue synthroid 50 mcg po daily  TSH 11/8 2.28 WNL, 11/10 TSH 1.08, free t4 1.3 WNL   Repeat TFTs in 4-6 weeks     Hypocalcemia   11/10 8.3 decreased now calcium within normal limits   VIT D, 25 Hydroxy 23.1 decreased ; Please add Vitamin D 1,000 iu daily   Trend Calcium    D/w Primary team     Randa Lu  Nurse Practitioner  Division of Endocrinology & Diabetes  In house pager #[phone]    If before 9AM or after 6PM, or on weekends/holidays, please call endocrine answering service for assistance ([phone]).For nonurgent matters email [email or web address] for assistance.  76 yo female with uncontrolled DM2, HTN, BIBEMS for hyperglycemia 500s, and syncope.  Found to be bradycardic.  A1c 10.5%    Endocrine team consulted for uncontrolled diabetes. Patient is high risk with high level decision making due to uncontrolled diabetes which places patient at high risk for cardiovascular and cerebrovascular events. Patient with lability of glucose requiring close monitoring and insulin adjustments.    Uncontrolled Dm2 c/b HHS  Target a1c 7-8%  HOME REGIMEN: Basaglar 22-24 units daily plus novolog 14 units pre meals  INPATIENT PLAN:  Target bg 100-200 mg/dl given patients age: above goal this am : Bedtime  maybe due to pt receiving lantus 4 hours earlier than day before. Pt eating in between meals. Advised family to follow hospital meal schedule.  Family verbalized understanding   Increase Lantus to 5 units sq change to q8pm then will move 2 hours later on 11/16 to bedtime  Continue Admelog 2 units tid ac- hold if skips meal/NPO  Change customized low to low Admelog correction scale for premeal and continue low dose Admelog correction scale for bedtime  Consistent Carbohydrate diet   Check FS before meals and at bedtime; please check 3 am FS   RD consult  Bedside RN: please review family insulin pen injection technique.  If there are any concerns, please inform endocrine team.  Please consider changing abx solution from dextrose to sodium chloride    DC plan:  Likely will continue basal bolus insulin (will determine insulin dose based on inpt insulin requirements)  [facility] prefers to follow up with PCP for DM mgmt  Please follow up with podiatry, pcp, and opthalmology as an outpt   Ensure patient has working glucometer, test strips, lancets, alcohol pads, and BD scarlett pen needles  Please also prescribe glucose tabs, Baqsimi nasal spray or glucagon emergency kit for hypoglycemia risk     HTN  Target /80  Continue Losartan   check urine microalbumin outpatient    Hypothyroid  continue synthroid 50 mcg po daily  TSH 11/8 2.28 WNL, 11/10 TSH 1.08, free t4 1.3 WNL   Repeat TFTs in 4-6 weeks     Hypocalcemia   11/10 8.3 decreased now calcium within normal limits   VIT D, 25 Hydroxy 23.1 decreased ; Please add Vitamin D 1,000 iu daily   Trend Calcium    D/w Primary team     Randa Lu  Nurse Practitioner  Division of Endocrinology & Diabetes  In house pager #[phone]    If before 9AM or after 6PM, or on weekends/holidays, please call endocrine answering service for assistance ([phone]).For nonurgent matters email [email or web address] for assistance.

## 2022-11-15 NOTE — DISCHARGE NOTE NURSING/CASE MANAGEMENT/SOCIAL WORK - NSDCPEFALRISK_GEN_ALL_CORE
For information on Fall & Injury Prevention, visit: [email or web address] OR  [email or web address] OR  https://www.cdc.gov/steadi/patient.html

## 2022-11-15 NOTE — DISCHARGE NOTE NURSING/CASE MANAGEMENT/SOCIAL WORK - NSSCNAMETXT_GEN_ALL_CORE
[facility] [phone] initial visit will be day after discharge home. A nurse will call prior to the home visit.

## 2022-11-15 NOTE — DISCHARGE NOTE NURSING/CASE MANAGEMENT/SOCIAL WORK - PATIENT PORTAL LINK FT
You can access the FollowMyHealth Patient Portal offered by [facility] by registering at the following website: [email or web address]. By joining [facility]’s FollowMyHealth portal, you will also be able to view your health information using other applications (apps) compatible with our system.

## 2022-11-16 DIAGNOSIS — R55 SYNCOPE AND COLLAPSE: ICD-10-CM

## 2022-11-16 DIAGNOSIS — J18.9 PNEUMONIA, UNSPECIFIED ORGANISM: ICD-10-CM

## 2022-11-16 LAB
ANION GAP SERPL CALC-SCNC: 10 MMOL/L — SIGNIFICANT CHANGE UP (ref 7–14)
BUN SERPL-MCNC: 6 MG/DL — LOW (ref 7–23)
CALCIUM SERPL-MCNC: 9.1 MG/DL — SIGNIFICANT CHANGE UP (ref 8.4–10.5)
CHLORIDE SERPL-SCNC: 102 MMOL/L — SIGNIFICANT CHANGE UP (ref 98–107)
CO2 SERPL-SCNC: 30 MMOL/L — SIGNIFICANT CHANGE UP (ref 22–31)
CREAT SERPL-MCNC: 0.66 MG/DL — SIGNIFICANT CHANGE UP (ref 0.5–1.3)
EGFR: 89 ML/MIN/1.73M2 — SIGNIFICANT CHANGE UP
GLUCOSE BLDC GLUCOMTR-MCNC: 126 MG/DL — HIGH (ref 70–99)
GLUCOSE BLDC GLUCOMTR-MCNC: 193 MG/DL — HIGH (ref 70–99)
GLUCOSE BLDC GLUCOMTR-MCNC: 203 MG/DL — HIGH (ref 70–99)
GLUCOSE BLDC GLUCOMTR-MCNC: 221 MG/DL — HIGH (ref 70–99)
GLUCOSE SERPL-MCNC: 123 MG/DL — HIGH (ref 70–99)
HCT VFR BLD CALC: 31.8 % — LOW (ref 34.5–45)
HGB BLD-MCNC: 8.5 G/DL — LOW (ref 11.5–15.5)
MAGNESIUM SERPL-MCNC: 1.8 MG/DL — SIGNIFICANT CHANGE UP (ref 1.6–2.6)
MCHC RBC-ENTMCNC: 19.7 PG — LOW (ref 27–34)
MCHC RBC-ENTMCNC: 26.7 GM/DL — LOW (ref 32–36)
MCV RBC AUTO: 73.6 FL — LOW (ref 80–100)
NRBC # BLD: 0 /100 WBCS — SIGNIFICANT CHANGE UP (ref 0–0)
NRBC # FLD: 0 K/UL — SIGNIFICANT CHANGE UP (ref 0–0)
PHOSPHATE SERPL-MCNC: 2.2 MG/DL — LOW (ref 2.5–4.5)
PLATELET # BLD AUTO: 222 K/UL — SIGNIFICANT CHANGE UP (ref 150–400)
POTASSIUM SERPL-MCNC: 4.3 MMOL/L — SIGNIFICANT CHANGE UP (ref 3.5–5.3)
POTASSIUM SERPL-SCNC: 4.3 MMOL/L — SIGNIFICANT CHANGE UP (ref 3.5–5.3)
RBC # BLD: 4.32 M/UL — SIGNIFICANT CHANGE UP (ref 3.8–5.2)
RBC # FLD: 20.5 % — HIGH (ref 10.3–14.5)
SODIUM SERPL-SCNC: 142 MMOL/L — SIGNIFICANT CHANGE UP (ref 135–145)
WBC # BLD: 6.84 K/UL — SIGNIFICANT CHANGE UP (ref 3.8–10.5)
WBC # FLD AUTO: 6.84 K/UL — SIGNIFICANT CHANGE UP (ref 3.8–10.5)

## 2022-11-16 PROCEDURE — 99232 SBSQ HOSP IP/OBS MODERATE 35: CPT

## 2022-11-16 RX ORDER — INSULIN LISPRO 100/ML
VIAL (ML) SUBCUTANEOUS
Refills: 0 | Status: DISCONTINUED | OUTPATIENT
Start: 2022-11-16 | End: 2022-11-19

## 2022-11-16 RX ORDER — INSULIN GLARGINE 100 [IU]/ML
10 INJECTION, SOLUTION SUBCUTANEOUS
Qty: 1 | Refills: 0
Start: 2022-11-16 | End: 2022-12-15

## 2022-11-16 RX ORDER — INSULIN LISPRO 100/ML
5 VIAL (ML) SUBCUTANEOUS
Qty: 1 | Refills: 0
Start: 2022-11-16 | End: 2022-12-15

## 2022-11-16 RX ORDER — ISOPROPYL ALCOHOL, BENZOCAINE .7; .06 ML/ML; ML/ML
1 SWAB TOPICAL
Qty: 100 | Refills: 1
Start: 2022-11-16 | End: 2023-01-04

## 2022-11-16 RX ADMIN — Medication 650 MILLIGRAM(S): at 06:18

## 2022-11-16 RX ADMIN — SIMVASTATIN 20 MILLIGRAM(S): 20 TABLET, FILM COATED ORAL at 21:54

## 2022-11-16 RX ADMIN — Medication 2 UNIT(S): at 13:11

## 2022-11-16 RX ADMIN — LOSARTAN POTASSIUM 100 MILLIGRAM(S): 100 TABLET, FILM COATED ORAL at 06:13

## 2022-11-16 RX ADMIN — ENOXAPARIN SODIUM 40 MILLIGRAM(S): 100 INJECTION SUBCUTANEOUS at 13:15

## 2022-11-16 RX ADMIN — Medication 50 MICROGRAM(S): at 06:13

## 2022-11-16 RX ADMIN — POLYETHYLENE GLYCOL 3350 17 GRAM(S): 17 POWDER, FOR SOLUTION ORAL at 13:14

## 2022-11-16 RX ADMIN — INSULIN GLARGINE 5 UNIT(S): 100 INJECTION, SOLUTION SUBCUTANEOUS at 22:18

## 2022-11-16 RX ADMIN — Medication 2 UNIT(S): at 18:27

## 2022-11-16 RX ADMIN — Medication 1: at 18:27

## 2022-11-16 RX ADMIN — Medication 1000 UNIT(S): at 13:15

## 2022-11-16 RX ADMIN — Medication 650 MILLIGRAM(S): at 08:03

## 2022-11-16 NOTE — PROGRESS NOTE ADULT - ASSESSMENT
76 yo female with uncontrolled DM2, HTN, BIBEMS for hyperglycemia 500s, and syncope.  Found to be bradycardic.  A1c 10.5%    Endocrine team consulted for uncontrolled diabetes. Patient is high risk with high level decision making due to uncontrolled diabetes which places patient at high risk for cardiovascular and cerebrovascular events. Patient with lability of glucose requiring close monitoring and insulin adjustments.    Uncontrolled Dm2 c/b HHS  Target a1c 7-8%  HOME REGIMEN: Basaglar 22-24 units daily plus novolog 14 units pre meals  INPATIENT PLAN:  Target bg 100-200 mg/dl given patients age: Hypoglycemia at dinner yesterday; as per daughter in law pt had a light lunch.  Pt eating well today.  Pts fs at lunch yesterday might have been post prandial as family was feeding pt in between meal. Family states they are now following hospital meal time schedule.  Continue Lantus to 5 units sq at bedtime   Continue Admelog 2 units tid ac- hold if skips meal/NPO  Continue customized low Admelog correction scale for premeal and continue low dose Admelog correction scale for bedtime  Consistent Carbohydrate diet   Check FS before meals and at bedtime; please check 3 am FS   RD consult  Bedside RN: please review family insulin pen injection technique.  If there are any concerns, please inform endocrine team.  Please consider changing abx solution from dextrose to sodium chloride    DC plan:  Likely will continue basal bolus insulin (will determine insulin dose based on inpt insulin requirements)  [facility] prefers to follow up with PCP for DM mgmt  Please follow up with podiatry, pcp, and opthalmology as an outpt   Ensure patient has working glucometer, test strips, lancets, alcohol pads, and BD scarlett pen needles  Please also prescribe glucose tabs, Baqsimi nasal spray or glucagon emergency kit for hypoglycemia risk       HTN  Target /80  Continue Losartan   check urine microalbumin outpatient    Hypothyroid  continue synthroid 50 mcg po daily  TSH 11/8 2.28 WNL, 11/10 TSH 1.08, free t4 1.3 WNL   Repeat TFTs in 4-6 weeks     Hypocalcemia   11/10 8.3 decreased now calcium within normal limits   VIT D, 25 Hydroxy 23.1 decreased ; Please add Vitamin D 1,000 iu daily   Trend Calcium    D/w Primary team     Randa Lu  Nurse Practitioner  Division of Endocrinology & Diabetes  In house pager #[phone]    If before 9AM or after 6PM, or on weekends/holidays, please call endocrine answering service for assistance ([phone]).For nonurgent matters email [email or web address] for assistance.

## 2022-11-17 LAB
ANION GAP SERPL CALC-SCNC: 6 MMOL/L — LOW (ref 7–14)
BUN SERPL-MCNC: 6 MG/DL — LOW (ref 7–23)
CALCIUM SERPL-MCNC: 9 MG/DL — SIGNIFICANT CHANGE UP (ref 8.4–10.5)
CHLORIDE SERPL-SCNC: 100 MMOL/L — SIGNIFICANT CHANGE UP (ref 98–107)
CO2 SERPL-SCNC: 33 MMOL/L — HIGH (ref 22–31)
CREAT SERPL-MCNC: 0.65 MG/DL — SIGNIFICANT CHANGE UP (ref 0.5–1.3)
CULTURE RESULTS: SIGNIFICANT CHANGE UP
CULTURE RESULTS: SIGNIFICANT CHANGE UP
EGFR: 90 ML/MIN/1.73M2 — SIGNIFICANT CHANGE UP
GLUCOSE BLDC GLUCOMTR-MCNC: 132 MG/DL — HIGH (ref 70–99)
GLUCOSE BLDC GLUCOMTR-MCNC: 189 MG/DL — HIGH (ref 70–99)
GLUCOSE BLDC GLUCOMTR-MCNC: 214 MG/DL — HIGH (ref 70–99)
GLUCOSE BLDC GLUCOMTR-MCNC: 245 MG/DL — HIGH (ref 70–99)
GLUCOSE SERPL-MCNC: 139 MG/DL — HIGH (ref 70–99)
HCT VFR BLD CALC: 30.9 % — LOW (ref 34.5–45)
HGB BLD-MCNC: 8.1 G/DL — LOW (ref 11.5–15.5)
MAGNESIUM SERPL-MCNC: 1.9 MG/DL — SIGNIFICANT CHANGE UP (ref 1.6–2.6)
MCHC RBC-ENTMCNC: 19.2 PG — LOW (ref 27–34)
MCHC RBC-ENTMCNC: 26.2 GM/DL — LOW (ref 32–36)
MCV RBC AUTO: 73.2 FL — LOW (ref 80–100)
NRBC # BLD: 0 /100 WBCS — SIGNIFICANT CHANGE UP (ref 0–0)
NRBC # FLD: 0 K/UL — SIGNIFICANT CHANGE UP (ref 0–0)
PHOSPHATE SERPL-MCNC: 2.6 MG/DL — SIGNIFICANT CHANGE UP (ref 2.5–4.5)
PLATELET # BLD AUTO: 230 K/UL — SIGNIFICANT CHANGE UP (ref 150–400)
POTASSIUM SERPL-MCNC: 4.2 MMOL/L — SIGNIFICANT CHANGE UP (ref 3.5–5.3)
POTASSIUM SERPL-SCNC: 4.2 MMOL/L — SIGNIFICANT CHANGE UP (ref 3.5–5.3)
RBC # BLD: 4.22 M/UL — SIGNIFICANT CHANGE UP (ref 3.8–5.2)
RBC # FLD: 20.2 % — HIGH (ref 10.3–14.5)
SODIUM SERPL-SCNC: 139 MMOL/L — SIGNIFICANT CHANGE UP (ref 135–145)
SPECIMEN SOURCE: SIGNIFICANT CHANGE UP
SPECIMEN SOURCE: SIGNIFICANT CHANGE UP
WBC # BLD: 7.35 K/UL — SIGNIFICANT CHANGE UP (ref 3.8–10.5)
WBC # FLD AUTO: 7.35 K/UL — SIGNIFICANT CHANGE UP (ref 3.8–10.5)

## 2022-11-17 PROCEDURE — 99232 SBSQ HOSP IP/OBS MODERATE 35: CPT

## 2022-11-17 RX ORDER — ISOPROPYL ALCOHOL, BENZOCAINE .7; .06 ML/ML; ML/ML
1 SWAB TOPICAL
Qty: 100 | Refills: 1
Start: 2022-11-17 | End: 2023-01-05

## 2022-11-17 RX ORDER — METFORMIN HYDROCHLORIDE 850 MG/1
1 TABLET ORAL
Qty: 60 | Refills: 0
Start: 2022-11-17 | End: 2022-12-16

## 2022-11-17 RX ORDER — INSULIN GLARGINE 100 [IU]/ML
10 INJECTION, SOLUTION SUBCUTANEOUS
Qty: 1 | Refills: 0
Start: 2022-11-17 | End: 2022-12-16

## 2022-11-17 RX ORDER — INSULIN ASPART 100 [IU]/ML
4 INJECTION, SOLUTION SUBCUTANEOUS
Qty: 1 | Refills: 0
Start: 2022-11-17 | End: 2022-12-16

## 2022-11-17 RX ORDER — INSULIN LISPRO 100/ML
3 VIAL (ML) SUBCUTANEOUS
Refills: 0 | Status: DISCONTINUED | OUTPATIENT
Start: 2022-11-17 | End: 2022-11-19

## 2022-11-17 RX ADMIN — Medication 2 UNIT(S): at 09:24

## 2022-11-17 RX ADMIN — LOSARTAN POTASSIUM 100 MILLIGRAM(S): 100 TABLET, FILM COATED ORAL at 06:41

## 2022-11-17 RX ADMIN — INSULIN GLARGINE 5 UNIT(S): 100 INJECTION, SOLUTION SUBCUTANEOUS at 22:07

## 2022-11-17 RX ADMIN — Medication 2 UNIT(S): at 13:35

## 2022-11-17 RX ADMIN — SIMVASTATIN 20 MILLIGRAM(S): 20 TABLET, FILM COATED ORAL at 22:09

## 2022-11-17 RX ADMIN — Medication 650 MILLIGRAM(S): at 03:20

## 2022-11-17 RX ADMIN — Medication 1000 UNIT(S): at 13:04

## 2022-11-17 RX ADMIN — Medication 1: at 13:36

## 2022-11-17 RX ADMIN — Medication 3 UNIT(S): at 18:13

## 2022-11-17 RX ADMIN — SENNA PLUS 2 TABLET(S): 8.6 TABLET ORAL at 22:09

## 2022-11-17 RX ADMIN — SODIUM CHLORIDE 75 MILLILITER(S): 9 INJECTION, SOLUTION INTRAVENOUS at 11:24

## 2022-11-17 RX ADMIN — ENOXAPARIN SODIUM 40 MILLIGRAM(S): 100 INJECTION SUBCUTANEOUS at 13:04

## 2022-11-17 RX ADMIN — Medication 50 MICROGRAM(S): at 06:40

## 2022-11-17 NOTE — PROGRESS NOTE ADULT - ASSESSMENT
76 yo female with uncontrolled DM2, HTN, BIBEMS for hyperglycemia 500s, and syncope.  Found to be bradycardic.  A1c 10.5%    Endocrine team consulted for uncontrolled diabetes. Patient is high risk with high level decision making due to uncontrolled diabetes which places patient at high risk for cardiovascular and cerebrovascular events. Patient with lability of glucose requiring close monitoring and insulin adjustments.    Uncontrolled Dm2 c/b HHS  Target a1c 7-8%  HOME REGIMEN: Basaglar 22-24 units daily plus novolog 14 units at breakfast only as per Jaspreet Winston   INPATIENT PLAN:  Target bg 100-200 mg/dl given patients age: Slightly above goal   Continue Lantus 5 units sq at bedtime   Increase Admelog to 3 units tid ac- hold if skips meal/NPO  Continue customized low Admelog correction scale for premeal and continue low dose Admelog correction scale for bedtime  Consistent Carbohydrate diet   Check FS before meals and at bedtime; please check 3 am FS   RD consult  Bedside RN: please review family insulin pen injection technique.  If there are any concerns, please inform endocrine team.  Please consider changing abx solution from dextrose to sodium chloride    DC plan: Please discharge patient on Basaglar kwik pen 10 units sq qhs and Novolog flex pen 4 units before Breakfast and Dinner, Plus Metformin 500mg p.o. BID with meals (please trend GFR and LFTs). Family unable to administer insulin at lunch. Recommending a low carb diet. For lunch even less carbohydrates as pt will not be receiving insulin.  Daughter in law who is at home with patient and pt unwilling to learn insulin pen administration.  Please call Endocrine on day of discharge.  As per Family pt eats cakes and corn muffins at home despite education   Jeannie prefers to follow up with PCP for DM mgmt  Please follow up with podiatry, pcp, and opthalmology as an outpt   Ensure patient has working glucometer, test strips, lancets, alcohol pads, and BD scarlett pen needles  Please also prescribe glucose tabs, Baqsimi nasal spray or glucagon emergency kit for hypoglycemia risk   Tessy aware of hypoglycemia and intervention. If glucose 70 or below and or 250 and above please call patients PCP for insulin dose adjustment   Please make sure all medications and supplies are covered. Please make sure pt can afford it.    Insulin pen administration reviewed with Jaspreet Winston with teach back.    Jaspreet Winston and grandaughtert Magdalena are  taking full responsibility for Insulin pen administration and glucose monitoring      HTN  Target /80  Continue Losartan   check urine microalbumin outpatient    Hypothyroid  continue synthroid 50 mcg po daily  TSH 11/8 2.28 WNL, 11/10 TSH 1.08, free t4 1.3 WNL   Repeat TFTs in 4-6 weeks     Hypocalcemia   11/10 8.3 decreased now calcium within normal limits   VIT D, 25 Hydroxy 23.1 decreased ; Please add Vitamin D 1,000 iu daily   Trend Calcium  Repeat vitamin D level in 8 weeks as an outpt     D/w Primary team     Randa Lu  Nurse Practitioner  Division of Endocrinology & Diabetes  In house pager #[phone]    If before 9AM or after 6PM, or on weekends/holidays, please call endocrine answering service for assistance ([phone]).For nonurgent matters email [email or web address] for assistance.  78 yo female with uncontrolled DM2, HTN, BIBEMS for hyperglycemia 500s, and syncope.  Found to be bradycardic.  A1c 10.5%    Endocrine team consulted for uncontrolled diabetes. Patient is high risk with high level decision making due to uncontrolled diabetes which places patient at high risk for cardiovascular and cerebrovascular events. Patient with lability of glucose requiring close monitoring and insulin adjustments.    Uncontrolled Dm2 c/b HHS  Target a1c 7-8%  HOME REGIMEN: Basaglar 22-24 units daily plus novolog 14 units at breakfast only as per Jaspreet Winston   INPATIENT PLAN:  Target bg 100-200 mg/dl given patients age: Slightly above goal   Continue Lantus 5 units sq at bedtime   Increase Admelog to 3 units tid ac- hold if skips meal/NPO  Continue customized low Admelog correction scale for premeal and continue low dose Admelog correction scale for bedtime  Consistent Carbohydrate diet   Check FS before meals and at bedtime; please check 3 am FS   RD consult  Bedside RN: please review family insulin pen injection technique.  If there are any concerns, please inform endocrine team.  Please consider changing abx solution from dextrose to sodium chloride    DC plan: Please discharge patient on Basaglar kwik pen 10 units sq qhs and Novolog flex pen 4 units before Breakfast and Dinner, Plus Metformin 500mg p.o. BID with meals if LFTS and GFR stable (please trend GFR and LFTs). Family unable to administer insulin at lunch. Recommending a low carb diet. For lunch even less carbohydrates as pt will not be receiving insulin.  Daughter in law who is at home with patient and pt unwilling to learn insulin pen administration.  Please call Endocrine on day of discharge.  As per Family pt eats cakes and corn muffins at home despite education   Jeannie prefers to follow up with PCP for DM mgmt  Please follow up with podiatry, pcp, and opthalmology as an outpt   Ensure patient has working glucometer, test strips, lancets, alcohol pads, and BD scarlett pen needles  Please also prescribe glucose tabs, Baqsimi nasal spray or glucagon emergency kit for hypoglycemia risk   Tessy aware of hypoglycemia and intervention. If glucose 70 or below and or 250 and above please call patients PCP for insulin dose adjustment   Please make sure all medications and supplies are covered. Please make sure pt can afford it.    Insulin pen administration reviewed with Jaspreet Winston with teach back.    Jaspreet Winston and yady Nichols are  taking full responsibility for Insulin pen administration and glucose monitoring      HTN  Target /80  Continue Losartan   check urine microalbumin outpatient    Hypothyroid  continue synthroid 50 mcg po daily  TSH 11/8 2.28 WNL, 11/10 TSH 1.08, free t4 1.3 WNL   Repeat TFTs in 4-6 weeks     Hypocalcemia   11/10 8.3 decreased now calcium within normal limits   VIT D, 25 Hydroxy 23.1 decreased ; Please add Vitamin D 1,000 iu daily   Trend Calcium  Repeat vitamin D level in 8 weeks as an outpt     D/w Primary team     Randa Lu  Nurse Practitioner  Division of Endocrinology & Diabetes  In house pager #[phone]    If before 9AM or after 6PM, or on weekends/holidays, please call endocrine answering service for assistance ([phone]).For nonurgent matters email [email or web address] for assistance.

## 2022-11-18 LAB
ALBUMIN SERPL ELPH-MCNC: 3.6 G/DL — SIGNIFICANT CHANGE UP (ref 3.3–5)
ALP SERPL-CCNC: 61 U/L — SIGNIFICANT CHANGE UP (ref 40–120)
ALT FLD-CCNC: 18 U/L — SIGNIFICANT CHANGE UP (ref 4–33)
AST SERPL-CCNC: 14 U/L — SIGNIFICANT CHANGE UP (ref 4–32)
BILIRUB DIRECT SERPL-MCNC: <0.2 MG/DL — SIGNIFICANT CHANGE UP (ref 0–0.3)
BILIRUB INDIRECT FLD-MCNC: >0.1 MG/DL — SIGNIFICANT CHANGE UP (ref 0–1)
BILIRUB SERPL-MCNC: 0.3 MG/DL — SIGNIFICANT CHANGE UP (ref 0.2–1.2)
GLUCOSE BLDC GLUCOMTR-MCNC: 114 MG/DL — HIGH (ref 70–99)
GLUCOSE BLDC GLUCOMTR-MCNC: 180 MG/DL — HIGH (ref 70–99)
GLUCOSE BLDC GLUCOMTR-MCNC: 190 MG/DL — HIGH (ref 70–99)
GLUCOSE BLDC GLUCOMTR-MCNC: 278 MG/DL — HIGH (ref 70–99)
HCT VFR BLD CALC: 32.9 % — LOW (ref 34.5–45)
HGB BLD-MCNC: 8.8 G/DL — LOW (ref 11.5–15.5)
MCHC RBC-ENTMCNC: 19.6 PG — LOW (ref 27–34)
MCHC RBC-ENTMCNC: 26.7 GM/DL — LOW (ref 32–36)
MCV RBC AUTO: 73.3 FL — LOW (ref 80–100)
NRBC # BLD: 0 /100 WBCS — SIGNIFICANT CHANGE UP (ref 0–0)
NRBC # FLD: 0 K/UL — SIGNIFICANT CHANGE UP (ref 0–0)
PLATELET # BLD AUTO: 246 K/UL — SIGNIFICANT CHANGE UP (ref 150–400)
PROT SERPL-MCNC: 5.9 G/DL — LOW (ref 6–8.3)
RBC # BLD: 4.49 M/UL — SIGNIFICANT CHANGE UP (ref 3.8–5.2)
RBC # FLD: 20.5 % — HIGH (ref 10.3–14.5)
WBC # BLD: 7.82 K/UL — SIGNIFICANT CHANGE UP (ref 3.8–10.5)
WBC # FLD AUTO: 7.82 K/UL — SIGNIFICANT CHANGE UP (ref 3.8–10.5)

## 2022-11-18 PROCEDURE — 99232 SBSQ HOSP IP/OBS MODERATE 35: CPT

## 2022-11-18 RX ORDER — GLUCAGON INJECTION, SOLUTION 0.5 MG/.1ML
1 INJECTION, SOLUTION SUBCUTANEOUS
Qty: 1 | Refills: 0
Start: 2022-11-18 | End: 2022-12-17

## 2022-11-18 RX ADMIN — SENNA PLUS 2 TABLET(S): 8.6 TABLET ORAL at 23:06

## 2022-11-18 RX ADMIN — Medication 1: at 22:45

## 2022-11-18 RX ADMIN — SODIUM CHLORIDE 75 MILLILITER(S): 9 INJECTION, SOLUTION INTRAVENOUS at 10:19

## 2022-11-18 RX ADMIN — Medication 50 MICROGRAM(S): at 06:26

## 2022-11-18 RX ADMIN — ENOXAPARIN SODIUM 40 MILLIGRAM(S): 100 INJECTION SUBCUTANEOUS at 13:14

## 2022-11-18 RX ADMIN — Medication 3 UNIT(S): at 13:10

## 2022-11-18 RX ADMIN — Medication 1000 UNIT(S): at 13:14

## 2022-11-18 RX ADMIN — LOSARTAN POTASSIUM 100 MILLIGRAM(S): 100 TABLET, FILM COATED ORAL at 06:27

## 2022-11-18 RX ADMIN — INSULIN GLARGINE 5 UNIT(S): 100 INJECTION, SOLUTION SUBCUTANEOUS at 23:42

## 2022-11-18 RX ADMIN — SIMVASTATIN 20 MILLIGRAM(S): 20 TABLET, FILM COATED ORAL at 23:07

## 2022-11-18 RX ADMIN — Medication 3 UNIT(S): at 18:37

## 2022-11-18 RX ADMIN — Medication 3 UNIT(S): at 09:03

## 2022-11-18 NOTE — PROGRESS NOTE ADULT - PROBLEM SELECTOR PROBLEM 2
Hypothyroid
Hyperosmolar hyperglycemic state (HHS)
Hypothyroid
Diarrhea
Hypothyroid
Syncope
Hyperosmolar hyperglycemic state (HHS)
Hypothyroid
Syncope
Hyperosmolar hyperglycemic state (HHS)
Hyperosmolar hyperglycemic state (HHS)
Syncope
Hyperosmolar hyperglycemic state (HHS)

## 2022-11-18 NOTE — PROGRESS NOTE ADULT - PROBLEM SELECTOR PLAN 7
-pt. on losartan 100 daily, can continue while inpatient
-pt. on losartan 100 daily, can continue while inpatient
DVT: Lovenox 40  Dispo: Pending PT
-pt. on losartan 100 daily, can continue while inpatient
DVT: Lovenox 40  Dispo: Pending PT
DVT: Lovenox 40  Dispo: Pending PT

## 2022-11-18 NOTE — PROGRESS NOTE ADULT - PROBLEM SELECTOR PLAN 1
-IMPROVED CLINICALLY
s/p abx  pt needs home oxygen , mostly hypoxia sec to atelectasis , pt not doing incentive spirometry   pulm input appreciated  waiting for dc home with home oxygen
-Pt. hypothermic and tachypneic with WBC 11 on arrival to ED, meeting SIRS criteria  -iv fluids  observe off abx
-IMPROVED CLINICALLY
s/p abx  pt needs home oxygen , mostly hypoxia sec to atelectasis , pt not doing incentive spirometry   pulm input appreciated
-Pt. hypothermic and tachypneic with WBC 11 on arrival to ED, meeting SIRS criteria  -s/p 2L NS and c/w maintenance LR  -BCx sent, UA negative, patient now at normal temperature with improved RR, though still SOB with speaking  -RVP negative, imaging negative for new pneumonia  -pt. 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  -will f/u BCx, trend WBC and fever curve.
-IMPROVED CLINICALLY
abx as per ID
-Pt. hypothermic and tachypneic with WBC 11 on arrival to ED, meeting SIRS criteria  -iv fluids  observe off abx
-IMPROVED CLINICALLY

## 2022-11-18 NOTE — PROGRESS NOTE ADULT - PROBLEM SELECTOR PLAN 2
-HR reported to be 20-30s after, likely vasovagal  -orthos negative  -echo normal LV systolic function, no WMA. mild diastolic dysfunction. decreased RV function   -monitor on tele   -CT head unremarkable
-pt. with Hx T2DM, on novolog and basaglar at home  -endo f/u noted
-pt. with Hx T2DM, on novolog and basaglar at home  -endo f/u
-pt. with Hx T2DM, on novolog and basaglar at home  -endo f/u
-pt. with Hx T2DM, on novolog and basaglar at home  -endo f/u noted
-HR reported to be 20-30s after, likely vasovagal  -orthos negative  -echo normal LV systolic function, no WMA. mild diastolic dysfunction. decreased RV function   -monitor on tele   -CT head unremarkable
-pt. with Hx T2DM, on novolog and basaglar at home  -endo f/u noted
-HR reported to be 20-30s after, likely vasovagal  -orthos negative  -echo normal LV systolic function, no WMA. mild diastolic dysfunction. decreased RV function   -monitor on tele   -CT head unremarkable
-pt. with Hx T2DM, on novolog and basaglar at home  -endo f/u noted
-pt. with Hx T2DM, on novolog and basaglar at home  -endo f/u noted

## 2022-11-18 NOTE — PROGRESS NOTE ADULT - PROBLEM SELECTOR PROBLEM 1
Hyperosmolar hyperglycemic state (HHS)
DM type 2, not at goal
SIRS (systemic inflammatory response syndrome)
Pneumonia
SIRS (systemic inflammatory response syndrome)
Hyperosmolar hyperglycemic state (HHS)
Hyperosmolar hyperglycemic state (HHS)
SIRS (systemic inflammatory response syndrome)
Pneumonia
Pneumonia
Hyperosmolar hyperglycemic state (HHS)
SIRS (systemic inflammatory response syndrome)

## 2022-11-18 NOTE — PROGRESS NOTE ADULT - NUTRITIONAL ASSESSMENT
This patient has been assessed with a concern for Malnutrition and has been determined to have a diagnosis/diagnoses of Severe protein-calorie malnutrition.    This patient is being managed with:   Diet Consistent Carbohydrate w/Evening Snack-  DASH/TLC {Sodium & Cholesterol Restricted} (DASH)  Entered: Nov 9 2022  9:29AM    

## 2022-11-18 NOTE — PROGRESS NOTE ADULT - PROBLEM SELECTOR PLAN 6
-on levothyroxine at home
-pt. on losartan 100 daily, can continue while inpatient
-pt. on losartan 100 daily, can continue while inpatient
-on levothyroxine at home
-pt. on losartan 100 daily, can continue while inpatient
-on levothyroxine at home

## 2022-11-18 NOTE — PROGRESS NOTE ADULT - PROBLEM SELECTOR PLAN 4
at baseline mental status as per pts family
-pt. tachypneic to 20's with increased work of breathing while speaking  -given duonebs with mild improvement in symptoms,  improved.
at baseline mental status as per pts family
-pt. tachypneic to 20's with increased work of breathing while speaking  -given duonebs with mild improvement in symptoms,  improved.
-pt. tachypneic to 20's with increased work of breathing while speaking  -given duonebs with mild improvement in symptoms,  improving resp status
at baseline mental status as per pts family
-pt. tachypneic to 20's with increased work of breathing while speaking  -given duonebs with mild improvement in symptoms,  improved.
-pt. tachypneic to 20's with increased work of breathing while speaking  -given duonebs with mild improvement in symptoms,  improving resp status

## 2022-11-18 NOTE — PROGRESS NOTE ADULT - PROBLEM SELECTOR PROBLEM 6
Hypothyroid
Hypertension
Hypertension
Hypothyroid
Hypothyroid
Hypertension
Hypothyroid

## 2022-11-18 NOTE — PROGRESS NOTE ADULT - PROBLEM SELECTOR PLAN 5
-pt. had episode of syncope for appx. 30 seconds duration prior to arrival to ED  -noted to be answering questions inappropriately in ED on arrival, now mental status at baseline per family member at bedside.  -Likely i/s/o acidosis due to elevated lactate vs. hyperglycemia  -EKG negative for acute findings  -monitor on tele, routine mental status checks
-on levothyroxine at home
-on levothyroxine at home
-pt. had episode of syncope for appx. 30 seconds duration prior to arrival to ED  -noted to be answering questions inappropriately in ED on arrival, now mental status at baseline per family member at bedside.  -Likely i/s/o acidosis due to elevated lactate vs. hyperglycemia  -EKG negative for acute findings  -monitor on tele, routine mental status checks
-pt. had episode of syncope for appx. 30 seconds duration prior to arrival to ED  -noted to be answering questions inappropriately in ED on arrival, now mental status at baseline per family member at bedside.  -Likely i/s/o acidosis due to elevated lactate vs. hyperglycemia  -EKG negative for acute findings  -monitor on tele, routine mental status checks
-on levothyroxine at home
-pt. had episode of syncope for appx. 30 seconds duration prior to arrival to ED  -noted to be answering questions inappropriately in ED on arrival, now mental status at baseline per family member at bedside.  -Likely i/s/o acidosis due to elevated lactate vs. hyperglycemia  -EKG negative for acute findings  -monitor on tele, routine mental status checks

## 2022-11-18 NOTE — PROGRESS NOTE ADULT - PROBLEM SELECTOR PROBLEM 3
High serum lactate
Hypertension
High serum lactate
Hypertension
High serum lactate
Hyperosmolar hyperglycemic state (HHS)
Hypertension
High serum lactate
Hyperosmolar hyperglycemic state (HHS)
High serum lactate
Hyperosmolar hyperglycemic state (HHS)
Hypertension

## 2022-11-18 NOTE — PROGRESS NOTE ADULT - PROBLEM SELECTOR PROBLEM 5
Altered mental status
Hypothyroid
Altered mental status
Diarrhea
Diarrhea
Hypothyroid
Diarrhea
Diarrhea
Hypothyroid
Altered mental status
Altered mental status

## 2022-11-18 NOTE — PROGRESS NOTE ADULT - PROBLEM SELECTOR PLAN 3
-Pt. with elevated lactate to 4.4 up from 1.8   iv fluids , f/u lactate
-Pt. with elevated lactate to 4.4 up from 1.8   iv fluids , f/u lactate
-pt. with Hx T2DM, on novolog and basaglar at home  -endo f/u noted  INPATIENT PLAN:  Target bg 100-200 mg/dl given patients age: Hypoglycemia at dinner yesterday; as per daughter in law pt had a light lunch.  Pt eating well today.  Pts fs at lunch yesterday might have been post prandial as family was feeding pt in between meal. Family states they are now following hospital meal time schedule.  Continue Lantus to 5 units sq at bedtime   Continue Admelog 2 units tid ac- hold if skips meal/NPO
-pt. with Hx T2DM, on novolog and basaglar at home  -endo f/u noted  INPATIENT PLAN:  Target bg 100-200 mg/dl given patients age: Hypoglycemia at dinner yesterday; as per daughter in law pt had a light lunch.  Pt eating well today.  Pts fs at lunch yesterday might have been post prandial as family was feeding pt in between meal. Family states they are now following hospital meal time schedule.  Continue Lantus to 5 units sq at bedtime   Continue Admelog 2 units tid ac- hold if skips meal/NPo  pt improving clinically
-Pt. with elevated lactate to 4.4 up from 1.8   iv fluids , f/u lactate
-pt. with Hx T2DM, on novolog and basaglar at home  -endo f/u noted  INPATIENT PLAN:  Target bg 100-200 mg/dl given patients age: Hypoglycemia at dinner yesterday; as per daughter in law pt had a light lunch.  Pt eating well today.  Pts fs at lunch yesterday might have been post prandial as family was feeding pt in between meal. Family states they are now following hospital meal time schedule.  Continue Lantus to 5 units sq at bedtime   Continue Admelog 2 units tid ac- hold if skips meal/NPo  pt improving clinically
-Pt. with elevated lactate to 4.4 up from 1.8   iv fluids , f/u lactate

## 2022-11-19 VITALS
OXYGEN SATURATION: 96 % | RESPIRATION RATE: 18 BRPM | HEART RATE: 80 BPM | DIASTOLIC BLOOD PRESSURE: 71 MMHG | SYSTOLIC BLOOD PRESSURE: 160 MMHG

## 2022-11-19 LAB — GLUCOSE BLDC GLUCOMTR-MCNC: 173 MG/DL — HIGH (ref 70–99)

## 2022-11-19 RX ADMIN — Medication 3 UNIT(S): at 09:12

## 2022-11-19 RX ADMIN — Medication 50 MICROGRAM(S): at 05:30

## 2022-11-19 RX ADMIN — Medication 1000 UNIT(S): at 12:17

## 2022-11-19 RX ADMIN — LOSARTAN POTASSIUM 100 MILLIGRAM(S): 100 TABLET, FILM COATED ORAL at 05:30

## 2022-11-19 NOTE — PROGRESS NOTE ADULT - SUBJECTIVE AND OBJECTIVE BOX
Forrest Rosenthal MD  Interventional Cardiology / Endovascular Specialist  [place] Office : [address] [place] [address] [place]. [address]  Tel:   [place] Office : [address] [facility] [place] [address]  Tel: [phone]      Subjective/Overnight events: Patient sitting up in chair comfortably. No acute distress. Denies chest pain, SOB or palpitations or dizziness   	  MEDICATIONS:  enoxaparin Injectable 40 milliGRAM(s) SubCutaneous every 24 hours  losartan 100 milliGRAM(s) Oral daily      albuterol/ipratropium for Nebulization.. 3 milliLiter(s) Nebulizer every 6 hours PRN    acetaminophen     Tablet .. 650 milliGRAM(s) Oral every 6 hours PRN      dextrose 50% Injectable 25 Gram(s) IV Push once  dextrose 50% Injectable 12.5 Gram(s) IV Push once  dextrose 50% Injectable 25 Gram(s) IV Push once  dextrose Oral Gel 15 Gram(s) Oral once PRN  glucagon  Injectable 1 milliGRAM(s) IntraMuscular once  insulin glargine Injectable (LANTUS) 8 Unit(s) SubCutaneous <User Schedule>  insulin lispro (ADMELOG) corrective regimen sliding scale   SubCutaneous three times a day before meals  insulin lispro (ADMELOG) corrective regimen sliding scale   SubCutaneous at bedtime  levothyroxine 50 MICROGram(s) Oral daily  simvastatin 20 milliGRAM(s) Oral at bedtime    dextrose 5%. 1000 milliLiter(s) IV Continuous <Continuous>  dextrose 5%. 1000 milliLiter(s) IV Continuous <Continuous>  lactated ringers. 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      Cataract of left eye          SOCIAL HISTORY: Substance Use (street drugs): ( x ) never used  (  ) other:    FAMILY HISTORY:  Family history of heart attack (Mother)  mother          PHYSICAL EXAM:  T(C): 36.4 (11-10-22 @ 04:45), Max: 36.7 (11-09-22 @ 20:30)  HR: 77 (11-10-22 @ 04:45) (63 - 79)  BP: 153/60 (11-10-22 @ 04:45) (138/71 - 153/60)  RR: 18 (11-10-22 @ 04:45) (18 - 24)  SpO2: 100% (11-10-22 @ 04:45) (100% - 100%)  Wt(kg): --  I&O's Summary    09 Nov 2022 07:01  -  10 Nov 2022 07:00  --------------------------------------------------------  IN: 825 mL / OUT: 850 mL / NET: -25 mL      Height (cm): 149.9 (11-10 @ 06:00)  Weight (kg): 59.5 (11-10 @ 06:00)  BMI (kg/m2): 26.5 (11-10 @ 06:00)  BSA (m2): 1.54 (11-10 @ 06:00)          GENERAL: NAD  EYES: EOMI, PERRLA, conjunctiva and sclera clear  ENMT: No tonsillar erythema, exudates, or enlargement  Cardiovascular: Normal S1 S2, No JVD, No murmurs, No edema  Respiratory: Lungs clear to auscultation	  Gastrointestinal:  Soft, Non-tender, + BS	  Extremities: No edema                                       8.1    7.65  )-----------( 222      ( 10 Nov 2022 06:48 )             30.1     11-10    137  |  104  |  13  ----------------------------<  72  3.9   |  24  |  0.82    Ca    8.3<L>      10 Nov 2022 06:48  Phos  2.5     11-10  Mg     1.70     11-10    TPro  5.9<L>  /  Alb  3.4  /  TBili  0.3  /  DBili  x   /  AST  32  /  ALT  40<H>  /  AlkPhos  98  11-10    proBNP:   Lipid Profile:   HgA1c:   TSH: Thyroid Stimulating Hormone, Serum: 1.08 uIU/mL (11-10 @ 06:48)      Consultant(s) Notes Reviewed:  [x ] YES  [ ] NO    Care Discussed with Consultants/Other Providers [ x] YES  [ ] NO    Imaging Personally Reviewed independently:  [x] YES  [ ] NO    All labs, radiologic studies, vitals, orders and medications list reviewed. Patient is seen and examined at bedside. Case discussed with medical team.              
Chief Complaint: Uncontrolled Dm2 c/b [facility]    History: Pt seen at bedside. Pt tolerating oral diet. Pt denies nausea and vomiting/any signs of hypoglycemia. Pt reports an adequate appetite. Pt deferred Farsi Translation to Daughter in law and Son Nasar at bedside.     MEDICATIONS  (STANDING):  cholecalciferol 1000 Unit(s) Oral daily  dextrose 5%. 1000 milliLiter(s) (50 mL/Hr) IV Continuous <Continuous>  dextrose 5%. 1000 milliLiter(s) (100 mL/Hr) IV Continuous <Continuous>  dextrose 50% Injectable 25 Gram(s) IV Push once  dextrose 50% Injectable 12.5 Gram(s) IV Push once  dextrose 50% Injectable 25 Gram(s) IV Push once  enoxaparin Injectable 40 milliGRAM(s) SubCutaneous every 24 hours  glucagon  Injectable 1 milliGRAM(s) IntraMuscular once  insulin glargine Injectable (LANTUS) 5 Unit(s) SubCutaneous <User Schedule>  insulin lispro (ADMELOG) corrective regimen sliding scale   SubCutaneous at bedtime  insulin lispro (ADMELOG) corrective regimen sliding scale   SubCutaneous three times a day before meals  insulin lispro Injectable (ADMELOG) 2 Unit(s) SubCutaneous three times a day before meals  lactated ringers. 1000 milliLiter(s) (75 mL/Hr) IV Continuous <Continuous>  levothyroxine 50 MICROGram(s) Oral daily  losartan 100 milliGRAM(s) Oral daily  polyethylene glycol 3350 17 Gram(s) Oral daily  senna 2 Tablet(s) Oral at bedtime  simvastatin 20 milliGRAM(s) Oral at bedtime    MEDICATIONS  (PRN):  acetaminophen     Tablet .. 650 milliGRAM(s) Oral every 6 hours PRN Mild Pain (1 - 3)  albuterol/ipratropium for Nebulization.. 3 milliLiter(s) Nebulizer every 6 hours PRN Shortness of Breath and/or Wheezing  aluminum hydroxide/magnesium hydroxide/simethicone Suspension 30 milliLiter(s) Oral every 4 hours PRN Dyspepsia  dextrose Oral Gel 15 Gram(s) Oral once PRN Blood Glucose LESS THAN 70 milliGRAM(s)/deciliter      Allergies: No Known Allergies      Review of Systems:  Respiratory: No SOB, no cough  GI: No nausea, vomiting, abdominal pain  Endocrine: no polyuria, polydipsia      PHYSICAL EXAM:  VITALS: T(C): 36.9 (11-17-22 @ 12:00)  T(F): 98.5 (11-17-22 @ 12:00), Max: 98.5 (11-17-22 @ 12:00)  HR: 86 (11-17-22 @ 12:00) (82 - 96)  BP: 152/66 (11-17-22 @ 12:00) (152/66 - 180/77)  RR:  (18 - 20)  SpO2:  (79% - 100%)  Wt(kg): --  GENERAL: NAD, well-groomed, well-developed  RESPIRATORY: No labored breathing   GI: Soft, nontender, non distended  PSYCH: Alert and oriented x 3, normal affect, normal mood      CAPILLARY BLOOD GLUCOSE  POCT Blood Glucose.: 214 mg/dL (17 Nov 2022 13:18)  POCT Blood Glucose.: 132 mg/dL (17 Nov 2022 09:15)  POCT Blood Glucose.: 203 mg/dL (16 Nov 2022 21:43)  POCT Blood Glucose.: 221 mg/dL (16 Nov 2022 17:30)    A1C with Estimated Average Glucose (11.08.22 @ 09:00)    A1C with Estimated Average Glucose Result: 10.5      11-17    139  |  100  |  6<L>  ----------------------------<  139<H>  4.2   |  33<H>  |  0.65    eGFR: 90    Ca    9.0      11-17  Mg     1.90     11-17  Phos  2.6     11-17      Thyroid Function Tests:  11-10 @ 06:48 TSH 1.08 FreeT4 1.3 T3 -- Anti TPO -- Anti Thyroglobulin Ab -- TSI --  11-08 @ 09:00 TSH 2.23 FreeT4 -- T3 -- Anti TPO -- Anti Thyroglobulin Ab -- TSI --        Diet, Consistent Carbohydrate w/Evening Snack:   DASH/TLC Sodium & Cholesterol Restricted (DASH) (11-09-22 @ 09:29) [Active]                    
Forrest Rosenthal MD  Interventional Cardiology / Advance Heart Failure and Cardiac Transplant Specialist  [place] Office : [address] [place] [address] [place] [address]  Tel:   [place] Office : [address] [facility] [place] [address]  Tel: [phone]      Subjective/Overnight events: Pt is lying in bed. denies CP. continues to have some SOB and cough  	  MEDICATIONS:  enoxaparin Injectable 40 milliGRAM(s) SubCutaneous every 24 hours  losartan 100 milliGRAM(s) Oral daily    piperacillin/tazobactam IVPB.. 3.375 Gram(s) IV Intermittent every 8 hours    albuterol/ipratropium for Nebulization.. 3 milliLiter(s) Nebulizer every 6 hours PRN    acetaminophen     Tablet .. 650 milliGRAM(s) Oral every 6 hours PRN    aluminum hydroxide/magnesium hydroxide/simethicone Suspension 30 milliLiter(s) Oral every 4 hours PRN  polyethylene glycol 3350 17 Gram(s) Oral daily  senna 2 Tablet(s) Oral at bedtime    dextrose 50% Injectable 25 Gram(s) IV Push once  dextrose 50% Injectable 12.5 Gram(s) IV Push once  dextrose 50% Injectable 25 Gram(s) IV Push once  dextrose Oral Gel 15 Gram(s) Oral once PRN  glucagon  Injectable 1 milliGRAM(s) IntraMuscular once  insulin glargine Injectable (LANTUS) 5 Unit(s) SubCutaneous <User Schedule>  insulin lispro (ADMELOG) corrective regimen sliding scale   SubCutaneous at bedtime  insulin lispro (ADMELOG) corrective regimen sliding scale   SubCutaneous three times a day before meals  insulin lispro Injectable (ADMELOG) 2 Unit(s) SubCutaneous three times a day before meals  levothyroxine 50 MICROGram(s) Oral daily  simvastatin 20 milliGRAM(s) Oral at bedtime    cholecalciferol 1000 Unit(s) Oral daily  dextrose 5%. 1000 milliLiter(s) IV Continuous <Continuous>  dextrose 5%. 1000 milliLiter(s) IV Continuous <Continuous>  lactated ringers. 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      Cataract of left eye          SOCIAL HISTORY: Substance Use (street drugs): ( x ) never used  (  ) other:    FAMILY HISTORY:  Family history of heart attack (Mother)  mother            PHYSICAL EXAM:  T(C): 36.6 (11-15-22 @ 12:10), Max: 36.6 (11-15-22 @ 09:00)  HR: 77 (11-15-22 @ 12:10) (73 - 85)  BP: 146/57 (11-15-22 @ 12:10) (139/100 - 162/92)  RR: 18 (11-15-22 @ 12:10) (18 - 18)  SpO2: 99% (11-15-22 @ 12:10) (95% - 100%)  Wt(kg): --  I&O's Summary    14 Nov 2022 07:01  -  15 Nov 2022 07:00  --------------------------------------------------------  IN: 420 mL / OUT: 0 mL / NET: 420 mL          GENERAL: NAD  EYES: EOMI, PERRLA, conjunctiva and sclera clear  ENMT: No tonsillar erythema, exudates, or enlargement  Cardiovascular: Normal S1 S2, No JVD, No murmurs, No edema  Respiratory: Lungs clear to auscultation	  Gastrointestinal:  Soft, Non-tender, + BS	  Extremities: No edema                                   8.2    6.08  )-----------( 228      ( 15 Nov 2022 06:26 )             30.8     11-15    138  |  96<L>  |  6<L>  ----------------------------<  216<H>  3.7   |  32<H>  |  0.82    Ca    8.7      15 Nov 2022 06:26  Phos  2.3     11-15  Mg     1.80     11-15      proBNP:   Lipid Profile:   HgA1c:   TSH:     Consultant(s) Notes Reviewed:  [x ] YES  [ ] NO    Care Discussed with Consultants/Other Providers [ x] YES  [ ] NO    Imaging Personally Reviewed independently:  [x] YES  [ ] NO    All labs, radiologic studies, vitals, orders and medications list reviewed. Patient is seen and examined at bedside. Case discussed with medical team.        
PULMONARY PROGRESS NOTE    MARTIR VU  MRN-[id number]    Patient is a 77y old  Female who presents with a chief complaint of Hyperglycemia, Syncope (14 Nov 2022 14:22)      HPI:  eating lunch  on 2L  nc  family says she feels better    ROS:   -    MEDICATIONS  (STANDING):  dextrose 5%. 1000 milliLiter(s) (50 mL/Hr) IV Continuous <Continuous>  dextrose 5%. 1000 milliLiter(s) (100 mL/Hr) IV Continuous <Continuous>  dextrose 50% Injectable 25 Gram(s) IV Push once  dextrose 50% Injectable 12.5 Gram(s) IV Push once  dextrose 50% Injectable 25 Gram(s) IV Push once  enoxaparin Injectable 40 milliGRAM(s) SubCutaneous every 24 hours  glucagon  Injectable 1 milliGRAM(s) IntraMuscular once  insulin glargine Injectable (LANTUS) 5 Unit(s) SubCutaneous <User Schedule>  insulin lispro (ADMELOG) corrective regimen sliding scale   SubCutaneous at bedtime  insulin lispro (ADMELOG) corrective regimen sliding scale   SubCutaneous three times a day before meals  insulin lispro Injectable (ADMELOG) 2 Unit(s) SubCutaneous three times a day before meals  lactated ringers. 1000 milliLiter(s) (75 mL/Hr) IV Continuous <Continuous>  levothyroxine 50 MICROGram(s) Oral daily  losartan 100 milliGRAM(s) Oral daily  piperacillin/tazobactam IVPB.. 3.375 Gram(s) IV Intermittent every 8 hours  polyethylene glycol 3350 17 Gram(s) Oral daily  senna 2 Tablet(s) Oral at bedtime  simvastatin 20 milliGRAM(s) Oral at bedtime    MEDICATIONS  (PRN):  acetaminophen     Tablet .. 650 milliGRAM(s) Oral every 6 hours PRN Mild Pain (1 - 3)  albuterol/ipratropium for Nebulization.. 3 milliLiter(s) Nebulizer every 6 hours PRN Shortness of Breath and/or Wheezing  aluminum hydroxide/magnesium hydroxide/simethicone Suspension 30 milliLiter(s) Oral every 4 hours PRN Dyspepsia  dextrose Oral Gel 15 Gram(s) Oral once PRN Blood Glucose LESS THAN 70 milliGRAM(s)/deciliter        EXAM:  Vital Signs Last 24 Hrs  T(C): 36.6 (15 Nov 2022 12:10), Max: 36.6 (15 Nov 2022 09:00)  T(F): 97.8 (15 Nov 2022 12:10), Max: 97.8 (15 Nov 2022 09:00)  HR: 77 (15 Nov 2022 12:10) (73 - 85)  BP: 146/57 (15 Nov 2022 12:10) (139/100 - 162/92)  BP(mean): --  RR: 18 (15 Nov 2022 12:10) (18 - 18)  SpO2: 99% (15 Nov 2022 12:10) (95% - 100%)    Parameters below as of 15 Nov 2022 12:10  Patient On (Oxygen Delivery Method): nasal cannula      GENERAL: The patient is awake and alert in no apparent distress.     LUNGS: Clear to auscultation without wheezing, rales or rhonchi; respirations unlabored                                        8.2    6.08  )-----------( 228      ( 15 Nov 2022 06:26 )             30.8   11-15    138  |  96<L>  |  6<L>  ----------------------------<  216<H>  3.7   |  32<H>  |  0.82    Ca    8.7      15 Nov 2022 06:26  Phos  2.3     11-15  Mg     1.80     11-15         < from: Xray Chest 1 View- PORTABLE-Routine (Xray Chest 1 View- PORTABLE-Routine .) (11.11.22 @ 12:03) >    ACC: [id number] EXAM:  XR CHEST PORTABLE ROUTINE 1V                          PROCEDURE DATE:  11/11/2022          INTERPRETATION:  CLINICAL INFORMATION: Chest pain    TIME OF EXAMINATION: November 11 at 11:39 AM    EXAM: Portable chest    FINDINGS:  Left perihilar and right upper lobe inhomogeneous opacifications are   again seen unchanged possibly multifocal pneumonia. Heart size is stable.   No effusions or pneumothorax.        COMPARISON: November 8        IMPRESSION: Follow-up possible multifocal pneumonia.    --- End of Report ---        < end of copied text >  < from: CT Angio Chest PE Protocol w/ IV Cont (11.08.22 @ 11:39) >    ACC: [id number] EXAM:  CT ABDOMEN AND PELVIS IC                        ACC: [id number] EXAM:  CT ANGIO CHEST PULM ART [facility]                          PROCEDURE DATE:  11/08/2022          INTERPRETATION:  CTA CHEST AND CT ABDOMEN AND PELVIS    INDICATION: Syncope. Evaluate for pneumonia.    TECHNIQUE: Enhanced helical images were obtained of the chest, abdomen   and pelvis. Coronal and sagittal images were reconstructed. Maximum   intensity projection images were generated.    CONTRAST/COMPLICATIONS:  IV Contrast: Omnipaque 350 (accession [id number]), IV contrast documented   in associated exam (accession [id number])  90 cc administered   10 cc   discarded  Oral Contrast: NONE  Complications: None reported at time of study completion    COMPARISON: 5/29/2021 CT chest and 12/6/2021 CT abdomen.    FINDINGS:    PULMONARY ARTERIES: No pulmonary embolism detected. Please note that   multiple distal segmental and subsegmental pulmonary arterial branches   are not adequately assessed due to motion.    MEDIASTINUM: The right ventricle is qualitatively mildly dilated and   there is suggestion of flattening of the interventricular septum. No   pericardial effusion. Thoracic aorta normal caliber.  No large   mediastinal lymph nodes.    AIRWAYS, LUNGS, PLEURA: Severe narrowing of the right mainstem bronchus   and bronchus intermedius.    Right apical soft tissue opacification measuring 2.8 x 1.6 cm (image 13,   series 2) is similar to marginally decreased in size compared to   5/29/2021 where it was 2.9 x 1.8 cm when remeasured. An additional   anterior right upper lobe 1.3 cm nodular opacity (image 19, series 2) is   unchanged. Right apical cystic/cavitary opacity is likewise similar in   appearance. No significant interval change in right upperlobe   opacification along the minor fissure.    Left upper lobe parenchymal opacification measuring 3.6 x 2.2 cm (image   156, series 4) is without significant interval change compared to   5/29/2021, but progressed compared to an even earlier examination dated   10/26/2019.    No pleural effusion.    ABDOMEN and PELVIS: Peripheral enhancement of hepatic segment 7 (image   22, series 3) may be perfusional. Gallbladder, pancreas, spleen, adrenal   glands unremarkable. No hydronephrosis.    Unremarkable urinary bladder and uterus.    No bowel obstruction. Ascending and transverse colon are decompressed. No   free fluid. No abdominal or pelvic lymphadenopathy. Abdominal aorta   normal caliber.    BONES: Unremarkable.    SOFT TISSUES: Unremarkable.    IMPRESSION:    No pulmonary embolism detected.    No new lung parenchymal opacity to suggest acute pneumonia.    Indeterminate left upper lobe 3.6 x 2.2 cm opacity is similar compared to   5/29/2021, but progressed compared to 10/26/2019; the exact etiology is   unclear, but it is difficult to exclude lung neoplasm.    Additional right upper lobe opacities also unchanged compared to   5/29/2021.    Peripheral enhancement of the liver as described above may represent   perfusional abnormality. Otherwise, unremarkable evaluation of the   abdomen and pelvis.    --- End of Report ---            DONIS YATES MD; Attending Radiologist  This document has been electronically signed. Nov 8 2022 12:06PM    < end of copied text >    PROBLEM LIST:  77y Female with HEALTH ISSUES - PROBLEM Dx:  Hyperosmolar hyperglycemic state (HHS)    High serum lactate    Tachypnea    Preventive measure    Altered mental status    SIRS (systemic inflammatory response syndrome)    Hypothyroid    Hypertension    DM type 2, not at goal    Diarrhea         RECS:  CT findings may be due to old TB  EMR review state she had treatment in 2018 by [facility]  no outpatient emr data  outpatient pfts/repeat scans/PET?  abx per primary team, would limit to short course as she does not clinically appear to have pna    Please call with any questions.  Kathleen Bashir MD  [facility] Pulmonary/Sleep Medicine  [phone]  
PULMONARY PROGRESS NOTE    MARTIR VU  [id number]    Patient is a 77y old  Female who presents with a chief complaint of Hyperglycemia, Syncope (14 Nov 2022 14:22)      HPI:  resting in bed  family at bedside  doing ok    ROS:   -    MEDICATIONS  (STANDING):  cholecalciferol 1000 Unit(s) Oral daily  dextrose 5%. 1000 milliLiter(s) (50 mL/Hr) IV Continuous <Continuous>  dextrose 5%. 1000 milliLiter(s) (100 mL/Hr) IV Continuous <Continuous>  dextrose 50% Injectable 25 Gram(s) IV Push once  dextrose 50% Injectable 12.5 Gram(s) IV Push once  dextrose 50% Injectable 25 Gram(s) IV Push once  enoxaparin Injectable 40 milliGRAM(s) SubCutaneous every 24 hours  glucagon  Injectable 1 milliGRAM(s) IntraMuscular once  insulin glargine Injectable (LANTUS) 5 Unit(s) SubCutaneous <User Schedule>  insulin lispro (ADMELOG) corrective regimen sliding scale   SubCutaneous at bedtime  insulin lispro (ADMELOG) corrective regimen sliding scale   SubCutaneous three times a day before meals  insulin lispro Injectable (ADMELOG) 2 Unit(s) SubCutaneous three times a day before meals  lactated ringers. 1000 milliLiter(s) (75 mL/Hr) IV Continuous <Continuous>  levothyroxine 50 MICROGram(s) Oral daily  losartan 100 milliGRAM(s) Oral daily  polyethylene glycol 3350 17 Gram(s) Oral daily  senna 2 Tablet(s) Oral at bedtime  simvastatin 20 milliGRAM(s) Oral at bedtime    MEDICATIONS  (PRN):  acetaminophen     Tablet .. 650 milliGRAM(s) Oral every 6 hours PRN Mild Pain (1 - 3)  albuterol/ipratropium for Nebulization.. 3 milliLiter(s) Nebulizer every 6 hours PRN Shortness of Breath and/or Wheezing  aluminum hydroxide/magnesium hydroxide/simethicone Suspension 30 milliLiter(s) Oral every 4 hours PRN Dyspepsia  dextrose Oral Gel 15 Gram(s) Oral once PRN Blood Glucose LESS THAN 70 milliGRAM(s)/deciliter      EXAM:  Vital Signs Last 24 Hrs  T(C): 36.8 (17 Nov 2022 06:15), Max: 36.8 (16 Nov 2022 16:39)  T(F): 98.2 (17 Nov 2022 06:15), Max: 98.3 (16 Nov 2022 16:39)  HR: 96 (17 Nov 2022 06:15) (82 - 96)  BP: 154/81 (17 Nov 2022 06:15) (142/66 - 180/77)  BP(mean): --  RR: 20 (17 Nov 2022 06:15) (18 - 20)  SpO2: 79% (17 Nov 2022 12:20) (79% - 100%)    Parameters below as of 17 Nov 2022 12:20  Patient On (Oxygen Delivery Method): room air          GENERAL: The patient is awake and alert in no apparent distress.     LUNGS: Clear to auscultation without wheezing, rales or rhonchi; respirations unlabored                                        8.1    7.35  )-----------( 230      ( 17 Nov 2022 06:23 )             30.9   11-17    139  |  100  |  6<L>  ----------------------------<  139<H>  4.2   |  33<H>  |  0.65    Ca    9.0      17 Nov 2022 06:23  Phos  2.6     11-17  Mg     1.90     11-17           < from: Xray Chest 1 View- PORTABLE-Routine (Xray Chest 1 View- PORTABLE-Routine .) (11.11.22 @ 12:03) >    ACC: [id number] EXAM:  XR CHEST PORTABLE ROUTINE 1V                          PROCEDURE DATE:  11/11/2022          INTERPRETATION:  CLINICAL INFORMATION: Chest pain    TIME OF EXAMINATION: November 11 at 11:39 AM    EXAM: Portable chest    FINDINGS:  Left perihilar and right upper lobe inhomogeneous opacifications are   again seen unchanged possibly multifocal pneumonia. Heart size is stable.   No effusions or pneumothorax.        COMPARISON: November 8        IMPRESSION: Follow-up possible multifocal pneumonia.    --- End of Report ---        < end of copied text >  < from: CT Angio Chest PE Protocol w/ IV Cont (11.08.22 @ 11:39) >    ACC: [id number] EXAM:  CT ABDOMEN AND PELVIS IC                        ACC: [id number] EXAM:  CT ANGIO CHEST PULM ART [facility]                          PROCEDURE DATE:  11/08/2022          INTERPRETATION:  CTA CHEST AND CT ABDOMEN AND PELVIS    INDICATION: Syncope. Evaluate for pneumonia.    TECHNIQUE: Enhanced helical images were obtained of the chest, abdomen   and pelvis. Coronal and sagittal images were reconstructed. Maximum   intensity projection images were generated.    CONTRAST/COMPLICATIONS:  IV Contrast: Omnipaque 350 (accession [id number]), IV contrast documented   in associated exam (accession [id number])  90 cc administered   10 cc   discarded  Oral Contrast: NONE  Complications: None reported at time of study completion    COMPARISON: 5/29/2021 CT chest and 12/6/2021 CT abdomen.    FINDINGS:    PULMONARY ARTERIES: No pulmonary embolism detected. Please note that   multiple distal segmental and subsegmental pulmonary arterial branches   are not adequately assessed due to motion.    MEDIASTINUM: The right ventricle is qualitatively mildly dilated and   there is suggestion of flattening of the interventricular septum. No   pericardial effusion. Thoracic aorta normal caliber.  No large   mediastinal lymph nodes.    AIRWAYS, LUNGS, PLEURA: Severe narrowing of the right mainstem bronchus   and bronchus intermedius.    Right apical soft tissue opacification measuring 2.8 x 1.6 cm (image 13,   series 2) is similar to marginally decreased in size compared to   5/29/2021 where it was 2.9 x 1.8 cm when remeasured. An additional   anterior right upper lobe 1.3 cm nodular opacity (image 19, series 2) is   unchanged. Right apical cystic/cavitary opacity is likewise similar in   appearance. No significant interval change in right upperlobe   opacification along the minor fissure.    Left upper lobe parenchymal opacification measuring 3.6 x 2.2 cm (image   156, series 4) is without significant interval change compared to   5/29/2021, but progressed compared to an even earlier examination dated   10/26/2019.    No pleural effusion.    ABDOMEN and PELVIS: Peripheral enhancement of hepatic segment 7 (image   22, series 3) may be perfusional. Gallbladder, pancreas, spleen, adrenal   glands unremarkable. No hydronephrosis.    Unremarkable urinary bladder and uterus.    No bowel obstruction. Ascending and transverse colon are decompressed. No   free fluid. No abdominal or pelvic lymphadenopathy. Abdominal aorta   normal caliber.    BONES: Unremarkable.    SOFT TISSUES: Unremarkable.    IMPRESSION:    No pulmonary embolism detected.    No new lung parenchymal opacity to suggest acute pneumonia.    Indeterminate left upper lobe 3.6 x 2.2 cm opacity is similar compared to   5/29/2021, but progressed compared to 10/26/2019; the exact etiology is   unclear, but it is difficult to exclude lung neoplasm.    Additional right upper lobe opacities also unchanged compared to   5/29/2021.    Peripheral enhancement of the liver as described above may represent   perfusional abnormality. Otherwise, unremarkable evaluation of the   abdomen and pelvis.    --- End of Report ---            DONIS YATES MD; Attending Radiologist  This document has been electronically signed. Nov 8 2022 12:06PM    < end of copied text >    PROBLEM LIST:  77y Female with HEALTH ISSUES - PROBLEM Dx:  Hyperosmolar hyperglycemic state (HHS)    High serum lactate    Tachypnea    Preventive measure    Altered mental status    SIRS (systemic inflammatory response syndrome)    Hypothyroid    Hypertension    DM type 2, not at goal    Diarrhea         RECS:  CT findings may be due to old TB  EMR review state she had treatment in 2018 by [facility]  no outpatient emr data  outpatient pfts/repeat scans/PET?  s/p short course abx for possible pna  awaiting home O2    Please call with any questions.  Kathleen Bashir MD  [facility] Pulmonary/Sleep Medicine  [phone]  
Pulmonary Follow Up Note    MARTIR VU  [id number]    Chief Complaint: Patient is a 77y old  Female who presents with a chief complaint of Hyperglycemia, Syncope (08 Nov 2022 20:34)      HPI:  family at bedside  pt complaining of left lower chest pain that travels up the center of the chest    MEDICATIONS  (STANDING):  dextrose 5%. 1000 milliLiter(s) (100 mL/Hr) IV Continuous <Continuous>  dextrose 5%. 1000 milliLiter(s) (50 mL/Hr) IV Continuous <Continuous>  dextrose 50% Injectable 25 Gram(s) IV Push once  dextrose 50% Injectable 12.5 Gram(s) IV Push once  dextrose 50% Injectable 25 Gram(s) IV Push once  enoxaparin Injectable 40 milliGRAM(s) SubCutaneous every 24 hours  glucagon  Injectable 1 milliGRAM(s) IntraMuscular once  insulin glargine Injectable (LANTUS) 5 Unit(s) SubCutaneous <User Schedule>  insulin lispro (ADMELOG) corrective regimen sliding scale   SubCutaneous three times a day before meals  insulin lispro (ADMELOG) corrective regimen sliding scale   SubCutaneous at bedtime  lactated ringers. 1000 milliLiter(s) (75 mL/Hr) IV Continuous <Continuous>  levothyroxine 50 MICROGram(s) Oral daily  losartan 100 milliGRAM(s) Oral daily  simvastatin 20 milliGRAM(s) Oral at bedtime    MEDICATIONS  (PRN):  acetaminophen     Tablet .. 650 milliGRAM(s) Oral every 6 hours PRN Mild Pain (1 - 3)  albuterol/ipratropium for Nebulization.. 3 milliLiter(s) Nebulizer every 6 hours PRN Shortness of Breath and/or Wheezing  dextrose Oral Gel 15 Gram(s) Oral once PRN Blood Glucose LESS THAN 70 milliGRAM(s)/deciliter        EXAM:  Vital Signs Last 24 Hrs  T(C): 36.5 (11 Nov 2022 10:47), Max: 36.5 (10 Nov 2022 17:49)  T(F): 97.7 (11 Nov 2022 10:47), Max: 97.7 (10 Nov 2022 17:49)  HR: 87 (11 Nov 2022 10:47) (77 - 88)  BP: 160/81 (11 Nov 2022 10:47) (122/60 - 160/81)  BP(mean): --  RR: 20 (11 Nov 2022 10:47) (17 - 20)  SpO2: 96% (11 Nov 2022 10:47) (94% - 99%)    Parameters below as of 11 Nov 2022 10:47  Patient On (Oxygen Delivery Method): nasal cannula  O2 Flow (L/min): 2          GENERAL: No acute distress  LUNGS: Clear to auscultation bilaterally, no rales or wheezes  CHEST: tender to palpation of left lower chest.  CV: S1/S2      LABS/IMAGING: reviewed                                              8.1    6.95  )-----------( 236      ( 11 Nov 2022 05:35 )             30.3   11-11    141  |  106  |  12  ----------------------------<  103<H>  3.9   |  26  |  0.75    Ca    8.4      11 Nov 2022 05:35  Phos  2.9     11-11  Mg     1.80     11-11    TPro  5.9<L>  /  Alb  3.4  /  TBili  0.3  /  DBili  x   /  AST  32  /  ALT  40<H>  /  AlkPhos  98  11-10    < from: CT Angio Chest PE Protocol w/ IV Cont (11.08.22 @ 11:39) >    ACC: [id number] EXAM:  CT ABDOMEN AND PELVIS IC                        ACC: [id number] EXAM:  CT ANGIO CHEST PULM [facility]                          PROCEDURE DATE:  11/08/2022          INTERPRETATION:  CTA CHEST AND CT ABDOMEN AND PELVIS    INDICATION: Syncope. Evaluate for pneumonia.    TECHNIQUE: Enhanced helical images were obtained of the chest, abdomen   and pelvis. Coronal and sagittal images were reconstructed. Maximum   intensity projection images were generated.    CONTRAST/COMPLICATIONS:  IV Contrast: Omnipaque 350 (accession [id number]), IV contrast documented   in associated exam (accession [id number])  90 cc administered   10 cc   discarded  Oral Contrast: NONE  Complications: None reported at time of study completion    COMPARISON: 5/29/2021 CT chest and 12/6/2021 CT abdomen.    FINDINGS:    PULMONARY ARTERIES: No pulmonary embolism detected. Please note that   multiple distal segmental and subsegmental pulmonary arterial branches   are not adequately assessed due to motion.    MEDIASTINUM: The right ventricle is qualitatively mildly dilated and   there is suggestion of flattening of the interventricular septum. No   pericardial effusion. Thoracic aorta normal caliber.  No large   mediastinal lymph nodes.    AIRWAYS, LUNGS, PLEURA: Severe narrowing of the right mainstem bronchus   and bronchus intermedius.    Right apical soft tissue opacification measuring 2.8 x 1.6 cm (image 13,   series 2) is similar to marginally decreased in size compared to   5/29/2021 where it was 2.9 x 1.8 cm when remeasured. An additional   anterior right upper lobe 1.3 cm nodular opacity (image 19, series 2) is   unchanged. Right apical cystic/cavitary opacity is likewise similar in   appearance. No significant interval change in right upperlobe   opacification along the minor fissure.    Left upper lobe parenchymal opacification measuring 3.6 x 2.2 cm (image   156, series 4) is without significant interval change compared to   5/29/2021, but progressed compared to an even earlier examination dated   10/26/2019.    No pleural effusion.    ABDOMEN and PELVIS: Peripheral enhancement of hepatic segment 7 (image   22, series 3) may be perfusional. Gallbladder, pancreas, spleen, adrenal   glands unremarkable. No hydronephrosis.    Unremarkable urinary bladder and uterus.    No bowel obstruction. Ascending and transverse colon are decompressed. No   free fluid. No abdominal or pelvic lymphadenopathy. Abdominal aorta   normal caliber.    BONES: Unremarkable.    SOFT TISSUES: Unremarkable.    IMPRESSION:    No pulmonary embolism detected.    No new lung parenchymal opacity to suggest acute pneumonia.    Indeterminate left upper lobe 3.6 x 2.2 cm opacity is similar compared to   5/29/2021, but progressed compared to 10/26/2019; the exact etiology is   unclear, but it is difficult to exclude lung neoplasm.    Additional right upper lobe opacities also unchanged compared to   5/29/2021.    Peripheral enhancement of the liver as described above may represent   perfusional abnormality. Otherwise, unremarkable evaluation of the   abdomen and pelvis.    --- End of Report ---            DONIS YATES MD; Attending Radiologist  This document has been electronically signed. Nov 8 2022 12:06PM    < end of copied text >    PROBLEM LIST:  77yFemale with HEALTH ISSUES - PROBLEM Dx:  Hyperosmolar hyperglycemic state (HHS)    High serum lactate    Tachypnea    Preventive measure    Altered mental status    SIRS (systemic inflammatory response syndrome)    Hypothyroid    Hypertension    RECS:  -obtain cxr, pt denies any trauma to the area of pain.  -reflux? although i wouldn't expect there to be pain on palpation.  can trial maalox   -prn nebs if sob/wheezing  -pt DNR  -lung masses stabe from 2021 related to TB history? malignancy? would need GOC discussion in regards to further work up.    please feel free to call with any questions [phone]  Kathleen Bashir MD
Pulmonary Follow Up Note    MARTIR VU  [id number]    Chief Complaint: Patient is a 77y old  Female who presents with a chief complaint of Hyperglycemia, Syncope (08 Nov 2022 20:34)      HPI:  family at bedside  says she is doing ok  sitting up eating lunch, on room air, no complaints      MEDICATIONS  (STANDING):  dextrose 5%. 1000 milliLiter(s) (100 mL/Hr) IV Continuous <Continuous>  dextrose 5%. 1000 milliLiter(s) (50 mL/Hr) IV Continuous <Continuous>  dextrose 50% Injectable 25 Gram(s) IV Push once  dextrose 50% Injectable 12.5 Gram(s) IV Push once  dextrose 50% Injectable 25 Gram(s) IV Push once  enoxaparin Injectable 40 milliGRAM(s) SubCutaneous every 24 hours  glucagon  Injectable 1 milliGRAM(s) IntraMuscular once  insulin glargine Injectable (LANTUS) 6 Unit(s) SubCutaneous <User Schedule>  insulin lispro (ADMELOG) corrective regimen sliding scale   SubCutaneous three times a day before meals  insulin lispro (ADMELOG) corrective regimen sliding scale   SubCutaneous at bedtime  lactated ringers. 1000 milliLiter(s) (75 mL/Hr) IV Continuous <Continuous>  levothyroxine 50 MICROGram(s) Oral daily  losartan 100 milliGRAM(s) Oral daily  simvastatin 20 milliGRAM(s) Oral at bedtime    MEDICATIONS  (PRN):  acetaminophen     Tablet .. 650 milliGRAM(s) Oral every 6 hours PRN Mild Pain (1 - 3)  albuterol/ipratropium for Nebulization.. 3 milliLiter(s) Nebulizer every 6 hours PRN Shortness of Breath and/or Wheezing  dextrose Oral Gel 15 Gram(s) Oral once PRN Blood Glucose LESS THAN 70 milliGRAM(s)/deciliter      EXAM:  Vital Signs Last 24 Hrs  T(C): 36.2 (10 Nov 2022 11:00), Max: 36.7 (09 Nov 2022 20:30)  T(F): 97.1 (10 Nov 2022 11:00), Max: 98 (09 Nov 2022 20:30)  HR: 80 (10 Nov 2022 11:00) (63 - 80)  BP: 130/60 (10 Nov 2022 11:00) (130/60 - 153/60)  BP(mean): --  RR: 18 (10 Nov 2022 11:00) (18 - 24)  SpO2: 100% (10 Nov 2022 11:00) (100% - 100%)    Parameters below as of 10 Nov 2022 11:00  Patient On (Oxygen Delivery Method): nasal cannula  O2 Flow (L/min): 2        GENERAL: No acute distress  NEURO: Alert   LUNGS: Clear to auscultation bilaterally, no rales or wheezes  CV: S1/S2      LABS/IMAGING: reviewed                                   8.1    7.65  )-----------( 222      ( 10 Nov 2022 06:48 )             30.1   11-10    137  |  104  |  13  ----------------------------<  72  3.9   |  24  |  0.82    Ca    8.3<L>      10 Nov 2022 06:48  Phos  2.5     11-10  Mg     1.70     11-10    TPro  5.9<L>  /  Alb  3.4  /  TBili  0.3  /  DBili  x   /  AST  32  /  ALT  40<H>  /  AlkPhos  98  11-10    < from: CT Angio Chest PE Protocol w/ IV Cont (11.08.22 @ 11:39) >    ACC: [id number] EXAM:  CT ABDOMEN AND PELVIS IC                        ACC: [id number] EXAM:  CT ANGIO CHEST PULM [facility]                          PROCEDURE DATE:  11/08/2022          INTERPRETATION:  CTA CHEST AND CT ABDOMEN AND PELVIS    INDICATION: Syncope. Evaluate for pneumonia.    TECHNIQUE: Enhanced helical images were obtained of the chest, abdomen   and pelvis. Coronal and sagittal images were reconstructed. Maximum   intensity projection images were generated.    CONTRAST/COMPLICATIONS:  IV Contrast: Omnipaque 350 (accession [id number]), IV contrast documented   in associated exam (accession [id number])  90 cc administered   10 cc   discarded  Oral Contrast: NONE  Complications: None reported at time of study completion    COMPARISON: 5/29/2021 CT chest and 12/6/2021 CT abdomen.    FINDINGS:    PULMONARY ARTERIES: No pulmonary embolism detected. Please note that   multiple distal segmental and subsegmental pulmonary arterial branches   are not adequately assessed due to motion.    MEDIASTINUM: The right ventricle is qualitatively mildly dilated and   there is suggestion of flattening of the interventricular septum. No   pericardial effusion. Thoracic aorta normal caliber.  No large   mediastinal lymph nodes.    AIRWAYS, LUNGS, PLEURA: Severe narrowing of the right mainstem bronchus   and bronchus intermedius.    Right apical soft tissue opacification measuring 2.8 x 1.6 cm (image 13,   series 2) is similar to marginally decreased in size compared to   5/29/2021 where it was 2.9 x 1.8 cm when remeasured. An additional   anterior right upper lobe 1.3 cm nodular opacity (image 19, series 2) is   unchanged. Right apical cystic/cavitary opacity is likewise similar in   appearance. No significant interval change in right upperlobe   opacification along the minor fissure.    Left upper lobe parenchymal opacification measuring 3.6 x 2.2 cm (image   156, series 4) is without significant interval change compared to   5/29/2021, but progressed compared to an even earlier examination dated   10/26/2019.    No pleural effusion.    ABDOMEN and PELVIS: Peripheral enhancement of hepatic segment 7 (image   22, series 3) may be perfusional. Gallbladder, pancreas, spleen, adrenal   glands unremarkable. No hydronephrosis.    Unremarkable urinary bladder and uterus.    No bowel obstruction. Ascending and transverse colon are decompressed. No   free fluid. No abdominal or pelvic lymphadenopathy. Abdominal aorta   normal caliber.    BONES: Unremarkable.    SOFT TISSUES: Unremarkable.    IMPRESSION:    No pulmonary embolism detected.    No new lung parenchymal opacity to suggest acute pneumonia.    Indeterminate left upper lobe 3.6 x 2.2 cm opacity is similar compared to   5/29/2021, but progressed compared to 10/26/2019; the exact etiology is   unclear, but it is difficult to exclude lung neoplasm.    Additional right upper lobe opacities also unchanged compared to   5/29/2021.    Peripheral enhancement of the liver as described above may represent   perfusional abnormality. Otherwise, unremarkable evaluation of the   abdomen and pelvis.    --- End of Report ---            DONIS YATES MD; Attending Radiologist  This document has been electronically signed. Nov 8 2022 12:06PM    < end of copied text >    PROBLEM LIST:  77yFemale with HEALTH ISSUES - PROBLEM Dx:  Hyperosmolar hyperglycemic state (HHS)    High serum lactate    Tachypnea    Preventive measure    Altered mental status    SIRS (systemic inflammatory response syndrome)    Hypothyroid    Hypertension    RECS:  -currently resting comfortably on room air  -prn nebs if sob/wheezing  -pt DNR  -lung masses stabe from 2021 related to TB history? malignancy? would need GOC discussion in regards to further work up.    please feel free to call with any questions [phone]  Kathleen Bashir MD
Forrest Rosenthal MD  Interventional Cardiology / Advance Heart Failure and Cardiac Transplant Specialist  [place] Office : [phone] [place] [address] [place]. [address]  Tel:   [place] Office : [address] [facility] [place] [address]  Tel: [phone]      Subjective/Overnight events: Pt is lying in bed some SOB. denies CP      MEDICATIONS:  enoxaparin Injectable 40 milliGRAM(s) SubCutaneous every 24 hours  losartan 100 milliGRAM(s) Oral daily    piperacillin/tazobactam IVPB.. 3.375 Gram(s) IV Intermittent every 8 hours    albuterol/ipratropium for Nebulization.. 3 milliLiter(s) Nebulizer every 6 hours PRN    acetaminophen     Tablet .. 650 milliGRAM(s) Oral every 6 hours PRN    aluminum hydroxide/magnesium hydroxide/simethicone Suspension 30 milliLiter(s) Oral every 4 hours PRN    dextrose 50% Injectable 25 Gram(s) IV Push once  dextrose 50% Injectable 12.5 Gram(s) IV Push once  dextrose 50% Injectable 25 Gram(s) IV Push once  dextrose Oral Gel 15 Gram(s) Oral once PRN  glucagon  Injectable 1 milliGRAM(s) IntraMuscular once  insulin glargine Injectable (LANTUS) 4 Unit(s) SubCutaneous <User Schedule>  insulin lispro (ADMELOG) corrective regimen sliding scale   SubCutaneous three times a day before meals  insulin lispro (ADMELOG) corrective regimen sliding scale   SubCutaneous at bedtime  levothyroxine 50 MICROGram(s) Oral daily  simvastatin 20 milliGRAM(s) Oral at bedtime    dextrose 5%. 1000 milliLiter(s) IV Continuous <Continuous>  dextrose 5%. 1000 milliLiter(s) IV Continuous <Continuous>  lactated ringers. 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      Cataract of left eye          SOCIAL HISTORY: Substance Use (street drugs): ( x ) never used  (  ) other:    FAMILY HISTORY:  Family history of heart attack (Mother)  mother        PHYSICAL EXAM:  T(C): 36.6 (11-14-22 @ 11:34), Max: 36.8 (11-13-22 @ 21:18)  HR: 82 (11-14-22 @ 11:34) (80 - 88)  BP: 154/77 (11-14-22 @ 11:34) (154/77 - 173/80)  RR: 18 (11-14-22 @ 11:34) (18 - 20)  SpO2: 100% (11-14-22 @ 11:34) (98% - 100%)  Wt(kg): --  I&O's Summary    13 Nov 2022 07:01  -  14 Nov 2022 07:00  --------------------------------------------------------  IN: 900 mL / OUT: 0 mL / NET: 900 mL          GENERAL: NAD  EYES: EOMI, PERRLA, conjunctiva and sclera clear  ENMT: No tonsillar erythema, exudates, or enlargement  Cardiovascular: Normal S1 S2, No JVD, No murmurs, No edema  Respiratory: Lungs clear to auscultation	  Gastrointestinal:  Soft, Non-tender, + BS	  Extremities: No edema                                 9.0    8.60  )-----------( 234      ( 14 Nov 2022 05:40 )             33.4     11-14    138  |  98  |  7   ----------------------------<  215<H>  4.1   |  30  |  0.79    Ca    8.9      14 Nov 2022 05:40  Phos  2.0     11-14  Mg     1.80     11-14      proBNP:   Lipid Profile:   HgA1c:   TSH:     Consultant(s) Notes Reviewed:  [x ] YES  [ ] NO    Care Discussed with Consultants/Other Providers [ x] YES  [ ] NO    Imaging Personally Reviewed independently:  [x] YES  [ ] NO    All labs, radiologic studies, vitals, orders and medications list reviewed. Patient is seen and examined at bedside. Case discussed with medical team.        
PULMONARY PROGRESS NOTE    MARTIR VU  MRN-[id number]    Patient is a 77y old  Female who presents with a chief complaint of Hyperglycemia, Syncope (14 Nov 2022 14:22)      HPI:  resting in bed  family at bedside  doing ok    ROS:   -          EXAM:  Vital Signs Last 24 Hrs  T(C): 36.8 (17 Nov 2022 06:15), Max: 36.8 (16 Nov 2022 16:39)  T(F): 98.2 (17 Nov 2022 06:15), Max: 98.3 (16 Nov 2022 16:39)  HR: 96 (17 Nov 2022 06:15) (82 - 96)  BP: 154/81 (17 Nov 2022 06:15) (142/66 - 180/77)  BP(mean): --  RR: 20 (17 Nov 2022 06:15) (18 - 20)  SpO2: 79% (17 Nov 2022 12:20) (79% - 100%)    Parameters below as of 17 Nov 2022 12:20  Patient On (Oxygen Delivery Method): room air          GENERAL: The patient is awake and alert in no apparent distress.     LUNGS: Clear to auscultation without wheezing, rales or rhonchi; respirations unlabored     CBC Full  -  ( 17 Nov 2022 06:23 )  WBC Count : 7.35 K/uL  RBC Count : 4.22 M/uL  Hemoglobin : 8.1 g/dL  Hematocrit : 30.9 %  Platelet Count - Automated : 230 K/uL  Mean Cell Volume : 73.2 fL  Mean Cell Hemoglobin : 19.2 pg  Mean Cell Hemoglobin Concentration : 26.2 gm/dL  Auto Neutrophil # : x  Auto Lymphocyte # : x  Auto Monocyte # : x  Auto Eosinophil # : x  Auto Basophil # : x  Auto Neutrophil % : x  Auto Lymphocyte % : x  Auto Monocyte % : x  Auto Eosinophil % : x  Auto Basophil % : x    11-17    139  |  100  |  6<L>  ----------------------------<  139<H>  4.2   |  33<H>  |  0.65    Ca    9.0      17 Nov 2022 06:23  Phos  2.6     11-17  Mg     1.90     11-17    TPro  5.9<L>  /  Alb  3.6  /  TBili  0.3  /  DBili  <0.2  /  AST  14  /  ALT  18  /  AlkPhos  61  11-17       < from: Xray Chest 1 View- PORTABLE-Routine (Xray Chest 1 View- PORTABLE-Routine .) (11.11.22 @ 12:03) >    ACC: [id number] EXAM:  XR CHEST PORTABLE ROUTINE 1V                          PROCEDURE DATE:  11/11/2022          INTERPRETATION:  CLINICAL INFORMATION: Chest pain    TIME OF EXAMINATION: November 11 at 11:39 AM    EXAM: Portable chest    FINDINGS:  Left perihilar and right upper lobe inhomogeneous opacifications are   again seen unchanged possibly multifocal pneumonia. Heart size is stable.   No effusions or pneumothorax.        COMPARISON: November 8        IMPRESSION: Follow-up possible multifocal pneumonia.    --- End of Report ---        < end of copied text >  < from: CT Angio Chest PE Protocol w/ IV Cont (11.08.22 @ 11:39) >    ACC: [id number] EXAM:  CT ABDOMEN AND PELVIS IC                        ACC: [id number] EXAM:  CT ANGIO CHEST PULM [facility]                          PROCEDURE DATE:  11/08/2022          INTERPRETATION:  CTA CHEST AND CT ABDOMEN AND PELVIS    INDICATION: Syncope. Evaluate for pneumonia.    TECHNIQUE: Enhanced helical images were obtained of the chest, abdomen   and pelvis. Coronal and sagittal images were reconstructed. Maximum   intensity projection images were generated.    CONTRAST/COMPLICATIONS:  IV Contrast: Omnipaque 350 (accession [id number]), IV contrast documented   in associated exam (accession [id number])  90 cc administered   10 cc   discarded  Oral Contrast: NONE  Complications: None reported at time of study completion    COMPARISON: 5/29/2021 CT chest and 12/6/2021 CT abdomen.    FINDINGS:    PULMONARY ARTERIES: No pulmonary embolism detected. Please note that   multiple distal segmental and subsegmental pulmonary arterial branches   are not adequately assessed due to motion.    MEDIASTINUM: The right ventricle is qualitatively mildly dilated and   there is suggestion of flattening of the interventricular septum. No   pericardial effusion. Thoracic aorta normal caliber.  No large   mediastinal lymph nodes.    AIRWAYS, LUNGS, PLEURA: Severe narrowing of the right mainstem bronchus   and bronchus intermedius.    Right apical soft tissue opacification measuring 2.8 x 1.6 cm (image 13,   series 2) is similar to marginally decreased in size compared to   5/29/2021 where it was 2.9 x 1.8 cm when remeasured. An additional   anterior right upper lobe 1.3 cm nodular opacity (image 19, series 2) is   unchanged. Right apical cystic/cavitary opacity is likewise similar in   appearance. No significant interval change in right upperlobe   opacification along the minor fissure.    Left upper lobe parenchymal opacification measuring 3.6 x 2.2 cm (image   156, series 4) is without significant interval change compared to   5/29/2021, but progressed compared to an even earlier examination dated   10/26/2019.    No pleural effusion.    ABDOMEN and PELVIS: Peripheral enhancement of hepatic segment 7 (image   22, series 3) may be perfusional. Gallbladder, pancreas, spleen, adrenal   glands unremarkable. No hydronephrosis.    Unremarkable urinary bladder and uterus.    No bowel obstruction. Ascending and transverse colon are decompressed. No   free fluid. No abdominal or pelvic lymphadenopathy. Abdominal aorta   normal caliber.    BONES: Unremarkable.    SOFT TISSUES: Unremarkable.    IMPRESSION:    No pulmonary embolism detected.    No new lung parenchymal opacity to suggest acute pneumonia.    Indeterminate left upper lobe 3.6 x 2.2 cm opacity is similar compared to   5/29/2021, but progressed compared to 10/26/2019; the exact etiology is   unclear, but it is difficult to exclude lung neoplasm.    Additional right upper lobe opacities also unchanged compared to   5/29/2021.    Peripheral enhancement of the liver as described above may represent   perfusional abnormality. Otherwise, unremarkable evaluation of the   abdomen and pelvis.    --- End of Report ---            DONIS YATES MD; Attending Radiologist  This document has been electronically signed. Nov 8 2022 12:06PM    < end of copied text >    PROBLEM LIST:  77y Female with HEALTH ISSUES - PROBLEM Dx:  Hyperosmolar hyperglycemic state (HHS)    High serum lactate    Tachypnea    Preventive measure    Altered mental status    SIRS (systemic inflammatory response syndrome)    Hypothyroid    Hypertension    DM type 2, not at goal    Diarrhea         RECS:  CT findings may be due to old TB  EMR review state she had treatment in 2018 by [facility]  no outpatient emr data  outpatient pfts/repeat scans/PET?  s/p short course abx for possible pna  awaiting home O2    Please call with any questions.  Kathleen Bashir MD  [facility] Pulmonary/Sleep Medicine  [phone]  
PULMONARY PROGRESS NOTE    MARTIR VU  MRN-[id number]    Patient is a 77y old  Female who presents with a chief complaint of Hyperglycemia, Syncope (14 Nov 2022 14:22)      HPI:  -on 2L comfortable  daughter in law at bedside  -    ROS:   -    ACTIVE MEDICATION LIST:  MEDICATIONS  (STANDING):  dextrose 5%. 1000 milliLiter(s) (100 mL/Hr) IV Continuous <Continuous>  dextrose 5%. 1000 milliLiter(s) (50 mL/Hr) IV Continuous <Continuous>  dextrose 50% Injectable 25 Gram(s) IV Push once  dextrose 50% Injectable 12.5 Gram(s) IV Push once  dextrose 50% Injectable 25 Gram(s) IV Push once  enoxaparin Injectable 40 milliGRAM(s) SubCutaneous every 24 hours  glucagon  Injectable 1 milliGRAM(s) IntraMuscular once  insulin glargine Injectable (LANTUS) 4 Unit(s) SubCutaneous <User Schedule>  insulin lispro (ADMELOG) corrective regimen sliding scale   SubCutaneous three times a day before meals  insulin lispro (ADMELOG) corrective regimen sliding scale   SubCutaneous at bedtime  lactated ringers. 1000 milliLiter(s) (75 mL/Hr) IV Continuous <Continuous>  levothyroxine 50 MICROGram(s) Oral daily  losartan 100 milliGRAM(s) Oral daily  piperacillin/tazobactam IVPB.. 3.375 Gram(s) IV Intermittent every 8 hours  simvastatin 20 milliGRAM(s) Oral at bedtime    MEDICATIONS  (PRN):  acetaminophen     Tablet .. 650 milliGRAM(s) Oral every 6 hours PRN Mild Pain (1 - 3)  albuterol/ipratropium for Nebulization.. 3 milliLiter(s) Nebulizer every 6 hours PRN Shortness of Breath and/or Wheezing  aluminum hydroxide/magnesium hydroxide/simethicone Suspension 30 milliLiter(s) Oral every 4 hours PRN Dyspepsia  dextrose Oral Gel 15 Gram(s) Oral once PRN Blood Glucose LESS THAN 70 milliGRAM(s)/deciliter      EXAM:  Vital Signs Last 24 Hrs  T(C): 36.6 (14 Nov 2022 11:34), Max: 36.8 (13 Nov 2022 21:18)  T(F): 97.9 (14 Nov 2022 11:34), Max: 98.3 (13 Nov 2022 21:18)  HR: 82 (14 Nov 2022 11:34) (80 - 88)  BP: 154/77 (14 Nov 2022 11:34) (154/77 - 173/80)  BP(mean): --  RR: 18 (14 Nov 2022 11:34) (18 - 20)  SpO2: 100% (14 Nov 2022 11:34) (98% - 100%)    Parameters below as of 14 Nov 2022 11:34  Patient On (Oxygen Delivery Method): nasal cannula  O2 Flow (L/min): 2      GENERAL: The patient is awake and alert in no apparent distress.     LUNGS: Clear to auscultation without wheezing, rales or rhonchi; respirations unlabored                             9.0    8.60  )-----------( 234      ( 14 Nov 2022 05:40 )             33.4       11-14    138  |  98  |  7   ----------------------------<  215<H>  4.1   |  30  |  0.79    Ca    8.9      14 Nov 2022 05:40  Phos  2.0     11-14  Mg     1.80     11-14       < from: Xray Chest 1 View- PORTABLE-Routine (Xray Chest 1 View- PORTABLE-Routine .) (11.11.22 @ 12:03) >    ACC: [id number] EXAM:  XR CHEST PORTABLE ROUTINE 1V                          PROCEDURE DATE:  11/11/2022          INTERPRETATION:  CLINICAL INFORMATION: Chest pain    TIME OF EXAMINATION: November 11 at 11:39 AM    EXAM: Portable chest    FINDINGS:  Left perihilar and right upper lobe inhomogeneous opacifications are   again seen unchanged possibly multifocal pneumonia. Heart size is stable.   No effusions or pneumothorax.        COMPARISON: November 8        IMPRESSION: Follow-up possible multifocal pneumonia.    --- End of Report ---        < end of copied text >  < from: CT Angio Chest PE Protocol w/ IV Cont (11.08.22 @ 11:39) >    ACC: [id number] EXAM:  CT ABDOMEN AND PELVIS IC                        ACC: [id number] EXAM:  CT ANGIO CHEST PULM [facility]                          PROCEDURE DATE:  11/08/2022          INTERPRETATION:  CTA CHEST AND CT ABDOMEN AND PELVIS    INDICATION: Syncope. Evaluate for pneumonia.    TECHNIQUE: Enhanced helical images were obtained of the chest, abdomen   and pelvis. Coronal and sagittal images were reconstructed. Maximum   intensity projection images were generated.    CONTRAST/COMPLICATIONS:  IV Contrast: Omnipaque 350 (accession [id number]), IV contrast documented   in associated exam (accession [id number])  90 cc administered   10 cc   discarded  Oral Contrast: NONE  Complications: None reported at time of study completion    COMPARISON: 5/29/2021 CT chest and 12/6/2021 CT abdomen.    FINDINGS:    PULMONARY ARTERIES: No pulmonary embolism detected. Please note that   multiple distal segmental and subsegmental pulmonary arterial branches   are not adequately assessed due to motion.    MEDIASTINUM: The right ventricle is qualitatively mildly dilated and   there is suggestion of flattening of the interventricular septum. No   pericardial effusion. Thoracic aorta normal caliber.  No large   mediastinal lymph nodes.    AIRWAYS, LUNGS, PLEURA: Severe narrowing of the right mainstem bronchus   and bronchus intermedius.    Right apical soft tissue opacification measuring 2.8 x 1.6 cm (image 13,   series 2) is similar to marginally decreased in size compared to   5/29/2021 where it was 2.9 x 1.8 cm when remeasured. An additional   anterior right upper lobe 1.3 cm nodular opacity (image 19, series 2) is   unchanged. Right apical cystic/cavitary opacity is likewise similar in   appearance. No significant interval change in right upperlobe   opacification along the minor fissure.    Left upper lobe parenchymal opacification measuring 3.6 x 2.2 cm (image   156, series 4) is without significant interval change compared to   5/29/2021, but progressed compared to an even earlier examination dated   10/26/2019.    No pleural effusion.    ABDOMEN and PELVIS: Peripheral enhancement of hepatic segment 7 (image   22, series 3) may be perfusional. Gallbladder, pancreas, spleen, adrenal   glands unremarkable. No hydronephrosis.    Unremarkable urinary bladder and uterus.    No bowel obstruction. Ascending and transverse colon are decompressed. No   free fluid. No abdominal or pelvic lymphadenopathy. Abdominal aorta   normal caliber.    BONES: Unremarkable.    SOFT TISSUES: Unremarkable.    IMPRESSION:    No pulmonary embolism detected.    No new lung parenchymal opacity to suggest acute pneumonia.    Indeterminate left upper lobe 3.6 x 2.2 cm opacity is similar compared to   5/29/2021, but progressed compared to 10/26/2019; the exact etiology is   unclear, but it is difficult to exclude lung neoplasm.    Additional right upper lobe opacities also unchanged compared to   5/29/2021.    Peripheral enhancement of the liver as described above may represent   perfusional abnormality. Otherwise, unremarkable evaluation of the   abdomen and pelvis.    --- End of Report ---            DONIS YATES MD; Attending Radiologist  This document has been electronically signed. Nov 8 2022 12:06PM    < end of copied text >    PROBLEM LIST:  77y Female with HEALTH ISSUES - PROBLEM Dx:  Hyperosmolar hyperglycemic state (HHS)    High serum lactate    Tachypnea    Preventive measure    Altered mental status    SIRS (systemic inflammatory response syndrome)    Hypothyroid    Hypertension    DM type 2, not at goal    Diarrhea         RECS:  findings may be due to old TB  EMR review state she had treatment in 2018 by [facility]  no outpatient emr data  dyspnea wiht exertion- defer to cardio any role for diuresis  outpatient pfts  abxper primary team    Please call with any questions.    Dede Flynn DO  [facility] Pulmonary/Sleep Medicine  [phone]  
  Forrest Rosenthal MD  Interventional Cardiology / Endovascular Specialist  [place] Office : [phone] [place] [address] [place] [address]  Tel:   [place] Office : [address] [facility] [place] [address]  Tel: [phone]    Subjective/Overnight events: Patient sitting up in chair comfortably. No acute distress. Denies chest pain, SOB or palpitations or dizziness   	  MEDICATIONS:  enoxaparin Injectable 40 milliGRAM(s) SubCutaneous every 24 hours  losartan 100 milliGRAM(s) Oral daily    piperacillin/tazobactam IVPB.- 3.375 Gram(s) IV Intermittent once  piperacillin/tazobactam IVPB.. 3.375 Gram(s) IV Intermittent every 8 hours    albuterol/ipratropium for Nebulization.. 3 milliLiter(s) Nebulizer every 6 hours PRN    acetaminophen     Tablet .. 650 milliGRAM(s) Oral every 6 hours PRN    aluminum hydroxide/magnesium hydroxide/simethicone Suspension 30 milliLiter(s) Oral every 4 hours PRN    dextrose 50% Injectable 25 Gram(s) IV Push once  dextrose 50% Injectable 12.5 Gram(s) IV Push once  dextrose 50% Injectable 25 Gram(s) IV Push once  dextrose Oral Gel 15 Gram(s) Oral once PRN  glucagon  Injectable 1 milliGRAM(s) IntraMuscular once  insulin glargine Injectable (LANTUS) 4 Unit(s) SubCutaneous <User Schedule>  insulin lispro (ADMELOG) corrective regimen sliding scale   SubCutaneous three times a day before meals  insulin lispro (ADMELOG) corrective regimen sliding scale   SubCutaneous at bedtime  levothyroxine 50 MICROGram(s) Oral daily  simvastatin 20 milliGRAM(s) Oral at bedtime    dextrose 5%. 1000 milliLiter(s) IV Continuous <Continuous>  dextrose 5%. 1000 milliLiter(s) IV Continuous <Continuous>  lactated ringers. 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 No nausea, vomiting, or hematemesis; No diarrhea or constipation. No melena or hematochezia.  GENITOURINARY: No dysuria, frequency, hematuria, or incontinence  NEUROLOGICAL: No headaches, memory loss, loss of strength, numbness, or tremors  ENDOCRINE: No heat or cold intolerance; No hair loss  MUSCULOSKELETAL: No joint pain or swelling; No muscle, back, or extremity pain  PSYCHIATRIC: No depression, anxiety, mood swings, or difficulty sleeping  HEME/LYMPH: No easy bruising, or bleeding gums  All others negative    PHYSICAL EXAM:  T(C): 36.5 (11-11-22 @ 21:45), Max: 36.8 (11-11-22 @ 17:43)  HR: 82 (11-11-22 @ 21:45) (77 - 87)  BP: 171/76 (11-11-22 @ 21:45) (157/75 - 171/76)  RR: 17 (11-11-22 @ 21:45) (16 - 20)  SpO2: 100% (11-11-22 @ 21:45) (96% - 100%)  Wt(kg): --  I&O's Summary      GENERAL: NAD  EYES: EOMI, PERRLA, conjunctiva and sclera clear  ENMT: No tonsillar erythema, exudates, or enlargement  Cardiovascular: Normal S1 S2, No JVD, No murmurs, No edema  Respiratory: Lungs clear to auscultation	  Gastrointestinal:  Soft, Non-tender, + BS	  Extremities: No edema                           8.1    6.95  )-----------( 236      ( 11 Nov 2022 05:35 )             30.3     11-11    141  |  106  |  12  ----------------------------<  103<H>  3.9   |  26  |  0.75    Ca    8.4      11 Nov 2022 05:35  Phos  2.9     11-11  Mg     1.80     11-11    TPro  5.9<L>  /  Alb  3.4  /  TBili  0.3  /  DBili  x   /  AST  32  /  ALT  40<H>  /  AlkPhos  98  11-10    proBNP:   Lipid Profile:   HgA1c:   TSH:     Consultant(s) Notes Reviewed:  [x ] YES  [ ] NO    Care Discussed with Consultants/Other Providers [ x] YES  [ ] NO    Imaging Personally Reviewed independently:  [x] YES  [ ] NO    All labs, radiologic studies, vitals, orders and medications list reviewed. Patient is seen and examined at bedside. Case discussed with medical team.              
Chief Complaint: Uncontrolled Dm2 c/b [facility]    History: Pt deferred Czech translation to daughter in law. Pt seen at bedside. Pt tolerating oral diet. Pt denies nausea and vomiting/any signs of hypoglycemia. Pt reports an adequate appetite.     MEDICATIONS  (STANDING):  dextrose 5%. 1000 milliLiter(s) (100 mL/Hr) IV Continuous <Continuous>  dextrose 5%. 1000 milliLiter(s) (50 mL/Hr) IV Continuous <Continuous>  dextrose 50% Injectable 25 Gram(s) IV Push once  dextrose 50% Injectable 12.5 Gram(s) IV Push once  dextrose 50% Injectable 25 Gram(s) IV Push once  enoxaparin Injectable 40 milliGRAM(s) SubCutaneous every 24 hours  glucagon  Injectable 1 milliGRAM(s) IntraMuscular once  insulin glargine Injectable (LANTUS) 5 Unit(s) SubCutaneous <User Schedule>  insulin lispro (ADMELOG) corrective regimen sliding scale   SubCutaneous three times a day before meals  insulin lispro (ADMELOG) corrective regimen sliding scale   SubCutaneous at bedtime  insulin lispro Injectable (ADMELOG) 2 Unit(s) SubCutaneous three times a day before meals  lactated ringers. 1000 milliLiter(s) (75 mL/Hr) IV Continuous <Continuous>  levothyroxine 50 MICROGram(s) Oral daily  losartan 100 milliGRAM(s) Oral daily  piperacillin/tazobactam IVPB.. 3.375 Gram(s) IV Intermittent every 8 hours  simvastatin 20 milliGRAM(s) Oral at bedtime    MEDICATIONS  (PRN):  acetaminophen     Tablet .. 650 milliGRAM(s) Oral every 6 hours PRN Mild Pain (1 - 3)  albuterol/ipratropium for Nebulization.. 3 milliLiter(s) Nebulizer every 6 hours PRN Shortness of Breath and/or Wheezing  aluminum hydroxide/magnesium hydroxide/simethicone Suspension 30 milliLiter(s) Oral every 4 hours PRN Dyspepsia  dextrose Oral Gel 15 Gram(s) Oral once PRN Blood Glucose LESS THAN 70 milliGRAM(s)/deciliter      Allergies: No Known Allergies    Review of Systems:  Respiratory: No SOB, no cough  GI: No nausea, vomiting, abdominal pain  Endocrine: no polyuria, polydipsia      PHYSICAL EXAM:  VITALS: T(C): 36.6 (11-14-22 @ 11:34)  T(F): 97.9 (11-14-22 @ 11:34), Max: 98.3 (11-13-22 @ 21:18)  HR: 82 (11-14-22 @ 11:34) (80 - 88)  BP: 154/77 (11-14-22 @ 11:34) (154/77 - 173/80)  RR:  (18 - 20)  SpO2:  (98% - 100%)  Wt(kg): --  GENERAL: NAD, well-groomed, well-developed  RESPIRATORY: No labored breathing   GI: Soft, nontender, non distended  PSYCH: Alert and oriented x 3, normal affect, normal mood      CAPILLARY BLOOD GLUCOSE  POCT Blood Glucose.: 200 mg/dL (14 Nov 2022 12:50)  POCT Blood Glucose.: 202 mg/dL (14 Nov 2022 08:23)  POCT Blood Glucose.: 221 mg/dL (13 Nov 2022 21:46)  POCT Blood Glucose.: 228 mg/dL (13 Nov 2022 17:54)    A1C with Estimated Average Glucose (11.08.22 @ 09:00)    A1C with Estimated Average Glucose Result: 10.5    11-14    138  |  98  |  7   ----------------------------<  215<H>  4.1   |  30  |  0.79    eGFR: 76    Ca    8.9      11-14  Mg     1.80     11-14  Phos  2.0     11-14      Thyroid Function Tests:  11-10 @ 06:48 TSH 1.08 FreeT4 1.3 T3 -- Anti TPO -- Anti Thyroglobulin Ab -- TSI --  11-08 @ 09:00 TSH 2.23 FreeT4 -- T3 -- Anti TPO -- Anti Thyroglobulin Ab -- TSI --      Diet, Consistent Carbohydrate w/Evening Snack:   DASH/TLC Sodium & Cholesterol Restricted (DASH) (11-09-22 @ 09:29) [Active]                      
Forrest Rosenthal MD  Interventional Cardiology / Advance Heart Failure and Cardiac Transplant Specialist  [place] Office : [address] [place] [address] [facility] [address]  Tel:   [place] Office : [address] [facility] [place] [address]  Tel: [phone]      Subjective/Overnight events: Pt is lying in bed comfortable not in distress    	  MEDICATIONS:  enoxaparin Injectable 40 milliGRAM(s) SubCutaneous every 24 hours  losartan 100 milliGRAM(s) Oral daily      albuterol/ipratropium for Nebulization.. 3 milliLiter(s) Nebulizer every 6 hours PRN    acetaminophen     Tablet .. 650 milliGRAM(s) Oral every 6 hours PRN    aluminum hydroxide/magnesium hydroxide/simethicone Suspension 30 milliLiter(s) Oral every 4 hours PRN  polyethylene glycol 3350 17 Gram(s) Oral daily  senna 2 Tablet(s) Oral at bedtime    dextrose 50% Injectable 25 Gram(s) IV Push once  dextrose 50% Injectable 12.5 Gram(s) IV Push once  dextrose 50% Injectable 25 Gram(s) IV Push once  dextrose Oral Gel 15 Gram(s) Oral once PRN  glucagon  Injectable 1 milliGRAM(s) IntraMuscular once  insulin glargine Injectable (LANTUS) 5 Unit(s) SubCutaneous <User Schedule>  insulin lispro (ADMELOG) corrective regimen sliding scale   SubCutaneous at bedtime  insulin lispro (ADMELOG) corrective regimen sliding scale   SubCutaneous three times a day before meals  insulin lispro Injectable (ADMELOG) 3 Unit(s) SubCutaneous three times a day before meals  levothyroxine 50 MICROGram(s) Oral daily  simvastatin 20 milliGRAM(s) Oral at bedtime    cholecalciferol 1000 Unit(s) Oral daily  dextrose 5%. 1000 milliLiter(s) IV Continuous <Continuous>  dextrose 5%. 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      Cataract of left eye          SOCIAL HISTORY: Substance Use (street drugs): ( x ) never used  (  ) other:    FAMILY HISTORY:  Family history of heart attack (Mother)  mother          PHYSICAL EXAM:  T(C): 36.7 (11-19-22 @ 08:46), Max: 36.8 (11-18-22 @ 22:00)  HR: 84 (11-19-22 @ 08:46) (84 - 98)  BP: 174/78 (11-19-22 @ 08:46) (150/70 - 174/78)  RR: 18 (11-19-22 @ 08:46) (17 - 18)  SpO2: 96% (11-19-22 @ 05:00) (94% - 96%)  Wt(kg): --  I&O's Summary    18 Nov 2022 07:01  -  19 Nov 2022 07:00  --------------------------------------------------------  IN: 2572 mL / OUT: 1400 mL / NET: 1172 mL    19 Nov 2022 07:01  -  19 Nov 2022 11:48  --------------------------------------------------------  IN: 200 mL / OUT: 300 mL / NET: -100 mL      EYES:   PERRLA   ENMT:   Moist mucous membranes, Good dentition, No lesions  Cardiovascular: Normal S1 S2, No JVD, No murmurs, No edema  Respiratory: b/l rhonchi   Gastrointestinal:  Soft, Non-tender, + BS	  Extremities: no edema                              8.8    7.82  )-----------( 246      ( 18 Nov 2022 14:48 )             32.9           proBNP:   Lipid Profile:   HgA1c:   TSH:     Consultant(s) Notes Reviewed:  [x ] YES  [ ] NO    Care Discussed with Consultants/Other Providers [ x] YES  [ ] NO    Imaging Personally Reviewed independently:  [x] YES  [ ] NO    All labs, radiologic studies, vitals, orders and medications list reviewed. Patient is seen and examined at bedside. Case discussed with medical team.        
PULMONARY PROGRESS NOTE    MARTIR VU  MRN-[id number]    Patient is a 77y old  Female who presents with a chief complaint of Hyperglycemia, Syncope (14 Nov 2022 14:22)      HPI:  resting in bed  daughter in law at bedside  no complaints    ROS:   -    MEDICATIONS  (STANDING):  cholecalciferol 1000 Unit(s) Oral daily  dextrose 5%. 1000 milliLiter(s) (100 mL/Hr) IV Continuous <Continuous>  dextrose 5%. 1000 milliLiter(s) (50 mL/Hr) IV Continuous <Continuous>  dextrose 50% Injectable 25 Gram(s) IV Push once  dextrose 50% Injectable 12.5 Gram(s) IV Push once  dextrose 50% Injectable 25 Gram(s) IV Push once  enoxaparin Injectable 40 milliGRAM(s) SubCutaneous every 24 hours  glucagon  Injectable 1 milliGRAM(s) IntraMuscular once  insulin glargine Injectable (LANTUS) 5 Unit(s) SubCutaneous <User Schedule>  insulin lispro (ADMELOG) corrective regimen sliding scale   SubCutaneous at bedtime  insulin lispro (ADMELOG) corrective regimen sliding scale   SubCutaneous three times a day before meals  insulin lispro Injectable (ADMELOG) 2 Unit(s) SubCutaneous three times a day before meals  lactated ringers. 1000 milliLiter(s) (75 mL/Hr) IV Continuous <Continuous>  levothyroxine 50 MICROGram(s) Oral daily  losartan 100 milliGRAM(s) Oral daily  polyethylene glycol 3350 17 Gram(s) Oral daily  senna 2 Tablet(s) Oral at bedtime  simvastatin 20 milliGRAM(s) Oral at bedtime    MEDICATIONS  (PRN):  acetaminophen     Tablet .. 650 milliGRAM(s) Oral every 6 hours PRN Mild Pain (1 - 3)  albuterol/ipratropium for Nebulization.. 3 milliLiter(s) Nebulizer every 6 hours PRN Shortness of Breath and/or Wheezing  aluminum hydroxide/magnesium hydroxide/simethicone Suspension 30 milliLiter(s) Oral every 4 hours PRN Dyspepsia  dextrose Oral Gel 15 Gram(s) Oral once PRN Blood Glucose LESS THAN 70 milliGRAM(s)/deciliter        EXAM:  Vital Signs Last 24 Hrs  T(C): 36.6 (16 Nov 2022 05:22), Max: 37 (15 Nov 2022 16:35)  T(F): 97.8 (16 Nov 2022 05:22), Max: 98.6 (15 Nov 2022 16:35)  HR: 85 (16 Nov 2022 07:45) (77 - 85)  BP: 178/70 (16 Nov 2022 07:45) (146/57 - 178/70)  BP(mean): --  RR: 18 (16 Nov 2022 07:45) (18 - 18)  SpO2: 98% (16 Nov 2022 07:45) (96% - 99%)    Parameters below as of 16 Nov 2022 07:45  Patient On (Oxygen Delivery Method): nasal cannula          GENERAL: The patient is awake and alert in no apparent distress.     LUNGS: Clear to auscultation without wheezing, rales or rhonchi; respirations unlabored                                        8.5    6.84  )-----------( 222      ( 16 Nov 2022 05:45 )             31.8   11-16    142  |  102  |  6<L>  ----------------------------<  123<H>  4.3   |  30  |  0.66    Ca    9.1      16 Nov 2022 05:45  Phos  2.2     11-16  Mg     1.80     11-16           < from: Xray Chest 1 View- PORTABLE-Routine (Xray Chest 1 View- PORTABLE-Routine .) (11.11.22 @ 12:03) >    ACC: [id number] EXAM:  XR CHEST PORTABLE ROUTINE 1V                          PROCEDURE DATE:  11/11/2022          INTERPRETATION:  CLINICAL INFORMATION: Chest pain    TIME OF EXAMINATION: November 11 at 11:39 AM    EXAM: Portable chest    FINDINGS:  Left perihilar and right upper lobe inhomogeneous opacifications are   again seen unchanged possibly multifocal pneumonia. Heart size is stable.   No effusions or pneumothorax.        COMPARISON: November 8        IMPRESSION: Follow-up possible multifocal pneumonia.    --- End of Report ---        < end of copied text >  < from: CT Angio Chest PE Protocol w/ IV Cont (11.08.22 @ 11:39) >    ACC: [id number] EXAM:  CT ABDOMEN AND PELVIS IC                        ACC: [id number] EXAM:  CT ANGIO CHEST PULM [facility]                          PROCEDURE DATE:  11/08/2022          INTERPRETATION:  CTA CHEST AND CT ABDOMEN AND PELVIS    INDICATION: Syncope. Evaluate for pneumonia.    TECHNIQUE: Enhanced helical images were obtained of the chest, abdomen   and pelvis. Coronal and sagittal images were reconstructed. Maximum   intensity projection images were generated.    CONTRAST/COMPLICATIONS:  IV Contrast: Omnipaque 350 (accession [id number]), IV contrast documented   in associated exam (accession [id number])  90 cc administered   10 cc   discarded  Oral Contrast: NONE  Complications: None reported at time of study completion    COMPARISON: 5/29/2021 CT chest and 12/6/2021 CT abdomen.    FINDINGS:    PULMONARY ARTERIES: No pulmonary embolism detected. Please note that   multiple distal segmental and subsegmental pulmonary arterial branches   are not adequately assessed due to motion.    MEDIASTINUM: The right ventricle is qualitatively mildly dilated and   there is suggestion of flattening of the interventricular septum. No   pericardial effusion. Thoracic aorta normal caliber.  No large   mediastinal lymph nodes.    AIRWAYS, LUNGS, PLEURA: Severe narrowing of the right mainstem bronchus   and bronchus intermedius.    Right apical soft tissue opacification measuring 2.8 x 1.6 cm (image 13,   series 2) is similar to marginally decreased in size compared to   5/29/2021 where it was 2.9 x 1.8 cm when remeasured. An additional   anterior right upper lobe 1.3 cm nodular opacity (image 19, series 2) is   unchanged. Right apical cystic/cavitary opacity is likewise similar in   appearance. No significant interval change in right upperlobe   opacification along the minor fissure.    Left upper lobe parenchymal opacification measuring 3.6 x 2.2 cm (image   156, series 4) is without significant interval change compared to   5/29/2021, but progressed compared to an even earlier examination dated   10/26/2019.    No pleural effusion.    ABDOMEN and PELVIS: Peripheral enhancement of hepatic segment 7 (image   22, series 3) may be perfusional. Gallbladder, pancreas, spleen, adrenal   glands unremarkable. No hydronephrosis.    Unremarkable urinary bladder and uterus.    No bowel obstruction. Ascending and transverse colon are decompressed. No   free fluid. No abdominal or pelvic lymphadenopathy. Abdominal aorta   normal caliber.    BONES: Unremarkable.    SOFT TISSUES: Unremarkable.    IMPRESSION:    No pulmonary embolism detected.    No new lung parenchymal opacity to suggest acute pneumonia.    Indeterminate left upper lobe 3.6 x 2.2 cm opacity is similar compared to   5/29/2021, but progressed compared to 10/26/2019; the exact etiology is   unclear, but it is difficult to exclude lung neoplasm.    Additional right upper lobe opacities also unchanged compared to   5/29/2021.    Peripheral enhancement of the liver as described above may represent   perfusional abnormality. Otherwise, unremarkable evaluation of the   abdomen and pelvis.    --- End of Report ---            DONIS YATES MD; Attending Radiologist  This document has been electronically signed. Nov 8 2022 12:06PM    < end of copied text >    PROBLEM LIST:  77y Female with HEALTH ISSUES - PROBLEM Dx:  Hyperosmolar hyperglycemic state (HHS)    High serum lactate    Tachypnea    Preventive measure    Altered mental status    SIRS (systemic inflammatory response syndrome)    Hypothyroid    Hypertension    DM type 2, not at goal    Diarrhea         RECS:  CT findings may be due to old TB  EMR review state she had treatment in 2018 by [facility]  no outpatient emr data  outpatient pfts/repeat scans/PET?  s/p short course abx for possible pna  wean O2 as tolerated    Please call with any questions.  Kathleen Bashir MD  [facility] Pulmonary/Sleep Medicine  [phone]  
  Forrest Rosenthal MD  Interventional Cardiology / Endovascular Specialist  [place] Office : [address] [place] [address] [place] [address]  Tel:   [place] Office : [address] [facility] [place] [address]  Tel: [phone]    Pt lying in bed in NAD   	  MEDICATIONS:  enoxaparin Injectable 40 milliGRAM(s) SubCutaneous every 24 hours  losartan 100 milliGRAM(s) Oral daily    piperacillin/tazobactam IVPB.. 3.375 Gram(s) IV Intermittent every 8 hours    albuterol/ipratropium for Nebulization.. 3 milliLiter(s) Nebulizer every 6 hours PRN    acetaminophen     Tablet .. 650 milliGRAM(s) Oral every 6 hours PRN    aluminum hydroxide/magnesium hydroxide/simethicone Suspension 30 milliLiter(s) Oral every 4 hours PRN    dextrose 50% Injectable 25 Gram(s) IV Push once  dextrose 50% Injectable 12.5 Gram(s) IV Push once  dextrose 50% Injectable 25 Gram(s) IV Push once  dextrose Oral Gel 15 Gram(s) Oral once PRN  glucagon  Injectable 1 milliGRAM(s) IntraMuscular once  insulin glargine Injectable (LANTUS) 4 Unit(s) SubCutaneous <User Schedule>  insulin lispro (ADMELOG) corrective regimen sliding scale   SubCutaneous three times a day before meals  insulin lispro (ADMELOG) corrective regimen sliding scale   SubCutaneous at bedtime  levothyroxine 50 MICROGram(s) Oral daily  simvastatin 20 milliGRAM(s) Oral at bedtime    dextrose 5%. 1000 milliLiter(s) IV Continuous <Continuous>  dextrose 5%. 1000 milliLiter(s) IV Continuous <Continuous>  lactated ringers. 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      Cataract of left eye          SOCIAL HISTORY: Substance Use (street drugs): ( x ) never used  (  ) other:    FAMILY HISTORY:  Family history of heart attack (Mother)  mother        REVIEW OF SYSTEMS:  unable to obtain 2/2 mental status   PHYSICAL EXAM:  T(C): 36.5 (11-13-22 @ 00:46), Max: 36.8 (11-12-22 @ 13:16)  HR: 87 (11-13-22 @ 00:46) (77 - 87)  BP: 169/73 (11-13-22 @ 00:46) (165/74 - 179/81)  RR: 18 (11-13-22 @ 00:46) (18 - 18)  SpO2: 93% (11-13-22 @ 00:46) (93% - 100%)  Wt(kg): --  I&O's Summary    11 Nov 2022 07:01  -  12 Nov 2022 07:00  --------------------------------------------------------  IN: 1485 mL / OUT: 350 mL / NET: 1135 mL      GENERAL: NAD  EYES: EOMI, PERRLA, conjunctiva and sclera clear  ENMT: No tonsillar erythema, exudates, or enlargement  Cardiovascular: Normal S1 S2, No JVD, No murmurs, No edema  Respiratory: Lungs clear to auscultation	  Gastrointestinal:  Soft, Non-tender, + BS	  Extremities: No edema                                 8.0    7.75  )-----------( 208      ( 12 Nov 2022 06:44 )             30.0     11-12    136  |  101  |  10  ----------------------------<  210<H>  3.9   |  28  |  0.73    Ca    8.3<L>      12 Nov 2022 06:44  Phos  2.2     11-12  Mg     1.90     11-12      proBNP:   Lipid Profile:   HgA1c:   TSH:     Consultant(s) Notes Reviewed:  [x ] YES  [ ] NO    Care Discussed with Consultants/Other Providers [ x] YES  [ ] NO    Imaging Personally Reviewed independently:  [x] YES  [ ] NO    All labs, radiologic studies, vitals, orders and medications list reviewed. Patient is seen and examined at bedside. Case discussed with medical team.              
  Forrest Rosenthal MD  Interventional Cardiology / Endovascular Specialist  [place] Office : [address] [place] [address] [place] [address]  Tel:   [place] Office : [address] [facility] [place] [address]  Tel: [phone]    Pt lying in bed in NAD   	  MEDICATIONS:  enoxaparin Injectable 40 milliGRAM(s) SubCutaneous every 24 hours  losartan 100 milliGRAM(s) Oral daily    piperacillin/tazobactam IVPB.. 3.375 Gram(s) IV Intermittent every 8 hours    albuterol/ipratropium for Nebulization.. 3 milliLiter(s) Nebulizer every 6 hours PRN    acetaminophen     Tablet .. 650 milliGRAM(s) Oral every 6 hours PRN    aluminum hydroxide/magnesium hydroxide/simethicone Suspension 30 milliLiter(s) Oral every 4 hours PRN    dextrose 50% Injectable 25 Gram(s) IV Push once  dextrose 50% Injectable 12.5 Gram(s) IV Push once  dextrose 50% Injectable 25 Gram(s) IV Push once  dextrose Oral Gel 15 Gram(s) Oral once PRN  glucagon  Injectable 1 milliGRAM(s) IntraMuscular once  insulin glargine Injectable (LANTUS) 4 Unit(s) SubCutaneous <User Schedule>  insulin lispro (ADMELOG) corrective regimen sliding scale   SubCutaneous three times a day before meals  insulin lispro (ADMELOG) corrective regimen sliding scale   SubCutaneous at bedtime  levothyroxine 50 MICROGram(s) Oral daily  simvastatin 20 milliGRAM(s) Oral at bedtime    dextrose 5%. 1000 milliLiter(s) IV Continuous <Continuous>  dextrose 5%. 1000 milliLiter(s) IV Continuous <Continuous>  lactated ringers. 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 No nausea, vomiting, or hematemesis; No diarrhea or constipation. No melena or hematochezia.  GENITOURINARY: No dysuria, frequency, hematuria, or incontinence  NEUROLOGICAL: No headaches, memory loss, loss of strength, numbness, or tremors  ENDOCRINE: No heat or cold intolerance; No hair loss  MUSCULOSKELETAL: No joint pain or swelling; No muscle, back, or extremity pain  PSYCHIATRIC: No depression, anxiety, mood swings, or difficulty sleeping  HEME/LYMPH: No easy bruising, or bleeding gums  All others negative    PHYSICAL EXAM:  T(C): 36.3 (11-13-22 @ 05:29), Max: 36.5 (11-12-22 @ 16:52)  HR: 77 (11-13-22 @ 05:29) (77 - 87)  BP: 166/67 (11-13-22 @ 05:29) (165/74 - 173/79)  RR: 20 (11-13-22 @ 05:29) (18 - 20)  SpO2: 100% (11-13-22 @ 05:29) (93% - 100%)  Wt(kg): --  I&O's Summary    12 Nov 2022 07:01  -  13 Nov 2022 07:00  --------------------------------------------------------  IN: 825 mL / OUT: 400 mL / NET: 425 mL        GENERAL: NAD  EYES: EOMI, PERRLA, conjunctiva and sclera clear  ENMT: No tonsillar erythema, exudates, or enlargement  Cardiovascular: Normal S1 S2, No JVD, No murmurs, No edema  Respiratory: Lungs clear to auscultation	  Gastrointestinal:  Soft, Non-tender, + BS	  Extremities: No edema                             8.2    6.79  )-----------( 208      ( 13 Nov 2022 06:14 )             30.4     11-13    140  |  101  |  8   ----------------------------<  141<H>  3.8   |  30  |  0.73    Ca    8.5      13 Nov 2022 06:14  Phos  1.7     11-13  Mg     1.80     11-13      proBNP:   Lipid Profile:   HgA1c:   TSH:     Consultant(s) Notes Reviewed:  [x ] YES  [ ] NO    Care Discussed with Consultants/Other Providers [ x] YES  [ ] NO    Imaging Personally Reviewed independently:  [x] YES  [ ] NO    All labs, radiologic studies, vitals, orders and medications list reviewed. Patient is seen and examined at bedside. Case discussed with medical team.              
Forrest Rosenthal MD  Interventional Cardiology / Advance Heart Failure and Cardiac Transplant Specialist  [place] Office : [address] [place] [address] [place] [address]  Tel:   [place] Office : [address] [facility] [place] [address]  Tel: [phone]      Subjective/Overnight events: Pt is lying in bed comfortable not in distress  	  MEDICATIONS:  enoxaparin Injectable 40 milliGRAM(s) SubCutaneous every 24 hours  losartan 100 milliGRAM(s) Oral daily      albuterol/ipratropium for Nebulization.. 3 milliLiter(s) Nebulizer every 6 hours PRN    acetaminophen     Tablet .. 650 milliGRAM(s) Oral every 6 hours PRN    aluminum hydroxide/magnesium hydroxide/simethicone Suspension 30 milliLiter(s) Oral every 4 hours PRN  polyethylene glycol 3350 17 Gram(s) Oral daily  senna 2 Tablet(s) Oral at bedtime    dextrose 50% Injectable 25 Gram(s) IV Push once  dextrose 50% Injectable 12.5 Gram(s) IV Push once  dextrose 50% Injectable 25 Gram(s) IV Push once  dextrose Oral Gel 15 Gram(s) Oral once PRN  glucagon  Injectable 1 milliGRAM(s) IntraMuscular once  insulin glargine Injectable (LANTUS) 5 Unit(s) SubCutaneous <User Schedule>  insulin lispro (ADMELOG) corrective regimen sliding scale   SubCutaneous at bedtime  insulin lispro (ADMELOG) corrective regimen sliding scale   SubCutaneous three times a day before meals  insulin lispro Injectable (ADMELOG) 2 Unit(s) SubCutaneous three times a day before meals  levothyroxine 50 MICROGram(s) Oral daily  simvastatin 20 milliGRAM(s) Oral at bedtime    cholecalciferol 1000 Unit(s) Oral daily  dextrose 5%. 1000 milliLiter(s) IV Continuous <Continuous>  dextrose 5%. 1000 milliLiter(s) IV Continuous <Continuous>  lactated ringers. 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      Cataract of left eye          SOCIAL HISTORY: Substance Use (street drugs): ( x ) never used  (  ) other:    FAMILY HISTORY:  Family history of heart attack (Mother)  mother          PHYSICAL EXAM:  T(C): 36.6 (11-16-22 @ 05:22), Max: 37 (11-15-22 @ 16:35)  HR: 85 (11-16-22 @ 07:45) (81 - 85)  BP: 178/70 (11-16-22 @ 07:45) (146/65 - 178/70)  RR: 18 (11-16-22 @ 07:45) (18 - 18)  SpO2: 98% (11-16-22 @ 07:45) (96% - 98%)  Wt(kg): --  I&O's Summary          GENERAL: NAD  EYES: EOMI, PERRLA, conjunctiva and sclera clear  ENMT: No tonsillar erythema, exudates, or enlargement  Cardiovascular: Normal S1 S2, No JVD, No murmurs, No edema  Respiratory: Lungs clear to auscultation	  Gastrointestinal:  Soft, Non-tender, + BS	  Extremities: No edema                               8.5    6.84  )-----------( 222      ( 16 Nov 2022 05:45 )             31.8     11-16    142  |  102  |  6<L>  ----------------------------<  123<H>  4.3   |  30  |  0.66    Ca    9.1      16 Nov 2022 05:45  Phos  2.2     11-16  Mg     1.80     11-16      proBNP:   Lipid Profile:   HgA1c:   TSH:     Consultant(s) Notes Reviewed:  [x ] YES  [ ] NO    Care Discussed with Consultants/Other Providers [ x] YES  [ ] NO    Imaging Personally Reviewed independently:  [x] YES  [ ] NO    All labs, radiologic studies, vitals, orders and medications list reviewed. Patient is seen and examined at bedside. Case discussed with medical team.        
Forrest Rosenthal MD  Interventional Cardiology / Advance Heart Failure and Cardiac Transplant Specialist  [place] Office : [address] [place] [address] [place] [address]  Tel:   [place] Office : [address] [facility] [place] [address]  Tel: [phone]      Subjective/Overnight events: Pt is lying in bed comfortable not in distress, no chest pains no SOB no palpitations  	  MEDICATIONS:  enoxaparin Injectable 40 milliGRAM(s) SubCutaneous every 24 hours  losartan 100 milliGRAM(s) Oral daily      albuterol/ipratropium for Nebulization.. 3 milliLiter(s) Nebulizer every 6 hours PRN    acetaminophen     Tablet .. 650 milliGRAM(s) Oral every 6 hours PRN    aluminum hydroxide/magnesium hydroxide/simethicone Suspension 30 milliLiter(s) Oral every 4 hours PRN  polyethylene glycol 3350 17 Gram(s) Oral daily  senna 2 Tablet(s) Oral at bedtime    dextrose 50% Injectable 25 Gram(s) IV Push once  dextrose 50% Injectable 12.5 Gram(s) IV Push once  dextrose 50% Injectable 25 Gram(s) IV Push once  dextrose Oral Gel 15 Gram(s) Oral once PRN  glucagon  Injectable 1 milliGRAM(s) IntraMuscular once  insulin glargine Injectable (LANTUS) 5 Unit(s) SubCutaneous <User Schedule>  insulin lispro (ADMELOG) corrective regimen sliding scale   SubCutaneous at bedtime  insulin lispro (ADMELOG) corrective regimen sliding scale   SubCutaneous three times a day before meals  insulin lispro Injectable (ADMELOG) 2 Unit(s) SubCutaneous three times a day before meals  levothyroxine 50 MICROGram(s) Oral daily  simvastatin 20 milliGRAM(s) Oral at bedtime    cholecalciferol 1000 Unit(s) Oral daily  dextrose 5%. 1000 milliLiter(s) IV Continuous <Continuous>  dextrose 5%. 1000 milliLiter(s) IV Continuous <Continuous>  lactated ringers. 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      Cataract of left eye          SOCIAL HISTORY: Substance Use (street drugs): ( x ) never used  (  ) other:    FAMILY HISTORY:  Family history of heart attack (Mother)  mother        PHYSICAL EXAM:  T(C): 36.8 (11-17-22 @ 06:15), Max: 36.8 (11-16-22 @ 16:39)  HR: 96 (11-17-22 @ 06:15) (82 - 96)  BP: 154/81 (11-17-22 @ 06:15) (142/66 - 180/77)  RR: 20 (11-17-22 @ 06:15) (18 - 20)  SpO2: 79% (11-17-22 @ 12:20) (79% - 100%)  Wt(kg): --  I&O's Summary      GENERAL: NAD  EYES: EOMI, PERRLA, conjunctiva and sclera clear  ENMT: No tonsillar erythema, exudates, or enlargement  Cardiovascular: Normal S1 S2, No JVD, No murmurs, No edema  Respiratory: Lungs clear to auscultation	  Gastrointestinal:  Soft, Non-tender, + BS	  Extremities: No edema                                   8.1    7.35  )-----------( 230      ( 17 Nov 2022 06:23 )             30.9     11-17    139  |  100  |  6<L>  ----------------------------<  139<H>  4.2   |  33<H>  |  0.65    Ca    9.0      17 Nov 2022 06:23  Phos  2.6     11-17  Mg     1.90     11-17      proBNP:   Lipid Profile:   HgA1c:   TSH:     Consultant(s) Notes Reviewed:  [x ] YES  [ ] NO    Care Discussed with Consultants/Other Providers [ x] YES  [ ] NO    Imaging Personally Reviewed independently:  [x] YES  [ ] NO    All labs, radiologic studies, vitals, orders and medications list reviewed. Patient is seen and examined at bedside. Case discussed with medical team.        
Forrest Rosenthal MD  Interventional Cardiology / Advance Heart Failure and Cardiac Transplant Specialist  [place] Office : [address] [place] [address] [place]. [address]  Tel:   [place] Office : [address] [facility] [place] [address]  Tel: [phone]       Pt is lying in bed comfortable not in distress, no chest pains some SOB no palpitations  	  MEDICATIONS:  enoxaparin Injectable 40 milliGRAM(s) SubCutaneous every 24 hours  losartan 100 milliGRAM(s) Oral daily      albuterol/ipratropium for Nebulization.. 3 milliLiter(s) Nebulizer every 6 hours PRN    acetaminophen     Tablet .. 650 milliGRAM(s) Oral every 6 hours PRN    aluminum hydroxide/magnesium hydroxide/simethicone Suspension 30 milliLiter(s) Oral every 4 hours PRN  polyethylene glycol 3350 17 Gram(s) Oral daily  senna 2 Tablet(s) Oral at bedtime    dextrose 50% Injectable 25 Gram(s) IV Push once  dextrose 50% Injectable 12.5 Gram(s) IV Push once  dextrose 50% Injectable 25 Gram(s) IV Push once  dextrose Oral Gel 15 Gram(s) Oral once PRN  glucagon  Injectable 1 milliGRAM(s) IntraMuscular once  insulin glargine Injectable (LANTUS) 5 Unit(s) SubCutaneous <User Schedule>  insulin lispro (ADMELOG) corrective regimen sliding scale   SubCutaneous at bedtime  insulin lispro (ADMELOG) corrective regimen sliding scale   SubCutaneous three times a day before meals  insulin lispro Injectable (ADMELOG) 3 Unit(s) SubCutaneous three times a day before meals  levothyroxine 50 MICROGram(s) Oral daily  simvastatin 20 milliGRAM(s) Oral at bedtime    cholecalciferol 1000 Unit(s) Oral daily  dextrose 5%. 1000 milliLiter(s) IV Continuous <Continuous>  dextrose 5%. 1000 milliLiter(s) IV Continuous <Continuous>  lactated ringers. 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      Cataract of left eye          SOCIAL HISTORY: Substance Use (street drugs): ( x ) never used  (  ) other:    FAMILY HISTORY:  Family history of heart attack (Mother)  mother         PHYSICAL EXAM:  T(C): 36.6 (11-18-22 @ 13:21), Max: 37 (11-17-22 @ 23:44)  HR: 90 (11-18-22 @ 13:21) (82 - 90)  BP: 157/67 (11-18-22 @ 13:21) (122/69 - 171/75)  RR: 17 (11-18-22 @ 13:21) (17 - 20)  SpO2: 95% (11-18-22 @ 13:21) (95% - 100%)  Wt(kg): --  I&O's Summary        EYES:   PERRLA   ENMT:   Moist mucous membranes, Good dentition, No lesions  Cardiovascular: Normal S1 S2, No JVD, No murmurs, No edema  Respiratory: b/l rhonchi   Gastrointestinal:  Soft, Non-tender, + BS	  Extremities: no edema                                    8.1    7.35  )-----------( 230      ( 17 Nov 2022 06:23 )             30.9     11-17    139  |  100  |  6<L>  ----------------------------<  139<H>  4.2   |  33<H>  |  0.65    Ca    9.0      17 Nov 2022 06:23  Phos  2.6     11-17  Mg     1.90     11-17    TPro  5.9<L>  /  Alb  3.6  /  TBili  0.3  /  DBili  <0.2  /  AST  14  /  ALT  18  /  AlkPhos  61  11-17    proBNP:   Lipid Profile:   HgA1c:   TSH:     Consultant(s) Notes Reviewed:  [x ] YES  [ ] NO    Care Discussed with Consultants/Other Providers [ x] YES  [ ] NO    Imaging Personally Reviewed independently:  [x] YES  [ ] NO    All labs, radiologic studies, vitals, orders and medications list reviewed. Patient is seen and examined at bedside. Case discussed with medical team.        
    SUBJECTIVE / OVERNIGHT EVENTS:pt seen and examined  22     MEDICATIONS  (STANDING):  dextrose 5%. 1000 milliLiter(s) (100 mL/Hr) IV Continuous <Continuous>  dextrose 5%. 1000 milliLiter(s) (50 mL/Hr) IV Continuous <Continuous>  dextrose 50% Injectable 25 Gram(s) IV Push once  dextrose 50% Injectable 12.5 Gram(s) IV Push once  dextrose 50% Injectable 25 Gram(s) IV Push once  enoxaparin Injectable 40 milliGRAM(s) SubCutaneous every 24 hours  glucagon  Injectable 1 milliGRAM(s) IntraMuscular once  insulin glargine Injectable (LANTUS) 8 Unit(s) SubCutaneous <User Schedule>  insulin lispro (ADMELOG) corrective regimen sliding scale   SubCutaneous three times a day before meals  insulin lispro (ADMELOG) corrective regimen sliding scale   SubCutaneous at bedtime  lactated ringers. 1000 milliLiter(s) (75 mL/Hr) IV Continuous <Continuous>  levothyroxine 50 MICROGram(s) Oral daily  losartan 100 milliGRAM(s) Oral daily  simvastatin 20 milliGRAM(s) Oral at bedtime    MEDICATIONS  (PRN):  acetaminophen     Tablet .. 650 milliGRAM(s) Oral every 6 hours PRN Mild Pain (1 - 3)  albuterol/ipratropium for Nebulization.. 3 milliLiter(s) Nebulizer every 6 hours PRN Shortness of Breath and/or Wheezing  dextrose Oral Gel 15 Gram(s) Oral once PRN Blood Glucose LESS THAN 70 milliGRAM(s)/deciliter    T(C): 36.4 (22 @ 19:40), Max: 36.7 (22 @ 09:07)  HR: 78 (22 @ 15:38) (73 - 88)  BP: 143/77 (22 @ 15:38) (116/40 - 143/77)  RR: 24 (22 @ 19:40) (18 - 25)  SpO2: 100% (22 @ 19:40) (100% - 100%)    CAPILLARY BLOOD GLUCOSE      POCT Blood Glucose.: 91 mg/dL (2022 17:11)  POCT Blood Glucose.: 126 mg/dL (2022 12:55)  POCT Blood Glucose.: 100 mg/dL (2022 08:56)  POCT Blood Glucose.: 81 mg/dL (2022 05:14)  POCT Blood Glucose.: 244 mg/dL (2022 23:19)    I&O's Summary    PHYSICAL EXAM:  GENERAL: NAD  EYES: EOMI, PERRLA  NECK: Supple, No JVD  CHEST/LUNG: dec breath sounds at bases  HEART:  S1 , S2 +  ABDOMEN: soft, bs+, distension+. no tenderness , no tenderness  EXTREMITIES:  no edema  NEUROLOGY:alert awake      LABS:                        8.0    10.87 )-----------( 256      ( 2022 06:42 )             29.3     11    139  |  105  |  20  ----------------------------<  67<L>  3.9   |  24  |  0.89    Ca    8.9      2022 06:42  Phos  3.1     11-08  Mg     1.70     11-    TPro  6.3  /  Alb  3.8  /  TBili  0.3  /  DBili  x   /  AST  34<H>  /  ALT  49<H>  /  AlkPhos  110  11-09    PT/INR - ( 2022 09:00 )   PT: 13.3 sec;   INR: 1.14 ratio         PTT - ( 2022 09:00 )  PTT:34.7 sec  CARDIAC MARKERS ( 2022 09:30 )  x     / x     / 75 U/L / x     / 3.4 ng/mL      Urinalysis Basic - ( 2022 09:20 )    Color: Light Yellow / Appearance: Clear / S.022 / pH: x  Gluc: x / Ketone: Negative  / Bili: Negative / Urobili: <2 mg/dL   Blood: x / Protein: 100 mg/dL / Nitrite: Negative   Leuk Esterase: Negative / RBC: 1 /HPF / WBC 3 /HPF   Sq Epi: x / Non Sq Epi: 2 /HPF / Bacteria: Negative        RADIOLOGY & ADDITIONAL TESTS:    Imaging Personally Reviewed:    Consultant(s) Notes Reviewed:      Care Discussed with Consultants/Other Providers:  
MARTIR VU  77y  Female      Patient is a 77y old  Female who presents with a chief complaint of Hyperglycemia, Syncope (11 Nov 2022 19:05)  comfortable,no sob,no cp,no abd pain.s/p fall,no injury  pts family next to pts bed    MEDICATIONS  (STANDING):  cholecalciferol 1000 Unit(s) Oral daily  dextrose 5%. 1000 milliLiter(s) (100 mL/Hr) IV Continuous <Continuous>  dextrose 5%. 1000 milliLiter(s) (50 mL/Hr) IV Continuous <Continuous>  dextrose 50% Injectable 25 Gram(s) IV Push once  dextrose 50% Injectable 12.5 Gram(s) IV Push once  dextrose 50% Injectable 25 Gram(s) IV Push once  enoxaparin Injectable 40 milliGRAM(s) SubCutaneous every 24 hours  glucagon  Injectable 1 milliGRAM(s) IntraMuscular once  insulin glargine Injectable (LANTUS) 5 Unit(s) SubCutaneous <User Schedule>  insulin lispro (ADMELOG) corrective regimen sliding scale   SubCutaneous at bedtime  insulin lispro (ADMELOG) corrective regimen sliding scale   SubCutaneous three times a day before meals  insulin lispro Injectable (ADMELOG) 3 Unit(s) SubCutaneous three times a day before meals  lactated ringers. 1000 milliLiter(s) (75 mL/Hr) IV Continuous <Continuous>  levothyroxine 50 MICROGram(s) Oral daily  losartan 100 milliGRAM(s) Oral daily  polyethylene glycol 3350 17 Gram(s) Oral daily  senna 2 Tablet(s) Oral at bedtime  simvastatin 20 milliGRAM(s) Oral at bedtime    MEDICATIONS  (PRN):  acetaminophen     Tablet .. 650 milliGRAM(s) Oral every 6 hours PRN Mild Pain (1 - 3)  albuterol/ipratropium for Nebulization.. 3 milliLiter(s) Nebulizer every 6 hours PRN Shortness of Breath and/or Wheezing  aluminum hydroxide/magnesium hydroxide/simethicone Suspension 30 milliLiter(s) Oral every 4 hours PRN Dyspepsia  dextrose Oral Gel 15 Gram(s) Oral once PRN Blood Glucose LESS THAN 70 milliGRAM(s)/deciliter    Vital Signs Last 24 Hrs  T(C): 36.7 (11-18-22 @ 18:58), Max: 36.8 (11-18-22 @ 00:30)  T(F): 98 (11-18-22 @ 18:58), Max: 98.2 (11-18-22 @ 00:30)  HR: 93 (11-18-22 @ 18:58) (82 - 93)  BP: 165/75 (11-18-22 @ 18:58) (144/85 - 166/78)  BP(mean): --  RR: 18 (11-18-22 @ 18:58) (17 - 18)  SpO2: 94% (11-18-22 @ 18:58) (94% - 99%)            PAST MEDICAL & SURGICAL HISTORY:  HTN (Hypertension)      Dyslipidemia      DM (Diabetes Mellitus)      Hypothyroidism      Pulmonary TB  Dx 2019, completed 10 month treatment      Cataract of left eye      RADIOLOGY & ADDITIONAL TESTS:    Imaging Personally Reviewed:  [x] YES  [ ] NO    Consultant(s) Notes Reviewed:  [x] YES  [ ] NO    PHYSICAL EXAM:  GENERAL: Alert and awake lying in bed in no distress  HEAD:  Atraumatic, Normocephalic  EYES: EOMI, GIAN, conjunctiva and sclera clear  NECK: Supple, No JVD, Normal thyroid  NERVOUS SYSTEM:  Alert awake  CHEST/LUNG: dec breath sounds at bases  rate and rhythm; No murmurs, rubs, or gallops  ABDOMEN: Soft, Nontender, Nondistended; Bowel sounds present  EXTREMITIES:  dec  edema+    LABS:  11-17    139  |  100  |  6<L>  ----------------------------<  139<H>  4.2   |  33<H>  |  0.65    Ca    9.0      17 Nov 2022 06:23  Phos  2.6     11-17  Mg     1.90     11-17    TPro  5.9<L>  /  Alb  3.6  /  TBili  0.3  /  DBili  <0.2  /  AST  14  /  ALT  18  /  AlkPhos  61  11-17    Creatinine Trend: 0.65 <--, 0.66 <--, 0.82 <--, 0.79 <--, 0.73 <--, 0.73 <--                        8.8    7.82  )-----------( 246      ( 18 Nov 2022 14:48 )             32.9     Urine Studies:            LIVER FUNCTIONS - ( 17 Nov 2022 06:23 )  Alb: 3.6 g/dL / Pro: 5.9 g/dL / ALK PHOS: 61 U/L / ALT: 18 U/L / AST: 14 U/L / GGT: x                                   
MARTIR VU  77y  Female      Patient is a 77y old  Female who presents with a chief complaint of Hyperglycemia, Syncope (11 Nov 2022 19:05)  comfortable,no sob,no cp,no abd pain.s/p fall,no injury    -MEDICATIONS  (STANDING):  cholecalciferol 1000 Unit(s) Oral daily  dextrose 5%. 1000 milliLiter(s) (100 mL/Hr) IV Continuous <Continuous>  dextrose 5%. 1000 milliLiter(s) (50 mL/Hr) IV Continuous <Continuous>  dextrose 50% Injectable 25 Gram(s) IV Push once  dextrose 50% Injectable 12.5 Gram(s) IV Push once  dextrose 50% Injectable 25 Gram(s) IV Push once  enoxaparin Injectable 40 milliGRAM(s) SubCutaneous every 24 hours  glucagon  Injectable 1 milliGRAM(s) IntraMuscular once  insulin glargine Injectable (LANTUS) 5 Unit(s) SubCutaneous <User Schedule>  insulin lispro (ADMELOG) corrective regimen sliding scale   SubCutaneous three times a day before meals  insulin lispro (ADMELOG) corrective regimen sliding scale   SubCutaneous at bedtime  insulin lispro Injectable (ADMELOG) 2 Unit(s) SubCutaneous three times a day before meals  lactated ringers. 1000 milliLiter(s) (75 mL/Hr) IV Continuous <Continuous>  levothyroxine 50 MICROGram(s) Oral daily  losartan 100 milliGRAM(s) Oral daily  polyethylene glycol 3350 17 Gram(s) Oral daily  senna 2 Tablet(s) Oral at bedtime  simvastatin 20 milliGRAM(s) Oral at bedtime    MEDICATIONS  (PRN):  acetaminophen     Tablet .. 650 milliGRAM(s) Oral every 6 hours PRN Mild Pain (1 - 3)  albuterol/ipratropium for Nebulization.. 3 milliLiter(s) Nebulizer every 6 hours PRN Shortness of Breath and/or Wheezing  aluminum hydroxide/magnesium hydroxide/simethicone Suspension 30 milliLiter(s) Oral every 4 hours PRN Dyspepsia  dextrose Oral Gel 15 Gram(s) Oral once PRN Blood Glucose LESS THAN 70 milliGRAM(s)/deciliter    Vital Signs Last 24 Hrs  T(C): 37 (11-15-22 @ 16:35), Max: 37 (11-15-22 @ 16:35)  T(F): 98.6 (11-15-22 @ 16:35), Max: 98.6 (11-15-22 @ 16:35)  HR: 81 (11-15-22 @ 16:35) (77 - 85)  BP: 146/65 (11-15-22 @ 16:35) (141/76 - 162/92)  BP(mean): --  RR: 18 (11-15-22 @ 16:35) (18 - 18)  SpO2: 97% (11-15-22 @ 16:35) (95% - 99%)            PAST MEDICAL & SURGICAL HISTORY:  HTN (Hypertension)      Dyslipidemia      DM (Diabetes Mellitus)      Hypothyroidism      Pulmonary TB  Dx 2019, completed 10 month treatment      Cataract of left eye      RADIOLOGY & ADDITIONAL TESTS:    Imaging Personally Reviewed:  [x] YES  [ ] NO    Consultant(s) Notes Reviewed:  [x] YES  [ ] NO    PHYSICAL EXAM:  GENERAL: Alert and awake lying in bed in no distress  HEAD:  Atraumatic, Normocephalic  EYES: EOMI, GIAN, conjunctiva and sclera clear  NECK: Supple, No JVD, Normal thyroid  NERVOUS SYSTEM:  Alert awake  CHEST/LUNG: dec breath sounds at bases  rate and rhythm; No murmurs, rubs, or gallops  ABDOMEN: Soft, Nontender, Nondistended; Bowel sounds present  EXTREMITIES:   Peripheral Pulses are palpable, no  edema    LABS:  11-15    138  |  96<L>  |  6<L>  ----------------------------<  216<H>  3.7   |  32<H>  |  0.82    Ca    8.7      15 Nov 2022 06:26  Phos  2.3     11-15  Mg     1.80     11-15      Creatinine Trend: 0.82 <--, 0.79 <--, 0.73 <--, 0.73 <--, 0.75 <--, 0.82 <--, 0.89 <--, 0.88 <--                        8.2    6.08  )-----------( 228      ( 15 Nov 2022 06:26 )             30.8     Urine Studies:                
MARTIR VU  77y  Female      Patient is a 77y old  Female who presents with a chief complaint of Hyperglycemia, Syncope (2022 19:05)  comfortable,no sob,no cp,no abd pain.s/p fall,no injury    -MEDICATIONS  (STANDING):  dextrose 5%. 1000 milliLiter(s) (50 mL/Hr) IV Continuous <Continuous>  dextrose 5%. 1000 milliLiter(s) (100 mL/Hr) IV Continuous <Continuous>  dextrose 50% Injectable 25 Gram(s) IV Push once  dextrose 50% Injectable 12.5 Gram(s) IV Push once  dextrose 50% Injectable 25 Gram(s) IV Push once  enoxaparin Injectable 40 milliGRAM(s) SubCutaneous every 24 hours  glucagon  Injectable 1 milliGRAM(s) IntraMuscular once  insulin glargine Injectable (LANTUS) 4 Unit(s) SubCutaneous <User Schedule>  insulin lispro (ADMELOG) corrective regimen sliding scale   SubCutaneous three times a day before meals  insulin lispro (ADMELOG) corrective regimen sliding scale   SubCutaneous at bedtime  lactated ringers. 1000 milliLiter(s) (75 mL/Hr) IV Continuous <Continuous>  levothyroxine 50 MICROGram(s) Oral daily  losartan 100 milliGRAM(s) Oral daily  piperacillin/tazobactam IVPB.. 3.375 Gram(s) IV Intermittent every 8 hours  simvastatin 20 milliGRAM(s) Oral at bedtime    MEDICATIONS  (PRN):  acetaminophen     Tablet .. 650 milliGRAM(s) Oral every 6 hours PRN Mild Pain (1 - 3)  albuterol/ipratropium for Nebulization.. 3 milliLiter(s) Nebulizer every 6 hours PRN Shortness of Breath and/or Wheezing  aluminum hydroxide/magnesium hydroxide/simethicone Suspension 30 milliLiter(s) Oral every 4 hours PRN Dyspepsia  dextrose Oral Gel 15 Gram(s) Oral once PRN Blood Glucose LESS THAN 70 milliGRAM(s)/deciliter    Vital Signs Last 24 Hrs  T(C): 36.8 (22 @ 21:18), Max: 36.8 (22 @ 12:35)  T(F): 98.3 (22 @ 21:18), Max: 98.3 (22 @ 12:35)  HR: 88 (22 @ 21:18) (77 - 93)  BP: 170/79 (22 @ 21:18) (154/51 - 170/79)  BP(mean): 76 (22 @ 12:35) (76 - 76)  RR: 20 (22 @ 21:18) (18 - 20)  SpO2: 100% (22 @ 21:18) (92% - 100%)            PAST MEDICAL & SURGICAL HISTORY:  HTN (Hypertension)      Dyslipidemia      DM (Diabetes Mellitus)      Hypothyroidism      Pulmonary TB  Dx 2019, completed 10 month treatment      Cataract of left eye      RADIOLOGY & ADDITIONAL TESTS:    Imaging Personally Reviewed:  [x] YES  [ ] NO    Consultant(s) Notes Reviewed:  [x] YES  [ ] NO    PHYSICAL EXAM:  GENERAL: Alert and awake lying in bed in no distress  HEAD:  Atraumatic, Normocephalic  EYES: EOMI, GIAN, conjunctiva and sclera clear  NECK: Supple, No JVD, Normal thyroid  NERVOUS SYSTEM:  Alert & Oriented X3, Motor and sensory systems are intact,   CHEST/LUNG: dec breath sounds at bases  rate and rhythm; No murmurs, rubs, or gallops  ABDOMEN: Soft, Nontender, Nondistended; Bowel sounds present  EXTREMITIES:   Peripheral Pulses are palpable, no  edema    LABS:      140  |  101  |  8   ----------------------------<  141<H>  3.8   |  30  |  0.73    Ca    8.5      2022 06:14  Phos  1.7       Mg     1.80           Creatinine Trend: 0.73 <--, 0.73 <--, 0.75 <--, 0.82 <--, 0.89 <--, 0.88 <--, 1.03 <--, 1.08 <--, 1.10 <--                        8.2    6.79  )-----------( 208      ( 2022 06:14 )             30.4     Urine Studies:  Urinalysis Basic - ( 2022 09:20 )    Color: Light Yellow / Appearance: Clear / S.022 / pH:   Gluc:  / Ketone: Negative  / Bili: Negative / Urobili: <2 mg/dL   Blood:  / Protein: 100 mg/dL / Nitrite: Negative   Leuk Esterase: Negative / RBC: 1 /HPF / WBC 3 /HPF   Sq Epi:  / Non Sq Epi: 2 /HPF / Bacteria: Negative                        Care Discussed with Consultants/Other Providers [ ] YES  [ ] NO      Code Status: [] Full Code [] DNR [] DNI [] Goals of Care:   Disposition: [] ICU [] Stroke Unit [] RCU []PCU []Floor [] Discharge Home       
Chief Complaint: Uncontrolled Dm2 c/b [facility]    History: Daughter in law at bedside. Pt deferred Kyr[place]z translation to daughter in law. Pt seen at bedside. Pt tolerating oral diet. Pt denies nausea and vomiting/any signs of hypoglycemia. Pt reports an adequate appetite.     MEDICATIONS  (STANDING):  dextrose 5%. 1000 milliLiter(s) (100 mL/Hr) IV Continuous <Continuous>  dextrose 5%. 1000 milliLiter(s) (50 mL/Hr) IV Continuous <Continuous>  dextrose 50% Injectable 25 Gram(s) IV Push once  dextrose 50% Injectable 12.5 Gram(s) IV Push once  dextrose 50% Injectable 25 Gram(s) IV Push once  enoxaparin Injectable 40 milliGRAM(s) SubCutaneous every 24 hours  glucagon  Injectable 1 milliGRAM(s) IntraMuscular once  insulin glargine Injectable (LANTUS) 6 Unit(s) SubCutaneous <User Schedule>  insulin lispro (ADMELOG) corrective regimen sliding scale   SubCutaneous three times a day before meals  insulin lispro (ADMELOG) corrective regimen sliding scale   SubCutaneous at bedtime  lactated ringers. 1000 milliLiter(s) (75 mL/Hr) IV Continuous <Continuous>  levothyroxine 50 MICROGram(s) Oral daily  losartan 100 milliGRAM(s) Oral daily  simvastatin 20 milliGRAM(s) Oral at bedtime    MEDICATIONS  (PRN):  acetaminophen     Tablet .. 650 milliGRAM(s) Oral every 6 hours PRN Mild Pain (1 - 3)  albuterol/ipratropium for Nebulization.. 3 milliLiter(s) Nebulizer every 6 hours PRN Shortness of Breath and/or Wheezing  dextrose Oral Gel 15 Gram(s) Oral once PRN Blood Glucose LESS THAN 70 milliGRAM(s)/deciliter      Allergies: No Known Allergies    Review of Systems:  Respiratory: No SOB, no cough  GI: No nausea, vomiting, abdominal pain  Endocrine: no polyuria, polydipsia      PHYSICAL EXAM:  VITALS: T(C): 36.2 (11-10-22 @ 11:00)  T(F): 97.1 (11-10-22 @ 11:00), Max: 98 (11-09-22 @ 20:30)  HR: 80 (11-10-22 @ 11:00) (63 - 80)  BP: 130/60 (11-10-22 @ 11:00) (130/60 - 153/60)  RR:  (18 - 24)  SpO2:  (100% - 100%)  Wt(kg): --  GENERAL: NAD, well-groomed, well-developed  RESPIRATORY: No labored breathing   GI: Soft, nontender, non distended  PSYCH: Alert and oriented x 3, normal affect, normal mood      CAPILLARY BLOOD GLUCOSE  POCT Blood Glucose.: 195 mg/dL (10 Nov 2022 12:40)  POCT Blood Glucose.: 119 mg/dL (10 Nov 2022 10:20)  POCT Blood Glucose.: 76 mg/dL (10 Nov 2022 09:43)  POCT Blood Glucose.: 64 mg/dL (10 Nov 2022 09:43)  POCT Blood Glucose.: 80 mg/dL (09 Nov 2022 21:35)  POCT Blood Glucose.: 91 mg/dL (09 Nov 2022 17:11)    A1C with Estimated Average Glucose (11.08.22 @ 09:00)    A1C with Estimated Average Glucose Result: 10.5      11-10    137  |  104  |  13  ----------------------------<  72  3.9   |  24  |  0.82    eGFR: 73    Ca    8.3<L>      11-10  Mg     1.70     11-10  Phos  2.5     11-10    TPro  5.9<L>  /  Alb  3.4  /  TBili  0.3  /  DBili  x   /  AST  32  /  ALT  40<H>  /  AlkPhos  98  11-10      Thyroid Function Tests:  11-10 @ 06:48 TSH 1.08 FreeT4 1.3 T3 -- Anti TPO -- Anti Thyroglobulin Ab -- TSI --  11-08 @ 09:00 TSH 2.23 FreeT4 -- T3 -- Anti TPO -- Anti Thyroglobulin Ab -- TSI --    Diet, Consistent Carbohydrate w/Evening Snack:   DASH/TLC Sodium & Cholesterol Restricted (DASH) (11-09-22 @ 09:29) [Active]                        
Chief Complaint: Uncontrolled Dm2 c/b [facility]    History: Deferred Upper sorbian translation to daughter in law at bedside. Pt seen at bedside. Pt tolerating oral diet. Pt denies nausea and vomiting/any signs of hypoglycemia. Pt reports an adequate appetite.     MEDICATIONS  (STANDING):  cholecalciferol 1000 Unit(s) Oral daily  dextrose 5%. 1000 milliLiter(s) (100 mL/Hr) IV Continuous <Continuous>  dextrose 5%. 1000 milliLiter(s) (50 mL/Hr) IV Continuous <Continuous>  dextrose 50% Injectable 25 Gram(s) IV Push once  dextrose 50% Injectable 12.5 Gram(s) IV Push once  dextrose 50% Injectable 25 Gram(s) IV Push once  enoxaparin Injectable 40 milliGRAM(s) SubCutaneous every 24 hours  glucagon  Injectable 1 milliGRAM(s) IntraMuscular once  insulin glargine Injectable (LANTUS) 5 Unit(s) SubCutaneous <User Schedule>  insulin lispro (ADMELOG) corrective regimen sliding scale   SubCutaneous at bedtime  insulin lispro (ADMELOG) corrective regimen sliding scale   SubCutaneous three times a day before meals  insulin lispro Injectable (ADMELOG) 2 Unit(s) SubCutaneous three times a day before meals  lactated ringers. 1000 milliLiter(s) (75 mL/Hr) IV Continuous <Continuous>  levothyroxine 50 MICROGram(s) Oral daily  losartan 100 milliGRAM(s) Oral daily  polyethylene glycol 3350 17 Gram(s) Oral daily  senna 2 Tablet(s) Oral at bedtime  simvastatin 20 milliGRAM(s) Oral at bedtime    MEDICATIONS  (PRN):  acetaminophen     Tablet .. 650 milliGRAM(s) Oral every 6 hours PRN Mild Pain (1 - 3)  albuterol/ipratropium for Nebulization.. 3 milliLiter(s) Nebulizer every 6 hours PRN Shortness of Breath and/or Wheezing  aluminum hydroxide/magnesium hydroxide/simethicone Suspension 30 milliLiter(s) Oral every 4 hours PRN Dyspepsia  dextrose Oral Gel 15 Gram(s) Oral once PRN Blood Glucose LESS THAN 70 milliGRAM(s)/deciliter      Allergies: No Known Allergies      Review of Systems:  Respiratory: No SOB, no cough  GI: No nausea, vomiting, abdominal pain  Endocrine: no polyuria, polydipsia      PHYSICAL EXAM:  VITALS: T(C): 36.8 (11-16-22 @ 16:39)  T(F): 98.3 (11-16-22 @ 16:39), Max: 98.7 (11-16-22 @ 13:34)  HR: 82 (11-16-22 @ 16:39) (81 - 85)  BP: 142/66 (11-16-22 @ 16:39) (142/66 - 178/85)  RR:  (18 - 18)  SpO2:  (85% - 98%)  Wt(kg): --  GENERAL: NAD, well-groomed, well-developed  RESPIRATORY: No labored breathing   GI: Soft, nontender, non distended  PSYCH: Alert and oriented x 3, normal affect, normal mood      CAPILLARY BLOOD GLUCOSE  POCT Blood Glucose.: 193 mg/dL (16 Nov 2022 12:12)  POCT Blood Glucose.: 126 mg/dL (16 Nov 2022 08:41)  POCT Blood Glucose.: 193 mg/dL (15 Nov 2022 21:38)  POCT Blood Glucose.: 112 mg/dL (15 Nov 2022 18:17)  POCT Blood Glucose.: 56 mg/dL (15 Nov 2022 17:57)  POCT Blood Glucose.: 53 mg/dL (15 Nov 2022 17:54)    A1C with Estimated Average Glucose (11.08.22 @ 09:00)    A1C with Estimated Average Glucose Result: 10.5      11-16    142  |  102  |  6<L>  ----------------------------<  123<H>  4.3   |  30  |  0.66    eGFR: 89    Ca    9.1      11-16  Mg     1.80     11-16  Phos  2.2     11-16        Thyroid Function Tests:  11-10 @ 06:48 TSH 1.08 FreeT4 1.3 T3 -- Anti TPO -- Anti Thyroglobulin Ab -- TSI --  11-08 @ 09:00 TSH 2.23 FreeT4 -- T3 -- Anti TPO -- Anti Thyroglobulin Ab -- TSI --      Diet, Consistent Carbohydrate w/Evening Snack:   DASH/TLC Sodium & Cholesterol Restricted (DASH) (11-09-22 @ 09:29) [Active]                      
Chief Complaint: Uncontrolled Dm2 c/b [facility]    History: Pt deferred Mongolian translation to family. Pt seen at bedside. Pt tolerating oral diet. Pt denies nausea and vomiting/any signs of hypoglycemia. Pt reports an adequate appetite.     MEDICATIONS  (STANDING):  dextrose 5%. 1000 milliLiter(s) (50 mL/Hr) IV Continuous <Continuous>  dextrose 5%. 1000 milliLiter(s) (100 mL/Hr) IV Continuous <Continuous>  dextrose 50% Injectable 25 Gram(s) IV Push once  dextrose 50% Injectable 12.5 Gram(s) IV Push once  dextrose 50% Injectable 25 Gram(s) IV Push once  enoxaparin Injectable 40 milliGRAM(s) SubCutaneous every 24 hours  glucagon  Injectable 1 milliGRAM(s) IntraMuscular once  insulin glargine Injectable (LANTUS) 5 Unit(s) SubCutaneous <User Schedule>  insulin lispro (ADMELOG) corrective regimen sliding scale   SubCutaneous at bedtime  insulin lispro (ADMELOG) corrective regimen sliding scale   SubCutaneous three times a day before meals  insulin lispro Injectable (ADMELOG) 2 Unit(s) SubCutaneous three times a day before meals  lactated ringers. 1000 milliLiter(s) (75 mL/Hr) IV Continuous <Continuous>  levothyroxine 50 MICROGram(s) Oral daily  losartan 100 milliGRAM(s) Oral daily  piperacillin/tazobactam IVPB.. 3.375 Gram(s) IV Intermittent every 8 hours  polyethylene glycol 3350 17 Gram(s) Oral daily  senna 2 Tablet(s) Oral at bedtime  simvastatin 20 milliGRAM(s) Oral at bedtime    MEDICATIONS  (PRN):  acetaminophen     Tablet .. 650 milliGRAM(s) Oral every 6 hours PRN Mild Pain (1 - 3)  albuterol/ipratropium for Nebulization.. 3 milliLiter(s) Nebulizer every 6 hours PRN Shortness of Breath and/or Wheezing  aluminum hydroxide/magnesium hydroxide/simethicone Suspension 30 milliLiter(s) Oral every 4 hours PRN Dyspepsia  dextrose Oral Gel 15 Gram(s) Oral once PRN Blood Glucose LESS THAN 70 milliGRAM(s)/deciliter      Allergies: No Known Allergies      Review of Systems:  Respiratory: No SOB, no cough  GI: No nausea, vomiting, abdominal pain  Endocrine: no polyuria, polydipsia      PHYSICAL EXAM:  VITALS: T(C): 36.6 (11-15-22 @ 12:10)  T(F): 97.8 (11-15-22 @ 12:10), Max: 97.8 (11-15-22 @ 09:00)  HR: 77 (11-15-22 @ 12:10) (73 - 85)  BP: 146/57 (11-15-22 @ 12:10) (139/100 - 162/92)  RR:  (18 - 18)  SpO2:  (95% - 100%)  Wt(kg): --  GENERAL: NAD, well-groomed, well-developed  RESPIRATORY: No labored breathing   GI: Soft, nontender, non distended  PSYCH: Alert and oriented x 3, normal affect, normal mood      CAPILLARY BLOOD GLUCOSE  POCT Blood Glucose.: 265 mg/dL (15 Nov 2022 12:30)  POCT Blood Glucose.: 193 mg/dL (15 Nov 2022 08:56)  POCT Blood Glucose.: 100 mg/dL (14 Nov 2022 21:45)  POCT Blood Glucose.: 215 mg/dL (14 Nov 2022 17:35)    A1C with Estimated Average Glucose (11.08.22 @ 09:00)    A1C with Estimated Average Glucose Result: 10.5    11-15    138  |  96<L>  |  6<L>  ----------------------------<  216<H>  3.7   |  32<H>  |  0.82    eGFR: 73    Ca    8.7      11-15  Mg     1.80     11-15  Phos  2.3     11-15      Thyroid Function Tests:  11-10 @ 06:48 TSH 1.08 FreeT4 1.3 T3 -- Anti TPO -- Anti Thyroglobulin Ab -- TSI --  11-08 @ 09:00 TSH 2.23 FreeT4 -- T3 -- Anti TPO -- Anti Thyroglobulin Ab -- TSI --      Diet, Consistent Carbohydrate w/Evening Snack:   DASH/TLC Sodium & Cholesterol Restricted (DASH) (11-09-22 @ 09:29) [Active]                      
MARTIR VU  77y  Female      Patient is a 77y old  Female who presents with a chief complaint of Hyperglycemia, Syncope (11 Nov 2022 15:03)  Patient was seen and examined.chart reviewed.c/o mild ;t.loer rib area pain on and off,no sob,no cp,no cough,no fever    REVIEW OF SYSTEMS:  CONSTITUTIONAL: No fever  RESPIRATORY: No cough, hemoptysis or shortness of breath  CARDIOVASCULAR: No chest pain, palpitations, dizziness, or leg swelling  GASTROINTESTINAL: No abdominal pain. nausea, vomiting, hematemesis  GENITOURINARY: No dysuria, frequency, hematuria     INTERVAL HPI/OVERNIGHT EVENTS:  T(C): 36.8 (11-11-22 @ 17:43), Max: 36.8 (11-11-22 @ 17:43)  HR: 87 (11-11-22 @ 17:43) (77 - 87)  BP: 157/75 (11-11-22 @ 17:43) (147/75 - 160/81)  RR: 16 (11-11-22 @ 17:43) (16 - 20)  SpO2: 100% (11-11-22 @ 17:43) (96% - 100%)  Wt(kg): --  I&O's Summary    T(C): 36.8 (11-11-22 @ 17:43), Max: 36.8 (11-11-22 @ 17:43)  HR: 87 (11-11-22 @ 17:43) (77 - 87)  BP: 157/75 (11-11-22 @ 17:43) (147/75 - 160/81)  RR: 16 (11-11-22 @ 17:43) (16 - 20)  SpO2: 100% (11-11-22 @ 17:43) (96% - 100%)  Wt(kg): --Vital Signs Last 24 Hrs  T(C): 36.8 (11 Nov 2022 17:43), Max: 36.8 (11 Nov 2022 17:43)  T(F): 98.2 (11 Nov 2022 17:43), Max: 98.2 (11 Nov 2022 17:43)  HR: 87 (11 Nov 2022 17:43) (77 - 87)  BP: 157/75 (11 Nov 2022 17:43) (147/75 - 160/81)  BP(mean): --  RR: 16 (11 Nov 2022 17:43) (16 - 20)  SpO2: 100% (11 Nov 2022 17:43) (96% - 100%)    Parameters below as of 11 Nov 2022 17:43  Patient On (Oxygen Delivery Method): nasal cannula        LABS:                        8.1    6.95  )-----------( 236      ( 11 Nov 2022 05:35 )             30.3     11-11    141  |  106  |  12  ----------------------------<  103<H>  3.9   |  26  |  0.75    Ca    8.4      11 Nov 2022 05:35  Phos  2.9     11-11  Mg     1.80     11-11    TPro  5.9<L>  /  Alb  3.4  /  TBili  0.3  /  DBili  x   /  AST  32  /  ALT  40<H>  /  AlkPhos  98  11-10        CAPILLARY BLOOD GLUCOSE      POCT Blood Glucose.: 148 mg/dL (11 Nov 2022 17:28)  POCT Blood Glucose.: 172 mg/dL (11 Nov 2022 12:30)  POCT Blood Glucose.: 92 mg/dL (11 Nov 2022 10:48)  POCT Blood Glucose.: 105 mg/dL (11 Nov 2022 08:22)  POCT Blood Glucose.: 106 mg/dL (10 Nov 2022 22:26)            PAST MEDICAL & SURGICAL HISTORY:  HTN (Hypertension)      Dyslipidemia      DM (Diabetes Mellitus)      Hypothyroidism      Pulmonary TB  Dx 2019, completed 10 month treatment      Cataract of left eye          MEDICATIONS  (STANDING):  dextrose 5%. 1000 milliLiter(s) (50 mL/Hr) IV Continuous <Continuous>  dextrose 5%. 1000 milliLiter(s) (100 mL/Hr) IV Continuous <Continuous>  dextrose 50% Injectable 25 Gram(s) IV Push once  dextrose 50% Injectable 12.5 Gram(s) IV Push once  dextrose 50% Injectable 25 Gram(s) IV Push once  enoxaparin Injectable 40 milliGRAM(s) SubCutaneous every 24 hours  glucagon  Injectable 1 milliGRAM(s) IntraMuscular once  insulin glargine Injectable (LANTUS) 4 Unit(s) SubCutaneous <User Schedule>  insulin lispro (ADMELOG) corrective regimen sliding scale   SubCutaneous at bedtime  insulin lispro (ADMELOG) corrective regimen sliding scale   SubCutaneous three times a day before meals  lactated ringers. 1000 milliLiter(s) (75 mL/Hr) IV Continuous <Continuous>  levothyroxine 50 MICROGram(s) Oral daily  losartan 100 milliGRAM(s) Oral daily  simvastatin 20 milliGRAM(s) Oral at bedtime    MEDICATIONS  (PRN):  acetaminophen     Tablet .. 650 milliGRAM(s) Oral every 6 hours PRN Mild Pain (1 - 3)  albuterol/ipratropium for Nebulization.. 3 milliLiter(s) Nebulizer every 6 hours PRN Shortness of Breath and/or Wheezing  aluminum hydroxide/magnesium hydroxide/simethicone Suspension 30 milliLiter(s) Oral every 4 hours PRN Dyspepsia  dextrose Oral Gel 15 Gram(s) Oral once PRN Blood Glucose LESS THAN 70 milliGRAM(s)/deciliter        RADIOLOGY & ADDITIONAL TESTS:    Imaging Personally Reviewed:  [ ] YES  [ ] NO    Consultant(s) Notes Reviewed:  [ x] YES  [ ] NO    PHYSICAL EXAM:  GENERAL: Alert and awake lying in bed in no distress  HEAD:  Atraumatic, Normocephalic  EYES: EOMI, GIAN, conjunctiva and sclera clear  NECK: Supple, No JVD, Normal thyroid  NERVOUS SYSTEM:  Alert & Oriented X2, Motor and sensory systems are intact,   CHEST/LUNG: Bilateral clear breath sounds, no rhochi, no wheezing, no crepitations,l.ower ribs-no tnderness  HEART: Regular rate and rhythm; No murmurs, rubs, or gallops  ABDOMEN: Soft, Nontender, Nondistended; Bowel sounds present  EXTREMITIES:   Peripheral Pulses are palpable, no  edema        Care Discussed with Consultants/Other Providers [x ] YES  [ ] NO      Code Status: [] Full Code [] DNR [] DNI [] Goals of Care:   Disposition: [] ICU [] Stroke Unit [] RCU []PCU []Floor [] Discharge Home         BARBARA Aragon    
MARTIR VU  77y  Female      Patient is a 77y old  Female who presents with a chief complaint of Hyperglycemia, Syncope (2022 19:05)  comfortable,no sob,no cp,no abd pain.s/p fall,no injury    -MEDICATIONS  (STANDING):  dextrose 5%. 1000 milliLiter(s) (100 mL/Hr) IV Continuous <Continuous>  dextrose 5%. 1000 milliLiter(s) (50 mL/Hr) IV Continuous <Continuous>  dextrose 50% Injectable 25 Gram(s) IV Push once  dextrose 50% Injectable 12.5 Gram(s) IV Push once  dextrose 50% Injectable 25 Gram(s) IV Push once  enoxaparin Injectable 40 milliGRAM(s) SubCutaneous every 24 hours  glucagon  Injectable 1 milliGRAM(s) IntraMuscular once  insulin glargine Injectable (LANTUS) 4 Unit(s) SubCutaneous at bedtime  insulin lispro (ADMELOG) corrective regimen sliding scale   SubCutaneous at bedtime  insulin lispro (ADMELOG) corrective regimen sliding scale   SubCutaneous three times a day before meals  insulin lispro Injectable (ADMELOG) 2 Unit(s) SubCutaneous three times a day before meals  lactated ringers. 1000 milliLiter(s) (75 mL/Hr) IV Continuous <Continuous>  levothyroxine 50 MICROGram(s) Oral daily  losartan 100 milliGRAM(s) Oral daily  piperacillin/tazobactam IVPB.. 3.375 Gram(s) IV Intermittent every 8 hours  simvastatin 20 milliGRAM(s) Oral at bedtime    MEDICATIONS  (PRN):  acetaminophen     Tablet .. 650 milliGRAM(s) Oral every 6 hours PRN Mild Pain (1 - 3)  albuterol/ipratropium for Nebulization.. 3 milliLiter(s) Nebulizer every 6 hours PRN Shortness of Breath and/or Wheezing  aluminum hydroxide/magnesium hydroxide/simethicone Suspension 30 milliLiter(s) Oral every 4 hours PRN Dyspepsia  dextrose Oral Gel 15 Gram(s) Oral once PRN Blood Glucose LESS THAN 70 milliGRAM(s)/deciliter    Vital Signs Last 24 Hrs  T(C): 36.4 (22 @ 21:38), Max: 36.6 (22 @ 11:34)  T(F): 97.6 (22 @ 21:38), Max: 97.9 (22 @ 11:34)  HR: 78 (22 @ 21:38) (73 - 85)  BP: 141/76 (22 @ 21:38) (139/100 - 173/80)  BP(mean): --  RR: 18 (22 @ 21:38) (18 - 20)  SpO2: 97% (22 @ 21:38) (97% - 100%)          PAST MEDICAL & SURGICAL HISTORY:  HTN (Hypertension)      Dyslipidemia      DM (Diabetes Mellitus)      Hypothyroidism      Pulmonary TB  Dx 2019, completed 10 month treatment      Cataract of left eye      RADIOLOGY & ADDITIONAL TESTS:    Imaging Personally Reviewed:  [x] YES  [ ] NO    Consultant(s) Notes Reviewed:  [x] YES  [ ] NO    PHYSICAL EXAM:  GENERAL: Alert and awake lying in bed in no distress  HEAD:  Atraumatic, Normocephalic  EYES: EOMI, GIAN, conjunctiva and sclera clear  NECK: Supple, No JVD, Normal thyroid  NERVOUS SYSTEM:  Alert & Oriented X3, Motor and sensory systems are intact,   CHEST/LUNG: dec breath sounds at bases  rate and rhythm; No murmurs, rubs, or gallops  ABDOMEN: Soft, Nontender, Nondistended; Bowel sounds present  EXTREMITIES:   Peripheral Pulses are palpable, no  edema    LABS:      138  |  98  |  7   ----------------------------<  215<H>  4.1   |  30  |  0.79    Ca    8.9      2022 05:40  Phos  2.0       Mg     1.80           Creatinine Trend: 0.79 <--, 0.73 <--, 0.73 <--, 0.75 <--, 0.82 <--, 0.89 <--, 0.88 <--, 1.03 <--, 1.08 <--, 1.10 <--                        9.0    8.60  )-----------( 234      ( 2022 05:40 )             33.4     Urine Studies:  Urinalysis Basic - ( 2022 09:20 )    Color: Light Yellow / Appearance: Clear / S.022 / pH:   Gluc:  / Ketone: Negative  / Bili: Negative / Urobili: <2 mg/dL   Blood:  / Protein: 100 mg/dL / Nitrite: Negative   Leuk Esterase: Negative / RBC: 1 /HPF / WBC 3 /HPF   Sq Epi:  / Non Sq Epi: 2 /HPF / Bacteria: Negative                                  Care Discussed with Consultants/Other Providers [ ] YES  [ ] NO      Code Status: [] Full Code [] DNR [] DNI [] Goals of Care:   Disposition: [] ICU [] Stroke Unit [] RCU []PCU []Floor [] Discharge Home       
MARTIR VU  77y  Female      Patient is a 77y old  Female who presents with a chief complaint of Hyperglycemia, Syncope (11 Nov 2022 19:05)  comfortable,no sob,no cp,no abd pain.s/p fall,no injury    REVIEW OF SYSTEMS:  CONSTITUTIONAL: No fever  RESPIRATORY: No cough, hemoptysis or shortness of breath  CARDIOVASCULAR: No chest pain, palpitations, dizziness, or leg swelling  GASTROINTESTINAL: No abdominal pain. nausea, vomiting, hematemesis  GENITOURINARY: No dysuria, frequency, hematuria   NEUROLOGICAL: No headaches, no dizziness  MUSCULOSKELETAL: No joint pain or swelling;     INTERVAL HPI/OVERNIGHT EVENTS:  T(C): 36.5 (11-12-22 @ 16:52), Max: 36.8 (11-12-22 @ 13:16)  HR: 81 (11-12-22 @ 16:52) (77 - 83)  BP: 165/74 (11-12-22 @ 16:52) (165/74 - 179/81)  RR: 18 (11-12-22 @ 16:52) (17 - 18)  SpO2: 97% (11-12-22 @ 16:52) (95% - 100%)  Wt(kg): --  I&O's Summary    11 Nov 2022 07:01  -  12 Nov 2022 07:00  --------------------------------------------------------  IN: 1485 mL / OUT: 350 mL / NET: 1135 mL      T(C): 36.5 (11-12-22 @ 16:52), Max: 36.8 (11-12-22 @ 13:16)  HR: 81 (11-12-22 @ 16:52) (77 - 83)  BP: 165/74 (11-12-22 @ 16:52) (165/74 - 179/81)  RR: 18 (11-12-22 @ 16:52) (17 - 18)  SpO2: 97% (11-12-22 @ 16:52) (95% - 100%)  Wt(kg): --Vital Signs Last 24 Hrs  T(C): 36.5 (12 Nov 2022 16:52), Max: 36.8 (12 Nov 2022 13:16)  T(F): 97.7 (12 Nov 2022 16:52), Max: 98.2 (12 Nov 2022 13:16)  HR: 81 (12 Nov 2022 16:52) (77 - 83)  BP: 165/74 (12 Nov 2022 16:52) (165/74 - 179/81)  BP(mean): --  RR: 18 (12 Nov 2022 16:52) (17 - 18)  SpO2: 97% (12 Nov 2022 16:52) (95% - 100%)    Parameters below as of 12 Nov 2022 16:52  Patient On (Oxygen Delivery Method): nasal cannula        LABS:                        8.0    7.75  )-----------( 208      ( 12 Nov 2022 06:44 )             30.0     11-12    136  |  101  |  10  ----------------------------<  210<H>  3.9   |  28  |  0.73    Ca    8.3<L>      12 Nov 2022 06:44  Phos  2.2     11-12  Mg     1.90     11-12          CAPILLARY BLOOD GLUCOSE      POCT Blood Glucose.: 144 mg/dL (12 Nov 2022 17:42)  POCT Blood Glucose.: 185 mg/dL (12 Nov 2022 13:15)  POCT Blood Glucose.: 196 mg/dL (12 Nov 2022 09:21)  POCT Blood Glucose.: 216 mg/dL (11 Nov 2022 21:55)            PAST MEDICAL & SURGICAL HISTORY:  HTN (Hypertension)      Dyslipidemia      DM (Diabetes Mellitus)      Hypothyroidism      Pulmonary TB  Dx 2019, completed 10 month treatment      Cataract of left eye          MEDICATIONS  (STANDING):  dextrose 5%. 1000 milliLiter(s) (100 mL/Hr) IV Continuous <Continuous>  dextrose 5%. 1000 milliLiter(s) (50 mL/Hr) IV Continuous <Continuous>  dextrose 50% Injectable 25 Gram(s) IV Push once  dextrose 50% Injectable 12.5 Gram(s) IV Push once  dextrose 50% Injectable 25 Gram(s) IV Push once  enoxaparin Injectable 40 milliGRAM(s) SubCutaneous every 24 hours  glucagon  Injectable 1 milliGRAM(s) IntraMuscular once  insulin glargine Injectable (LANTUS) 4 Unit(s) SubCutaneous <User Schedule>  insulin lispro (ADMELOG) corrective regimen sliding scale   SubCutaneous three times a day before meals  insulin lispro (ADMELOG) corrective regimen sliding scale   SubCutaneous at bedtime  lactated ringers. 1000 milliLiter(s) (75 mL/Hr) IV Continuous <Continuous>  levothyroxine 50 MICROGram(s) Oral daily  losartan 100 milliGRAM(s) Oral daily  piperacillin/tazobactam IVPB.. 3.375 Gram(s) IV Intermittent every 8 hours  simvastatin 20 milliGRAM(s) Oral at bedtime    MEDICATIONS  (PRN):  acetaminophen     Tablet .. 650 milliGRAM(s) Oral every 6 hours PRN Mild Pain (1 - 3)  albuterol/ipratropium for Nebulization.. 3 milliLiter(s) Nebulizer every 6 hours PRN Shortness of Breath and/or Wheezing  aluminum hydroxide/magnesium hydroxide/simethicone Suspension 30 milliLiter(s) Oral every 4 hours PRN Dyspepsia  dextrose Oral Gel 15 Gram(s) Oral once PRN Blood Glucose LESS THAN 70 milliGRAM(s)/deciliter        RADIOLOGY & ADDITIONAL TESTS:    Imaging Personally Reviewed:  [ ] YES  [ ] NO    Consultant(s) Notes Reviewed:  [ ] YES  [ ] NO    PHYSICAL EXAM:  GENERAL: Alert and awake lying in bed in no distress  HEAD:  Atraumatic, Normocephalic  EYES: EOMI, GIAN, conjunctiva and sclera clear  NECK: Supple, No JVD, Normal thyroid  NERVOUS SYSTEM:  Alert & Oriented X3, Motor and sensory systems are intact,   CHEST/LUNG: Bilateral clear breath sounds, no rhochi, no wheezing, no crepitations,  HEART: Regular rate and rhythm; No murmurs, rubs, or gallops  ABDOMEN: Soft, Nontender, Nondistended; Bowel sounds present  EXTREMITIES:   Peripheral Pulses are palpable, no  edema        Care Discussed with Consultants/Other Providers [ ] YES  [ ] NO      Code Status: [] Full Code [] DNR [] DNI [] Goals of Care:   Disposition: [] ICU [] Stroke Unit [] RCU []PCU []Floor [] Discharge Home         BARBARA Aragon    
    SUBJECTIVE / OVERNIGHT EVENTS:pt seen and examined  11-10-22     MEDICATIONS  (STANDING):  dextrose 5%. 1000 milliLiter(s) (100 mL/Hr) IV Continuous <Continuous>  dextrose 5%. 1000 milliLiter(s) (50 mL/Hr) IV Continuous <Continuous>  dextrose 50% Injectable 25 Gram(s) IV Push once  dextrose 50% Injectable 12.5 Gram(s) IV Push once  dextrose 50% Injectable 25 Gram(s) IV Push once  enoxaparin Injectable 40 milliGRAM(s) SubCutaneous every 24 hours  glucagon  Injectable 1 milliGRAM(s) IntraMuscular once  insulin glargine Injectable (LANTUS) 5 Unit(s) SubCutaneous <User Schedule>  insulin lispro (ADMELOG) corrective regimen sliding scale   SubCutaneous three times a day before meals  insulin lispro (ADMELOG) corrective regimen sliding scale   SubCutaneous at bedtime  lactated ringers. 1000 milliLiter(s) (75 mL/Hr) IV Continuous <Continuous>  levothyroxine 50 MICROGram(s) Oral daily  losartan 100 milliGRAM(s) Oral daily  simvastatin 20 milliGRAM(s) Oral at bedtime    MEDICATIONS  (PRN):  acetaminophen     Tablet .. 650 milliGRAM(s) Oral every 6 hours PRN Mild Pain (1 - 3)  albuterol/ipratropium for Nebulization.. 3 milliLiter(s) Nebulizer every 6 hours PRN Shortness of Breath and/or Wheezing  dextrose Oral Gel 15 Gram(s) Oral once PRN Blood Glucose LESS THAN 70 milliGRAM(s)/deciliter    Vital Signs Last 24 Hrs  T(C): 36.5 (11-10-22 @ 17:49), Max: 36.7 (22 @ 20:30)  T(F): 97.7 (11-10-22 @ 17:49), Max: 98 (22 @ 20:30)  HR: 88 (11-10-22 @ 17:49) (63 - 88)  BP: 122/60 (11-10-22 @ 17:49) (122/60 - 153/60)  BP(mean): --  RR: 18 (11-10-22 @ 17:49) (18 - 24)  SpO2: 94% (11-10-22 @ 17:49) (94% - 100%)    Orthostatic VS  22 @ 19:40  Lying BP: 133/77 HR: 88  Sitting BP: 150/87 HR: 86  Standing BP: 138/77 HR: 89  Site: upper left arm  Mode: electronic    PHYSICAL EXAM:  GENERAL: NAD  EYES: EOMI, PERRLA  NECK: Supple, No JVD  CHEST/LUNG: dec breath sounds at bases  HEART:  S1 , S2 +  ABDOMEN: soft, bs+, distension+. no tenderness , no tenderness  EXTREMITIES:  no edema  NEUROLOGY:alert awake    LABS:  11-10    137  |  104  |  13  ----------------------------<  72  3.9   |  24  |  0.82    Ca    8.3<L>      10 Nov 2022 06:48  Phos  2.5     11-10  Mg     1.70     11-10    TPro  5.9<L>  /  Alb  3.4  /  TBili  0.3  /  DBili      /  AST  32  /  ALT  40<H>  /  AlkPhos  98  11-10    Creatinine Trend: 0.82 <--, 0.89 <--, 0.88 <--, 1.03 <--, 1.08 <--, 1.10 <--                        8.1    7.65  )-----------( 222      ( 10 Nov 2022 06:48 )             30.1     Urine Studies:  Urinalysis Basic - ( 2022 09:20 )    Color: Light Yellow / Appearance: Clear / S.022 / pH:   Gluc:  / Ketone: Negative  / Bili: Negative / Urobili: <2 mg/dL   Blood:  / Protein: 100 mg/dL / Nitrite: Negative   Leuk Esterase: Negative / RBC: 1 /HPF / WBC 3 /HPF   Sq Epi:  / Non Sq Epi: 2 /HPF / Bacteria: Negative              LIVER FUNCTIONS - ( 10 Nov 2022 06:48 )  Alb: 3.4 g/dL / Pro: 5.9 g/dL / ALK PHOS: 98 U/L / ALT: 40 U/L / AST: 32 U/L / GGT: x                 RADIOLOGY & ADDITIONAL TESTS:    Imaging Personally Reviewed:yes    Consultant(s) Notes Reviewed: yes     Care Discussed with Consultants/Other Providers:yes  
MARTIR VU  77y  Female      Patient is a 77y old  Female who presents with a chief complaint of Hyperglycemia, Syncope (11 Nov 2022 19:05)  comfortable,no sob,no cp,no abd pain.s/p fall,no injury  pts family next to pts bed    MEDICATIONS  (STANDING):  cholecalciferol 1000 Unit(s) Oral daily  dextrose 5%. 1000 milliLiter(s) (100 mL/Hr) IV Continuous <Continuous>  dextrose 5%. 1000 milliLiter(s) (50 mL/Hr) IV Continuous <Continuous>  dextrose 50% Injectable 25 Gram(s) IV Push once  dextrose 50% Injectable 12.5 Gram(s) IV Push once  dextrose 50% Injectable 25 Gram(s) IV Push once  enoxaparin Injectable 40 milliGRAM(s) SubCutaneous every 24 hours  glucagon  Injectable 1 milliGRAM(s) IntraMuscular once  insulin glargine Injectable (LANTUS) 5 Unit(s) SubCutaneous <User Schedule>  insulin lispro (ADMELOG) corrective regimen sliding scale   SubCutaneous at bedtime  insulin lispro (ADMELOG) corrective regimen sliding scale   SubCutaneous three times a day before meals  insulin lispro Injectable (ADMELOG) 2 Unit(s) SubCutaneous three times a day before meals  lactated ringers. 1000 milliLiter(s) (75 mL/Hr) IV Continuous <Continuous>  levothyroxine 50 MICROGram(s) Oral daily  losartan 100 milliGRAM(s) Oral daily  polyethylene glycol 3350 17 Gram(s) Oral daily  senna 2 Tablet(s) Oral at bedtime  simvastatin 20 milliGRAM(s) Oral at bedtime    MEDICATIONS  (PRN):  acetaminophen     Tablet .. 650 milliGRAM(s) Oral every 6 hours PRN Mild Pain (1 - 3)  albuterol/ipratropium for Nebulization.. 3 milliLiter(s) Nebulizer every 6 hours PRN Shortness of Breath and/or Wheezing  aluminum hydroxide/magnesium hydroxide/simethicone Suspension 30 milliLiter(s) Oral every 4 hours PRN Dyspepsia  dextrose Oral Gel 15 Gram(s) Oral once PRN Blood Glucose LESS THAN 70 milliGRAM(s)/deciliter    Vital Signs Last 24 Hrs  T(C): 36.8 (11-16-22 @ 16:39), Max: 37.1 (11-16-22 @ 13:34)  T(F): 98.3 (11-16-22 @ 16:39), Max: 98.7 (11-16-22 @ 13:34)  HR: 82 (11-16-22 @ 16:39) (81 - 85)  BP: 142/66 (11-16-22 @ 16:39) (142/66 - 178/85)  BP(mean): --  RR: 18 (11-16-22 @ 16:39) (18 - 18)  SpO2: 98% (11-16-22 @ 16:39) (85% - 98%)            PAST MEDICAL & SURGICAL HISTORY:  HTN (Hypertension)      Dyslipidemia      DM (Diabetes Mellitus)      Hypothyroidism      Pulmonary TB  Dx 2019, completed 10 month treatment      Cataract of left eye      RADIOLOGY & ADDITIONAL TESTS:    Imaging Personally Reviewed:  [x] YES  [ ] NO    Consultant(s) Notes Reviewed:  [x] YES  [ ] NO    PHYSICAL EXAM:  GENERAL: Alert and awake lying in bed in no distress  HEAD:  Atraumatic, Normocephalic  EYES: EOMI, GIAN, conjunctiva and sclera clear  NECK: Supple, No JVD, Normal thyroid  NERVOUS SYSTEM:  Alert awake  CHEST/LUNG: dec breath sounds at bases  rate and rhythm; No murmurs, rubs, or gallops  ABDOMEN: Soft, Nontender, Nondistended; Bowel sounds present  EXTREMITIES:   edema+  LABS:  11-16    142  |  102  |  6<L>  ----------------------------<  123<H>  4.3   |  30  |  0.66    Ca    9.1      16 Nov 2022 05:45  Phos  2.2     11-16  Mg     1.80     11-16      Creatinine Trend: 0.66 <--, 0.82 <--, 0.79 <--, 0.73 <--, 0.73 <--, 0.75 <--, 0.82 <--                        8.5    6.84  )-----------( 222      ( 16 Nov 2022 05:45 )             31.8     Urine Studies:                
MARTIR VU  77y  Female      Patient is a 77y old  Female who presents with a chief complaint of Hyperglycemia, Syncope (11 Nov 2022 19:05)  comfortable,no sob,no cp,no abd pain.s/p fall,no injury  pts family next to pts bed    MEDICATIONS  (STANDING):  cholecalciferol 1000 Unit(s) Oral daily  dextrose 5%. 1000 milliLiter(s) (50 mL/Hr) IV Continuous <Continuous>  dextrose 5%. 1000 milliLiter(s) (100 mL/Hr) IV Continuous <Continuous>  dextrose 50% Injectable 25 Gram(s) IV Push once  dextrose 50% Injectable 12.5 Gram(s) IV Push once  dextrose 50% Injectable 25 Gram(s) IV Push once  enoxaparin Injectable 40 milliGRAM(s) SubCutaneous every 24 hours  glucagon  Injectable 1 milliGRAM(s) IntraMuscular once  insulin glargine Injectable (LANTUS) 5 Unit(s) SubCutaneous <User Schedule>  insulin lispro (ADMELOG) corrective regimen sliding scale   SubCutaneous at bedtime  insulin lispro (ADMELOG) corrective regimen sliding scale   SubCutaneous three times a day before meals  insulin lispro Injectable (ADMELOG) 2 Unit(s) SubCutaneous three times a day before meals  lactated ringers. 1000 milliLiter(s) (75 mL/Hr) IV Continuous <Continuous>  levothyroxine 50 MICROGram(s) Oral daily  losartan 100 milliGRAM(s) Oral daily  polyethylene glycol 3350 17 Gram(s) Oral daily  senna 2 Tablet(s) Oral at bedtime  simvastatin 20 milliGRAM(s) Oral at bedtime    MEDICATIONS  (PRN):  acetaminophen     Tablet .. 650 milliGRAM(s) Oral every 6 hours PRN Mild Pain (1 - 3)  albuterol/ipratropium for Nebulization.. 3 milliLiter(s) Nebulizer every 6 hours PRN Shortness of Breath and/or Wheezing  aluminum hydroxide/magnesium hydroxide/simethicone Suspension 30 milliLiter(s) Oral every 4 hours PRN Dyspepsia  dextrose Oral Gel 15 Gram(s) Oral once PRN Blood Glucose LESS THAN 70 milliGRAM(s)/deciliter    Vital Signs Last 24 Hrs  T(C): 36.8 (11-17-22 @ 06:15), Max: 36.8 (11-16-22 @ 16:39)  T(F): 98.2 (11-17-22 @ 06:15), Max: 98.3 (11-16-22 @ 16:39)  HR: 96 (11-17-22 @ 06:15) (82 - 96)  BP: 154/81 (11-17-22 @ 06:15) (142/66 - 180/77)  BP(mean): --  RR: 20 (11-17-22 @ 06:15) (18 - 20)  SpO2: 79% (11-17-22 @ 12:20) (79% - 100%)          PAST MEDICAL & SURGICAL HISTORY:  HTN (Hypertension)      Dyslipidemia      DM (Diabetes Mellitus)      Hypothyroidism      Pulmonary TB  Dx 2019, completed 10 month treatment      Cataract of left eye      RADIOLOGY & ADDITIONAL TESTS:    Imaging Personally Reviewed:  [x] YES  [ ] NO    Consultant(s) Notes Reviewed:  [x] YES  [ ] NO    PHYSICAL EXAM:  GENERAL: Alert and awake lying in bed in no distress  HEAD:  Atraumatic, Normocephalic  EYES: EOMI, GIAN, conjunctiva and sclera clear  NECK: Supple, No JVD, Normal thyroid  NERVOUS SYSTEM:  Alert awake  CHEST/LUNG: dec breath sounds at bases  rate and rhythm; No murmurs, rubs, or gallops  ABDOMEN: Soft, Nontender, Nondistended; Bowel sounds present  EXTREMITIES:  dec  edema+    LABS:  11-17    139  |  100  |  6<L>  ----------------------------<  139<H>  4.2   |  33<H>  |  0.65    Ca    9.0      17 Nov 2022 06:23  Phos  2.6     11-17  Mg     1.90     11-17      Creatinine Trend: 0.65 <--, 0.66 <--, 0.82 <--, 0.79 <--, 0.73 <--, 0.73 <--, 0.75 <--                        8.1    7.35  )-----------( 230      ( 17 Nov 2022 06:23 )             30.9     Urine Studies:

## 2022-11-19 NOTE — PROGRESS NOTE ADULT - REASON FOR ADMISSION
Hyperglycemia, Syncope

## 2022-11-19 NOTE — PROGRESS NOTE ADULT - ASSESSMENT
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 Syncope  -HR reported to be 20-30s after, likely vasovagal  -orthos negative  -echo normal LV systolic function, no WMA. mild diastolic dysfunction. decreased RV function   -monitor on tele   -CT head unremarkable    2. Hyperglycemia  -improving  -f/u endo recs    3. SIRS  -WBC elevated  -on abx for PNA    4. HTN  -controlled  -c/w losartan  -continue to monitor BP     5. DVT prophylaxis  -lovenox subq

## 2022-11-19 NOTE — PROGRESS NOTE ADULT - PROVIDER SPECIALTY LIST ADULT
Cardiology
Endocrinology
Internal Medicine
Pulmonology
Pulmonology
Cardiology
Pulmonology
Cardiology
Pulmonology
Cardiology
Endocrinology
Internal Medicine
Endocrinology
Internal Medicine
Endocrinology
Internal Medicine
Endocrinology
Internal Medicine

## 2022-11-19 NOTE — PROGRESS NOTE ADULT - NS ATTEND AMEND GEN_ALL_CORE FT

## 2022-12-01 ENCOUNTER — INPATIENT (INPATIENT)
Facility: HOSPITAL | Age: 77
LOS: 8 days | Discharge: HOME HEALTH | End: 2022-12-10
Attending: INTERNAL MEDICINE | Admitting: INTERNAL MEDICINE

## 2022-12-01 VITALS
SYSTOLIC BLOOD PRESSURE: 152 MMHG | HEART RATE: 122 BPM | HEIGHT: 59 IN | RESPIRATION RATE: 28 BRPM | TEMPERATURE: 98 F | OXYGEN SATURATION: 100 % | DIASTOLIC BLOOD PRESSURE: 77 MMHG

## 2022-12-01 DIAGNOSIS — Z29.9 ENCOUNTER FOR PROPHYLACTIC MEASURES, UNSPECIFIED: ICD-10-CM

## 2022-12-01 DIAGNOSIS — H26.9 UNSPECIFIED CATARACT: Chronic | ICD-10-CM

## 2022-12-01 DIAGNOSIS — J44.1 CHRONIC OBSTRUCTIVE PULMONARY DISEASE WITH (ACUTE) EXACERBATION: ICD-10-CM

## 2022-12-01 DIAGNOSIS — A41.9 SEPSIS, UNSPECIFIED ORGANISM: ICD-10-CM

## 2022-12-01 DIAGNOSIS — E11.9 TYPE 2 DIABETES MELLITUS WITHOUT COMPLICATIONS: ICD-10-CM

## 2022-12-01 DIAGNOSIS — E03.9 HYPOTHYROIDISM, UNSPECIFIED: ICD-10-CM

## 2022-12-01 DIAGNOSIS — I10 ESSENTIAL (PRIMARY) HYPERTENSION: ICD-10-CM

## 2022-12-01 LAB
A1C WITH ESTIMATED AVERAGE GLUCOSE RESULT: 8.8 % — HIGH (ref 4–5.6)
ALBUMIN SERPL ELPH-MCNC: 4.4 G/DL — SIGNIFICANT CHANGE UP (ref 3.3–5)
ALP SERPL-CCNC: 99 U/L — SIGNIFICANT CHANGE UP (ref 40–120)
ALT FLD-CCNC: 15 U/L — SIGNIFICANT CHANGE UP (ref 4–33)
ANION GAP SERPL CALC-SCNC: 12 MMOL/L — SIGNIFICANT CHANGE UP (ref 7–14)
APPEARANCE UR: CLEAR — SIGNIFICANT CHANGE UP
APTT BLD: 34.7 SEC — SIGNIFICANT CHANGE UP (ref 27–36.3)
AST SERPL-CCNC: 37 U/L — HIGH (ref 4–32)
B PERT DNA SPEC QL NAA+PROBE: SIGNIFICANT CHANGE UP
B PERT+PARAPERT DNA PNL SPEC NAA+PROBE: SIGNIFICANT CHANGE UP
BACTERIA # UR AUTO: NEGATIVE — SIGNIFICANT CHANGE UP
BASE EXCESS BLDV CALC-SCNC: 0.4 MMOL/L — SIGNIFICANT CHANGE UP (ref -2–3)
BASE EXCESS BLDV CALC-SCNC: 3.1 MMOL/L — HIGH (ref -2–3)
BASOPHILS # BLD AUTO: 0.08 K/UL — SIGNIFICANT CHANGE UP (ref 0–0.2)
BASOPHILS NFR BLD AUTO: 0.6 % — SIGNIFICANT CHANGE UP (ref 0–2)
BILIRUB SERPL-MCNC: 0.4 MG/DL — SIGNIFICANT CHANGE UP (ref 0.2–1.2)
BILIRUB UR-MCNC: NEGATIVE — SIGNIFICANT CHANGE UP
BLOOD GAS VENOUS COMPREHENSIVE RESULT: SIGNIFICANT CHANGE UP
BORDETELLA PARAPERTUSSIS (RAPRVP): SIGNIFICANT CHANGE UP
BUN SERPL-MCNC: 21 MG/DL — SIGNIFICANT CHANGE UP (ref 7–23)
C PNEUM DNA SPEC QL NAA+PROBE: SIGNIFICANT CHANGE UP
CALCIUM SERPL-MCNC: 9.3 MG/DL — SIGNIFICANT CHANGE UP (ref 8.4–10.5)
CHLORIDE BLDV-SCNC: 95 MMOL/L — LOW (ref 96–108)
CHLORIDE BLDV-SCNC: 97 MMOL/L — SIGNIFICANT CHANGE UP (ref 96–108)
CHLORIDE SERPL-SCNC: 96 MMOL/L — LOW (ref 98–107)
CO2 BLDV-SCNC: 30.8 MMOL/L — HIGH (ref 22–26)
CO2 BLDV-SCNC: 34.4 MMOL/L — HIGH (ref 22–26)
CO2 SERPL-SCNC: 26 MMOL/L — SIGNIFICANT CHANGE UP (ref 22–31)
COLOR SPEC: SIGNIFICANT CHANGE UP
CREAT SERPL-MCNC: 0.71 MG/DL — SIGNIFICANT CHANGE UP (ref 0.5–1.3)
DIFF PNL FLD: NEGATIVE — SIGNIFICANT CHANGE UP
EGFR: 86 ML/MIN/1.73M2 — SIGNIFICANT CHANGE UP
EOSINOPHIL # BLD AUTO: 0.02 K/UL — SIGNIFICANT CHANGE UP (ref 0–0.5)
EOSINOPHIL NFR BLD AUTO: 0.1 % — SIGNIFICANT CHANGE UP (ref 0–6)
EPI CELLS # UR: 3 /HPF — SIGNIFICANT CHANGE UP (ref 0–5)
ESTIMATED AVERAGE GLUCOSE: 206 — SIGNIFICANT CHANGE UP
FLUAV SUBTYP SPEC NAA+PROBE: SIGNIFICANT CHANGE UP
FLUBV RNA SPEC QL NAA+PROBE: SIGNIFICANT CHANGE UP
GAS PNL BLDV: 132 MMOL/L — LOW (ref 136–145)
GAS PNL BLDV: 134 MMOL/L — LOW (ref 136–145)
GAS PNL BLDV: SIGNIFICANT CHANGE UP
GLUCOSE BLDC GLUCOMTR-MCNC: 323 MG/DL — HIGH (ref 70–99)
GLUCOSE BLDC GLUCOMTR-MCNC: 441 MG/DL — HIGH (ref 70–99)
GLUCOSE BLDC GLUCOMTR-MCNC: 466 MG/DL — CRITICAL HIGH (ref 70–99)
GLUCOSE BLDC GLUCOMTR-MCNC: 479 MG/DL — CRITICAL HIGH (ref 70–99)
GLUCOSE BLDV-MCNC: 311 MG/DL — HIGH (ref 70–99)
GLUCOSE BLDV-MCNC: 452 MG/DL — CRITICAL HIGH (ref 70–99)
GLUCOSE SERPL-MCNC: 283 MG/DL — HIGH (ref 70–99)
GLUCOSE UR QL: ABNORMAL
HADV DNA SPEC QL NAA+PROBE: SIGNIFICANT CHANGE UP
HCO3 BLDV-SCNC: 29 MMOL/L — SIGNIFICANT CHANGE UP (ref 22–29)
HCO3 BLDV-SCNC: 32 MMOL/L — HIGH (ref 22–29)
HCOV 229E RNA SPEC QL NAA+PROBE: SIGNIFICANT CHANGE UP
HCOV HKU1 RNA SPEC QL NAA+PROBE: SIGNIFICANT CHANGE UP
HCOV NL63 RNA SPEC QL NAA+PROBE: SIGNIFICANT CHANGE UP
HCOV OC43 RNA SPEC QL NAA+PROBE: SIGNIFICANT CHANGE UP
HCT VFR BLD CALC: 39.3 % — SIGNIFICANT CHANGE UP (ref 34.5–45)
HCT VFR BLDA CALC: 29 % — LOW (ref 34.5–46.5)
HCT VFR BLDA CALC: 32 % — LOW (ref 34.5–46.5)
HGB BLD CALC-MCNC: 10.8 G/DL — LOW (ref 11.5–15.5)
HGB BLD CALC-MCNC: 9.5 G/DL — LOW (ref 11.5–15.5)
HGB BLD-MCNC: 10.4 G/DL — LOW (ref 11.5–15.5)
HMPV RNA SPEC QL NAA+PROBE: SIGNIFICANT CHANGE UP
HPIV1 RNA SPEC QL NAA+PROBE: SIGNIFICANT CHANGE UP
HPIV2 RNA SPEC QL NAA+PROBE: SIGNIFICANT CHANGE UP
HPIV3 RNA SPEC QL NAA+PROBE: SIGNIFICANT CHANGE UP
HPIV4 RNA SPEC QL NAA+PROBE: SIGNIFICANT CHANGE UP
HYALINE CASTS # UR AUTO: 1 /LPF — SIGNIFICANT CHANGE UP (ref 0–7)
IANC: 12.22 K/UL — HIGH (ref 1.8–7.4)
IMM GRANULOCYTES NFR BLD AUTO: 0.7 % — SIGNIFICANT CHANGE UP (ref 0–0.9)
INR BLD: 1.14 RATIO — SIGNIFICANT CHANGE UP (ref 0.88–1.16)
KETONES UR-MCNC: NEGATIVE — SIGNIFICANT CHANGE UP
LACTATE BLDV-MCNC: 3.1 MMOL/L — HIGH (ref 0.5–2)
LACTATE BLDV-MCNC: 3.2 MMOL/L — HIGH (ref 0.5–2)
LEUKOCYTE ESTERASE UR-ACNC: NEGATIVE — SIGNIFICANT CHANGE UP
LIDOCAIN IGE QN: 9 U/L — SIGNIFICANT CHANGE UP (ref 7–60)
LYMPHOCYTES # BLD AUTO: 1.28 K/UL — SIGNIFICANT CHANGE UP (ref 1–3.3)
LYMPHOCYTES # BLD AUTO: 8.9 % — LOW (ref 13–44)
M PNEUMO DNA SPEC QL NAA+PROBE: SIGNIFICANT CHANGE UP
MAGNESIUM SERPL-MCNC: 1.7 MG/DL — SIGNIFICANT CHANGE UP (ref 1.6–2.6)
MCHC RBC-ENTMCNC: 20 PG — LOW (ref 27–34)
MCHC RBC-ENTMCNC: 26.5 GM/DL — LOW (ref 32–36)
MCV RBC AUTO: 75.6 FL — LOW (ref 80–100)
MONOCYTES # BLD AUTO: 0.69 K/UL — SIGNIFICANT CHANGE UP (ref 0–0.9)
MONOCYTES NFR BLD AUTO: 4.8 % — SIGNIFICANT CHANGE UP (ref 2–14)
NEUTROPHILS # BLD AUTO: 12.22 K/UL — HIGH (ref 1.8–7.4)
NEUTROPHILS NFR BLD AUTO: 84.9 % — HIGH (ref 43–77)
NITRITE UR-MCNC: NEGATIVE — SIGNIFICANT CHANGE UP
NRBC # BLD: 0 /100 WBCS — SIGNIFICANT CHANGE UP (ref 0–0)
NRBC # FLD: 0 K/UL — SIGNIFICANT CHANGE UP (ref 0–0)
NT-PROBNP SERPL-SCNC: HIGH PG/ML
PCO2 BLDV: 67 MMHG — HIGH (ref 39–42)
PCO2 BLDV: 75 MMHG — HIGH (ref 39–42)
PH BLDV: 7.24 — LOW (ref 7.32–7.43)
PH BLDV: 7.24 — LOW (ref 7.32–7.43)
PH UR: 6 — SIGNIFICANT CHANGE UP (ref 5–8)
PLATELET # BLD AUTO: 315 K/UL — SIGNIFICANT CHANGE UP (ref 150–400)
PO2 BLDV: 29 MMHG — SIGNIFICANT CHANGE UP
PO2 BLDV: 43 MMHG — SIGNIFICANT CHANGE UP
POTASSIUM BLDV-SCNC: 4 MMOL/L — SIGNIFICANT CHANGE UP (ref 3.5–5.1)
POTASSIUM BLDV-SCNC: 5 MMOL/L — SIGNIFICANT CHANGE UP (ref 3.5–5.1)
POTASSIUM SERPL-MCNC: 4.9 MMOL/L — SIGNIFICANT CHANGE UP (ref 3.5–5.3)
POTASSIUM SERPL-SCNC: 4.9 MMOL/L — SIGNIFICANT CHANGE UP (ref 3.5–5.3)
PROCALCITONIN SERPL-MCNC: 0.06 NG/ML — SIGNIFICANT CHANGE UP (ref 0.02–0.1)
PROT SERPL-MCNC: 7.9 G/DL — SIGNIFICANT CHANGE UP (ref 6–8.3)
PROT UR-MCNC: ABNORMAL
PROTHROM AB SERPL-ACNC: 13.3 SEC — SIGNIFICANT CHANGE UP (ref 10.5–13.4)
RAPID RVP RESULT: DETECTED
RBC # BLD: 5.2 M/UL — SIGNIFICANT CHANGE UP (ref 3.8–5.2)
RBC # FLD: 22.5 % — HIGH (ref 10.3–14.5)
RBC CASTS # UR COMP ASSIST: 9 /HPF — HIGH (ref 0–4)
RSV RNA SPEC QL NAA+PROBE: DETECTED
RV+EV RNA SPEC QL NAA+PROBE: SIGNIFICANT CHANGE UP
SAO2 % BLDV: 40.8 % — SIGNIFICANT CHANGE UP
SAO2 % BLDV: 69.8 % — SIGNIFICANT CHANGE UP
SARS-COV-2 RNA SPEC QL NAA+PROBE: SIGNIFICANT CHANGE UP
SODIUM SERPL-SCNC: 134 MMOL/L — LOW (ref 135–145)
SP GR SPEC: 1.02 — SIGNIFICANT CHANGE UP (ref 1.01–1.05)
TROPONIN T, HIGH SENSITIVITY RESULT: 72 NG/L — CRITICAL HIGH
TROPONIN T, HIGH SENSITIVITY RESULT: 73 NG/L — CRITICAL HIGH
TSH SERPL-MCNC: 1.11 UIU/ML — SIGNIFICANT CHANGE UP (ref 0.27–4.2)
UROBILINOGEN FLD QL: SIGNIFICANT CHANGE UP
WBC # BLD: 14.39 K/UL — HIGH (ref 3.8–10.5)
WBC # FLD AUTO: 14.39 K/UL — HIGH (ref 3.8–10.5)
WBC UR QL: 2 /HPF — SIGNIFICANT CHANGE UP (ref 0–5)

## 2022-12-01 PROCEDURE — 99223 1ST HOSP IP/OBS HIGH 75: CPT

## 2022-12-01 PROCEDURE — 99291 CRITICAL CARE FIRST HOUR: CPT

## 2022-12-01 PROCEDURE — 93010 ELECTROCARDIOGRAM REPORT: CPT

## 2022-12-01 PROCEDURE — 71045 X-RAY EXAM CHEST 1 VIEW: CPT | Mod: 26

## 2022-12-01 RX ORDER — SODIUM CHLORIDE 9 MG/ML
500 INJECTION INTRAMUSCULAR; INTRAVENOUS; SUBCUTANEOUS ONCE
Refills: 0 | Status: COMPLETED | OUTPATIENT
Start: 2022-12-01 | End: 2022-12-01

## 2022-12-01 RX ORDER — INSULIN GLARGINE 100 [IU]/ML
10 INJECTION, SOLUTION SUBCUTANEOUS AT BEDTIME
Refills: 0 | Status: DISCONTINUED | OUTPATIENT
Start: 2022-12-01 | End: 2022-12-02

## 2022-12-01 RX ORDER — AZITHROMYCIN 500 MG/1
500 TABLET, FILM COATED ORAL ONCE
Refills: 0 | Status: COMPLETED | OUTPATIENT
Start: 2022-12-01 | End: 2022-12-01

## 2022-12-01 RX ORDER — VANCOMYCIN HCL 1 G
1000 VIAL (EA) INTRAVENOUS EVERY 12 HOURS
Refills: 0 | Status: DISCONTINUED | OUTPATIENT
Start: 2022-12-02 | End: 2022-12-02

## 2022-12-01 RX ORDER — IPRATROPIUM/ALBUTEROL SULFATE 18-103MCG
3 AEROSOL WITH ADAPTER (GRAM) INHALATION ONCE
Refills: 0 | Status: COMPLETED | OUTPATIENT
Start: 2022-12-01 | End: 2022-12-01

## 2022-12-01 RX ORDER — INSULIN LISPRO 100/ML
6 VIAL (ML) SUBCUTANEOUS ONCE
Refills: 0 | Status: COMPLETED | OUTPATIENT
Start: 2022-12-01 | End: 2022-12-02

## 2022-12-01 RX ORDER — ONDANSETRON 8 MG/1
4 TABLET, FILM COATED ORAL EVERY 8 HOURS
Refills: 0 | Status: DISCONTINUED | OUTPATIENT
Start: 2022-12-01 | End: 2022-12-10

## 2022-12-01 RX ORDER — ENOXAPARIN SODIUM 100 MG/ML
40 INJECTION SUBCUTANEOUS EVERY 24 HOURS
Refills: 0 | Status: DISCONTINUED | OUTPATIENT
Start: 2022-12-01 | End: 2022-12-10

## 2022-12-01 RX ORDER — SODIUM CHLORIDE 9 MG/ML
1000 INJECTION, SOLUTION INTRAVENOUS
Refills: 0 | Status: DISCONTINUED | OUTPATIENT
Start: 2022-12-01 | End: 2022-12-10

## 2022-12-01 RX ORDER — ACETAMINOPHEN 500 MG
650 TABLET ORAL EVERY 6 HOURS
Refills: 0 | Status: DISCONTINUED | OUTPATIENT
Start: 2022-12-01 | End: 2022-12-10

## 2022-12-01 RX ORDER — SIMVASTATIN 20 MG/1
20 TABLET, FILM COATED ORAL AT BEDTIME
Refills: 0 | Status: DISCONTINUED | OUTPATIENT
Start: 2022-12-01 | End: 2022-12-10

## 2022-12-01 RX ORDER — DEXTROSE 50 % IN WATER 50 %
15 SYRINGE (ML) INTRAVENOUS ONCE
Refills: 0 | Status: DISCONTINUED | OUTPATIENT
Start: 2022-12-01 | End: 2022-12-10

## 2022-12-01 RX ORDER — DEXTROSE 50 % IN WATER 50 %
12.5 SYRINGE (ML) INTRAVENOUS ONCE
Refills: 0 | Status: DISCONTINUED | OUTPATIENT
Start: 2022-12-01 | End: 2022-12-10

## 2022-12-01 RX ORDER — DEXTROSE 50 % IN WATER 50 %
25 SYRINGE (ML) INTRAVENOUS ONCE
Refills: 0 | Status: DISCONTINUED | OUTPATIENT
Start: 2022-12-01 | End: 2022-12-10

## 2022-12-01 RX ORDER — PIPERACILLIN AND TAZOBACTAM 4; .5 G/20ML; G/20ML
3.38 INJECTION, POWDER, LYOPHILIZED, FOR SOLUTION INTRAVENOUS ONCE
Refills: 0 | Status: COMPLETED | OUTPATIENT
Start: 2022-12-01 | End: 2022-12-01

## 2022-12-01 RX ORDER — GLUCAGON INJECTION, SOLUTION 0.5 MG/.1ML
1 INJECTION, SOLUTION SUBCUTANEOUS ONCE
Refills: 0 | Status: DISCONTINUED | OUTPATIENT
Start: 2022-12-01 | End: 2022-12-10

## 2022-12-01 RX ORDER — FUROSEMIDE 40 MG
40 TABLET ORAL ONCE
Refills: 0 | Status: COMPLETED | OUTPATIENT
Start: 2022-12-01 | End: 2022-12-01

## 2022-12-01 RX ORDER — ACETAMINOPHEN 500 MG
1000 TABLET ORAL ONCE
Refills: 0 | Status: COMPLETED | OUTPATIENT
Start: 2022-12-01 | End: 2022-12-01

## 2022-12-01 RX ORDER — VANCOMYCIN HCL 1 G
1000 VIAL (EA) INTRAVENOUS ONCE
Refills: 0 | Status: COMPLETED | OUTPATIENT
Start: 2022-12-01 | End: 2022-12-01

## 2022-12-01 RX ORDER — PIPERACILLIN AND TAZOBACTAM 4; .5 G/20ML; G/20ML
3.38 INJECTION, POWDER, LYOPHILIZED, FOR SOLUTION INTRAVENOUS EVERY 8 HOURS
Refills: 0 | Status: DISCONTINUED | OUTPATIENT
Start: 2022-12-01 | End: 2022-12-02

## 2022-12-01 RX ORDER — LANOLIN ALCOHOL/MO/W.PET/CERES
3 CREAM (GRAM) TOPICAL AT BEDTIME
Refills: 0 | Status: DISCONTINUED | OUTPATIENT
Start: 2022-12-01 | End: 2022-12-10

## 2022-12-01 RX ORDER — INSULIN LISPRO 100/ML
VIAL (ML) SUBCUTANEOUS EVERY 4 HOURS
Refills: 0 | Status: DISCONTINUED | OUTPATIENT
Start: 2022-12-01 | End: 2022-12-03

## 2022-12-01 RX ORDER — LOSARTAN POTASSIUM 100 MG/1
100 TABLET, FILM COATED ORAL DAILY
Refills: 0 | Status: DISCONTINUED | OUTPATIENT
Start: 2022-12-02 | End: 2022-12-09

## 2022-12-01 RX ORDER — LEVOTHYROXINE SODIUM 125 MCG
50 TABLET ORAL DAILY
Refills: 0 | Status: DISCONTINUED | OUTPATIENT
Start: 2022-12-02 | End: 2022-12-02

## 2022-12-01 RX ADMIN — Medication 40 MILLIGRAM(S): at 15:16

## 2022-12-01 RX ADMIN — Medication 3 MILLILITER(S): at 11:55

## 2022-12-01 RX ADMIN — Medication 3 MILLILITER(S): at 12:24

## 2022-12-01 RX ADMIN — SODIUM CHLORIDE 500 MILLILITER(S): 9 INJECTION INTRAMUSCULAR; INTRAVENOUS; SUBCUTANEOUS at 12:00

## 2022-12-01 RX ADMIN — Medication 4: at 23:45

## 2022-12-01 RX ADMIN — Medication 250 MILLIGRAM(S): at 16:07

## 2022-12-01 RX ADMIN — PIPERACILLIN AND TAZOBACTAM 25 GRAM(S): 4; .5 INJECTION, POWDER, LYOPHILIZED, FOR SOLUTION INTRAVENOUS at 23:46

## 2022-12-01 RX ADMIN — Medication 400 MILLIGRAM(S): at 15:17

## 2022-12-01 RX ADMIN — Medication 3 MILLILITER(S): at 12:15

## 2022-12-01 RX ADMIN — Medication 125 MILLIGRAM(S): at 12:00

## 2022-12-01 RX ADMIN — Medication 6: at 19:57

## 2022-12-01 RX ADMIN — Medication 6: at 17:22

## 2022-12-01 RX ADMIN — AZITHROMYCIN 255 MILLIGRAM(S): 500 TABLET, FILM COATED ORAL at 12:00

## 2022-12-01 RX ADMIN — PIPERACILLIN AND TAZOBACTAM 200 GRAM(S): 4; .5 INJECTION, POWDER, LYOPHILIZED, FOR SOLUTION INTRAVENOUS at 15:16

## 2022-12-01 RX ADMIN — INSULIN GLARGINE 10 UNIT(S): 100 INJECTION, SOLUTION SUBCUTANEOUS at 22:58

## 2022-12-01 NOTE — ED ADULT NURSE REASSESSMENT NOTE - NS ED NURSE REASSESS COMMENT FT1
Pt a&ox2, sinus tach on cardiac monitor, changed and turned, skin intact, no signs of skin breakdown or pressure ulcer. Repeat labs collected. Pt medicated as per MD orders, ERIS Pugh confirmed with TRACEY Caruso to hold zocor and other PO meds due to BIPAP machine.

## 2022-12-01 NOTE — H&P ADULT - NSHPPHYSICALEXAM_GEN_ALL_CORE
PHYSICAL EXAM:  GENERAL: NAD, on BiPAP, no acute respiratory distress  HEAD:  Atraumatic, Normocephalic  EYES: EOMI, PERRL, conjunctiva and sclera clear  NECK: Supple, No JVD  CHEST/LUNG: Clear to auscultation bilaterally; No wheezes, rales or rhonchi  HEART: Regular rate and rhythm; No murmurs, rubs, or gallops, (+)S1, S2  ABDOMEN: Soft, Nontender, Nondistended; Normal Bowel sounds   EXTREMITIES:  2+ Peripheral Pulses, No clubbing, cyanosis, or edema  PSYCH: normal mood and affect  NEUROLOGY: AAOx3, non-focal, Able to move all extremities though slowly and generalized weakness  SKIN: No rashes or lesions

## 2022-12-01 NOTE — H&P ADULT - PROBLEM SELECTOR PLAN 5
FENP: No IVF, Lytes PRN, Cardiac diet, Lovenox ppx FENP: No IVF, Lytes PRN, Cardiac diet, Lovenox ppx    DNR/DNI confirmed with son at bedside. PAULIE previously filled in chart

## 2022-12-01 NOTE — ED PROVIDER NOTE - ATTENDING CONTRIBUTION TO CARE
Pt was seen and evaluated by me. Pt is a 76 y/o female with PMHx of DM type 2, hypothyroidism, and HTN who presented to the ED for increased SOB today. As per son at bedside pt is alert and oriented to person and place at baseline and pt had increased SOB today but has been having SOB over the past few days. Discussed with pt about GOC and son admits pt is a DNR/DNI. Pt denies any fever, chills, nausea, vomiting, chest pain, or abd pain.  VITALS: Vitals have been reviewed.  GEN APPEARANCE: Alert and cooperative, in respiratory distress   HEAD: Atraumatic, normocephalic.   EYES: PERRL, EOMI.   EARS: Gross hearing intact.   NOSE: No nasal discharge.   THROAT: MMM. Oral cavity and pharynx normal.   CV: Tachy  LUNGS: Tachypneic. Diffuse wheezing  ABDOMEN: Soft, NTND. No guarding or rebound.   MSK/EXT: Spine appears normal, no spine point tenderness.   NEURO: Awake and oriented to person and place  SKIN: Normal color for race, warm, dry and intact. No evidence of rash.  76 y/o female with PMHx of DM type 2, hypothyroidism, and HTN who presented to the ED for increased SOB today.   Concern for Respiratory Distress/CHF/COPD  Labs, EKG, CXR, Bipap

## 2022-12-01 NOTE — ED ADULT NURSE REASSESSMENT NOTE - NS ED NURSE REASSESS COMMENT FT1
MD made aware of lab result for elevated BGL.  . Give 5 units insullin per MD Weintrauv. Pt awake and alert, A&OX2, remains on bipap. Resting at this time. Son at bedside. Vancomycin running. NAD.

## 2022-12-01 NOTE — ED ADULT NURSE REASSESSMENT NOTE - NS ED NURSE REASSESS COMMENT FT1
Indwelling urinary catheter placed using sterile technique, tolerated well. Clear yellow urine draining to gravity. Will continue to monitor. 800 cc output

## 2022-12-01 NOTE — ED ADULT NURSE REASSESSMENT NOTE - NS ED NURSE REASSESS COMMENT FT1
MD paged regarding FS of 441. Pt sleeping at this time but responsive to verbal stimuli. Resp even and unlabored on bipap. NAD

## 2022-12-01 NOTE — H&P ADULT - NSHPREVIEWOFSYSTEMS_GEN_ALL_CORE
REVIEW OF SYSTEMS:    CONSTITUTIONAL: ++weakness, fevers and chills  EYES/ENT: No visual changes;  No dysphagia  NECK: No pain or stiffness  RESPIRATORY: No wheezing, hemoptysis; ++shortness of breath and cough  CARDIOVASCULAR: No chest pain or palpitations; No lower extremity edema  GASTROINTESTINAL: No abdominal or epigastric pain. No nausea, vomiting, or hematemesis; No diarrhea or constipation. No melena or hematochezia.  GENITOURINARY: No dysuria, frequency or hematuria  NEUROLOGICAL: No numbness or weakness  SKIN: No itching, burning, rashes, or lesions   PSYCH: No auditory or visual hallucinations  All other review of systems is negative unless indicated above.

## 2022-12-01 NOTE — H&P ADULT - HISTORY OF PRESENT ILLNESS
Most of hx provided by son at bedside  76 y/o female with PMHx of DM type 2, hypothyroidism, and HTN who presented to the ED for increased SOB today. As per son, pt had increased SOB today but has been having SOB over the past few days. She has positive RSV in ED. She was recently admitted here for syncope and pneumonia. She had a MOLST filled last visit and per son, DNR/DNI. Also endorses increased sputum production of brownish color. Had fevers at home. Denies chest pain, diarrhea, abdominal pain, headaches, confusion.  In the ED, patient put on BiPAP and found to have elevated proBNP and leukocytosis of 14, Fever 101.6, HR 110s.

## 2022-12-01 NOTE — ED ADULT NURSE REASSESSMENT NOTE - NS ED NURSE REASSESS COMMENT FT1
Break coverage RN: pt awake and alert, respirations are even and labored, sating at 95% on BIPAP. , gave 6 units of insulin based on sliding scale. VSS and in no acute distress at this time. Family at bedside.

## 2022-12-01 NOTE — ED PROVIDER NOTE - OBJECTIVE STATEMENT
76 y/o female with PMHx of DM type 2, hypothyroidism, and HTN who presented to the ED for increased SOB today. As per son pt had increased SOB today but has been having SOB over the past few days. Discussed with pt about GOC and son admits pt is a DNR/DNI. Pt denies any fever, chills, nausea, vomiting, chest pain, or abd pain.

## 2022-12-01 NOTE — H&P ADULT - PROBLEM SELECTOR PLAN 1
Sepsis likely 2/2 RSV + underlying pneumonia  -Procalcitonin pending  -Continue Vanc/Zosyn  -Will get pulm consult in AM for sever pulmonary HTN with concern for Cor Pulmonale. Currently euvolemic without signs of fluid overload. Avoid overdiuresis in cor pulmonale  -Echo previously in Nov 2022  -Repeat VBG and continue BiPAP for tonight  -Will continue methylpred and duonebs standing if HR tolerates

## 2022-12-01 NOTE — ED PROVIDER NOTE - CLINICAL SUMMARY MEDICAL DECISION MAKING FREE TEXT BOX
78 y/o female with PMHx of DM type 2, hypothyroidism, and HTN who presented to the ED for increased SOB today.   Concern for Respiratory Distress/CHF/COPD  Labs, EKG, CXR, Bipap

## 2022-12-01 NOTE — ED ADULT TRIAGE NOTE - CHIEF COMPLAINT QUOTE
#18 left arm noted zero ivf given   2x duo nebs in field for SOB O2 sat in field was 77% c/o some ABD pain left upper quad pain #18 left arm noted zero ivf given   2x duo nebs in field for SOB O2 sat in field was 77% c/o some ABD pain left upper quad pain  f/s in field 313

## 2022-12-01 NOTE — ED PROVIDER NOTE - MUSCULOSKELETAL, MLM
Spine appears normal, range of motion is not limited, no muscle or joint tenderness Spine appears normal, range of motion is not limited, no muscle or joint tenderness. No lower extremity edema b/l.

## 2022-12-01 NOTE — CONSULT NOTE ADULT - ASSESSMENT
77F w/ hx of T2DM, HTN, hypothyroidism who presents from home for increased SOB today. As per son pt had increased SOB today but has been having SOB over the past few days. Per ED, patient is DNR/DNI. Placed on BIPAP. MICU consulted for new BIPAP.    #SOB, Hypercapnia  DDx: Viral pneumonia 2/2 RSV vs. possibly secondary to R sided HF secondary to underlying lung disease (from previous pulmonary TB)  Also at this time, anxiety may be contributing to respiratory distress/WOB, given when reassured, patient's WOB improves.  - BIPAP was increased to 12/5 with improvement in TV. Recommend finding smaller BIPAP mask if possible.  - Trend blood gas  - c/w duonebs q6h  - Supportive care for RSV  - Recommend TTE

## 2022-12-01 NOTE — CONSULT NOTE ADULT - SUBJECTIVE AND OBJECTIVE BOX
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      Cataract of left eye          FAMILY HISTORY:  Family history of heart attack (Mother)  mother        SOCIAL HISTORY:  Smoking: __ packs x ___ years  EtOH Use:  Marital Status:  Occupation:  Recent Travel:  Country of Birth:  Advance Directives:    Allergies    No Known Allergies    Intolerances        HOME MEDICATIONS:    REVIEW OF SYSTEMS:  Constitutional:   Eyes:  ENT:  CV:  Resp:  GI:  :  MSK:  Integumentary:  Neurological:  Psychiatric:  Endocrine:  Hematologic/Lymphatic:  Allergic/Immunologic:  [ ] All other systems negative  [ ] Unable to assess ROS because ________    OBJECTIVE:  ICU Vital Signs Last 24 Hrs  T(C): 38.7 (01 Dec 2022 13:38), Max: 38.7 (01 Dec 2022 13:38)  T(F): 101.6 (01 Dec 2022 13:38), Max: 101.6 (01 Dec 2022 13:38)  HR: 116 (01 Dec 2022 13:38) (116 - 128)  BP: 129/62 (01 Dec 2022 13:38) (117/69 - 171/78)  BP(mean): --  ABP: --  ABP(mean): --  RR: 24 (01 Dec 2022 13:38) (24 - 30)  SpO2: 100% (01 Dec 2022 13:38) (100% - 100%)    O2 Parameters below as of 01 Dec 2022 13:38  Patient On (Oxygen Delivery Method): BiPAP/CPAP              CAPILLARY BLOOD GLUCOSE      POCT Blood Glucose.: 256 mg/dL (01 Dec 2022 11:40)      PHYSICAL EXAM:  General:   HEENT:   Lymph Nodes:  Neck:   Respiratory:   Cardiovascular:   Abdomen:   Extremities:   Skin:   Neurological:  Psychiatry:    HOSPITAL MEDICATIONS:  MEDICATIONS  (STANDING):    MEDICATIONS  (PRN):      LABS:                        10.4   14.39 )-----------( 315      ( 01 Dec 2022 12:00 )             39.3     12-01    134<L>  |  96<L>  |  21  ----------------------------<  283<H>  4.9   |  26  |  0.71    Ca    9.3      01 Dec 2022 12:00  Mg     1.70     12-01    TPro  7.9  /  Alb  4.4  /  TBili  0.4  /  DBili  x   /  AST  37<H>  /  ALT  15  /  AlkPhos  99  12-01    PT/INR - ( 01 Dec 2022 12:00 )   PT: 13.3 sec;   INR: 1.14 ratio         PTT - ( 01 Dec 2022 12:00 )  PTT:34.7 sec      Venous Blood Gas:  12-01 @ 12:00  7.24/75/29/32/40.8  VBG Lactate: 3.1      MICROBIOLOGY:     RADIOLOGY:  [ ] Reviewed and interpreted by me    EKG: CHIEF COMPLAINT:    HPI:  77F w/ hx of T2DM, HTN, hypothyroidism who presents from home for increased SOB today. As per son pt had increased SOB today but has been having SOB over the past few days. Per ED, patient is DNR/DNI. Placed on BIPAP.    MICU consulted for new BIPAP.    At bedside, patient appears restless. Translation is provided by son at bedside. When asked why she is in the hospital, she responds that she came because she feels sick. She asks to have the head of the bed elevated further.    Per RN, earlier, patient had made BM and was touching it with her hands and spreading it. Son admits to patient being more confused.      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 month treatment  Cataract of left eye      FAMILY HISTORY:  Family history of heart attack (Mother)  mother        SOCIAL HISTORY:  Never smoker      Allergies  No Known Allergies  Intolerances        HOME MEDICATIONS:  levothyroxine 50 mcg (0.05 mg) oral tablet: 1 tab(s) orally once a day (08 Nov 2022 21:01)  simvastatin 20 mg oral tablet: 1 tab(s) orally once a day (at bedtime) (08 Nov 2022 21:01)      REVIEW OF SYSTEMS:      OBJECTIVE:  ICU Vital Signs Last 24 Hrs  T(C): 38.7 (01 Dec 2022 13:38), Max: 38.7 (01 Dec 2022 13:38)  T(F): 101.6 (01 Dec 2022 13:38), Max: 101.6 (01 Dec 2022 13:38)  HR: 116 (01 Dec 2022 13:38) (116 - 128)  BP: 129/62 (01 Dec 2022 13:38) (117/69 - 171/78)  BP(mean): --  ABP: --  ABP(mean): --  RR: 24 (01 Dec 2022 13:38) (24 - 30)  SpO2: 100% (01 Dec 2022 13:38) (100% - 100%)    O2 Parameters below as of 01 Dec 2022 13:38  Patient On (Oxygen Delivery Method): BiPAP/CPAP              CAPILLARY BLOOD GLUCOSE      POCT Blood Glucose.: 256 mg/dL (01 Dec 2022 11:40)      PHYSICAL EXAM:  General:   HEENT:   Lymph Nodes:  Neck:   Respiratory:   Cardiovascular:   Abdomen:   Extremities:   Skin:   Neurological:  Psychiatry:    HOSPITAL MEDICATIONS:  MEDICATIONS  (STANDING):    MEDICATIONS  (PRN):      LABS:                        10.4   14.39 )-----------( 315      ( 01 Dec 2022 12:00 )             39.3     12-01    134<L>  |  96<L>  |  21  ----------------------------<  283<H>  4.9   |  26  |  0.71    Ca    9.3      01 Dec 2022 12:00  Mg     1.70     12-01    TPro  7.9  /  Alb  4.4  /  TBili  0.4  /  DBili  x   /  AST  37<H>  /  ALT  15  /  AlkPhos  99  12-01    PT/INR - ( 01 Dec 2022 12:00 )   PT: 13.3 sec;   INR: 1.14 ratio         PTT - ( 01 Dec 2022 12:00 )  PTT:34.7 sec      Venous Blood Gas:  12-01 @ 12:00  7.24/75/29/32/40.8  VBG Lactate: 3.1      MICROBIOLOGY:     RADIOLOGY:  [ ] Reviewed and interpreted by me    EKG: CHIEF COMPLAINT:    HPI:  77F w/ hx of T2DM, HTN, hypothyroidism who presents from home for increased SOB today. As per son pt had increased SOB today but has been having SOB over the past few days. Per ED, patient is DNR/DNI. Placed on BIPAP.    MICU consulted for new BIPAP.    At bedside, patient appears restless. Translation is provided by son at bedside. When asked why she is in the hospital, she responds that she came because she feels sick. Son reports she has been coughing. She asks to have the head of the bed elevated further.    Per RN, earlier, patient had made BM and was touching it with her hands and spreading it. Son admits to patient being more confused.      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 No wheezing  GI: No abdominal pain, nausea, vomiting  : No dysuria, hematuria  MSK: No joint pain or swelling  Ext: No edema, no claudication  Skin: No rashes or itching  Heme: No easy bruising or petechiae  Neuro: + confusion. No loss of consciousness  Endocrine: No polyuria, polydipsia    OBJECTIVE:  ICU Vital Signs Last 24 Hrs  T(C): 38.7 (01 Dec 2022 13:38), Max: 38.7 (01 Dec 2022 13:38)  T(F): 101.6 (01 Dec 2022 13:38), Max: 101.6 (01 Dec 2022 13:38)  HR: 116 (01 Dec 2022 13:38) (116 - 128)  BP: 129/62 (01 Dec 2022 13:38) (117/69 - 171/78)  BP(mean): --  ABP: --  ABP(mean): --  RR: 24 (01 Dec 2022 13:38) (24 - 30)  SpO2: 100% (01 Dec 2022 13:38) (100% - 100%)    O2 Parameters below as of 01 Dec 2022 13:38  Patient On (Oxygen Delivery Method): BiPAP/CPAP              CAPILLARY BLOOD GLUCOSE      POCT Blood Glucose.: 256 mg/dL (01 Dec 2022 11:40)      PHYSICAL EXAM:  Gen: Alert, restless and moaning and with increased WOB, but becomes calm and without WOB when responding to questions, answers questions appropriately  HEENT: NCAT, EOMI, clear conjunctiva, no scleral icterus, no erythema or exudates in the oropharynx, mmm  Neck: Supple, no JVD, no LAD  CV: tachycardic, RR  Resp: On BIPAP. Atelectatic crackles in HERON, LLL, RUL, slight scattered wheezing  Abd: Soft, NT, ND  Ext: no edema, no clubbing or cyanosis  Neuro: AOx2 (self, place; not year), MOHR  Skin: warm, perfused    HOSPITAL MEDICATIONS:  MEDICATIONS  (STANDING):    MEDICATIONS  (PRN):      LABS:                        10.4   14.39 )-----------( 315      ( 01 Dec 2022 12:00 )             39.3     12-01    134<L>  |  96<L>  |  21  ----------------------------<  283<H>  4.9   |  26  |  0.71    Ca    9.3      01 Dec 2022 12:00  Mg     1.70     12-01    TPro  7.9  /  Alb  4.4  /  TBili  0.4  /  DBili  x   /  AST  37<H>  /  ALT  15  /  AlkPhos  99  12-01    PT/INR - ( 01 Dec 2022 12:00 )   PT: 13.3 sec;   INR: 1.14 ratio         PTT - ( 01 Dec 2022 12:00 )  PTT:34.7 sec      Venous Blood Gas:  12-01 @ 12:00  7.24/75/29/32/40.8  VBG Lactate: 3.1      MICROBIOLOGY:     RADIOLOGY:  [ ] Reviewed and interpreted by me    EKG:

## 2022-12-01 NOTE — ED ADULT NURSE NOTE - OBJECTIVE STATEMENT
Pt received to tr A, awake and alert, A&OX2, unsure of year. BIB EMS for SOB. Family at bedside states pt has had worsening SOB over the last few days, but worst today. Pt tachypneic on arrival, receiving nebulized albuterol by EMS. Placed on BiPAP with improvements in respirations. Denies CP, N/V, HA, dizziness, palpitations, fatigue. 20G IV placed to  RAC. Received w 18G to LAC . Bed in lowest position, call bell within reach. Will continue to monitor.

## 2022-12-01 NOTE — ED ADULT TRIAGE NOTE - CCCP TRG CHIEF CMPLNT
started this am was recently d/cd  had a home care visit today and EMS was called due to SOB/shortness of breath

## 2022-12-01 NOTE — H&P ADULT - ASSESSMENT
76 y/o female with PMHx of DM type 2, hypothyroidism, and HTN who presented to the ED for increased SOB today now admitted for sepsis and acute hypercarbic respiratory failure.

## 2022-12-01 NOTE — H&P ADULT - NSHPLABSRESULTS_GEN_ALL_CORE
134<L>  |  96<L>  |  21  ----------------------------<  283<H>  4.9   |  26  |  0.71    Ca    9.3      01 Dec 2022 12:00  Mg     1.70         TPro  7.9  /  Alb  4.4  /  TBili  0.4  /  DBili  x   /  AST  37<H>  /  ALT  15  /  AlkPhos  99                        10.4   14.39 )-----------( 315      ( 01 Dec 2022 12:00 )             39.3     LIVER FUNCTIONS - ( 01 Dec 2022 12:00 )  Alb: 4.4 g/dL / Pro: 7.9 g/dL / ALK PHOS: 99 U/L / ALT: 15 U/L / AST: 37 U/L / GGT: x           PT/INR - ( 01 Dec 2022 12:00 )   PT: 13.3 sec;   INR: 1.14 ratio         PTT - ( 01 Dec 2022 12:00 )  PTT:34.7 sec    Urinalysis Basic - ( 01 Dec 2022 16:30 )    Color: Light Yellow / Appearance: Clear / S.016 / pH: x  Gluc: x / Ketone: Negative  / Bili: Negative / Urobili: <2 mg/dL   Blood: x / Protein: 30 mg/dL / Nitrite: Negative   Leuk Esterase: Negative / RBC: 9 /HPF / WBC 2 /HPF   Sq Epi: x / Non Sq Epi: 3 /HPF / Bacteria: Negative    EKG: sinus tach,     Image: CXR FINDINGS:  Right upper lobe and left midlung opacities about the same as the last   study may be chronic findings. The heart is not enlarged and there are no   effusions or congestion to indicate CHF.

## 2022-12-01 NOTE — ED ADULT NURSE NOTE - CHIEF COMPLAINT QUOTE
#18 left arm noted zero ivf given   2x duo nebs in field for SOB O2 sat in field was 77% c/o some ABD pain left upper quad pain  f/s in field 313

## 2022-12-01 NOTE — ED PROVIDER NOTE - CARE PLAN
Principal Discharge DX:	COPD exacerbation  Secondary Diagnosis:	CHF exacerbation  Secondary Diagnosis:	Sepsis   1

## 2022-12-02 LAB
ALBUMIN SERPL ELPH-MCNC: 4.1 G/DL — SIGNIFICANT CHANGE UP (ref 3.3–5)
ALP SERPL-CCNC: 79 U/L — SIGNIFICANT CHANGE UP (ref 40–120)
ALT FLD-CCNC: 12 U/L — SIGNIFICANT CHANGE UP (ref 4–33)
ANION GAP SERPL CALC-SCNC: 11 MMOL/L — SIGNIFICANT CHANGE UP (ref 7–14)
AST SERPL-CCNC: 22 U/L — SIGNIFICANT CHANGE UP (ref 4–32)
BILIRUB SERPL-MCNC: 0.4 MG/DL — SIGNIFICANT CHANGE UP (ref 0.2–1.2)
BLOOD GAS VENOUS COMPREHENSIVE RESULT: SIGNIFICANT CHANGE UP
BLOOD GAS VENOUS COMPREHENSIVE RESULT: SIGNIFICANT CHANGE UP
BUN SERPL-MCNC: 22 MG/DL — SIGNIFICANT CHANGE UP (ref 7–23)
CALCIUM SERPL-MCNC: 9.2 MG/DL — SIGNIFICANT CHANGE UP (ref 8.4–10.5)
CHLORIDE SERPL-SCNC: 96 MMOL/L — LOW (ref 98–107)
CO2 SERPL-SCNC: 31 MMOL/L — SIGNIFICANT CHANGE UP (ref 22–31)
CREAT SERPL-MCNC: 0.86 MG/DL — SIGNIFICANT CHANGE UP (ref 0.5–1.3)
CULTURE RESULTS: NO GROWTH — SIGNIFICANT CHANGE UP
EGFR: 69 ML/MIN/1.73M2 — SIGNIFICANT CHANGE UP
GLUCOSE BLDC GLUCOMTR-MCNC: 224 MG/DL — HIGH (ref 70–99)
GLUCOSE BLDC GLUCOMTR-MCNC: 229 MG/DL — HIGH (ref 70–99)
GLUCOSE BLDC GLUCOMTR-MCNC: 243 MG/DL — HIGH (ref 70–99)
GLUCOSE BLDC GLUCOMTR-MCNC: 271 MG/DL — HIGH (ref 70–99)
GLUCOSE BLDC GLUCOMTR-MCNC: 281 MG/DL — HIGH (ref 70–99)
GLUCOSE BLDC GLUCOMTR-MCNC: 286 MG/DL — HIGH (ref 70–99)
GLUCOSE BLDC GLUCOMTR-MCNC: 293 MG/DL — HIGH (ref 70–99)
GLUCOSE BLDC GLUCOMTR-MCNC: 304 MG/DL — HIGH (ref 70–99)
GLUCOSE BLDC GLUCOMTR-MCNC: 318 MG/DL — HIGH (ref 70–99)
GLUCOSE SERPL-MCNC: 245 MG/DL — HIGH (ref 70–99)
HCT VFR BLD CALC: 33.4 % — LOW (ref 34.5–45)
HGB BLD-MCNC: 9 G/DL — LOW (ref 11.5–15.5)
INR BLD: 1.27 RATIO — HIGH (ref 0.88–1.16)
MAGNESIUM SERPL-MCNC: 1.6 MG/DL — SIGNIFICANT CHANGE UP (ref 1.6–2.6)
MCHC RBC-ENTMCNC: 20 PG — LOW (ref 27–34)
MCHC RBC-ENTMCNC: 26.9 GM/DL — LOW (ref 32–36)
MCV RBC AUTO: 74.2 FL — LOW (ref 80–100)
NRBC # BLD: 0 /100 WBCS — SIGNIFICANT CHANGE UP (ref 0–0)
NRBC # FLD: 0 K/UL — SIGNIFICANT CHANGE UP (ref 0–0)
PLATELET # BLD AUTO: 246 K/UL — SIGNIFICANT CHANGE UP (ref 150–400)
POTASSIUM SERPL-MCNC: 4.3 MMOL/L — SIGNIFICANT CHANGE UP (ref 3.5–5.3)
POTASSIUM SERPL-SCNC: 4.3 MMOL/L — SIGNIFICANT CHANGE UP (ref 3.5–5.3)
PROT SERPL-MCNC: 7.3 G/DL — SIGNIFICANT CHANGE UP (ref 6–8.3)
PROTHROM AB SERPL-ACNC: 14.8 SEC — HIGH (ref 10.5–13.4)
RBC # BLD: 4.5 M/UL — SIGNIFICANT CHANGE UP (ref 3.8–5.2)
RBC # FLD: 22.5 % — HIGH (ref 10.3–14.5)
SODIUM SERPL-SCNC: 138 MMOL/L — SIGNIFICANT CHANGE UP (ref 135–145)
SPECIMEN SOURCE: SIGNIFICANT CHANGE UP
TROPONIN T, HIGH SENSITIVITY RESULT: 70 NG/L — CRITICAL HIGH
WBC # BLD: 17.05 K/UL — HIGH (ref 3.8–10.5)
WBC # FLD AUTO: 17.05 K/UL — HIGH (ref 3.8–10.5)

## 2022-12-02 PROCEDURE — 99233 SBSQ HOSP IP/OBS HIGH 50: CPT

## 2022-12-02 PROCEDURE — 99222 1ST HOSP IP/OBS MODERATE 55: CPT

## 2022-12-02 RX ORDER — INSULIN GLARGINE 100 [IU]/ML
14 INJECTION, SOLUTION SUBCUTANEOUS AT BEDTIME
Refills: 0 | Status: DISCONTINUED | OUTPATIENT
Start: 2022-12-02 | End: 2022-12-03

## 2022-12-02 RX ORDER — SODIUM CHLORIDE 9 MG/ML
500 INJECTION, SOLUTION INTRAVENOUS ONCE
Refills: 0 | Status: COMPLETED | OUTPATIENT
Start: 2022-12-02 | End: 2022-12-02

## 2022-12-02 RX ORDER — INFLUENZA VIRUS VACCINE 15; 15; 15; 15 UG/.5ML; UG/.5ML; UG/.5ML; UG/.5ML
0.7 SUSPENSION INTRAMUSCULAR ONCE
Refills: 0 | Status: DISCONTINUED | OUTPATIENT
Start: 2022-12-02 | End: 2022-12-10

## 2022-12-02 RX ORDER — LEVOTHYROXINE SODIUM 125 MCG
37 TABLET ORAL AT BEDTIME
Refills: 0 | Status: DISCONTINUED | OUTPATIENT
Start: 2022-12-02 | End: 2022-12-10

## 2022-12-02 RX ORDER — CHOLECALCIFEROL (VITAMIN D3) 125 MCG
1000 CAPSULE ORAL DAILY
Refills: 0 | Status: DISCONTINUED | OUTPATIENT
Start: 2022-12-02 | End: 2022-12-10

## 2022-12-02 RX ORDER — IPRATROPIUM/ALBUTEROL SULFATE 18-103MCG
3 AEROSOL WITH ADAPTER (GRAM) INHALATION EVERY 4 HOURS
Refills: 0 | Status: DISCONTINUED | OUTPATIENT
Start: 2022-12-02 | End: 2022-12-10

## 2022-12-02 RX ORDER — PANTOPRAZOLE SODIUM 20 MG/1
40 TABLET, DELAYED RELEASE ORAL DAILY
Refills: 0 | Status: DISCONTINUED | OUTPATIENT
Start: 2022-12-02 | End: 2022-12-10

## 2022-12-02 RX ADMIN — SODIUM CHLORIDE 250 MILLILITER(S): 9 INJECTION, SOLUTION INTRAVENOUS at 01:40

## 2022-12-02 RX ADMIN — Medication 3 MILLILITER(S): at 07:10

## 2022-12-02 RX ADMIN — Medication 40 MILLIGRAM(S): at 00:36

## 2022-12-02 RX ADMIN — Medication 1000 UNIT(S): at 14:38

## 2022-12-02 RX ADMIN — Medication 3: at 10:09

## 2022-12-02 RX ADMIN — Medication 40 MILLIGRAM(S): at 23:04

## 2022-12-02 RX ADMIN — PANTOPRAZOLE SODIUM 40 MILLIGRAM(S): 20 TABLET, DELAYED RELEASE ORAL at 14:39

## 2022-12-02 RX ADMIN — Medication 3: at 18:27

## 2022-12-02 RX ADMIN — Medication 3 MILLILITER(S): at 10:08

## 2022-12-02 RX ADMIN — INSULIN GLARGINE 14 UNIT(S): 100 INJECTION, SOLUTION SUBCUTANEOUS at 22:59

## 2022-12-02 RX ADMIN — Medication 2: at 13:15

## 2022-12-02 RX ADMIN — Medication 3 MILLILITER(S): at 02:00

## 2022-12-02 RX ADMIN — ENOXAPARIN SODIUM 40 MILLIGRAM(S): 100 INJECTION SUBCUTANEOUS at 14:38

## 2022-12-02 RX ADMIN — SIMVASTATIN 20 MILLIGRAM(S): 20 TABLET, FILM COATED ORAL at 22:59

## 2022-12-02 RX ADMIN — Medication 40 MILLIGRAM(S): at 11:30

## 2022-12-02 RX ADMIN — PIPERACILLIN AND TAZOBACTAM 25 GRAM(S): 4; .5 INJECTION, POWDER, LYOPHILIZED, FOR SOLUTION INTRAVENOUS at 07:12

## 2022-12-02 RX ADMIN — Medication 4: at 23:00

## 2022-12-02 RX ADMIN — Medication 250 MILLIGRAM(S): at 05:49

## 2022-12-02 RX ADMIN — Medication 3 MILLILITER(S): at 17:07

## 2022-12-02 RX ADMIN — Medication 4: at 07:19

## 2022-12-02 NOTE — PATIENT PROFILE ADULT - FALL HARM RISK - HARM RISK INTERVENTIONS

## 2022-12-02 NOTE — CONSULT NOTE ADULT - SUBJECTIVE AND OBJECTIVE BOX
Forrest Rosenthal MD  Interventional Cardiology / Advance Heart Failure and Cardiac Transplant Specialist  [place] Office : [phone] [place] [address] [place] [address]  Tel:   [place] Office : [phone] [facility] [place] [address]  Tel: [phone]         HISTORY OF PRESENTING ILLNESS:  HPI:  76 y/o female with PMHx of DM type 2, hypothyroidism, and HTN who presented to the ED for increased SOB today. As per son, pt had increased SOB today but has been having SOB over the past few days. She has positive RSV in ED. She was recently admitted here for syncope and pneumonia. She had a MOLST filled last visit and per son, DNR/DNI. Also endorses increased sputum production of brownish color. Had fevers at home. Denies chest pain, diarrhea, abdominal pain, headaches, confusion.  In the ED, patient put on BiPAP and found to have elevated proBNP and leukocytosis of 14, Fever 101.6, HR 110s.     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 3.375 Gram(s) IV Intermittent every 8 hours  vancomycin  IVPB 1000 milliGRAM(s) IV Intermittent every 12 hours    albuterol/ipratropium for Nebulization 3 milliLiter(s) Nebulizer every 4 hours    acetaminophen     Tablet .. 650 milliGRAM(s) Oral every 6 hours PRN  melatonin 3 milliGRAM(s) Oral at bedtime PRN  ondansetron Injectable 4 milliGRAM(s) IV Push every 8 hours PRN    aluminum hydroxide/magnesium hydroxide/simethicone Suspension 30 milliLiter(s) Oral every 4 hours PRN  pantoprazole  Injectable 40 milliGRAM(s) IV Push daily    dextrose 50% Injectable 25 Gram(s) IV Push once  dextrose 50% Injectable 12.5 Gram(s) IV Push once  dextrose 50% Injectable 25 Gram(s) IV Push once  dextrose Oral Gel 15 Gram(s) Oral once PRN  glucagon  Injectable 1 milliGRAM(s) IntraMuscular once  insulin glargine Injectable (LANTUS) 14 Unit(s) SubCutaneous at bedtime  insulin lispro (ADMELOG) corrective regimen sliding scale   SubCutaneous every 4 hours  levothyroxine Injectable 37 MICROGram(s) IV Push at bedtime  methylPREDNISolone sodium succinate Injectable 40 milliGRAM(s) IV Push every 12 hours  simvastatin 20 milliGRAM(s) Oral at bedtime    cholecalciferol 1000 Unit(s) Oral daily  dextrose 5%. 1000 milliLiter(s) IV Continuous <Continuous>  dextrose 5%. 1000 milliLiter(s) IV Continuous <Continuous>      FAMILY HISTORY:  Family history of heart attack (Mother)  mother          Allergies    No Known Allergies    Intolerances    	      PHYSICAL EXAM:  T(C): 36.7 (12-02-22 @ 11:37), Max: 38.7 (12-01-22 @ 13:38)  HR: 99 (12-02-22 @ 11:37) (89 - 118)  BP: 118/60 (12-02-22 @ 11:37) (108/60 - 133/73)  RR: 23 (12-02-22 @ 11:37) (18 - 28)  SpO2: 97% (12-02-22 @ 11:37) (94% - 100%)  Wt(kg): --  I&O's Summary      GENERAL: NAD   EYES: EOMI, PERRLA, conjunctiva and sclera clear  ENMT: No tonsillar erythema, exudates, or enlargement; Moist mucous membranes, Good dentition, No lesions  Cardiovascular: Normal S1 S2, No JVD, No murmurs, No edema  Respiratory: b/l rhonchi   Gastrointestinal:  Soft, Non-tender, + BS	  Extremities: no edema    LABS:	 	    CARDIAC MARKERS:                                  9.0    17.05 )-----------( 246      ( 02 Dec 2022 05:45 )             33.4     12-02    138  |  96<L>  |  22  ----------------------------<  245<H>  4.3   |  31  |  0.86    Ca    9.2      02 Dec 2022 05:45  Mg     1.60     12-02    TPro  7.3  /  Alb  4.1  /  TBili  0.4  /  DBili  x   /  AST  22  /  ALT  12  /  AlkPhos  79  12-02    proBNP:   Lipid Profile:   HgA1c:   TSH: Thyroid Stimulating Hormone, Serum: 1.11 uIU/mL (12-01 @ 13:38)      Consultant(s) Notes Reviewed:  [x ] YES  [ ] NO    Care Discussed with Consultants/Other Providers [ x] YES  [ ] NO    Imaging Personally Reviewed independently:  [x] YES  [ ] NO    All labs, radiologic studies, vitals, orders and medications list reviewed. Patient is seen and examined at bedside. Case discussed with medical team.    ASSESSMENT/PLAN:

## 2022-12-02 NOTE — PROGRESS NOTE ADULT - ASSESSMENT
78 y/o female with PMHx of DM type 2, hypothyroidism, and HTN who presented to the ED for increased SOB today now admitted for sepsis and acute hypercarbic respiratory failure.

## 2022-12-02 NOTE — PROGRESS NOTE ADULT - SUBJECTIVE AND OBJECTIVE BOX
PULMONARY PROGRESS NOTE    MARTIR VU  MRN-[id number]    Patient is a 77y old  Female who presents with a chief complaint of Difficulty breathing (02 Dec 2022 13:07)      HPI:  - 76 yo female with P PMHx of DM type 2, hypothyroidism, and HTN , was admitted a few weeks ago for hyperglycemia and treatedf or a pna  she is known to this pulm service  has abnormal xray/ct findings-EMR review state she had treatment in 2018 by [facility]    seen today in ER . son at bedside.  dyspnea, cough, wheezing, temnp X 3 days  seen by ICU overnight  seen today in ER on 6L  complaining of cough/ fatigue/fever   per son has been dyspneic with ambulation at home  no pulse oximeter at home      ROS:   -    ACTIVE MEDICATION LIST:  MEDICATIONS  (STANDING):  albuterol/ipratropium for Nebulization 3 milliLiter(s) Nebulizer every 4 hours  cholecalciferol 1000 Unit(s) Oral daily  dextrose 5%. 1000 milliLiter(s) (100 mL/Hr) IV Continuous <Continuous>  dextrose 5%. 1000 milliLiter(s) (50 mL/Hr) IV Continuous <Continuous>  dextrose 50% Injectable 25 Gram(s) IV Push once  dextrose 50% Injectable 12.5 Gram(s) IV Push once  dextrose 50% Injectable 25 Gram(s) IV Push once  enoxaparin Injectable 40 milliGRAM(s) SubCutaneous every 24 hours  glucagon  Injectable 1 milliGRAM(s) IntraMuscular once  insulin glargine Injectable (LANTUS) 14 Unit(s) SubCutaneous at bedtime  insulin lispro (ADMELOG) corrective regimen sliding scale   SubCutaneous every 4 hours  levothyroxine Injectable 37 MICROGram(s) IV Push at bedtime  losartan 100 milliGRAM(s) Oral daily  methylPREDNISolone sodium succinate Injectable 40 milliGRAM(s) IV Push every 12 hours  pantoprazole  Injectable 40 milliGRAM(s) IV Push daily  piperacillin/tazobactam IVPB.. 3.375 Gram(s) IV Intermittent every 8 hours  simvastatin 20 milliGRAM(s) Oral at bedtime  vancomycin  IVPB 1000 milliGRAM(s) IV Intermittent every 12 hours    MEDICATIONS  (PRN):  acetaminophen     Tablet .. 650 milliGRAM(s) Oral every 6 hours PRN Temp greater or equal to 38C (100.4F), Mild Pain (1 - 3)  aluminum hydroxide/magnesium hydroxide/simethicone Suspension 30 milliLiter(s) Oral every 4 hours PRN Dyspepsia  dextrose Oral Gel 15 Gram(s) Oral once PRN Blood Glucose LESS THAN 70 milliGRAM(s)/deciliter  melatonin 3 milliGRAM(s) Oral at bedtime PRN Insomnia  ondansetron Injectable 4 milliGRAM(s) IV Push every 8 hours PRN Nausea and/or Vomiting      EXAM:  Vital Signs Last 24 Hrs  T(C): 36.7 (02 Dec 2022 11:37), Max: 37.6 (01 Dec 2022 16:03)  T(F): 98.1 (02 Dec 2022 11:37), Max: 99.7 (01 Dec 2022 16:03)  HR: 99 (02 Dec 2022 11:37) (89 - 118)  BP: 118/60 (02 Dec 2022 11:37) (108/60 - 133/73)  BP(mean): --  RR: 23 (02 Dec 2022 11:37) (18 - 28)  SpO2: 97% (02 Dec 2022 11:37) (94% - 100%)    Parameters below as of 02 Dec 2022 11:37  Patient On (Oxygen Delivery Method): nasal cannula  O2 Flow (L/min): 6      GENERAL: The patient is awake and alert agitated  non labored respirations  scattered rhonchi/wheeze                          9.0    17.05 )-----------( 246      ( 02 Dec 2022 05:45 )             33.4       12-02    138  |  96<L>  |  22  ----------------------------<  245<H>  4.3   |  31  |  0.86    Ca    9.2      02 Dec 2022 05:45  Mg     1.60     12-02    TPro  7.3  /  Alb  4.1  /  TBili  0.4  /  DBili  x   /  AST  22  /  ALT  12  /  AlkPhos  79  12-02     < from: Xray Chest 1 View- PORTABLE-Urgent (12.01.22 @ 13:02) >    ACC: [id number] EXAM:  XR CHEST PORTABLE URGENT 1V                          PROCEDURE DATE:  12/01/2022          INTERPRETATION:  CLINICAL INFORMATION: Chest pain    TIME OF EXAMINATION: December 1, 2022 at 12:50 PM    EXAM: Portable chest    FINDINGS:  Right upper lobe and left midlung opacities about the same as the last   study may be chronic findings. The heart is not enlarged and there are no   effusions or congestion to indicate CHF.        COMPARISON: November 11, 2022        IMPRESSION: Follow-up study showing no acute pulmonary pathology.    --- End of Report ---            NAT HENNING MD; Attending Radiologist  This document has been electronically signed. Dec  1 2022  1:41PM    < end of copied text >  < from: CT Angio Chest PE Protocol w/ IV Cont (11.08.22 @ 11:39) >    ACC: [id number] EXAM:  CT ABDOMEN AND PELVIS IC                        ACC: [id number] EXAM:  CT ANGIO CHEST PULM ART WAWIC                          PROCEDURE DATE:  11/08/2022          INTERPRETATION:  CTA CHEST AND CT ABDOMEN AND PELVIS    INDICATION: Syncope. Evaluate for pneumonia.    TECHNIQUE: Enhanced helical images were obtained of the chest, abdomen   and pelvis. Coronal and sagittal images were reconstructed. Maximum   intensity projection images were generated.    CONTRAST/COMPLICATIONS:  IV Contrast: Omnipaque 350 (accession [id number]), IV contrast documented   in associated exam (accession [id number])  90 cc administered   10 cc   discarded  Oral Contrast: NONE  Complications: None reported at time of study completion    COMPARISON: 5/29/2021 CT chest and 12/6/2021 CT abdomen.    FINDINGS:    PULMONARY ARTERIES: No pulmonary embolism detected. Please note that   multiple distal segmental and subsegmental pulmonary arterial branches   are not adequately assessed due to motion.    MEDIASTINUM: The right ventricle is qualitatively mildly dilated and   there is suggestion of flattening of the interventricular septum. No   pericardial effusion. Thoracic aorta normal caliber.  No large   mediastinal lymph nodes.    AIRWAYS, LUNGS, PLEURA: Severe narrowing of the right mainstem bronchus   and bronchus intermedius.    Right apical soft tissue opacification measuring 2.8 x 1.6 cm (image 13,   series 2) is similar to marginally decreased in size compared to   5/29/2021 where it was 2.9 x 1.8 cm when remeasured. An additional   anterior right upper lobe 1.3 cm nodular opacity (image 19, series 2) is   unchanged. Right apical cystic/cavitary opacity is likewise similar in   appearance. No significant interval change in right upperlobe   opacification along the minor fissure.    Left upper lobe parenchymal opacification measuring 3.6 x 2.2 cm (image   156, series 4) is without significant interval change compared to   5/29/2021, but progressed compared to an even earlier examination dated   10/26/2019.    No pleural effusion.    ABDOMEN and PELVIS: Peripheral enhancement of hepatic segment 7 (image   22, series 3) may be perfusional. Gallbladder, pancreas, spleen, adrenal   glands unremarkable. No hydronephrosis.    Unremarkable urinary bladder and uterus.    No bowel obstruction. Ascending and transverse colon are decompressed. No   free fluid. No abdominal or pelvic lymphadenopathy. Abdominal aorta   normal caliber.    BONES: Unremarkable.    SOFT TISSUES: Unremarkable.    IMPRESSION:    No pulmonary embolism detected.    No new lung parenchymal opacity to suggest acute pneumonia.    Indeterminate left upper lobe 3.6 x 2.2 cm opacity is similar compared to   5/29/2021, but progressed compared to 10/26/2019; the exact etiology is   unclear, but it is difficult to exclude lung neoplasm.    Additional right upper lobe opacities also unchanged compared to   5/29/2021.    Peripheral enhancement of the liver as described above may represent   perfusional abnormality. Otherwise, unremarkable evaluation of the   abdomen and pelvis.    --- End of Report ---            DONIS YATES MD; Attending Radiologist  This document has been electronically signed. Nov 8 2022 12:06    < end of copied text >    PROBLEM LIST:  77y Female with HEALTH ISSUES - PROBLEM Dx:  Diabetes mellitus    Sepsis with acute hypercapnic respiratory failure    HTN (hypertension)    Need for prophylactic measure    Hypothyroidism      RECS:  ID eval  previous [facility] records document she had TB in the past  her xray on this admission is unchanged from prior  defer to ID re: abx  taper donw 02  can try bpap for work of breathing prn/qhs  solumedrol 40 IVP BID for now  supportive care for cough associated wtih RSV  cardio eval      Please call with any questions.    Dede Flynn, DO  [facility]  [phone]

## 2022-12-02 NOTE — PROGRESS NOTE ADULT - SUBJECTIVE AND OBJECTIVE BOX
SUBJECTIVE / OVERNIGHT EVENTS:pt seen and examined  22    MEDICATIONS  (STANDING):  albuterol/ipratropium for Nebulization 3 milliLiter(s) Nebulizer every 4 hours  cholecalciferol 1000 Unit(s) Oral daily  dextrose 5%. 1000 milliLiter(s) (100 mL/Hr) IV Continuous <Continuous>  dextrose 5%. 1000 milliLiter(s) (50 mL/Hr) IV Continuous <Continuous>  dextrose 50% Injectable 25 Gram(s) IV Push once  dextrose 50% Injectable 12.5 Gram(s) IV Push once  dextrose 50% Injectable 25 Gram(s) IV Push once  enoxaparin Injectable 40 milliGRAM(s) SubCutaneous every 24 hours  glucagon  Injectable 1 milliGRAM(s) IntraMuscular once  influenza  Vaccine (HIGH DOSE) 0.7 milliLiter(s) IntraMuscular once  insulin glargine Injectable (LANTUS) 14 Unit(s) SubCutaneous at bedtime  insulin lispro (ADMELOG) corrective regimen sliding scale   SubCutaneous every 4 hours  levothyroxine Injectable 37 MICROGram(s) IV Push at bedtime  losartan 100 milliGRAM(s) Oral daily  methylPREDNISolone sodium succinate Injectable 40 milliGRAM(s) IV Push every 12 hours  pantoprazole  Injectable 40 milliGRAM(s) IV Push daily  simvastatin 20 milliGRAM(s) Oral at bedtime    MEDICATIONS  (PRN):  acetaminophen     Tablet .. 650 milliGRAM(s) Oral every 6 hours PRN Temp greater or equal to 38C (100.4F), Mild Pain (1 - 3)  aluminum hydroxide/magnesium hydroxide/simethicone Suspension 30 milliLiter(s) Oral every 4 hours PRN Dyspepsia  dextrose Oral Gel 15 Gram(s) Oral once PRN Blood Glucose LESS THAN 70 milliGRAM(s)/deciliter  melatonin 3 milliGRAM(s) Oral at bedtime PRN Insomnia  ondansetron Injectable 4 milliGRAM(s) IV Push every 8 hours PRN Nausea and/or Vomiting    T(C): 36.6 (22 @ 21:30), Max: 37.2 (22 @ 07:02)  HR: 100 (22 @ 00:23) (90 - 112)  BP: 120/61 (22 @ 21:30) (108/60 - 133/73)  RR: 19 (22 @ 21:30) (18 - 23)  SpO2: 98% (22 @ 00:23) (97% - 100%)    CAPILLARY BLOOD GLUCOSE      POCT Blood Glucose.: 318 mg/dL (02 Dec 2022 22:26)  POCT Blood Glucose.: 281 mg/dL (02 Dec 2022 17:46)  POCT Blood Glucose.: 271 mg/dL (02 Dec 2022 17:14)  POCT Blood Glucose.: 224 mg/dL (02 Dec 2022 13:10)  POCT Blood Glucose.: 286 mg/dL (02 Dec 2022 10:01)  POCT Blood Glucose.: 293 mg/dL (02 Dec 2022 09:30)  POCT Blood Glucose.: 304 mg/dL (02 Dec 2022 06:59)  POCT Blood Glucose.: 243 mg/dL (02 Dec 2022 05:40)  POCT Blood Glucose.: 229 mg/dL (02 Dec 2022 01:53)    I&O's Summary    02 Dec 2022 07:01  -  03 Dec 2022 01:04  --------------------------------------------------------  IN: 0 mL / OUT: 300 mL / NET: -300 mL        PHYSICAL EXAM:  GENERAL: NAD  EYES: EOMI, PERRLA  NECK: Supple, No JVD  CHEST/LUNG: dec breath sounds at bases  HEART:  S1 , S2 +  ABDOMEN: soft , bs+  EXTREMITIES:  no edema  NEUROLOGY:alert awake      LABS:                        9.0    17.05 )-----------( 246      ( 02 Dec 2022 05:45 )             33.4     12-02    138  |  96<L>  |  22  ----------------------------<  245<H>  4.3   |  31  |  0.86    Ca    9.2      02 Dec 2022 05:45  Mg     1.60     12-    TPro  7.3  /  Alb  4.1  /  TBili  0.4  /  DBili  x   /  AST  22  /  ALT  12  /  AlkPhos  79  12-02    PT/INR - ( 02 Dec 2022 05:45 )   PT: 14.8 sec;   INR: 1.27 ratio         PTT - ( 01 Dec 2022 12:00 )  PTT:34.7 sec      Urinalysis Basic - ( 01 Dec 2022 16:30 )    Color: Light Yellow / Appearance: Clear / S.016 / pH: x  Gluc: x / Ketone: Negative  / Bili: Negative / Urobili: <2 mg/dL   Blood: x / Protein: 30 mg/dL / Nitrite: Negative   Leuk Esterase: Negative / RBC: 9 /HPF / WBC 2 /HPF   Sq Epi: x / Non Sq Epi: 3 /HPF / Bacteria: Negative        RADIOLOGY & ADDITIONAL TESTS:    Imaging Personally Reviewed:    Consultant(s) Notes Reviewed:      Care Discussed with Consultants/Other Providers:

## 2022-12-02 NOTE — CHART NOTE - NSCHARTNOTEFT_GEN_A_CORE
Through chart review noted patient to have elevated lactate and abnormal VBG. Discussed with admitting attending,  who contacted respiratory. Settings on BIPAP adjusted 16/5. Repeat VBG showed minimal improvement of pH and lactate. Discussed with , ICU attending who suggested to keep on current regimen for now. If worsening of VBG in AM recommends AVAPs. Will continue to monitor. Through chart review noted patient to have elevated lactate and abnormal VBG. Discussed with admitting attending,  who contacted respiratory. Settings on BIPAP adjusted 16/5. Repeat VBG showed minimal improvement of pH and lactate. Discussed current vent settings BIPAP: 16/5 and VBGs with , ICU attending who suggested to keep on current regimen for now. If worsening of VBG in AM recommends AVAPs. Will continue to monitor. Admitting attending,  aware of VBG results and also would like to maintain on current settings and reassess in AM. Provider went to bedside to evaluate patient. Patient was sleeping in no acute distress. Tachypneic to 30 speaking however decreased at rest. Advised day team ACP to follow up AM VBG and consider AVAPs. Day attending/MICU to be updated in AM if worsening VBG for further recommendations.

## 2022-12-02 NOTE — CONSULT NOTE ADULT - SUBJECTIVE AND OBJECTIVE BOX
HPI:  77F A1c 8.8%, pulmonary TB 2019, hypothyroidism.   Hospitalized - for syncope and HHS. Received a 5-day course of Zosyn for possible pneumonia although none clinically or new radiographically.   Here  for a few days of dyspnea followed by fevers.   RSV positive here with fever.   Associated cough and sputum production of brownish color. Some sore throat.   Sick contacts - grandkids.   Feels better since presentation.   Reports diarrhea, 4-5 times a day, mostly watery.       PAST MEDICAL & SURGICAL HISTORY:  HTN (Hypertension)      Dyslipidemia      DM (Diabetes Mellitus)      Hypothyroidism      Pulmonary TB  Dx 2019, completed 10 month treatment      Cataract of left eye          Allergies    No Known Allergies    Intolerances        ANTIMICROBIALS:      OTHER MEDS:  acetaminophen     Tablet .. 650 milliGRAM(s) Oral every 6 hours PRN  albuterol/ipratropium for Nebulization 3 milliLiter(s) Nebulizer every 4 hours  aluminum hydroxide/magnesium hydroxide/simethicone Suspension 30 milliLiter(s) Oral every 4 hours PRN  cholecalciferol 1000 Unit(s) Oral daily  dextrose 5%. 1000 milliLiter(s) IV Continuous <Continuous>  dextrose 5%. 1000 milliLiter(s) IV Continuous <Continuous>  dextrose 50% Injectable 25 Gram(s) IV Push once  dextrose 50% Injectable 12.5 Gram(s) IV Push once  dextrose 50% Injectable 25 Gram(s) IV Push once  dextrose Oral Gel 15 Gram(s) Oral once PRN  enoxaparin Injectable 40 milliGRAM(s) SubCutaneous every 24 hours  glucagon  Injectable 1 milliGRAM(s) IntraMuscular once  insulin glargine Injectable (LANTUS) 14 Unit(s) SubCutaneous at bedtime  insulin lispro (ADMELOG) corrective regimen sliding scale   SubCutaneous every 4 hours  levothyroxine Injectable 37 MICROGram(s) IV Push at bedtime  losartan 100 milliGRAM(s) Oral daily  melatonin 3 milliGRAM(s) Oral at bedtime PRN  methylPREDNISolone sodium succinate Injectable 40 milliGRAM(s) IV Push every 12 hours  ondansetron Injectable 4 milliGRAM(s) IV Push every 8 hours PRN  pantoprazole  Injectable 40 milliGRAM(s) IV Push daily  simvastatin 20 milliGRAM(s) Oral at bedtime      SOCIAL HISTORY: Nonsmoker     FAMILY HISTORY:  Family history of heart attack (Mother)  mother        ROS:  All other systems negative   Constitutional: per HPI   Eye: no vision changes  ENT: per HPI   Cardiovascular: no chest pain, no palpitation  Respiratory: per HPI   GI:  per HPI   urinary: no dysuria. jesus placed here   musculoskeletal: no joint pain, no joint swelling  skin: no rash  neurology: no headache, no change in mental status  psych: no anxiety    Physical Exam:  General: awake, alert, non toxic, eating fruit cup of pineapple   Head: atraumatic, normocephalic  Eyes: normal sclera and conjunctiva  ENT: no LAD, neck supple. left jaw firm nodular lesion, nontender   Cardio: regular rate   Respiratory: nonlabored on nasal cannula   abd: soft, not tender  : no suprapubic tenderness. jesus in place   Musculoskeletal: no joint swelling, no edema  Skin: no rash  vascular: no phlebitis  Neurologic: no focal deficits  psych: normal affect       Drug Dosing Weight  Height (cm): 149.9 (01 Dec 2022 11:28)  Weight (kg): 59.5 (10 Nov 2022 06:00)  BMI (kg/m2): 26.5 (01 Dec 2022 11:28)  BSA (m2): 1.54 (01 Dec 2022 11:28)    Vital Signs Last 24 Hrs  T(F): 98.1 (22 @ 11:37), Max: 101.6 (22 @ 13:38)    Vital Signs Last 24 Hrs  HR: 99 (22 @ 11:37) (89 - 118)  BP: 118/60 (22 @ 11:37) (108/60 - 133/73)  RR: 23 (22 @ 11:37)  SpO2: 97% (22 @ 11:37) (94% - 100%)  Wt(kg): --                          9.0    17.05 )-----------( 246      ( 02 Dec 2022 05:45 )             33.4           138  |  96<L>  |  22  ----------------------------<  245<H>  4.3   |  31  |  0.86    Ca    9.2      02 Dec 2022 05:45  Mg     1.60         TPro  7.3  /  Alb  4.1  /  TBili  0.4  /  DBili  x   /  AST  22  /  ALT  12  /  AlkPhos  79        Urinalysis Basic - ( 01 Dec 2022 16:30 )    Color: Light Yellow / Appearance: Clear / S.016 / pH: x  Gluc: x / Ketone: Negative  / Bili: Negative / Urobili: <2 mg/dL   Blood: x / Protein: 30 mg/dL / Nitrite: Negative   Leuk Esterase: Negative / RBC: 9 /HPF / WBC 2 /HPF   Sq Epi: x / Non Sq Epi: 3 /HPF / Bacteria: Negative        MICROBIOLOGY:  Blood cultures in lab     Rapid RVP Result: Detected ( @ 12:00)    RADIOLOGY:  Images below reviewed personally  Xray Chest 1 View- PORTABLE-Urgent (22 @ 13:02)   Right upper lobe and left midlung opacities about the same as the last   study may be chronic findings. The heart is not enlarged and there are no   effusions or congestion to indicate CHF.  COMPARISON: 2022  IMPRESSION: Follow-up study showing no acute pulmonary pathology.

## 2022-12-02 NOTE — ED ADULT NURSE REASSESSMENT NOTE - NS ED NURSE REASSESS COMMENT FT1
Pt at baseline mental status, changed and turned skin intact, medicated as per MD orders. Respirations are even and unlabored, no signs of respiratory distress.

## 2022-12-02 NOTE — ED ADULT NURSE REASSESSMENT NOTE - NS ED NURSE REASSESS COMMENT FT1
Break RN note- Patient resting quietly in bed, breathing even and nonlabored. No acute distress. Patient medicated as ordered. Cardiac monitor in place- sinus rhythm. Pulsox 96%. Patient appears comfortable. Patient stable upon exiting the room.

## 2022-12-02 NOTE — CONSULT NOTE ADULT - ASSESSMENT
77F A1c 8.8%, dCHF, pulmonary TB 2019.   Here 12/1 with cough, fever, RSV.   Treatment is supportive.   Might have C diff - son reports watery diarrhea and she received a course of Zosyn in Nov for possible pneumonia.   Otherwise I don't think she has a bacterial infection.   CXR is unchanged and procalcitonin is low 0.06.   No pyuria or dysuria (jesus placed in ED).     Suggest  -stop antibiotics  -f/u cultures  -supportive care  -if still diarrhea, send for C diff     Discussed with medicine     Javi Stahl MD   Infectious Disease   Available on TEAMS. After 5PM and on weekends please page fellow on call or call [phone]

## 2022-12-02 NOTE — CHART NOTE - NSCHARTNOTEFT_GEN_A_CORE
PT with Type 2 DM w/ hyperglycemia, on basal /bolus, HS Lantus dose increased, endo e mailed as per Dr Gonzalez request.   Spoke w/ son earlier at the bed side, MOLST form filled out, left in pt's chart, pt is DNR/DNR    [facility] [id number]

## 2022-12-02 NOTE — ED ADULT NURSE REASSESSMENT NOTE - NS ED NURSE REASSESS COMMENT FT1
TRACEY Caruso paged MD Liao 3x to confirm if 6 units of insulin should be administered since the pt received 4 units of insulin at 2345 and repeat FS was 229 at 0153. MD Liao did not return the page, TRACEY Caruso reached out to [facility] to confirm if this order should be given and they informed TRACEY Caruso to contact MD Liao. Information shared with TRACEY Hernadez. TRACEY Caruso paged MD Liao 3x to confirm if 6 units of insulin should be administered since the pt received 4 units of insulin at 2345 and repeat FS was 229 at 0153. MD Liao did not return the page, TRACEY Caruso reached out to ACP to confirm if this order should be given and [facility] informed TRACEY Caruso to contact MD Liao. After multiple unsuccessful attempts to contact MD Liao, TRACEY Caruso informs ERIS Pugh that 6 units of admelog was not given, no further interventions at this time. Information shared with TRACEY Hernadez.

## 2022-12-02 NOTE — CHART NOTE - NSCHARTNOTEFT_GEN_A_CORE
Pt is a 70 y/o female with DM2 admitted with RSV. On steroids with steroid-induced hyperglycemia. On basal/bolus insulin at home and inpatient.    Based on BG trends, agree with increasing Lantus to 14 units qhs (DO NOT HOLD IF NPO). Recommend starting Admelog 7 units TIDac (HOLD IF NPO). Use low dose Admelog correction scale qac and separate low dose Admelog correction scale qhs, rather than q4h FSBG. If patient is NPO, use low dose Admelog correction scale q6h.    Full consult on 12/3.    Franklin Gardiner DO, Endocrinology Fellow  For follow-up questions, discharge recommendations, or new consults please call answering service at [phone] (weekdays), [phone] (nights/weekends). For nonurgent matters, please email [email or web address] or [email or web address].

## 2022-12-02 NOTE — CONSULT NOTE ADULT - ASSESSMENT
77 y/o F from [place] with PMH pulmonary TB (2019, treated 10 months), HTN, DM2 who was BIBEMS for SOB    EKG: NSR no ischemic changes      1. RSV pneumonia    - on vancomycin and zosyn   -echo normal LV systolic function, no WMA. mild diastolic dysfunction. decreased RV function   -monitor on tele   - on IV steroids     2. HTN  -controlled  -c/w losartan  -continue to monitor BP     3. DVT prophylaxis  -lovenox subq

## 2022-12-03 DIAGNOSIS — E78.5 HYPERLIPIDEMIA, UNSPECIFIED: ICD-10-CM

## 2022-12-03 DIAGNOSIS — R73.9 HYPERGLYCEMIA, UNSPECIFIED: ICD-10-CM

## 2022-12-03 LAB
ANION GAP SERPL CALC-SCNC: 13 MMOL/L — SIGNIFICANT CHANGE UP (ref 7–14)
BUN SERPL-MCNC: 28 MG/DL — HIGH (ref 7–23)
CALCIUM SERPL-MCNC: 9.1 MG/DL — SIGNIFICANT CHANGE UP (ref 8.4–10.5)
CHLORIDE SERPL-SCNC: 94 MMOL/L — LOW (ref 98–107)
CO2 SERPL-SCNC: 32 MMOL/L — HIGH (ref 22–31)
CREAT SERPL-MCNC: 0.83 MG/DL — SIGNIFICANT CHANGE UP (ref 0.5–1.3)
EGFR: 72 ML/MIN/1.73M2 — SIGNIFICANT CHANGE UP
GLUCOSE BLDC GLUCOMTR-MCNC: 295 MG/DL — HIGH (ref 70–99)
GLUCOSE BLDC GLUCOMTR-MCNC: 329 MG/DL — HIGH (ref 70–99)
GLUCOSE BLDC GLUCOMTR-MCNC: 350 MG/DL — HIGH (ref 70–99)
GLUCOSE BLDC GLUCOMTR-MCNC: 369 MG/DL — HIGH (ref 70–99)
GLUCOSE BLDC GLUCOMTR-MCNC: 488 MG/DL — CRITICAL HIGH (ref 70–99)
GLUCOSE BLDC GLUCOMTR-MCNC: 515 MG/DL — CRITICAL HIGH (ref 70–99)
GLUCOSE BLDC GLUCOMTR-MCNC: >600 MG/DL — CRITICAL HIGH (ref 70–99)
GLUCOSE SERPL-MCNC: 343 MG/DL — HIGH (ref 70–99)
HCT VFR BLD CALC: 33.2 % — LOW (ref 34.5–45)
HGB BLD-MCNC: 9.1 G/DL — LOW (ref 11.5–15.5)
MAGNESIUM SERPL-MCNC: 2.2 MG/DL — SIGNIFICANT CHANGE UP (ref 1.6–2.6)
MCHC RBC-ENTMCNC: 20.5 PG — LOW (ref 27–34)
MCHC RBC-ENTMCNC: 27.4 GM/DL — LOW (ref 32–36)
MCV RBC AUTO: 74.8 FL — LOW (ref 80–100)
NRBC # BLD: 0 /100 WBCS — SIGNIFICANT CHANGE UP (ref 0–0)
NRBC # FLD: 0 K/UL — SIGNIFICANT CHANGE UP (ref 0–0)
PHOSPHATE SERPL-MCNC: 2.8 MG/DL — SIGNIFICANT CHANGE UP (ref 2.5–4.5)
PLATELET # BLD AUTO: 245 K/UL — SIGNIFICANT CHANGE UP (ref 150–400)
POTASSIUM SERPL-MCNC: 4.5 MMOL/L — SIGNIFICANT CHANGE UP (ref 3.5–5.3)
POTASSIUM SERPL-SCNC: 4.5 MMOL/L — SIGNIFICANT CHANGE UP (ref 3.5–5.3)
RBC # BLD: 4.44 M/UL — SIGNIFICANT CHANGE UP (ref 3.8–5.2)
RBC # FLD: 22.5 % — HIGH (ref 10.3–14.5)
SODIUM SERPL-SCNC: 139 MMOL/L — SIGNIFICANT CHANGE UP (ref 135–145)
WBC # BLD: 13.08 K/UL — HIGH (ref 3.8–10.5)
WBC # FLD AUTO: 13.08 K/UL — HIGH (ref 3.8–10.5)

## 2022-12-03 PROCEDURE — 99232 SBSQ HOSP IP/OBS MODERATE 35: CPT

## 2022-12-03 PROCEDURE — 99222 1ST HOSP IP/OBS MODERATE 55: CPT

## 2022-12-03 RX ORDER — INSULIN LISPRO 100/ML
VIAL (ML) SUBCUTANEOUS
Refills: 0 | Status: DISCONTINUED | OUTPATIENT
Start: 2022-12-03 | End: 2022-12-07

## 2022-12-03 RX ORDER — INSULIN LISPRO 100/ML
VIAL (ML) SUBCUTANEOUS AT BEDTIME
Refills: 0 | Status: DISCONTINUED | OUTPATIENT
Start: 2022-12-03 | End: 2022-12-03

## 2022-12-03 RX ORDER — INSULIN LISPRO 100/ML
VIAL (ML) SUBCUTANEOUS
Refills: 0 | Status: DISCONTINUED | OUTPATIENT
Start: 2022-12-03 | End: 2022-12-03

## 2022-12-03 RX ORDER — INSULIN GLARGINE 100 [IU]/ML
18 INJECTION, SOLUTION SUBCUTANEOUS AT BEDTIME
Refills: 0 | Status: DISCONTINUED | OUTPATIENT
Start: 2022-12-03 | End: 2022-12-04

## 2022-12-03 RX ORDER — INSULIN LISPRO 100/ML
9 VIAL (ML) SUBCUTANEOUS
Refills: 0 | Status: DISCONTINUED | OUTPATIENT
Start: 2022-12-03 | End: 2022-12-04

## 2022-12-03 RX ORDER — INSULIN LISPRO 100/ML
7 VIAL (ML) SUBCUTANEOUS
Refills: 0 | Status: DISCONTINUED | OUTPATIENT
Start: 2022-12-03 | End: 2022-12-03

## 2022-12-03 RX ORDER — INSULIN LISPRO 100/ML
VIAL (ML) SUBCUTANEOUS AT BEDTIME
Refills: 0 | Status: DISCONTINUED | OUTPATIENT
Start: 2022-12-03 | End: 2022-12-07

## 2022-12-03 RX ADMIN — INSULIN GLARGINE 18 UNIT(S): 100 INJECTION, SOLUTION SUBCUTANEOUS at 21:09

## 2022-12-03 RX ADMIN — Medication 3 MILLILITER(S): at 11:08

## 2022-12-03 RX ADMIN — SIMVASTATIN 20 MILLIGRAM(S): 20 TABLET, FILM COATED ORAL at 21:09

## 2022-12-03 RX ADMIN — Medication 6: at 18:43

## 2022-12-03 RX ADMIN — Medication 37 MICROGRAM(S): at 00:38

## 2022-12-03 RX ADMIN — Medication 1000 UNIT(S): at 11:44

## 2022-12-03 RX ADMIN — Medication 40 MILLIGRAM(S): at 11:43

## 2022-12-03 RX ADMIN — Medication 7 UNIT(S): at 13:53

## 2022-12-03 RX ADMIN — Medication 6: at 21:09

## 2022-12-03 RX ADMIN — Medication 37 MICROGRAM(S): at 22:47

## 2022-12-03 RX ADMIN — ENOXAPARIN SODIUM 40 MILLIGRAM(S): 100 INJECTION SUBCUTANEOUS at 13:54

## 2022-12-03 RX ADMIN — Medication 3 MILLILITER(S): at 08:07

## 2022-12-03 RX ADMIN — Medication 4: at 09:14

## 2022-12-03 RX ADMIN — PANTOPRAZOLE SODIUM 40 MILLIGRAM(S): 20 TABLET, DELAYED RELEASE ORAL at 11:43

## 2022-12-03 RX ADMIN — Medication 9 UNIT(S): at 18:43

## 2022-12-03 RX ADMIN — Medication 7 UNIT(S): at 09:14

## 2022-12-03 RX ADMIN — Medication 3 MILLILITER(S): at 00:23

## 2022-12-03 RX ADMIN — Medication 3 MILLILITER(S): at 03:16

## 2022-12-03 RX ADMIN — Medication 4: at 13:53

## 2022-12-03 RX ADMIN — Medication 40 MILLIGRAM(S): at 22:49

## 2022-12-03 RX ADMIN — Medication 3 MILLILITER(S): at 21:50

## 2022-12-03 RX ADMIN — LOSARTAN POTASSIUM 100 MILLIGRAM(S): 100 TABLET, FILM COATED ORAL at 06:12

## 2022-12-03 RX ADMIN — Medication 3 MILLILITER(S): at 14:24

## 2022-12-03 NOTE — PROGRESS NOTE ADULT - SUBJECTIVE AND OBJECTIVE BOX
PULMONARY PROGRESS NOTE    MARTIR VU  MRN-[id number]    Patient is a 77y old  Female who presents with a chief complaint of Difficulty breathing (02 Dec 2022 13:07)      HPI:  resting in bed  family at bedside says she is better today just tired    MEDICATIONS  (STANDING):  albuterol/ipratropium for Nebulization 3 milliLiter(s) Nebulizer every 4 hours  cholecalciferol 1000 Unit(s) Oral daily  dextrose 5%. 1000 milliLiter(s) (100 mL/Hr) IV Continuous <Continuous>  dextrose 5%. 1000 milliLiter(s) (50 mL/Hr) IV Continuous <Continuous>  dextrose 50% Injectable 25 Gram(s) IV Push once  dextrose 50% Injectable 12.5 Gram(s) IV Push once  dextrose 50% Injectable 25 Gram(s) IV Push once  enoxaparin Injectable 40 milliGRAM(s) SubCutaneous every 24 hours  glucagon  Injectable 1 milliGRAM(s) IntraMuscular once  influenza  Vaccine (HIGH DOSE) 0.7 milliLiter(s) IntraMuscular once  insulin glargine Injectable (LANTUS) 14 Unit(s) SubCutaneous at bedtime  insulin lispro (ADMELOG) corrective regimen sliding scale   SubCutaneous three times a day before meals  insulin lispro (ADMELOG) corrective regimen sliding scale   SubCutaneous at bedtime  insulin lispro Injectable (ADMELOG) 7 Unit(s) SubCutaneous three times a day before meals  levothyroxine Injectable 37 MICROGram(s) IV Push at bedtime  losartan 100 milliGRAM(s) Oral daily  methylPREDNISolone sodium succinate Injectable 40 milliGRAM(s) IV Push every 12 hours  pantoprazole  Injectable 40 milliGRAM(s) IV Push daily  simvastatin 20 milliGRAM(s) Oral at bedtime    MEDICATIONS  (PRN):  acetaminophen     Tablet .. 650 milliGRAM(s) Oral every 6 hours PRN Temp greater or equal to 38C (100.4F), Mild Pain (1 - 3)  aluminum hydroxide/magnesium hydroxide/simethicone Suspension 30 milliLiter(s) Oral every 4 hours PRN Dyspepsia  dextrose Oral Gel 15 Gram(s) Oral once PRN Blood Glucose LESS THAN 70 milliGRAM(s)/deciliter  melatonin 3 milliGRAM(s) Oral at bedtime PRN Insomnia  ondansetron Injectable 4 milliGRAM(s) IV Push every 8 hours PRN Nausea and/or Vomiting        EXAM:  Vital Signs Last 24 Hrs  T(C): 36.3 (03 Dec 2022 06:10), Max: 36.8 (02 Dec 2022 17:30)  T(F): 97.4 (03 Dec 2022 06:10), Max: 98.3 (02 Dec 2022 17:30)  HR: 101 (03 Dec 2022 11:10) (87 - 114)  BP: 113/58 (03 Dec 2022 06:10) (113/58 - 123/56)  BP(mean): --  RR: 18 (03 Dec 2022 06:10) (18 - 20)  SpO2: 97% (03 Dec 2022 11:10) (95% - 100%)    Parameters below as of 03 Dec 2022 11:10  Patient On (Oxygen Delivery Method): nasal cannula          GENERAL: The patient is awake and alert  LUNGS: ronchi                                     9.1    13.08 )-----------( 245      ( 03 Dec 2022 06:24 )             33.2   12-03    139  |  94<L>  |  28<H>  ----------------------------<  343<H>  4.5   |  32<H>  |  0.83    Ca    9.1      03 Dec 2022 06:24  Phos  2.8     12-03  Mg     2.20     12-03    TPro  7.3  /  Alb  4.1  /  TBili  0.4  /  DBili  x   /  AST  22  /  ALT  12  /  AlkPhos  79  12-02       < from: Xray Chest 1 View- PORTABLE-Urgent (12.01.22 @ 13:02) >    ACC: [id number] EXAM:  XR CHEST PORTABLE URGENT 1V                          PROCEDURE DATE:  12/01/2022          INTERPRETATION:  CLINICAL INFORMATION: Chest pain    TIME OF EXAMINATION: December 1, 2022 at 12:50 PM    EXAM: Portable chest    FINDINGS:  Right upper lobe and left midlung opacities about the same as the last   study may be chronic findings. The heart is not enlarged and there are no   effusions or congestion to indicate CHF.        COMPARISON: November 11, 2022        IMPRESSION: Follow-up study showing no acute pulmonary pathology.    --- End of Report ---            NAT HENNING MD; Attending Radiologist  This document has been electronically signed. Dec  1 2022  1:41PM    < end of copied text >  < from: CT Angio Chest PE Protocol w/ IV Cont (11.08.22 @ 11:39) >    ACC: [id number] EXAM:  CT ABDOMEN AND PELVIS IC                        ACC: [id number] EXAM:  CT ANGIO CHEST PULM ART [facility]                          PROCEDURE DATE:  11/08/2022          INTERPRETATION:  CTA CHEST AND CT ABDOMEN AND PELVIS    INDICATION: Syncope. Evaluate for pneumonia.    TECHNIQUE: Enhanced helical images were obtained of the chest, abdomen   and pelvis. Coronal and sagittal images were reconstructed. Maximum   intensity projection images were generated.    CONTRAST/COMPLICATIONS:  IV Contrast: Omnipaque 350 (accession [id number]), IV contrast documented   in associated exam (accession [id number])  90 cc administered   10 cc   discarded  Oral Contrast: NONE  Complications: None reported at time of study completion    COMPARISON: 5/29/2021 CT chest and 12/6/2021 CT abdomen.    FINDINGS:    PULMONARY ARTERIES: No pulmonary embolism detected. Please note that   multiple distal segmental and subsegmental pulmonary arterial branches   are not adequately assessed due to motion.    MEDIASTINUM: The right ventricle is qualitatively mildly dilated and   there is suggestion of flattening of the interventricular septum. No   pericardial effusion. Thoracic aorta normal caliber.  No large   mediastinal lymph nodes.    AIRWAYS, LUNGS, PLEURA: Severe narrowing of the right mainstem bronchus   and bronchus intermedius.    Right apical soft tissue opacification measuring 2.8 x 1.6 cm (image 13,   series 2) is similar to marginally decreased in size compared to   5/29/2021 where it was 2.9 x 1.8 cm when remeasured. An additional   anterior right upper lobe 1.3 cm nodular opacity (image 19, series 2) is   unchanged. Right apical cystic/cavitary opacity is likewise similar in   appearance. No significant interval change in right upperlobe   opacification along the minor fissure.    Left upper lobe parenchymal opacification measuring 3.6 x 2.2 cm (image   156, series 4) is without significant interval change compared to   5/29/2021, but progressed compared to an even earlier examination dated   10/26/2019.    No pleural effusion.    ABDOMEN and PELVIS: Peripheral enhancement of hepatic segment 7 (image   22, series 3) may be perfusional. Gallbladder, pancreas, spleen, adrenal   glands unremarkable. No hydronephrosis.    Unremarkable urinary bladder and uterus.    No bowel obstruction. Ascending and transverse colon are decompressed. No   free fluid. No abdominal or pelvic lymphadenopathy. Abdominal aorta   normal caliber.    BONES: Unremarkable.    SOFT TISSUES: Unremarkable.    IMPRESSION:    No pulmonary embolism detected.    No new lung parenchymal opacity to suggest acute pneumonia.    Indeterminate left upper lobe 3.6 x 2.2 cm opacity is similar compared to   5/29/2021, but progressed compared to 10/26/2019; the exact etiology is   unclear, but it is difficult to exclude lung neoplasm.    Additional right upper lobe opacities also unchanged compared to   5/29/2021.    Peripheral enhancement of the liver as described above may represent   perfusional abnormality. Otherwise, unremarkable evaluation of the   abdomen and pelvis.    --- End of Report ---            DONIS YATES MD; Attending Radiologist  This document has been electronically signed. Nov 8 2022 12:06    < end of copied text >    PROBLEM LIST:  77y Female with HEALTH ISSUES - PROBLEM Dx:  Diabetes mellitus    Sepsis with acute hypercapnic respiratory failure    HTN (hypertension)    Need for prophylactic measure    Hypothyroidism      RECS:  appreciate ID  supportive care for RSV  solumedrol 40 IVP BID, decrease to 30mg IV BID tomorrow  wean O2 as tolerated    Please call with any questions.    Kathleen Bashir MD  [facility] Pulmonary/Sleep Medicine  [phone]

## 2022-12-03 NOTE — PROGRESS NOTE ADULT - SUBJECTIVE AND OBJECTIVE BOX
Forrest Rosenthal MD  Interventional Cardiology / Advance Heart Failure and Cardiac Transplant Specialist  [place] Office : [address] [place] [address] [place] [address]  Tel:   [place] Office : [address] [facility] [place] [address]  Tel: [phone]       Pt is lying in bed comfortable not in distress, sOB improving   	  MEDICATIONS:  enoxaparin Injectable 40 milliGRAM(s) SubCutaneous every 24 hours  losartan 100 milliGRAM(s) Oral daily      albuterol/ipratropium for Nebulization 3 milliLiter(s) Nebulizer every 4 hours    acetaminophen     Tablet .. 650 milliGRAM(s) Oral every 6 hours PRN  melatonin 3 milliGRAM(s) Oral at bedtime PRN  ondansetron Injectable 4 milliGRAM(s) IV Push every 8 hours PRN    aluminum hydroxide/magnesium hydroxide/simethicone Suspension 30 milliLiter(s) Oral every 4 hours PRN  pantoprazole  Injectable 40 milliGRAM(s) IV Push daily    dextrose 50% Injectable 25 Gram(s) IV Push once  dextrose 50% Injectable 12.5 Gram(s) IV Push once  dextrose 50% Injectable 25 Gram(s) IV Push once  dextrose Oral Gel 15 Gram(s) Oral once PRN  glucagon  Injectable 1 milliGRAM(s) IntraMuscular once  insulin glargine Injectable (LANTUS) 18 Unit(s) SubCutaneous at bedtime  insulin lispro (ADMELOG) corrective regimen sliding scale   SubCutaneous three times a day before meals  insulin lispro (ADMELOG) corrective regimen sliding scale   SubCutaneous at bedtime  insulin lispro Injectable (ADMELOG) 9 Unit(s) SubCutaneous three times a day before meals  levothyroxine Injectable 37 MICROGram(s) IV Push at bedtime  methylPREDNISolone sodium succinate Injectable 40 milliGRAM(s) IV Push every 12 hours  simvastatin 20 milliGRAM(s) Oral at bedtime    cholecalciferol 1000 Unit(s) Oral daily  dextrose 5%. 1000 milliLiter(s) IV Continuous <Continuous>  dextrose 5%. 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      Cataract of left eye          SOCIAL HISTORY: Substance Use (street drugs): ( x ) never used  (  ) other:    FAMILY HISTORY:  Family history of heart attack (Mother)  mother      PHYSICAL EXAM:  T(C): 36.3 (12-03-22 @ 20:50), Max: 36.7 (12-03-22 @ 11:42)  HR: 99 (12-03-22 @ 20:50) (87 - 114)  BP: 126/69 (12-03-22 @ 20:50) (113/58 - 129/65)  RR: 16 (12-03-22 @ 20:50) (16 - 18)  SpO2: 98% (12-03-22 @ 20:50) (95% - 100%)  Wt(kg): --  I&O's Summary    02 Dec 2022 07:01  -  03 Dec 2022 07:00  --------------------------------------------------------  IN: 200 mL / OUT: 600 mL / NET: -400 mL    03 Dec 2022 07:01  -  03 Dec 2022 21:32  --------------------------------------------------------  IN: 527 mL / OUT: 620 mL / NET: -93 mL            EYES:   PERRLA   ENMT:   Moist mucous membranes, Good dentition, No lesions  Cardiovascular: Normal S1 S2, No JVD, No murmurs, No edema  Respiratory: b/l rhonchi   Gastrointestinal:  Soft, Non-tender, + BS	  Extremities: no edema                                    9.1    13.08 )-----------( 245      ( 03 Dec 2022 06:24 )             33.2     12-03    139  |  94<L>  |  28<H>  ----------------------------<  343<H>  4.5   |  32<H>  |  0.83    Ca    9.1      03 Dec 2022 06:24  Phos  2.8     12-03  Mg     2.20     12-03    TPro  7.3  /  Alb  4.1  /  TBili  0.4  /  DBili  x   /  AST  22  /  ALT  12  /  AlkPhos  79  12-02    proBNP:   Lipid Profile:   HgA1c:   TSH:     Consultant(s) Notes Reviewed:  [x ] YES  [ ] NO    Care Discussed with Consultants/Other Providers [ x] YES  [ ] NO    Imaging Personally Reviewed independently:  [x] YES  [ ] NO    All labs, radiologic studies, vitals, orders and medications list reviewed. Patient is seen and examined at bedside. Case discussed with medical team.

## 2022-12-03 NOTE — PROGRESS NOTE ADULT - SUBJECTIVE AND OBJECTIVE BOX
SUBJECTIVE / OVERNIGHT EVENTS:pt seen and examined, pts family next to pts bedside  22    MEDICATIONS  (STANDING):  albuterol/ipratropium for Nebulization 3 milliLiter(s) Nebulizer every 4 hours  cholecalciferol 1000 Unit(s) Oral daily  dextrose 5%. 1000 milliLiter(s) (100 mL/Hr) IV Continuous <Continuous>  dextrose 5%. 1000 milliLiter(s) (50 mL/Hr) IV Continuous <Continuous>  dextrose 50% Injectable 25 Gram(s) IV Push once  dextrose 50% Injectable 12.5 Gram(s) IV Push once  dextrose 50% Injectable 25 Gram(s) IV Push once  enoxaparin Injectable 40 milliGRAM(s) SubCutaneous every 24 hours  glucagon  Injectable 1 milliGRAM(s) IntraMuscular once  influenza  Vaccine (HIGH DOSE) 0.7 milliLiter(s) IntraMuscular once  insulin glargine Injectable (LANTUS) 14 Unit(s) SubCutaneous at bedtime  insulin lispro (ADMELOG) corrective regimen sliding scale   SubCutaneous three times a day before meals  insulin lispro (ADMELOG) corrective regimen sliding scale   SubCutaneous at bedtime  insulin lispro Injectable (ADMELOG) 7 Unit(s) SubCutaneous three times a day before meals  levothyroxine Injectable 37 MICROGram(s) IV Push at bedtime  losartan 100 milliGRAM(s) Oral daily  methylPREDNISolone sodium succinate Injectable 40 milliGRAM(s) IV Push every 12 hours  pantoprazole  Injectable 40 milliGRAM(s) IV Push daily  simvastatin 20 milliGRAM(s) Oral at bedtime    MEDICATIONS  (PRN):  acetaminophen     Tablet .. 650 milliGRAM(s) Oral every 6 hours PRN Temp greater or equal to 38C (100.4F), Mild Pain (1 - 3)  aluminum hydroxide/magnesium hydroxide/simethicone Suspension 30 milliLiter(s) Oral every 4 hours PRN Dyspepsia  dextrose Oral Gel 15 Gram(s) Oral once PRN Blood Glucose LESS THAN 70 milliGRAM(s)/deciliter  melatonin 3 milliGRAM(s) Oral at bedtime PRN Insomnia  ondansetron Injectable 4 milliGRAM(s) IV Push every 8 hours PRN Nausea and/or Vomiting    Vital Signs Last 24 Hrs  T(C): 36.7 (22 @ 11:42), Max: 36.7 (22 @ 11:42)  T(F): 98.1 (22 @ 11:42), Max: 98.1 (22 @ 11:42)  HR: 103 (22 @ 14:31) (87 - 114)  BP: 117/69 (22 @ 11:42) (113/58 - 120/61)  BP(mean): --  RR: 18 (22 @ 11:42) (18 - 19)  SpO2: 97% (22 @ 14:31) (95% - 100%)          PHYSICAL EXAM:  GENERAL: NAD  EYES: EOMI, PERRLA  NECK: Supple, No JVD  CHEST/LUNG: dec breath sounds at bases  HEART:  S1 , S2 +  ABDOMEN: soft , bs+  EXTREMITIES:  no edema  NEUROLOGY:alert awake    LABS:      139  |  94<L>  |  28<H>  ----------------------------<  343<H>  4.5   |  32<H>  |  0.83    Ca    9.1      03 Dec 2022 06:24  Phos  2.8       Mg     2.20         TPro  7.3  /  Alb  4.1  /  TBili  0.4  /  DBili      /  AST  22  /  ALT  12  /  AlkPhos  79  12    Creatinine Trend: 0.83 <--, 0.86 <--, 0.71 <--                        9.1    13.08 )-----------( 245      ( 03 Dec 2022 06:24 )             33.2     Urine Studies:  Urinalysis Basic - ( 01 Dec 2022 16:30 )    Color: Light Yellow / Appearance: Clear / S.016 / pH:   Gluc:  / Ketone: Negative  / Bili: Negative / Urobili: <2 mg/dL   Blood:  / Protein: 30 mg/dL / Nitrite: Negative   Leuk Esterase: Negative / RBC: 9 /HPF / WBC 2 /HPF   Sq Epi:  / Non Sq Epi: 3 /HPF / Bacteria: Negative              LIVER FUNCTIONS - ( 02 Dec 2022 05:45 )  Alb: 4.1 g/dL / Pro: 7.3 g/dL / ALK PHOS: 79 U/L / ALT: 12 U/L / AST: 22 U/L / GGT: x           PT/INR - ( 02 Dec 2022 05:45 )   PT: 14.8 sec;   INR: 1.27 ratio                 RADIOLOGY & ADDITIONAL TESTS:    Imaging Personally Reviewed:yes    Consultant(s) Notes Reviewed:  yes    Care Discussed with Consultants/Other Providers:yes

## 2022-12-03 NOTE — CONSULT NOTE ADULT - ASSESSMENT
78 y/o female with PMHx of DM type 2, hypothyroidism, and HTN who presented to the ED for increased SOB. Admitted with RSV. Is on methylprednisolone 40mg q12h with resultant hyperglycemia. Endocrine consulted for DM2 with steroid-induced hyperglycemia.    Poorly controlled T2DM with steroid-induced hyperglycemia  DM diagnosis: DM2 x20 years  Last A1c: 8.8  Endocrinologist: PCP only  Home DM meds: Basaglar 10 units q24h, Novolog 4 units TIDac, metformin 500mg BID  Receiving methylprednisolone 40mg q12h.  -Hold oral DM agents while inpatient  -Increase Lantus to 18 units at bedtime. DO NOT HOLD IF NPO.  -Increase Admelog to 9 units TID pre-meal. HOLD IF NPO.  -Use moderate dose Admelog correction scale pre-meal  -Use moderate dose Admelog correction scale at bedtime  -Fingerstick BG before meals and bedtime  -Goal -180  -Carbohydrate consistent diet  Discharge plan:  -Likely to discharge patient home on basal insulin plus Ozempic and metformin versus basal/bolus insulin. Final regimen pending clinical course.  -Patient will need education on how to self-administer insulin, how to check FSBG, as well as hypoglycemia management. Patient to call doctor with persistent high or low BG at home.   -Will need to have glucometer, test strips and lancets sent to pharmacy prior to discharge.  -Recommend outpatient dilated eye exam/routine ophthalmology f/u, podiatry referral and endocrinology f/u outpatient  -Recommend routine outpatient ophthalmology, podiatry and endocrinology f/u    HTN  -Management per primary team.    HLD  -On simvastatin 20mg daily    Hypothyroidism  TSH normal now. FT4 nml in Nov 2022.  Continue LT4 50mcg daily PO (or 37mcg IV)    Franklin Gardiner DO, Endocrinology Fellow  For follow-up questions, discharge recommendations, or new consults please call answering service at [phone] (weekdays), [phone] (nights/weekends). For nonurgent matters, please email [email or web address] or [email or web address].     76 y/o female with PMHx of DM type 2, hypothyroidism, and HTN who presented to the ED for increased SOB. Admitted with RSV. Is on methylprednisolone 40mg q12h with resultant hyperglycemia. Endocrine consulted for DM2 with steroid-induced hyperglycemia.    Poorly controlled T2DM with steroid-induced hyperglycemia  DM diagnosis: DM2 x20 years  Last A1c: 8.8  Endocrinologist: PCP only  Home DM meds: Basaglar 10 units q24h, Novolog 4 units TIDac, metformin 500mg BID  Receiving methylprednisolone 40mg q12h.  -Hold oral DM agents while inpatient  -Increase Lantus to 18 units at bedtime. DO NOT HOLD IF NPO.  -Increase Admelog to 9 units TID pre-meal. HOLD IF NPO.  -Use moderate dose Admelog correction scale pre-meal  -Use moderate dose Admelog correction scale at bedtime  -Fingerstick BG before meals and bedtime  -Goal -180  -Carbohydrate consistent diet  Discharge plan:  -Likely to discharge patient home on basal/bolus insulin plus metformin. Final regimen pending clinical course.  -Recommend routine outpatient ophthalmology and PCP f/u    HTN  -Management per primary team.    HLD  -On simvastatin 20mg daily    Hypothyroidism  TSH normal now. FT4 nml in Nov 2022.  Continue LT4 50mcg daily PO (or 37mcg IV)    Franklin Gardiner DO, Endocrinology Fellow  For follow-up questions, discharge recommendations, or new consults please call answering service at [phone] (weekdays), [phone] (nights/weekends). For nonurgent matters, please email [email or web address] or [email or web address].     78 y/o female with PMHx of DM type 2, hypothyroidism, and HTN who presented to the ED for increased SOB. Admitted with RSV. Is on methylprednisolone 40mg q12h with resultant hyperglycemia. Endocrine consulted for DM2 with steroid-induced hyperglycemia.    Poorly controlled T2DM with steroid-induced hyperglycemia  DM diagnosis: DM2 x20 years  Last A1c: 8.8  Endocrinologist: PCP only  Home DM meds: Basaglar 10 units q24h, Novolog 4 units TIDac, metformin 500mg BID  Receiving methylprednisolone 40mg q12h.  -Hold oral DM agents while inpatient  -Increase Lantus to 18 units at bedtime. DO NOT HOLD IF NPO.  -Increase Admelog to 9 units TID pre-meal. HOLD IF NPO.  -Use moderate dose Admelog correction scale pre-meal  -Use moderate dose Admelog correction scale at bedtime  -Fingerstick BG before meals and bedtime  -Goal -180  -Carbohydrate consistent diet  Discharge plan:  -Likely to discharge patient home on basal/bolus insulin plus metformin. Final regimen pending clinical course.  -Recommend routine outpatient ophthalmology and PCP f/u    HTN  goal BP in DM <130/80  on losartan 100mg  outpt mc/cr ratio      HLD  -On simvastatin 20mg daily    Hypothyroidism  TSH normal now. FT4 nml in Nov 2022.  Continue LT4 50mcg daily PO (or 37mcg IV)          Franklin Gardiner DO, Endocrinology Fellow  For follow-up questions, discharge recommendations, or new consults please call answering service at [phone] (weekdays), [phone] (nights/weekends). For nonurgent matters, please email [email or web address] or [email or web address].

## 2022-12-03 NOTE — CONSULT NOTE ADULT - ATTENDING COMMENTS
Uncontrolled DM2 exacerbated by steroids   Agree with basal/bolus plan as outlined, adjust as plan above   Endocrine team consulted for uncontrolled diabetes. Patient is high risk with high level decision making due to uncontrolled diabetes which places patient at high risk for cardiovascular and cerebrovascular events. Patient with lability of glucose requiring close monitoring and insulin adjustments.

## 2022-12-03 NOTE — CONSULT NOTE ADULT - SUBJECTIVE AND OBJECTIVE BOX
HPI:  76 y/o female with PMHx of DM type 2, hypothyroidism, and HTN who presented to the ED for increased SOB today. As per son, pt had increased SOB today but has been having SOB over the past few days. She has positive RSV in ED. She was recently admitted here for syncope and pneumonia. She had a MOLST filled last visit and per son, DNR/DNI. Also endorses increased sputum production of brownish color. Had fevers at home. Denies chest pain, diarrhea, abdominal pain, headaches, confusion.  In the ED, patient put on BiPAP and found to have elevated proBNP and leukocytosis of 14, Fever 101.6, HR 110s.  (01 Dec 2022 21:12)    Endocrine consulted for DM2 with steroid-induced hyperglycemia.    DM diagnosis: DM2 x20 years  Last A1c: 8.8  Endocrinologist: PCP only  Home DM meds: Basaglar 10 units q24h, Novolog 4 units TIDac, metformin 500mg BID  Microvascular complications: + neuropathy  Macrovascular complications: No CVA, MI, PAD  SMBG: BG 100s-300s, no lows  Symptoms: No high BG sxs  Diet at home: Better than before regarding carbs  Appetite in the hospital: Finishing meals  PMHx: As above. No known hx of HF, pancreatitis or UTIs.  Also has hypothyroidism. Takes 50mcg LT4 daily on an empty stomach.  ROS positive for sore throat, nasal congestion, cough, diarrhea.      PAST MEDICAL & SURGICAL HISTORY:  HTN (Hypertension)      Dyslipidemia      DM (Diabetes Mellitus)      Hypothyroidism      Pulmonary TB  Dx 2019, completed 10 month treatment      Cataract of left eye          FAMILY HISTORY:  Family history of heart attack (Mother)  mother  No DM      Social History: no tobacco or etoh use    Outpatient Medications: Home Medications:  levothyroxine 50 mcg (0.05 mg) oral tablet: 1 tab(s) orally once a day (08 Nov 2022 21:01)  simvastatin 20 mg oral tablet: 1 tab(s) orally once a day (at bedtime) (08 Nov 2022 21:01)      MEDICATIONS  (STANDING):  albuterol/ipratropium for Nebulization 3 milliLiter(s) Nebulizer every 4 hours  cholecalciferol 1000 Unit(s) Oral daily  dextrose 5%. 1000 milliLiter(s) (100 mL/Hr) IV Continuous <Continuous>  dextrose 5%. 1000 milliLiter(s) (50 mL/Hr) IV Continuous <Continuous>  dextrose 50% Injectable 25 Gram(s) IV Push once  dextrose 50% Injectable 12.5 Gram(s) IV Push once  dextrose 50% Injectable 25 Gram(s) IV Push once  enoxaparin Injectable 40 milliGRAM(s) SubCutaneous every 24 hours  glucagon  Injectable 1 milliGRAM(s) IntraMuscular once  influenza  Vaccine (HIGH DOSE) 0.7 milliLiter(s) IntraMuscular once  insulin glargine Injectable (LANTUS) 14 Unit(s) SubCutaneous at bedtime  insulin lispro (ADMELOG) corrective regimen sliding scale   SubCutaneous three times a day before meals  insulin lispro (ADMELOG) corrective regimen sliding scale   SubCutaneous at bedtime  insulin lispro Injectable (ADMELOG) 7 Unit(s) SubCutaneous three times a day before meals  levothyroxine Injectable 37 MICROGram(s) IV Push at bedtime  losartan 100 milliGRAM(s) Oral daily  methylPREDNISolone sodium succinate Injectable 40 milliGRAM(s) IV Push every 12 hours  pantoprazole  Injectable 40 milliGRAM(s) IV Push daily  simvastatin 20 milliGRAM(s) Oral at bedtime    MEDICATIONS  (PRN):  acetaminophen     Tablet .. 650 milliGRAM(s) Oral every 6 hours PRN Temp greater or equal to 38C (100.4F), Mild Pain (1 - 3)  aluminum hydroxide/magnesium hydroxide/simethicone Suspension 30 milliLiter(s) Oral every 4 hours PRN Dyspepsia  dextrose Oral Gel 15 Gram(s) Oral once PRN Blood Glucose LESS THAN 70 milliGRAM(s)/deciliter  melatonin 3 milliGRAM(s) Oral at bedtime PRN Insomnia  ondansetron Injectable 4 milliGRAM(s) IV Push every 8 hours PRN Nausea and/or Vomiting      Allergies    No Known Allergies    Intolerances      Review of Systems:  Constitutional:  No fever, No chills.  Eye:  No eye pain, No blurring.   Ear/Nose/Mouth/Throat:  + nasal congestion, + sore throat.   Respiratory:  No shortness of breath, + cough.   Cardiovascular:  No chest pain, No palpitations.   Gastrointestinal:  No nausea, No vomiting, + diarrhea.   Genitourinary:  No dysuria, No hematuria.   Endocrine:  No excessive thirst, No polyuria.   Musculoskeletal:  No back pain, No decreased range of motion.   Integumentary:  No rash, No skin lesion.   Neurologic:  Alert and oriented X4, No confusion, + numbness.   Additional ROS reviewed and negative except as indicated in HPI.        PHYSICAL EXAM:  VITALS: T(C): 36.7 (12-03-22 @ 11:42)  T(F): 98.1 (12-03-22 @ 11:42), Max: 98.1 (12-03-22 @ 11:42)  HR: 103 (12-03-22 @ 14:31) (87 - 114)  BP: 117/69 (12-03-22 @ 11:42) (113/58 - 120/61)  RR:  (18 - 19)  SpO2:  (95% - 100%)  Wt(kg): --  General: Frail female, No acute distress, Speaking full sentences.   Eye:  Extraocular movements are intact, No proptosis or lid lag.   HENT:  Normocephalic.   Neck:  Supple, Non-tender.   Respiratory:  Respirations are non-labored, Symmetric chest wall expansion, Breath sounds are equal.   Cardiovascular:  Normal rate, Regular rhythm, No edema.  Gastrointestinal:  Soft, Non-tender, Non-distended.   Musculoskeletal:  Normal range of motion, No gross joint swelling.   Feet:  Normal by visual exam, Normal pulses, No ulcers.   Integumentary:  Warm, dry.  Mental Status Exam:  Orientedx4, Speech clear and coherent.   Neurologic:  Alert, Orientedx4, Normal motor function, No focal deficits, Cranial Nerves II-XII are grossly intact bilaterally.   Psychiatric:  Cooperative, Appropriate mood & affect.    POCT Blood Glucose.: >600 mg/dL (12-03-22 @ 17:20)  POCT Blood Glucose.: 350 mg/dL (12-03-22 @ 13:06)  POCT Blood Glucose.: 329 mg/dL (12-03-22 @ 08:57)  POCT Blood Glucose.: 318 mg/dL (12-02-22 @ 22:26)  POCT Blood Glucose.: 281 mg/dL (12-02-22 @ 17:46)  POCT Blood Glucose.: 271 mg/dL (12-02-22 @ 17:14)  POCT Blood Glucose.: 224 mg/dL (12-02-22 @ 13:10)  POCT Blood Glucose.: 286 mg/dL (12-02-22 @ 10:01)  POCT Blood Glucose.: 293 mg/dL (12-02-22 @ 09:30)  POCT Blood Glucose.: 304 mg/dL (12-02-22 @ 06:59)  POCT Blood Glucose.: 243 mg/dL (12-02-22 @ 05:40)  POCT Blood Glucose.: 229 mg/dL (12-02-22 @ 01:53)  POCT Blood Glucose.: 323 mg/dL (12-01-22 @ 22:51)  POCT Blood Glucose.: 441 mg/dL (12-01-22 @ 19:53)  POCT Blood Glucose.: 479 mg/dL (12-01-22 @ 17:18)  POCT Blood Glucose.: 466 mg/dL (12-01-22 @ 16:05)  POCT Blood Glucose.: 256 mg/dL (12-01-22 @ 11:40)                            9.1    13.08 )-----------( 245      ( 03 Dec 2022 06:24 )             33.2       12-03    139  |  94<L>  |  28<H>  ----------------------------<  343<H>  4.5   |  32<H>  |  0.83    eGFR: 72    Ca    9.1      12-03  Mg     2.20     12-03  Phos  2.8     12-03    TPro  7.3  /  Alb  4.1  /  TBili  0.4  /  DBili  x   /  AST  22  /  ALT  12  /  AlkPhos  79  12-02      Thyroid Function Tests:  12-01 @ 13:38 TSH 1.11 FreeT4 -- T3 -- Anti TPO -- Anti Thyroglobulin Ab -- TSI --  11-10 @ 06:48 TSH 1.08 FreeT4 1.3 T3 -- Anti TPO -- Anti Thyroglobulin Ab -- TSI --              Radiology:

## 2022-12-04 LAB
ANION GAP SERPL CALC-SCNC: 10 MMOL/L — SIGNIFICANT CHANGE UP (ref 7–14)
BUN SERPL-MCNC: 44 MG/DL — HIGH (ref 7–23)
CALCIUM SERPL-MCNC: 9.2 MG/DL — SIGNIFICANT CHANGE UP (ref 8.4–10.5)
CHLORIDE SERPL-SCNC: 100 MMOL/L — SIGNIFICANT CHANGE UP (ref 98–107)
CO2 SERPL-SCNC: 31 MMOL/L — SIGNIFICANT CHANGE UP (ref 22–31)
CREAT SERPL-MCNC: 0.9 MG/DL — SIGNIFICANT CHANGE UP (ref 0.5–1.3)
EGFR: 65 ML/MIN/1.73M2 — SIGNIFICANT CHANGE UP
GLUCOSE BLDC GLUCOMTR-MCNC: 196 MG/DL — HIGH (ref 70–99)
GLUCOSE BLDC GLUCOMTR-MCNC: 261 MG/DL — HIGH (ref 70–99)
GLUCOSE BLDC GLUCOMTR-MCNC: 272 MG/DL — HIGH (ref 70–99)
GLUCOSE BLDC GLUCOMTR-MCNC: 279 MG/DL — HIGH (ref 70–99)
GLUCOSE SERPL-MCNC: 346 MG/DL — HIGH (ref 70–99)
HCT VFR BLD CALC: 32.5 % — LOW (ref 34.5–45)
HCT VFR BLD CALC: 33.9 % — LOW (ref 34.5–45)
HGB BLD-MCNC: 8.7 G/DL — LOW (ref 11.5–15.5)
HGB BLD-MCNC: 9.1 G/DL — LOW (ref 11.5–15.5)
MAGNESIUM SERPL-MCNC: 2.3 MG/DL — SIGNIFICANT CHANGE UP (ref 1.6–2.6)
MCHC RBC-ENTMCNC: 20.2 PG — LOW (ref 27–34)
MCHC RBC-ENTMCNC: 20.3 PG — LOW (ref 27–34)
MCHC RBC-ENTMCNC: 26.8 GM/DL — LOW (ref 32–36)
MCHC RBC-ENTMCNC: 26.8 GM/DL — LOW (ref 32–36)
MCV RBC AUTO: 75.6 FL — LOW (ref 80–100)
MCV RBC AUTO: 75.7 FL — LOW (ref 80–100)
NRBC # BLD: 0 /100 WBCS — SIGNIFICANT CHANGE UP (ref 0–0)
NRBC # BLD: 0 /100 WBCS — SIGNIFICANT CHANGE UP (ref 0–0)
NRBC # FLD: 0 K/UL — SIGNIFICANT CHANGE UP (ref 0–0)
NRBC # FLD: 0 K/UL — SIGNIFICANT CHANGE UP (ref 0–0)
PHOSPHATE SERPL-MCNC: 2.1 MG/DL — LOW (ref 2.5–4.5)
PLATELET # BLD AUTO: 259 K/UL — SIGNIFICANT CHANGE UP (ref 150–400)
PLATELET # BLD AUTO: 265 K/UL — SIGNIFICANT CHANGE UP (ref 150–400)
POTASSIUM SERPL-MCNC: 4.5 MMOL/L — SIGNIFICANT CHANGE UP (ref 3.5–5.3)
POTASSIUM SERPL-SCNC: 4.5 MMOL/L — SIGNIFICANT CHANGE UP (ref 3.5–5.3)
RBC # BLD: 4.3 M/UL — SIGNIFICANT CHANGE UP (ref 3.8–5.2)
RBC # BLD: 4.48 M/UL — SIGNIFICANT CHANGE UP (ref 3.8–5.2)
RBC # FLD: 22.7 % — HIGH (ref 10.3–14.5)
RBC # FLD: 23.2 % — HIGH (ref 10.3–14.5)
SODIUM SERPL-SCNC: 141 MMOL/L — SIGNIFICANT CHANGE UP (ref 135–145)
WBC # BLD: 12.12 K/UL — HIGH (ref 3.8–10.5)
WBC # BLD: 12.21 K/UL — HIGH (ref 3.8–10.5)
WBC # FLD AUTO: 12.12 K/UL — HIGH (ref 3.8–10.5)
WBC # FLD AUTO: 12.21 K/UL — HIGH (ref 3.8–10.5)

## 2022-12-04 RX ORDER — INSULIN LISPRO 100/ML
12 VIAL (ML) SUBCUTANEOUS
Refills: 0 | Status: DISCONTINUED | OUTPATIENT
Start: 2022-12-04 | End: 2022-12-05

## 2022-12-04 RX ORDER — INSULIN GLARGINE 100 [IU]/ML
25 INJECTION, SOLUTION SUBCUTANEOUS AT BEDTIME
Refills: 0 | Status: DISCONTINUED | OUTPATIENT
Start: 2022-12-04 | End: 2022-12-05

## 2022-12-04 RX ADMIN — Medication 3 MILLILITER(S): at 15:35

## 2022-12-04 RX ADMIN — Medication 6: at 09:27

## 2022-12-04 RX ADMIN — Medication 6: at 18:16

## 2022-12-04 RX ADMIN — Medication 12 UNIT(S): at 18:15

## 2022-12-04 RX ADMIN — Medication 30 MILLIGRAM(S): at 18:16

## 2022-12-04 RX ADMIN — Medication 12 UNIT(S): at 13:13

## 2022-12-04 RX ADMIN — LOSARTAN POTASSIUM 100 MILLIGRAM(S): 100 TABLET, FILM COATED ORAL at 05:10

## 2022-12-04 RX ADMIN — Medication 37 MICROGRAM(S): at 21:33

## 2022-12-04 RX ADMIN — Medication 3 MILLIGRAM(S): at 21:13

## 2022-12-04 RX ADMIN — Medication 9 UNIT(S): at 09:27

## 2022-12-04 RX ADMIN — Medication 3 MILLILITER(S): at 01:37

## 2022-12-04 RX ADMIN — Medication 3 MILLILITER(S): at 21:56

## 2022-12-04 RX ADMIN — SIMVASTATIN 20 MILLIGRAM(S): 20 TABLET, FILM COATED ORAL at 21:13

## 2022-12-04 RX ADMIN — Medication 3 MILLILITER(S): at 11:08

## 2022-12-04 RX ADMIN — Medication 6: at 13:13

## 2022-12-04 RX ADMIN — PANTOPRAZOLE SODIUM 40 MILLIGRAM(S): 20 TABLET, DELAYED RELEASE ORAL at 12:28

## 2022-12-04 RX ADMIN — Medication 1000 UNIT(S): at 12:26

## 2022-12-04 RX ADMIN — ENOXAPARIN SODIUM 40 MILLIGRAM(S): 100 INJECTION SUBCUTANEOUS at 13:52

## 2022-12-04 RX ADMIN — Medication 3 MILLILITER(S): at 04:35

## 2022-12-04 RX ADMIN — INSULIN GLARGINE 25 UNIT(S): 100 INJECTION, SOLUTION SUBCUTANEOUS at 21:22

## 2022-12-04 NOTE — CHART NOTE - NSCHARTNOTEFT_GEN_A_CORE
Methylprednisolone reduced to 30mg q12h.    Based on BG trends, recommend increasing Lantus to 25 units qhs (DO NOT HOLD IF NPO) and Admelog to 12 units TIDac (HOLD IF NPO). Use moderate dose Admelog correction scale qac and separate moderate dose Admelog correction scale qhs.    Franklin Gardiner DO, Endocrinology Fellow  For follow-up questions, discharge recommendations, or new consults please call answering service at [phone] (weekdays), [phone] (nights/weekends). For nonurgent matters, please email [email or web address] or [email or web address].

## 2022-12-04 NOTE — PROGRESS NOTE ADULT - SUBJECTIVE AND OBJECTIVE BOX
Forrest Rosenthal MD  Interventional Cardiology / Advance Heart Failure and Cardiac Transplant Specialist  [place] Office : [address] [place] [address] [place] [address]  Tel:   [place] Office : [address] [facility] [place] [address]  Tel: [phone]       Pt is lying in bed comfortable not in distress, no chest pains some SOB no palpitations  	  MEDICATIONS:  enoxaparin Injectable 40 milliGRAM(s) SubCutaneous every 24 hours  losartan 100 milliGRAM(s) Oral daily      albuterol/ipratropium for Nebulization 3 milliLiter(s) Nebulizer every 4 hours    acetaminophen     Tablet .. 650 milliGRAM(s) Oral every 6 hours PRN  melatonin 3 milliGRAM(s) Oral at bedtime PRN  ondansetron Injectable 4 milliGRAM(s) IV Push every 8 hours PRN    aluminum hydroxide/magnesium hydroxide/simethicone Suspension 30 milliLiter(s) Oral every 4 hours PRN  pantoprazole  Injectable 40 milliGRAM(s) IV Push daily    dextrose 50% Injectable 25 Gram(s) IV Push once  dextrose 50% Injectable 12.5 Gram(s) IV Push once  dextrose 50% Injectable 25 Gram(s) IV Push once  dextrose Oral Gel 15 Gram(s) Oral once PRN  glucagon  Injectable 1 milliGRAM(s) IntraMuscular once  insulin glargine Injectable (LANTUS) 25 Unit(s) SubCutaneous at bedtime  insulin lispro (ADMELOG) corrective regimen sliding scale   SubCutaneous three times a day before meals  insulin lispro (ADMELOG) corrective regimen sliding scale   SubCutaneous at bedtime  insulin lispro Injectable (ADMELOG) 12 Unit(s) SubCutaneous three times a day before meals  levothyroxine Injectable 37 MICROGram(s) IV Push at bedtime  methylPREDNISolone sodium succinate Injectable 30 milliGRAM(s) IV Push every 12 hours  simvastatin 20 milliGRAM(s) Oral at bedtime    cholecalciferol 1000 Unit(s) Oral daily  dextrose 5%. 1000 milliLiter(s) IV Continuous <Continuous>  dextrose 5%. 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      Cataract of left eye          SOCIAL HISTORY: Substance Use (street drugs): ( x ) never used  (  ) other:    FAMILY HISTORY:  Family history of heart attack (Mother)  mother           PHYSICAL EXAM:  T(C): 36.4 (12-04-22 @ 12:23), Max: 36.6 (12-03-22 @ 16:45)  HR: 97 (12-04-22 @ 12:23) (88 - 102)  BP: 112/61 (12-04-22 @ 12:23) (112/61 - 131/71)  RR: 18 (12-04-22 @ 12:23) (16 - 18)  SpO2: 94% (12-04-22 @ 12:23) (92% - 100%)  Wt(kg): --  I&O's Summary    03 Dec 2022 07:01  -  04 Dec 2022 07:00  --------------------------------------------------------  IN: 527 mL / OUT: 620 mL / NET: -93 mL            EYES:   PERRLA   ENMT:   Moist mucous membranes, Good dentition, No lesions  Cardiovascular: Normal S1 S2, No JVD, No murmurs, No edema  Respiratory: b/l rhonchi  Gastrointestinal:  Soft, Non-tender, + BS	  Extremities: no edema                                    8.7    12.21 )-----------( 259      ( 04 Dec 2022 06:02 )             32.5     12-04    141  |  100  |  44<H>  ----------------------------<  346<H>  4.5   |  31  |  0.90    Ca    9.2      04 Dec 2022 06:02  Phos  2.1     12-04  Mg     2.30     12-04      proBNP:   Lipid Profile:   HgA1c:   TSH:     Consultant(s) Notes Reviewed:  [x ] YES  [ ] NO    Care Discussed with Consultants/Other Providers [ x] YES  [ ] NO    Imaging Personally Reviewed independently:  [x] YES  [ ] NO    All labs, radiologic studies, vitals, orders and medications list reviewed. Patient is seen and examined at bedside. Case discussed with medical team.

## 2022-12-04 NOTE — PHYSICAL THERAPY INITIAL EVALUATION ADULT - PERTINENT HX OF CURRENT PROBLEM, REHAB EVAL
Patient is 76 year old female from [place] with PMH pulmonary TB (2019, treated 10 months), HTN, DM2 who was BIBEMS for SOB

## 2022-12-04 NOTE — PROGRESS NOTE ADULT - SUBJECTIVE AND OBJECTIVE BOX
SUBJECTIVE / OVERNIGHT EVENTS:pt seen and examined, pts family next to pts bedside  22    MEDICATIONS  (STANDING):  albuterol/ipratropium for Nebulization 3 milliLiter(s) Nebulizer every 4 hours  cholecalciferol 1000 Unit(s) Oral daily  dextrose 5%. 1000 milliLiter(s) (100 mL/Hr) IV Continuous <Continuous>  dextrose 5%. 1000 milliLiter(s) (50 mL/Hr) IV Continuous <Continuous>  dextrose 50% Injectable 25 Gram(s) IV Push once  dextrose 50% Injectable 12.5 Gram(s) IV Push once  dextrose 50% Injectable 25 Gram(s) IV Push once  enoxaparin Injectable 40 milliGRAM(s) SubCutaneous every 24 hours  glucagon  Injectable 1 milliGRAM(s) IntraMuscular once  influenza  Vaccine (HIGH DOSE) 0.7 milliLiter(s) IntraMuscular once  insulin glargine Injectable (LANTUS) 25 Unit(s) SubCutaneous at bedtime  insulin lispro (ADMELOG) corrective regimen sliding scale   SubCutaneous three times a day before meals  insulin lispro (ADMELOG) corrective regimen sliding scale   SubCutaneous at bedtime  insulin lispro Injectable (ADMELOG) 12 Unit(s) SubCutaneous three times a day before meals  levothyroxine Injectable 37 MICROGram(s) IV Push at bedtime  losartan 100 milliGRAM(s) Oral daily  methylPREDNISolone sodium succinate Injectable 30 milliGRAM(s) IV Push every 12 hours  pantoprazole  Injectable 40 milliGRAM(s) IV Push daily  simvastatin 20 milliGRAM(s) Oral at bedtime    MEDICATIONS  (PRN):  acetaminophen     Tablet .. 650 milliGRAM(s) Oral every 6 hours PRN Temp greater or equal to 38C (100.4F), Mild Pain (1 - 3)  aluminum hydroxide/magnesium hydroxide/simethicone Suspension 30 milliLiter(s) Oral every 4 hours PRN Dyspepsia  dextrose Oral Gel 15 Gram(s) Oral once PRN Blood Glucose LESS THAN 70 milliGRAM(s)/deciliter  melatonin 3 milliGRAM(s) Oral at bedtime PRN Insomnia  ondansetron Injectable 4 milliGRAM(s) IV Push every 8 hours PRN Nausea and/or Vomiting    Vital Signs Last 24 Hrs  T(C): 36.6 (22 @ 17:27), Max: 36.6 (22 @ 17:27)  T(F): 97.9 (22 @ 17:27), Max: 97.9 (22 @ 17:27)  HR: 95 (22 @ 17:27) (88 - 102)  BP: 112/68 (22 @ 17:27) (112/61 - 131/71)  BP(mean): --  RR: 18 (22 @ 17:27) (16 - 18)  SpO2: 93% (22 @ 17:27) (92% - 100%)            PHYSICAL EXAM:  GENERAL: NAD  EYES: EOMI, PERRLA  NECK: Supple, No JVD  CHEST/LUNG: dec breath sounds at bases  HEART:  S1 , S2 +  ABDOMEN: soft , bs+  EXTREMITIES:  no edema  NEUROLOGY:alert awake    LABS:      141  |  100  |  44<H>  ----------------------------<  346<H>  4.5   |  31  |  0.90    Ca    9.2      04 Dec 2022 06:02  Phos  2.1       Mg     2.30     12      Creatinine Trend: 0.90 <--, 0.83 <--, 0.86 <--, 0.71 <--                        8.7    12.21 )-----------( 259      ( 04 Dec 2022 06:02 )             32.5     Urine Studies:  Urinalysis Basic - ( 01 Dec 2022 16:30 )    Color: Light Yellow / Appearance: Clear / S.016 / pH:   Gluc:  / Ketone: Negative  / Bili: Negative / Urobili: <2 mg/dL   Blood:  / Protein: 30 mg/dL / Nitrite: Negative   Leuk Esterase: Negative / RBC: 9 /HPF / WBC 2 /HPF   Sq Epi:  / Non Sq Epi: 3 /HPF / Bacteria: Negative                        RADIOLOGY & ADDITIONAL TESTS:    Imaging Personally Reviewed:yes    Consultant(s) Notes Reviewed:  yes    Care Discussed with Consultants/Other Providers:yes

## 2022-12-05 LAB
ANION GAP SERPL CALC-SCNC: 9 MMOL/L — SIGNIFICANT CHANGE UP (ref 7–14)
BUN SERPL-MCNC: 38 MG/DL — HIGH (ref 7–23)
CALCIUM SERPL-MCNC: 9.2 MG/DL — SIGNIFICANT CHANGE UP (ref 8.4–10.5)
CHLORIDE SERPL-SCNC: 101 MMOL/L — SIGNIFICANT CHANGE UP (ref 98–107)
CO2 SERPL-SCNC: 33 MMOL/L — HIGH (ref 22–31)
CREAT SERPL-MCNC: 0.79 MG/DL — SIGNIFICANT CHANGE UP (ref 0.5–1.3)
EGFR: 76 ML/MIN/1.73M2 — SIGNIFICANT CHANGE UP
GLUCOSE BLDC GLUCOMTR-MCNC: 167 MG/DL — HIGH (ref 70–99)
GLUCOSE BLDC GLUCOMTR-MCNC: 270 MG/DL — HIGH (ref 70–99)
GLUCOSE BLDC GLUCOMTR-MCNC: 280 MG/DL — HIGH (ref 70–99)
GLUCOSE BLDC GLUCOMTR-MCNC: 393 MG/DL — HIGH (ref 70–99)
GLUCOSE SERPL-MCNC: 262 MG/DL — HIGH (ref 70–99)
HCT VFR BLD CALC: 33.2 % — LOW (ref 34.5–45)
HGB BLD-MCNC: 8.7 G/DL — LOW (ref 11.5–15.5)
MAGNESIUM SERPL-MCNC: 2.1 MG/DL — SIGNIFICANT CHANGE UP (ref 1.6–2.6)
MCHC RBC-ENTMCNC: 20 PG — LOW (ref 27–34)
MCHC RBC-ENTMCNC: 26.2 GM/DL — LOW (ref 32–36)
MCV RBC AUTO: 76.1 FL — LOW (ref 80–100)
NRBC # BLD: 0 /100 WBCS — SIGNIFICANT CHANGE UP (ref 0–0)
NRBC # FLD: 0 K/UL — SIGNIFICANT CHANGE UP (ref 0–0)
PHOSPHATE SERPL-MCNC: 2.2 MG/DL — LOW (ref 2.5–4.5)
PLATELET # BLD AUTO: 249 K/UL — SIGNIFICANT CHANGE UP (ref 150–400)
POTASSIUM SERPL-MCNC: 4.5 MMOL/L — SIGNIFICANT CHANGE UP (ref 3.5–5.3)
POTASSIUM SERPL-SCNC: 4.5 MMOL/L — SIGNIFICANT CHANGE UP (ref 3.5–5.3)
RBC # BLD: 4.36 M/UL — SIGNIFICANT CHANGE UP (ref 3.8–5.2)
RBC # FLD: 23.1 % — HIGH (ref 10.3–14.5)
SODIUM SERPL-SCNC: 143 MMOL/L — SIGNIFICANT CHANGE UP (ref 135–145)
WBC # BLD: 10.11 K/UL — SIGNIFICANT CHANGE UP (ref 3.8–10.5)
WBC # FLD AUTO: 10.11 K/UL — SIGNIFICANT CHANGE UP (ref 3.8–10.5)

## 2022-12-05 PROCEDURE — 99232 SBSQ HOSP IP/OBS MODERATE 35: CPT

## 2022-12-05 RX ORDER — ACETYLCYSTEINE 200 MG/ML
4 VIAL (ML) MISCELLANEOUS EVERY 12 HOURS
Refills: 0 | Status: DISCONTINUED | OUTPATIENT
Start: 2022-12-05 | End: 2022-12-10

## 2022-12-05 RX ORDER — INSULIN LISPRO 100/ML
15 VIAL (ML) SUBCUTANEOUS
Refills: 0 | Status: DISCONTINUED | OUTPATIENT
Start: 2022-12-05 | End: 2022-12-07

## 2022-12-05 RX ORDER — INSULIN GLARGINE 100 [IU]/ML
30 INJECTION, SOLUTION SUBCUTANEOUS AT BEDTIME
Refills: 0 | Status: DISCONTINUED | OUTPATIENT
Start: 2022-12-05 | End: 2022-12-07

## 2022-12-05 RX ORDER — ACETYLCYSTEINE 200 MG/ML
4 VIAL (ML) MISCELLANEOUS
Refills: 0 | Status: DISCONTINUED | OUTPATIENT
Start: 2022-12-05 | End: 2022-12-05

## 2022-12-05 RX ADMIN — Medication 2: at 21:56

## 2022-12-05 RX ADMIN — Medication 3 MILLILITER(S): at 15:26

## 2022-12-05 RX ADMIN — Medication 3 MILLILITER(S): at 19:42

## 2022-12-05 RX ADMIN — Medication 37 MICROGRAM(S): at 21:46

## 2022-12-05 RX ADMIN — LOSARTAN POTASSIUM 100 MILLIGRAM(S): 100 TABLET, FILM COATED ORAL at 06:30

## 2022-12-05 RX ADMIN — Medication 3 MILLILITER(S): at 04:34

## 2022-12-05 RX ADMIN — Medication 12 UNIT(S): at 09:06

## 2022-12-05 RX ADMIN — Medication 2: at 18:05

## 2022-12-05 RX ADMIN — Medication 30 MILLIGRAM(S): at 06:29

## 2022-12-05 RX ADMIN — Medication 30 MILLIGRAM(S): at 18:01

## 2022-12-05 RX ADMIN — PANTOPRAZOLE SODIUM 40 MILLIGRAM(S): 20 TABLET, DELAYED RELEASE ORAL at 13:58

## 2022-12-05 RX ADMIN — Medication 15 UNIT(S): at 13:09

## 2022-12-05 RX ADMIN — Medication 1000 UNIT(S): at 13:08

## 2022-12-05 RX ADMIN — Medication 3 MILLILITER(S): at 07:41

## 2022-12-05 RX ADMIN — Medication 15 UNIT(S): at 18:06

## 2022-12-05 RX ADMIN — Medication 3 MILLILITER(S): at 10:41

## 2022-12-05 RX ADMIN — Medication 3 MILLIGRAM(S): at 21:04

## 2022-12-05 RX ADMIN — Medication 6: at 09:06

## 2022-12-05 RX ADMIN — Medication 3 MILLILITER(S): at 00:29

## 2022-12-05 RX ADMIN — Medication 10: at 13:08

## 2022-12-05 RX ADMIN — SIMVASTATIN 20 MILLIGRAM(S): 20 TABLET, FILM COATED ORAL at 21:04

## 2022-12-05 RX ADMIN — ENOXAPARIN SODIUM 40 MILLIGRAM(S): 100 INJECTION SUBCUTANEOUS at 13:59

## 2022-12-05 RX ADMIN — INSULIN GLARGINE 30 UNIT(S): 100 INJECTION, SOLUTION SUBCUTANEOUS at 21:56

## 2022-12-05 NOTE — PROGRESS NOTE ADULT - ASSESSMENT
75 y/o F from [place] with PMH pulmonary TB (2019, treated 10 months), HTN, DM2 who was BIBEMS for SOB    EKG: NSR no ischemic changes      1. RSV pneumonia    - on vancomycin and zosyn   -echo normal LV systolic function, no WMA. mild diastolic dysfunction. decreased RV function   -monitor on tele   - on IV steroids     2. HTN  -controlled  -c/w losartan  -continue to monitor BP     3. DVT prophylaxis  -lovenox subq

## 2022-12-05 NOTE — PROGRESS NOTE ADULT - SUBJECTIVE AND OBJECTIVE BOX
Forrest Rosenthal MD  Interventional Cardiology / Endovascular Specialist  [place] Office : [address] [place] [address] [place] [address]  Tel:   [place] Office : [address] [facility] [place] [address]  Tel: [phone]    Pt is lying in bed comfortable not in distress, no chest pains some SOB no palpitations  	  MEDICATIONS:  enoxaparin Injectable 40 milliGRAM(s) SubCutaneous every 24 hours  losartan 100 milliGRAM(s) Oral daily  acetylcysteine 20%  Inhalation 4 milliLiter(s) Inhalation every 12 hours  albuterol/ipratropium for Nebulization 3 milliLiter(s) Nebulizer every 4 hours  acetaminophen     Tablet .. 650 milliGRAM(s) Oral every 6 hours PRN  melatonin 3 milliGRAM(s) Oral at bedtime PRN  ondansetron Injectable 4 milliGRAM(s) IV Push every 8 hours PRN  aluminum hydroxide/magnesium hydroxide/simethicone Suspension 30 milliLiter(s) Oral every 4 hours PRN  pantoprazole  Injectable 40 milliGRAM(s) IV Push daily  dextrose 50% Injectable 25 Gram(s) IV Push once  dextrose 50% Injectable 12.5 Gram(s) IV Push once  dextrose 50% Injectable 25 Gram(s) IV Push once  dextrose Oral Gel 15 Gram(s) Oral once PRN  glucagon  Injectable 1 milliGRAM(s) IntraMuscular once  insulin glargine Injectable (LANTUS) 30 Unit(s) SubCutaneous at bedtime  insulin lispro (ADMELOG) corrective regimen sliding scale   SubCutaneous three times a day before meals  insulin lispro (ADMELOG) corrective regimen sliding scale   SubCutaneous at bedtime  insulin lispro Injectable (ADMELOG) 15 Unit(s) SubCutaneous three times a day before meals  levothyroxine Injectable 37 MICROGram(s) IV Push at bedtime  methylPREDNISolone sodium succinate Injectable 30 milliGRAM(s) IV Push every 12 hours  simvastatin 20 milliGRAM(s) Oral at bedtime    cholecalciferol 1000 Unit(s) Oral daily  dextrose 5%. 1000 milliLiter(s) IV Continuous <Continuous>  dextrose 5%. 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 No nausea, vomiting, or hematemesis; No diarrhea or constipation. No melena or hematochezia.  GENITOURINARY: No dysuria, frequency, hematuria, or incontinence  NEUROLOGICAL: No headaches, memory loss, loss of strength, numbness, or tremors  ENDOCRINE: No heat or cold intolerance; No hair loss  MUSCULOSKELETAL: No joint pain or swelling; No muscle, back, or extremity pain  PSYCHIATRIC: No depression, anxiety, mood swings, or difficulty sleeping  HEME/LYMPH: No easy bruising, or bleeding gums  All others negative    PHYSICAL EXAM:  T(C): 37 (12-05-22 @ 14:00), Max: 37 (12-05-22 @ 14:00)  HR: 97 (12-05-22 @ 15:27) (90 - 110)  BP: 126/80 (12-05-22 @ 14:00) (116/63 - 146/82)  RR: 20 (12-05-22 @ 14:00) (18 - 22)  SpO2: 100% (12-05-22 @ 15:27) (94% - 100%)  Wt(kg): --  I&O's Summary    04 Dec 2022 07:01  -  05 Dec 2022 07:00  --------------------------------------------------------  IN: 377 mL / OUT: 650 mL / NET: -273 mL    05 Dec 2022 07:01  -  05 Dec 2022 19:50  --------------------------------------------------------  IN: 0 mL / OUT: 1000 mL / NET: -1000 mL        EYES:   PERRLA   ENMT:   Moist mucous membranes, Good dentition, No lesions  Cardiovascular: Normal S1 S2, No JVD, No murmurs, No edema  Respiratory: b/l rhonchi  Gastrointestinal:  Soft, Non-tender, + BS	  Extremities: no edema                                  8.7    10.11 )-----------( 249      ( 05 Dec 2022 05:55 )             33.2     12-05    143  |  101  |  38<H>  ----------------------------<  262<H>  4.5   |  33<H>  |  0.79    Ca    9.2      05 Dec 2022 05:55  Phos  2.2     12-05  Mg     2.10     12-05      proBNP:   Lipid Profile:   HgA1c:   TSH:     Consultant(s) Notes Reviewed:  [x ] YES  [ ] NO    Care Discussed with Consultants/Other Providers [ x] YES  [ ] NO    Imaging Personally Reviewed independently:  [x] YES  [ ] NO    All labs, radiologic studies, vitals, orders and medications list reviewed. Patient is seen and examined at bedside. Case discussed with medical team.

## 2022-12-05 NOTE — PROGRESS NOTE ADULT - SUBJECTIVE AND OBJECTIVE BOX
History:  patient has fair appeite  No hypoglycemia  on IV hydrocortisone    MEDICATIONS  (STANDING):  acetylcysteine 10%  Inhalation 4 milliLiter(s) Inhalation two times a day  albuterol/ipratropium for Nebulization 3 milliLiter(s) Nebulizer every 4 hours  cholecalciferol 1000 Unit(s) Oral daily  dextrose 5%. 1000 milliLiter(s) (100 mL/Hr) IV Continuous <Continuous>  dextrose 5%. 1000 milliLiter(s) (50 mL/Hr) IV Continuous <Continuous>  dextrose 50% Injectable 25 Gram(s) IV Push once  dextrose 50% Injectable 12.5 Gram(s) IV Push once  dextrose 50% Injectable 25 Gram(s) IV Push once  enoxaparin Injectable 40 milliGRAM(s) SubCutaneous every 24 hours  glucagon  Injectable 1 milliGRAM(s) IntraMuscular once  influenza  Vaccine (HIGH DOSE) 0.7 milliLiter(s) IntraMuscular once  insulin glargine Injectable (LANTUS) 30 Unit(s) SubCutaneous at bedtime  insulin lispro (ADMELOG) corrective regimen sliding scale   SubCutaneous three times a day before meals  insulin lispro (ADMELOG) corrective regimen sliding scale   SubCutaneous at bedtime  insulin lispro Injectable (ADMELOG) 15 Unit(s) SubCutaneous three times a day before meals  levothyroxine Injectable 37 MICROGram(s) IV Push at bedtime  losartan 100 milliGRAM(s) Oral daily  methylPREDNISolone sodium succinate Injectable 30 milliGRAM(s) IV Push every 12 hours  pantoprazole  Injectable 40 milliGRAM(s) IV Push daily  simvastatin 20 milliGRAM(s) Oral at bedtime    MEDICATIONS  (PRN):  acetaminophen     Tablet .. 650 milliGRAM(s) Oral every 6 hours PRN Temp greater or equal to 38C (100.4F), Mild Pain (1 - 3)  aluminum hydroxide/magnesium hydroxide/simethicone Suspension 30 milliLiter(s) Oral every 4 hours PRN Dyspepsia  dextrose Oral Gel 15 Gram(s) Oral once PRN Blood Glucose LESS THAN 70 milliGRAM(s)/deciliter  melatonin 3 milliGRAM(s) Oral at bedtime PRN Insomnia  ondansetron Injectable 4 milliGRAM(s) IV Push every 8 hours PRN Nausea and/or Vomiting      Allergies    No Known Allergies    Intolerances      Review of Systems:  Constitutional: No fever  ALL OTHER SYSTEMS REVIEWED AND NEGATIVE        PHYSICAL EXAM:  VITALS: T(C): 37 (12-05-22 @ 14:00)  T(F): 98.6 (12-05-22 @ 14:00), Max: 98.6 (12-05-22 @ 14:00)  HR: 97 (12-05-22 @ 15:27) (90 - 110)  BP: 126/80 (12-05-22 @ 14:00) (112/68 - 146/82)  RR:  (18 - 22)  SpO2:  (80% - 100%)  Wt(kg): --  GENERAL: NAD, well-developed  GI: Soft, nontender, non distended  SKIN: Dry, intact, No rashes or lesions  PSYCH: Alert and oriented x 3, normal affect, normal mood      CAPILLARY BLOOD GLUCOSE    POCT Blood Glucose.: 393 mg/dL (05 Dec 2022 13:06)  POCT Blood Glucose.: 280 mg/dL (05 Dec 2022 08:37)  POCT Blood Glucose.: 196 mg/dL (04 Dec 2022 20:58)  POCT Blood Glucose.: 279 mg/dL (04 Dec 2022 17:54)      12-05    143  |  101  |  38<H>  ----------------------------<  262<H>  4.5   |  33<H>  |  0.79    eGFR: 76    Ca    9.2      12-05  Mg     2.10     12-05  Phos  2.2     12-05      A1C with Estimated Average Glucose (12.01.22 @ 12:00)    A1C with Estimated Average Glucose Result: 8.8 %    Estimated Average Glucose: 206          Thyroid Function Tests:  12-01 @ 13:38 TSH 1.11 FreeT4 -- T3 -- Anti TPO -- Anti Thyroglobulin Ab -- TSI --  11-10 @ 06:48 TSH 1.08 FreeT4 1.3 T3 -- Anti TPO -- Anti Thyroglobulin Ab -- TSI --

## 2022-12-05 NOTE — PROGRESS NOTE ADULT - SUBJECTIVE AND OBJECTIVE BOX
SUBJECTIVE / OVERNIGHT EVENTS:pt seen and examined, pts family next to pts bedside  22    MEDICATIONS  (STANDING):  acetylcysteine 10%  Inhalation 4 milliLiter(s) Inhalation two times a day  albuterol/ipratropium for Nebulization 3 milliLiter(s) Nebulizer every 4 hours  cholecalciferol 1000 Unit(s) Oral daily  dextrose 5%. 1000 milliLiter(s) (100 mL/Hr) IV Continuous <Continuous>  dextrose 5%. 1000 milliLiter(s) (50 mL/Hr) IV Continuous <Continuous>  dextrose 50% Injectable 25 Gram(s) IV Push once  dextrose 50% Injectable 12.5 Gram(s) IV Push once  dextrose 50% Injectable 25 Gram(s) IV Push once  enoxaparin Injectable 40 milliGRAM(s) SubCutaneous every 24 hours  glucagon  Injectable 1 milliGRAM(s) IntraMuscular once  influenza  Vaccine (HIGH DOSE) 0.7 milliLiter(s) IntraMuscular once  insulin glargine Injectable (LANTUS) 30 Unit(s) SubCutaneous at bedtime  insulin lispro (ADMELOG) corrective regimen sliding scale   SubCutaneous three times a day before meals  insulin lispro (ADMELOG) corrective regimen sliding scale   SubCutaneous at bedtime  insulin lispro Injectable (ADMELOG) 15 Unit(s) SubCutaneous three times a day before meals  levothyroxine Injectable 37 MICROGram(s) IV Push at bedtime  losartan 100 milliGRAM(s) Oral daily  methylPREDNISolone sodium succinate Injectable 30 milliGRAM(s) IV Push every 12 hours  pantoprazole  Injectable 40 milliGRAM(s) IV Push daily  simvastatin 20 milliGRAM(s) Oral at bedtime    MEDICATIONS  (PRN):  acetaminophen     Tablet .. 650 milliGRAM(s) Oral every 6 hours PRN Temp greater or equal to 38C (100.4F), Mild Pain (1 - 3)  aluminum hydroxide/magnesium hydroxide/simethicone Suspension 30 milliLiter(s) Oral every 4 hours PRN Dyspepsia  dextrose Oral Gel 15 Gram(s) Oral once PRN Blood Glucose LESS THAN 70 milliGRAM(s)/deciliter  melatonin 3 milliGRAM(s) Oral at bedtime PRN Insomnia  ondansetron Injectable 4 milliGRAM(s) IV Push every 8 hours PRN Nausea and/or Vomiting    Vital Signs Last 24 Hrs  T(C): 37 (22 @ 14:00), Max: 37 (22 @ 14:00)  T(F): 98.6 (22 @ 14:00), Max: 98.6 (22 @ 14:00)  HR: 97 (22 @ 15:27) (90 - 110)  BP: 126/80 (22 @ 14:00) (112/68 - 146/82)  BP(mean): --  RR: 20 (22 @ 14:00) (18 - 22)  SpO2: 100% (22 @ 15:27) (80% - 100%)          PHYSICAL EXAM:  GENERAL: NAD  EYES: EOMI, PERRLA  NECK: Supple, No JVD  CHEST/LUNG: dec breath sounds at bases  HEART:  S1 , S2 +  ABDOMEN: soft , bs+  EXTREMITIES:  no edema  NEUROLOGY:alert awake    LABS:      143  |  101  |  38<H>  ----------------------------<  262<H>  4.5   |  33<H>  |  0.79    Ca    9.2      05 Dec 2022 05:55  Phos  2.2     12  Mg     2.10     12-05      Creatinine Trend: 0.79 <--, 0.90 <--, 0.83 <--, 0.86 <--, 0.71 <--                        8.7    10.11 )-----------( 249      ( 05 Dec 2022 05:55 )             33.2     Urine Studies:  Urinalysis Basic - ( 01 Dec 2022 16:30 )    Color: Light Yellow / Appearance: Clear / S.016 / pH:   Gluc:  / Ketone: Negative  / Bili: Negative / Urobili: <2 mg/dL   Blood:  / Protein: 30 mg/dL / Nitrite: Negative   Leuk Esterase: Negative / RBC: 9 /HPF / WBC 2 /HPF   Sq Epi:  / Non Sq Epi: 3 /HPF / Bacteria: Negative                              RADIOLOGY & ADDITIONAL TESTS:    Imaging Personally Reviewed:yes    Consultant(s) Notes Reviewed:  yes    Care Discussed with Consultants/Other Providers:yes

## 2022-12-05 NOTE — PROGRESS NOTE ADULT - ASSESSMENT
78 y/o female with PMHx of DM type 2, hypothyroidism, and HTN who presented to the ED for increased SOB. Admitted with RSV. Is on methylprednisolone 40mg q12h with resultant hyperglycemia. Endocrine consulted for DM2 with steroid-induced hyperglycemia.    Poorly controlled T2DM with steroid-induced hyperglycemia  DM diagnosis: DM2 x20 years  Last A1c: 8.8  Endocrinologist: PCP only  Home DM meds: Basaglar 10 units q24h, Novolog 4 units TIDac, metformin 500mg BID  Receiving methylprednisolone 40mg q12h.  -Hold oral DM agents while inpatient  -Increase Lantus to 30 units at bedtime. DO NOT HOLD IF NPO.  -Increase Admelog to 15 units TID pre-meal. HOLD IF NPO.  -Use moderate dose Admelog correction scale pre-meal  -Use moderate dose Admelog correction scale at bedtime  -Fingerstick BG before meals and bedtime  -Goal -180  -Carbohydrate consistent diet  Discharge plan:  -Likely to discharge patient home on basal/bolus insulin plus metformin. Final regimen pending clinical course.  -Recommend routine outpatient ophthalmology and PCP f/u    HTN  goal BP in DM <130/80  on losartan 100mg  outpt mc/cr ratio      HLD  -On simvastatin 20mg daily    Hypothyroidism  TSH normal now. FT4 nml in Nov 2022.  Continue LT4 50mcg daily PO (or 37mcg IV)          Xochilt Gutiérrez  Nurse Practitioner  Division of Endocrinology & Diabetes  Pager # [phone]      If after 6PM or before 9AM, or on weekends/holidays, please call endocrine answering service for assistance ([phone]).  For nonurgent matters email [email or web address] for assistance.

## 2022-12-05 NOTE — PROGRESS NOTE ADULT - SUBJECTIVE AND OBJECTIVE BOX
PULMONARY PROGRESS NOTE    MARTIR VU  MRN-[id number]    Patient is a 77y old  Female who presents with a chief complaint of Difficulty breathing (04 Dec 2022 11:28)      HPI:  -family at bedside  she just finishe dlunch- sleeping   woke up w/o issue  family reports some cough with lunch today  -    ROS:   -    ACTIVE MEDICATION LIST:  MEDICATIONS  (STANDING):  acetylcysteine 10%  Inhalation 4 milliLiter(s) Inhalation two times a day  albuterol/ipratropium for Nebulization 3 milliLiter(s) Nebulizer every 4 hours  cholecalciferol 1000 Unit(s) Oral daily  dextrose 5%. 1000 milliLiter(s) (100 mL/Hr) IV Continuous <Continuous>  dextrose 5%. 1000 milliLiter(s) (50 mL/Hr) IV Continuous <Continuous>  dextrose 50% Injectable 25 Gram(s) IV Push once  dextrose 50% Injectable 12.5 Gram(s) IV Push once  dextrose 50% Injectable 25 Gram(s) IV Push once  enoxaparin Injectable 40 milliGRAM(s) SubCutaneous every 24 hours  glucagon  Injectable 1 milliGRAM(s) IntraMuscular once  influenza  Vaccine (HIGH DOSE) 0.7 milliLiter(s) IntraMuscular once  insulin glargine Injectable (LANTUS) 30 Unit(s) SubCutaneous at bedtime  insulin lispro (ADMELOG) corrective regimen sliding scale   SubCutaneous three times a day before meals  insulin lispro (ADMELOG) corrective regimen sliding scale   SubCutaneous at bedtime  insulin lispro Injectable (ADMELOG) 15 Unit(s) SubCutaneous three times a day before meals  levothyroxine Injectable 37 MICROGram(s) IV Push at bedtime  losartan 100 milliGRAM(s) Oral daily  methylPREDNISolone sodium succinate Injectable 30 milliGRAM(s) IV Push every 12 hours  pantoprazole  Injectable 40 milliGRAM(s) IV Push daily  simvastatin 20 milliGRAM(s) Oral at bedtime    MEDICATIONS  (PRN):  acetaminophen     Tablet .. 650 milliGRAM(s) Oral every 6 hours PRN Temp greater or equal to 38C (100.4F), Mild Pain (1 - 3)  aluminum hydroxide/magnesium hydroxide/simethicone Suspension 30 milliLiter(s) Oral every 4 hours PRN Dyspepsia  dextrose Oral Gel 15 Gram(s) Oral once PRN Blood Glucose LESS THAN 70 milliGRAM(s)/deciliter  melatonin 3 milliGRAM(s) Oral at bedtime PRN Insomnia  ondansetron Injectable 4 milliGRAM(s) IV Push every 8 hours PRN Nausea and/or Vomiting      EXAM:  Vital Signs Last 24 Hrs  T(C): 36.4 (05 Dec 2022 05:36), Max: 36.6 (04 Dec 2022 17:27)  T(F): 97.6 (05 Dec 2022 05:36), Max: 97.9 (04 Dec 2022 17:27)  HR: 110 (05 Dec 2022 10:42) (90 - 110)  BP: 146/82 (05 Dec 2022 05:36) (112/68 - 146/82)  BP(mean): --  RR: 22 (05 Dec 2022 05:36) (18 - 22)  SpO2: 99% (05 Dec 2022 10:42) (80% - 100%)    Parameters below as of 05 Dec 2022 10:42  Patient On (Oxygen Delivery Method): nasal cannula w/ humidification        GENERAL: The patient is awake and alert in no apparent distress.     LUNGS: not labored  still wheezing when she cooperates with exam                             8.7    10.11 )-----------( 249      ( 05 Dec 2022 05:55 )             33.2       12-05    143  |  101  |  38<H>  ----------------------------<  262<H>  4.5   |  33<H>  |  0.79    Ca    9.2      05 Dec 2022 05:55  Phos  2.2     12-05  Mg     2.10     12-05       < from: Xray Chest 1 View- PORTABLE-Urgent (12.01.22 @ 13:02) >    ACC: [id number] EXAM:  XR CHEST PORTABLE URGENT 1V                          PROCEDURE DATE:  12/01/2022          INTERPRETATION:  CLINICAL INFORMATION: Chest pain    TIME OF EXAMINATION: December 1, 2022 at 12:50 PM    EXAM: Portable chest    FINDINGS:  Right upper lobe and left midlung opacities about the same as the last   study may be chronic findings. The heart is not enlarged and there are no   effusions or congestion to indicate CHF.        COMPARISON: November 11, 2022        IMPRESSION: Follow-up study showing no acute pulmonary pathology.    --- End of Report ---            NAT HENNING MD; Attending Radiologist  This document has been electronically signed. Dec  1 2022  1:41PM    < end of copied text >  < from: CT Angio Chest PE Protocol w/ IV Cont (11.08.22 @ 11:39) >    ACC: [id number] EXAM:  CT ABDOMEN AND PELVIS IC                        ACC: [id number] EXAM:  CT ANGIO CHEST PULM ART [facility]                          PROCEDURE DATE:  11/08/2022          INTERPRETATION:  CTA CHEST AND CT ABDOMEN AND PELVIS    INDICATION: Syncope. Evaluate for pneumonia.    TECHNIQUE: Enhanced helical images were obtained of the chest, abdomen   and pelvis. Coronal and sagittal images were reconstructed. Maximum   intensity projection images were generated.    CONTRAST/COMPLICATIONS:  IV Contrast: Omnipaque 350 (accession [id number]), IV contrast documented   in associated exam (accession [id number])  90 cc administered   10 cc   discarded  Oral Contrast: NONE  Complications: None reported at time of study completion    COMPARISON: 5/29/2021 CT chest and 12/6/2021 CT abdomen.    FINDINGS:    PULMONARY ARTERIES: No pulmonary embolism detected. Please note that   multiple distal segmental and subsegmental pulmonary arterial branches   are not adequately assessed due to motion.    MEDIASTINUM: The right ventricle is qualitatively mildly dilated and   there is suggestion of flattening of the interventricular septum. No   pericardial effusion. Thoracic aorta normal caliber.  No large   mediastinal lymph nodes.    AIRWAYS, LUNGS, PLEURA: Severe narrowing of the right mainstem bronchus   and bronchus intermedius.    Right apical soft tissue opacification measuring 2.8 x 1.6 cm (image 13,   series 2) is similar to marginally decreased in size compared to   5/29/2021 where it was 2.9 x 1.8 cm when remeasured. An additional   anterior right upper lobe 1.3 cm nodular opacity (image 19, series 2) is   unchanged. Right apical cystic/cavitary opacity is likewise similar in   appearance. No significant interval change in right upperlobe   opacification along the minor fissure.    Left upper lobe parenchymal opacification measuring 3.6 x 2.2 cm (image   156, series 4) is without significant interval change compared to   5/29/2021, but progressed compared to an even earlier examination dated   10/26/2019.    No pleural effusion.    ABDOMEN and PELVIS: Peripheral enhancement of hepatic segment 7 (image   22, series 3) may be perfusional. Gallbladder, pancreas, spleen, adrenal   glands unremarkable. No hydronephrosis.    Unremarkable urinary bladder and uterus.    No bowel obstruction. Ascending and transverse colon are decompressed. No   free fluid. No abdominal or pelvic lymphadenopathy. Abdominal aorta   normal caliber.    BONES: Unremarkable.    SOFT TISSUES: Unremarkable.    IMPRESSION:    No pulmonary embolism detected.    No new lung parenchymal opacity to suggest acute pneumonia.    Indeterminate left upper lobe 3.6 x 2.2 cm opacity is similar compared to   5/29/2021, but progressed compared to 10/26/2019; the exact etiology is   unclear, but it is difficult to exclude lung neoplasm.    Additional right upper lobe opacities also unchanged compared to   5/29/2021.    Peripheral enhancement of the liver as described above may represent   perfusional abnormality. Otherwise, unremarkable evaluation of the   abdomen and pelvis.    --- End of Report ---            DONIS YATES MD; Attending Radiologist  This document has been electronically signed. Nov 8 2022 12:06PM    < end of copied text >    PROBLEM LIST:  77y Female with HEALTH ISSUES - PROBLEM Dx:  Diabetes mellitus    Sepsis with acute hypercapnic respiratory failure    HTN (hypertension)    Need for prophylactic measure    Hypothyroidism    Steroid-induced hyperglycemia    HLD (hyperlipidemia)              RECS:  appreciate ID input  supportive care for RSV  solumedrol 30 IVP BID for 2 days  continue neb treatments  swallow eval if she does not have one already  wean O2 as tolerated  can repeat her ct chest outpatient            Please call with any questions.    Dede Flynn,   [facility] Pulmonary/Sleep Medicine  [phone]

## 2022-12-06 ENCOUNTER — TRANSCRIPTION ENCOUNTER (OUTPATIENT)
Age: 77
End: 2022-12-06

## 2022-12-06 LAB
ANION GAP SERPL CALC-SCNC: 12 MMOL/L — SIGNIFICANT CHANGE UP (ref 7–14)
BUN SERPL-MCNC: 33 MG/DL — HIGH (ref 7–23)
CALCIUM SERPL-MCNC: 9.3 MG/DL — SIGNIFICANT CHANGE UP (ref 8.4–10.5)
CHLORIDE SERPL-SCNC: 100 MMOL/L — SIGNIFICANT CHANGE UP (ref 98–107)
CO2 SERPL-SCNC: 31 MMOL/L — SIGNIFICANT CHANGE UP (ref 22–31)
CREAT SERPL-MCNC: 0.7 MG/DL — SIGNIFICANT CHANGE UP (ref 0.5–1.3)
CULTURE RESULTS: SIGNIFICANT CHANGE UP
CULTURE RESULTS: SIGNIFICANT CHANGE UP
EGFR: 88 ML/MIN/1.73M2 — SIGNIFICANT CHANGE UP
GLUCOSE BLDC GLUCOMTR-MCNC: 162 MG/DL — HIGH (ref 70–99)
GLUCOSE BLDC GLUCOMTR-MCNC: 165 MG/DL — HIGH (ref 70–99)
GLUCOSE BLDC GLUCOMTR-MCNC: 174 MG/DL — HIGH (ref 70–99)
GLUCOSE BLDC GLUCOMTR-MCNC: 92 MG/DL — SIGNIFICANT CHANGE UP (ref 70–99)
GLUCOSE SERPL-MCNC: 160 MG/DL — HIGH (ref 70–99)
HCT VFR BLD CALC: 33.8 % — LOW (ref 34.5–45)
HGB BLD-MCNC: 9.2 G/DL — LOW (ref 11.5–15.5)
MAGNESIUM SERPL-MCNC: 2 MG/DL — SIGNIFICANT CHANGE UP (ref 1.6–2.6)
MCHC RBC-ENTMCNC: 20.1 PG — LOW (ref 27–34)
MCHC RBC-ENTMCNC: 27.2 GM/DL — LOW (ref 32–36)
MCV RBC AUTO: 74 FL — LOW (ref 80–100)
NRBC # BLD: 0 /100 WBCS — SIGNIFICANT CHANGE UP (ref 0–0)
NRBC # FLD: 0 K/UL — SIGNIFICANT CHANGE UP (ref 0–0)
PHOSPHATE SERPL-MCNC: 2.3 MG/DL — LOW (ref 2.5–4.5)
PLATELET # BLD AUTO: 256 K/UL — SIGNIFICANT CHANGE UP (ref 150–400)
POTASSIUM SERPL-MCNC: 4.6 MMOL/L — SIGNIFICANT CHANGE UP (ref 3.5–5.3)
POTASSIUM SERPL-SCNC: 4.6 MMOL/L — SIGNIFICANT CHANGE UP (ref 3.5–5.3)
RBC # BLD: 4.57 M/UL — SIGNIFICANT CHANGE UP (ref 3.8–5.2)
RBC # FLD: 23.4 % — HIGH (ref 10.3–14.5)
SODIUM SERPL-SCNC: 143 MMOL/L — SIGNIFICANT CHANGE UP (ref 135–145)
SPECIMEN SOURCE: SIGNIFICANT CHANGE UP
SPECIMEN SOURCE: SIGNIFICANT CHANGE UP
WBC # BLD: 10.74 K/UL — HIGH (ref 3.8–10.5)
WBC # FLD AUTO: 10.74 K/UL — HIGH (ref 3.8–10.5)

## 2022-12-06 PROCEDURE — 99232 SBSQ HOSP IP/OBS MODERATE 35: CPT

## 2022-12-06 PROCEDURE — 99231 SBSQ HOSP IP/OBS SF/LOW 25: CPT

## 2022-12-06 RX ORDER — INSULIN LISPRO 100/ML
7 VIAL (ML) SUBCUTANEOUS ONCE
Refills: 0 | Status: COMPLETED | OUTPATIENT
Start: 2022-12-06 | End: 2022-12-06

## 2022-12-06 RX ORDER — INSULIN LISPRO 100/ML
7.5 VIAL (ML) SUBCUTANEOUS ONCE
Refills: 0 | Status: DISCONTINUED | OUTPATIENT
Start: 2022-12-06 | End: 2022-12-06

## 2022-12-06 RX ORDER — POTASSIUM PHOSPHATE, MONOBASIC POTASSIUM PHOSPHATE, DIBASIC 236; 224 MG/ML; MG/ML
15 INJECTION, SOLUTION INTRAVENOUS ONCE
Refills: 0 | Status: COMPLETED | OUTPATIENT
Start: 2022-12-06 | End: 2022-12-06

## 2022-12-06 RX ADMIN — INSULIN GLARGINE 30 UNIT(S): 100 INJECTION, SOLUTION SUBCUTANEOUS at 21:40

## 2022-12-06 RX ADMIN — LOSARTAN POTASSIUM 100 MILLIGRAM(S): 100 TABLET, FILM COATED ORAL at 05:47

## 2022-12-06 RX ADMIN — Medication 3 MILLILITER(S): at 23:11

## 2022-12-06 RX ADMIN — SIMVASTATIN 20 MILLIGRAM(S): 20 TABLET, FILM COATED ORAL at 21:40

## 2022-12-06 RX ADMIN — POTASSIUM PHOSPHATE, MONOBASIC POTASSIUM PHOSPHATE, DIBASIC 62.5 MILLIMOLE(S): 236; 224 INJECTION, SOLUTION INTRAVENOUS at 09:16

## 2022-12-06 RX ADMIN — ENOXAPARIN SODIUM 40 MILLIGRAM(S): 100 INJECTION SUBCUTANEOUS at 13:04

## 2022-12-06 RX ADMIN — Medication 7 UNIT(S): at 18:06

## 2022-12-06 RX ADMIN — Medication 3 MILLILITER(S): at 10:14

## 2022-12-06 RX ADMIN — Medication 1000 UNIT(S): at 12:31

## 2022-12-06 RX ADMIN — Medication 30 MILLIGRAM(S): at 05:48

## 2022-12-06 RX ADMIN — Medication 3 MILLILITER(S): at 20:28

## 2022-12-06 RX ADMIN — Medication 4 MILLILITER(S): at 20:28

## 2022-12-06 RX ADMIN — Medication 30 MILLIGRAM(S): at 17:33

## 2022-12-06 RX ADMIN — PANTOPRAZOLE SODIUM 40 MILLIGRAM(S): 20 TABLET, DELAYED RELEASE ORAL at 12:32

## 2022-12-06 RX ADMIN — Medication 15 UNIT(S): at 12:30

## 2022-12-06 RX ADMIN — Medication 15 UNIT(S): at 09:16

## 2022-12-06 RX ADMIN — Medication 2: at 09:15

## 2022-12-06 RX ADMIN — Medication 37 MICROGRAM(S): at 22:16

## 2022-12-06 RX ADMIN — Medication 3 MILLILITER(S): at 16:13

## 2022-12-06 RX ADMIN — Medication 2: at 12:30

## 2022-12-06 RX ADMIN — Medication 3 MILLIGRAM(S): at 21:40

## 2022-12-06 NOTE — DISCHARGE NOTE PROVIDER - NSDCFUADDAPPT_GEN_ALL_CORE_FT
Followup with outpatient ophthalmology, podiatry, and PCP   Follow up with Dr. Bashir pulmonologist

## 2022-12-06 NOTE — SWALLOW BEDSIDE ASSESSMENT ADULT - COMMENTS
Progress Note- Internal Medicine 12/5: "76 y/o female with PMHx of DM type 2, hypothyroidism, and HTN who presented to the ED for increased SOB today now admitted for sepsis and acute hypercarbic respiratory failure."    CR Chest 12/1: "FINDINGS: Right upper lobe and left midlung opacities about the same as the last study may be chronic findings. The heart is not enlarged and there are no effusions or congestion to indicate CHF."    Patient seen at bedside awake/alert during clinical swallow evaluation this AM, with daughter-in-law present. Patient is primarily [place] speaking, [employer] offered for translation however daughter-in-law expressed she can translate. Patient is able to follow simple commands. Upon arriving to the room, patient observed eating dry cereal. Patient is on nasal cannula.

## 2022-12-06 NOTE — PROGRESS NOTE ADULT - SUBJECTIVE AND OBJECTIVE BOX
SUBJECTIVE / OVERNIGHT EVENTS:pt seen and examined, pts family next to pts bedside  22    MEDICATIONS  (STANDING):  acetylcysteine 20%  Inhalation 4 milliLiter(s) Inhalation every 12 hours  albuterol/ipratropium for Nebulization 3 milliLiter(s) Nebulizer every 4 hours  cholecalciferol 1000 Unit(s) Oral daily  dextrose 5%. 1000 milliLiter(s) (100 mL/Hr) IV Continuous <Continuous>  dextrose 5%. 1000 milliLiter(s) (50 mL/Hr) IV Continuous <Continuous>  dextrose 50% Injectable 25 Gram(s) IV Push once  dextrose 50% Injectable 12.5 Gram(s) IV Push once  dextrose 50% Injectable 25 Gram(s) IV Push once  enoxaparin Injectable 40 milliGRAM(s) SubCutaneous every 24 hours  glucagon  Injectable 1 milliGRAM(s) IntraMuscular once  influenza  Vaccine (HIGH DOSE) 0.7 milliLiter(s) IntraMuscular once  insulin glargine Injectable (LANTUS) 30 Unit(s) SubCutaneous at bedtime  insulin lispro (ADMELOG) corrective regimen sliding scale   SubCutaneous three times a day before meals  insulin lispro (ADMELOG) corrective regimen sliding scale   SubCutaneous at bedtime  insulin lispro Injectable (ADMELOG) 15 Unit(s) SubCutaneous three times a day before meals  levothyroxine Injectable 37 MICROGram(s) IV Push at bedtime  losartan 100 milliGRAM(s) Oral daily  pantoprazole  Injectable 40 milliGRAM(s) IV Push daily  simvastatin 20 milliGRAM(s) Oral at bedtime    MEDICATIONS  (PRN):  acetaminophen     Tablet .. 650 milliGRAM(s) Oral every 6 hours PRN Temp greater or equal to 38C (100.4F), Mild Pain (1 - 3)  aluminum hydroxide/magnesium hydroxide/simethicone Suspension 30 milliLiter(s) Oral every 4 hours PRN Dyspepsia  dextrose Oral Gel 15 Gram(s) Oral once PRN Blood Glucose LESS THAN 70 milliGRAM(s)/deciliter  melatonin 3 milliGRAM(s) Oral at bedtime PRN Insomnia  ondansetron Injectable 4 milliGRAM(s) IV Push every 8 hours PRN Nausea and/or Vomiting    Vital Signs Last 24 Hrs  T(C): 36.6 (22 @ 13:06), Max: 36.8 (22 @ 20:00)  T(F): 97.9 (22 @ 13:06), Max: 98.3 (22 @ 20:00)  HR: 100 (22 @ 16:16) (90 - 108)  BP: 138/72 (22 @ 13:06) (138/72 - 159/84)  BP(mean): --  RR: 18 (22 @ 13:06) (16 - 18)  SpO2: 96% (22 @ 13:06) (96% - 99%)          PHYSICAL EXAM:  GENERAL: NAD  EYES: EOMI, PERRLA  NECK: Supple, No JVD  CHEST/LUNG: dec breath sounds at bases  HEART:  S1 , S2 +  ABDOMEN: soft , bs+  EXTREMITIES:  no edema  NEUROLOGY:alert awake    LABS:      143  |  100  |  33<H>  ----------------------------<  160<H>  4.6   |  31  |  0.70    Ca    9.3      06 Dec 2022 05:30  Phos  2.3       Mg     2.00           Creatinine Trend: 0.70 <--, 0.79 <--, 0.90 <--, 0.83 <--, 0.86 <--, 0.71 <--                        9.2    10.74 )-----------( 256      ( 06 Dec 2022 05:30 )             33.8     Urine Studies:  Urinalysis Basic - ( 01 Dec 2022 16:30 )    Color: Light Yellow / Appearance: Clear / S.016 / pH:   Gluc:  / Ketone: Negative  / Bili: Negative / Urobili: <2 mg/dL   Blood:  / Protein: 30 mg/dL / Nitrite: Negative   Leuk Esterase: Negative / RBC: 9 /HPF / WBC 2 /HPF   Sq Epi:  / Non Sq Epi: 3 /HPF / Bacteria: Negative                                    RADIOLOGY & ADDITIONAL TESTS:    Imaging Personally Reviewed:yes    Consultant(s) Notes Reviewed:  yes    Care Discussed with Consultants/Other Providers:yes

## 2022-12-06 NOTE — CHART NOTE - NSCHARTNOTEFT_GEN_A_CORE
FS reviewed. Patient has been receiving solumedrol 30mg IV q 12 hours. Continue current insulin regimen. Endocrine team will follow.    Lynne Oconnell DO

## 2022-12-06 NOTE — DISCHARGE NOTE PROVIDER - NSDCMRMEDTOKEN_GEN_ALL_CORE_FT
alcohol swabs : Apply topically to affected area 4 times a day   Basaglar KwikPen 100 units/mL subcutaneous solution: 10 unit(s) subcutaneous once a day (at bedtime)   Glucagon Emergency Kit for Low Blood Sugar 1 mg injection: 1 milligram(s) intramuscular once as needed for severe hypoglycemia, then call 911.  glucometer (per patient&#x27;s insurance): Test blood sugars four times a day. Dispense #1 glucometer.  glucose tablets: Follow instructions on bottle when sugar is low.  Insulin Pen Needles, 4mm: 1 application subcutaneously 4 times a day. ** Use with insulin pen **   lancets: 1 application subcutaneously 4 times a day   levothyroxine 50 mcg (0.05 mg) oral tablet: 1 tab(s) orally once a day  losartan 100 mg oral tablet: 1 tab(s) orally once a day  metFORMIN 500 mg oral tablet: 1 tab(s) orally 2 times a day   NovoLOG FlexPen 100 units/mL injectable solution: 4 unit(s) injectable 2 times a day before breakfast and dinner  Oxygen: 2L Nasal Cannula  Continuous Daily  J18.9  [id number]  simvastatin 20 mg oral tablet: 1 tab(s) orally once a day (at bedtime)  test strips (per patient&#x27;s insurance): 1 application subcutaneously 4 times a day. ** Compatible with patient&#x27;s glucometer **   Basaglar KwikPen 100 units/mL subcutaneous solution: While on Prednisone 30 MG: take Basaglar 20 units SQ at bedtime, Novolog 6 units three times a day before meals (Hold if not eating). While on Prednisone 20 MG: take Basaglar 15 units SQ at bedtime, Novolog 5 units three times a day before meals (Hold if not eating). While on Prednisone 10 MG AND OFF, take Basaglar 10 units SQ at bedtime, Novolog 4 units three times a day before meals (Hold if not eating).  cholecalciferol oral tablet: 1000 unit(s) orally once a day  ipratropium-albuterol 0.5 mg-2.5 mg/3 mL inhalation solution: 3 milliliter(s) inhaled every 12 hours  levothyroxine 50 mcg (0.05 mg) oral tablet: 1 tab(s) orally once a day  metFORMIN 500 mg oral tablet: 1 tab(s) orally 2 times a day   metoprolol succinate 25 mg oral tablet, extended release: 1 tab(s) orally once a day  NovoLOG FlexPen 100 units/mL injectable solution: While on Prednisone 30 MG: take Basaglar 20 units SQ at bedtime, Novolog 6 units three times a day before meals (Hold if not eating). While on Prednisone 20 MG: take Basaglar 15 units SQ at bedtime, Novolog 5 units three times a day before meals (Hold if not eating). While on Prednisone 10 MG AND OFF, take Basaglar 10 units SQ at bedtime, Novolog 4 units three times a day before meals (Hold if not eating).  predniSONE 10 mg oral tablet: Take 3 tablets daily for 3 days on 12/11/-12/13  Take 2 tablets daily for 3 days on 12/14-12/16  Take 1 tablet daily for 3 days on 12/17-12/19  simvastatin 20 mg oral tablet: 1 tab(s) orally once a day (at bedtime)

## 2022-12-06 NOTE — SWALLOW BEDSIDE ASSESSMENT ADULT - ADDITIONAL RECOMMENDATIONS
This department to follow up as schedule permits to assess for diet tolerance. Medical team further advised to reconsult this service if there is a change in medical status and/or observed change in patient's tolerance of recommended PO diet.

## 2022-12-06 NOTE — SWALLOW BEDSIDE ASSESSMENT ADULT - ASR SWALLOW RECOMMEND DIAG
Objective testing not warranted at this time given clinical presentation and no overt signs of aspiration during PO trials.

## 2022-12-06 NOTE — DISCHARGE NOTE PROVIDER - NSDCCPCAREPLAN_GEN_ALL_CORE_FT
PRINCIPAL DISCHARGE DIAGNOSIS  Diagnosis: Sepsis  Assessment and Plan of Treatment: You were admitted in the hospital for sepsis with acute hypercapnic respiratory failure. You are to continue prednisone taper upon discharge. You will need a repeat CT chest outpatient. Follow up with the pulmonologist outpatient to get repeat CT chest scheduled.      SECONDARY DISCHARGE DIAGNOSES  Diagnosis: Diabetes mellitus  Assessment and Plan of Treatment: While on Prednisone 30 MG: Recommend Basaglar 20 units SQ at bedtime, Novolog 6 units three times a day before meals (Hold if Not eating or skips meal), Metformin 500 mg PO twice a day   While on Prednisone 20 MG: Recommend Basaglar 15 units SQ at bedtime, Novolog 5 units three times a day before meals (Hold if Not eating or skips meal), Metformin 500 mg PO twice a day  While on Prednisone 10 MG AND OFF: Resume home regimen, Basaglar 10 units SQ at bedtime, Novolog 4 units three times a day before meals (Hold if not eating or skips meals), Metformin 500 mg PO twice a day   Follow up with outpatient ophthalmology, podiatry, and PCP.       Diagnosis: HTN (hypertension)  Assessment and Plan of Treatment: Low sodium and fat diet, continue anti-hypertensive medications, and follow up with primary care physician.    Diagnosis: Hypothyroidism  Assessment and Plan of Treatment: Continue Levothyroxine 50mcg PO daily, repeat TFTs in 4-6 weeks of discharge.    Diagnosis: NSVT (nonsustained ventricular tachycardia)  Assessment and Plan of Treatment: Start taking metoprolol 25mg oral daily. Follow up with your primary care physician and/or cardiologist outpatient.    Diagnosis: Vitamin D deficiency  Assessment and Plan of Treatment: Continue Cholecalciferol 1000 units daily and followup outpatient for continued monitoring.       PRINCIPAL DISCHARGE DIAGNOSIS  Diagnosis: Sepsis  Assessment and Plan of Treatment: You were admitted in the hospital for sepsis with acute hypercapnic respiratory failure. You are to continue prednisone taper upon discharge. You will need a repeat CT chest outpatient. Follow up with the pulmonologist outpatient to get repeat CT chest scheduled.      SECONDARY DISCHARGE DIAGNOSES  Diagnosis: Diabetes mellitus  Assessment and Plan of Treatment: While on Prednisone 30 MG: Recommend Basaglar 20 units SQ at bedtime, Novolog 6 units three times a day before meals (Hold if Not eating or skips meal), Metformin 500 mg PO twice a day   While on Prednisone 20 MG: Recommend Basaglar 15 units SQ at bedtime, Novolog 5 units three times a day before meals (Hold if Not eating or skips meal), Metformin 500 mg PO twice a day  While on Prednisone 10 MG AND OFF: Resume home regimen, Basaglar 10 units SQ at bedtime, Novolog 4 units three times a day before meals (Hold if not eating or skips meals), Metformin 500 mg PO twice a day.  Please also do Admelog correctional scale three times a day before meals. Please following the following instructions:   1 Unit(s) if Glucose 151 - 200  2 Unit(s) if Glucose 201 - 250  3 Unit(s) if Glucose 251 - 300  4 Unit(s) if Glucose 301 - 350  5 Unit(s) if Glucose 351 - 400  6 Unit(s) if Glucose Greater Than 400  Follow up with outpatient ophthalmology, podiatry, and PCP.       Diagnosis: HTN (hypertension)  Assessment and Plan of Treatment: Low sodium and fat diet, continue anti-hypertensive medications, and follow up with primary care physician.    Diagnosis: Hypothyroidism  Assessment and Plan of Treatment: Continue Levothyroxine 50mcg PO daily, repeat TFTs in 4-6 weeks of discharge.    Diagnosis: NSVT (nonsustained ventricular tachycardia)  Assessment and Plan of Treatment: Start taking metoprolol 25mg oral daily. Follow up with your primary care physician and/or cardiologist outpatient.    Diagnosis: Vitamin D deficiency  Assessment and Plan of Treatment: Continue Cholecalciferol 1000 units daily and followup outpatient for continued monitoring.

## 2022-12-06 NOTE — DISCHARGE NOTE PROVIDER - PROVIDER TOKENS
PROVIDER:[TOKEN:[[id number]]] PROVIDER:[TOKEN:[[id number]]],FREE:[LAST:[Outpatient urology clinic],PHONE:[phone]],FAX:[(   )    -],ADDRESS:[[address] [place], [place] [address], [place] [id number]]]

## 2022-12-06 NOTE — DISCHARGE NOTE PROVIDER - HOSPITAL COURSE
78 y/o female with PMHx of DM type 2, hypothyroidism, and HTN who presented to the ED for increased SOB today now admitted for sepsis and acute hypercarbic respiratory failure.    Sepsis with acute hypercapnic respiratory failure.   Sepsis likely 2/2 RSV + underlying pneumonia  observe off abx as per ID.    Diabetes mellitus.   Currently extremely hyperglycemic likely 2/2 steroids  -TITRATE INSULIN FOR OPTIMAL BLOOD SUGAR CONTROL.    HTN (hypertension).   Currently BP stable, continue home anti-HTNs.    Hypothyroidism.   Thyroid Stimulating Hormone, Serum (12.01.22 @ 13:38)  Thyroid Stimulating Hormone, Serum: 1.11 uIU/mL.    Patient is medically stable for discharge on 12/10/2022 per attending Dr. Gonzalez.

## 2022-12-06 NOTE — CHART NOTE - NSCHARTNOTEFT_GEN_A_CORE
Patient is admitted to the hospital with acute hypercapnic respiratory failure, sepsis, oxygen dependently, and has history of DM. As a result of the above comorbidities, patient will require a transport wheelchair due to decreased endurance and deconditioning. The patient has decreased mobility that significantly impairs her ability to participate with ADLs such as toileting, feeding, dressing, grooming, and bathing in customary locations in the home. The patient's mobility limitation cannot be sufficiently resolved by use of an appropriately fitted walker. Use of transport chair will significantly improve the patient's ability to participate in ADLs at home. The patient has caregivers who are available willing and able to provide assistance with transport wheelchair use. The patient's not able to self-propel a standard or a lightweight wheelchair.

## 2022-12-06 NOTE — PROGRESS NOTE ADULT - ASSESSMENT
77F A1c 8.8%, dCHF, pulmonary TB 2019.   Here 12/1 with cough, fever, positive for RSV.   Treatment is supportive.   Clinically better without evidence of bacterial infection.     Suggest  -monitor off antibiotics  -supportive care    Will sign off, call back if needed    Javi Stahl MD   Infectious Disease   Available on TEAMS. After 5PM and on weekends please page fellow on call or call [phone]

## 2022-12-06 NOTE — DISCHARGE NOTE PROVIDER - CARE PROVIDER_API CALL
Kathleen Bashir)  Critical Care Medicine; Internal Medicine; Pulmonary Disease  [address] [facility], [address]  [place] [address]  Phone: [phone]  Fax: [phone]  Follow Up Time:    Kathleen Bashir)  Critical Care Medicine; Internal Medicine; Pulmonary Disease  [address] [facility], [address]  [place] [address]  Phone: [phone]  Fax: [phone]  Follow Up Time:     Outpatient urology clinic,   [address] [place] [address] [id number], [place] [address]  Phone: [phone]  Fax: (   )    -  Follow Up Time:

## 2022-12-06 NOTE — SWALLOW BEDSIDE ASSESSMENT ADULT - SWALLOW EVAL: DIAGNOSIS
1- Functional oral stage for puree, soft and bite sized solids, regular solids, and thin liquids marked by adequate oral containment, adequate manipulation and timely mastication, adequate anterior to posterior transfer/transit with oral clearance noted. 2- Functional oral stage for puree, soft and bite sized solids, regular solids, and thin liquids marked by initiation of pharyngeal swallow trigger and hyolaryngeal excursion upon palpation. No overt sign or symptoms of aspiration across PO consistencies.

## 2022-12-06 NOTE — DISCHARGE NOTE PROVIDER - NSDCHHCONTACT_GEN_ALL_CORE_FT
3
As certified below, I, or a nurse practitioner or physician assistant working with me, had a face-to-face encounter that meets the physician face-to-face encounter requirements.

## 2022-12-06 NOTE — DISCHARGE NOTE PROVIDER - CARE PROVIDERS DIRECT ADDRESSES
[email or web address]

## 2022-12-06 NOTE — PROGRESS NOTE ADULT - SUBJECTIVE AND OBJECTIVE BOX
Forrest Rosenthal MD  Interventional Cardiology / Endovascular Specialist  [place] Office : [phone] [place] [address] [place] [address]  Tel:   [place] Office : [address] [facility] [place] [address]  Tel: [phone]    Pt is lying in bed comfortable not in distress, no chest pains some SOB no palpitations  	    MEDICATIONS:  enoxaparin Injectable 40 milliGRAM(s) SubCutaneous every 24 hours  losartan 100 milliGRAM(s) Oral daily      acetylcysteine 20%  Inhalation 4 milliLiter(s) Inhalation every 12 hours  albuterol/ipratropium for Nebulization 3 milliLiter(s) Nebulizer every 4 hours    acetaminophen     Tablet .. 650 milliGRAM(s) Oral every 6 hours PRN  melatonin 3 milliGRAM(s) Oral at bedtime PRN  ondansetron Injectable 4 milliGRAM(s) IV Push every 8 hours PRN    aluminum hydroxide/magnesium hydroxide/simethicone Suspension 30 milliLiter(s) Oral every 4 hours PRN  pantoprazole  Injectable 40 milliGRAM(s) IV Push daily    dextrose 50% Injectable 25 Gram(s) IV Push once  dextrose 50% Injectable 12.5 Gram(s) IV Push once  dextrose 50% Injectable 25 Gram(s) IV Push once  dextrose Oral Gel 15 Gram(s) Oral once PRN  glucagon  Injectable 1 milliGRAM(s) IntraMuscular once  insulin glargine Injectable (LANTUS) 30 Unit(s) SubCutaneous at bedtime  insulin lispro (ADMELOG) corrective regimen sliding scale   SubCutaneous three times a day before meals  insulin lispro (ADMELOG) corrective regimen sliding scale   SubCutaneous at bedtime  insulin lispro Injectable (ADMELOG) 15 Unit(s) SubCutaneous three times a day before meals  levothyroxine Injectable 37 MICROGram(s) IV Push at bedtime  methylPREDNISolone sodium succinate Injectable 30 milliGRAM(s) IV Push every 12 hours  simvastatin 20 milliGRAM(s) Oral at bedtime    cholecalciferol 1000 Unit(s) Oral daily  dextrose 5%. 1000 milliLiter(s) IV Continuous <Continuous>  dextrose 5%. 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      Cataract of left eye          SOCIAL HISTORY: Substance Use (street drugs): ( x ) never used  (  ) other:    FAMILY HISTORY:  Family history of heart attack (Mother)  mother          PHYSICAL EXAM:  T(C): 36.6 (12-06-22 @ 13:06), Max: 36.8 (12-05-22 @ 20:00)  HR: 90 (12-06-22 @ 13:06) (90 - 108)  BP: 138/72 (12-06-22 @ 13:06) (138/72 - 159/84)  RR: 18 (12-06-22 @ 13:06) (16 - 18)  SpO2: 96% (12-06-22 @ 13:06) (96% - 99%)  Wt(kg): --  I&O's Summary    05 Dec 2022 07:01  -  06 Dec 2022 07:00  --------------------------------------------------------  IN: 0 mL / OUT: 2000 mL / NET: -2000 mL        EYES:   PERRLA   ENMT:   Moist mucous membranes, Good dentition, No lesions  Cardiovascular: Normal S1 S2, No JVD, No murmurs, No edema  Respiratory: b/l rhonchi  Gastrointestinal:  Soft, Non-tender, + BS	  Extremities: no edema                                    9.2    10.74 )-----------( 256      ( 06 Dec 2022 05:30 )             33.8     12-06    143  |  100  |  33<H>  ----------------------------<  160<H>  4.6   |  31  |  0.70    Ca    9.3      06 Dec 2022 05:30  Phos  2.3     12-06  Mg     2.00     12-06      proBNP:   Lipid Profile:   HgA1c:   TSH:     Consultant(s) Notes Reviewed:  [x ] YES  [ ] NO    Care Discussed with Consultants/Other Providers [ x] YES  [ ] NO    Imaging Personally Reviewed independently:  [x] YES  [ ] NO    All labs, radiologic studies, vitals, orders and medications list reviewed. Patient is seen and examined at bedside. Case discussed with medical team.

## 2022-12-06 NOTE — PROGRESS NOTE ADULT - SUBJECTIVE AND OBJECTIVE BOX
Follow Up: RSV    Interval History/ROS: Afebrile. Daughter in law at bedside provides history. Says she's doing fine. Has appetite, no cough and breathing comfortably. No pain.     Allergies  No Known Allergies        ANTIMICROBIALS:      OTHER MEDS:  acetaminophen     Tablet .. 650 milliGRAM(s) Oral every 6 hours PRN  acetylcysteine 20%  Inhalation 4 milliLiter(s) Inhalation every 12 hours  albuterol/ipratropium for Nebulization 3 milliLiter(s) Nebulizer every 4 hours  aluminum hydroxide/magnesium hydroxide/simethicone Suspension 30 milliLiter(s) Oral every 4 hours PRN  cholecalciferol 1000 Unit(s) Oral daily  dextrose 5%. 1000 milliLiter(s) IV Continuous <Continuous>  dextrose 5%. 1000 milliLiter(s) IV Continuous <Continuous>  dextrose 50% Injectable 25 Gram(s) IV Push once  dextrose 50% Injectable 12.5 Gram(s) IV Push once  dextrose 50% Injectable 25 Gram(s) IV Push once  dextrose Oral Gel 15 Gram(s) Oral once PRN  enoxaparin Injectable 40 milliGRAM(s) SubCutaneous every 24 hours  glucagon  Injectable 1 milliGRAM(s) IntraMuscular once  influenza  Vaccine (HIGH DOSE) 0.7 milliLiter(s) IntraMuscular once  insulin glargine Injectable (LANTUS) 30 Unit(s) SubCutaneous at bedtime  insulin lispro (ADMELOG) corrective regimen sliding scale   SubCutaneous three times a day before meals  insulin lispro (ADMELOG) corrective regimen sliding scale   SubCutaneous at bedtime  insulin lispro Injectable (ADMELOG) 15 Unit(s) SubCutaneous three times a day before meals  levothyroxine Injectable 37 MICROGram(s) IV Push at bedtime  losartan 100 milliGRAM(s) Oral daily  melatonin 3 milliGRAM(s) Oral at bedtime PRN  methylPREDNISolone sodium succinate Injectable 30 milliGRAM(s) IV Push every 12 hours  ondansetron Injectable 4 milliGRAM(s) IV Push every 8 hours PRN  pantoprazole  Injectable 40 milliGRAM(s) IV Push daily  simvastatin 20 milliGRAM(s) Oral at bedtime      Vital Signs Last 24 Hrs  T(C): 36.4 (06 Dec 2022 05:20), Max: 37 (05 Dec 2022 14:00)  T(F): 97.6 (06 Dec 2022 05:20), Max: 98.6 (05 Dec 2022 14:00)  HR: 95 (06 Dec 2022 05:20) (95 - 108)  BP: 159/84 (06 Dec 2022 05:20) (126/80 - 159/84)  BP(mean): --  RR: 18 (06 Dec 2022 05:20) (16 - 20)  SpO2: 99% (06 Dec 2022 05:20) (94% - 100%)    Parameters below as of 06 Dec 2022 05:20    O2 Flow (L/min): 3      Physical Exam:  General: non toxic  Cardio: regular rate   Respiratory: nonlabored on room air   abd: nondistended  Skin: no rash  Neuro: alert                           9.2    10.74 )-----------( 256      ( 06 Dec 2022 05:30 )             33.8       12-06    143  |  100  |  33<H>  ----------------------------<  160<H>  4.6   |  31  |  0.70    Ca    9.3      06 Dec 2022 05:30  Phos  2.3     12-06  Mg     2.00     12-06            MICROBIOLOGY:  Culture - Urine (collected 12-01-22 @ 16:30)  Source: Clean Catch Clean Catch (Midstream)  Final Report (12-02-22 @ 16:44):    No growth    Culture - Blood (collected 12-01-22 @ 12:22)  Source: .Blood Blood-Peripheral  Preliminary Report (12-02-22 @ 17:02):    No growth to date.    Culture - Blood (collected 12-01-22 @ 12:00)  Source: .Blood Blood-Peripheral  Preliminary Report (12-02-22 @ 17:02):    No growth to date.    Rapid RVP Result: Detected (12-01 @ 12:00)      RADIOLOGY:  Images below reviewed personally  Xray Chest 1 View- PORTABLE-Urgent (12.01.22 @ 13:02)   Right upper lobe and left midlung opacities about the same as the last   study may be chronic findings. The heart is not enlarged and there are no   effusions or congestion to indicate CHF.  COMPARISON: November 11, 2022  IMPRESSION: Follow-up study showing no acute pulmonary pathology.

## 2022-12-06 NOTE — PROGRESS NOTE ADULT - SUBJECTIVE AND OBJECTIVE BOX
PULMONARY PROGRESS NOTE    MARTIR VU  MRN-[id number]    Patient is a 77y old  Female who presents with a chief complaint of Difficulty breathing (06 Dec 2022 12:33)      HPI:  on 2L  family at bedside  some cough, able to expectorate     -    ROS:   -    ACTIVE MEDICATION LIST:  MEDICATIONS  (STANDING):  acetylcysteine 20%  Inhalation 4 milliLiter(s) Inhalation every 12 hours  albuterol/ipratropium for Nebulization 3 milliLiter(s) Nebulizer every 4 hours  cholecalciferol 1000 Unit(s) Oral daily  dextrose 5%. 1000 milliLiter(s) (100 mL/Hr) IV Continuous <Continuous>  dextrose 5%. 1000 milliLiter(s) (50 mL/Hr) IV Continuous <Continuous>  dextrose 50% Injectable 25 Gram(s) IV Push once  dextrose 50% Injectable 12.5 Gram(s) IV Push once  dextrose 50% Injectable 25 Gram(s) IV Push once  enoxaparin Injectable 40 milliGRAM(s) SubCutaneous every 24 hours  glucagon  Injectable 1 milliGRAM(s) IntraMuscular once  influenza  Vaccine (HIGH DOSE) 0.7 milliLiter(s) IntraMuscular once  insulin glargine Injectable (LANTUS) 30 Unit(s) SubCutaneous at bedtime  insulin lispro (ADMELOG) corrective regimen sliding scale   SubCutaneous three times a day before meals  insulin lispro (ADMELOG) corrective regimen sliding scale   SubCutaneous at bedtime  insulin lispro Injectable (ADMELOG) 15 Unit(s) SubCutaneous three times a day before meals  levothyroxine Injectable 37 MICROGram(s) IV Push at bedtime  losartan 100 milliGRAM(s) Oral daily  methylPREDNISolone sodium succinate Injectable 30 milliGRAM(s) IV Push every 12 hours  pantoprazole  Injectable 40 milliGRAM(s) IV Push daily  simvastatin 20 milliGRAM(s) Oral at bedtime    MEDICATIONS  (PRN):  acetaminophen     Tablet .. 650 milliGRAM(s) Oral every 6 hours PRN Temp greater or equal to 38C (100.4F), Mild Pain (1 - 3)  aluminum hydroxide/magnesium hydroxide/simethicone Suspension 30 milliLiter(s) Oral every 4 hours PRN Dyspepsia  dextrose Oral Gel 15 Gram(s) Oral once PRN Blood Glucose LESS THAN 70 milliGRAM(s)/deciliter  melatonin 3 milliGRAM(s) Oral at bedtime PRN Insomnia  ondansetron Injectable 4 milliGRAM(s) IV Push every 8 hours PRN Nausea and/or Vomiting      EXAM:  Vital Signs Last 24 Hrs  T(C): 36.4 (06 Dec 2022 05:20), Max: 37 (05 Dec 2022 14:00)  T(F): 97.6 (06 Dec 2022 05:20), Max: 98.6 (05 Dec 2022 14:00)  HR: 95 (06 Dec 2022 05:20) (95 - 108)  BP: 159/84 (06 Dec 2022 05:20) (126/80 - 159/84)  BP(mean): --  RR: 18 (06 Dec 2022 05:20) (16 - 20)  SpO2: 99% (06 Dec 2022 05:20) (94% - 100%)    Parameters below as of 06 Dec 2022 05:20    O2 Flow (L/min): 3      GENERAL: The patient is awake and alert in no apparent distress.     LUNGS: scattered rhonchi and wheeze  no tlabored                           9.2    10.74 )-----------( 256      ( 06 Dec 2022 05:30 )             33.8       12-06    143  |  100  |  33<H>  ----------------------------<  160<H>  4.6   |  31  |  0.70    Ca    9.3      06 Dec 2022 05:30  Phos  2.3     12-06  Mg     2.00     12-06         < from: Xray Chest 1 View- PORTABLE-Urgent (12.01.22 @ 13:02) >    ACC: [id number] EXAM:  XR CHEST PORTABLE URGENT 1V                          PROCEDURE DATE:  12/01/2022          INTERPRETATION:  CLINICAL INFORMATION: Chest pain    TIME OF EXAMINATION: December 1, 2022 at 12:50 PM    EXAM: Portable chest    FINDINGS:  Right upper lobe and left midlung opacities about the same as the last   study may be chronic findings. The heart is not enlarged and there are no   effusions or congestion to indicate CHF.        COMPARISON: November 11, 2022      PROBLEM LIST:  77y Female with HEALTH ISSUES - PROBLEM Dx:  Diabetes mellitus    Sepsis with acute hypercapnic respiratory failure    HTN (hypertension)    Need for prophylactic measure    Hypothyroidism    Steroid-induced hyperglycemia    HLD (hyperlipidemia)         RECS:  solumedrol 30 IVP BID for 2 days, then taper to prednisone 40mg X 3 days, with 10mg taper every 3 days  continue neb treatments around the clock  swallow eval if she does not have one already  wean O2 as tolerated  can repeat her ct chest outpatient  family asking about jesus- defer to primary team      Please call with any questions.    Dede Flynn,   [facility] Pulmonary/Sleep Medicine  [phone]

## 2022-12-07 LAB
ANION GAP SERPL CALC-SCNC: 6 MMOL/L — LOW (ref 7–14)
BUN SERPL-MCNC: 32 MG/DL — HIGH (ref 7–23)
CALCIUM SERPL-MCNC: 9.1 MG/DL — SIGNIFICANT CHANGE UP (ref 8.4–10.5)
CHLORIDE SERPL-SCNC: 99 MMOL/L — SIGNIFICANT CHANGE UP (ref 98–107)
CO2 SERPL-SCNC: 32 MMOL/L — HIGH (ref 22–31)
CREAT SERPL-MCNC: 0.71 MG/DL — SIGNIFICANT CHANGE UP (ref 0.5–1.3)
EGFR: 86 ML/MIN/1.73M2 — SIGNIFICANT CHANGE UP
GLUCOSE BLDC GLUCOMTR-MCNC: 144 MG/DL — HIGH (ref 70–99)
GLUCOSE BLDC GLUCOMTR-MCNC: 174 MG/DL — HIGH (ref 70–99)
GLUCOSE BLDC GLUCOMTR-MCNC: 242 MG/DL — HIGH (ref 70–99)
GLUCOSE BLDC GLUCOMTR-MCNC: 243 MG/DL — HIGH (ref 70–99)
GLUCOSE BLDC GLUCOMTR-MCNC: 36 MG/DL — CRITICAL LOW (ref 70–99)
GLUCOSE BLDC GLUCOMTR-MCNC: 40 MG/DL — CRITICAL LOW (ref 70–99)
GLUCOSE BLDC GLUCOMTR-MCNC: 97 MG/DL — SIGNIFICANT CHANGE UP (ref 70–99)
GLUCOSE SERPL-MCNC: 131 MG/DL — HIGH (ref 70–99)
HCT VFR BLD CALC: 32.2 % — LOW (ref 34.5–45)
HGB BLD-MCNC: 8.6 G/DL — LOW (ref 11.5–15.5)
MAGNESIUM SERPL-MCNC: 2 MG/DL — SIGNIFICANT CHANGE UP (ref 1.6–2.6)
MCHC RBC-ENTMCNC: 20 PG — LOW (ref 27–34)
MCHC RBC-ENTMCNC: 26.7 GM/DL — LOW (ref 32–36)
MCV RBC AUTO: 74.7 FL — LOW (ref 80–100)
NRBC # BLD: 0 /100 WBCS — SIGNIFICANT CHANGE UP (ref 0–0)
NRBC # FLD: 0 K/UL — SIGNIFICANT CHANGE UP (ref 0–0)
PHOSPHATE SERPL-MCNC: 3.7 MG/DL — SIGNIFICANT CHANGE UP (ref 2.5–4.5)
PLATELET # BLD AUTO: 235 K/UL — SIGNIFICANT CHANGE UP (ref 150–400)
POTASSIUM SERPL-MCNC: 4.4 MMOL/L — SIGNIFICANT CHANGE UP (ref 3.5–5.3)
POTASSIUM SERPL-SCNC: 4.4 MMOL/L — SIGNIFICANT CHANGE UP (ref 3.5–5.3)
RBC # BLD: 4.31 M/UL — SIGNIFICANT CHANGE UP (ref 3.8–5.2)
RBC # FLD: 23.2 % — HIGH (ref 10.3–14.5)
SODIUM SERPL-SCNC: 137 MMOL/L — SIGNIFICANT CHANGE UP (ref 135–145)
WBC # BLD: 10.8 K/UL — HIGH (ref 3.8–10.5)
WBC # FLD AUTO: 10.8 K/UL — HIGH (ref 3.8–10.5)

## 2022-12-07 PROCEDURE — 99232 SBSQ HOSP IP/OBS MODERATE 35: CPT

## 2022-12-07 RX ORDER — INSULIN GLARGINE 100 [IU]/ML
24 INJECTION, SOLUTION SUBCUTANEOUS AT BEDTIME
Refills: 0 | Status: DISCONTINUED | OUTPATIENT
Start: 2022-12-07 | End: 2022-12-07

## 2022-12-07 RX ORDER — INSULIN LISPRO 100/ML
VIAL (ML) SUBCUTANEOUS
Refills: 0 | Status: DISCONTINUED | OUTPATIENT
Start: 2022-12-07 | End: 2022-12-10

## 2022-12-07 RX ORDER — INSULIN LISPRO 100/ML
12 VIAL (ML) SUBCUTANEOUS
Refills: 0 | Status: DISCONTINUED | OUTPATIENT
Start: 2022-12-07 | End: 2022-12-07

## 2022-12-07 RX ORDER — INSULIN LISPRO 100/ML
6 VIAL (ML) SUBCUTANEOUS
Refills: 0 | Status: COMPLETED | OUTPATIENT
Start: 2022-12-07 | End: 2022-12-07

## 2022-12-07 RX ORDER — INSULIN LISPRO 100/ML
7 VIAL (ML) SUBCUTANEOUS ONCE
Refills: 0 | Status: COMPLETED | OUTPATIENT
Start: 2022-12-07 | End: 2022-12-07

## 2022-12-07 RX ORDER — INSULIN LISPRO 100/ML
8 VIAL (ML) SUBCUTANEOUS
Refills: 0 | Status: DISCONTINUED | OUTPATIENT
Start: 2022-12-07 | End: 2022-12-07

## 2022-12-07 RX ORDER — INSULIN GLARGINE 100 [IU]/ML
20 INJECTION, SOLUTION SUBCUTANEOUS AT BEDTIME
Refills: 0 | Status: DISCONTINUED | OUTPATIENT
Start: 2022-12-07 | End: 2022-12-10

## 2022-12-07 RX ORDER — INSULIN LISPRO 100/ML
8 VIAL (ML) SUBCUTANEOUS
Refills: 0 | Status: DISCONTINUED | OUTPATIENT
Start: 2022-12-08 | End: 2022-12-08

## 2022-12-07 RX ORDER — DEXTROSE 50 % IN WATER 50 %
25 SYRINGE (ML) INTRAVENOUS ONCE
Refills: 0 | Status: COMPLETED | OUTPATIENT
Start: 2022-12-07 | End: 2022-12-07

## 2022-12-07 RX ORDER — INSULIN LISPRO 100/ML
VIAL (ML) SUBCUTANEOUS AT BEDTIME
Refills: 0 | Status: DISCONTINUED | OUTPATIENT
Start: 2022-12-07 | End: 2022-12-10

## 2022-12-07 RX ADMIN — PANTOPRAZOLE SODIUM 40 MILLIGRAM(S): 20 TABLET, DELAYED RELEASE ORAL at 11:57

## 2022-12-07 RX ADMIN — Medication 1000 UNIT(S): at 11:57

## 2022-12-07 RX ADMIN — Medication 3 MILLILITER(S): at 15:27

## 2022-12-07 RX ADMIN — Medication 3 MILLIGRAM(S): at 21:45

## 2022-12-07 RX ADMIN — ENOXAPARIN SODIUM 40 MILLIGRAM(S): 100 INJECTION SUBCUTANEOUS at 14:01

## 2022-12-07 RX ADMIN — Medication 20 MILLIGRAM(S): at 05:52

## 2022-12-07 RX ADMIN — Medication 4 MILLILITER(S): at 09:00

## 2022-12-07 RX ADMIN — Medication 3 MILLILITER(S): at 08:59

## 2022-12-07 RX ADMIN — SIMVASTATIN 20 MILLIGRAM(S): 20 TABLET, FILM COATED ORAL at 21:44

## 2022-12-07 RX ADMIN — Medication 25 GRAM(S): at 17:23

## 2022-12-07 RX ADMIN — Medication 2: at 13:32

## 2022-12-07 RX ADMIN — Medication 3 MILLILITER(S): at 21:59

## 2022-12-07 RX ADMIN — Medication 7 UNIT(S): at 09:54

## 2022-12-07 RX ADMIN — Medication 37 MICROGRAM(S): at 22:38

## 2022-12-07 RX ADMIN — Medication 6 UNIT(S): at 18:42

## 2022-12-07 RX ADMIN — LOSARTAN POTASSIUM 100 MILLIGRAM(S): 100 TABLET, FILM COATED ORAL at 05:52

## 2022-12-07 RX ADMIN — Medication 4 MILLILITER(S): at 22:09

## 2022-12-07 RX ADMIN — INSULIN GLARGINE 20 UNIT(S): 100 INJECTION, SOLUTION SUBCUTANEOUS at 23:55

## 2022-12-07 RX ADMIN — Medication 12 UNIT(S): at 13:33

## 2022-12-07 NOTE — PROGRESS NOTE ADULT - ASSESSMENT
76 y/o female with PMHx of DM type 2, hypothyroidism, and HTN who presented to the ED for increased SOB. Admitted with RSV. Is on methylprednisolone 40mg q12h with resultant hyperglycemia. Endocrine consulted for DM2 with steroid-induced hyperglycemia.    Poorly controlled T2DM with steroid-induced hyperglycemia  DM diagnosis: DM2 x20 years  Last A1c: 8.8  Endocrinologist: PCP only  Home DM meds: Basaglar 10 units q24h, Novolog 4 units TIDac, metformin 500mg BID  Receiving methylprednisolone 30mg q12h transitioned ot prednisone 20mg today; now increasing back to 40mg x3 doses starting in am then taper   -Hold oral DM agents while inpatient  -Decrease Lantus to 24 units at bedtime. DO NOT HOLD IF NPO.  -Decrease Admelog to 15 units TID pre-meal. HOLD IF NPO.  -Use moderate dose Admelog correction scale pre-meal  -Use moderate dose Admelog correction scale at bedtime  -Fingerstick BG before meals and bedtime  -Goal -180  -Carbohydrate consistent diet  Discharge plan:  -Likely to discharge patient home on basal/bolus insulin plus metformin. Final regimen pending clinical course.  -Recommend routine outpatient ophthalmology and PCP f/u    HTN  goal BP in DM <130/80  on losartan 100mg  outpt mc/cr ratio      HLD  -On simvastatin 20mg daily    Hypothyroidism  TSH normal now. FT4 nml in Nov 2022.  Continue LT4 50mcg daily PO (or 37mcg IV)          Xochilt Gutiérrez  Nurse Practitioner  Division of Endocrinology & Diabetes  Pager # [phone]      If after 6PM or before 9AM, or on weekends/holidays, please call endocrine answering service for assistance ([phone]).  For nonurgent matters email [email or web address] for assistance.     76 y/o female with PMHx of DM type 2, hypothyroidism, and HTN who presented to the ED for increased SOB. Admitted with RSV. Is on methylprednisolone 40mg q12h with resultant hyperglycemia. Endocrine consulted for DM2 with steroid-induced hyperglycemia.    Poorly controlled T2DM with steroid-induced hyperglycemia  DM diagnosis: DM2 x20 years  Last A1c: 8.8  Endocrinologist: PCP only  Home DM meds: Basaglar 10 units q24h, Novolog 4 units TIDac, metformin 500mg BID  Receiving methylprednisolone 30mg q12h transitioned to  prednisone 20mg today; now increasing back to 40mg x3 doses starting in am then taper   -Hold oral DM agents while inpatient  -Decrease Lantus to 24 units at bedtime. DO NOT HOLD IF NPO.  -Decrease Admelog to 8 units TID pre-meal. HOLD IF NPO.  -Use moderate dose Admelog correction scale pre-meal  -Use moderate dose Admelog correction scale at bedtime  -Fingerstick BG before meals and bedtime; please check a 3 am FS   -Goal -180; Hypoglycemia at dinner today; 6 units given x 1 for dinner only after correction of hypoglycemia and pt eating (explained to RN)  -Carbohydrate consistent diet  Discharge plan:  -Likely to discharge patient home on basal/bolus insulin plus metformin. Final regimen pending clinical course.  -Recommend routine outpatient ophthalmology and PCP f/u    HTN  goal BP in DM <130/80  on losartan 100mg  outpt mc/cr ratio      HLD  -On simvastatin 20mg daily    Hypothyroidism  TSH normal now. FT4 nml in Nov 2022.  Continue LT4 50mcg daily PO (or 37mcg IV)    D/w Team       Randa Lu  Nurse Practitioner  Division of Endocrinology & Diabetes  In house pager #[phone]    If before 9AM or after 6PM, or on weekends/holidays, please call endocrine answering service for assistance ([phone]).For nonurgent matters email [email or web address] for assistance.     If after 6PM or before 9AM, or on weekends/holidays, please call endocrine answering service for assistance ([phone]).  For nonurgent matters email [email or web address] for assistance.     76 y/o female with PMHx of DM type 2, hypothyroidism, and HTN who presented to the ED for increased SOB. Admitted with RSV. Is on methylprednisolone 40mg q12h with resultant hyperglycemia. Endocrine consulted for DM2 with steroid-induced hyperglycemia.    Poorly controlled T2DM with steroid-induced hyperglycemia  DM diagnosis: DM2 x20 years  Last A1c: 8.8  Endocrinologist: PCP only  Home DM meds: Basaglar 10 units q24h, Novolog 4 units TIDac, metformin 500mg BID  Receiving methylprednisolone 30mg q12h transitioned to  prednisone 20mg today; now increasing back to 40mg x3 doses starting in am then taper   -Hold oral DM agents while inpatient  -Decrease Lantus to 24 units at bedtime. DO NOT HOLD IF NPO.  -Decrease Admelog to 8 units TID pre-meal. HOLD IF NPO.  -Change moderate to low dose Admelog correction scale pre-meal  -Change moderate to low dose Admelog correction scale at bedtime  -Fingerstick BG before meals and bedtime; please check a 3 am FS   -Goal -180; Hypoglycemia at dinner today; 6 units given x 1 for dinner only after correction of hypoglycemia and pt eating (explained to RN)  -Carbohydrate consistent diet  Discharge plan:  -Likely to discharge patient home on basal/bolus insulin plus metformin. Final regimen pending clinical course.  -Recommend routine outpatient ophthalmology and PCP f/u    HTN  goal BP in DM <130/80  on losartan 100mg  outpt mc/cr ratio      HLD  -On simvastatin 20mg daily    Hypothyroidism  TSH normal now. FT4 nml in Nov 2022.  Continue LT4 50mcg daily PO (or 37mcg IV)    D/w Team       Randa Lu  Nurse Practitioner  Division of Endocrinology & Diabetes  In house pager #[phone]    If before 9AM or after 6PM, or on weekends/holidays, please call endocrine answering service for assistance ([phone]).For nonurgent matters email [email or web address] for assistance.     If after 6PM or before 9AM, or on weekends/holidays, please call endocrine answering service for assistance ([phone]).  For nonurgent matters email [email or web address] for assistance.     76 y/o female with PMHx of DM type 2, hypothyroidism, and HTN who presented to the ED for increased SOB. Admitted with RSV. Is on methylprednisolone 40mg q12h with resultant hyperglycemia. Endocrine consulted for DM2 with steroid-induced hyperglycemia.    Poorly controlled T2DM with steroid-induced hyperglycemia  DM diagnosis: DM2 x20 years  Last A1c: 8.8  Endocrinologist: PCP only  Home DM meds: Basaglar 10 units q24h, Novolog 4 units TIDac, metformin 500mg BID  Receiving methylprednisolone 30mg q12h transitioned to  prednisone 20mg today; now increasing back to 40mg x3 doses starting in am then taper   -Hold oral DM agents while inpatient  -Decrease Lantus to 24 units at bedtime. DO NOT HOLD IF NPO.  -Decrease Admelog to 8 units TID pre-meal. HOLD IF NPO.  -Change moderate to low dose Admelog correction scale pre-meal  -Change moderate to low dose Admelog correction scale at bedtime  -Fingerstick BG before meals and bedtime; please check a 3 am FS   -Goal -180; Hypoglycemia at dinner today; 6 units given x 1 for dinner only after correction of hypoglycemia and pt eating (explained to RN); as per rn pt also only ate about 25% of lunch  -Carbohydrate consistent diet  Discharge plan:  -Likely to discharge patient home on basal/bolus insulin plus metformin. Final regimen pending clinical course.  -Recommend routine outpatient ophthalmology and PCP f/u    HTN  goal BP in DM <130/80  on losartan 100mg  outpt mc/cr ratio      HLD  -On simvastatin 20mg daily    Hypothyroidism  TSH normal now. FT4 nml in Nov 2022.  Continue LT4 50mcg daily PO (or 37mcg IV)    D/w Team       Randa Lu  Nurse Practitioner  Division of Endocrinology & Diabetes  In house pager #[phone]    If before 9AM or after 6PM, or on weekends/holidays, please call endocrine answering service for assistance ([phone]).For nonurgent matters email [email or web address] for assistance.     If after 6PM or before 9AM, or on weekends/holidays, please call endocrine answering service for assistance ([phone]).  For nonurgent matters email [email or web address] for assistance.

## 2022-12-07 NOTE — PROGRESS NOTE ADULT - SUBJECTIVE AND OBJECTIVE BOX
PULMONARY PROGRESS NOTE    MARTIR VU  MRN-[id number]    Patient is a 77y old  Female who presents with a chief complaint of Difficulty breathing (06 Dec 2022 16:10)      HPI:  -seen this morning  nasal cannula displaced  soem cough   -    ROS:   -    ACTIVE MEDICATION LIST:  MEDICATIONS  (STANDING):  acetylcysteine 20%  Inhalation 4 milliLiter(s) Inhalation every 12 hours  albuterol/ipratropium for Nebulization 3 milliLiter(s) Nebulizer every 4 hours  cholecalciferol 1000 Unit(s) Oral daily  dextrose 5%. 1000 milliLiter(s) (100 mL/Hr) IV Continuous <Continuous>  dextrose 5%. 1000 milliLiter(s) (50 mL/Hr) IV Continuous <Continuous>  dextrose 50% Injectable 25 Gram(s) IV Push once  dextrose 50% Injectable 12.5 Gram(s) IV Push once  dextrose 50% Injectable 25 Gram(s) IV Push once  enoxaparin Injectable 40 milliGRAM(s) SubCutaneous every 24 hours  glucagon  Injectable 1 milliGRAM(s) IntraMuscular once  influenza  Vaccine (HIGH DOSE) 0.7 milliLiter(s) IntraMuscular once  insulin glargine Injectable (LANTUS) 30 Unit(s) SubCutaneous at bedtime  insulin lispro (ADMELOG) corrective regimen sliding scale   SubCutaneous three times a day before meals  insulin lispro (ADMELOG) corrective regimen sliding scale   SubCutaneous at bedtime  insulin lispro Injectable (ADMELOG) 15 Unit(s) SubCutaneous three times a day before meals  levothyroxine Injectable 37 MICROGram(s) IV Push at bedtime  losartan 100 milliGRAM(s) Oral daily  pantoprazole  Injectable 40 milliGRAM(s) IV Push daily  predniSONE   Tablet 20 milliGRAM(s) Oral daily  simvastatin 20 milliGRAM(s) Oral at bedtime    MEDICATIONS  (PRN):  acetaminophen     Tablet .. 650 milliGRAM(s) Oral every 6 hours PRN Temp greater or equal to 38C (100.4F), Mild Pain (1 - 3)  aluminum hydroxide/magnesium hydroxide/simethicone Suspension 30 milliLiter(s) Oral every 4 hours PRN Dyspepsia  dextrose Oral Gel 15 Gram(s) Oral once PRN Blood Glucose LESS THAN 70 milliGRAM(s)/deciliter  melatonin 3 milliGRAM(s) Oral at bedtime PRN Insomnia  ondansetron Injectable 4 milliGRAM(s) IV Push every 8 hours PRN Nausea and/or Vomiting      EXAM:  Vital Signs Last 24 Hrs  T(C): 36.6 (07 Dec 2022 05:07), Max: 36.6 (06 Dec 2022 13:06)  T(F): 97.9 (07 Dec 2022 05:07), Max: 97.9 (06 Dec 2022 13:06)  HR: 90 (07 Dec 2022 08:59) (86 - 102)  BP: 140/75 (07 Dec 2022 05:07) (133/71 - 140/75)  BP(mean): --  RR: 16 (07 Dec 2022 05:07) (16 - 18)  SpO2: 100% (07 Dec 2022 05:07) (96% - 100%)    Parameters below as of 06 Dec 2022 21:16  Patient On (Oxygen Delivery Method): nasal cannula  O2 Flow (L/min): 2      GENERAL: The patient is awake and alert in no apparent distress.     LUNGS:  coughing  some wheeze wtih cough  scattered rhonchi                                 8.6    10.80 )-----------( 235      ( 07 Dec 2022 05:40 )             32.2       12-07    137  |  99  |  32<H>  ----------------------------<  131<H>  4.4   |  32<H>  |  0.71    Ca    9.1      07 Dec 2022 05:40  Phos  3.7     12-07  Mg     2.00     12-07       < from: Xray Chest 1 View- PORTABLE-Urgent (12.01.22 @ 13:02) >    ACC: [id number] EXAM:  XR CHEST PORTABLE URGENT 1V                          PROCEDURE DATE:  12/01/2022          INTERPRETATION:  CLINICAL INFORMATION: Chest pain    TIME OF EXAMINATION: December 1, 2022 at 12:50 PM    EXAM: Portable chest    FINDINGS:  Right upper lobe and left midlung opacities about the same as the last   study may be chronic findings. The heart is not enlarged and there are no   effusions or congestion to indicate CHF.        COMPARISON: November 11, 2022        IMPRESSION: Follow-up study showing no acute pulmonary pathology.    --- End of Report ---            NAT HENNING MD; Attending Radiologist  This document has been electronically signed. Dec  1 2022  1:41PM    < end of copied text >      PROBLEM LIST:  77y Female with HEALTH ISSUES - PROBLEM Dx:  Diabetes mellitus    Sepsis with acute hypercapnic respiratory failure    HTN (hypertension)    Need for prophylactic measure    Hypothyroidism    Steroid-induced hyperglycemia    HLD (hyperlipidemia)              RECS:  would put her on prednisone taper- start with  40mg X 3 days, with 10mg taper every 3 days until shes off it  send her home wtih duonebs to be done BID for now  send her home with acapella or IS  mucomyst inhalation as well  wean O2 as tolerated  can repeat her ct chest outpatient  family asking about jesus- defer to primary team        Please call with any questions.    Dede Flynn, DO  [facility] Pulmonary/Sleep Medicine  [phone]

## 2022-12-07 NOTE — PROGRESS NOTE ADULT - SUBJECTIVE AND OBJECTIVE BOX
SUBJECTIVE / OVERNIGHT EVENTS:pt seen and examined, pts family next to pts bedside  22    MEDICATIONS  (STANDING):  acetylcysteine 20%  Inhalation 4 milliLiter(s) Inhalation every 12 hours  albuterol/ipratropium for Nebulization 3 milliLiter(s) Nebulizer every 4 hours  cholecalciferol 1000 Unit(s) Oral daily  dextrose 5%. 1000 milliLiter(s) (100 mL/Hr) IV Continuous <Continuous>  dextrose 5%. 1000 milliLiter(s) (50 mL/Hr) IV Continuous <Continuous>  dextrose 50% Injectable 25 Gram(s) IV Push once  dextrose 50% Injectable 12.5 Gram(s) IV Push once  dextrose 50% Injectable 25 Gram(s) IV Push once  enoxaparin Injectable 40 milliGRAM(s) SubCutaneous every 24 hours  glucagon  Injectable 1 milliGRAM(s) IntraMuscular once  influenza  Vaccine (HIGH DOSE) 0.7 milliLiter(s) IntraMuscular once  insulin glargine Injectable (LANTUS) 20 Unit(s) SubCutaneous at bedtime  insulin lispro (ADMELOG) corrective regimen sliding scale   SubCutaneous three times a day before meals  insulin lispro (ADMELOG) corrective regimen sliding scale   SubCutaneous at bedtime  levothyroxine Injectable 37 MICROGram(s) IV Push at bedtime  losartan 100 milliGRAM(s) Oral daily  pantoprazole  Injectable 40 milliGRAM(s) IV Push daily  simvastatin 20 milliGRAM(s) Oral at bedtime    MEDICATIONS  (PRN):  acetaminophen     Tablet .. 650 milliGRAM(s) Oral every 6 hours PRN Temp greater or equal to 38C (100.4F), Mild Pain (1 - 3)  aluminum hydroxide/magnesium hydroxide/simethicone Suspension 30 milliLiter(s) Oral every 4 hours PRN Dyspepsia  dextrose Oral Gel 15 Gram(s) Oral once PRN Blood Glucose LESS THAN 70 milliGRAM(s)/deciliter  melatonin 3 milliGRAM(s) Oral at bedtime PRN Insomnia  ondansetron Injectable 4 milliGRAM(s) IV Push every 8 hours PRN Nausea and/or Vomiting    Vital Signs Last 24 Hrs  T(C): 36.6 (22 @ 11:55), Max: 36.6 (22 @ 21:16)  T(F): 97.8 (22 @ 11:55), Max: 97.9 (22 @ 21:16)  HR: 87 (22 @ 17:10) (86 - 95)  BP: 134/67 (22 @ 17:10) (125/64 - 140/75)  BP(mean): --  RR: 18 (22 @ 17:10) (16 - 18)  SpO2: 94% (22 @ 17:10) (86% - 100%)        PHYSICAL EXAM:  GENERAL: NAD  EYES: EOMI, PERRLA  NECK: Supple, No JVD  CHEST/LUNG: dec breath sounds at bases  HEART:  S1 , S2 +  ABDOMEN: soft , bs+  EXTREMITIES:  no edema  NEUROLOGY:alert awake    LABS:      137  |  99  |  32<H>  ----------------------------<  131<H>  4.4   |  32<H>  |  0.71    Ca    9.1      07 Dec 2022 05:40  Phos  3.7       Mg     2.00           Creatinine Trend: 0.71 <--, 0.70 <--, 0.79 <--, 0.90 <--, 0.83 <--, 0.86 <--, 0.71 <--                        8.6    10.80 )-----------( 235      ( 07 Dec 2022 05:40 )             32.2     Urine Studies:  Urinalysis Basic - ( 01 Dec 2022 16:30 )    Color: Light Yellow / Appearance: Clear / S.016 / pH:   Gluc:  / Ketone: Negative  / Bili: Negative / Urobili: <2 mg/dL   Blood:  / Protein: 30 mg/dL / Nitrite: Negative   Leuk Esterase: Negative / RBC: 9 /HPF / WBC 2 /HPF   Sq Epi:  / Non Sq Epi: 3 /HPF / Bacteria: Negative                                                    RADIOLOGY & ADDITIONAL TESTS:    Imaging Personally Reviewed:yes    Consultant(s) Notes Reviewed:  yes    Care Discussed with Consultants/Other Providers:yes

## 2022-12-07 NOTE — PROGRESS NOTE ADULT - SUBJECTIVE AND OBJECTIVE BOX
Forrest Rosenthal MD  Interventional Cardiology / Endovascular Specialist  [place] Office : [address] [place] [address] Tel:   [place] Office : [address] [facility] [address]  Tel: [phone]      Subjective/Overnight events: Patient lying in bed comfortably. No acute distress.   	  MEDICATIONS:  enoxaparin Injectable 40 milliGRAM(s) SubCutaneous every 24 hours  losartan 100 milliGRAM(s) Oral daily      acetylcysteine 20%  Inhalation 4 milliLiter(s) Inhalation every 12 hours  albuterol/ipratropium for Nebulization 3 milliLiter(s) Nebulizer every 4 hours    acetaminophen     Tablet .. 650 milliGRAM(s) Oral every 6 hours PRN  melatonin 3 milliGRAM(s) Oral at bedtime PRN  ondansetron Injectable 4 milliGRAM(s) IV Push every 8 hours PRN    aluminum hydroxide/magnesium hydroxide/simethicone Suspension 30 milliLiter(s) Oral every 4 hours PRN  pantoprazole  Injectable 40 milliGRAM(s) IV Push daily    dextrose 50% Injectable 25 Gram(s) IV Push once  dextrose 50% Injectable 12.5 Gram(s) IV Push once  dextrose 50% Injectable 25 Gram(s) IV Push once  dextrose Oral Gel 15 Gram(s) Oral once PRN  glucagon  Injectable 1 milliGRAM(s) IntraMuscular once  insulin glargine Injectable (LANTUS) 30 Unit(s) SubCutaneous at bedtime  insulin lispro (ADMELOG) corrective regimen sliding scale   SubCutaneous three times a day before meals  insulin lispro (ADMELOG) corrective regimen sliding scale   SubCutaneous at bedtime  insulin lispro Injectable (ADMELOG) 12 Unit(s) SubCutaneous three times a day before meals  levothyroxine Injectable 37 MICROGram(s) IV Push at bedtime  predniSONE   Tablet 20 milliGRAM(s) Oral daily  simvastatin 20 milliGRAM(s) Oral at bedtime    cholecalciferol 1000 Unit(s) Oral daily  dextrose 5%. 1000 milliLiter(s) IV Continuous <Continuous>  dextrose 5%. 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      Cataract of left eye          SOCIAL HISTORY: Substance Use (street drugs): ( x ) never used  (  ) other:    FAMILY HISTORY:  Family history of heart attack (Mother)  mother          PHYSICAL EXAM:  T(C): 36.6 (12-07-22 @ 11:55), Max: 36.6 (12-06-22 @ 21:16)  HR: 95 (12-07-22 @ 11:55) (86 - 102)  BP: 125/64 (12-07-22 @ 11:55) (125/64 - 140/75)  RR: 18 (12-07-22 @ 11:55) (16 - 18)  SpO2: 94% (12-07-22 @ 11:55) (86% - 100%)  Wt(kg): --  I&O's Summary    06 Dec 2022 07:01  -  07 Dec 2022 07:00  --------------------------------------------------------  IN: 0 mL / OUT: 1200 mL / NET: -1200 mL          GENERAL: NAD  EYES: EOMI, PERRLA, conjunctiva and sclera clear  ENMT: No tonsillar erythema, exudates, or enlargement  Cardiovascular: Normal S1 S2, No JVD, No murmurs, No edema  Respiratory: Lungs clear to auscultation	  Gastrointestinal:  Soft, Non-tender, + BS	  Extremities: No edema                                     8.6    10.80 )-----------( 235      ( 07 Dec 2022 05:40 )             32.2     12-07    137  |  99  |  32<H>  ----------------------------<  131<H>  4.4   |  32<H>  |  0.71    Ca    9.1      07 Dec 2022 05:40  Phos  3.7     12-07  Mg     2.00     12-07      proBNP:   Lipid Profile:   HgA1c:   TSH:     Consultant(s) Notes Reviewed:  [x ] YES  [ ] NO    Care Discussed with Consultants/Other Providers [ x] YES  [ ] NO    Imaging Personally Reviewed independently:  [x] YES  [ ] NO    All labs, radiologic studies, vitals, orders and medications list reviewed. Patient is seen and examined at bedside. Case discussed with medical team.

## 2022-12-07 NOTE — CHART NOTE - NSCHARTNOTEFT_GEN_A_CORE
Patient with hypoglycemia this afternoon. Insulin is being adjusted as steroids are being tapered. Contacted endocrinology- insulin regimen adjusted. Grandson is currently at bedside and reports someone will be at bedside with her to make sure she eats her dinner tonight.

## 2022-12-07 NOTE — CHART NOTE - NSCHARTNOTEFT_GEN_A_CORE
Patient admitted to the hospital with a diagnosis of sepsis related to RSV, pneumonia and acute hypercarbic respiratory failure. Patient demonstrates decreased endurance, decreased muscle strength and impaired balance. Patient also requires DUONEB nebulization to improve respiratory function. Patient would medically benefit from a semi-electrical hospital bed with gel overlay to allow for head of bed elevation to 30 degrees to prevent aspiration, allow for maximum chest expansion, allow for maximum OOB activities and allow for frequent changes in body positioning otherwise not feasible in an ordinary bed.

## 2022-12-07 NOTE — PROGRESS NOTE ADULT - SUBJECTIVE AND OBJECTIVE BOX
Chief Complaint:     History:    MEDICATIONS  (STANDING):  acetylcysteine 20%  Inhalation 4 milliLiter(s) Inhalation every 12 hours  albuterol/ipratropium for Nebulization 3 milliLiter(s) Nebulizer every 4 hours  cholecalciferol 1000 Unit(s) Oral daily  dextrose 5%. 1000 milliLiter(s) (100 mL/Hr) IV Continuous <Continuous>  dextrose 5%. 1000 milliLiter(s) (50 mL/Hr) IV Continuous <Continuous>  dextrose 50% Injectable 25 Gram(s) IV Push once  dextrose 50% Injectable 12.5 Gram(s) IV Push once  dextrose 50% Injectable 25 Gram(s) IV Push once  enoxaparin Injectable 40 milliGRAM(s) SubCutaneous every 24 hours  glucagon  Injectable 1 milliGRAM(s) IntraMuscular once  influenza  Vaccine (HIGH DOSE) 0.7 milliLiter(s) IntraMuscular once  insulin glargine Injectable (LANTUS) 30 Unit(s) SubCutaneous at bedtime  insulin lispro (ADMELOG) corrective regimen sliding scale   SubCutaneous three times a day before meals  insulin lispro (ADMELOG) corrective regimen sliding scale   SubCutaneous at bedtime  insulin lispro Injectable (ADMELOG) 6 Unit(s) SubCutaneous three times a day before meals  levothyroxine Injectable 37 MICROGram(s) IV Push at bedtime  losartan 100 milliGRAM(s) Oral daily  pantoprazole  Injectable 40 milliGRAM(s) IV Push daily  simvastatin 20 milliGRAM(s) Oral at bedtime    MEDICATIONS  (PRN):  acetaminophen     Tablet .. 650 milliGRAM(s) Oral every 6 hours PRN Temp greater or equal to 38C (100.4F), Mild Pain (1 - 3)  aluminum hydroxide/magnesium hydroxide/simethicone Suspension 30 milliLiter(s) Oral every 4 hours PRN Dyspepsia  dextrose Oral Gel 15 Gram(s) Oral once PRN Blood Glucose LESS THAN 70 milliGRAM(s)/deciliter  melatonin 3 milliGRAM(s) Oral at bedtime PRN Insomnia  ondansetron Injectable 4 milliGRAM(s) IV Push every 8 hours PRN Nausea and/or Vomiting      Allergies: No Known Allergies    Review of Systems:  UNABLE TO OBTAIN    PHYSICAL EXAM:  VITALS: T(C): 36.6 (12-07-22 @ 11:55)  T(F): 97.8 (12-07-22 @ 11:55), Max: 97.9 (12-06-22 @ 21:16)  HR: 86 (12-07-22 @ 15:27) (86 - 101)  BP: 125/64 (12-07-22 @ 11:55) (125/64 - 140/75)  RR:  (16 - 18)  SpO2:  (86% - 100%)  Wt(kg): --  GENERAL: NAD, well-groomed, well-developed  RESPIRATORY: No labored breathing   GI: Soft, nontender, non distended  PSYCH: Alert and oriented x 3, normal affect, normal mood      CAPILLARY BLOOD GLUCOSE  POCT Blood Glucose.: 243 mg/dL (07 Dec 2022 17:35)  POCT Blood Glucose.: 40 mg/dL (07 Dec 2022 17:14)  POCT Blood Glucose.: 36 mg/dL (07 Dec 2022 17:13)  POCT Blood Glucose.: 174 mg/dL (07 Dec 2022 12:44)  POCT Blood Glucose.: 97 mg/dL (07 Dec 2022 09:17)  POCT Blood Glucose.: 165 mg/dL (06 Dec 2022 21:31)    A1C with Estimated Average Glucose (12.01.22 @ 12:00)    A1C with Estimated Average Glucose Result: 8.8      12-07    137  |  99  |  32<H>  ----------------------------<  131<H>  4.4   |  32<H>  |  0.71    eGFR: 86    Ca    9.1      12-07  Mg     2.00     12-07  Phos  3.7     12-07      Thyroid Function Tests:  12-01 @ 13:38 TSH 1.11 FreeT4 -- T3 -- Anti TPO -- Anti Thyroglobulin Ab -- TSI --  11-10 @ 06:48 TSH 1.08 FreeT4 1.3 T3 -- Anti TPO -- Anti Thyroglobulin Ab -- TSI --      Diet, Regular:   Consistent Carbohydrate No Snacks (CSTCHO)  Low Sodium  Halal (12-03-22 @ 07:30) [Active]                     Chief Complaint: Poorly controlled T2DM with steroid-induced hyperglycemia    History: [facility] id [id number].   unable to understand pt. Attempted ot call [facility] no answer. As per RN pt tolerating oral diet. Pt has no sings of nausea and vomiting/any signs of hypoglycemia.    MEDICATIONS  (STANDING):  acetylcysteine 20%  Inhalation 4 milliLiter(s) Inhalation every 12 hours  albuterol/ipratropium for Nebulization 3 milliLiter(s) Nebulizer every 4 hours  cholecalciferol 1000 Unit(s) Oral daily  dextrose 5%. 1000 milliLiter(s) (100 mL/Hr) IV Continuous <Continuous>  dextrose 5%. 1000 milliLiter(s) (50 mL/Hr) IV Continuous <Continuous>  dextrose 50% Injectable 25 Gram(s) IV Push once  dextrose 50% Injectable 12.5 Gram(s) IV Push once  dextrose 50% Injectable 25 Gram(s) IV Push once  enoxaparin Injectable 40 milliGRAM(s) SubCutaneous every 24 hours  glucagon  Injectable 1 milliGRAM(s) IntraMuscular once  influenza  Vaccine (HIGH DOSE) 0.7 milliLiter(s) IntraMuscular once  insulin glargine Injectable (LANTUS) 30 Unit(s) SubCutaneous at bedtime  insulin lispro (ADMELOG) corrective regimen sliding scale   SubCutaneous three times a day before meals  insulin lispro (ADMELOG) corrective regimen sliding scale   SubCutaneous at bedtime  insulin lispro Injectable (ADMELOG) 6 Unit(s) SubCutaneous three times a day before meals  levothyroxine Injectable 37 MICROGram(s) IV Push at bedtime  losartan 100 milliGRAM(s) Oral daily  pantoprazole  Injectable 40 milliGRAM(s) IV Push daily  simvastatin 20 milliGRAM(s) Oral at bedtime    MEDICATIONS  (PRN):  acetaminophen     Tablet .. 650 milliGRAM(s) Oral every 6 hours PRN Temp greater or equal to 38C (100.4F), Mild Pain (1 - 3)  aluminum hydroxide/magnesium hydroxide/simethicone Suspension 30 milliLiter(s) Oral every 4 hours PRN Dyspepsia  dextrose Oral Gel 15 Gram(s) Oral once PRN Blood Glucose LESS THAN 70 milliGRAM(s)/deciliter  melatonin 3 milliGRAM(s) Oral at bedtime PRN Insomnia  ondansetron Injectable 4 milliGRAM(s) IV Push every 8 hours PRN Nausea and/or Vomiting      Allergies: No Known Allergies    Review of Systems:  UNABLE TO OBTAIN    PHYSICAL EXAM:  VITALS: T(C): 36.6 (12-07-22 @ 11:55)  T(F): 97.8 (12-07-22 @ 11:55), Max: 97.9 (12-06-22 @ 21:16)  HR: 86 (12-07-22 @ 15:27) (86 - 101)  BP: 125/64 (12-07-22 @ 11:55) (125/64 - 140/75)  RR:  (16 - 18)  SpO2:  (86% - 100%)  Wt(kg): --  GENERAL: NAD, well-groomed, well-developed  RESPIRATORY: No labored breathing   GI: Soft, nontender, non distended  PSYCH: Alert and oriented x 3, normal affect, normal mood      CAPILLARY BLOOD GLUCOSE  POCT Blood Glucose.: 243 mg/dL (07 Dec 2022 17:35)  POCT Blood Glucose.: 40 mg/dL (07 Dec 2022 17:14)  POCT Blood Glucose.: 36 mg/dL (07 Dec 2022 17:13)  POCT Blood Glucose.: 174 mg/dL (07 Dec 2022 12:44)  POCT Blood Glucose.: 97 mg/dL (07 Dec 2022 09:17)  POCT Blood Glucose.: 165 mg/dL (06 Dec 2022 21:31)    A1C with Estimated Average Glucose (12.01.22 @ 12:00)    A1C with Estimated Average Glucose Result: 8.8      12-07    137  |  99  |  32<H>  ----------------------------<  131<H>  4.4   |  32<H>  |  0.71    eGFR: 86    Ca    9.1      12-07  Mg     2.00     12-07  Phos  3.7     12-07      Thyroid Function Tests:  12-01 @ 13:38 TSH 1.11 FreeT4 -- T3 -- Anti TPO -- Anti Thyroglobulin Ab -- TSI --  11-10 @ 06:48 TSH 1.08 FreeT4 1.3 T3 -- Anti TPO -- Anti Thyroglobulin Ab -- TSI --      Diet, Regular:   Consistent Carbohydrate No Snacks (CSTCHO)  Low Sodium  Halal (12-03-22 @ 07:30) [Active]

## 2022-12-07 NOTE — PROGRESS NOTE ADULT - ASSESSMENT
78 y/o female with PMHx of DM type 2, hypothyroidism, and HTN who presented to the ED for increased SOB    EKG: NSR no ischemic changes      1. RSV pneumonia    - on vancomycin and zosyn   -echo normal LV systolic function, no WMA. mild diastolic dysfunction. decreased RV function   -monitor on tele   - on IV steroids     2. HTN  -controlled  -c/w losartan  -continue to monitor BP     3. DVT prophylaxis  -lovenox subq Yes

## 2022-12-08 LAB
ANION GAP SERPL CALC-SCNC: 14 MMOL/L — SIGNIFICANT CHANGE UP (ref 7–14)
BUN SERPL-MCNC: 32 MG/DL — HIGH (ref 7–23)
CALCIUM SERPL-MCNC: 9.2 MG/DL — SIGNIFICANT CHANGE UP (ref 8.4–10.5)
CHLORIDE SERPL-SCNC: 101 MMOL/L — SIGNIFICANT CHANGE UP (ref 98–107)
CO2 SERPL-SCNC: 30 MMOL/L — SIGNIFICANT CHANGE UP (ref 22–31)
CREAT SERPL-MCNC: 0.69 MG/DL — SIGNIFICANT CHANGE UP (ref 0.5–1.3)
EGFR: 88 ML/MIN/1.73M2 — SIGNIFICANT CHANGE UP
GLUCOSE BLDC GLUCOMTR-MCNC: 106 MG/DL — HIGH (ref 70–99)
GLUCOSE BLDC GLUCOMTR-MCNC: 120 MG/DL — HIGH (ref 70–99)
GLUCOSE BLDC GLUCOMTR-MCNC: 121 MG/DL — HIGH (ref 70–99)
GLUCOSE BLDC GLUCOMTR-MCNC: 123 MG/DL — HIGH (ref 70–99)
GLUCOSE BLDC GLUCOMTR-MCNC: 189 MG/DL — HIGH (ref 70–99)
GLUCOSE BLDC GLUCOMTR-MCNC: 224 MG/DL — HIGH (ref 70–99)
GLUCOSE BLDC GLUCOMTR-MCNC: 232 MG/DL — HIGH (ref 70–99)
GLUCOSE BLDC GLUCOMTR-MCNC: 51 MG/DL — CRITICAL LOW (ref 70–99)
GLUCOSE BLDC GLUCOMTR-MCNC: 51 MG/DL — CRITICAL LOW (ref 70–99)
GLUCOSE BLDC GLUCOMTR-MCNC: 80 MG/DL — SIGNIFICANT CHANGE UP (ref 70–99)
GLUCOSE SERPL-MCNC: 124 MG/DL — HIGH (ref 70–99)
HCT VFR BLD CALC: 34 % — LOW (ref 34.5–45)
HGB BLD-MCNC: 9.3 G/DL — LOW (ref 11.5–15.5)
MAGNESIUM SERPL-MCNC: 1.9 MG/DL — SIGNIFICANT CHANGE UP (ref 1.6–2.6)
MCHC RBC-ENTMCNC: 20.4 PG — LOW (ref 27–34)
MCHC RBC-ENTMCNC: 27.4 GM/DL — LOW (ref 32–36)
MCV RBC AUTO: 74.7 FL — LOW (ref 80–100)
NRBC # BLD: 0 /100 WBCS — SIGNIFICANT CHANGE UP (ref 0–0)
NRBC # FLD: 0 K/UL — SIGNIFICANT CHANGE UP (ref 0–0)
PHOSPHATE SERPL-MCNC: 3.5 MG/DL — SIGNIFICANT CHANGE UP (ref 2.5–4.5)
PLATELET # BLD AUTO: 245 K/UL — SIGNIFICANT CHANGE UP (ref 150–400)
POTASSIUM SERPL-MCNC: 4.3 MMOL/L — SIGNIFICANT CHANGE UP (ref 3.5–5.3)
POTASSIUM SERPL-SCNC: 4.3 MMOL/L — SIGNIFICANT CHANGE UP (ref 3.5–5.3)
RBC # BLD: 4.55 M/UL — SIGNIFICANT CHANGE UP (ref 3.8–5.2)
RBC # FLD: 23.9 % — HIGH (ref 10.3–14.5)
SODIUM SERPL-SCNC: 145 MMOL/L — SIGNIFICANT CHANGE UP (ref 135–145)
WBC # BLD: 11.4 K/UL — HIGH (ref 3.8–10.5)
WBC # FLD AUTO: 11.4 K/UL — HIGH (ref 3.8–10.5)

## 2022-12-08 PROCEDURE — 99232 SBSQ HOSP IP/OBS MODERATE 35: CPT

## 2022-12-08 RX ORDER — INSULIN LISPRO 100/ML
6 VIAL (ML) SUBCUTANEOUS
Refills: 0 | Status: DISCONTINUED | OUTPATIENT
Start: 2022-12-08 | End: 2022-12-09

## 2022-12-08 RX ADMIN — Medication 3 MILLILITER(S): at 00:10

## 2022-12-08 RX ADMIN — Medication 3 MILLILITER(S): at 15:35

## 2022-12-08 RX ADMIN — ENOXAPARIN SODIUM 40 MILLIGRAM(S): 100 INJECTION SUBCUTANEOUS at 14:21

## 2022-12-08 RX ADMIN — PANTOPRAZOLE SODIUM 40 MILLIGRAM(S): 20 TABLET, DELAYED RELEASE ORAL at 14:12

## 2022-12-08 RX ADMIN — SIMVASTATIN 20 MILLIGRAM(S): 20 TABLET, FILM COATED ORAL at 22:01

## 2022-12-08 RX ADMIN — Medication 40 MILLIGRAM(S): at 05:08

## 2022-12-08 RX ADMIN — LOSARTAN POTASSIUM 100 MILLIGRAM(S): 100 TABLET, FILM COATED ORAL at 05:08

## 2022-12-08 RX ADMIN — Medication 8 UNIT(S): at 09:14

## 2022-12-08 RX ADMIN — Medication 4 MILLILITER(S): at 11:55

## 2022-12-08 RX ADMIN — Medication 8 UNIT(S): at 14:20

## 2022-12-08 RX ADMIN — Medication 3 MILLILITER(S): at 11:49

## 2022-12-08 RX ADMIN — Medication 37 MICROGRAM(S): at 23:49

## 2022-12-08 RX ADMIN — Medication 4 MILLILITER(S): at 19:53

## 2022-12-08 RX ADMIN — Medication 1000 UNIT(S): at 14:21

## 2022-12-08 RX ADMIN — Medication 3 MILLILITER(S): at 19:50

## 2022-12-08 RX ADMIN — Medication 3 MILLILITER(S): at 04:05

## 2022-12-08 RX ADMIN — INSULIN GLARGINE 20 UNIT(S): 100 INJECTION, SOLUTION SUBCUTANEOUS at 21:59

## 2022-12-08 NOTE — PROGRESS NOTE ADULT - SUBJECTIVE AND OBJECTIVE BOX
SUBJECTIVE / OVERNIGHT EVENTS:pt seen and examined, pts family next to pts bedside  12-08-22  pt had episode of hypoglycemia    lMEDICATIONS  (STANDING):  acetylcysteine 20%  Inhalation 4 milliLiter(s) Inhalation every 12 hours  albuterol/ipratropium for Nebulization 3 milliLiter(s) Nebulizer every 4 hours  cholecalciferol 1000 Unit(s) Oral daily  dextrose 5%. 1000 milliLiter(s) (100 mL/Hr) IV Continuous <Continuous>  dextrose 5%. 1000 milliLiter(s) (50 mL/Hr) IV Continuous <Continuous>  dextrose 50% Injectable 25 Gram(s) IV Push once  dextrose 50% Injectable 12.5 Gram(s) IV Push once  dextrose 50% Injectable 25 Gram(s) IV Push once  enoxaparin Injectable 40 milliGRAM(s) SubCutaneous every 24 hours  glucagon  Injectable 1 milliGRAM(s) IntraMuscular once  influenza  Vaccine (HIGH DOSE) 0.7 milliLiter(s) IntraMuscular once  insulin glargine Injectable (LANTUS) 20 Unit(s) SubCutaneous at bedtime  insulin lispro (ADMELOG) corrective regimen sliding scale   SubCutaneous three times a day before meals  insulin lispro (ADMELOG) corrective regimen sliding scale   SubCutaneous at bedtime  insulin lispro Injectable (ADMELOG) 8 Unit(s) SubCutaneous three times a day before meals  levothyroxine Injectable 37 MICROGram(s) IV Push at bedtime  losartan 100 milliGRAM(s) Oral daily  pantoprazole  Injectable 40 milliGRAM(s) IV Push daily  predniSONE   Tablet 40 milliGRAM(s) Oral daily  simvastatin 20 milliGRAM(s) Oral at bedtime    MEDICATIONS  (PRN):  acetaminophen     Tablet .. 650 milliGRAM(s) Oral every 6 hours PRN Temp greater or equal to 38C (100.4F), Mild Pain (1 - 3)  aluminum hydroxide/magnesium hydroxide/simethicone Suspension 30 milliLiter(s) Oral every 4 hours PRN Dyspepsia  dextrose Oral Gel 15 Gram(s) Oral once PRN Blood Glucose LESS THAN 70 milliGRAM(s)/deciliter  melatonin 3 milliGRAM(s) Oral at bedtime PRN Insomnia  ondansetron Injectable 4 milliGRAM(s) IV Push every 8 hours PRN Nausea and/or Vomiting    Vital Signs Last 24 Hrs  T(C): 36.6 (12-08-22 @ 11:20), Max: 36.7 (12-07-22 @ 21:00)  T(F): 97.8 (12-08-22 @ 11:20), Max: 98 (12-07-22 @ 21:00)  HR: 95 (12-08-22 @ 15:35) (85 - 97)  BP: 107/60 (12-08-22 @ 11:20) (97/65 - 126/71)  BP(mean): --  RR: 17 (12-08-22 @ 11:20) (16 - 17)  SpO2: 97% (12-08-22 @ 15:35) (95% - 97%)          PHYSICAL EXAM:  GENERAL: NAD  EYES: EOMI, PERRLA  NECK: Supple, No JVD  CHEST/LUNG: dec breath sounds at bases  HEART:  S1 , S2 +  ABDOMEN: soft , bs+  EXTREMITIES:  no edema  NEUROLOGY:alert awake    LABS:  12-08    145  |  101  |  32<H>  ----------------------------<  124<H>  4.3   |  30  |  0.69    Ca    9.2      08 Dec 2022 05:39  Phos  3.5     12-08  Mg     1.90     12-08      Creatinine Trend: 0.69 <--, 0.71 <--, 0.70 <--, 0.79 <--, 0.90 <--, 0.83 <--, 0.86 <--                        9.3    11.40 )-----------( 245      ( 08 Dec 2022 05:39 )             34.0     Urine Studies:                                                              RADIOLOGY & ADDITIONAL TESTS:    Imaging Personally Reviewed:yes    Consultant(s) Notes Reviewed:  yes    Care Discussed with Consultants/Other Providers:yes

## 2022-12-08 NOTE — DIETITIAN INITIAL EVALUATION ADULT - PERTINENT MEDS FT
MEDICATIONS  (STANDING):  acetylcysteine 20%  Inhalation 4 milliLiter(s) Inhalation every 12 hours  albuterol/ipratropium for Nebulization 3 milliLiter(s) Nebulizer every 4 hours  cholecalciferol 1000 Unit(s) Oral daily  dextrose 5%. 1000 milliLiter(s) (100 mL/Hr) IV Continuous <Continuous>  dextrose 5%. 1000 milliLiter(s) (50 mL/Hr) IV Continuous <Continuous>  dextrose 50% Injectable 25 Gram(s) IV Push once  dextrose 50% Injectable 12.5 Gram(s) IV Push once  dextrose 50% Injectable 25 Gram(s) IV Push once  enoxaparin Injectable 40 milliGRAM(s) SubCutaneous every 24 hours  glucagon  Injectable 1 milliGRAM(s) IntraMuscular once  influenza  Vaccine (HIGH DOSE) 0.7 milliLiter(s) IntraMuscular once  insulin glargine Injectable (LANTUS) 20 Unit(s) SubCutaneous at bedtime  insulin lispro (ADMELOG) corrective regimen sliding scale   SubCutaneous three times a day before meals  insulin lispro (ADMELOG) corrective regimen sliding scale   SubCutaneous at bedtime  insulin lispro Injectable (ADMELOG) 8 Unit(s) SubCutaneous three times a day before meals  levothyroxine Injectable 37 MICROGram(s) IV Push at bedtime  losartan 100 milliGRAM(s) Oral daily  pantoprazole  Injectable 40 milliGRAM(s) IV Push daily  predniSONE   Tablet 40 milliGRAM(s) Oral daily  simvastatin 20 milliGRAM(s) Oral at bedtime    MEDICATIONS  (PRN):  acetaminophen     Tablet .. 650 milliGRAM(s) Oral every 6 hours PRN Temp greater or equal to 38C (100.4F), Mild Pain (1 - 3)  aluminum hydroxide/magnesium hydroxide/simethicone Suspension 30 milliLiter(s) Oral every 4 hours PRN Dyspepsia  dextrose Oral Gel 15 Gram(s) Oral once PRN Blood Glucose LESS THAN 70 milliGRAM(s)/deciliter  melatonin 3 milliGRAM(s) Oral at bedtime PRN Insomnia  ondansetron Injectable 4 milliGRAM(s) IV Push every 8 hours PRN Nausea and/or Vomiting

## 2022-12-08 NOTE — PROGRESS NOTE ADULT - SUBJECTIVE AND OBJECTIVE BOX
Chief Complaint: Poorly controlled T2DM with steroid-induced hyperglycemia     History: Grandson at bedside. Pt deferred carleen translation to grandson. Pt tolerating oral diet. Pt denies nausea and vomiting/any signs of hypoglycemia. Pt reports an adequate appetite.     MEDICATIONS  (STANDING):  acetylcysteine 20%  Inhalation 4 milliLiter(s) Inhalation every 12 hours  albuterol/ipratropium for Nebulization 3 milliLiter(s) Nebulizer every 4 hours  cholecalciferol 1000 Unit(s) Oral daily  dextrose 5%. 1000 milliLiter(s) (100 mL/Hr) IV Continuous <Continuous>  dextrose 5%. 1000 milliLiter(s) (50 mL/Hr) IV Continuous <Continuous>  dextrose 50% Injectable 25 Gram(s) IV Push once  dextrose 50% Injectable 12.5 Gram(s) IV Push once  dextrose 50% Injectable 25 Gram(s) IV Push once  enoxaparin Injectable 40 milliGRAM(s) SubCutaneous every 24 hours  glucagon  Injectable 1 milliGRAM(s) IntraMuscular once  influenza  Vaccine (HIGH DOSE) 0.7 milliLiter(s) IntraMuscular once  insulin glargine Injectable (LANTUS) 20 Unit(s) SubCutaneous at bedtime  insulin lispro (ADMELOG) corrective regimen sliding scale   SubCutaneous three times a day before meals  insulin lispro (ADMELOG) corrective regimen sliding scale   SubCutaneous at bedtime  insulin lispro Injectable (ADMELOG) 8 Unit(s) SubCutaneous three times a day before meals  levothyroxine Injectable 37 MICROGram(s) IV Push at bedtime  losartan 100 milliGRAM(s) Oral daily  pantoprazole  Injectable 40 milliGRAM(s) IV Push daily  predniSONE   Tablet 40 milliGRAM(s) Oral daily  simvastatin 20 milliGRAM(s) Oral at bedtime    MEDICATIONS  (PRN):  acetaminophen     Tablet .. 650 milliGRAM(s) Oral every 6 hours PRN Temp greater or equal to 38C (100.4F), Mild Pain (1 - 3)  aluminum hydroxide/magnesium hydroxide/simethicone Suspension 30 milliLiter(s) Oral every 4 hours PRN Dyspepsia  dextrose Oral Gel 15 Gram(s) Oral once PRN Blood Glucose LESS THAN 70 milliGRAM(s)/deciliter  melatonin 3 milliGRAM(s) Oral at bedtime PRN Insomnia  ondansetron Injectable 4 milliGRAM(s) IV Push every 8 hours PRN Nausea and/or Vomiting      Allergies: No Known Allergies      Review of Systems:  Respiratory: No SOB, no cough  GI: No nausea, vomiting, abdominal pain  Endocrine: no polyuria, polydipsia      PHYSICAL EXAM:  VITALS: T(C): 36.6 (12-08-22 @ 11:20)  T(F): 97.8 (12-08-22 @ 11:20), Max: 98 (12-07-22 @ 21:00)  HR: 95 (12-08-22 @ 15:35) (85 - 97)  BP: 107/60 (12-08-22 @ 11:20) (97/65 - 134/67)  RR:  (16 - 18)  SpO2:  (94% - 97%)  Wt(kg): --  GENERAL: NAD, well-groomed, well-developed  RESPIRATORY: No labored breathing  GI: Soft, nontender, non distended  PSYCH: Alert and oriented x 3, normal affect, normal mood      CAPILLARY BLOOD GLUCOSE  POCT Blood Glucose.: 232 mg/dL (08 Dec 2022 14:19)  POCT Blood Glucose.: 120 mg/dL (08 Dec 2022 12:43)  POCT Blood Glucose.: 123 mg/dL (08 Dec 2022 09:05)  POCT Blood Glucose.: 189 mg/dL (08 Dec 2022 03:03)  POCT Blood Glucose.: 242 mg/dL (07 Dec 2022 21:49)  POCT Blood Glucose.: 144 mg/dL (07 Dec 2022 18:10)  POCT Blood Glucose.: 243 mg/dL (07 Dec 2022 17:35)  POCT Blood Glucose.: 40 mg/dL (07 Dec 2022 17:14)  POCT Blood Glucose.: 36 mg/dL (07 Dec 2022 17:13)    A1C with Estimated Average Glucose (12.01.22 @ 12:00)    A1C with Estimated Average Glucose Result: 8.8    12-08    145  |  101  |  32<H>  ----------------------------<  124<H>  4.3   |  30  |  0.69    eGFR: 88    Ca    9.2      12-08  Mg     1.90     12-08  Phos  3.5     12-08        Thyroid Function Tests:  12-01 @ 13:38 TSH 1.11 FreeT4 -- T3 -- Anti TPO -- Anti Thyroglobulin Ab -- TSI --  11-10 @ 06:48 TSH 1.08 FreeT4 1.3 T3 -- Anti TPO -- Anti Thyroglobulin Ab -- TSI --      Diet, Regular:   Consistent Carbohydrate No Snacks (CSTCHO)  Low Sodium  Halal (12-03-22 @ 07:30) [Active]

## 2022-12-08 NOTE — PROGRESS NOTE ADULT - ASSESSMENT
76 y/o female with PMHx of DM type 2, hypothyroidism, and HTN who presented to the ED for increased SOB    EKG: NSR no ischemic changes      1. RSV pneumonia    - on vancomycin and zosyn   -echo normal LV systolic function, no WMA. mild diastolic dysfunction. decreased RV function   - on IV steroids and nebs  -f/u pulm recs    2. HTN  -controlled  -c/w losartan  -continue to monitor BP     3. DVT prophylaxis  -lovenox subq

## 2022-12-08 NOTE — DIETITIAN INITIAL EVALUATION ADULT - LITERATURE/VIDEOS GIVEN
RD provided the patient's daughter-in-law with verbal DM diet education; including, carb counting, meal planning, pre-prandial and post-prandial finger stick goals, and HbA1c goal. Patient was also made aware of the physiological implications of poor glycemic control. Also discussed Heart Healthy diet recommendations. Importance of having consistent carbohydrate with protein at each meals emphasized.

## 2022-12-08 NOTE — DIETITIAN INITIAL EVALUATION ADULT - OTHER INFO
76 y/o female with PMH of DM type 2, hypothyroidism, and HTN who presented to the ED for increased SOB today now admitted for sepsis and acute hypercarbic respiratory failure.    Pt seen with her daughter-in-law at bedside who reports 75% intake of meals with No GI distress (nausea/vomiting/diarrhea/constipation.) at present.   As per SLP recommendation (12/6/22)- Regular Solids with Thin Liquids.  Noted skin ecchymosis, no edema per nursing flow sheet. Labs reviewed. A1c- 8.8% (12/1/22).  76 y/o female with PMH of DM type 2, hypothyroidism, and HTN who presented to the ED for increased SOB today now admitted for sepsis and acute hypercarbic respiratory failure.    Pt seen with her daughter-in-law at bedside who reports 75% intake of meals with No GI distress (nausea/vomiting/diarrhea/constipation.) at present.   As per SLP recommendation (12/6/22)- Regular Solids with Thin Liquids.  Noted skin ecchymosis, no edema per nursing flow sheet. Labs reviewed. A1c- 8.8% (12/1/22). UBW- 130.9# (11/10/22) per [facility], Current wt- 124# (12/8/22) per bed scale. Noted wt. loss of 6#/4.5% in past one month.

## 2022-12-08 NOTE — CHART NOTE - NSCHARTNOTEFT_GEN_A_CORE
Alerted by RN, Pt with recurrent pre-dinner hypoglycemia, 51. Pt is asymptomatic resting in bed without complaints. Hypoglycemia protocol followed by RN and repeat FS 80, will repeat again. Discussed with endocrine NP Randa, recommend to reduce Admelog to 6units pre-meal and c/w Lantus at current dose of 20 units qhs. Will continue to monitor and reassess.

## 2022-12-08 NOTE — PROVIDER CONTACT NOTE (HYPOGLYCEMIA EVENT) - NS PROVIDER CONTACT CONTRIBUTING FACTORS OF EPISODE
Previous finger stick less than 100 mg/dL
Patient had poor oral intake for lunch. Patients insulin has also been getting adjusted due to patient being on steroids/Other (Specify)

## 2022-12-08 NOTE — DIETITIAN INITIAL EVALUATION ADULT - PERTINENT LABORATORY DATA
12-08    145  |  101  |  32<H>  ----------------------------<  124<H>  4.3   |  30  |  0.69    Ca    9.2      08 Dec 2022 05:39  Phos  3.5     12-08  Mg     1.90     12-08    POCT Blood Glucose.: 232 mg/dL (12-08-22 @ 14:19)  A1C with Estimated Average Glucose Result: 8.8 % (12-01-22 @ 12:00)  A1C with Estimated Average Glucose Result: 10.5 % (11-08-22 @ 09:00)

## 2022-12-08 NOTE — PROGRESS NOTE ADULT - ASSESSMENT
78 y/o female with PMHx of DM type 2, hypothyroidism, and HTN who presented to the ED for increased SOB. Admitted with RSV. Is on methylprednisolone 40mg q12h with resultant hyperglycemia. Endocrine consulted for DM2 with steroid-induced hyperglycemia.    Poorly controlled T2DM with steroid-induced hyperglycemia  DM diagnosis: DM2 x20 years  Last A1c: 8.8  Endocrinologist: PCP only  Home DM meds: Basaglar 10 units q24h, Novolog 4 units TIDac, metformin 500mg BID  Receiving methylprednisolone 30mg q12h transitioned to  prednisone 20mg today; now increasing back to 40mg x3 doses starting in am then taper   -Hold oral DM agents while inpatient  -Continue  Lantus  20 units at bedtime. DO NOT HOLD IF NPO.  -Continue Admelog 8 units TID pre-meal. HOLD IF NPO.  -Continue low dose Admelog correction scale pre-meal  -Continue low dose Admelog correction scale at bedtime  -Fingerstick BG before meals and bedtime; please check a 3 am FS   -Goal -180; stable today  -Carbohydrate consistent diet  Discharge plan:  -If discharged on steroid as planned. Continue Metformin 500mg p.o. BID with meals;   While on prednisone 40mg x 2 more days administer Basaglar Kwik pen 20 units sq qhs and Novolog Pen  8 units TID AC  Prednisone 30mg daily x 3 days Basaglar kwik pen 16 units sq qhs and Novolog Pen 6 units TID AC   Prednisone 20mg daily x 3 day Basaglar kwik pen 13 units sq qhs and Novolog pen 5 units TID AC   Prednisone 10mg daily x 3 days Basaglar kwik pen 10 units TID AC and Novolog pen 4 units TID AC   Once off steroids continue Basaglar kwik pen 10 units sq qhs, Novolog 4 units TID AC, and Metformin 500mg p.o. BID with meals  -Recommend routine outpatient ophthalmology and PCP f/u    HTN  goal BP in DM <130/80  on losartan 100mg  outpt mc/cr ratio    HLD  -LDL goal<70   -On simvastatin 20mg daily  -Outpt lipid panel    Hypothyroidism  TSH normal now. FT4 nml in Nov 2022.  Continue LT4 50mcg daily PO (or 37mcg IV)  Repeat TFts in 4-6 week of discharge     D/w Team       Randa Lu  Nurse Practitioner  Division of Endocrinology & Diabetes  In house pager #[phone]    If before 9AM or after 6PM, or on weekends/holidays, please call endocrine answering service for assistance ([phone]).For nonurgent matters email [email or web address] for assistance.     If after 6PM or before 9AM, or on weekends/holidays, please call endocrine answering service for assistance ([phone]).  For nonurgent matters email [email or web address] for assistance.     78 y/o female with PMHx of DM type 2, hypothyroidism, and HTN who presented to the ED for increased SOB. Admitted with RSV. Is on methylprednisolone 40mg q12h with resultant hyperglycemia. Endocrine consulted for DM2 with steroid-induced hyperglycemia.    Poorly controlled T2DM with steroid-induced hyperglycemia  DM diagnosis: DM2 x20 years  Last A1c: 8.8  Endocrinologist: PCP only  Home DM meds: Basaglar 10 units q24h, Novolog 4 units TIDac, metformin 500mg BID  Receiving methylprednisolone 30mg q12h transitioned to  prednisone 20mg today; now increasing back to 40mg x3 doses starting in am then taper   -Hold oral DM agents while inpatient  -Continue  Lantus  20 units at bedtime. DO NOT HOLD IF NPO.  -Continue Admelog 8 units TID pre-meal. HOLD IF NPO.  -Continue low dose Admelog correction scale pre-meal  -Continue low dose Admelog correction scale at bedtime  -Fingerstick BG before meals and bedtime; please check a 3 am FS   -Goal -180; stable today; fs checked at 2 pm 233 was pos prandial   -Carbohydrate consistent diet  Discharge plan:  -If discharged on steroid as planned. Continue Metformin 500mg p.o. BID with meals;   While on prednisone 40mg x 2 more days administer Basaglar Kwik pen 20 units sq qhs and Novolog Pen  8 units TID AC  Prednisone 30mg daily x 3 days Basaglar kwik pen 16 units sq qhs and Novolog Pen 6 units TID AC   Prednisone 20mg daily x 3 day Basaglar kwik pen 13 units sq qhs and Novolog pen 5 units TID AC   Prednisone 10mg daily x 3 days Basaglar kwik pen 10 units TID AC and Novolog pen 4 units TID AC   Once off steroids continue Basaglar kwik pen 10 units sq qhs, Novolog 4 units TID AC, and Metformin 500mg p.o. BID with meals  -Recommend routine outpatient ophthalmology and PCP f/u    HTN  goal BP in DM <130/80  on losartan 100mg  outpt mc/cr ratio    HLD  -LDL goal<70   -On simvastatin 20mg daily  -Outpt lipid panel    Hypothyroidism  TSH normal now. FT4 nml in Nov 2022.  Continue LT4 50mcg daily PO (or 37mcg IV)  Repeat TFts in 4-6 week of discharge     D/w Team       Randa Lu  Nurse Practitioner  Division of Endocrinology & Diabetes  In house pager #[phone]    If before 9AM or after 6PM, or on weekends/holidays, please call endocrine answering service for assistance ([phone]).For nonurgent matters email [email or web address] for assistance.     If after 6PM or before 9AM, or on weekends/holidays, please call endocrine answering service for assistance ([phone]).  For nonurgent matters email [email or web address] for assistance.     78 y/o female with PMHx of DM type 2, hypothyroidism, and HTN who presented to the ED for increased SOB. Admitted with RSV. Is on methylprednisolone 40mg q12h with resultant hyperglycemia. Endocrine consulted for DM2 with steroid-induced hyperglycemia.    Poorly controlled T2DM with steroid-induced hyperglycemia  DM diagnosis: DM2 x20 years  Last A1c: 8.8  Endocrinologist: PCP only  Home DM meds: Basaglar 10 units q24h, Novolog 4 units TIDac, metformin 500mg BID  Receiving methylprednisolone 30mg q12h transitioned to  prednisone 20mg today; now increasing back to 40mg x3 doses starting in am then taper   -Hold oral DM agents while inpatient  -Continue  Lantus  20 units at bedtime. DO NOT HOLD IF NPO.  -Continue Admelog 8 units TID pre-meal. HOLD IF NPO.  -Continue low dose Admelog correction scale pre-meal  -Continue low dose Admelog correction scale at bedtime  -Fingerstick BG before meals and bedtime; please check a 3 am FS   -Goal -180; stable today; fs checked at 2 pm 233 was pos prandial   -Carbohydrate consistent diet  Discharge plan:  -If discharged on steroid as planned. Continue Metformin 500mg p.o. BID with meals;   While on prednisone 40mg x 2 more days administer Basaglar Kwik pen 20 units sq qhs and Novolog Pen  8 units TID AC  Prednisone 30mg daily x 3 days Basaglar kwik pen 16 units sq qhs and Novolog Pen 6 units TID AC   Prednisone 20mg daily x 3 day Basaglar kwik pen 13 units sq qhs and Novolog pen 5 units TID AC   Prednisone 10mg daily x 3 days Basaglar kwik pen 10 units TID AC and Novolog pen 4 units TID AC   Once off steroids continue Basaglar kwik pen 10 units sq qhs, Novolog 4 units TID AC, and Metformin 500mg p.o. BID with meals  -Recommend routine outpatient ophthalmology, podiatry, and PCP f/u  - D/w Jeannie Miguel and Jaspreet Winston [phone] are taking full responsibility for checking pts fs and administer insulin via insulin pen as prescribed  -Declined insulin pen review as jaspreet Winston demonsterated insulin pen review with teach back on last admission  -prefers pcp for DM management     HTN  goal BP in DM <130/80  on losartan 100mg  outpt mc/cr ratio    HLD  -LDL goal<70   -On simvastatin 20mg daily  -Outpt lipid panel    Hypothyroidism  TSH normal now. FT4 nml in Nov 2022.  Continue LT4 50mcg daily PO (or 37mcg IV)  Repeat TFts in 4-6 week of discharge     D/w Team       Randa Lu  Nurse Practitioner  Division of Endocrinology & Diabetes  In house pager #[phone]    If before 9AM or after 6PM, or on weekends/holidays, please call endocrine answering service for assistance ([phone]).For nonurgent matters email [email or web address] for assistance.     If after 6PM or before 9AM, or on weekends/holidays, please call endocrine answering service for assistance ([phone]).  For nonurgent matters email [email or web address] for assistance.     76 y/o female with PMHx of DM type 2, hypothyroidism, and HTN who presented to the ED for increased SOB. Admitted with RSV. Is on methylprednisolone 40mg q12h with resultant hyperglycemia. Endocrine consulted for DM2 with steroid-induced hyperglycemia.    Poorly controlled T2DM with steroid-induced hyperglycemia  DM diagnosis: DM2 x20 years  Last A1c: 8.8  Endocrinologist: PCP only  Home DM meds: Basaglar 10 units q24h, Novolog 4 units TIDac, metformin 500mg BID  Receiving methylprednisolone 30mg q12h transitioned to  prednisone 40mg x3 doses starting today then taper   -Hold oral DM agents while inpatient  -Continue  Lantus  20 units at bedtime. DO NOT HOLD IF NPO.  -Decrease Admelog 6 units TID pre-meal. HOLD IF NPO. ( will most likely decrease lunch and dinner FS in am )  -Continue low dose Admelog correction scale pre-meal  -Continue low dose Admelog correction scale at bedtime  -Fingerstick BG before meals and bedtime; please check a 3 am FS   -Goal -180; stable today; fs checked at 2 pm 233 was post prandial; hypoglycemic at dinner today ; although prednisone dose increased today and pt ate adequately   -Carbohydrate consistent diet  Discharge plan:  -If discharged on steroid as planned. Continue Metformin 500mg p.o. BID with meals;   While on prednisone 40mg x 2 more days administer Basaglar Kwik pen 20 units sq qhs and Novolog Pen  8 units TID AC  Prednisone 30mg daily x 3 days Basaglar kwik pen 16 units sq qhs and Novolog Pen 6 units TID AC   Prednisone 20mg daily x 3 day Basaglar kwik pen 13 units sq qhs and Novolog pen 5 units TID AC   Prednisone 10mg daily x 3 days Basaglar kwik pen 10 units TID AC and Novolog pen 4 units TID AC   please confirm with endocrine final doses on day of discharge   Once off steroids continue Basaglar kwik pen 10 units sq qhs, Novolog 4 units TID AC, and Metformin 500mg p.o. BID with meals  -Recommend routine outpatient ophthalmology, podiatry, and PCP f/u  - D/w Jeannie Rivera and Jaspreet Winston [phone] are taking full responsibility for checking pts fs and administer insulin via insulin pen as prescribed  -Declined insulin pen review as jaspreet Winston demonsterated insulin pen review with teach back on last admission  -prefers pcp for DM management     HTN  goal BP in DM <130/80  on losartan 100mg  outpt mc/cr ratio    HLD  -LDL goal<70   -On simvastatin 20mg daily  -Outpt lipid panel    Hypothyroidism  TSH normal now. FT4 nml in Nov 2022.  Continue LT4 50mcg daily PO (or 37mcg IV)  Repeat TFts in 4-6 week of discharge     D/w Team       Randa Lu  Nurse Practitioner  Division of Endocrinology & Diabetes  In house pager #[phone]    If before 9AM or after 6PM, or on weekends/holidays, please call endocrine answering service for assistance ([phone]).For nonurgent matters email [email or web address] for assistance.     If after 6PM or before 9AM, or on weekends/holidays, please call endocrine answering service for assistance ([phone]).  For nonurgent matters email [email or web address] for assistance.

## 2022-12-08 NOTE — PROVIDER CONTACT NOTE (HYPOGLYCEMIA EVENT) - NS PROVIDER CONTACT BACKGROUND-HYPO
Age: 77y    Gender: Female    POCT Blood Glucose:  121 mg/dL (12-08-22 @ 19:21)  106 mg/dL (12-08-22 @ 19:20)  80 mg/dL (12-08-22 @ 18:33)  51 mg/dL (12-08-22 @ 17:54)  51 mg/dL (12-08-22 @ 17:52)  232 mg/dL (12-08-22 @ 14:19)  120 mg/dL (12-08-22 @ 12:43)  123 mg/dL (12-08-22 @ 09:05)      eMAR:  insulin glargine Injectable (LANTUS)   20 Unit(s) SubCutaneous (12-07-22 @ 23:55)    insulin lispro Injectable (ADMELOG)   8 Unit(s) SubCutaneous (12-08-22 @ 14:20)   8 Unit(s) SubCutaneous (12-08-22 @ 09:14)    levothyroxine Injectable   37 MICROGram(s) IV Push (12-07-22 @ 22:38)    predniSONE   Tablet   40 milliGRAM(s) Oral (12-08-22 @ 05:08)    simvastatin   20 milliGRAM(s) Oral (12-07-22 @ 21:44)    
Age: 77y    Gender: Female    POCT Blood Glucose:  144 mg/dL (12-07-22 @ 18:10)  243 mg/dL (12-07-22 @ 17:35)  40 mg/dL (12-07-22 @ 17:14)  36 mg/dL (12-07-22 @ 17:13)  174 mg/dL (12-07-22 @ 12:44)  97 mg/dL (12-07-22 @ 09:17)  165 mg/dL (12-06-22 @ 21:31)      eMAR:  dextrose 50% Injectable   25 Gram(s) IV Push (12-07-22 @ 17:23)    insulin glargine Injectable (LANTUS)   30 Unit(s) SubCutaneous (12-06-22 @ 21:40)    insulin lispro (ADMELOG) corrective regimen sliding scale   2 Unit(s) SubCutaneous (12-07-22 @ 13:32)    insulin lispro Injectable (ADMELOG)   12 Unit(s) SubCutaneous (12-07-22 @ 13:33)    insulin lispro Injectable (ADMELOG).   7 Unit(s) SubCutaneous (12-07-22 @ 09:54)    levothyroxine Injectable   37 MICROGram(s) IV Push (12-06-22 @ 22:16)    predniSONE   Tablet   20 milliGRAM(s) Oral (12-07-22 @ 05:52)    simvastatin   20 milliGRAM(s) Oral (12-06-22 @ 21:40)

## 2022-12-08 NOTE — DIETITIAN INITIAL EVALUATION ADULT - NS FNS DIET ORDER
Diet, Regular:   Consistent Carbohydrate {No Snacks} (CSTCHO)  Low Sodium  Halal (12-03-22 @ 07:30)

## 2022-12-08 NOTE — PROGRESS NOTE ADULT - SUBJECTIVE AND OBJECTIVE BOX
Forrest Rosenthal MD  Interventional Cardiology / Endovascular Specialist  [place] Office : [phone] [place] [id number] Tel:   [place] Office : [phone] [facility] [address]  Tel: [phone]      Subjective/Overnight events: Patient lying in bed comfortably. No acute distress.   	  MEDICATIONS:  enoxaparin Injectable 40 milliGRAM(s) SubCutaneous every 24 hours  losartan 100 milliGRAM(s) Oral daily      acetylcysteine 20%  Inhalation 4 milliLiter(s) Inhalation every 12 hours  albuterol/ipratropium for Nebulization 3 milliLiter(s) Nebulizer every 4 hours    acetaminophen     Tablet .. 650 milliGRAM(s) Oral every 6 hours PRN  melatonin 3 milliGRAM(s) Oral at bedtime PRN  ondansetron Injectable 4 milliGRAM(s) IV Push every 8 hours PRN    aluminum hydroxide/magnesium hydroxide/simethicone Suspension 30 milliLiter(s) Oral every 4 hours PRN  pantoprazole  Injectable 40 milliGRAM(s) IV Push daily    dextrose 50% Injectable 25 Gram(s) IV Push once  dextrose 50% Injectable 12.5 Gram(s) IV Push once  dextrose 50% Injectable 25 Gram(s) IV Push once  dextrose Oral Gel 15 Gram(s) Oral once PRN  glucagon  Injectable 1 milliGRAM(s) IntraMuscular once  insulin glargine Injectable (LANTUS) 20 Unit(s) SubCutaneous at bedtime  insulin lispro (ADMELOG) corrective regimen sliding scale   SubCutaneous three times a day before meals  insulin lispro (ADMELOG) corrective regimen sliding scale   SubCutaneous at bedtime  insulin lispro Injectable (ADMELOG) 8 Unit(s) SubCutaneous three times a day before meals  levothyroxine Injectable 37 MICROGram(s) IV Push at bedtime  predniSONE   Tablet 40 milliGRAM(s) Oral daily  simvastatin 20 milliGRAM(s) Oral at bedtime    cholecalciferol 1000 Unit(s) Oral daily  dextrose 5%. 1000 milliLiter(s) IV Continuous <Continuous>  dextrose 5%. 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      Cataract of left eye          SOCIAL HISTORY: Substance Use (street drugs): ( x ) never used  (  ) other:    FAMILY HISTORY:  Family history of heart attack (Mother)  mother          PHYSICAL EXAM:  T(C): 36.6 (12-08-22 @ 11:20), Max: 36.7 (12-07-22 @ 21:00)  HR: 92 (12-08-22 @ 11:49) (85 - 95)  BP: 107/60 (12-08-22 @ 11:20) (97/65 - 134/67)  RR: 17 (12-08-22 @ 11:20) (16 - 18)  SpO2: 97% (12-08-22 @ 11:49) (94% - 97%)  Wt(kg): --  I&O's Summary    07 Dec 2022 07:01  -  08 Dec 2022 07:00  --------------------------------------------------------  IN: 477 mL / OUT: 1090 mL / NET: -613 mL          GENERAL: NAD  EYES: EOMI, PERRLA, conjunctiva and sclera clear  ENMT: No tonsillar erythema, exudates, or enlargement  Cardiovascular: Normal S1 S2, No JVD, No murmurs, No edema  Respiratory: Lungs coarse to auscultation	  Gastrointestinal:  Soft, Non-tender, + BS	  Extremities: No edema                                     9.3    11.40 )-----------( 245      ( 08 Dec 2022 05:39 )             34.0     12-08    145  |  101  |  32<H>  ----------------------------<  124<H>  4.3   |  30  |  0.69    Ca    9.2      08 Dec 2022 05:39  Phos  3.5     12-08  Mg     1.90     12-08      proBNP:   Lipid Profile:   HgA1c:   TSH:     Consultant(s) Notes Reviewed:  [x ] YES  [ ] NO    Care Discussed with Consultants/Other Providers [ x] YES  [ ] NO    Imaging Personally Reviewed independently:  [x] YES  [ ] NO    All labs, radiologic studies, vitals, orders and medications list reviewed. Patient is seen and examined at bedside. Case discussed with medical team.

## 2022-12-08 NOTE — PROVIDER CONTACT NOTE (HYPOGLYCEMIA EVENT) - NS PROVIDER CONTACT RECOMMEND-HYPO
pt given 2 apple juices, son is here at bedside assisting patient with eating dinner
Provider orders

## 2022-12-08 NOTE — PROGRESS NOTE ADULT - SUBJECTIVE AND OBJECTIVE BOX
PULMONARY PROGRESS NOTE    MARTIR VU  MRN-[id number]    Patient is a 77y old  Female who presents with a chief complaint of Difficulty breathing (07 Dec 2022 18:03)      HPI:  -  -    ROS:   -    ACTIVE MEDICATION LIST:  MEDICATIONS  (STANDING):  acetylcysteine 20%  Inhalation 4 milliLiter(s) Inhalation every 12 hours  albuterol/ipratropium for Nebulization 3 milliLiter(s) Nebulizer every 4 hours  cholecalciferol 1000 Unit(s) Oral daily  dextrose 5%. 1000 milliLiter(s) (100 mL/Hr) IV Continuous <Continuous>  dextrose 5%. 1000 milliLiter(s) (50 mL/Hr) IV Continuous <Continuous>  dextrose 50% Injectable 25 Gram(s) IV Push once  dextrose 50% Injectable 12.5 Gram(s) IV Push once  dextrose 50% Injectable 25 Gram(s) IV Push once  enoxaparin Injectable 40 milliGRAM(s) SubCutaneous every 24 hours  glucagon  Injectable 1 milliGRAM(s) IntraMuscular once  influenza  Vaccine (HIGH DOSE) 0.7 milliLiter(s) IntraMuscular once  insulin glargine Injectable (LANTUS) 20 Unit(s) SubCutaneous at bedtime  insulin lispro (ADMELOG) corrective regimen sliding scale   SubCutaneous three times a day before meals  insulin lispro (ADMELOG) corrective regimen sliding scale   SubCutaneous at bedtime  insulin lispro Injectable (ADMELOG) 8 Unit(s) SubCutaneous three times a day before meals  levothyroxine Injectable 37 MICROGram(s) IV Push at bedtime  losartan 100 milliGRAM(s) Oral daily  pantoprazole  Injectable 40 milliGRAM(s) IV Push daily  predniSONE   Tablet 40 milliGRAM(s) Oral daily  simvastatin 20 milliGRAM(s) Oral at bedtime    MEDICATIONS  (PRN):  acetaminophen     Tablet .. 650 milliGRAM(s) Oral every 6 hours PRN Temp greater or equal to 38C (100.4F), Mild Pain (1 - 3)  aluminum hydroxide/magnesium hydroxide/simethicone Suspension 30 milliLiter(s) Oral every 4 hours PRN Dyspepsia  dextrose Oral Gel 15 Gram(s) Oral once PRN Blood Glucose LESS THAN 70 milliGRAM(s)/deciliter  melatonin 3 milliGRAM(s) Oral at bedtime PRN Insomnia  ondansetron Injectable 4 milliGRAM(s) IV Push every 8 hours PRN Nausea and/or Vomiting      EXAM:  Vital Signs Last 24 Hrs  T(C): 36.6 (08 Dec 2022 05:02), Max: 36.7 (07 Dec 2022 21:00)  T(F): 97.9 (08 Dec 2022 05:02), Max: 98 (07 Dec 2022 21:00)  HR: 90 (08 Dec 2022 05:02) (85 - 95)  BP: 126/71 (08 Dec 2022 05:02) (97/65 - 134/67)  BP(mean): --  RR: 16 (08 Dec 2022 05:02) (16 - 18)  SpO2: 97% (08 Dec 2022 05:02) (94% - 97%)    Parameters below as of 08 Dec 2022 05:02  Patient On (Oxygen Delivery Method): nasal cannula  O2 Flow (L/min): 2      GENERAL: The patient is awake and alert in no apparent distress.     LUNGS: Clear to auscultation without wheezing, rales or rhonchi; respirations unlabored    HEART: Regular rate and rhythm without murmur.                            9.3    11.40 )-----------( 245      ( 08 Dec 2022 05:39 )             34.0       12-08    145  |  101  |  32<H>  ----------------------------<  124<H>  4.3   |  30  |  0.69    Ca    9.2      08 Dec 2022 05:39  Phos  3.5     12-08  Mg     1.90     12-08      >>> <<<    PROBLEM LIST:  77y Female with HEALTH ISSUES - PROBLEM Dx:  Diabetes mellitus    Sepsis with acute hypercapnic respiratory failure    HTN (hypertension)    Need for prophylactic measure    Hypothyroidism    Steroid-induced hyperglycemia    HLD (hyperlipidemia)              RECS:        Please call with any questions.    Dede Flynn DO  [facility] Pulmonary/Sleep Medicine  [phone]   PULMONARY PROGRESS NOTE    MARTIR VU  MRN-[id number]    Patient is a 77y old  Female who presents with a chief complaint of Difficulty breathing (07 Dec 2022 18:03)      HPI:  -eating breakfast with family  on room air  no coughing when SHE eats per family   -    ROS:   -    ACTIVE MEDICATION LIST:  MEDICATIONS  (STANDING):  acetylcysteine 20%  Inhalation 4 milliLiter(s) Inhalation every 12 hours  albuterol/ipratropium for Nebulization 3 milliLiter(s) Nebulizer every 4 hours  cholecalciferol 1000 Unit(s) Oral daily  dextrose 5%. 1000 milliLiter(s) (100 mL/Hr) IV Continuous <Continuous>  dextrose 5%. 1000 milliLiter(s) (50 mL/Hr) IV Continuous <Continuous>  dextrose 50% Injectable 25 Gram(s) IV Push once  dextrose 50% Injectable 12.5 Gram(s) IV Push once  dextrose 50% Injectable 25 Gram(s) IV Push once  enoxaparin Injectable 40 milliGRAM(s) SubCutaneous every 24 hours  glucagon  Injectable 1 milliGRAM(s) IntraMuscular once  influenza  Vaccine (HIGH DOSE) 0.7 milliLiter(s) IntraMuscular once  insulin glargine Injectable (LANTUS) 20 Unit(s) SubCutaneous at bedtime  insulin lispro (ADMELOG) corrective regimen sliding scale   SubCutaneous three times a day before meals  insulin lispro (ADMELOG) corrective regimen sliding scale   SubCutaneous at bedtime  insulin lispro Injectable (ADMELOG) 8 Unit(s) SubCutaneous three times a day before meals  levothyroxine Injectable 37 MICROGram(s) IV Push at bedtime  losartan 100 milliGRAM(s) Oral daily  pantoprazole  Injectable 40 milliGRAM(s) IV Push daily  predniSONE   Tablet 40 milliGRAM(s) Oral daily  simvastatin 20 milliGRAM(s) Oral at bedtime    MEDICATIONS  (PRN):  acetaminophen     Tablet .. 650 milliGRAM(s) Oral every 6 hours PRN Temp greater or equal to 38C (100.4F), Mild Pain (1 - 3)  aluminum hydroxide/magnesium hydroxide/simethicone Suspension 30 milliLiter(s) Oral every 4 hours PRN Dyspepsia  dextrose Oral Gel 15 Gram(s) Oral once PRN Blood Glucose LESS THAN 70 milliGRAM(s)/deciliter  melatonin 3 milliGRAM(s) Oral at bedtime PRN Insomnia  ondansetron Injectable 4 milliGRAM(s) IV Push every 8 hours PRN Nausea and/or Vomiting      EXAM:  Vital Signs Last 24 Hrs  T(C): 36.6 (08 Dec 2022 05:02), Max: 36.7 (07 Dec 2022 21:00)  T(F): 97.9 (08 Dec 2022 05:02), Max: 98 (07 Dec 2022 21:00)  HR: 90 (08 Dec 2022 05:02) (85 - 95)  BP: 126/71 (08 Dec 2022 05:02) (97/65 - 134/67)  BP(mean): --  RR: 16 (08 Dec 2022 05:02) (16 - 18)  SpO2: 97% (08 Dec 2022 05:02) (94% - 97%)    Parameters below as of 08 Dec 2022 05:02  Patient On (Oxygen Delivery Method): nasal cannula  O2 Flow (L/min): 2      GENERAL: The patient is awake and alert in no apparent distress.     LUNGS: not wheezing not labored  some rhonchi                             9.3    11.40 )-----------( 245      ( 08 Dec 2022 05:39 )             34.0       12-08    145  |  101  |  32<H>  ----------------------------<  124<H>  4.3   |  30  |  0.69    Ca    9.2      08 Dec 2022 05:39  Phos  3.5     12-08  Mg     1.90     12-08      < from: Xray Chest 1 View- PORTABLE-Urgent (12.01.22 @ 13:02) >    ACC: [id number] EXAM:  XR CHEST PORTABLE URGENT 1V                          PROCEDURE DATE:  12/01/2022          INTERPRETATION:  CLINICAL INFORMATION: Chest pain    TIME OF EXAMINATION: December 1, 2022 at 12:50 PM    EXAM: Portable chest    FINDINGS:  Right upper lobe and left midlung opacities about the same as the last   study may be chronic findings. The heart is not enlarged and there are no   effusions or congestion to indicate CHF.        COMPARISON: November 11, 2022        IMPRESSION: Follow-up study showing no acute pulmonary pathology.    --- End of Report ---            NAT HENNING MD; Attending Radiologist  This document has been electronically signed. Dec  1 2022  1:41PM    < end of copied text >  < from: CT Angio Chest PE Protocol w/ IV Cont (11.08.22 @ 11:39) >    ACC: [id number] EXAM:  CT ABDOMEN AND PELVIS IC                        ACC: [id number] EXAM:  CT ANGIO CHEST PULM MARILYN WILSON                          PROCEDURE DATE:  11/08/2022          INTERPRETATION:  CTA CHEST AND CT ABDOMEN AND PELVIS    INDICATION: Syncope. Evaluate for pneumonia.    TECHNIQUE: Enhanced helical images were obtained of the chest, abdomen   and pelvis. Coronal and sagittal images were reconstructed. Maximum   intensity projection images were generated.    CONTRAST/COMPLICATIONS:  IV Contrast: Omnipaque 350 (accession [id number]), IV contrast documented   in associated exam (accession [id number])  90 cc administered   10 cc   discarded  Oral Contrast: NONE  Complications: None reported at time of study completion    COMPARISON: 5/29/2021 CT chest and 12/6/2021 CT abdomen.    FINDINGS:    PULMONARY ARTERIES: No pulmonary embolism detected. Please note that   multiple distal segmental and subsegmental pulmonary arterial branches   are not adequately assessed due to motion.    MEDIASTINUM: The right ventricle is qualitatively mildly dilated and   there is suggestion of flattening of the interventricular septum. No   pericardial effusion. Thoracic aorta normal caliber.  No large   mediastinal lymph nodes.    AIRWAYS, LUNGS, PLEURA: Severe narrowing of the right mainstem bronchus   and bronchus intermedius.    Right apical soft tissue opacification measuring 2.8 x 1.6 cm (image 13,   series 2) is similar to marginally decreased in size compared to   5/29/2021 where it was 2.9 x 1.8 cm when remeasured. An additional   anterior right upper lobe 1.3 cm nodular opacity (image 19, series 2) is   unchanged. Right apical cystic/cavitary opacity is likewise similar in   appearance. No significant interval change in right upperlobe   opacification along the minor fissure.    Left upper lobe parenchymal opacification measuring 3.6 x 2.2 cm (image   156, series 4) is without significant interval change compared to   5/29/2021, but progressed compared to an even earlier examination dated   10/26/2019.    No pleural effusion.    ABDOMEN and PELVIS: Peripheral enhancement of hepatic segment 7 (image   22, series 3) may be perfusional. Gallbladder, pancreas, spleen, adrenal   glands unremarkable. No hydronephrosis.    Unremarkable urinary bladder and uterus.    No bowel obstruction. Ascending and transverse colon are decompressed. No   free fluid. No abdominal or pelvic lymphadenopathy. Abdominal aorta   normal caliber.    BONES: Unremarkable.    SOFT TISSUES: Unremarkable.    IMPRESSION:    No pulmonary embolism detected.    No new lung parenchymal opacity to suggest acute pneumonia.    Indeterminate left upper lobe 3.6 x 2.2 cm opacity is similar compared to   5/29/2021, but progressed compared to 10/26/2019; the exact etiology is   unclear, but it is difficult to exclude lung neoplasm.    Additional right upper lobe opacities also unchanged compared to   5/29/2021.    Peripheral enhancement of the liver as described above may represent   perfusional abnormality. Otherwise, unremarkable evaluation of the   abdomen and pelvis.    --- End of Report ---        < end of copied text >  >>> <<<    PROBLEM LIST:  77y Female with HEALTH ISSUES - PROBLEM Dx:  Diabetes mellitus    Sepsis with acute hypercapnic respiratory failure    HTN (hypertension)    Need for prophylactic measure    Hypothyroidism    Steroid-induced hyperglycemia    HLD (hyperlipidemia)        RECS:  prednisone taper- start with  40mg X 3 days, with 10mg taper every 3 days until shes off it  send her home wtih duonebs to be done BID for now  send her home with acapella or IS  Mucomyst inhalation as well  wean O2 as tolerated  can repeat her ct chest outpatient  dc planning    Please call with any questions.    Dede Flynn, DO  [facility] Pulmonary/Sleep Medicine  [phone]

## 2022-12-08 NOTE — DIETITIAN INITIAL EVALUATION ADULT - ORAL INTAKE PTA/DIET HISTORY
Pt is poor historian, seen with her daughter in law at bedside who provided the collateral. Pt with good appetite at home  but not always compliant to Carbohydrate controlled diet pta.

## 2022-12-09 ENCOUNTER — TRANSCRIPTION ENCOUNTER (OUTPATIENT)
Age: 77
End: 2022-12-09

## 2022-12-09 LAB
ANION GAP SERPL CALC-SCNC: 11 MMOL/L — SIGNIFICANT CHANGE UP (ref 7–14)
ANION GAP SERPL CALC-SCNC: 4 MMOL/L — LOW (ref 7–14)
BASOPHILS # BLD AUTO: 0.02 K/UL — SIGNIFICANT CHANGE UP (ref 0–0.2)
BASOPHILS NFR BLD AUTO: 0.2 % — SIGNIFICANT CHANGE UP (ref 0–2)
BUN SERPL-MCNC: 28 MG/DL — HIGH (ref 7–23)
BUN SERPL-MCNC: 30 MG/DL — HIGH (ref 7–23)
CALCIUM SERPL-MCNC: 8.7 MG/DL — SIGNIFICANT CHANGE UP (ref 8.4–10.5)
CALCIUM SERPL-MCNC: 8.8 MG/DL — SIGNIFICANT CHANGE UP (ref 8.4–10.5)
CHLORIDE SERPL-SCNC: 98 MMOL/L — SIGNIFICANT CHANGE UP (ref 98–107)
CHLORIDE SERPL-SCNC: 98 MMOL/L — SIGNIFICANT CHANGE UP (ref 98–107)
CO2 SERPL-SCNC: 24 MMOL/L — SIGNIFICANT CHANGE UP (ref 22–31)
CO2 SERPL-SCNC: 32 MMOL/L — HIGH (ref 22–31)
CREAT SERPL-MCNC: 0.71 MG/DL — SIGNIFICANT CHANGE UP (ref 0.5–1.3)
CREAT SERPL-MCNC: 0.74 MG/DL — SIGNIFICANT CHANGE UP (ref 0.5–1.3)
EGFR: 82 ML/MIN/1.73M2 — SIGNIFICANT CHANGE UP
EGFR: 86 ML/MIN/1.73M2 — SIGNIFICANT CHANGE UP
EOSINOPHIL # BLD AUTO: 0.33 K/UL — SIGNIFICANT CHANGE UP (ref 0–0.5)
EOSINOPHIL NFR BLD AUTO: 2.9 % — SIGNIFICANT CHANGE UP (ref 0–6)
GLUCOSE BLDC GLUCOMTR-MCNC: 138 MG/DL — HIGH (ref 70–99)
GLUCOSE BLDC GLUCOMTR-MCNC: 143 MG/DL — HIGH (ref 70–99)
GLUCOSE BLDC GLUCOMTR-MCNC: 163 MG/DL — HIGH (ref 70–99)
GLUCOSE BLDC GLUCOMTR-MCNC: 170 MG/DL — HIGH (ref 70–99)
GLUCOSE BLDC GLUCOMTR-MCNC: 186 MG/DL — HIGH (ref 70–99)
GLUCOSE SERPL-MCNC: 143 MG/DL — HIGH (ref 70–99)
GLUCOSE SERPL-MCNC: 171 MG/DL — HIGH (ref 70–99)
HCT VFR BLD CALC: 33.6 % — LOW (ref 34.5–45)
HGB BLD-MCNC: 9.3 G/DL — LOW (ref 11.5–15.5)
IANC: 8.25 K/UL — HIGH (ref 1.8–7.4)
IMM GRANULOCYTES NFR BLD AUTO: 1.6 % — HIGH (ref 0–0.9)
LYMPHOCYTES # BLD AUTO: 18.3 % — SIGNIFICANT CHANGE UP (ref 13–44)
LYMPHOCYTES # BLD AUTO: 2.07 K/UL — SIGNIFICANT CHANGE UP (ref 1–3.3)
MAGNESIUM SERPL-MCNC: 1.7 MG/DL — SIGNIFICANT CHANGE UP (ref 1.6–2.6)
MAGNESIUM SERPL-MCNC: 1.8 MG/DL — SIGNIFICANT CHANGE UP (ref 1.6–2.6)
MCHC RBC-ENTMCNC: 20.7 PG — LOW (ref 27–34)
MCHC RBC-ENTMCNC: 27.7 GM/DL — LOW (ref 32–36)
MCV RBC AUTO: 74.8 FL — LOW (ref 80–100)
MONOCYTES # BLD AUTO: 0.47 K/UL — SIGNIFICANT CHANGE UP (ref 0–0.9)
MONOCYTES NFR BLD AUTO: 4.2 % — SIGNIFICANT CHANGE UP (ref 2–14)
NEUTROPHILS # BLD AUTO: 8.25 K/UL — HIGH (ref 1.8–7.4)
NEUTROPHILS NFR BLD AUTO: 72.8 % — SIGNIFICANT CHANGE UP (ref 43–77)
NRBC # BLD: 0 /100 WBCS — SIGNIFICANT CHANGE UP (ref 0–0)
NRBC # FLD: 0 K/UL — SIGNIFICANT CHANGE UP (ref 0–0)
PHOSPHATE SERPL-MCNC: 3.3 MG/DL — SIGNIFICANT CHANGE UP (ref 2.5–4.5)
PHOSPHATE SERPL-MCNC: 3.5 MG/DL — SIGNIFICANT CHANGE UP (ref 2.5–4.5)
PLATELET # BLD AUTO: 148 K/UL — LOW (ref 150–400)
POTASSIUM SERPL-MCNC: 4.3 MMOL/L — SIGNIFICANT CHANGE UP (ref 3.5–5.3)
POTASSIUM SERPL-MCNC: 5 MMOL/L — SIGNIFICANT CHANGE UP (ref 3.5–5.3)
POTASSIUM SERPL-SCNC: 4.3 MMOL/L — SIGNIFICANT CHANGE UP (ref 3.5–5.3)
POTASSIUM SERPL-SCNC: 5 MMOL/L — SIGNIFICANT CHANGE UP (ref 3.5–5.3)
RBC # BLD: 4.49 M/UL — SIGNIFICANT CHANGE UP (ref 3.8–5.2)
RBC # FLD: 23.7 % — HIGH (ref 10.3–14.5)
SODIUM SERPL-SCNC: 133 MMOL/L — LOW (ref 135–145)
SODIUM SERPL-SCNC: 134 MMOL/L — LOW (ref 135–145)
WBC # BLD: 11.32 K/UL — HIGH (ref 3.8–10.5)
WBC # FLD AUTO: 11.32 K/UL — HIGH (ref 3.8–10.5)

## 2022-12-09 PROCEDURE — 99232 SBSQ HOSP IP/OBS MODERATE 35: CPT

## 2022-12-09 RX ORDER — INSULIN LISPRO 100/ML
6 VIAL (ML) SUBCUTANEOUS
Refills: 0 | Status: DISCONTINUED | OUTPATIENT
Start: 2022-12-10 | End: 2022-12-10

## 2022-12-09 RX ORDER — METOPROLOL TARTRATE 50 MG
25 TABLET ORAL DAILY
Refills: 0 | Status: DISCONTINUED | OUTPATIENT
Start: 2022-12-10 | End: 2022-12-10

## 2022-12-09 RX ORDER — INSULIN LISPRO 100/ML
4 VIAL (ML) SUBCUTANEOUS
Refills: 0 | Status: DISCONTINUED | OUTPATIENT
Start: 2022-12-09 | End: 2022-12-10

## 2022-12-09 RX ADMIN — LOSARTAN POTASSIUM 100 MILLIGRAM(S): 100 TABLET, FILM COATED ORAL at 07:19

## 2022-12-09 RX ADMIN — INSULIN GLARGINE 20 UNIT(S): 100 INJECTION, SOLUTION SUBCUTANEOUS at 22:40

## 2022-12-09 RX ADMIN — Medication 1000 UNIT(S): at 11:34

## 2022-12-09 RX ADMIN — PANTOPRAZOLE SODIUM 40 MILLIGRAM(S): 20 TABLET, DELAYED RELEASE ORAL at 11:33

## 2022-12-09 RX ADMIN — Medication 4 MILLILITER(S): at 08:55

## 2022-12-09 RX ADMIN — Medication 1: at 18:16

## 2022-12-09 RX ADMIN — Medication 3 MILLILITER(S): at 16:20

## 2022-12-09 RX ADMIN — Medication 3 MILLILITER(S): at 23:12

## 2022-12-09 RX ADMIN — SIMVASTATIN 20 MILLIGRAM(S): 20 TABLET, FILM COATED ORAL at 21:26

## 2022-12-09 RX ADMIN — Medication 37 MICROGRAM(S): at 22:39

## 2022-12-09 RX ADMIN — Medication 3 MILLILITER(S): at 00:25

## 2022-12-09 RX ADMIN — Medication 1: at 13:19

## 2022-12-09 RX ADMIN — Medication 3 MILLILITER(S): at 03:43

## 2022-12-09 RX ADMIN — Medication 40 MILLIGRAM(S): at 07:17

## 2022-12-09 RX ADMIN — ENOXAPARIN SODIUM 40 MILLIGRAM(S): 100 INJECTION SUBCUTANEOUS at 14:33

## 2022-12-09 RX ADMIN — Medication 3 MILLILITER(S): at 08:54

## 2022-12-09 RX ADMIN — Medication 3 MILLILITER(S): at 13:21

## 2022-12-09 RX ADMIN — Medication 1: at 09:43

## 2022-12-09 RX ADMIN — Medication 4 UNIT(S): at 13:19

## 2022-12-09 RX ADMIN — Medication 6 UNIT(S): at 09:43

## 2022-12-09 RX ADMIN — Medication 650 MILLIGRAM(S): at 15:27

## 2022-12-09 RX ADMIN — Medication 4 MILLILITER(S): at 19:51

## 2022-12-09 RX ADMIN — Medication 3 MILLILITER(S): at 19:51

## 2022-12-09 RX ADMIN — Medication 3 MILLIGRAM(S): at 21:29

## 2022-12-09 RX ADMIN — Medication 4 UNIT(S): at 18:16

## 2022-12-09 NOTE — PROGRESS NOTE ADULT - PROBLEM SELECTOR PROBLEM 3
HTN (hypertension)
HLD (hyperlipidemia)
HLD (hyperlipidemia)
HTN (hypertension)
HTN (hypertension)
HLD (hyperlipidemia)
HTN (hypertension)
HLD (hyperlipidemia)
HTN (hypertension)

## 2022-12-09 NOTE — DISCHARGE NOTE NURSING/CASE MANAGEMENT/SOCIAL WORK - PATIENT PORTAL LINK FT
You can access the FollowMyHealth Patient Portal offered by [facility] by registering at the following website: [email or web address]. By joining [employer]’s FollowMyHealth portal, you will also be able to view your health information using other applications (apps) compatible with our system.

## 2022-12-09 NOTE — PROGRESS NOTE ADULT - ASSESSMENT
76 y/o female with PMHx of DM type 2, hypothyroidism, and HTN who presented to the ED for increased SOB. Admitted with RSV. Is on methylprednisolone 40mg q12h with resultant hyperglycemia. Endocrine consulted for DM2 with steroid-induced hyperglycemia.    Poorly controlled T2DM with steroid-induced hyperglycemia  DM diagnosis: DM2 x20 years  Last A1c: 8.8  Endocrinologist: PCP only  Home DM meds: Basaglar 10 units q24h, Novolog 4 units TIDac, metformin 500mg BID  S/p methylprednisolone 30mg q12h now transitioned to prednisone 40mg x3 (started on 12/8) ;Prednisone 30mg daily x 3 days, Prednisone 20mg daily x 3 day, Prednisone 10mg daily x 3 days then stop   -Hold oral DM agents while inpatient  -Continue Lantus 20 units at bedtime. DO NOT HOLD IF NPO.  -Change Admelog 6/4/4 units TID pre-meal. HOLD IF NPO.   -Continue low dose Admelog correction scale pre-meal  -Continue low dose Admelog correction scale at bedtime  -Fingerstick BG before meals and bedtime  -Goal -180; stable today;  had multiple episodes of hypoglycemia at dinner   -Carbohydrate consistent diet  Discharge plan:  -On steroid would Continue Metformin 500mg p.o. BID with meals; Basal/boluses doses TBD based on insulin requirements while inpt; please confirm with endocrine final doses on day of discharge   -Once off steroids continue basal/bolus doses TBD and Metformin 500mg p.o. BID with meals  -Recommend routine outpatient ophthalmology, podiatry, and PCP f/u  - D/w Jeannie Rivera and Jaspreet Winston [phone] are taking full responsibility for checking pts fs and administer insulin via insulin pen as prescribed  -Declined insulin pen review as jaspreet Winston demonsterated insulin pen review with teach back on last admission  -prefers pcp for DM management     HTN  goal BP in DM <130/80  on losartan 100mg  outpt mc/cr ratio    HLD  -LDL goal<70   -On simvastatin 20mg daily  -Outpt lipid panel    Hypothyroidism  TSH normal now. FT4 nml in Nov 2022.  Continue LT4 50mcg daily PO (or 37mcg IV)  Repeat TFts in 4-6 week of discharge     D/w Team       Randa Lu  Nurse Practitioner  Division of Endocrinology & Diabetes  In house pager #[phone]    If before 9AM or after 6PM, or on weekends/holidays, please call endocrine answering service for assistance ([phone]).For nonurgent matters email [email or web address] for assistance.     If after 6PM or before 9AM, or on weekends/holidays, please call endocrine answering service for assistance ([phone]).  For nonurgent matters email [email or web address] for assistance.

## 2022-12-09 NOTE — PROGRESS NOTE ADULT - PROBLEM SELECTOR PLAN 3
Currently BP stable, continue home anti-HTNs

## 2022-12-09 NOTE — PROGRESS NOTE ADULT - PROBLEM SELECTOR PROBLEM 2
Diabetes mellitus
HTN (hypertension)
Diabetes mellitus
Diabetes mellitus
HTN (hypertension)
Diabetes mellitus

## 2022-12-09 NOTE — PROGRESS NOTE ADULT - PROBLEM SELECTOR PLAN 1
Sepsis likely 2/2 RSV + underlying pneumonia  observe off abx as per ID

## 2022-12-09 NOTE — PROGRESS NOTE ADULT - SUBJECTIVE AND OBJECTIVE BOX
PULMONARY PROGRESS NOTE    MARTIR VU  MRN-[id number]    Patient is a 77y old  Female who presents with a chief complaint of Difficulty breathing (07 Dec 2022 18:03)      HPI:  -eating breakfast with family  -has cough    ROS:   -    MEDICATIONS  (STANDING):  acetylcysteine 20%  Inhalation 4 milliLiter(s) Inhalation every 12 hours  albuterol/ipratropium for Nebulization 3 milliLiter(s) Nebulizer every 4 hours  cholecalciferol 1000 Unit(s) Oral daily  dextrose 5%. 1000 milliLiter(s) (100 mL/Hr) IV Continuous <Continuous>  dextrose 5%. 1000 milliLiter(s) (50 mL/Hr) IV Continuous <Continuous>  dextrose 50% Injectable 25 Gram(s) IV Push once  dextrose 50% Injectable 12.5 Gram(s) IV Push once  dextrose 50% Injectable 25 Gram(s) IV Push once  enoxaparin Injectable 40 milliGRAM(s) SubCutaneous every 24 hours  glucagon  Injectable 1 milliGRAM(s) IntraMuscular once  influenza  Vaccine (HIGH DOSE) 0.7 milliLiter(s) IntraMuscular once  insulin glargine Injectable (LANTUS) 20 Unit(s) SubCutaneous at bedtime  insulin lispro (ADMELOG) corrective regimen sliding scale   SubCutaneous three times a day before meals  insulin lispro (ADMELOG) corrective regimen sliding scale   SubCutaneous at bedtime  insulin lispro Injectable (ADMELOG) 4 Unit(s) SubCutaneous before lunch  insulin lispro Injectable (ADMELOG) 4 Unit(s) SubCutaneous before dinner  levothyroxine Injectable 37 MICROGram(s) IV Push at bedtime  losartan 100 milliGRAM(s) Oral daily  pantoprazole  Injectable 40 milliGRAM(s) IV Push daily  predniSONE   Tablet 40 milliGRAM(s) Oral daily  simvastatin 20 milliGRAM(s) Oral at bedtime    MEDICATIONS  (PRN):  acetaminophen     Tablet .. 650 milliGRAM(s) Oral every 6 hours PRN Temp greater or equal to 38C (100.4F), Mild Pain (1 - 3)  aluminum hydroxide/magnesium hydroxide/simethicone Suspension 30 milliLiter(s) Oral every 4 hours PRN Dyspepsia  dextrose Oral Gel 15 Gram(s) Oral once PRN Blood Glucose LESS THAN 70 milliGRAM(s)/deciliter  melatonin 3 milliGRAM(s) Oral at bedtime PRN Insomnia  ondansetron Injectable 4 milliGRAM(s) IV Push every 8 hours PRN Nausea and/or Vomiting        EXAM:  \Vital Signs Last 24 Hrs  T(C): 36.3 (09 Dec 2022 07:17), Max: 36.6 (08 Dec 2022 11:20)  T(F): 97.3 (09 Dec 2022 07:17), Max: 97.8 (08 Dec 2022 11:20)  HR: 83 (09 Dec 2022 08:55) (83 - 97)  BP: 114/57 (09 Dec 2022 07:17) (101/53 - 114/58)  BP(mean): --  RR: 18 (09 Dec 2022 08:55) (17 - 18)  SpO2: 96% (09 Dec 2022 08:55) (88% - 97%)    Parameters below as of 09 Dec 2022 08:55  Patient On (Oxygen Delivery Method): nasal cannula  O2 Flow (L/min): 3        GENERAL: The patient is awake and alert in no apparent distress.     LUNGS: not wheezing not labored  some rhonchi                             9.3    11.32 )-----------( 148      ( 09 Dec 2022 06:50 )             33.6   12-09    133<L>  |  98  |  28<H>  ----------------------------<  143<H>  5.0   |  24  |  0.71    Ca    8.8      09 Dec 2022 06:50  Phos  3.5     12-09  Mg     1.80     12-09          < from: Xray Chest 1 View- PORTABLE-Urgent (12.01.22 @ 13:02) >    ACC: [id number] EXAM:  XR CHEST PORTABLE URGENT 1V                          PROCEDURE DATE:  12/01/2022          INTERPRETATION:  CLINICAL INFORMATION: Chest pain    TIME OF EXAMINATION: December 1, 2022 at 12:50 PM    EXAM: Portable chest    FINDINGS:  Right upper lobe and left midlung opacities about the same as the last   study may be chronic findings. The heart is not enlarged and there are no   effusions or congestion to indicate CHF.        COMPARISON: November 11, 2022        IMPRESSION: Follow-up study showing no acute pulmonary pathology.    --- End of Report ---            NAT HENNING MD; Attending Radiologist  This document has been electronically signed. Dec  1 2022  1:41PM    < end of copied text >  < from: CT Angio Chest PE Protocol w/ IV Cont (11.08.22 @ 11:39) >    ACC: [id number] EXAM:  CT ABDOMEN AND PELVIS IC                        ACC: [id number] EXAM:  CT ANGIO CHEST PULM [facility]                          PROCEDURE DATE:  11/08/2022          INTERPRETATION:  CTA CHEST AND CT ABDOMEN AND PELVIS    INDICATION: Syncope. Evaluate for pneumonia.    TECHNIQUE: Enhanced helical images were obtained of the chest, abdomen   and pelvis. Coronal and sagittal images were reconstructed. Maximum   intensity projection images were generated.    CONTRAST/COMPLICATIONS:  IV Contrast: Omnipaque 350 (accession [id number]), IV contrast documented   in associated exam (accession [id number])  90 cc administered   10 cc   discarded  Oral Contrast: NONE  Complications: None reported at time of study completion    COMPARISON: 5/29/2021 CT chest and 12/6/2021 CT abdomen.    FINDINGS:    PULMONARY ARTERIES: No pulmonary embolism detected. Please note that   multiple distal segmental and subsegmental pulmonary arterial branches   are not adequately assessed due to motion.    MEDIASTINUM: The right ventricle is qualitatively mildly dilated and   there is suggestion of flattening of the interventricular septum. No   pericardial effusion. Thoracic aorta normal caliber.  No large   mediastinal lymph nodes.    AIRWAYS, LUNGS, PLEURA: Severe narrowing of the right mainstem bronchus   and bronchus intermedius.    Right apical soft tissue opacification measuring 2.8 x 1.6 cm (image 13,   series 2) is similar to marginally decreased in size compared to   5/29/2021 where it was 2.9 x 1.8 cm when remeasured. An additional   anterior right upper lobe 1.3 cm nodular opacity (image 19, series 2) is   unchanged. Right apical cystic/cavitary opacity is likewise similar in   appearance. No significant interval change in right upperlobe   opacification along the minor fissure.    Left upper lobe parenchymal opacification measuring 3.6 x 2.2 cm (image   156, series 4) is without significant interval change compared to   5/29/2021, but progressed compared to an even earlier examination dated   10/26/2019.    No pleural effusion.    ABDOMEN and PELVIS: Peripheral enhancement of hepatic segment 7 (image   22, series 3) may be perfusional. Gallbladder, pancreas, spleen, adrenal   glands unremarkable. No hydronephrosis.    Unremarkable urinary bladder and uterus.    No bowel obstruction. Ascending and transverse colon are decompressed. No   free fluid. No abdominal or pelvic lymphadenopathy. Abdominal aorta   normal caliber.    BONES: Unremarkable.    SOFT TISSUES: Unremarkable.    IMPRESSION:    No pulmonary embolism detected.    No new lung parenchymal opacity to suggest acute pneumonia.    Indeterminate left upper lobe 3.6 x 2.2 cm opacity is similar compared to   5/29/2021, but progressed compared to 10/26/2019; the exact etiology is   unclear, but it is difficult to exclude lung neoplasm.    Additional right upper lobe opacities also unchanged compared to   5/29/2021.    Peripheral enhancement of the liver as described above may represent   perfusional abnormality. Otherwise, unremarkable evaluation of the   abdomen and pelvis.    --- End of Report ---        < end of copied text >  >>> <<<    PROBLEM LIST:  77y Female with HEALTH ISSUES - PROBLEM Dx:  Diabetes mellitus    Sepsis with acute hypercapnic respiratory failure    HTN (hypertension)    Need for prophylactic measure    Hypothyroidism    Steroid-induced hyperglycemia    HLD (hyperlipidemia)        RECS:  prednisone taper- 40mg X 3 days, with 10mg taper every 3 days until done  send her home wtih duonebs to be done BID for now  send her home with acapella or incentive rajani  Mucomyst inhalation as well  wean O2 as tolerated  can repeat her ct chest outpatient  dc planning    Please call with any questions.    Kathleen Bashir MD  [facility]  [phone]

## 2022-12-09 NOTE — PROVIDER CONTACT NOTE (OTHER) - SITUATION
Patients blood glucose 97, due for 15 units standing dose of insulin
Pt had 20 beats Vtach on tele
Patient has multiple repeat reading of High glucose. >600, 488, >600. Provider made aware, repeat glucoses of >600, 515, provider made aware of those as well
Pt 02 sustaining between 87-89%
patient /57, Hr 100, saturating 99% on 6L NC
bladder scan yielded 304ml urine. Patient has been straight cath x3.
Bladder scan 434ml

## 2022-12-09 NOTE — PROGRESS NOTE ADULT - PROBLEM SELECTOR PROBLEM 1
Sepsis with acute hypercapnic respiratory failure
Steroid-induced hyperglycemia
Steroid-induced hyperglycemia
Sepsis with acute hypercapnic respiratory failure
Sepsis with acute hypercapnic respiratory failure
Steroid-induced hyperglycemia
Sepsis with acute hypercapnic respiratory failure
Steroid-induced hyperglycemia
Sepsis with acute hypercapnic respiratory failure
Sepsis with acute hypercapnic respiratory failure

## 2022-12-09 NOTE — PROVIDER CONTACT NOTE (OTHER) - REASON
BP parameters, O2 weaning
Bladder scan 434ml
Glucose Readings High
02
20 Beats VTach
Fingerstick 97, insulin due
Urinary retention

## 2022-12-09 NOTE — PROGRESS NOTE ADULT - PROBLEM SELECTOR PLAN 5
DNR/DNI confirmed with son at bedside. MOLST previously filled in chart
DNR/DNI confirmed with son at bedside.     pos sdc
DNR/DNI confirmed with son at bedside.     pos sdc
DNR/DNI confirmed with son at bedside. MOLST previously filled in chart
DNR/DNI confirmed with son at bedside.     pos sdc

## 2022-12-09 NOTE — PROGRESS NOTE ADULT - SUBJECTIVE AND OBJECTIVE BOX
Chief Complaint: Poorly controlled T2DM with steroid-induced hyperglycemia    History: [facility] id: [id number]. Pt seen at bedside. Pt tolerating oral diet. Pt denies nausea and vomiting/any signs of hypoglycemia. Pt reports an adequate appetite.     MEDICATIONS  (STANDING):  acetylcysteine 20%  Inhalation 4 milliLiter(s) Inhalation every 12 hours  albuterol/ipratropium for Nebulization 3 milliLiter(s) Nebulizer every 4 hours  cholecalciferol 1000 Unit(s) Oral daily  dextrose 5%. 1000 milliLiter(s) (100 mL/Hr) IV Continuous <Continuous>  dextrose 5%. 1000 milliLiter(s) (50 mL/Hr) IV Continuous <Continuous>  dextrose 50% Injectable 25 Gram(s) IV Push once  dextrose 50% Injectable 12.5 Gram(s) IV Push once  dextrose 50% Injectable 25 Gram(s) IV Push once  enoxaparin Injectable 40 milliGRAM(s) SubCutaneous every 24 hours  glucagon  Injectable 1 milliGRAM(s) IntraMuscular once  influenza  Vaccine (HIGH DOSE) 0.7 milliLiter(s) IntraMuscular once  insulin glargine Injectable (LANTUS) 20 Unit(s) SubCutaneous at bedtime  insulin lispro (ADMELOG) corrective regimen sliding scale   SubCutaneous three times a day before meals  insulin lispro (ADMELOG) corrective regimen sliding scale   SubCutaneous at bedtime  insulin lispro Injectable (ADMELOG) 4 Unit(s) SubCutaneous before lunch  insulin lispro Injectable (ADMELOG) 4 Unit(s) SubCutaneous before dinner  levothyroxine Injectable 37 MICROGram(s) IV Push at bedtime  losartan 100 milliGRAM(s) Oral daily  pantoprazole  Injectable 40 milliGRAM(s) IV Push daily  predniSONE   Tablet 40 milliGRAM(s) Oral daily  simvastatin 20 milliGRAM(s) Oral at bedtime    MEDICATIONS  (PRN):  acetaminophen     Tablet .. 650 milliGRAM(s) Oral every 6 hours PRN Temp greater or equal to 38C (100.4F), Mild Pain (1 - 3)  aluminum hydroxide/magnesium hydroxide/simethicone Suspension 30 milliLiter(s) Oral every 4 hours PRN Dyspepsia  dextrose Oral Gel 15 Gram(s) Oral once PRN Blood Glucose LESS THAN 70 milliGRAM(s)/deciliter  melatonin 3 milliGRAM(s) Oral at bedtime PRN Insomnia  ondansetron Injectable 4 milliGRAM(s) IV Push every 8 hours PRN Nausea and/or Vomiting      Allergies: No Known Allergies      Review of Systems:  Respiratory: No SOB, no cough  GI: No nausea, vomiting, abdominal pain  Endocrine: no polyuria, polydipsia    PHYSICAL EXAM:  VITALS: T(C): 36.4 (12-09-22 @ 16:50)  T(F): 97.5 (12-09-22 @ 16:50), Max: 97.8 (12-08-22 @ 21:57)  HR: 96 (12-09-22 @ 16:50) (83 - 97)  BP: 105/52 (12-09-22 @ 16:50) (101/53 - 114/58)  RR:  (17 - 18)  SpO2:  (88% - 99%)  Wt(kg): --  GENERAL: NAD, well-groomed, well-developed  RESPIRATORY: No labored breathing   GI: Soft, nontender, non distended  PSYCH: Alert and oriented x 3, normal affect, normal mood      CAPILLARY BLOOD GLUCOSE  POCT Blood Glucose.: 186 mg/dL (09 Dec 2022 18:07)  POCT Blood Glucose.: 163 mg/dL (09 Dec 2022 12:49)  POCT Blood Glucose.: 170 mg/dL (09 Dec 2022 09:12)  POCT Blood Glucose.: 143 mg/dL (09 Dec 2022 03:03)  POCT Blood Glucose.: 224 mg/dL (08 Dec 2022 21:17)  POCT Blood Glucose.: 121 mg/dL (08 Dec 2022 19:21)  POCT Blood Glucose.: 106 mg/dL (08 Dec 2022 19:20)  POCT Blood Glucose.: 80 mg/dL (08 Dec 2022 18:33)    A1C with Estimated Average Glucose (12.01.22 @ 12:00)    A1C with Estimated Average Glucose Result: 8.8    Estimated Average Glucose: 206      12-09    133<L>  |  98  |  28<H>  ----------------------------<  143<H>  5.0   |  24  |  0.71    eGFR: 86    Ca    8.8      12-09  Mg     1.80     12-09  Phos  3.5     12-09      Thyroid Function Tests:  12-01 @ 13:38 TSH 1.11 FreeT4 -- T3 -- Anti TPO -- Anti Thyroglobulin Ab -- TSI --  11-10 @ 06:48 TSH 1.08 FreeT4 1.3 T3 -- Anti TPO -- Anti Thyroglobulin Ab -- TSI --        Diet, Regular:   Consistent Carbohydrate No Snacks (CSTCHO)  Low Sodium  Halal (12-03-22 @ 07:30) [Active]

## 2022-12-09 NOTE — PROGRESS NOTE ADULT - ASSESSMENT
78 y/o female with PMHx of DM type 2, hypothyroidism, and HTN who presented to the ED for increased SOB    EKG: NSR no ischemic changes      1. RSV pneumonia    - on vancomycin and zosyn   -echo normal LV systolic function, no WMA. mild diastolic dysfunction. decreased RV function   - on steroids and nebs  -f/u pulm recs    2. HTN  -controlled  -c/w losartan  -continue to monitor BP     3. DVT prophylaxis  -lovenox subq 76 y/o female with PMHx of DM type 2, hypothyroidism, and HTN who presented to the ED for increased SOB    EKG: NSR no ischemic changes      1. RSV pneumonia    - on vancomycin and zosyn   -echo normal LV systolic function, no WMA. mild diastolic dysfunction. decreased RV function   - on steroids and nebs  -f/u pulm recs    2. HTN  -controlled  -c/w losartan  -continue to monitor BP     3. Episode of NSVT: asymptomatic start metorolol 25 mg po daily if ok with pulm     DVT prophylaxis  -lovenox subq

## 2022-12-09 NOTE — PROGRESS NOTE ADULT - PROBLEM SELECTOR PLAN 2
Currently extremely hyperglycemic likely 2/2 steroids  -TITRATE INSULIN FOR OPTIMAL BLOOD SUGAR CONTROL

## 2022-12-09 NOTE — PROGRESS NOTE ADULT - SUBJECTIVE AND OBJECTIVE BOX
SUBJECTIVE / OVERNIGHT EVENTS:pt seen and examined, pts family next to pts bedside  12-09-22    MEDICATIONS  (STANDING):  acetylcysteine 20%  Inhalation 4 milliLiter(s) Inhalation every 12 hours  albuterol/ipratropium for Nebulization 3 milliLiter(s) Nebulizer every 4 hours  cholecalciferol 1000 Unit(s) Oral daily  dextrose 5%. 1000 milliLiter(s) (50 mL/Hr) IV Continuous <Continuous>  dextrose 5%. 1000 milliLiter(s) (100 mL/Hr) IV Continuous <Continuous>  dextrose 50% Injectable 25 Gram(s) IV Push once  dextrose 50% Injectable 12.5 Gram(s) IV Push once  dextrose 50% Injectable 25 Gram(s) IV Push once  enoxaparin Injectable 40 milliGRAM(s) SubCutaneous every 24 hours  glucagon  Injectable 1 milliGRAM(s) IntraMuscular once  influenza  Vaccine (HIGH DOSE) 0.7 milliLiter(s) IntraMuscular once  insulin glargine Injectable (LANTUS) 20 Unit(s) SubCutaneous at bedtime  insulin lispro (ADMELOG) corrective regimen sliding scale   SubCutaneous three times a day before meals  insulin lispro (ADMELOG) corrective regimen sliding scale   SubCutaneous at bedtime  insulin lispro Injectable (ADMELOG) 4 Unit(s) SubCutaneous before lunch  insulin lispro Injectable (ADMELOG) 4 Unit(s) SubCutaneous before dinner  levothyroxine Injectable 37 MICROGram(s) IV Push at bedtime  pantoprazole  Injectable 40 milliGRAM(s) IV Push daily  predniSONE   Tablet 40 milliGRAM(s) Oral daily  simvastatin 20 milliGRAM(s) Oral at bedtime    MEDICATIONS  (PRN):  acetaminophen     Tablet .. 650 milliGRAM(s) Oral every 6 hours PRN Temp greater or equal to 38C (100.4F), Mild Pain (1 - 3)  aluminum hydroxide/magnesium hydroxide/simethicone Suspension 30 milliLiter(s) Oral every 4 hours PRN Dyspepsia  dextrose Oral Gel 15 Gram(s) Oral once PRN Blood Glucose LESS THAN 70 milliGRAM(s)/deciliter  melatonin 3 milliGRAM(s) Oral at bedtime PRN Insomnia  ondansetron Injectable 4 milliGRAM(s) IV Push every 8 hours PRN Nausea and/or Vomiting    Vital Signs Last 24 Hrs  T(C): 36.4 (12-09-22 @ 16:50), Max: 36.4 (12-09-22 @ 11:30)  T(F): 97.5 (12-09-22 @ 16:50), Max: 97.6 (12-09-22 @ 11:30)  HR: 89 (12-09-22 @ 19:51) (83 - 97)  BP: 105/52 (12-09-22 @ 16:50) (105/52 - 114/58)  BP(mean): --  RR: 18 (12-09-22 @ 16:50) (17 - 18)  SpO2: 97% (12-09-22 @ 19:51) (88% - 99%)          PHYSICAL EXAM:  GENERAL: NAD  EYES: EOMI, PERRLA  NECK: Supple, No JVD  CHEST/LUNG: dec breath sounds at bases  HEART:  S1 , S2 +  ABDOMEN: soft , bs+  EXTREMITIES:  no edema  NEUROLOGY:alert awake    LABS:  12-09    133<L>  |  98  |  28<H>  ----------------------------<  143<H>  5.0   |  24  |  0.71    Ca    8.8      09 Dec 2022 06:50  Phos  3.5     12-09  Mg     1.80     12-09      Creatinine Trend: 0.71 <--, 0.74 <--, 0.69 <--, 0.71 <--, 0.70 <--, 0.79 <--, 0.90 <--, 0.83 <--                        9.3    11.32 )-----------( 148      ( 09 Dec 2022 06:50 )             33.6     Urine Studies:                                                                            RADIOLOGY & ADDITIONAL TESTS:    Imaging Personally Reviewed:yes    Consultant(s) Notes Reviewed:  yes    Care Discussed with Consultants/Other Providers:yes

## 2022-12-09 NOTE — PROGRESS NOTE ADULT - PROBLEM SELECTOR PLAN 4
F/U tsh
F/U tsh
Thyroid Stimulating Hormone, Serum (12.01.22 @ 13:38)    Thyroid Stimulating Hormone, Serum: 1.11 uIU/mL
F/U tsh

## 2022-12-09 NOTE — PROGRESS NOTE ADULT - SUBJECTIVE AND OBJECTIVE BOX
Forrest Rosenthal MD  Interventional Cardiology / Endovascular Specialist  [place] Office : [address] [place] [address] [place] [address]  Tel:   [place] Office : [address] [facility] [place] [address]  Tel: [phone]    Subjective/Overnight events: Patient lying in bed comfortably. No acute distress.   	  MEDICATIONS:  enoxaparin Injectable 40 milliGRAM(s) SubCutaneous every 24 hours  losartan 100 milliGRAM(s) Oral daily      acetylcysteine 20%  Inhalation 4 milliLiter(s) Inhalation every 12 hours  albuterol/ipratropium for Nebulization 3 milliLiter(s) Nebulizer every 4 hours    acetaminophen     Tablet .. 650 milliGRAM(s) Oral every 6 hours PRN  melatonin 3 milliGRAM(s) Oral at bedtime PRN  ondansetron Injectable 4 milliGRAM(s) IV Push every 8 hours PRN    aluminum hydroxide/magnesium hydroxide/simethicone Suspension 30 milliLiter(s) Oral every 4 hours PRN  pantoprazole  Injectable 40 milliGRAM(s) IV Push daily    dextrose 50% Injectable 25 Gram(s) IV Push once  dextrose 50% Injectable 12.5 Gram(s) IV Push once  dextrose 50% Injectable 25 Gram(s) IV Push once  dextrose Oral Gel 15 Gram(s) Oral once PRN  glucagon  Injectable 1 milliGRAM(s) IntraMuscular once  insulin glargine Injectable (LANTUS) 20 Unit(s) SubCutaneous at bedtime  insulin lispro (ADMELOG) corrective regimen sliding scale   SubCutaneous three times a day before meals  insulin lispro (ADMELOG) corrective regimen sliding scale   SubCutaneous at bedtime  insulin lispro Injectable (ADMELOG) 4 Unit(s) SubCutaneous before lunch  insulin lispro Injectable (ADMELOG) 4 Unit(s) SubCutaneous before dinner  levothyroxine Injectable 37 MICROGram(s) IV Push at bedtime  predniSONE   Tablet 40 milliGRAM(s) Oral daily  simvastatin 20 milliGRAM(s) Oral at bedtime    cholecalciferol 1000 Unit(s) Oral daily  dextrose 5%. 1000 milliLiter(s) IV Continuous <Continuous>  dextrose 5%. 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 No nausea, vomiting, or hematemesis; No diarrhea or constipation. No melena or hematochezia.  GENITOURINARY: No dysuria, frequency, hematuria, or incontinence  NEUROLOGICAL: No headaches, memory loss, loss of strength, numbness, or tremors  ENDOCRINE: No heat or cold intolerance; No hair loss  MUSCULOSKELETAL: No joint pain or swelling; No muscle, back, or extremity pain  PSYCHIATRIC: No depression, anxiety, mood swings, or difficulty sleeping  HEME/LYMPH: No easy bruising, or bleeding gums  All others negative    PHYSICAL EXAM:  T(C): 36.4 (12-09-22 @ 11:30), Max: 36.6 (12-08-22 @ 21:57)  HR: 93 (12-09-22 @ 13:27) (83 - 97)  BP: 106/54 (12-09-22 @ 11:30) (101/53 - 114/58)  RR: 18 (12-09-22 @ 11:30) (17 - 18)  SpO2: 99% (12-09-22 @ 13:27) (88% - 99%)  Wt(kg): --  I&O's Summary    08 Dec 2022 07:01  -  09 Dec 2022 07:00  --------------------------------------------------------  IN: 300 mL / OUT: 250 mL / NET: 50 mL    GENERAL: NAD  EYES: EOMI, PERRLA, conjunctiva and sclera clear  ENMT: No tonsillar erythema, exudates, or enlargement  Cardiovascular: Normal S1 S2, No JVD, No murmurs, No edema  Respiratory: Lungs coarse to auscultation	  Gastrointestinal:  Soft, Non-tender, + BS	  Extremities: No edema                                     9.3     11.32 )-----------( 148      ( 09 Dec 2022 06:50 )             33.6     12-09    133<L>  |  98  |  28<H>  ----------------------------<  143<H>  5.0   |  24  |  0.71    Ca    8.8      09 Dec 2022 06:50  Phos  3.5     12-09  Mg     1.80     12-09      proBNP:   Lipid Profile:   HgA1c:   TSH:     Consultant(s) Notes Reviewed:  [x ] YES  [ ] NO    Care Discussed with Consultants/Other Providers [ x] YES  [ ] NO    Imaging Personally Reviewed independently:  [x] YES  [ ] NO    All labs, radiologic studies, vitals, orders and medications list reviewed. Patient is seen and examined at bedside. Case discussed with medical team.

## 2022-12-09 NOTE — DISCHARGE NOTE NURSING/CASE MANAGEMENT/SOCIAL WORK - NSDCPEFALRISK_GEN_ALL_CORE
For information on Fall & Injury Prevention, visit: [email or web address] OR  [email or web address] OR  https://www.cdc.gov/steadi/patient.html

## 2022-12-10 VITALS
TEMPERATURE: 98 F | RESPIRATION RATE: 18 BRPM | OXYGEN SATURATION: 95 % | DIASTOLIC BLOOD PRESSURE: 60 MMHG | SYSTOLIC BLOOD PRESSURE: 133 MMHG | HEART RATE: 91 BPM

## 2022-12-10 LAB
ANION GAP SERPL CALC-SCNC: 10 MMOL/L — SIGNIFICANT CHANGE UP (ref 7–14)
ANION GAP SERPL CALC-SCNC: 9 MMOL/L — SIGNIFICANT CHANGE UP (ref 7–14)
B-OH-BUTYR SERPL-SCNC: <0 MMOL/L — SIGNIFICANT CHANGE UP (ref 0–0.4)
BASE EXCESS BLDV CALC-SCNC: 5.8 MMOL/L — HIGH (ref -2–3)
BASOPHILS # BLD AUTO: 0.02 K/UL — SIGNIFICANT CHANGE UP (ref 0–0.2)
BASOPHILS NFR BLD AUTO: 0.2 % — SIGNIFICANT CHANGE UP (ref 0–2)
BUN SERPL-MCNC: 26 MG/DL — HIGH (ref 7–23)
BUN SERPL-MCNC: 27 MG/DL — HIGH (ref 7–23)
CALCIUM SERPL-MCNC: 8.8 MG/DL — SIGNIFICANT CHANGE UP (ref 8.4–10.5)
CALCIUM SERPL-MCNC: 8.9 MG/DL — SIGNIFICANT CHANGE UP (ref 8.4–10.5)
CHLORIDE SERPL-SCNC: 100 MMOL/L — SIGNIFICANT CHANGE UP (ref 98–107)
CHLORIDE SERPL-SCNC: 99 MMOL/L — SIGNIFICANT CHANGE UP (ref 98–107)
CO2 BLDV-SCNC: 32 MMOL/L — HIGH (ref 22–26)
CO2 SERPL-SCNC: 28 MMOL/L — SIGNIFICANT CHANGE UP (ref 22–31)
CO2 SERPL-SCNC: 29 MMOL/L — SIGNIFICANT CHANGE UP (ref 22–31)
CREAT SERPL-MCNC: 0.73 MG/DL — SIGNIFICANT CHANGE UP (ref 0.5–1.3)
CREAT SERPL-MCNC: 0.75 MG/DL — SIGNIFICANT CHANGE UP (ref 0.5–1.3)
EGFR: 81 ML/MIN/1.73M2 — SIGNIFICANT CHANGE UP
EGFR: 84 ML/MIN/1.73M2 — SIGNIFICANT CHANGE UP
EOSINOPHIL # BLD AUTO: 0.2 K/UL — SIGNIFICANT CHANGE UP (ref 0–0.5)
EOSINOPHIL NFR BLD AUTO: 1.9 % — SIGNIFICANT CHANGE UP (ref 0–6)
GLUCOSE BLDC GLUCOMTR-MCNC: 150 MG/DL — HIGH (ref 70–99)
GLUCOSE BLDC GLUCOMTR-MCNC: 281 MG/DL — HIGH (ref 70–99)
GLUCOSE BLDC GLUCOMTR-MCNC: 306 MG/DL — HIGH (ref 70–99)
GLUCOSE BLDC GLUCOMTR-MCNC: 427 MG/DL — HIGH (ref 70–99)
GLUCOSE SERPL-MCNC: 202 MG/DL — HIGH (ref 70–99)
GLUCOSE SERPL-MCNC: 284 MG/DL — HIGH (ref 70–99)
HCO3 BLDV-SCNC: 31 MMOL/L — HIGH (ref 22–29)
HCT VFR BLD CALC: 31 % — LOW (ref 34.5–45)
HGB BLD-MCNC: 8.5 G/DL — LOW (ref 11.5–15.5)
IANC: 8.49 K/UL — HIGH (ref 1.8–7.4)
IMM GRANULOCYTES NFR BLD AUTO: 1.6 % — HIGH (ref 0–0.9)
LYMPHOCYTES # BLD AUTO: 1.5 K/UL — SIGNIFICANT CHANGE UP (ref 1–3.3)
LYMPHOCYTES # BLD AUTO: 13.9 % — SIGNIFICANT CHANGE UP (ref 13–44)
MAGNESIUM SERPL-MCNC: 1.8 MG/DL — SIGNIFICANT CHANGE UP (ref 1.6–2.6)
MAGNESIUM SERPL-MCNC: 1.8 MG/DL — SIGNIFICANT CHANGE UP (ref 1.6–2.6)
MCHC RBC-ENTMCNC: 20.3 PG — LOW (ref 27–34)
MCHC RBC-ENTMCNC: 27.4 GM/DL — LOW (ref 32–36)
MCV RBC AUTO: 74.2 FL — LOW (ref 80–100)
MONOCYTES # BLD AUTO: 0.38 K/UL — SIGNIFICANT CHANGE UP (ref 0–0.9)
MONOCYTES NFR BLD AUTO: 3.5 % — SIGNIFICANT CHANGE UP (ref 2–14)
NEUTROPHILS # BLD AUTO: 8.49 K/UL — HIGH (ref 1.8–7.4)
NEUTROPHILS NFR BLD AUTO: 78.9 % — HIGH (ref 43–77)
NRBC # BLD: 0 /100 WBCS — SIGNIFICANT CHANGE UP (ref 0–0)
NRBC # FLD: 0 K/UL — SIGNIFICANT CHANGE UP (ref 0–0)
PCO2 BLDV: 44 MMHG — HIGH (ref 39–42)
PH BLDV: 7.45 — HIGH (ref 7.32–7.43)
PHOSPHATE SERPL-MCNC: 3.1 MG/DL — SIGNIFICANT CHANGE UP (ref 2.5–4.5)
PHOSPHATE SERPL-MCNC: 3.4 MG/DL — SIGNIFICANT CHANGE UP (ref 2.5–4.5)
PLATELET # BLD AUTO: 238 K/UL — SIGNIFICANT CHANGE UP (ref 150–400)
PO2 BLDV: 46 MMHG — SIGNIFICANT CHANGE UP
POTASSIUM SERPL-MCNC: 4.2 MMOL/L — SIGNIFICANT CHANGE UP (ref 3.5–5.3)
POTASSIUM SERPL-MCNC: 4.8 MMOL/L — SIGNIFICANT CHANGE UP (ref 3.5–5.3)
POTASSIUM SERPL-SCNC: 4.2 MMOL/L — SIGNIFICANT CHANGE UP (ref 3.5–5.3)
POTASSIUM SERPL-SCNC: 4.8 MMOL/L — SIGNIFICANT CHANGE UP (ref 3.5–5.3)
RBC # BLD: 4.18 M/UL — SIGNIFICANT CHANGE UP (ref 3.8–5.2)
RBC # FLD: 23.5 % — HIGH (ref 10.3–14.5)
SAO2 % BLDV: 73.3 % — SIGNIFICANT CHANGE UP
SODIUM SERPL-SCNC: 137 MMOL/L — SIGNIFICANT CHANGE UP (ref 135–145)
SODIUM SERPL-SCNC: 138 MMOL/L — SIGNIFICANT CHANGE UP (ref 135–145)
WBC # BLD: 10.76 K/UL — HIGH (ref 3.8–10.5)
WBC # FLD AUTO: 10.76 K/UL — HIGH (ref 3.8–10.5)

## 2022-12-10 RX ORDER — METOPROLOL TARTRATE 50 MG
1 TABLET ORAL
Qty: 30 | Refills: 0
Start: 2022-12-10 | End: 2023-01-08

## 2022-12-10 RX ORDER — CHOLECALCIFEROL (VITAMIN D3) 125 MCG
1000 CAPSULE ORAL
Qty: 30 | Refills: 0
Start: 2022-12-10 | End: 2023-01-08

## 2022-12-10 RX ORDER — INSULIN ASPART 100 [IU]/ML
4 INJECTION, SOLUTION SUBCUTANEOUS
Qty: 0 | Refills: 0 | DISCHARGE
Start: 2022-12-10 | End: 2023-01-08

## 2022-12-10 RX ORDER — IPRATROPIUM/ALBUTEROL SULFATE 18-103MCG
3 AEROSOL WITH ADAPTER (GRAM) INHALATION
Qty: 0 | Refills: 0 | DISCHARGE
Start: 2022-12-10

## 2022-12-10 RX ORDER — INSULIN GLARGINE 100 [IU]/ML
10 INJECTION, SOLUTION SUBCUTANEOUS
Qty: 0 | Refills: 0 | DISCHARGE
Start: 2022-12-10 | End: 2023-01-08

## 2022-12-10 RX ORDER — INSULIN ASPART 100 [IU]/ML
6 INJECTION, SOLUTION SUBCUTANEOUS
Qty: 10 | Refills: 0
Start: 2022-12-10 | End: 2023-01-08

## 2022-12-10 RX ORDER — INSULIN GLARGINE 100 [IU]/ML
20 INJECTION, SOLUTION SUBCUTANEOUS
Qty: 10 | Refills: 0
Start: 2022-12-10 | End: 2023-01-08

## 2022-12-10 RX ADMIN — Medication 3 MILLILITER(S): at 16:01

## 2022-12-10 RX ADMIN — Medication 4 UNIT(S): at 13:25

## 2022-12-10 RX ADMIN — Medication 3 MILLILITER(S): at 10:49

## 2022-12-10 RX ADMIN — Medication 1000 UNIT(S): at 12:17

## 2022-12-10 RX ADMIN — Medication 40 MILLIGRAM(S): at 05:05

## 2022-12-10 RX ADMIN — Medication 6 UNIT(S): at 09:16

## 2022-12-10 RX ADMIN — Medication 25 MILLIGRAM(S): at 05:05

## 2022-12-10 RX ADMIN — Medication 3 MILLILITER(S): at 04:12

## 2022-12-10 RX ADMIN — PANTOPRAZOLE SODIUM 40 MILLIGRAM(S): 20 TABLET, DELAYED RELEASE ORAL at 12:16

## 2022-12-10 RX ADMIN — ENOXAPARIN SODIUM 40 MILLIGRAM(S): 100 INJECTION SUBCUTANEOUS at 16:01

## 2022-12-10 RX ADMIN — Medication 3: at 09:17

## 2022-12-10 RX ADMIN — Medication 6: at 13:25

## 2022-12-10 RX ADMIN — Medication 3 MILLILITER(S): at 13:55

## 2022-12-10 NOTE — PROVIDER CONTACT NOTE (OTHER) - ACTION/TREATMENT ORDERED:
Order for Rasmussen catheter to be reinserted.
straight cath patient
provider to put in 1 time dose of 7 units of insulin
Notify ACP Patsy [id number] notified. Advised to increase NC to 3L and educate both patient and family on the importance of keeping NC on. Nursing care to cont.
Notify ACP Patsy Maguire , [id number]-VHR assessed patient at bedside. Nursing care to continue.
STAT blood orders to be entered by provider, give scheduled dose of insulin
no further interventions at this time as per ACP [phone]
Give the standing dose of insulin for dinner and the sliding scale, recheck blood glucose 1 hour post insulin administration

## 2022-12-10 NOTE — PROGRESS NOTE ADULT - ASSESSMENT
78 y/o female with PMHx of DM type 2, hypothyroidism, and HTN who presented to the ED for increased SOB    EKG: NSR no ischemic changes      1. RSV pneumonia    - on vancomycin and zosyn   -echo normal LV systolic function, no WMA. mild diastolic dysfunction. decreased RV function   - on steroids and nebs  -f/u pulm recs    2. HTN  -controlled  -c/w losartan  -continue to monitor BP     3. Episode of NSVT  -asymptomatic   -started on metorolol 25 mg po daily  -improved    DVT prophylaxis  -lovenox subq

## 2022-12-10 NOTE — CHART NOTE - NSCHARTNOTESELECT_GEN_ALL_CORE
Endocrine/Event Note
Endocrinology
Event Note
Event Note
ICU consulted/Event Note
Endocrine/Event Note
Endocrinology/Event Note
Event Note

## 2022-12-10 NOTE — PROGRESS NOTE ADULT - SUBJECTIVE AND OBJECTIVE BOX
Forrest Rosenthal MD  Interventional Cardiology / Endovascular Specialist  [place] Office : [address] [place] [address] Tel:   [place] Office : [address] [facility] [address]  Tel: [phone]      Subjective/Overnight events: Patient lying in bed comfortably. No acute distress.   	  MEDICATIONS:  enoxaparin Injectable 40 milliGRAM(s) SubCutaneous every 24 hours  metoprolol succinate ER 25 milliGRAM(s) Oral daily      acetylcysteine 20%  Inhalation 4 milliLiter(s) Inhalation every 12 hours  albuterol/ipratropium for Nebulization 3 milliLiter(s) Nebulizer every 4 hours    acetaminophen     Tablet .. 650 milliGRAM(s) Oral every 6 hours PRN  melatonin 3 milliGRAM(s) Oral at bedtime PRN  ondansetron Injectable 4 milliGRAM(s) IV Push every 8 hours PRN    aluminum hydroxide/magnesium hydroxide/simethicone Suspension 30 milliLiter(s) Oral every 4 hours PRN  pantoprazole  Injectable 40 milliGRAM(s) IV Push daily    dextrose 50% Injectable 25 Gram(s) IV Push once  dextrose 50% Injectable 12.5 Gram(s) IV Push once  dextrose 50% Injectable 25 Gram(s) IV Push once  dextrose Oral Gel 15 Gram(s) Oral once PRN  glucagon  Injectable 1 milliGRAM(s) IntraMuscular once  insulin glargine Injectable (LANTUS) 20 Unit(s) SubCutaneous at bedtime  insulin lispro (ADMELOG) corrective regimen sliding scale   SubCutaneous three times a day before meals  insulin lispro (ADMELOG) corrective regimen sliding scale   SubCutaneous at bedtime  insulin lispro Injectable (ADMELOG) 4 Unit(s) SubCutaneous before lunch  insulin lispro Injectable (ADMELOG) 4 Unit(s) SubCutaneous before dinner  insulin lispro Injectable (ADMELOG) 6 Unit(s) SubCutaneous before breakfast  levothyroxine Injectable 37 MICROGram(s) IV Push at bedtime  simvastatin 20 milliGRAM(s) Oral at bedtime    cholecalciferol 1000 Unit(s) Oral daily  dextrose 5%. 1000 milliLiter(s) IV Continuous <Continuous>  dextrose 5%. 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      Cataract of left eye          SOCIAL HISTORY: Substance Use (street drugs): ( x ) never used  (  ) other:    FAMILY HISTORY:  Family history of heart attack (Mother)  mother            PHYSICAL EXAM:  T(C): 36.6 (12-10-22 @ 05:07), Max: 36.6 (12-09-22 @ 21:27)  HR: 91 (12-10-22 @ 10:49) (80 - 98)  BP: 123/61 (12-10-22 @ 05:07) (105/52 - 130/62)  RR: 18 (12-10-22 @ 05:07) (18 - 19)  SpO2: 94% (12-10-22 @ 10:49) (93% - 99%)  Wt(kg): --  I&O's Summary    09 Dec 2022 07:01  -  10 Dec 2022 07:00  --------------------------------------------------------  IN: 350 mL / OUT: 475 mL / NET: -125 mL          GENERAL: NAD  EYES: EOMI, PERRLA, conjunctiva and sclera clear  ENMT: No tonsillar erythema, exudates, or enlargement  Cardiovascular: Normal S1 S2, No JVD, No murmurs, No edema  Respiratory: Lungs clear to auscultation	  Gastrointestinal:  Soft, Non-tender, + BS	  Extremities: No edema                                     8.5    10.76 )-----------( 238      ( 10 Dec 2022 07:14 )             31.0     12-10    138  |  100  |  26<H>  ----------------------------<  202<H>  4.2   |  29  |  0.75    Ca    8.8      10 Dec 2022 07:14  Phos  3.1     12-10  Mg     1.80     12-10      proBNP:   Lipid Profile:   HgA1c:   TSH:     Consultant(s) Notes Reviewed:  [x ] YES  [ ] NO    Care Discussed with Consultants/Other Providers [ x] YES  [ ] NO    Imaging Personally Reviewed independently:  [x] YES  [ ] NO    All labs, radiologic studies, vitals, orders and medications list reviewed. Patient is seen and examined at bedside. Case discussed with medical team.

## 2022-12-10 NOTE — PROVIDER CONTACT NOTE (OTHER) - DATE AND TIME:
05-Dec-2022 05:15
03-Dec-2022 18:20
10-Dec-2022 12:39
07-Dec-2022 09:10
09-Dec-2022 01:15
02-Dec-2022 10:34
08-Dec-2022 20:19
03-Dec-2022 12:30

## 2022-12-10 NOTE — PROVIDER CONTACT NOTE (OTHER) - NAME OF MD/NP/PA/DO NOTIFIED:
Aleida Guo
Pietro Isaacs
Shilpa ACP
[facility] [id number]
Patsy Maguire [facility] [id number]
Patsy Maguire [id number]
Pietro Isaacs
[employer]

## 2022-12-10 NOTE — CHART NOTE - NSCHARTNOTEFT_GEN_A_CORE
78 y/o female with PMHx of DM type 2, hypothyroidism, and HTN who presented to the ED for increased SOB. Admitted with RSV. Is on methylprednisolone 40mg q12h with resultant hyperglycemia. Endocrine consulted for DM2 with steroid-induced hyperglycemia.    Contacted by primary team for discharge recommendations for today  Patient with poorly controlled T2DM with steroid-induced hyperglycemia  DM diagnosis: DM2 x20 years, Last A1c: 8.8  Endocrinologist: PCP only  Home DM meds: Basaglar 10 units q24h, Novolog 4 units TIDac, Metformin 500mg BID  S/p methylprednisolone 30mg q12h now transitioned to prednisone 40mg x3 (started on 12/8) ;Prednisone 30mg daily x 3 days (STARTING TOMORROW 12/11), Prednisone 20mg daily x 3 day, Prednisone 10mg daily x 3 days then stop     For discharge recommend:    While on Prednisone 30 MG: Recommend Basaglar 20 units SQ qHS, Novolog 6 units TID before meals (Hold if NPO/skips meal), Metformin 500 mg PO BID  While on Prednisone 20 MG: Recommend Basaglar 15 units SQ qHS, Novolog 5 units TID before meals (Hold if NPO/skips meal), Metformin 500 mg PO BID  While on Prednisone 10 MG AND OFF: Resume home regimen, Basaglar 10 units SQ qHS, Novolog 4 units TID before meals (Hold if NPO/skips meal), Metformin 500 mg PO BID    Followup with outpatient ophthalmology, podiatry, and PCP   Granddaughter Magdalena and Jaspreet Winston [phone] are taking full responsibility for checking pts fs and administer insulin via insulin pen as prescribed  Declined insulin pen review as jaspreet Winston demonstrated insulin pen review with teach back on last admission  Prefers PCP for DM management  In regards to thyroid, continue Levothyroxine 50mcg PO daily, repeat TFTs in 4-6 weeks of discharge  Vitamin D deficiency: Continue Cholecalciferol 1000 units daily and followup outpatient for continued monitoring    Reviewed recs with primary team JUDAH Guo    CAPILLARY BLOOD GLUCOSE    POCT Blood Glucose.: 281 mg/dL (10 Dec 2022 08:41)  POCT Blood Glucose.: 150 mg/dL (10 Dec 2022 03:15)  POCT Blood Glucose.: 138 mg/dL (09 Dec 2022 22:12)  POCT Blood Glucose.: 186 mg/dL (09 Dec 2022 18:07)  POCT Blood Glucose.: 163 mg/dL (09 Dec 2022 12:49)      12-10    138  |  100  |  26<H>  ----------------------------<  202<H>  4.2   |  29  |  0.75    Ca    8.8      10 Dec 2022 07:14  Phos  3.1     12-10  Mg     1.80     12-10        MEDICATIONS  (STANDING):  acetylcysteine 20%  Inhalation 4 milliLiter(s) Inhalation every 12 hours  albuterol/ipratropium for Nebulization 3 milliLiter(s) Nebulizer every 4 hours  cholecalciferol 1000 Unit(s) Oral daily  dextrose 5%. 1000 milliLiter(s) (100 mL/Hr) IV Continuous <Continuous>  dextrose 5%. 1000 milliLiter(s) (50 mL/Hr) IV Continuous <Continuous>  dextrose 50% Injectable 25 Gram(s) IV Push once  dextrose 50% Injectable 12.5 Gram(s) IV Push once  dextrose 50% Injectable 25 Gram(s) IV Push once  enoxaparin Injectable 40 milliGRAM(s) SubCutaneous every 24 hours  glucagon  Injectable 1 milliGRAM(s) IntraMuscular once  influenza  Vaccine (HIGH DOSE) 0.7 milliLiter(s) IntraMuscular once  insulin glargine Injectable (LANTUS) 20 Unit(s) SubCutaneous at bedtime  insulin lispro (ADMELOG) corrective regimen sliding scale   SubCutaneous three times a day before meals  insulin lispro (ADMELOG) corrective regimen sliding scale   SubCutaneous at bedtime  insulin lispro Injectable (ADMELOG) 4 Unit(s) SubCutaneous before lunch  insulin lispro Injectable (ADMELOG) 4 Unit(s) SubCutaneous before dinner  insulin lispro Injectable (ADMELOG) 6 Unit(s) SubCutaneous before breakfast  levothyroxine Injectable 37 MICROGram(s) IV Push at bedtime  metoprolol succinate ER 25 milliGRAM(s) Oral daily  pantoprazole  Injectable 40 milliGRAM(s) IV Push daily  simvastatin 20 milliGRAM(s) Oral at bedtime      Diet, Regular:   Consistent Carbohydrate {No Snacks} (CSTCHO)  Low Sodium  Halal (12-03-22 @ 07:30)      A1C with Estimated Average Glucose Result: 8.8 % (12-01-22 @ 12:00)  A1C with Estimated Average Glucose Result: 10.5 % (11-08-22 @ 09:00)      Lennie Almanza  Nurse Practitioner  Division of Endocrinology & Diabetes  In house pager #[phone]/long range pager #[phone]    If before 9AM or after 6PM, or on weekends/holidays, please call endocrine answering service for assistance ([phone]).  For nonurgent matters email [email or web address] for assistance. 76 y/o female with PMHx of DM type 2, hypothyroidism, and HTN who presented to the ED for increased SOB. Admitted with RSV. Is on methylprednisolone 40mg q12h with resultant hyperglycemia. Endocrine consulted for DM2 with steroid-induced hyperglycemia.    Contacted by primary team for discharge recommendations for today  Patient with poorly controlled T2DM with steroid-induced hyperglycemia  DM diagnosis: DM2 x20 years, Last A1c: 8.8  Endocrinologist: PCP only  Home DM meds: Basaglar 10 units q24h, Novolog 4 units TIDac, Metformin 500mg BID  S/p methylprednisolone 30mg q12h now transitioned to prednisone 40mg x3 (started on 12/8) ;Prednisone 30mg daily x 3 days (STARTING TOMORROW 12/11), Prednisone 20mg daily x 3 day, Prednisone 10mg daily x 3 days then stop     For discharge recommend:    While on Prednisone 30 MG: Recommend Basaglar 20 units SQ qHS, Novolog 6 units TID before meals (Hold if NPO/skips meal), Metformin 500 mg PO BID  While on Prednisone 20 MG: Recommend Basaglar 15 units SQ qHS, Novolog 5 units TID before meals (Hold if NPO/skips meal), Metformin 500 mg PO BID  While on Prednisone 10 MG AND OFF: Resume home regimen, Basaglar 10 units SQ qHS, Novolog 4 units TID before meals (Hold if NPO/skips meal), Metformin 500 mg PO BID    Followup with outpatient ophthalmology, podiatry, and PCP   Granddaughter Magdalena and Jaspreet Winston [phone] are taking full responsibility for checking pts fs and administer insulin via insulin pen as prescribed  Declined insulin pen review as jaspreet Winston demonstrated insulin pen review with teach back on last admission  Prefers PCP for DM management  In regards to thyroid, continue Levothyroxine 50mcg PO daily, repeat TFTs in 4-6 weeks of discharge  Vitamin D deficiency: Continue Cholecalciferol 1000 units daily and followup outpatient for continued monitoring    Reviewed recs with primary team JUDAH Guo    CAPILLARY BLOOD GLUCOSE    POCT Blood Glucose.: 281 mg/dL (10 Dec 2022 08:41)  POCT Blood Glucose.: 150 mg/dL (10 Dec 2022 03:15)  POCT Blood Glucose.: 138 mg/dL (09 Dec 2022 22:12)  POCT Blood Glucose.: 186 mg/dL (09 Dec 2022 18:07)  POCT Blood Glucose.: 163 mg/dL (09 Dec 2022 12:49)      12-10    138  |  100  |  26<H>  ----------------------------<  202<H>  4.2   |  29  |  0.75    Ca    8.8      10 Dec 2022 07:14  Phos  3.1     12-10  Mg     1.80     12-10        MEDICATIONS  (STANDING):  acetylcysteine 20%  Inhalation 4 milliLiter(s) Inhalation every 12 hours  albuterol/ipratropium for Nebulization 3 milliLiter(s) Nebulizer every 4 hours  cholecalciferol 1000 Unit(s) Oral daily  dextrose 5%. 1000 milliLiter(s) (100 mL/Hr) IV Continuous <Continuous>  dextrose 5%. 1000 milliLiter(s) (50 mL/Hr) IV Continuous <Continuous>  dextrose 50% Injectable 25 Gram(s) IV Push once  dextrose 50% Injectable 12.5 Gram(s) IV Push once  dextrose 50% Injectable 25 Gram(s) IV Push once  enoxaparin Injectable 40 milliGRAM(s) SubCutaneous every 24 hours  glucagon  Injectable 1 milliGRAM(s) IntraMuscular once  influenza  Vaccine (HIGH DOSE) 0.7 milliLiter(s) IntraMuscular once  insulin glargine Injectable (LANTUS) 20 Unit(s) SubCutaneous at bedtime  insulin lispro (ADMELOG) corrective regimen sliding scale   SubCutaneous three times a day before meals  insulin lispro (ADMELOG) corrective regimen sliding scale   SubCutaneous at bedtime  insulin lispro Injectable (ADMELOG) 4 Unit(s) SubCutaneous before lunch  insulin lispro Injectable (ADMELOG) 4 Unit(s) SubCutaneous before dinner  insulin lispro Injectable (ADMELOG) 6 Unit(s) SubCutaneous before breakfast  levothyroxine Injectable 37 MICROGram(s) IV Push at bedtime  metoprolol succinate ER 25 milliGRAM(s) Oral daily  pantoprazole  Injectable 40 milliGRAM(s) IV Push daily  simvastatin 20 milliGRAM(s) Oral at bedtime      Diet, Regular:   Consistent Carbohydrate {No Snacks} (CSTCHO)  Low Sodium  Halal (12-03-22 @ 07:30)      A1C with Estimated Average Glucose Result: 8.8 % (12-01-22 @ 12:00)  A1C with Estimated Average Glucose Result: 10.5 % (11-08-22 @ 09:00)      Lennie Almanza  Nurse Practitioner  Division of Endocrinology & Diabetes  In house pager #[phone]/long range pager #[phone]    If before 9AM or after 6PM, or on weekends/holidays, please call endocrine answering service for assistance ([phone]).  For nonurgent matters email [email or web address] for assistance.    ADDENDUM:  Patient noted with hyperglycemia at lunch to 400s. Patient given Admelog pre-meal and correctional insulin as ordered and now FS has downtrended - labs sent with no sign of DKA: AG and C02 stable, BHB negative, serum glucose 284    OK for patient to be discharged, please ADD the following Admelog correctional scale TID before meals to discharge plan.   Admelog correctional scale as follows:   1 Unit(s) if Glucose 151 - 200  2 Unit(s) if Glucose 201 - 250  3 Unit(s) if Glucose 251 - 300  4 Unit(s) if Glucose 301 - 350  5 Unit(s) if Glucose 351 - 400  6 Unit(s) if Glucose Greater Than 400    CAPILLARY BLOOD GLUCOSE    POCT Blood Glucose.: 306 mg/dL (10 Dec 2022 14:49)  POCT Blood Glucose.: 427 mg/dL (10 Dec 2022 12:34)        12-10    137  |  99  |  27<H>  ----------------------------<  284<H>  4.8   |  28  |  0.73    Ca    8.9      10 Dec 2022 13:05  Phos  3.4     12-10  Mg     1.80     12-10    Updated DC rec given to primary team  HN

## 2022-12-10 NOTE — PROGRESS NOTE ADULT - REASON FOR ADMISSION
Difficulty breathing

## 2022-12-10 NOTE — PROVIDER CONTACT NOTE (OTHER) - ASSESSMENT
patien asymptomatic
Patient asymptomatic
Patient asymptomatic
Pt doesn't appear in distress, VSS stable as per flow sheet, pt reports no chest pain, dizziness or increased SOB.
patient /57, Hr 100, saturating 99% on 6L NC. patient weaned from bipap
Pt VSS, No signs of distress. Pt denies any SOB. Family at bedside.
Patient asymptomatic

## 2022-12-10 NOTE — PROGRESS NOTE ADULT - PROVIDER SPECIALTY LIST ADULT
Cardiology
Internal Medicine
Cardiology
Endocrinology
Internal Medicine
Pulmonology
Cardiology
Internal Medicine
Pulmonology
Pulmonology
Cardiology
Endocrinology
Infectious Disease
Cardiology
Endocrinology
Endocrinology
Internal Medicine

## 2022-12-10 NOTE — PROVIDER CONTACT NOTE (OTHER) - RECOMMENDATIONS
Notify provider
Notify ACP Patsy Maguire (- [facility] on [facility]), [phone]
Notify Patsy [phone]

## 2022-12-10 NOTE — PROVIDER CONTACT NOTE (OTHER) - BACKGROUND
76 y/o female with PMH x of DM2, HTN, Hypothyroidism. Admit dx "COPD Acute exacerbation"
Patient admitted for COPD with acute exacerbation
Patient admitted for COPD with acute exacerbation
Pt admit DX "COPD acute exacerbation"   PMHx of DM2, HTN, Hypothyroidism
patient admitted for hypercapnic resp failure, hx of COPD, AOx1
Patient admitted for COPD with acute exacerbation
patient admitted for COPD with acute exacerbation

## 2022-12-13 RX ORDER — LEVOTHYROXINE SODIUM 125 MCG
1 TABLET ORAL
Qty: 30 | Refills: 0
Start: 2022-12-13 | End: 2023-01-11

## 2022-12-13 RX ORDER — IPRATROPIUM/ALBUTEROL SULFATE 18-103MCG
3 AEROSOL WITH ADAPTER (GRAM) INHALATION
Qty: 42 | Refills: 0
Start: 2022-12-13 | End: 2022-12-19

## 2022-12-16 ENCOUNTER — APPOINTMENT (OUTPATIENT)
Dept: UROLOGY | Facility: CLINIC | Age: 77
End: 2022-12-16

## 2022-12-16 ENCOUNTER — OUTPATIENT (OUTPATIENT)
Dept: OUTPATIENT SERVICES | Facility: HOSPITAL | Age: 77
LOS: 1 days | End: 2022-12-16
Payer: COMMERCIAL

## 2022-12-16 DIAGNOSIS — H26.9 UNSPECIFIED CATARACT: Chronic | ICD-10-CM

## 2022-12-16 DIAGNOSIS — R35.0 FREQUENCY OF MICTURITION: ICD-10-CM

## 2022-12-16 DIAGNOSIS — R33.9 RETENTION OF URINE, UNSPECIFIED: ICD-10-CM

## 2022-12-19 PROBLEM — R33.9 URINARY RETENTION: Status: ACTIVE | Noted: 2022-12-19

## 2022-12-19 NOTE — HISTORY OF PRESENT ILLNESS
[FreeTextEntry1] : 77 year old female recently admitted to the hospital for SOB and sepsis 2/2 RSV\par While in the hospital patient was retaining urine and jesus catheter placed. According to patients son she does not have baseline urinary issues. She came into appointment today in wheelchair on oxygen. Son says she has had a bowel movement daily for the past 3 days. \par \par PMHx: T2DM, hypothyroid, HTN\par PShx: none\par Social:\par AC: none\par Labs\par Rad \par

## 2022-12-19 NOTE — PHYSICAL EXAM
[Heart Rate And Rhythm] : Heart rate and rhythm were normal [Abdomen Soft] : soft [Urinary Bladder Findings] : the bladder was normal on palpation [Skin Color & Pigmentation] : normal skin color and pigmentation [FreeTextEntry1] : using O2

## 2022-12-19 NOTE — ASSESSMENT
[FreeTextEntry1] : 77 year old female recently admitted to hospital for treatment of RSV. Urinary retention in hospital presents today for TOV. \par \par - Jesus catheter removed \par - Patient and family instructed that if she is unable to void on her own she will need to go to the hospital to have a jesus catheter replaced \par - continue bowel regimen \par - continue hydration \par - f/up next week for PVR

## 2022-12-23 ENCOUNTER — APPOINTMENT (OUTPATIENT)
Dept: UROLOGY | Facility: CLINIC | Age: 77
End: 2022-12-23

## 2022-12-23 DIAGNOSIS — R33.9 RETENTION OF URINE, UNSPECIFIED: ICD-10-CM

## 2023-01-26 ENCOUNTER — INPATIENT (INPATIENT)
Facility: HOSPITAL | Age: 78
LOS: 5 days | Discharge: HOME HEALTH | End: 2023-02-01
Attending: INTERNAL MEDICINE | Admitting: INTERNAL MEDICINE
Payer: MEDICARE

## 2023-01-26 VITALS
HEART RATE: 80 BPM | RESPIRATION RATE: 20 BRPM | TEMPERATURE: 98 F | SYSTOLIC BLOOD PRESSURE: 147 MMHG | HEIGHT: 59 IN | OXYGEN SATURATION: 96 % | DIASTOLIC BLOOD PRESSURE: 82 MMHG

## 2023-01-26 DIAGNOSIS — J18.9 PNEUMONIA, UNSPECIFIED ORGANISM: ICD-10-CM

## 2023-01-26 DIAGNOSIS — I10 ESSENTIAL (PRIMARY) HYPERTENSION: ICD-10-CM

## 2023-01-26 DIAGNOSIS — J12.9 VIRAL PNEUMONIA, UNSPECIFIED: ICD-10-CM

## 2023-01-26 DIAGNOSIS — E11.9 TYPE 2 DIABETES MELLITUS WITHOUT COMPLICATIONS: ICD-10-CM

## 2023-01-26 DIAGNOSIS — H26.9 UNSPECIFIED CATARACT: Chronic | ICD-10-CM

## 2023-01-26 DIAGNOSIS — J96.21 ACUTE AND CHRONIC RESPIRATORY FAILURE WITH HYPOXIA: ICD-10-CM

## 2023-01-26 DIAGNOSIS — E03.9 HYPOTHYROIDISM, UNSPECIFIED: ICD-10-CM

## 2023-01-26 DIAGNOSIS — Z29.9 ENCOUNTER FOR PROPHYLACTIC MEASURES, UNSPECIFIED: ICD-10-CM

## 2023-01-26 LAB
ANION GAP SERPL CALC-SCNC: 10 MMOL/L — SIGNIFICANT CHANGE UP (ref 7–14)
B PERT DNA SPEC QL NAA+PROBE: SIGNIFICANT CHANGE UP
B PERT+PARAPERT DNA PNL SPEC NAA+PROBE: SIGNIFICANT CHANGE UP
BASE EXCESS BLDV CALC-SCNC: 3.5 MMOL/L — HIGH (ref -2–3)
BASOPHILS # BLD AUTO: 0.06 K/UL — SIGNIFICANT CHANGE UP (ref 0–0.2)
BASOPHILS NFR BLD AUTO: 0.6 % — SIGNIFICANT CHANGE UP (ref 0–2)
BLOOD GAS VENOUS COMPREHENSIVE RESULT: SIGNIFICANT CHANGE UP
BORDETELLA PARAPERTUSSIS (RAPRVP): SIGNIFICANT CHANGE UP
BUN SERPL-MCNC: 21 MG/DL — SIGNIFICANT CHANGE UP (ref 7–23)
C PNEUM DNA SPEC QL NAA+PROBE: SIGNIFICANT CHANGE UP
CALCIUM SERPL-MCNC: 9.2 MG/DL — SIGNIFICANT CHANGE UP (ref 8.4–10.5)
CHLORIDE BLDV-SCNC: 97 MMOL/L — SIGNIFICANT CHANGE UP (ref 96–108)
CHLORIDE SERPL-SCNC: 100 MMOL/L — SIGNIFICANT CHANGE UP (ref 98–107)
CO2 BLDV-SCNC: 32.9 MMOL/L — HIGH (ref 22–26)
CO2 SERPL-SCNC: 27 MMOL/L — SIGNIFICANT CHANGE UP (ref 22–31)
CREAT SERPL-MCNC: 0.68 MG/DL — SIGNIFICANT CHANGE UP (ref 0.5–1.3)
D DIMER BLD IA.RAPID-MCNC: 189 NG/ML DDU — SIGNIFICANT CHANGE UP
EGFR: 89 ML/MIN/1.73M2 — SIGNIFICANT CHANGE UP
EOSINOPHIL # BLD AUTO: 0.3 K/UL — SIGNIFICANT CHANGE UP (ref 0–0.5)
EOSINOPHIL NFR BLD AUTO: 3 % — SIGNIFICANT CHANGE UP (ref 0–6)
FLUAV SUBTYP SPEC NAA+PROBE: SIGNIFICANT CHANGE UP
FLUBV RNA SPEC QL NAA+PROBE: SIGNIFICANT CHANGE UP
GAS PNL BLDV: 134 MMOL/L — LOW (ref 136–145)
GLUCOSE BLDC GLUCOMTR-MCNC: 136 MG/DL — HIGH (ref 70–99)
GLUCOSE BLDC GLUCOMTR-MCNC: 146 MG/DL — HIGH (ref 70–99)
GLUCOSE BLDC GLUCOMTR-MCNC: 156 MG/DL — HIGH (ref 70–99)
GLUCOSE BLDC GLUCOMTR-MCNC: 207 MG/DL — HIGH (ref 70–99)
GLUCOSE BLDV-MCNC: 125 MG/DL — HIGH (ref 70–99)
GLUCOSE SERPL-MCNC: 136 MG/DL — HIGH (ref 70–99)
HADV DNA SPEC QL NAA+PROBE: SIGNIFICANT CHANGE UP
HCO3 BLDV-SCNC: 31 MMOL/L — HIGH (ref 22–29)
HCOV 229E RNA SPEC QL NAA+PROBE: SIGNIFICANT CHANGE UP
HCOV HKU1 RNA SPEC QL NAA+PROBE: SIGNIFICANT CHANGE UP
HCOV NL63 RNA SPEC QL NAA+PROBE: SIGNIFICANT CHANGE UP
HCOV OC43 RNA SPEC QL NAA+PROBE: SIGNIFICANT CHANGE UP
HCT VFR BLD CALC: 30.9 % — LOW (ref 34.5–45)
HCT VFR BLDA CALC: 29 % — LOW (ref 34.5–46.5)
HGB BLD CALC-MCNC: 9.5 G/DL — LOW (ref 11.5–15.5)
HGB BLD-MCNC: 9 G/DL — LOW (ref 11.5–15.5)
HMPV RNA SPEC QL NAA+PROBE: SIGNIFICANT CHANGE UP
HPIV1 RNA SPEC QL NAA+PROBE: SIGNIFICANT CHANGE UP
HPIV2 RNA SPEC QL NAA+PROBE: SIGNIFICANT CHANGE UP
HPIV3 RNA SPEC QL NAA+PROBE: SIGNIFICANT CHANGE UP
HPIV4 RNA SPEC QL NAA+PROBE: SIGNIFICANT CHANGE UP
IANC: 7.47 K/UL — HIGH (ref 1.8–7.4)
IMM GRANULOCYTES NFR BLD AUTO: 0.4 % — SIGNIFICANT CHANGE UP (ref 0–0.9)
LACTATE BLDV-MCNC: 0.7 MMOL/L — SIGNIFICANT CHANGE UP (ref 0.5–2)
LYMPHOCYTES # BLD AUTO: 1.24 K/UL — SIGNIFICANT CHANGE UP (ref 1–3.3)
LYMPHOCYTES # BLD AUTO: 12.5 % — LOW (ref 13–44)
M PNEUMO DNA SPEC QL NAA+PROBE: SIGNIFICANT CHANGE UP
MAGNESIUM SERPL-MCNC: 1.9 MG/DL — SIGNIFICANT CHANGE UP (ref 1.6–2.6)
MCHC RBC-ENTMCNC: 23.6 PG — LOW (ref 27–34)
MCHC RBC-ENTMCNC: 29.1 GM/DL — LOW (ref 32–36)
MCV RBC AUTO: 81.1 FL — SIGNIFICANT CHANGE UP (ref 80–100)
MONOCYTES # BLD AUTO: 0.83 K/UL — SIGNIFICANT CHANGE UP (ref 0–0.9)
MONOCYTES NFR BLD AUTO: 8.4 % — SIGNIFICANT CHANGE UP (ref 2–14)
NEUTROPHILS # BLD AUTO: 7.47 K/UL — HIGH (ref 1.8–7.4)
NEUTROPHILS NFR BLD AUTO: 75.1 % — SIGNIFICANT CHANGE UP (ref 43–77)
NRBC # BLD: 0 /100 WBCS — SIGNIFICANT CHANGE UP (ref 0–0)
NRBC # FLD: 0 K/UL — SIGNIFICANT CHANGE UP (ref 0–0)
NT-PROBNP SERPL-SCNC: 2524 PG/ML — HIGH
PCO2 BLDV: 63 MMHG — HIGH (ref 39–42)
PH BLDV: 7.3 — LOW (ref 7.32–7.43)
PHOSPHATE SERPL-MCNC: 4.5 MG/DL — SIGNIFICANT CHANGE UP (ref 2.5–4.5)
PLATELET # BLD AUTO: 234 K/UL — SIGNIFICANT CHANGE UP (ref 150–400)
PO2 BLDV: 46 MMHG — SIGNIFICANT CHANGE UP
POTASSIUM BLDV-SCNC: 4.8 MMOL/L — SIGNIFICANT CHANGE UP (ref 3.5–5.1)
POTASSIUM SERPL-MCNC: 4.8 MMOL/L — SIGNIFICANT CHANGE UP (ref 3.5–5.3)
POTASSIUM SERPL-SCNC: 4.8 MMOL/L — SIGNIFICANT CHANGE UP (ref 3.5–5.3)
PROCALCITONIN SERPL-MCNC: 0.02 NG/ML — SIGNIFICANT CHANGE UP (ref 0.02–0.1)
RAPID RVP RESULT: DETECTED
RBC # BLD: 3.81 M/UL — SIGNIFICANT CHANGE UP (ref 3.8–5.2)
RBC # FLD: 22.2 % — HIGH (ref 10.3–14.5)
RSV RNA SPEC QL NAA+PROBE: SIGNIFICANT CHANGE UP
RV+EV RNA SPEC QL NAA+PROBE: DETECTED
SAO2 % BLDV: 76 % — SIGNIFICANT CHANGE UP
SARS-COV-2 RNA SPEC QL NAA+PROBE: SIGNIFICANT CHANGE UP
SODIUM SERPL-SCNC: 137 MMOL/L — SIGNIFICANT CHANGE UP (ref 135–145)
TROPONIN T, HIGH SENSITIVITY RESULT: 21 NG/L — SIGNIFICANT CHANGE UP
TROPONIN T, HIGH SENSITIVITY RESULT: 21 NG/L — SIGNIFICANT CHANGE UP
WBC # BLD: 9.94 K/UL — SIGNIFICANT CHANGE UP (ref 3.8–10.5)
WBC # FLD AUTO: 9.94 K/UL — SIGNIFICANT CHANGE UP (ref 3.8–10.5)

## 2023-01-26 PROCEDURE — 71045 X-RAY EXAM CHEST 1 VIEW: CPT | Mod: 26

## 2023-01-26 PROCEDURE — 99285 EMERGENCY DEPT VISIT HI MDM: CPT

## 2023-01-26 PROCEDURE — 99223 1ST HOSP IP/OBS HIGH 75: CPT

## 2023-01-26 RX ORDER — ACETAMINOPHEN 500 MG
650 TABLET ORAL EVERY 6 HOURS
Refills: 0 | Status: DISCONTINUED | OUTPATIENT
Start: 2023-01-26 | End: 2023-02-01

## 2023-01-26 RX ORDER — DEXTROSE 50 % IN WATER 50 %
25 SYRINGE (ML) INTRAVENOUS ONCE
Refills: 0 | Status: DISCONTINUED | OUTPATIENT
Start: 2023-01-26 | End: 2023-02-01

## 2023-01-26 RX ORDER — ENOXAPARIN SODIUM 100 MG/ML
40 INJECTION SUBCUTANEOUS EVERY 24 HOURS
Refills: 0 | Status: DISCONTINUED | OUTPATIENT
Start: 2023-01-26 | End: 2023-01-27

## 2023-01-26 RX ORDER — METOPROLOL TARTRATE 50 MG
25 TABLET ORAL DAILY
Refills: 0 | Status: DISCONTINUED | OUTPATIENT
Start: 2023-01-27 | End: 2023-02-01

## 2023-01-26 RX ORDER — PIPERACILLIN AND TAZOBACTAM 4; .5 G/20ML; G/20ML
3.38 INJECTION, POWDER, LYOPHILIZED, FOR SOLUTION INTRAVENOUS ONCE
Refills: 0 | Status: COMPLETED | OUTPATIENT
Start: 2023-01-27 | End: 2023-01-27

## 2023-01-26 RX ORDER — INSULIN LISPRO 100/ML
VIAL (ML) SUBCUTANEOUS
Refills: 0 | Status: DISCONTINUED | OUTPATIENT
Start: 2023-01-26 | End: 2023-01-27

## 2023-01-26 RX ORDER — LEVOTHYROXINE SODIUM 125 MCG
50 TABLET ORAL DAILY
Refills: 0 | Status: DISCONTINUED | OUTPATIENT
Start: 2023-01-26 | End: 2023-02-01

## 2023-01-26 RX ORDER — DEXTROSE 50 % IN WATER 50 %
15 SYRINGE (ML) INTRAVENOUS ONCE
Refills: 0 | Status: DISCONTINUED | OUTPATIENT
Start: 2023-01-26 | End: 2023-02-01

## 2023-01-26 RX ORDER — POLYETHYLENE GLYCOL 3350 17 G/17G
17 POWDER, FOR SOLUTION ORAL DAILY
Refills: 0 | Status: DISCONTINUED | OUTPATIENT
Start: 2023-01-26 | End: 2023-02-01

## 2023-01-26 RX ORDER — SIMVASTATIN 20 MG/1
20 TABLET, FILM COATED ORAL AT BEDTIME
Refills: 0 | Status: DISCONTINUED | OUTPATIENT
Start: 2023-01-26 | End: 2023-02-01

## 2023-01-26 RX ORDER — SODIUM CHLORIDE 9 MG/ML
1000 INJECTION, SOLUTION INTRAVENOUS
Refills: 0 | Status: DISCONTINUED | OUTPATIENT
Start: 2023-01-26 | End: 2023-02-01

## 2023-01-26 RX ORDER — PIPERACILLIN AND TAZOBACTAM 4; .5 G/20ML; G/20ML
3.38 INJECTION, POWDER, LYOPHILIZED, FOR SOLUTION INTRAVENOUS ONCE
Refills: 0 | Status: COMPLETED | OUTPATIENT
Start: 2023-01-26 | End: 2023-01-26

## 2023-01-26 RX ORDER — DEXTROSE 50 % IN WATER 50 %
12.5 SYRINGE (ML) INTRAVENOUS ONCE
Refills: 0 | Status: DISCONTINUED | OUTPATIENT
Start: 2023-01-26 | End: 2023-02-01

## 2023-01-26 RX ORDER — INSULIN GLARGINE 100 [IU]/ML
5 INJECTION, SOLUTION SUBCUTANEOUS AT BEDTIME
Refills: 0 | Status: DISCONTINUED | OUTPATIENT
Start: 2023-01-26 | End: 2023-01-27

## 2023-01-26 RX ORDER — ACETAMINOPHEN 500 MG
975 TABLET ORAL ONCE
Refills: 0 | Status: COMPLETED | OUTPATIENT
Start: 2023-01-26 | End: 2023-01-26

## 2023-01-26 RX ORDER — INSULIN LISPRO 100/ML
VIAL (ML) SUBCUTANEOUS AT BEDTIME
Refills: 0 | Status: DISCONTINUED | OUTPATIENT
Start: 2023-01-26 | End: 2023-01-27

## 2023-01-26 RX ORDER — GLUCAGON INJECTION, SOLUTION 0.5 MG/.1ML
1 INJECTION, SOLUTION SUBCUTANEOUS ONCE
Refills: 0 | Status: DISCONTINUED | OUTPATIENT
Start: 2023-01-26 | End: 2023-02-01

## 2023-01-26 RX ORDER — INFLUENZA VIRUS VACCINE 15; 15; 15; 15 UG/.5ML; UG/.5ML; UG/.5ML; UG/.5ML
0.7 SUSPENSION INTRAMUSCULAR ONCE
Refills: 0 | Status: DISCONTINUED | OUTPATIENT
Start: 2023-01-26 | End: 2023-02-01

## 2023-01-26 RX ORDER — IPRATROPIUM/ALBUTEROL SULFATE 18-103MCG
3 AEROSOL WITH ADAPTER (GRAM) INHALATION EVERY 6 HOURS
Refills: 0 | Status: DISCONTINUED | OUTPATIENT
Start: 2023-01-26 | End: 2023-02-01

## 2023-01-26 RX ORDER — PIPERACILLIN AND TAZOBACTAM 4; .5 G/20ML; G/20ML
3.38 INJECTION, POWDER, LYOPHILIZED, FOR SOLUTION INTRAVENOUS EVERY 8 HOURS
Refills: 0 | Status: DISCONTINUED | OUTPATIENT
Start: 2023-01-27 | End: 2023-01-27

## 2023-01-26 RX ORDER — PANTOPRAZOLE SODIUM 20 MG/1
40 TABLET, DELAYED RELEASE ORAL
Refills: 0 | Status: DISCONTINUED | OUTPATIENT
Start: 2023-01-26 | End: 2023-02-01

## 2023-01-26 RX ORDER — FUROSEMIDE 40 MG
40 TABLET ORAL ONCE
Refills: 0 | Status: COMPLETED | OUTPATIENT
Start: 2023-01-26 | End: 2023-01-26

## 2023-01-26 RX ORDER — VANCOMYCIN HCL 1 G
1000 VIAL (EA) INTRAVENOUS ONCE
Refills: 0 | Status: COMPLETED | OUTPATIENT
Start: 2023-01-26 | End: 2023-01-26

## 2023-01-26 RX ADMIN — INSULIN GLARGINE 5 UNIT(S): 100 INJECTION, SOLUTION SUBCUTANEOUS at 22:11

## 2023-01-26 RX ADMIN — Medication 250 MILLIGRAM(S): at 09:10

## 2023-01-26 RX ADMIN — Medication 3 MILLILITER(S): at 22:12

## 2023-01-26 RX ADMIN — Medication 975 MILLIGRAM(S): at 10:38

## 2023-01-26 RX ADMIN — Medication 10 MILLIGRAM(S): at 22:12

## 2023-01-26 RX ADMIN — ENOXAPARIN SODIUM 40 MILLIGRAM(S): 100 INJECTION SUBCUTANEOUS at 22:12

## 2023-01-26 RX ADMIN — Medication 100 MILLIGRAM(S): at 22:12

## 2023-01-26 RX ADMIN — Medication 975 MILLIGRAM(S): at 05:17

## 2023-01-26 RX ADMIN — SIMVASTATIN 20 MILLIGRAM(S): 20 TABLET, FILM COATED ORAL at 22:13

## 2023-01-26 RX ADMIN — Medication 20 MILLIGRAM(S): at 22:43

## 2023-01-26 RX ADMIN — Medication 1: at 17:58

## 2023-01-26 RX ADMIN — PIPERACILLIN AND TAZOBACTAM 200 GRAM(S): 4; .5 INJECTION, POWDER, LYOPHILIZED, FOR SOLUTION INTRAVENOUS at 08:10

## 2023-01-26 RX ADMIN — Medication 40 MILLIGRAM(S): at 22:43

## 2023-01-26 RX ADMIN — PIPERACILLIN AND TAZOBACTAM 200 GRAM(S): 4; .5 INJECTION, POWDER, LYOPHILIZED, FOR SOLUTION INTRAVENOUS at 22:11

## 2023-01-26 NOTE — H&P ADULT - PROBLEM SELECTOR PLAN 1
Discharged on NC 3-4 lts/NC on last visit  Initially requiring NRM now back on 4 lts/NC  D-dimer is normal so unlikely to be PE  Troponin x 2 negative  CXR 1/26 shows Chronic changes of the right upper lobe  New hazy airspace opacities in the left upper lobe more consistent with active infection than heart failure  BNP is slightly elevated at 2524  TTE in 11/2022 with e/o diastolic dysfunction and severe pulm HTN  - Likely secondary to positive Entero/rhinovirus on RVP and possible pneumonia given CXR findings  - No definite e/o superimposed bacterial infection  - Will c/w IV zosyn until further information is available  - IV lasix 40mg x1, assess for further need of IV lasix  - F/up VBG in AM  - Consider Pulmonary consult

## 2023-01-26 NOTE — PATIENT PROFILE ADULT - FALL HARM RISK - HARM RISK INTERVENTIONS

## 2023-01-26 NOTE — ED PROVIDER NOTE - ATTENDING CONTRIBUTION TO CARE
77-year-old female with history of diabetes, hypertension, hypothyroidism, prior recent admission for hypercarbic respiratory failure from viral etiology, was discharged on home oxygen 3 to 4 L nasal cannula at that time, presenting to ER with son for 2 weeks of cough with worsening of baseline shortness of breath x1 day with mild chest tightness- now resolved.  Also reports some body aches intermittently x2 weeks.  Patient had SPO2 to 90% on room air prior to arrival.  No fevers, no vomiting, no abdominal pain, no lower extremity edema or change in urine output    Exam  Patient awake and alert–on 4 L nasal cannula (baseline requirements)  Lungs clear bilaterally  No edema in lower extremities  Speaking complete sentences    EKG normal sinus rhythm without acute ischemic changes    Assessment/plan  Acute on chronic respiratory failure  ?  Viral etiology/infectious/cardiac  Will get labs, troponin, proBNP, RVP, blood gas  Chest x-ray  Dispo pending results

## 2023-01-26 NOTE — H&P ADULT - PROBLEM SELECTOR PLAN 3
On basaglar 10U qhs and novolog 4U TID, also on metformin  Borderline controlled with A1C of 8.8 in 12/22  - Will reduce dose to lantus 5U qhs for now as FS have been on low side today  - Hold lispro for now  - Diabetic diet and SURYA  - Monitor FS's closely and adust insulin as needed  - C/w simvastatin

## 2023-01-26 NOTE — ED ADULT NURSE REASSESSMENT NOTE - NS ED NURSE REASSESS COMMENT FT1
Pt endorsing 6/10 B/L lower leg pain, pt medicated as ordered. Respirations are even and unlabored. Awaiting xray
Pt endorsing improved pain after medication.

## 2023-01-26 NOTE — H&P ADULT - HISTORY OF PRESENT ILLNESS
77-year-old female past medical history of diabetes, hypothyroidism, hypertension presenting to the ED for shortness of breath.  Patient experience shortness of breath the past few hours with mild associated chest tightness while having shortness of breath, associated with cough nonproductive, is also complained of abdominal pain at onset of shortness of breath, though does not have any abdominal pain right now.  Son at bedside says that shortness of breath has been coming and going for weeks, had called EMS who found O2 sat to be 90% on 4 L nasal cannula, baseline oxygen is 4 L nasal cannula.  Patient states abdominal pain was upper abdominal, not related to eating, was related to coughing, nonradiating.  Patient additionally complains of constipation for the past 4 to 5 days, with minimal stool passed, nonbloody.  Denies urinary complaints.  Denies fever, chills, dizziness, vision changes, leg swelling, leg pain, back pa  Upon chart review patient was recently discharged from hospital with sepsis with acute hypercapnic respiratory failure, likely secondary to RSV/pneumonia.  Differential diagnosis includes viral infection versus pneumonia versus ACS versus CHF, will work-up with labs, chest x-ray, reassess     77-year-old female with history of diabetes, hypertension, hypothyroidism, prior recent admission for hypercarbic respiratory failure from viral etiology, was discharged on home oxygen 3 to 4 L nasal cannula at that time, presenting to ER with son for 2 weeks of cough with worsening of baseline shortness of breath x1 day with mild chest tightness- now resolved.  Also reports some body aches intermittently x2 weeks.  Patient had SPO2 to 90% on room air prior to arrival.  No fevers, no vomiting, no abdominal pain, no lower extremity edema or change in urine output    Sepsis with acute hypercapnic respiratory failure.   Sepsis likely 2/2 RSV + underlying pneumonia  observe off abx as per ID.        In ER VSS except for requiring NRM, O2 sat 96% on 15L --> 3 lts/NC  Received Vanco x1, zosyn x1 and tylenol PO 77 year old female with PMHx of DM, HTN, hypothyroidism and hyperlipidemia recently discharged from [facility] on 12/10/2022 after being admitted for sepsis with acute hypercapnic respiratory failure secondary to RSV pneumonia and discharged on O2 3-4lts/NC who now comes in c/o worsening SOB that started few hours prior to arrival to ER. Chest tightness along with SOB, non-productive cough (although chest sounds congested), cough seems to be chronic. No chest pain at time of interview. EMS found O2 Sat to be 90% on 4L NC. +Abdominal pain associated with coughing. Constipation for past 4-5 days. Appetite has been ok, no nausea or vomiting. No urinary complaints. No recent fevers, chills or headache.. No dizziness or leg pain or LE edema. Patient is not a great historian.      In ER VSS except for requiring NRM initially, O2 sat 96% on 15L --> 3 lts/NC  Received Vanco x1, zosyn x1 and tylenol PO

## 2023-01-26 NOTE — ED CLERICAL - NS ED CARE COORDINATION INFORMATION
This patient is eligible for (or currently enrolled in) an outpatient care management program available through [facility]. This program can coordinate outpatient follow up and assist the patient in accessing a variety of outpatient resources.  If discharged from the ED, the patient will be contacted to see if any additional resources are needed.                                                                                    Please call the Nurse Clinical Call Center at [phone] with any questions or for assistance in discharge planning.

## 2023-01-26 NOTE — H&P ADULT - NSHPPHYSICALEXAM_GEN_ALL_CORE
Vital Signs Last 24 Hrs  T(C): 36.4 (26 Jan 2023 11:51), Max: 36.6 (26 Jan 2023 02:29)  T(F): 97.6 (26 Jan 2023 11:51), Max: 97.9 (26 Jan 2023 02:29)  HR: 83 (26 Jan 2023 11:51) (73 - 83)  BP: 168/84 (26 Jan 2023 11:51) (139/66 - 168/84)  BP(mean): --  RR: 20 (26 Jan 2023 11:51) (19 - 24)  SpO2: 100% (26 Jan 2023 11:51) (96% - 100%)    Parameters below as of 26 Jan 2023 11:51  Patient On (Oxygen Delivery Method): nasal cannula  O2 Flow (L/min): 3 Vital Signs Last 24 Hrs  T(C): 36.4 (26 Jan 2023 11:51), Max: 36.6 (26 Jan 2023 02:29)  T(F): 97.6 (26 Jan 2023 11:51), Max: 97.9 (26 Jan 2023 02:29)  HR: 83 (26 Jan 2023 11:51) (73 - 83)  BP: 168/84 (26 Jan 2023 11:51) (139/66 - 168/84)  BP(mean): --  RR: 20 (26 Jan 2023 11:51) (19 - 24)  SpO2: 100% (26 Jan 2023 11:51) (96% - 100%)    Parameters below as of 26 Jan 2023 11:51  Patient On (Oxygen Delivery Method): nasal cannula      PHYSICAL EXAM:  GENERAL: NAD, well-groomed, well-developed, speaking in full sentences, on NC 4 lts/min  HEAD:  Atraumatic, Normocephalic  EYES: EOMI, PERRLA, conjunctiva and sclera clear  ENMT: No tonsillar erythema, exudates, or enlargement; Moist mucous membranes, Good dentition, No lesions  NECK: Supple, No JVD, Normal thyroid  NERVOUS SYSTEM:  Alert & Oriented X2 (self and place), No focal deficits  CHEST/LUNG: Decreased AE B/L, no wheezes or rhonchi heard, no crackles  HEART: Regular rate and rhythm; No murmurs, rubs, or gallops  ABDOMEN: Soft, Nontender, Nondistended; Bowel sounds present  EXTREMITIES:  2+ Peripheral Pulses, No clubbing, cyanosis, or edema  LYMPH: No lymphadenopathy noted  SKIN: No rashes or lesions      O2 Flow (L/min): 3

## 2023-01-26 NOTE — ED PROVIDER NOTE - PROGRESS NOTE DETAILS
Pietro Haywood MD PGY1: Pt fth cap on xr, entero/rhinovirus on rvp. Dimer neg. Ekg nonischemic. Admitted for further management as pt uncomfortable ambulating without sob.

## 2023-01-26 NOTE — H&P ADULT - ASSESSMENT
EF 60% Nov 2022   Mild diastolic dysfunction (Stage I).  6. Right ventricular enlargement with decreased right  ventricular systolic function.  7. Estimated right ventricular systolic pressure equals 70  mm Hg, assuming right atrial pressure equals 10 mm Hg,  consistent with severe pulmonary hypertension.      DNR/DNI confirmed clemente APODACA previously filled in chart.       77 year old female with PMHx of DM, HTN, hypothyroidism and hyperlipidemia recently discharged from [facility] on 12/10/2022 for sepsis with acute hypercapnic respiratory failure secondary to RSV pneumonia and discharged on O2 3-4lts/NC who is now admitted with acute on chronic hypoxemic respiratory failure secondary to likely Entero/rhinovirus pneumonia.

## 2023-01-26 NOTE — ED PROVIDER NOTE - PHYSICAL EXAMINATION
CONSTITUTIONAL: Well-developed; well-nourished;  SKIN: warm, dry  HEAD: Normocephalic; atraumatic.  EYES: no conjunctival injection. no scleral icterus  ENT: No nasal discharge; airway clear.  NECK: Supple; non tender.  CARD: S1, S2 normal; no murmurs, gallops, or rubs. Regular rate and rhythm.   RESP: +Faint crackles to bases. Good air movement bilaterally.   ABD: soft ntnd, no guarding, no distention, no rigidity.   EXT: No cyanosis or edema.   NEURO: Alert, oriented, grossly unremarkable  PSYCH: Cooperative, appropriate.

## 2023-01-26 NOTE — PATIENT PROFILE ADULT - FUNCTIONAL ASSESSMENT - BASIC MOBILITY 6.
2-calculated by average/Not able to assess (calculate score using [facility] averaging method)

## 2023-01-26 NOTE — ED ADULT NURSE NOTE - OBJECTIVE STATEMENT
Pt arrives to ED rm 28 A&Ox4 Chad speaking c/o sudden worsening SOB for the past couple hours. Pt uses 3L of nasal cannula at home, but they started to become tachypneic with 90% 02 sat at home. Pt arrives to room with NRB mask in place, pt was able now on 3L nasal cannula, respirations are even and unlabored. Pt denies headache, chest pain, cough, fever, sick contact, nausea, vomiting, diarrhea. 20G placed to R forearm, labs drawn and sent.

## 2023-01-26 NOTE — ED PROVIDER NOTE - OBJECTIVE STATEMENT
Patient is 77-year-old female past medical history of diabetes, hypothyroidism, hypertension presenting to the ED for shortness of breath.  Patient experience shortness of breath the past few hours with associated chest tightness while having shortness of breath, associated with cough nonproductive, is also complained of abdominal pain at onset of shortness of breath, though does not have any abdominal pain right now.  Son at bedside says that shortness of breath has been coming and going, had called EMS who found O2 sat to be 90% on 4 L nasal cannula, baseline oxygen is 4 L nasal cannula.  Patient states abdominal pain was upper abdominal, not related to eating, was related to coughing, nonradiating.  Patient additionally complains of constipation for the past 4 to 5 days, with minimal stool passed, nonbloody.  Denies urinary complaints.  Denies fever, chills, dizziness, vision changes, leg swelling, leg pain, back pain. Patient is 77-year-old female past medical history of diabetes, hypothyroidism, hypertension presenting to the ED for shortness of breath.  Patient experience shortness of breath the past few hours with mild associated chest tightness while having shortness of breath, associated with cough nonproductive, is also complained of abdominal pain at onset of shortness of breath, though does not have any abdominal pain right now.  Son at bedside says that shortness of breath has been coming and going for weeks, had called EMS who found O2 sat to be 90% on 4 L nasal cannula, baseline oxygen is 4 L nasal cannula.  Patient states abdominal pain was upper abdominal, not related to eating, was related to coughing, nonradiating.  Patient additionally complains of constipation for the past 4 to 5 days, with minimal stool passed, nonbloody.  Denies urinary complaints.  Denies fever, chills, dizziness, vision changes, leg swelling, leg pain, back pain.

## 2023-01-26 NOTE — ED ADULT TRIAGE NOTE - CHIEF COMPLAINT QUOTE
pt c/o SOB few hrs. On EMS arrival pt dyspneic and Spo2 90% on chronic 4L nasal cannula. Pt placed on nonrebreather with improvement to work of breathing. Denies chest pain, fever, cough

## 2023-01-26 NOTE — ED PROVIDER NOTE - CLINICAL SUMMARY MEDICAL DECISION MAKING FREE TEXT BOX
Patient is 77-year-old female past medical history of diabetes, hypothyroidism, hypertension presenting to the ED for shortness of breath. Currently with vital signs stable including O2 sat 96% on 4 L nasal cannula, which is baseline oxygen, presenting for shortness of breath today with intermittent associated chest and abdominal pain.  Patient currently nontoxic-appearing, complaining of cough associated shortness of breath.  Upon chart review patient was recently discharged from hospital with sepsis with acute hypercapnic respiratory failure, likely secondary to RSV/pneumonia.  Differential diagnosis includes viral infection versus pneumonia versus ACS versus CHF, will work-up with labs, chest x-ray, reassess.

## 2023-01-26 NOTE — H&P ADULT - NSHPLABSRESULTS_GEN_ALL_CORE
.  LABS:                         9.0    9.94  )-----------( 234      ( 26 Jan 2023 03:35 )             30.9     01-26    137  |  100  |  21  ----------------------------<  136<H>  4.8   |  27  |  0.68    Ca    9.2      26 Jan 2023 03:35  Phos  4.5     01-26  Mg     1.90     01-26            RVP positive for Entero/rhinovirus    RADIOLOGY, EKG & ADDITIONAL TESTS: Reviewed.     CXR 1/26-Reviewed by me  Chronic changes of the right upper lobe.  New hazy airspace opacities in the left upper lobe more consistent with   active infection than heart failure. .  LABS:                         9.0    9.94  )-----------( 234      ( 26 Jan 2023 03:35 )             30.9     01-26    137  |  100  |  21  ----------------------------<  136<H>  4.8   |  27  |  0.68    Ca    9.2      26 Jan 2023 03:35  Phos  4.5     01-26  Mg     1.90     01-26        BNP-2524    RVP positive for Entero/rhinovirus    RADIOLOGY, EKG & ADDITIONAL TESTS: Reviewed.     CXR 1/26-Reviewed by me  Chronic changes of the right upper lobe.  New hazy airspace opacities in the left upper lobe more consistent with   active infection than heart failure.

## 2023-01-26 NOTE — H&P ADULT - NSHPREVIEWOFSYSTEMS_GEN_ALL_CORE
REVIEW OF SYSTEMS:    CONSTITUTIONAL: As HPI  EYES/ENT: No visual changes;  No vertigo or throat pain   NECK: No pain or stiffness  RESPIRATORY: As HPI  CARDIOVASCULAR: No chest pain or palpitations  GASTROINTESTINAL: As HPI.  GENITOURINARY: No dysuria, frequency or hematuria  NEUROLOGICAL: No numbness or weakness  SKIN: No itching, rashes

## 2023-01-26 NOTE — ED ADULT NURSE NOTE - NSFALLRSKOUTCOME_ED_ALL_ED
Universal Safety Interventions Stage 2: Additional Anesthesia Type: 1% lidocaine with 1:100,000 epinephrine and 408mcg clindamycin/ml and a 1:10 solution of 8.4% sodium bicarbonate

## 2023-01-27 DIAGNOSIS — E78.5 HYPERLIPIDEMIA, UNSPECIFIED: ICD-10-CM

## 2023-01-27 DIAGNOSIS — R73.9 HYPERGLYCEMIA, UNSPECIFIED: ICD-10-CM

## 2023-01-27 LAB
ANION GAP SERPL CALC-SCNC: 10 MMOL/L — SIGNIFICANT CHANGE UP (ref 7–14)
BASE EXCESS BLDV CALC-SCNC: 6.3 MMOL/L — HIGH (ref -2–3)
BASOPHILS # BLD AUTO: 0.09 K/UL — SIGNIFICANT CHANGE UP (ref 0–0.2)
BASOPHILS NFR BLD AUTO: 1.1 % — SIGNIFICANT CHANGE UP (ref 0–2)
BUN SERPL-MCNC: 20 MG/DL — SIGNIFICANT CHANGE UP (ref 7–23)
CA-I SERPL-SCNC: 1.18 MMOL/L — SIGNIFICANT CHANGE UP (ref 1.15–1.33)
CALCIUM SERPL-MCNC: 9.6 MG/DL — SIGNIFICANT CHANGE UP (ref 8.4–10.5)
CHLORIDE BLDV-SCNC: 95 MMOL/L — LOW (ref 96–108)
CHLORIDE SERPL-SCNC: 96 MMOL/L — LOW (ref 98–107)
CO2 BLDV-SCNC: 35.3 MMOL/L — HIGH (ref 22–26)
CO2 SERPL-SCNC: 32 MMOL/L — HIGH (ref 22–31)
CREAT SERPL-MCNC: 0.79 MG/DL — SIGNIFICANT CHANGE UP (ref 0.5–1.3)
EGFR: 76 ML/MIN/1.73M2 — SIGNIFICANT CHANGE UP
EOSINOPHIL # BLD AUTO: 0.09 K/UL — SIGNIFICANT CHANGE UP (ref 0–0.5)
EOSINOPHIL NFR BLD AUTO: 1.1 % — SIGNIFICANT CHANGE UP (ref 0–6)
GAS PNL BLDV: 133 MMOL/L — LOW (ref 136–145)
GAS PNL BLDV: SIGNIFICANT CHANGE UP
GLUCOSE BLDC GLUCOMTR-MCNC: 265 MG/DL — HIGH (ref 70–99)
GLUCOSE BLDC GLUCOMTR-MCNC: 315 MG/DL — HIGH (ref 70–99)
GLUCOSE BLDC GLUCOMTR-MCNC: 401 MG/DL — HIGH (ref 70–99)
GLUCOSE BLDC GLUCOMTR-MCNC: 411 MG/DL — HIGH (ref 70–99)
GLUCOSE BLDC GLUCOMTR-MCNC: 458 MG/DL — CRITICAL HIGH (ref 70–99)
GLUCOSE BLDC GLUCOMTR-MCNC: 461 MG/DL — CRITICAL HIGH (ref 70–99)
GLUCOSE BLDC GLUCOMTR-MCNC: 464 MG/DL — CRITICAL HIGH (ref 70–99)
GLUCOSE BLDV-MCNC: 463 MG/DL — CRITICAL HIGH (ref 70–99)
GLUCOSE SERPL-MCNC: 265 MG/DL — HIGH (ref 70–99)
HCO3 BLDV-SCNC: 33 MMOL/L — HIGH (ref 22–29)
HCT VFR BLD CALC: 33.1 % — LOW (ref 34.5–45)
HCT VFR BLDA CALC: 29 % — LOW (ref 34.5–46.5)
HGB BLD CALC-MCNC: 9.5 G/DL — LOW (ref 11.5–15.5)
HGB BLD-MCNC: 9.3 G/DL — LOW (ref 11.5–15.5)
IANC: 7.3 K/UL — SIGNIFICANT CHANGE UP (ref 1.8–7.4)
IMM GRANULOCYTES NFR BLD AUTO: 0.6 % — SIGNIFICANT CHANGE UP (ref 0–0.9)
LACTATE BLDV-MCNC: 2.6 MMOL/L — HIGH (ref 0.5–2)
LYMPHOCYTES # BLD AUTO: 0.83 K/UL — LOW (ref 1–3.3)
LYMPHOCYTES # BLD AUTO: 9.7 % — LOW (ref 13–44)
MAGNESIUM SERPL-MCNC: 1.9 MG/DL — SIGNIFICANT CHANGE UP (ref 1.6–2.6)
MCHC RBC-ENTMCNC: 23.1 PG — LOW (ref 27–34)
MCHC RBC-ENTMCNC: 28.1 GM/DL — LOW (ref 32–36)
MCV RBC AUTO: 82.1 FL — SIGNIFICANT CHANGE UP (ref 80–100)
MONOCYTES # BLD AUTO: 0.16 K/UL — SIGNIFICANT CHANGE UP (ref 0–0.9)
MONOCYTES NFR BLD AUTO: 1.9 % — LOW (ref 2–14)
NEUTROPHILS # BLD AUTO: 7.3 K/UL — SIGNIFICANT CHANGE UP (ref 1.8–7.4)
NEUTROPHILS NFR BLD AUTO: 85.6 % — HIGH (ref 43–77)
NRBC # BLD: 0 /100 WBCS — SIGNIFICANT CHANGE UP (ref 0–0)
NRBC # FLD: 0 K/UL — SIGNIFICANT CHANGE UP (ref 0–0)
PCO2 BLDV: 62 MMHG — HIGH (ref 39–42)
PH BLDV: 7.34 — SIGNIFICANT CHANGE UP (ref 7.32–7.43)
PHOSPHATE SERPL-MCNC: 4.4 MG/DL — SIGNIFICANT CHANGE UP (ref 2.5–4.5)
PLATELET # BLD AUTO: 227 K/UL — SIGNIFICANT CHANGE UP (ref 150–400)
PO2 BLDV: 60 MMHG — SIGNIFICANT CHANGE UP
POTASSIUM BLDV-SCNC: 4.3 MMOL/L — SIGNIFICANT CHANGE UP (ref 3.5–5.1)
POTASSIUM SERPL-MCNC: 4.8 MMOL/L — SIGNIFICANT CHANGE UP (ref 3.5–5.3)
POTASSIUM SERPL-SCNC: 4.8 MMOL/L — SIGNIFICANT CHANGE UP (ref 3.5–5.3)
RBC # BLD: 4.03 M/UL — SIGNIFICANT CHANGE UP (ref 3.8–5.2)
RBC # FLD: 21.9 % — HIGH (ref 10.3–14.5)
SAO2 % BLDV: 89.6 % — SIGNIFICANT CHANGE UP
SODIUM SERPL-SCNC: 138 MMOL/L — SIGNIFICANT CHANGE UP (ref 135–145)
WBC # BLD: 8.52 K/UL — SIGNIFICANT CHANGE UP (ref 3.8–10.5)
WBC # FLD AUTO: 8.52 K/UL — SIGNIFICANT CHANGE UP (ref 3.8–10.5)

## 2023-01-27 PROCEDURE — 99222 1ST HOSP IP/OBS MODERATE 55: CPT

## 2023-01-27 RX ORDER — INSULIN LISPRO 100/ML
2 VIAL (ML) SUBCUTANEOUS ONCE
Refills: 0 | Status: COMPLETED | OUTPATIENT
Start: 2023-01-27 | End: 2023-01-27

## 2023-01-27 RX ORDER — INSULIN LISPRO 100/ML
5 VIAL (ML) SUBCUTANEOUS
Refills: 0 | Status: DISCONTINUED | OUTPATIENT
Start: 2023-01-27 | End: 2023-01-28

## 2023-01-27 RX ORDER — INSULIN LISPRO 100/ML
VIAL (ML) SUBCUTANEOUS
Refills: 0 | Status: DISCONTINUED | OUTPATIENT
Start: 2023-01-27 | End: 2023-02-01

## 2023-01-27 RX ORDER — INSULIN LISPRO 100/ML
VIAL (ML) SUBCUTANEOUS AT BEDTIME
Refills: 0 | Status: DISCONTINUED | OUTPATIENT
Start: 2023-01-27 | End: 2023-02-01

## 2023-01-27 RX ORDER — INSULIN GLARGINE 100 [IU]/ML
15 INJECTION, SOLUTION SUBCUTANEOUS AT BEDTIME
Refills: 0 | Status: DISCONTINUED | OUTPATIENT
Start: 2023-01-27 | End: 2023-01-30

## 2023-01-27 RX ADMIN — Medication 5 UNIT(S): at 17:28

## 2023-01-27 RX ADMIN — Medication 6: at 12:19

## 2023-01-27 RX ADMIN — Medication 2 UNIT(S): at 10:43

## 2023-01-27 RX ADMIN — PIPERACILLIN AND TAZOBACTAM 25 GRAM(S): 4; .5 INJECTION, POWDER, LYOPHILIZED, FOR SOLUTION INTRAVENOUS at 01:38

## 2023-01-27 RX ADMIN — POLYETHYLENE GLYCOL 3350 17 GRAM(S): 17 POWDER, FOR SOLUTION ORAL at 12:22

## 2023-01-27 RX ADMIN — Medication 4: at 07:54

## 2023-01-27 RX ADMIN — PIPERACILLIN AND TAZOBACTAM 25 GRAM(S): 4; .5 INJECTION, POWDER, LYOPHILIZED, FOR SOLUTION INTRAVENOUS at 14:09

## 2023-01-27 RX ADMIN — Medication 3 MILLILITER(S): at 11:57

## 2023-01-27 RX ADMIN — Medication 10 MILLIGRAM(S): at 21:58

## 2023-01-27 RX ADMIN — Medication 3 MILLILITER(S): at 22:24

## 2023-01-27 RX ADMIN — Medication 20 MILLIGRAM(S): at 06:37

## 2023-01-27 RX ADMIN — Medication 650 MILLIGRAM(S): at 16:47

## 2023-01-27 RX ADMIN — PIPERACILLIN AND TAZOBACTAM 25 GRAM(S): 4; .5 INJECTION, POWDER, LYOPHILIZED, FOR SOLUTION INTRAVENOUS at 06:35

## 2023-01-27 RX ADMIN — Medication 12: at 17:28

## 2023-01-27 RX ADMIN — Medication 2: at 22:02

## 2023-01-27 RX ADMIN — Medication 650 MILLIGRAM(S): at 23:59

## 2023-01-27 RX ADMIN — Medication 3 MILLILITER(S): at 16:35

## 2023-01-27 RX ADMIN — Medication 100 MILLIGRAM(S): at 10:43

## 2023-01-27 RX ADMIN — Medication 3 MILLILITER(S): at 06:37

## 2023-01-27 RX ADMIN — INSULIN GLARGINE 15 UNIT(S): 100 INJECTION, SOLUTION SUBCUTANEOUS at 21:58

## 2023-01-27 RX ADMIN — Medication 650 MILLIGRAM(S): at 15:50

## 2023-01-27 RX ADMIN — Medication 50 MICROGRAM(S): at 06:37

## 2023-01-27 RX ADMIN — Medication 25 MILLIGRAM(S): at 06:37

## 2023-01-27 RX ADMIN — PANTOPRAZOLE SODIUM 40 MILLIGRAM(S): 20 TABLET, DELAYED RELEASE ORAL at 06:37

## 2023-01-27 RX ADMIN — SIMVASTATIN 20 MILLIGRAM(S): 20 TABLET, FILM COATED ORAL at 21:58

## 2023-01-27 NOTE — PHYSICAL THERAPY INITIAL EVALUATION ADULT - ADDITIONAL COMMENTS
Patient lives with son in private home, 1 step to enter and no steps to bedroom/bathroom. Patient was getting assist with ADL prior to admission. Patient was using a walker prior to admission.

## 2023-01-27 NOTE — CONSULT NOTE ADULT - ASSESSMENT
76 y/o female with PMHx of DM type 2, hypothyroidism, and HTN who presented for increased SOB. Is on prednisone 20mg daily x5 days (starting 1/26) with resultant hyperglycemia. Endocrine consulted for DM2 with steroid-induced hyperglycemia.    Poorly controlled T2DM with steroid-induced hyperglycemia  DM diagnosis: DM2 x20 years  Last A1c: 8.8  Endocrinologist: PCP only  Home DM meds: Basaglar 10 units q24h, Novolog 4-10 units TIDac depending on BG, metformin 500mg BID  Receiving prednisone 20mg daily x5 days (starting 1/26).  -Hold oral DM agents while inpatient  -Increase Lantus to 15 units at bedtime. DO NOT HOLD IF NPO.  -Increase Admelog to 5 units TID pre-meal. HOLD IF NPO.  -Use moderate dose Admelog correction scale pre-meal  -Use moderate dose Admelog correction scale at bedtime  -Fingerstick BG before meals and bedtime  -Goal -180  -Carbohydrate consistent diet  Discharge plan:  -Likely to discharge patient home on basal/bolus insulin plus metformin. Final regimen pending clinical course.  -Recommend routine outpatient ophthalmology and PCP vs Endocrine f/u. Patient can f/u outpatient with Endocrinology faculty practice [facility]: Endocrinology at [place].   [address] [place] [address], [address]  [place] [address]  Phone: [phone]  Fax: [phone]    HTN  goal BP <140/90  Management per primary team  outpt mc/cr ratio    HLD  -On simvastatin 20mg daily    Hypothyroidism  TFTs recently normal.  Continue LT4 50mcg daily PO (or 37mcg IV if NPO)    Franklin Gardiner DO, Endocrinology Fellow  For follow-up questions, discharge recommendations, or new consults please call answering service at [phone] (weekdays), [phone] (nights/weekends). For nonurgent matters, please email [email or web address].

## 2023-01-27 NOTE — CONSULT NOTE ADULT - SUBJECTIVE AND OBJECTIVE BOX
Patient is a 77y old  Female who presents with a chief complaint of SOB (26 Jan 2023 16:34)    HPI:  77 year old female with PMHx of DM, HTN, hypothyroidism and hyperlipidemia recently discharged from [facility] on 12/10/2022 after being admitted for sepsis with acute hypercapnic respiratory failure secondary to RSV pneumonia and discharged on O2 3-4lts/NC who now comes in c/o worsening SOB that started few hours prior to arrival to ER. Chest tightness along with SOB, non-productive cough (although chest sounds congested), cough seems to be chronic. No chest pain at time of interview. EMS found O2 Sat to be 90% on 4L NC. +Abdominal pain associated with coughing. Constipation for past 4-5 days. Appetite has been ok, no nausea or vomiting. No urinary complaints. No recent fevers, chills or headache.. No dizziness or leg pain or LE edema. Patient is not a great historian.    ID consulted for possible pneumonia. Patient currently endorsing SOB and cough intermittently productive of whitish sputum.      REVIEW OF SYSTEMS  [  ] ROS unobtainable because:    [ x ] All other systems negative except as noted below    Constitutional:  [ ] fever [ ] chills  [ ] weight loss  [ ]night sweat  [ ]poor appetite/PO intake [ ]fatigue   Skin:  [ ] rash [ ] phlebitis	  Eyes: [ ] icterus [ ] pain  [ ] discharge	  ENMT: [ ] sore throat  [ ] thrush [ ] ulcers [ ] exudates [ ]anosmia  Respiratory: [ ] dyspnea [ ] hemoptysis [ ] cough [ ] sputum	  Cardiovascular:  [ ] chest pain [ ] palpitations [ ] edema	  Gastrointestinal:  [ ] nausea [ ] vomiting [ ] diarrhea [ ] constipation [ ] pain	  Genitourinary:  [ ] dysuria [ ] frequency [ ] hematuria [ ] discharge [ ] flank pain  [ ] incontinence  Musculoskeletal:  [ ] myalgias [ ] arthralgias [ ] arthritis  [ ] back pain  Neurological:  [ ] headache [ ] weakness [ ] seizures  [ ] confusion/altered mental status    prior hospital charts reviewed [V]  primary team notes reviewed [V]  other consultant notes reviewed [V]    PAST MEDICAL & SURGICAL HISTORY:  HTN (Hypertension)    Dyslipidemia    DM (Diabetes Mellitus)    Hypothyroidism    Pulmonary TB  Dx 2019, completed 10 month treatment    Cataract of left eye      FAMILY HISTORY:  Family history of heart attack (Mother)    SOCIAL HISTORY:  Denied smoking    Allergies  No Known Allergies    ANTIMICROBIALS:  piperacillin/tazobactam IVPB.. 3.375 every 8 hours    ANTIMICROBIALS (past 90 days):   MEDICATIONS  (STANDING):  piperacillin/tazobactam IVPB.   200 mL/Hr IV Intermittent (01-26-23 @ 22:11)    piperacillin/tazobactam IVPB.-   25 mL/Hr IV Intermittent (01-27-23 @ 01:38)    piperacillin/tazobactam IVPB..   25 mL/Hr IV Intermittent (01-27-23 @ 14:09)   25 mL/Hr IV Intermittent (01-27-23 @ 06:35)    piperacillin/tazobactam IVPB...   200 mL/Hr IV Intermittent (01-26-23 @ 08:10)    vancomycin  IVPB.   250 mL/Hr IV Intermittent (01-26-23 @ 09:10)    MEDICATIONS  (STANDING):  acetaminophen     Tablet .. 650 every 6 hours PRN  albuterol/ipratropium for Nebulization 3 every 6 hours  bisacodyl 10 at bedtime  dextrose 50% Injectable 25 once  dextrose 50% Injectable 12.5 once  dextrose 50% Injectable 25 once  dextrose Oral Gel 15 once PRN  glucagon  Injectable 1 once  guaiFENesin Oral Liquid (Sugar-Free) 100 every 6 hours PRN  influenza  Vaccine (HIGH DOSE) 0.7 once  insulin glargine Injectable (LANTUS) 5 at bedtime  insulin lispro (ADMELOG) corrective regimen sliding scale  three times a day before meals  insulin lispro (ADMELOG) corrective regimen sliding scale  at bedtime  levothyroxine 50 daily  metoprolol succinate ER 25 daily  pantoprazole    Tablet 40 before breakfast  polyethylene glycol 3350 17 daily  predniSONE   Tablet 20 daily  simvastatin 20 at bedtime      VITALS:  Vital Signs Last 24 Hrs  T(F): 97.7 (01-27-23 @ 10:30), Max: 98.4 (01-26-23 @ 18:30)  Vital Signs Last 24 Hrs  HR: 92 (01-27-23 @ 14:15) (78 - 92)  BP: 119/64 (01-27-23 @ 14:15) (119/64 - 149/68)  RR: 20 (01-27-23 @ 10:30)  SpO2: 100% (01-27-23 @ 10:30) (100% - 100%)  Wt(kg): --    PHYSICAL EXAM:  Constitutional: non-toxic, no distress  HEAD/EYES: anicteric, no conjunctival injection  ENT:  supple, no thrush  Cardiovascular:   +S1/S2  Respiratory:  wheezes bilaterally  GI:  soft, non-tender, +bowel sounds  :  no jesus  Musculoskeletal:  no synovitis, normal ROM  Neurologic: awake and alert,  no focal findings  Skin:  no rash, no phlebitis  Vascular:  warm extremities b/l   Psychiatric:  awake, alert, appropriate mood    Labs:                        9.3    8.52  )-----------( 227      ( 27 Jan 2023 05:15 )             33.1     01-27    138  |  96<L>  |  20  ----------------------------<  265<H>  4.8   |  32<H>  |  0.79    Ca    9.6      27 Jan 2023 05:15  Phos  4.4     01-27  Mg     1.90     01-27    WBC Trend:  WBC Count: 8.52 (01-27-23 @ 05:15)  WBC Count: 9.94 (01-26-23 @ 03:35)    Auto Neutrophil #: 7.30 K/uL (01-27-23 @ 05:15)  Auto Neutrophil #: 7.47 K/uL (01-26-23 @ 03:35)  Auto Neutrophil #: 8.49 K/uL (12-10-22 @ 07:14)  Auto Neutrophil #: 8.25 K/uL (12-09-22 @ 06:50)  Auto Neutrophil #: 12.22 K/uL (12-01-22 @ 12:00)    Auto Eosinophil %: 1.1 % (01-27-23 @ 05:15)  Auto Eosinophil %: 3.0 % (01-26-23 @ 03:35)    MICROBIOLOGY:  Culture - Blood (collected 26 Jan 2023 07:03)  Source: .Blood Blood  Preliminary Report:    No growth to date.    Culture - Urine (collected 01 Dec 2022 16:30)  Source: Clean Catch Clean Catch (Midstream)  Final Report:    No growth    Culture - Blood (collected 01 Dec 2022 12:22)  Source: .Blood Blood-Peripheral  Final Report:    No Growth Final    Culture - Blood (collected 01 Dec 2022 12:00)  Source: .Blood Blood-Peripheral  Final Report:    No Growth Final    Culture - Blood (collected 11 Nov 2022 23:20)  Source: .Blood Blood-Venous  Final Report:    No Growth Final    Culture - Blood (collected 11 Nov 2022 23:20)  Source: .Blood Blood-Peripheral  Final Report:    No Growth Final    Culture - Blood (collected 08 Nov 2022 09:20)  Source: .Blood Blood-Peripheral  Final Report:    No Growth Final    Culture - Urine (collected 08 Nov 2022 09:20)  Source: Clean Catch Clean Catch (Midstream)  Final Report:    No growth    Culture - Blood (collected 08 Nov 2022 09:00)  Source: .Blood Blood-Peripheral  Final Report:    No Growth Final    Culture - Urine (collected 06 Dec 2021 17:37)  Source: Clean Catch Clean Catch (Midstream)  Final Report:    No growth    Rapid RVP Result: Detected (01-26 @ 03:35)    RADIOLOGY:  imaging below personally reviewed    < from: Xray Chest 1 View- PORTABLE-Urgent (Xray Chest 1 View- PORTABLE-Urgent .) (01.26.23 @ 05:27) >  IMPRESSION:  Chronic changes of the right upper lobe.  New hazy airspace opacities in the left upper lobe more consistent with   active infection than heart failure.  < end of copied text >   Patient is a 77y old  Female who presents with a chief complaint of SOB (26 Jan 2023 16:34)    HPI:  77 year old female with PMHx of DM, HTN, hypothyroidism and hyperlipidemia recently discharged from [facility] on 12/10/2022 after being admitted for sepsis with acute hypercapnic respiratory failure secondary to RSV pneumonia and discharged on O2 3-4lts/NC who now comes in c/o worsening SOB that started few hours prior to arrival to ER. Chest tightness along with SOB, non-productive cough (although chest sounds congested), cough seems to be chronic. No chest pain at time of interview. EMS found O2 Sat to be 90% on 4L NC. +Abdominal pain associated with coughing. Constipation for past 4-5 days. Appetite has been ok, no nausea or vomiting. No urinary complaints. No recent fevers, chills or headache.. No dizziness or leg pain or LE edema. Patient is not a great historian.    ID consulted for possible pneumonia. Patient currently endorsing SOB and cough intermittently productive of whitish sputum.      REVIEW OF SYSTEMS  [  ] ROS unobtainable because:    [ x ] All other systems negative except as noted below    Constitutional:  [ ] fever [ ] chills  [ ] weight loss  Skin:  [ ] rash [ ] phlebitis	  Eyes: [ ] icterus [ ] pain  [ ] discharge	  ENMT: [ ] sore throat  [ ] thrush   Respiratory: [x ] dyspnea [x ] cough [ ] sputum	  Cardiovascular:  [ ] chest pain 	  Gastrointestinal:  [ ] nausea [ ] vomiting [ ] diarrhea [ ] constipation [ ] pain	  Genitourinary:  [ ] dysuria [ ] frequency   Musculoskeletal:  [ ] myalgias  [ ] back pain  Neurological:  [ ] headache  [ ] confusion/altered mental status  Extremities: No edema       prior hospital charts reviewed [V]  primary team notes reviewed [V]  other consultant notes reviewed [V]    PAST MEDICAL & SURGICAL HISTORY:  HTN (Hypertension)    Dyslipidemia    DM (Diabetes Mellitus)    Hypothyroidism    Pulmonary TB  Dx 2019, completed 10 month treatment    Cataract of left eye      FAMILY HISTORY:  Family history of heart attack (Mother)      SOCIAL HISTORY:  Denied smoking, lives with family.       Allergies  No Known Allergies    ANTIMICROBIALS:  piperacillin/tazobactam IVPB.. 3.375 every 8 hours    ANTIMICROBIALS (past 90 days):   MEDICATIONS  (STANDING):  piperacillin/tazobactam IVPB.   200 mL/Hr IV Intermittent (01-26-23 @ 22:11)    piperacillin/tazobactam IVPB.-   25 mL/Hr IV Intermittent (01-27-23 @ 01:38)    piperacillin/tazobactam IVPB..   25 mL/Hr IV Intermittent (01-27-23 @ 14:09)   25 mL/Hr IV Intermittent (01-27-23 @ 06:35)    piperacillin/tazobactam IVPB...   200 mL/Hr IV Intermittent (01-26-23 @ 08:10)    vancomycin  IVPB.   250 mL/Hr IV Intermittent (01-26-23 @ 09:10)    MEDICATIONS  (STANDING):  acetaminophen     Tablet .. 650 every 6 hours PRN  albuterol/ipratropium for Nebulization 3 every 6 hours  bisacodyl 10 at bedtime  dextrose 50% Injectable 25 once  dextrose 50% Injectable 12.5 once  dextrose 50% Injectable 25 once  dextrose Oral Gel 15 once PRN  glucagon  Injectable 1 once  guaiFENesin Oral Liquid (Sugar-Free) 100 every 6 hours PRN  influenza  Vaccine (HIGH DOSE) 0.7 once  insulin glargine Injectable (LANTUS) 5 at bedtime  insulin lispro (ADMELOG) corrective regimen sliding scale  three times a day before meals  insulin lispro (ADMELOG) corrective regimen sliding scale  at bedtime  levothyroxine 50 daily  metoprolol succinate ER 25 daily  pantoprazole    Tablet 40 before breakfast  polyethylene glycol 3350 17 daily  predniSONE   Tablet 20 daily  simvastatin 20 at bedtime      VITALS:  Vital Signs Last 24 Hrs  T(F): 97.7 (01-27-23 @ 10:30), Max: 98.4 (01-26-23 @ 18:30)  Vital Signs Last 24 Hrs  HR: 92 (01-27-23 @ 14:15) (78 - 92)  BP: 119/64 (01-27-23 @ 14:15) (119/64 - 149/68)  RR: 20 (01-27-23 @ 10:30)  SpO2: 100% (01-27-23 @ 10:30) (100% - 100%)  Wt(kg): --      PHYSICAL EXAM:  Constitutional: non-toxic, no distress  HEAD/EYES: anicteric, no conjunctival injection  ENT:  supple, no thrush  Cardiovascular:   +S1/S2 normal   Respiratory:  wheezes bilaterally  GI:  soft, non-tender, +bowel sounds  :  no jesus  Musculoskeletal:  no joint swelling   Neurologic: awake and alert,  no focal findings  Skin:  no rash, no phlebitis  Extremities: No edema   Vascular: palpable pulses.   Psychiatric:  awake, alert, appropriate mood        Labs:                        9.3    8.52  )-----------( 227      ( 27 Jan 2023 05:15 )             33.1     01-27    138  |  96<L>  |  20  ----------------------------<  265<H>  4.8   |  32<H>  |  0.79    Ca    9.6      27 Jan 2023 05:15  Phos  4.4     01-27  Mg     1.90     01-27    WBC Trend:  WBC Count: 8.52 (01-27-23 @ 05:15)  WBC Count: 9.94 (01-26-23 @ 03:35)    Auto Neutrophil #: 7.30 K/uL (01-27-23 @ 05:15)  Auto Neutrophil #: 7.47 K/uL (01-26-23 @ 03:35)  Auto Neutrophil #: 8.49 K/uL (12-10-22 @ 07:14)  Auto Neutrophil #: 8.25 K/uL (12-09-22 @ 06:50)  Auto Neutrophil #: 12.22 K/uL (12-01-22 @ 12:00)    Auto Eosinophil %: 1.1 % (01-27-23 @ 05:15)  Auto Eosinophil %: 3.0 % (01-26-23 @ 03:35)    MICROBIOLOGY:  Culture - Blood (collected 26 Jan 2023 07:03)  Source: .Blood Blood  Preliminary Report:    No growth to date.    Culture - Urine (collected 01 Dec 2022 16:30)  Source: Clean Catch Clean Catch (Midstream)  Final Report:    No growth    Culture - Blood (collected 01 Dec 2022 12:22)  Source: .Blood Blood-Peripheral  Final Report:    No Growth Final    Culture - Blood (collected 01 Dec 2022 12:00)  Source: .Blood Blood-Peripheral  Final Report:    No Growth Final    Culture - Blood (collected 11 Nov 2022 23:20)  Source: .Blood Blood-Venous  Final Report:    No Growth Final    Culture - Blood (collected 11 Nov 2022 23:20)  Source: .Blood Blood-Peripheral  Final Report:    No Growth Final    Culture - Blood (collected 08 Nov 2022 09:20)  Source: .Blood Blood-Peripheral  Final Report:    No Growth Final    Culture - Urine (collected 08 Nov 2022 09:20)  Source: Clean Catch Clean Catch (Midstream)  Final Report:    No growth    Culture - Blood (collected 08 Nov 2022 09:00)  Source: .Blood Blood-Peripheral  Final Report:    No Growth Final    Culture - Urine (collected 06 Dec 2021 17:37)  Source: Clean Catch Clean Catch (Midstream)  Final Report:    No growth    Rapid RVP Result: Detected (01-26 @ 03:35)    RADIOLOGY:  imaging below personally reviewed    < from: Xray Chest 1 View- PORTABLE-Urgent (Xray Chest 1 View- PORTABLE-Urgent .) (01.26.23 @ 05:27) >  IMPRESSION:  Chronic changes of the right upper lobe.  New hazy airspace opacities in the left upper lobe more consistent with   active infection than heart failure.

## 2023-01-27 NOTE — PROGRESS NOTE ADULT - PROBLEM SELECTOR PLAN 2
RVP is positive for Entero/rhinovirus  CXR 1/26 shows Chronic changes of the right upper lobe  New hazy airspace opacities in the left upper lobe more consistent with active infection than heart failure  - Possible viral pneumonia  - No definite e/o superimposed bacterial infection  - Will c/w IV zosyn for now until further information is available  - Milagros ATC  - Prednisone 20mg QD x 5 days  - Symptomatic treatment with tessalon/robitussin as needed  - F/up MRSA nasal swab  - F/up Blood and sputum cultures  - F/up procalcitonin  - If all cultures and procalcitonin are negative can DC zosyn

## 2023-01-27 NOTE — H&P ADULT - PATIENT'S GENDER IDENTITY
Subjective:     CC: Here to discuss her anxiety issues.    HPI:   Chely presents today with the following medical concerns:    Anxiety  This is a chronic problem.  Patient has been off of her sertraline for a while and doing well until the holidays hit.  And everything got very chaotic.  She does have 5 children and additionally works as a realtor.  She does not find any time during the day for herself.  She does have 2 upcoming trips and she does have flight of anxiety.  Look at the other note regarding that.  She like to restart on her sertraline.  She is no longer breast-feeding.    Anxiety with flying  This is a chronic problem.  Patient's always had troubles with flying.  She does have a short trip next month to [place] that she thinks will not  be too bad but she has a upcoming trip to [place] which would be an extended flight and she is asking for some Xanax for that.  That has worked for her in the past.    Past Medical History:   Diagnosis Date    Chronic bilateral low back pain without sciatica 5/10/2022    Mild intermittent asthma with acute exacerbation 10/20/2022       Social History     Tobacco Use    Smoking status: Never    Smokeless tobacco: Never   Vaping Use    Vaping Use: Never used   Substance Use Topics    Alcohol use: Not Currently    Drug use: No       Current Outpatient Medications Ordered in Epic   Medication Sig Dispense Refill    sertraline (ZOLOFT) 100 MG Tab Take 1 Tablet by mouth every day. 90 Tablet 3    hydrOXYzine HCl (ATARAX) 25 MG Tab Take 1 Tablet by mouth 3 times a day as needed for Anxiety. 30 Tablet 1    ALPRAZolam (XANAX) 0.5 MG Tab Take 1 Tablet by mouth 3 times a day as needed for Anxiety (for flying) for up to 30 days. 10 Tablet 0    pantoprazole (PROTONIX) 40 MG Tablet Delayed Response TAKE 1 TABLET BY MOUTH ONCE A DAY 30 MINUTES BEFORE BREAKFAST MEAL      albuterol 108 (90 Base) MCG/ACT Aero Soln inhalation aerosol Inhale 2 Puffs every 6 hours as needed for 
"Shortness of Breath. 1 Each 3     No current Epic-ordered facility-administered medications on file.       Allergies:  Pcn [penicillins]    Health Maintenance: Completed    ROS:  Gen: no fevers/chills, no changes in weight  Eyes: no changes in vision  ENT: no sore throat, no hearing loss, no bloody nose  Pulm: no sob, no cough  CV: no chest pain, no palpitations  GI: no nausea/vomiting, no diarrhea  : no dysuria  MSk: no myalgias  Skin: no rash  Neuro: no headaches, no numbness/tingling  Heme/Lymph: no easy bruising      Objective:       Exam:  /74 (BP Location: Left arm, Patient Position: Sitting, BP Cuff Size: Adult)   Pulse 72   Temp 36.8 °C (98.2 °F) (Temporal)   Resp 16   Ht 1.702 m (5' 7\")   Wt 75.8 kg (167 lb)   SpO2 100%   BMI 26.16 kg/m²  Body mass index is 26.16 kg/m².    Gen: Alert and oriented, No apparent distress.  Psych: Patient is alert and oriented x3.  No unusual thought process expressed.  Insight and judgment is good.  She does not appear depressed.  She does start to get anxious and tearful when talking about her fear of flying.  And also appears a little anxious when talking about all of her duties as a mother and wife.        Assessment & Plan:     36 y.o. female with the following -     1. Anxiety  This is a chronic problem.  We will restart her on her sertraline.  She was told we can do it for another 6 to 12 months and if she is doing well try to wean off of it again.  She was also told she might want to get back in with her counselor as well.  I will also give her Atarax to use intermittently for anxiety issues.    2. Anxiety with flying  This is a chronic problem.  Give her Xanax for her long distance flight.  She was advised that it can cause drowsiness and she has used it before.  - ALPRAZolam (XANAX) 0.5 MG Tab; Take 1 Tablet by mouth 3 times a day as needed for Anxiety (for flying) for up to 30 days.  Dispense: 10 Tablet; Refill: 0      Return if symptoms worsen or fail "
Detail Level: Zone
Detail Level: Simple
to improve.  37 minutes was spent talking to her about her anxiety in general as well as her flight anxiety.  We also talked about coping mechanisms.  And we went over her medications and possible side effects.  Please note that this dictation was created using voice recognition software. I have made every reasonable attempt to correct obvious errors, but I expect that there are errors of grammar and possibly content that I did not discover before finalizing the note.        
Female
To get better and follow your care plan as instructed.

## 2023-01-27 NOTE — PROVIDER CONTACT NOTE (OTHER) - ASSESSMENT
VS stable, afebrile. PAtient denies chest pain, SOB, dizziness, headache. Patient resting comforatbly in bed.
VS stable, afebrile. No c/o pain at this time. Patient denies chest pain, SOB, dizziness, headache. No signs of acute distress noted. Pt resting comfortably in bed
vs STABLE, AFEBRILE NO C/O PAIN. pATIENT DENIES CHEST PAIN SOB DIZZINESS HEADACHE. lARGE AMOUNT OF SEROUS DRAINAGE FROM NOSE

## 2023-01-27 NOTE — PHYSICAL THERAPY INITIAL EVALUATION ADULT - PLANNED THERAPY INTERVENTIONS, PT EVAL
Patient called to schedule his Colonoscopy.  [phone]       balance training/bed mobility training/gait training/strengthening/transfer training

## 2023-01-27 NOTE — CONSULT NOTE ADULT - SUBJECTIVE AND OBJECTIVE BOX
HPI:  77 year old female with PMHx of DM, HTN, hypothyroidism and hyperlipidemia recently discharged from [facility] on 12/10/2022 after being admitted for sepsis with acute hypercapnic respiratory failure secondary to RSV pneumonia and discharged on O2 3-4lts/NC who now comes in c/o worsening SOB that started few hours prior to arrival to ER. Chest tightness along with SOB, non-productive cough (although chest sounds congested), cough seems to be chronic. No chest pain at time of interview. EMS found O2 Sat to be 90% on 4L NC. +Abdominal pain associated with coughing. Constipation for past 4-5 days. Appetite has been ok, no nausea or vomiting. No urinary complaints. No recent fevers, chills or headache.. No dizziness or leg pain or LE edema. Patient is not a great historian.      In ER VSS except for requiring NRM initially, O2 sat 96% on 15L --> 3 lts/NC  Received Vanco x1, zosyn x1 and tylenol PO (26 Jan 2023 16:34)    Endocrine consulted for DM2 with steroid-induced hyperglycemia. On prednisone 20mg daily x5 days (starting 1/26) for respiratory issue.    Attempted to obtain hx from patient using [employer] video [facility]  Duy [id number] but patient just kept repeating "I don't know anything." Below hx obtained from chart review and patient's granddaughter Magdalena over the phone ([phone]).    DM diagnosis: DM2 x20 years  Last A1c: 8.8  Endocrinologist: PCP only  Home DM meds: Basaglar 10 units q24h, Novolog 4-10 units TIDac depending on BG, metformin 500mg BID. Off steroids at home.  Microvascular complications: + neuropathy  Macrovascular complications: No CVA, MI, PAD  SMBG: BG 100s-300s, no lows  Symptoms: If BG goes into 80s (rare) she becomes anxious  Diet at home: Better than before regarding carbs, family not giving as much "bad" food.  PMHx: As above. No known hx of HF, pancreatitis or UTIs.  Also has hypothyroidism. Takes 50mcg LT4 daily on an empty stomach.          PAST MEDICAL & SURGICAL HISTORY:  HTN (Hypertension)      Dyslipidemia      DM (Diabetes Mellitus)      Hypothyroidism      Pulmonary TB  Dx 2019, completed 10 month treatment      Cataract of left eye          FAMILY HISTORY:  Family history of heart attack (Mother)  mother    No DM    Social History: No tobacco or etoh use    Outpatient Medications: Home Medications:  Basaglar KwikPen 100 units/mL subcutaneous solution: 10 unit(s) subcutaneous once a day (at bedtime) (26 Jan 2023 20:38)  NovoLOG FlexPen 100 units/mL injectable solution: 4 unit(s) injectable 3 times a day (before meals) (26 Jan 2023 20:38)  simvastatin 20 mg oral tablet: 1 tab(s) orally once a day (at bedtime) (26 Jan 2023 20:38)      MEDICATIONS  (STANDING):  albuterol/ipratropium for Nebulization 3 milliLiter(s) Nebulizer every 6 hours  bisacodyl 10 milliGRAM(s) Oral at bedtime  dextrose 5%. 1000 milliLiter(s) (100 mL/Hr) IV Continuous <Continuous>  dextrose 5%. 1000 milliLiter(s) (50 mL/Hr) IV Continuous <Continuous>  dextrose 50% Injectable 25 Gram(s) IV Push once  dextrose 50% Injectable 12.5 Gram(s) IV Push once  dextrose 50% Injectable 25 Gram(s) IV Push once  glucagon  Injectable 1 milliGRAM(s) IntraMuscular once  influenza  Vaccine (HIGH DOSE) 0.7 milliLiter(s) IntraMuscular once  insulin glargine Injectable (LANTUS) 5 Unit(s) SubCutaneous at bedtime  insulin lispro (ADMELOG) corrective regimen sliding scale   SubCutaneous three times a day before meals  insulin lispro (ADMELOG) corrective regimen sliding scale   SubCutaneous at bedtime  levothyroxine 50 MICROGram(s) Oral daily  metoprolol succinate ER 25 milliGRAM(s) Oral daily  pantoprazole    Tablet 40 milliGRAM(s) Oral before breakfast  piperacillin/tazobactam IVPB.. 3.375 Gram(s) IV Intermittent every 8 hours  polyethylene glycol 3350 17 Gram(s) Oral daily  predniSONE   Tablet 20 milliGRAM(s) Oral daily  simvastatin 20 milliGRAM(s) Oral at bedtime    MEDICATIONS  (PRN):  acetaminophen     Tablet .. 650 milliGRAM(s) Oral every 6 hours PRN Temp greater or equal to 38C (100.4F), Mild Pain (1 - 3)  dextrose Oral Gel 15 Gram(s) Oral once PRN Blood Glucose LESS THAN 70 milliGRAM(s)/deciliter  guaiFENesin Oral Liquid (Sugar-Free) 100 milliGRAM(s) Oral every 6 hours PRN Cough      Allergies    No Known Allergies    Intolerances      Review of Systems:  Unable to obtain as patient not interacting with  except to say "I don't know anything."        PHYSICAL EXAM:  VITALS: T(C): 36.5 (01-27-23 @ 10:30)  T(F): 97.7 (01-27-23 @ 10:30), Max: 98.4 (01-26-23 @ 18:30)  HR: 92 (01-27-23 @ 14:15) (78 - 92)  BP: 119/64 (01-27-23 @ 14:15) (119/64 - 149/68)  RR:  (18 - 20)  SpO2:  (100% - 100%)  Wt(kg): --  General: Well-developed female, No acute distress, Speaking full sentences.   Eye:  Extraocular movements are intact, No proptosis or lid lag.   HENT:  Normocephalic.   Neck:  Supple, Non-tender.   Respiratory:  Respirations are non-labored, Symmetric chest wall expansion, Breath sounds are equal.   Cardiovascular:  Normal rate, Regular rhythm, No edema.  Gastrointestinal:  Soft, Non-tender, Non-distended.   Musculoskeletal:  Normal range of motion, No gross joint swelling.   Feet:  Normal by visual exam, Normal pulses, No ulcers.   Integumentary:  Warm, dry.  Mental Status Exam:  Orientedx4, Speech clear and coherent.   Neurologic:  Alert, Orientedx4, Normal motor function, No focal deficits, Cranial Nerves II-XII are grossly intact bilaterally.   Psychiatric:  Cooperative, Appropriate mood & affect.    POCT Blood Glucose.: 464 mg/dL (01-27-23 @ 12:06)  POCT Blood Glucose.: 458 mg/dL (01-27-23 @ 12:02)  POCT Blood Glucose.: 461 mg/dL (01-27-23 @ 10:21)  POCT Blood Glucose.: 315 mg/dL (01-27-23 @ 07:24)  POCT Blood Glucose.: 207 mg/dL (01-26-23 @ 21:12)  POCT Blood Glucose.: 156 mg/dL (01-26-23 @ 17:44)  POCT Blood Glucose.: 146 mg/dL (01-26-23 @ 11:58)  POCT Blood Glucose.: 136 mg/dL (01-26-23 @ 10:49)  POCT Blood Glucose.: 124 mg/dL (01-26-23 @ 02:34)                            9.3    8.52  )-----------( 227      ( 27 Jan 2023 05:15 )             33.1       01-27    138  |  96<L>  |  20  ----------------------------<  265<H>  4.8   |  32<H>  |  0.79    eGFR: 76    Ca    9.6      01-27  Mg     1.90     01-27  Phos  4.4     01-27        Thyroid Function Tests:              Radiology:

## 2023-01-27 NOTE — PROVIDER CONTACT NOTE (CRITICAL VALUE NOTIFICATION) - ASSESSMENT
last VS stable, afebrile. Patient denies chest pain, SOB, dizziness, headache. No signs of acute distress noted at this time. Pt resting comfortably in bed. Patient received 4 units insulin before breakfast. Lab taken after breakfast

## 2023-01-27 NOTE — PROVIDER CONTACT NOTE (CRITICAL VALUE NOTIFICATION) - ACTION/TREATMENT ORDERED:
as per provider, "Patient received prednisone  in the morning making blood sugar high. I will put in order for insulin."

## 2023-01-27 NOTE — PHYSICAL THERAPY INITIAL EVALUATION ADULT - PERTINENT HX OF CURRENT PROBLEM, REHAB EVAL
77 year old female with PMHx of DM, HTN, hypothyroidism and hyperlipidemia recently discharged from [facility] on 12/10/2022 after being admitted for sepsis with acute hypercapnic respiratory failure secondary to RSV pneumonia and discharged on O2 3-4lts/NC who now comes in c/o worsening SOB that started few hours prior to arrival to ER. Chest tightness along with SOB, non-productive cough (although chest sounds congested), cough seems to be chronic. No chest pain at time of interview. EMS found O2 Sat to be 90% on 4L NC. +Abdominal pain associated with coughing. Constipation for past 4-5 days. Appetite has been ok, no nausea or vomiting. No urinary complaints.

## 2023-01-27 NOTE — CONSULT NOTE ADULT - ASSESSMENT
77 year old female with PMHx of DM, HTN, hypothyroidism, hyperlipidemia, and recent admission for RSV pneumonia and discharged on home O2, now presents with SOB. Found to be enterorhinovirus positive on admission. CXR showing new HERON opacity. ID consulted for further management.    Afebrile, HD stable, 4L NC  WBC 8.5k  RVP +enterorhinovirus    Impression:  # 77 year old female with PMHx of DM, HTN, hypothyroidism, hyperlipidemia, and recent admission for RSV pneumonia and discharged on home O2, now presents with SOB. Found to be enterorhinovirus positive on admission. CXR showing new HERON opacity. ID consulted for further management.    Afebrile, HD stable, 4L NC  WBC 8.5k  RVP +enterorhinovirus    Impression:  #Dyspnea, Viral URI  Lower suspicion for bacterial pneumonia given afebrile, no leukocytosis, and low procalcitonin  Wheezing on exam, possible component of COPD/RAD in setting of enterorhinovirus URI    Recs:  - d/c Zosyn and monitor off antibiotics  - check CT Chest NC for further eval  - monitor WBCs, temp  - supportive care    Plan discussed with primary team    Lui Alford MD  Infectious Disease Fellow  Available on [employer] Teams  Before 9AM or after 5PM: [phone]

## 2023-01-27 NOTE — CHART NOTE - NSCHARTNOTEFT_GEN_A_CORE
Medicine NP note      Notified by RN that pt had bleeding form the nose, pt in 4 L oxygen via nasal cannula , no humidified , and pt was on Lovenox, Attending Dr. Gonzalez notified, pressure applied to nose , Lovenox d/c , called Respiratory Therapy to applied humidified oxygem , bleeding stopped after pressure applied to nose, VS stable , endorsed to night provider to monitor pt    Paula Carvajal NP-C  Department of Medicine- ACP  In House Pager #[phone]

## 2023-01-27 NOTE — PROVIDER CONTACT NOTE (OTHER) - BACKGROUND
78 y/o f admitted for PNA. Hx of HTN, DM, pulmonary TB.
76 y/o f aDMITTED FOR pna, HX OF HTN, dm , PULMONARY tb
78 y/o f admitted for PNA. Hx of pulmonary TB, htn, DM.

## 2023-01-27 NOTE — CONSULT NOTE ADULT - ATTENDING COMMENTS
77 year old female with PMHx of DM, HTN, hypothyroidism, hyperlipidemia, and recent admission for RSV pneumonia and discharged on home O2, now presents with SOB. Found to be enterorhinovirus positive on admission. CXR showing new HERON opacity. ID consulted for further management.    Afebrile, HD stable, 4L NC  WBC 8.5k  RVP +enterorhinovirus    Impression:  #Dyspnea,   #Viral URI  #Abnormal CXR   Lower suspicion for bacterial pneumonia given afebrile, no leukocytosis, and low procalcitonin  Wheezing on exam, possible component of COPD/RAD in setting of enterorhinovirus URI    Recs:  - d/c Zosyn and monitor off antibiotics  - check CT Chest NC for further eval  - monitor WBCs, no leucocytosis   - supportive care    Plan discussed with Medicine Attending.     Dorian Isabel  Please contact through MS Teams   If no response or past 5 pm/weekend call [phone].
77F uncontrolled DM2 exacerbated by glucocorticoid. Agree with insulin regimen as outlined. Patient is poor historian despite attempt with PR Slides  phone. Obtain collateral from family members regarding glucose control at home since last hospitalization.    Shay Hein MD  Division of Endocrinology  Pager: [phone]    If after 6PM or before 9AM, or on weekends/holidays, please call endocrine answering service for assistance ([phone]).  For nonurgent matters email [email or web address] for assistance.

## 2023-01-27 NOTE — PROGRESS NOTE ADULT - SUBJECTIVE AND OBJECTIVE BOX
SUBJECTIVE / OVERNIGHT EVENTS:pt seen and examined  01-27-23     MEDICATIONS  (STANDING):  albuterol/ipratropium for Nebulization 3 milliLiter(s) Nebulizer every 6 hours  bisacodyl 10 milliGRAM(s) Oral at bedtime  dextrose 5%. 1000 milliLiter(s) (100 mL/Hr) IV Continuous <Continuous>  dextrose 5%. 1000 milliLiter(s) (50 mL/Hr) IV Continuous <Continuous>  dextrose 50% Injectable 25 Gram(s) IV Push once  dextrose 50% Injectable 12.5 Gram(s) IV Push once  dextrose 50% Injectable 25 Gram(s) IV Push once  glucagon  Injectable 1 milliGRAM(s) IntraMuscular once  influenza  Vaccine (HIGH DOSE) 0.7 milliLiter(s) IntraMuscular once  insulin glargine Injectable (LANTUS) 15 Unit(s) SubCutaneous at bedtime  insulin lispro (ADMELOG) corrective regimen sliding scale   SubCutaneous three times a day before meals  insulin lispro (ADMELOG) corrective regimen sliding scale   SubCutaneous at bedtime  insulin lispro Injectable (ADMELOG) 5 Unit(s) SubCutaneous three times a day before meals  levothyroxine 50 MICROGram(s) Oral daily  metoprolol succinate ER 25 milliGRAM(s) Oral daily  pantoprazole    Tablet 40 milliGRAM(s) Oral before breakfast  polyethylene glycol 3350 17 Gram(s) Oral daily  predniSONE   Tablet 20 milliGRAM(s) Oral daily  simvastatin 20 milliGRAM(s) Oral at bedtime    MEDICATIONS  (PRN):  acetaminophen     Tablet .. 650 milliGRAM(s) Oral every 6 hours PRN Temp greater or equal to 38C (100.4F), Mild Pain (1 - 3)  dextrose Oral Gel 15 Gram(s) Oral once PRN Blood Glucose LESS THAN 70 milliGRAM(s)/deciliter  guaiFENesin Oral Liquid (Sugar-Free) 100 milliGRAM(s) Oral every 6 hours PRN Cough    T(C): 36.4 (01-27-23 @ 23:04), Max: 36.7 (01-27-23 @ 18:01)  HR: 88 (01-27-23 @ 23:04) (78 - 92)  BP: 127/59 (01-27-23 @ 23:04) (114/58 - 147/77)  RR: 18 (01-27-23 @ 23:04) (18 - 20)  SpO2: 96% (01-27-23 @ 23:04) (95% - 100%)    CAPILLARY BLOOD GLUCOSE      POCT Blood Glucose.: 265 mg/dL (27 Jan 2023 21:54)  POCT Blood Glucose.: 411 mg/dL (27 Jan 2023 17:00)  POCT Blood Glucose.: 401 mg/dL (27 Jan 2023 16:54)  POCT Blood Glucose.: 464 mg/dL (27 Jan 2023 12:06)  POCT Blood Glucose.: 458 mg/dL (27 Jan 2023 12:02)  POCT Blood Glucose.: 461 mg/dL (27 Jan 2023 10:21)  POCT Blood Glucose.: 315 mg/dL (27 Jan 2023 07:24)    I&O's Summary    27 Jan 2023 07:01  -  27 Jan 2023 23:44  --------------------------------------------------------  IN: 940 mL / OUT: 250 mL / NET: 690 mL        Constitutional: No fever, fatigue  Skin: No rash.  Eyes: No recent vision problems or eye pain.  ENT: No congestion, ear pain, or sore throat.  Cardiovascular: No chest pain or palpation.  Respiratory: No cough, shortness of breath, congestion, or wheezing.  Gastrointestinal: No abdominal pain, nausea, vomiting, or diarrhea.  Genitourinary: No dysuria.  Musculoskeletal: No joint swelling.  Neurologic: No headache.    PHYSICAL EXAM:  GENERAL: NAD  EYES: EOMI, PERRLA  NECK: Supple, No JVD  CHEST/LUNG: dec breath sounds at bases  HEART:  S1 , S2 +  ABDOMEN: soft , bs+  EXTREMITIES:  trace edema+  NEUROLOGY:alert awake      LABS:                        9.3    8.52  )-----------( 227      ( 27 Jan 2023 05:15 )             33.1     01-27    138  |  96<L>  |  20  ----------------------------<  265<H>  4.8   |  32<H>  |  0.79    Ca    9.6      27 Jan 2023 05:15  Phos  4.4     01-27  Mg     1.90     01-27                RADIOLOGY & ADDITIONAL TESTS:    Imaging Personally Reviewed:yes    Consultant(s) Notes Reviewed:  yes    Care Discussed with Consultants/Other Providers:yes

## 2023-01-28 LAB
ANION GAP SERPL CALC-SCNC: 9 MMOL/L — SIGNIFICANT CHANGE UP (ref 7–14)
BASOPHILS # BLD AUTO: 0.05 K/UL — SIGNIFICANT CHANGE UP (ref 0–0.2)
BASOPHILS NFR BLD AUTO: 0.5 % — SIGNIFICANT CHANGE UP (ref 0–2)
BUN SERPL-MCNC: 29 MG/DL — HIGH (ref 7–23)
CALCIUM SERPL-MCNC: 9.4 MG/DL — SIGNIFICANT CHANGE UP (ref 8.4–10.5)
CHLORIDE SERPL-SCNC: 97 MMOL/L — LOW (ref 98–107)
CO2 SERPL-SCNC: 33 MMOL/L — HIGH (ref 22–31)
CREAT SERPL-MCNC: 0.82 MG/DL — SIGNIFICANT CHANGE UP (ref 0.5–1.3)
EGFR: 73 ML/MIN/1.73M2 — SIGNIFICANT CHANGE UP
EOSINOPHIL # BLD AUTO: 0.14 K/UL — SIGNIFICANT CHANGE UP (ref 0–0.5)
EOSINOPHIL NFR BLD AUTO: 1.3 % — SIGNIFICANT CHANGE UP (ref 0–6)
GLUCOSE BLDC GLUCOMTR-MCNC: 210 MG/DL — HIGH (ref 70–99)
GLUCOSE BLDC GLUCOMTR-MCNC: 233 MG/DL — HIGH (ref 70–99)
GLUCOSE BLDC GLUCOMTR-MCNC: 298 MG/DL — HIGH (ref 70–99)
GLUCOSE BLDC GLUCOMTR-MCNC: 332 MG/DL — HIGH (ref 70–99)
GLUCOSE SERPL-MCNC: 192 MG/DL — HIGH (ref 70–99)
HCT VFR BLD CALC: 29.8 % — LOW (ref 34.5–45)
HGB BLD-MCNC: 8.5 G/DL — LOW (ref 11.5–15.5)
IANC: 6.92 K/UL — SIGNIFICANT CHANGE UP (ref 1.8–7.4)
IMM GRANULOCYTES NFR BLD AUTO: 0.4 % — SIGNIFICANT CHANGE UP (ref 0–0.9)
LYMPHOCYTES # BLD AUTO: 19.7 % — SIGNIFICANT CHANGE UP (ref 13–44)
LYMPHOCYTES # BLD AUTO: 2.05 K/UL — SIGNIFICANT CHANGE UP (ref 1–3.3)
MAGNESIUM SERPL-MCNC: 2 MG/DL — SIGNIFICANT CHANGE UP (ref 1.6–2.6)
MCHC RBC-ENTMCNC: 23.3 PG — LOW (ref 27–34)
MCHC RBC-ENTMCNC: 28.5 GM/DL — LOW (ref 32–36)
MCV RBC AUTO: 81.6 FL — SIGNIFICANT CHANGE UP (ref 80–100)
MONOCYTES # BLD AUTO: 1.2 K/UL — HIGH (ref 0–0.9)
MONOCYTES NFR BLD AUTO: 11.5 % — SIGNIFICANT CHANGE UP (ref 2–14)
NEUTROPHILS # BLD AUTO: 6.92 K/UL — SIGNIFICANT CHANGE UP (ref 1.8–7.4)
NEUTROPHILS NFR BLD AUTO: 66.6 % — SIGNIFICANT CHANGE UP (ref 43–77)
NRBC # BLD: 0 /100 WBCS — SIGNIFICANT CHANGE UP (ref 0–0)
NRBC # FLD: 0 K/UL — SIGNIFICANT CHANGE UP (ref 0–0)
PHOSPHATE SERPL-MCNC: 2.8 MG/DL — SIGNIFICANT CHANGE UP (ref 2.5–4.5)
PLATELET # BLD AUTO: 245 K/UL — SIGNIFICANT CHANGE UP (ref 150–400)
POTASSIUM SERPL-MCNC: 3.6 MMOL/L — SIGNIFICANT CHANGE UP (ref 3.5–5.3)
POTASSIUM SERPL-SCNC: 3.6 MMOL/L — SIGNIFICANT CHANGE UP (ref 3.5–5.3)
RBC # BLD: 3.65 M/UL — LOW (ref 3.8–5.2)
RBC # FLD: 21.7 % — HIGH (ref 10.3–14.5)
SODIUM SERPL-SCNC: 139 MMOL/L — SIGNIFICANT CHANGE UP (ref 135–145)
WBC # BLD: 10.4 K/UL — SIGNIFICANT CHANGE UP (ref 3.8–10.5)
WBC # FLD AUTO: 10.4 K/UL — SIGNIFICANT CHANGE UP (ref 3.8–10.5)

## 2023-01-28 RX ORDER — INSULIN LISPRO 100/ML
7 VIAL (ML) SUBCUTANEOUS
Refills: 0 | Status: DISCONTINUED | OUTPATIENT
Start: 2023-01-28 | End: 2023-01-30

## 2023-01-28 RX ADMIN — Medication 5 UNIT(S): at 08:14

## 2023-01-28 RX ADMIN — POLYETHYLENE GLYCOL 3350 17 GRAM(S): 17 POWDER, FOR SOLUTION ORAL at 11:19

## 2023-01-28 RX ADMIN — Medication 3 MILLILITER(S): at 04:05

## 2023-01-28 RX ADMIN — PANTOPRAZOLE SODIUM 40 MILLIGRAM(S): 20 TABLET, DELAYED RELEASE ORAL at 06:39

## 2023-01-28 RX ADMIN — Medication 7 UNIT(S): at 17:25

## 2023-01-28 RX ADMIN — Medication 10 MILLIGRAM(S): at 21:43

## 2023-01-28 RX ADMIN — Medication 25 MILLIGRAM(S): at 06:39

## 2023-01-28 RX ADMIN — Medication 5 UNIT(S): at 12:21

## 2023-01-28 RX ADMIN — Medication 100 MILLIGRAM(S): at 08:18

## 2023-01-28 RX ADMIN — Medication 3 MILLILITER(S): at 16:33

## 2023-01-28 RX ADMIN — Medication 3 MILLILITER(S): at 09:40

## 2023-01-28 RX ADMIN — INSULIN GLARGINE 15 UNIT(S): 100 INJECTION, SOLUTION SUBCUTANEOUS at 22:23

## 2023-01-28 RX ADMIN — Medication 8: at 17:25

## 2023-01-28 RX ADMIN — Medication 3 MILLILITER(S): at 22:11

## 2023-01-28 RX ADMIN — Medication 20 MILLIGRAM(S): at 06:38

## 2023-01-28 RX ADMIN — Medication 6: at 12:22

## 2023-01-28 RX ADMIN — SIMVASTATIN 20 MILLIGRAM(S): 20 TABLET, FILM COATED ORAL at 21:43

## 2023-01-28 RX ADMIN — Medication 4: at 08:16

## 2023-01-28 RX ADMIN — Medication 50 MICROGRAM(S): at 06:39

## 2023-01-28 NOTE — PROGRESS NOTE ADULT - SUBJECTIVE AND OBJECTIVE BOX
SUBJECTIVE / OVERNIGHT EVENTS:pt seen and examined  01-28-23     MEDICATIONS  (STANDING):  albuterol/ipratropium for Nebulization 3 milliLiter(s) Nebulizer every 6 hours  bisacodyl 10 milliGRAM(s) Oral at bedtime  dextrose 5%. 1000 milliLiter(s) (100 mL/Hr) IV Continuous <Continuous>  dextrose 5%. 1000 milliLiter(s) (50 mL/Hr) IV Continuous <Continuous>  dextrose 50% Injectable 25 Gram(s) IV Push once  dextrose 50% Injectable 12.5 Gram(s) IV Push once  dextrose 50% Injectable 25 Gram(s) IV Push once  glucagon  Injectable 1 milliGRAM(s) IntraMuscular once  influenza  Vaccine (HIGH DOSE) 0.7 milliLiter(s) IntraMuscular once  insulin glargine Injectable (LANTUS) 15 Unit(s) SubCutaneous at bedtime  insulin lispro (ADMELOG) corrective regimen sliding scale   SubCutaneous three times a day before meals  insulin lispro (ADMELOG) corrective regimen sliding scale   SubCutaneous at bedtime  insulin lispro Injectable (ADMELOG) 7 Unit(s) SubCutaneous three times a day before meals  levothyroxine 50 MICROGram(s) Oral daily  metoprolol succinate ER 25 milliGRAM(s) Oral daily  pantoprazole    Tablet 40 milliGRAM(s) Oral before breakfast  polyethylene glycol 3350 17 Gram(s) Oral daily  predniSONE   Tablet 20 milliGRAM(s) Oral daily  simvastatin 20 milliGRAM(s) Oral at bedtime    MEDICATIONS  (PRN):  acetaminophen     Tablet .. 650 milliGRAM(s) Oral every 6 hours PRN Temp greater or equal to 38C (100.4F), Mild Pain (1 - 3)  dextrose Oral Gel 15 Gram(s) Oral once PRN Blood Glucose LESS THAN 70 milliGRAM(s)/deciliter  guaiFENesin Oral Liquid (Sugar-Free) 100 milliGRAM(s) Oral every 6 hours PRN Cough    Vital Signs Last 24 Hrs  T(C): 36.7 (01-28-23 @ 21:28), Max: 36.8 (01-28-23 @ 10:03)  T(F): 98 (01-28-23 @ 21:28), Max: 98.2 (01-28-23 @ 10:03)  HR: 90 (01-28-23 @ 21:28) (82 - 91)  BP: 105/70 (01-28-23 @ 21:28) (105/70 - 139/72)  BP(mean): --  RR: 18 (01-28-23 @ 21:28) (17 - 18)  SpO2: 100% (01-28-23 @ 21:28) (96% - 100%)          Constitutional: No fever, fatigue  Skin: No rash.  Eyes: No recent vision problems or eye pain.  ENT: No congestion, ear pain, or sore throat.  Cardiovascular: No chest pain or palpation.  Respiratory: No cough, shortness of breath, congestion, or wheezing.  Gastrointestinal: No abdominal pain, nausea, vomiting, or diarrhea.  Genitourinary: No dysuria.  Musculoskeletal: No joint swelling.  Neurologic: No headache.    PHYSICAL EXAM:  GENERAL: NAD  EYES: EOMI, PERRLA  NECK: Supple, No JVD  CHEST/LUNG: dec breath sounds at bases  HEART:  S1 , S2 +  ABDOMEN: soft , bs+  EXTREMITIES:  trace edema+  NEUROLOGY:alert awake      LABS:  01-28    139  |  97<L>  |  29<H>  ----------------------------<  192<H>  3.6   |  33<H>  |  0.82    Ca    9.4      28 Jan 2023 06:12  Phos  2.8     01-28  Mg     2.00     01-28      Creatinine Trend: 0.82 <--, 0.79 <--, 0.68 <--                        8.5    10.40 )-----------( 245      ( 28 Jan 2023 06:12 )             29.8     Urine Studies:                          RADIOLOGY & ADDITIONAL TESTS:    Imaging Personally Reviewed:yes    Consultant(s) Notes Reviewed:  yes    Care Discussed with Consultants/Other Providers:yes

## 2023-01-28 NOTE — CHART NOTE - NSCHARTNOTEFT_GEN_A_CORE
Chart reviewed   See last progress note for complete plan of care including discharge planning   Prednisone day #3/5  Hyperglycemia noted, will increase pre-meal Admelog to 7 units   Continue with Lantus and Admelog correctional scales as ordered below (moderate)  Endocrine      CAPILLARY BLOOD GLUCOSE      POCT Blood Glucose.: 298 mg/dL (28 Jan 2023 12:13)  POCT Blood Glucose.: 210 mg/dL (28 Jan 2023 08:13)  POCT Blood Glucose.: 265 mg/dL (27 Jan 2023 21:54)  POCT Blood Glucose.: 411 mg/dL (27 Jan 2023 17:00)  POCT Blood Glucose.: 401 mg/dL (27 Jan 2023 16:54)      01-28    139  |  97<L>  |  29<H>  ----------------------------<  192<H>  3.6   |  33<H>  |  0.82    Ca    9.4      28 Jan 2023 06:12  Phos  2.8     01-28  Mg     2.00     01-28        MEDICATIONS  (STANDING):  albuterol/ipratropium for Nebulization 3 milliLiter(s) Nebulizer every 6 hours  bisacodyl 10 milliGRAM(s) Oral at bedtime  dextrose 5%. 1000 milliLiter(s) (100 mL/Hr) IV Continuous <Continuous>  dextrose 5%. 1000 milliLiter(s) (50 mL/Hr) IV Continuous <Continuous>  dextrose 50% Injectable 25 Gram(s) IV Push once  dextrose 50% Injectable 12.5 Gram(s) IV Push once  dextrose 50% Injectable 25 Gram(s) IV Push once  glucagon  Injectable 1 milliGRAM(s) IntraMuscular once  influenza  Vaccine (HIGH DOSE) 0.7 milliLiter(s) IntraMuscular once  insulin glargine Injectable (LANTUS) 15 Unit(s) SubCutaneous at bedtime  insulin lispro (ADMELOG) corrective regimen sliding scale   SubCutaneous three times a day before meals  insulin lispro (ADMELOG) corrective regimen sliding scale   SubCutaneous at bedtime  insulin lispro Injectable (ADMELOG) 5 Unit(s) SubCutaneous three times a day before meals  levothyroxine 50 MICROGram(s) Oral daily  metoprolol succinate ER 25 milliGRAM(s) Oral daily  pantoprazole    Tablet 40 milliGRAM(s) Oral before breakfast  polyethylene glycol 3350 17 Gram(s) Oral daily  predniSONE   Tablet 20 milliGRAM(s) Oral daily  simvastatin 20 milliGRAM(s) Oral at bedtime      Diet, Consistent Carbohydrate w/Evening Snack:   DASH/TLC {Sodium & Cholesterol Restricted} (DASH)  Ellen (01-26-23 @ 20:42)      A1C with Estimated Average Glucose Result: 8.8 % (12-01-22 @ 12:00)  A1C with Estimated Average Glucose Result: 10.5 % (11-08-22 @ 09:00)

## 2023-01-29 LAB
ANION GAP SERPL CALC-SCNC: 10 MMOL/L — SIGNIFICANT CHANGE UP (ref 7–14)
BASOPHILS # BLD AUTO: 0.06 K/UL — SIGNIFICANT CHANGE UP (ref 0–0.2)
BASOPHILS NFR BLD AUTO: 0.5 % — SIGNIFICANT CHANGE UP (ref 0–2)
BUN SERPL-MCNC: 23 MG/DL — SIGNIFICANT CHANGE UP (ref 7–23)
CALCIUM SERPL-MCNC: 9.2 MG/DL — SIGNIFICANT CHANGE UP (ref 8.4–10.5)
CHLORIDE SERPL-SCNC: 99 MMOL/L — SIGNIFICANT CHANGE UP (ref 98–107)
CO2 SERPL-SCNC: 31 MMOL/L — SIGNIFICANT CHANGE UP (ref 22–31)
CREAT SERPL-MCNC: 0.75 MG/DL — SIGNIFICANT CHANGE UP (ref 0.5–1.3)
EGFR: 81 ML/MIN/1.73M2 — SIGNIFICANT CHANGE UP
EOSINOPHIL # BLD AUTO: 0.4 K/UL — SIGNIFICANT CHANGE UP (ref 0–0.5)
EOSINOPHIL NFR BLD AUTO: 3.3 % — SIGNIFICANT CHANGE UP (ref 0–6)
GLUCOSE BLDC GLUCOMTR-MCNC: 104 MG/DL — HIGH (ref 70–99)
GLUCOSE BLDC GLUCOMTR-MCNC: 166 MG/DL — HIGH (ref 70–99)
GLUCOSE BLDC GLUCOMTR-MCNC: 216 MG/DL — HIGH (ref 70–99)
GLUCOSE SERPL-MCNC: 158 MG/DL — HIGH (ref 70–99)
GRAM STN FLD: SIGNIFICANT CHANGE UP
HCT VFR BLD CALC: 31.2 % — LOW (ref 34.5–45)
HGB BLD-MCNC: 8.6 G/DL — LOW (ref 11.5–15.5)
IANC: 9.18 K/UL — HIGH (ref 1.8–7.4)
IMM GRANULOCYTES NFR BLD AUTO: 0.7 % — SIGNIFICANT CHANGE UP (ref 0–0.9)
LYMPHOCYTES # BLD AUTO: 1.55 K/UL — SIGNIFICANT CHANGE UP (ref 1–3.3)
LYMPHOCYTES # BLD AUTO: 12.7 % — LOW (ref 13–44)
MAGNESIUM SERPL-MCNC: 1.9 MG/DL — SIGNIFICANT CHANGE UP (ref 1.6–2.6)
MCHC RBC-ENTMCNC: 23.1 PG — LOW (ref 27–34)
MCHC RBC-ENTMCNC: 27.6 GM/DL — LOW (ref 32–36)
MCV RBC AUTO: 83.9 FL — SIGNIFICANT CHANGE UP (ref 80–100)
MONOCYTES # BLD AUTO: 0.97 K/UL — HIGH (ref 0–0.9)
MONOCYTES NFR BLD AUTO: 7.9 % — SIGNIFICANT CHANGE UP (ref 2–14)
MRSA PCR RESULT.: SIGNIFICANT CHANGE UP
NEUTROPHILS # BLD AUTO: 9.18 K/UL — HIGH (ref 1.8–7.4)
NEUTROPHILS NFR BLD AUTO: 74.9 % — SIGNIFICANT CHANGE UP (ref 43–77)
NRBC # BLD: 0 /100 WBCS — SIGNIFICANT CHANGE UP (ref 0–0)
NRBC # FLD: 0 K/UL — SIGNIFICANT CHANGE UP (ref 0–0)
PHOSPHATE SERPL-MCNC: 3.2 MG/DL — SIGNIFICANT CHANGE UP (ref 2.5–4.5)
PLATELET # BLD AUTO: 228 K/UL — SIGNIFICANT CHANGE UP (ref 150–400)
POTASSIUM SERPL-MCNC: 4.5 MMOL/L — SIGNIFICANT CHANGE UP (ref 3.5–5.3)
POTASSIUM SERPL-SCNC: 4.5 MMOL/L — SIGNIFICANT CHANGE UP (ref 3.5–5.3)
RBC # BLD: 3.72 M/UL — LOW (ref 3.8–5.2)
RBC # FLD: 21.9 % — HIGH (ref 10.3–14.5)
S AUREUS DNA NOSE QL NAA+PROBE: SIGNIFICANT CHANGE UP
SODIUM SERPL-SCNC: 140 MMOL/L — SIGNIFICANT CHANGE UP (ref 135–145)
SPECIMEN SOURCE: SIGNIFICANT CHANGE UP
WBC # BLD: 12.24 K/UL — HIGH (ref 3.8–10.5)
WBC # FLD AUTO: 12.24 K/UL — HIGH (ref 3.8–10.5)

## 2023-01-29 PROCEDURE — 71250 CT THORAX DX C-: CPT | Mod: 26

## 2023-01-29 PROCEDURE — 99223 1ST HOSP IP/OBS HIGH 75: CPT

## 2023-01-29 RX ORDER — LACTULOSE 10 G/15ML
15 SOLUTION ORAL ONCE
Refills: 0 | Status: COMPLETED | OUTPATIENT
Start: 2023-01-29 | End: 2023-01-29

## 2023-01-29 RX ORDER — SENNA PLUS 8.6 MG/1
2 TABLET ORAL AT BEDTIME
Refills: 0 | Status: DISCONTINUED | OUTPATIENT
Start: 2023-01-29 | End: 2023-02-01

## 2023-01-29 RX ADMIN — PANTOPRAZOLE SODIUM 40 MILLIGRAM(S): 20 TABLET, DELAYED RELEASE ORAL at 07:46

## 2023-01-29 RX ADMIN — Medication 3 MILLILITER(S): at 16:01

## 2023-01-29 RX ADMIN — INSULIN GLARGINE 15 UNIT(S): 100 INJECTION, SOLUTION SUBCUTANEOUS at 22:35

## 2023-01-29 RX ADMIN — Medication 3 MILLILITER(S): at 10:05

## 2023-01-29 RX ADMIN — POLYETHYLENE GLYCOL 3350 17 GRAM(S): 17 POWDER, FOR SOLUTION ORAL at 12:23

## 2023-01-29 RX ADMIN — Medication 25 MILLIGRAM(S): at 05:20

## 2023-01-29 RX ADMIN — Medication 4: at 12:22

## 2023-01-29 RX ADMIN — Medication 100 MILLIGRAM(S): at 05:46

## 2023-01-29 RX ADMIN — Medication 7 UNIT(S): at 07:47

## 2023-01-29 RX ADMIN — SIMVASTATIN 20 MILLIGRAM(S): 20 TABLET, FILM COATED ORAL at 21:18

## 2023-01-29 RX ADMIN — Medication 3 MILLILITER(S): at 21:19

## 2023-01-29 RX ADMIN — Medication 7 UNIT(S): at 17:04

## 2023-01-29 RX ADMIN — Medication 7 UNIT(S): at 12:22

## 2023-01-29 RX ADMIN — Medication 50 MICROGRAM(S): at 05:20

## 2023-01-29 RX ADMIN — Medication 2: at 07:37

## 2023-01-29 RX ADMIN — Medication 20 MILLIGRAM(S): at 05:20

## 2023-01-29 RX ADMIN — SENNA PLUS 2 TABLET(S): 8.6 TABLET ORAL at 21:18

## 2023-01-29 RX ADMIN — Medication 10 MILLIGRAM(S): at 21:18

## 2023-01-29 RX ADMIN — LACTULOSE 15 GRAM(S): 10 SOLUTION ORAL at 17:03

## 2023-01-29 NOTE — PROGRESS NOTE ADULT - SUBJECTIVE AND OBJECTIVE BOX
SUBJECTIVE / OVERNIGHT EVENTS:pt seen and examined  01-29-23     MEDICATIONS  (STANDING):  albuterol/ipratropium for Nebulization 3 milliLiter(s) Nebulizer every 6 hours  bisacodyl 10 milliGRAM(s) Oral at bedtime  dextrose 5%. 1000 milliLiter(s) (100 mL/Hr) IV Continuous <Continuous>  dextrose 5%. 1000 milliLiter(s) (50 mL/Hr) IV Continuous <Continuous>  dextrose 50% Injectable 25 Gram(s) IV Push once  dextrose 50% Injectable 12.5 Gram(s) IV Push once  dextrose 50% Injectable 25 Gram(s) IV Push once  glucagon  Injectable 1 milliGRAM(s) IntraMuscular once  influenza  Vaccine (HIGH DOSE) 0.7 milliLiter(s) IntraMuscular once  insulin glargine Injectable (LANTUS) 15 Unit(s) SubCutaneous at bedtime  insulin lispro (ADMELOG) corrective regimen sliding scale   SubCutaneous three times a day before meals  insulin lispro (ADMELOG) corrective regimen sliding scale   SubCutaneous at bedtime  insulin lispro Injectable (ADMELOG) 7 Unit(s) SubCutaneous three times a day before meals  levothyroxine 50 MICROGram(s) Oral daily  metoprolol succinate ER 25 milliGRAM(s) Oral daily  pantoprazole    Tablet 40 milliGRAM(s) Oral before breakfast  polyethylene glycol 3350 17 Gram(s) Oral daily  predniSONE   Tablet 20 milliGRAM(s) Oral daily  senna 2 Tablet(s) Oral at bedtime  simvastatin 20 milliGRAM(s) Oral at bedtime    MEDICATIONS  (PRN):  acetaminophen     Tablet .. 650 milliGRAM(s) Oral every 6 hours PRN Temp greater or equal to 38C (100.4F), Mild Pain (1 - 3)  dextrose Oral Gel 15 Gram(s) Oral once PRN Blood Glucose LESS THAN 70 milliGRAM(s)/deciliter  guaiFENesin Oral Liquid (Sugar-Free) 100 milliGRAM(s) Oral every 6 hours PRN Cough    Vital Signs Last 24 Hrs  T(C): 36.7 (01-29-23 @ 10:01), Max: 36.8 (01-28-23 @ 18:25)  T(F): 98 (01-29-23 @ 10:01), Max: 98.2 (01-28-23 @ 18:25)  HR: 79 (01-29-23 @ 10:01) (79 - 91)  BP: 104/56 (01-29-23 @ 10:01) (104/56 - 146/73)  BP(mean): --  RR: 16 (01-29-23 @ 10:01) (16 - 18)  SpO2: 99% (01-29-23 @ 10:01) (99% - 100%)        Constitutional: No fever, fatigue  Skin: No rash.  Eyes: No recent vision problems or eye pain.  ENT: No congestion, ear pain, or sore throat.  Cardiovascular: No chest pain or palpation.  Respiratory: No cough, shortness of breath, congestion, or wheezing.  Gastrointestinal: No abdominal pain, nausea, vomiting, or diarrhea.  Genitourinary: No dysuria.  Musculoskeletal: No joint swelling.  Neurologic: No headache.    PHYSICAL EXAM:  GENERAL: NAD  EYES: EOMI, PERRLA  NECK: Supple, No JVD  CHEST/LUNG: dec breath sounds at bases  HEART:  S1 , S2 +  ABDOMEN: soft , bs+  EXTREMITIES:  trace edema+  NEUROLOGY:alert awake      LABS:  01-29    140  |  99  |  23  ----------------------------<  158<H>  4.5   |  31  |  0.75    Ca    9.2      29 Jan 2023 07:23  Phos  3.2     01-29  Mg     1.90     01-29      Creatinine Trend: 0.75 <--, 0.82 <--, 0.79 <--, 0.68 <--                        8.6    12.24 )-----------( 228      ( 29 Jan 2023 07:23 )             31.2     Urine Studies:                          RADIOLOGY & ADDITIONAL TESTS:    Imaging Personally Reviewed:yes    Consultant(s) Notes Reviewed:  yes    Care Discussed with Consultants/Other Providers:yes       SUBJECTIVE / OVERNIGHT EVENTS:pt seen and examined, pts family next to pts bedside , not in distress  01-29-23     MEDICATIONS  (STANDING):  albuterol/ipratropium for Nebulization 3 milliLiter(s) Nebulizer every 6 hours  bisacodyl 10 milliGRAM(s) Oral at bedtime  dextrose 5%. 1000 milliLiter(s) (50 mL/Hr) IV Continuous <Continuous>  dextrose 5%. 1000 milliLiter(s) (100 mL/Hr) IV Continuous <Continuous>  dextrose 50% Injectable 25 Gram(s) IV Push once  dextrose 50% Injectable 12.5 Gram(s) IV Push once  dextrose 50% Injectable 25 Gram(s) IV Push once  glucagon  Injectable 1 milliGRAM(s) IntraMuscular once  influenza  Vaccine (HIGH DOSE) 0.7 milliLiter(s) IntraMuscular once  insulin glargine Injectable (LANTUS) 15 Unit(s) SubCutaneous at bedtime  insulin lispro (ADMELOG) corrective regimen sliding scale   SubCutaneous three times a day before meals  insulin lispro (ADMELOG) corrective regimen sliding scale   SubCutaneous at bedtime  insulin lispro Injectable (ADMELOG) 7 Unit(s) SubCutaneous three times a day before meals  levothyroxine 50 MICROGram(s) Oral daily  metoprolol succinate ER 25 milliGRAM(s) Oral daily  pantoprazole    Tablet 40 milliGRAM(s) Oral before breakfast  polyethylene glycol 3350 17 Gram(s) Oral daily  predniSONE   Tablet 20 milliGRAM(s) Oral daily  senna 2 Tablet(s) Oral at bedtime  simvastatin 20 milliGRAM(s) Oral at bedtime    MEDICATIONS  (PRN):  acetaminophen     Tablet .. 650 milliGRAM(s) Oral every 6 hours PRN Temp greater or equal to 38C (100.4F), Mild Pain (1 - 3)  dextrose Oral Gel 15 Gram(s) Oral once PRN Blood Glucose LESS THAN 70 milliGRAM(s)/deciliter  guaiFENesin Oral Liquid (Sugar-Free) 100 milliGRAM(s) Oral every 6 hours PRN Cough    Vital Signs Last 24 Hrs  T(C): 36.7 (01-29-23 @ 10:01), Max: 36.8 (01-28-23 @ 18:25)  T(F): 98 (01-29-23 @ 10:01), Max: 98.2 (01-28-23 @ 18:25)  HR: 79 (01-29-23 @ 10:01) (79 - 91)  BP: 104/56 (01-29-23 @ 10:01) (104/56 - 146/73)  BP(mean): --  RR: 16 (01-29-23 @ 10:01) (16 - 18)  SpO2: 99% (01-29-23 @ 10:01) (99% - 100%)      Constitutional: No fever, fatigue  Skin: No rash.  Eyes: No recent vision problems or eye pain.  ENT: No congestion, ear pain, or sore throat.  Cardiovascular: No chest pain or palpation.  Respiratory: No cough, shortness of breath, congestion, or wheezing.  Gastrointestinal: No abdominal pain, nausea, vomiting, or diarrhea.  Genitourinary: No dysuria.  Musculoskeletal: No joint swelling.  Neurologic: No headache.    PHYSICAL EXAM:  GENERAL: NAD  EYES: EOMI, PERRLA  NECK: Supple, No JVD  CHEST/LUNG: dec breath sounds at bases  HEART:  S1 , S2 +  ABDOMEN: soft , bs+  EXTREMITIES:  trace edema+  NEUROLOGY:alert awake      LABS:  01-29    140  |  99  |  23  ----------------------------<  158<H>  4.5   |  31  |  0.75    Ca    9.2      29 Jan 2023 07:23  Phos  3.2     01-29  Mg     1.90     01-29      Creatinine Trend: 0.75 <--, 0.82 <--, 0.79 <--, 0.68 <--                        8.6    12.24 )-----------( 228      ( 29 Jan 2023 07:23 )             31.2     Urine Studies:                                      RADIOLOGY & ADDITIONAL TESTS:    Imaging Personally Reviewed:yes    Consultant(s) Notes Reviewed:  yes    Care Discussed with Consultants/Other Providers:yes

## 2023-01-29 NOTE — PROGRESS NOTE ADULT - PROBLEM SELECTOR PLAN 1
Discharged on NC 3-4 lts/NC on last visit  Initially requiring NRM now back on 4 lts/NC  D-dimer is normal so unlikely to be PE  Troponin x 2 negative  CXR 1/26 shows Chronic changes of the right upper lobe  New hazy airspace opacities in the left upper lobe more consistent with active infection than heart failure  BNP is slightly elevated at 2524  TTE in 11/2022 with e/o diastolic dysfunction and severe pulm HTN  - Likely secondary to positive Entero/rhinovirus on RVP and possible pneumonia given CXR findings  - No definite e/o superimposed bacterial infection  - Will c/w IV zosyn until further information is available  - IV lasix 40mg x1, assess for further need of IV lasix  - F/up VBG in AM  - Consider Pulmonary consult Discharged on NC 3-4 lts/NC on last visit  Initially requiring NRM now back on 4 lts/NC  D-dimer is normal so unlikely to be PE  Troponin x 2 negative    New hazy airspace opacities in the left upper lobe more consistent with active infection than heart failure  BNP is slightly elevated at 2524  TTE in 11/2022 with e/o diastolic dysfunction and severe pulm HTN  - Likely secondary to positive Entero/rhinovirus on RVP and possible pneumonia given CXR findings  - pt appears stable  at present , not keeping oxygen nc

## 2023-01-29 NOTE — PROGRESS NOTE ADULT - PROBLEM SELECTOR PLAN 3
On basaglar 10U qhs and novolog 4U TID, also on metformin  Borderline controlled with A1C of 8.8 in 12/22  - Will reduce dose to lantus 5U qhs for now as FS have been on low side today  - Hold lispro for now  - Diabetic diet and SURYA  - Monitor FS's closely and adust insulin as needed  - C/w simvastatin On basaglar 10U qhs and novolog 4U TID, also on metformin  insulin to be adjusted as per endo recs

## 2023-01-29 NOTE — PROGRESS NOTE ADULT - PROBLEM SELECTOR PLAN 2
RVP is positive for Entero/rhinovirus  CXR 1/26 shows Chronic changes of the right upper lobe  New hazy airspace opacities in the left upper lobe more consistent with active infection than heart failure  - Possible viral pneumonia  - No definite e/o superimposed bacterial infection  - Will c/w IV zosyn for now until further information is available  - Milagros ATC  - Prednisone 20mg QD x 5 days  - Symptomatic treatment with tessalon/robitussin as needed  - F/up MRSA nasal swab  - F/up Blood and sputum cultures  - F/up procalcitonin  - If all cultures and procalcitonin are negative can DC zosyn RVP is positive for Entero/rhinovirus  CXR 1/26 shows Chronic changes of the right upper lobe  New hazy airspace opacities in the left upper lobe more consistent with active infection than heart failure  - Possible viral pneumonia  - No definite e/o superimposed bacterial infection  - Will c/w IV zosyn for now until further information is available  - Milagros ATC  - Prednisone 20mg QD x 5 days  - Symptomatic treatment with tessalon/robitussin as needed  -pulm f/u

## 2023-01-29 NOTE — CONSULT NOTE ADULT - ASSESSMENT
78 y/o female with PMHx of DM type 2, hypothyroidism, and HTN who presented to the ED for increased SOB    EKG: NSR no ischemic changes      1. Enterovirus  bronchitis  - on prednisone  -echo normal LV systolic function, no WMA. mild diastolic dysfunction. decreased RV function 11/22  - on steroids and nebs  -f/u pulm recs    2. HTN  -controlled  -c/w metoprolol  -continue to monitor BP     3. HLD   - on zocor

## 2023-01-29 NOTE — CONSULT NOTE ADULT - SUBJECTIVE AND OBJECTIVE BOX
Forrest Rosenthal MD  Interventional Cardiology / Advance Heart Failure and Cardiac Transplant Specialist  [place] Office : [phone] [place] [address] [place] [address]  Tel:   [place] Office : [phone] [facility] [place] [address]  Tel: [phone]         HISTORY OF PRESENTING ILLNESS:  HPI:  77 year old female with PMHx of DM, HTN, hypothyroidism and hyperlipidemia recently discharged from [facility] on 12/10/2022 after being admitted for sepsis with acute hypercapnic respiratory failure secondary to RSV pneumonia and discharged on O2 3-4lts/NC who now comes in c/o worsening SOB that started few hours prior to arrival to ER. Chest tightness along with SOB, non-productive cough (although chest sounds congested), cough seems to be chronic. No chest pain at time of interview. EMS found O2 Sat to be 90% on 4L NC. +Abdominal pain associated with coughing. Constipation for past 4-5 days. Appetite has been ok, no nausea or vomiting. No urinary complaints. No recent fevers, chills or headache.. No dizziness or leg pain or LE edema. Patient is not a great historian.    PAST MEDICAL & SURGICAL HISTORY:  HTN (Hypertension)      Dyslipidemia      DM (Diabetes Mellitus)      Hypothyroidism      Pulmonary TB  Dx 2019, completed 10 month treatment      Cataract of left eye          SOCIAL HISTORY: Substance Use (street drugs): ( x ) never used  (  ) other:    FAMILY HISTORY:  Family history of heart attack (Mother)  mother         MEDICATIONS:  metoprolol succinate ER 25 milliGRAM(s) Oral daily      albuterol/ipratropium for Nebulization 3 milliLiter(s) Nebulizer every 6 hours  guaiFENesin Oral Liquid (Sugar-Free) 100 milliGRAM(s) Oral every 6 hours PRN    acetaminophen     Tablet .. 650 milliGRAM(s) Oral every 6 hours PRN    bisacodyl 10 milliGRAM(s) Oral at bedtime  pantoprazole    Tablet 40 milliGRAM(s) Oral before breakfast  polyethylene glycol 3350 17 Gram(s) Oral daily    dextrose 50% Injectable 25 Gram(s) IV Push once  dextrose 50% Injectable 12.5 Gram(s) IV Push once  dextrose 50% Injectable 25 Gram(s) IV Push once  dextrose Oral Gel 15 Gram(s) Oral once PRN  glucagon  Injectable 1 milliGRAM(s) IntraMuscular once  insulin glargine Injectable (LANTUS) 15 Unit(s) SubCutaneous at bedtime  insulin lispro (ADMELOG) corrective regimen sliding scale   SubCutaneous three times a day before meals  insulin lispro (ADMELOG) corrective regimen sliding scale   SubCutaneous at bedtime  insulin lispro Injectable (ADMELOG) 7 Unit(s) SubCutaneous three times a day before meals  levothyroxine 50 MICROGram(s) Oral daily  predniSONE   Tablet 20 milliGRAM(s) Oral daily  simvastatin 20 milliGRAM(s) Oral at bedtime    dextrose 5%. 1000 milliLiter(s) IV Continuous <Continuous>  dextrose 5%. 1000 milliLiter(s) IV Continuous <Continuous>  influenza  Vaccine (HIGH DOSE) 0.7 milliLiter(s) IntraMuscular once      FAMILY HISTORY:  Family history of heart attack (Mother)  mother          Allergies    No Known Allergies    Intolerances    	      PHYSICAL EXAM:  T(C): 36.7 (01-29-23 @ 10:01), Max: 36.8 (01-28-23 @ 18:25)  HR: 79 (01-29-23 @ 10:01) (79 - 91)  BP: 104/56 (01-29-23 @ 10:01) (104/56 - 146/73)  RR: 16 (01-29-23 @ 10:01) (16 - 18)  SpO2: 99% (01-29-23 @ 10:01) (99% - 100%)  Wt(kg): --  I&O's Summary    28 Jan 2023 07:01  -  29 Jan 2023 07:00  --------------------------------------------------------  IN: 915 mL / OUT: 0 mL / NET: 915 mL        GENERAL: NAD   EYES: EOMI, PERRLA, conjunctiva and sclera clear  ENMT: No tonsillar erythema, exudates, or enlargement; Moist mucous membranes, Good dentition, No lesions  Cardiovascular: Normal S1 S2, No JVD, No murmurs, No edema  Respiratory: b/l rhonchi   Gastrointestinal:  Soft, Non-tender, + BS	  Extremities: no edema    LABS:	 	    CARDIAC MARKERS:                                  8.6    12.24 )-----------( 228      ( 29 Jan 2023 07:23 )             31.2     01-29    140  |  99  |  23  ----------------------------<  158<H>  4.5   |  31  |  0.75    Ca    9.2      29 Jan 2023 07:23  Phos  3.2     01-29  Mg     1.90     01-29      proBNP:   Lipid Profile:   HgA1c:   TSH:     Consultant(s) Notes Reviewed:  [x ] YES  [ ] NO    Care Discussed with Consultants/Other Providers [ x] YES  [ ] NO    Imaging Personally Reviewed independently:  [x] YES  [ ] NO    All labs, radiologic studies, vitals, orders and medications list reviewed. Patient is seen and examined at bedside. Case discussed with medical team.    ASSESSMENT/PLAN:

## 2023-01-29 NOTE — PROGRESS NOTE ADULT - SUBJECTIVE AND OBJECTIVE BOX
Pulmonary Consult Note    MARTIR VU  MRN-[id number]    Chief Complaint: Patient is a 77y old  Female who presents with a chief complaint of SOB (29 Jan 2023 11:33)      HPI:  per chart review:  77 year old female with PMHx of DM, HTN, hypothyroidism and hyperlipidemia recently discharged from [facility] on 12/10/2022 after being admitted for sepsis with acute hypercapnic respiratory failure secondary to RSV pneumonia and discharged on O2 3-4lts/NC who now comes in c/o worsening SOB that started few hours prior to arrival to ER. Chest tightness along with SOB, non-productive cough (although chest sounds congested), cough seems to be chronic. No chest pain at time of interview. EMS found O2 Sat to be 90% on 4L NC. +Abdominal pain associated with coughing. Constipation for past 4-5 days. Appetite has been ok, no nausea or vomiting. No urinary complaints. No recent fevers, chills or headache.. No dizziness or leg pain or LE edema. Patient is not a great historian.      In ER VSS except for requiring NRM initially, O2 sat 96% on 15L --> 3 lts/NC  Received Vanco x1, zosyn x1 and tylenol PO    on my exam:  resting in bed on nc  family at bedside says she has just been sob        ACTIVE MEDICATION LIST:  MEDICATIONS  (STANDING):  albuterol/ipratropium for Nebulization 3 milliLiter(s) Nebulizer every 6 hours  bisacodyl 10 milliGRAM(s) Oral at bedtime  dextrose 5%. 1000 milliLiter(s) (100 mL/Hr) IV Continuous <Continuous>  dextrose 5%. 1000 milliLiter(s) (50 mL/Hr) IV Continuous <Continuous>  dextrose 50% Injectable 25 Gram(s) IV Push once  dextrose 50% Injectable 12.5 Gram(s) IV Push once  dextrose 50% Injectable 25 Gram(s) IV Push once  glucagon  Injectable 1 milliGRAM(s) IntraMuscular once  influenza  Vaccine (HIGH DOSE) 0.7 milliLiter(s) IntraMuscular once  insulin glargine Injectable (LANTUS) 15 Unit(s) SubCutaneous at bedtime  insulin lispro (ADMELOG) corrective regimen sliding scale   SubCutaneous three times a day before meals  insulin lispro (ADMELOG) corrective regimen sliding scale   SubCutaneous at bedtime  insulin lispro Injectable (ADMELOG) 7 Unit(s) SubCutaneous three times a day before meals  lactulose Syrup 15 Gram(s) Oral once  levothyroxine 50 MICROGram(s) Oral daily  metoprolol succinate ER 25 milliGRAM(s) Oral daily  pantoprazole    Tablet 40 milliGRAM(s) Oral before breakfast  polyethylene glycol 3350 17 Gram(s) Oral daily  predniSONE   Tablet 20 milliGRAM(s) Oral daily  simvastatin 20 milliGRAM(s) Oral at bedtime    MEDICATIONS  (PRN):  acetaminophen     Tablet .. 650 milliGRAM(s) Oral every 6 hours PRN Temp greater or equal to 38C (100.4F), Mild Pain (1 - 3)  dextrose Oral Gel 15 Gram(s) Oral once PRN Blood Glucose LESS THAN 70 milliGRAM(s)/deciliter  guaiFENesin Oral Liquid (Sugar-Free) 100 milliGRAM(s) Oral every 6 hours PRN Cough      EXAM:  Vital Signs Last 24 Hrs  T(C): 36.7 (29 Jan 2023 10:01), Max: 36.8 (28 Jan 2023 18:25)  T(F): 98 (29 Jan 2023 10:01), Max: 98.2 (28 Jan 2023 18:25)  HR: 79 (29 Jan 2023 10:01) (79 - 91)  BP: 104/56 (29 Jan 2023 10:01) (104/56 - 146/73)  BP(mean): --  RR: 16 (29 Jan 2023 10:01) (16 - 18)  SpO2: 99% (29 Jan 2023 10:01) (99% - 100%)    Parameters below as of 29 Jan 2023 10:01  Patient On (Oxygen Delivery Method): nasal cannula  O2 Flow (L/min): 4    GENERAL: No acute distress  NEURO: Alert and oriented x 3  LUNGS: Clear to auscultation bilaterally, no rales or wheezes  CV: S1/S2, no murmur  ABDOMEN: +BS, nontender  EXTREMITIES: no clubbing, cyanosis, edema    LABS/IMAGING: reviewed                        8.6    12.24 )-----------( 228      ( 29 Jan 2023 07:23 )             31.2     < from: Xray Chest 1 View- PORTABLE-Urgent (Xray Chest 1 View- PORTABLE-Urgent .) (01.26.23 @ 05:27) >    ACC: [id number] EXAM:  XR CHEST PORTABLE URGENT 1V   ORDERED BY: KHALIF BENSON     PROCEDURE DATE:  01/26/2023          INTERPRETATION:  EXAMINATION: XR CHEST URGENT    CLINICAL INDICATION: SOB, cough    TECHNIQUE: Single frontal, portable view of the chest was obtained.    COMPARISON: Chest radiograph 12/1/2022.    FINDINGS:  The heart is not enlarged.  Redemonstration of right upper lobe linear opacities. New hazy airspace   opacities in the left midlung field.  No pleural effusion or pneumothorax.    IMPRESSION:  Chronic changes of the right upper lobe.  New hazy airspace opacities in the left upper lobe more consistent with   active infection than heart failure.    --- End of Report ---          PASHA NARVAEZ MD; Resident Radiology  This document has been electronically signed.  NAT HENNING MD; Attending Radiologist  This document has been electronically signed. Jan 26 2023  6:17AM    < end of copied text >    PROBLEM LIST:  77yFemale with HEALTH ISSUES - PROBLEM Dx:  Acute on chronic respiratory failure with hypoxia and hypercapnia      Viral pneumonia    Type 2 diabetes mellitus    Hypertension    Hypothyroidism    Prophylactic measure    Steroid-induced hyperglycemia    HLD (hyperlipidemia)    RECS:  -discussed with family at bedside regarding ct chest findings, have been discussed in the past, patient does not want invasive work up or biopsy for lung masses.  -supportive care for enterovirus  -can trial gentle diuresis  -cont O2  -[facility]    Thank you for this consultation, please feel free to call with any questions [phone]  Kathleen Bashir MD Pulmonary Consult Note    MARTIR VU  MRN-[id number]    Chief Complaint: Patient is a 77y old  Female who presents with a chief complaint of SOB (29 Jan 2023 11:33)      HPI:  per chart review:  77 year old female with PMHx of DM, HTN, hypothyroidism and hyperlipidemia recently discharged from [facility] on 12/10/2022 after being admitted for sepsis with acute hypercapnic respiratory failure secondary to RSV pneumonia and discharged on O2 3-4lts/NC who now comes in c/o worsening SOB that started few hours prior to arrival to ER. Chest tightness along with SOB, non-productive cough (although chest sounds congested), cough seems to be chronic. No chest pain at time of interview. EMS found O2 Sat to be 90% on 4L NC. +Abdominal pain associated with coughing. Constipation for past 4-5 days. Appetite has been ok, no nausea or vomiting. No urinary complaints. No recent fevers, chills or headache.. No dizziness or leg pain or LE edema. Patient is not a great historian.      In ER VSS except for requiring NRM initially, O2 sat 96% on 15L --> 3 lts/NC  Received Vanco x1, zosyn x1 and tylenol PO    on my exam:  resting in bed on nc  family at bedside says she has just been sob        ACTIVE MEDICATION LIST:  MEDICATIONS  (STANDING):  albuterol/ipratropium for Nebulization 3 milliLiter(s) Nebulizer every 6 hours  bisacodyl 10 milliGRAM(s) Oral at bedtime  dextrose 5%. 1000 milliLiter(s) (100 mL/Hr) IV Continuous <Continuous>  dextrose 5%. 1000 milliLiter(s) (50 mL/Hr) IV Continuous <Continuous>  dextrose 50% Injectable 25 Gram(s) IV Push once  dextrose 50% Injectable 12.5 Gram(s) IV Push once  dextrose 50% Injectable 25 Gram(s) IV Push once  glucagon  Injectable 1 milliGRAM(s) IntraMuscular once  influenza  Vaccine (HIGH DOSE) 0.7 milliLiter(s) IntraMuscular once  insulin glargine Injectable (LANTUS) 15 Unit(s) SubCutaneous at bedtime  insulin lispro (ADMELOG) corrective regimen sliding scale   SubCutaneous three times a day before meals  insulin lispro (ADMELOG) corrective regimen sliding scale   SubCutaneous at bedtime  insulin lispro Injectable (ADMELOG) 7 Unit(s) SubCutaneous three times a day before meals  lactulose Syrup 15 Gram(s) Oral once  levothyroxine 50 MICROGram(s) Oral daily  metoprolol succinate ER 25 milliGRAM(s) Oral daily  pantoprazole    Tablet 40 milliGRAM(s) Oral before breakfast  polyethylene glycol 3350 17 Gram(s) Oral daily  predniSONE   Tablet 20 milliGRAM(s) Oral daily  simvastatin 20 milliGRAM(s) Oral at bedtime    MEDICATIONS  (PRN):  acetaminophen     Tablet .. 650 milliGRAM(s) Oral every 6 hours PRN Temp greater or equal to 38C (100.4F), Mild Pain (1 - 3)  dextrose Oral Gel 15 Gram(s) Oral once PRN Blood Glucose LESS THAN 70 milliGRAM(s)/deciliter  guaiFENesin Oral Liquid (Sugar-Free) 100 milliGRAM(s) Oral every 6 hours PRN Cough      EXAM:  Vital Signs Last 24 Hrs  T(C): 36.7 (29 Jan 2023 10:01), Max: 36.8 (28 Jan 2023 18:25)  T(F): 98 (29 Jan 2023 10:01), Max: 98.2 (28 Jan 2023 18:25)  HR: 79 (29 Jan 2023 10:01) (79 - 91)  BP: 104/56 (29 Jan 2023 10:01) (104/56 - 146/73)  BP(mean): --  RR: 16 (29 Jan 2023 10:01) (16 - 18)  SpO2: 99% (29 Jan 2023 10:01) (99% - 100%)    Parameters below as of 29 Jan 2023 10:01  Patient On (Oxygen Delivery Method): nasal cannula  O2 Flow (L/min): 4    GENERAL: No acute distress  NEURO: Alert and oriented x 3  LUNGS: Clear to auscultation bilaterally, no rales or wheezes  CV: S1/S2, no murmur  ABDOMEN: +BS, nontender  EXTREMITIES: no clubbing, cyanosis, edema    LABS/IMAGING: reviewed                        8.6    12.24 )-----------( 228      ( 29 Jan 2023 07:23 )             31.2     < from: Xray Chest 1 View- PORTABLE-Urgent (Xray Chest 1 View- PORTABLE-Urgent .) (01.26.23 @ 05:27) >    ACC: [id number] EXAM:  XR CHEST PORTABLE URGENT 1V   ORDERED BY: KHALIF BENSON     PROCEDURE DATE:  01/26/2023          INTERPRETATION:  EXAMINATION: XR CHEST URGENT    CLINICAL INDICATION: SOB, cough    TECHNIQUE: Single frontal, portable view of the chest was obtained.    COMPARISON: Chest radiograph 12/1/2022.    FINDINGS:  The heart is not enlarged.  Redemonstration of right upper lobe linear opacities. New hazy airspace   opacities in the left midlung field.  No pleural effusion or pneumothorax.    IMPRESSION:  Chronic changes of the right upper lobe.  New hazy airspace opacities in the left upper lobe more consistent with   active infection than heart failure.    --- End of Report ---          PASHA NARVAEZ MD; Resident Radiology  This document has been electronically signed.  NAT HENNING MD; Attending Radiologist  This document has been electronically signed. Jan 26 2023  6:17AM    < end of copied text >    PROBLEM LIST:  77yFemale with HEALTH ISSUES - PROBLEM Dx:  Acute on chronic respiratory failure with hypoxia and hypercapnia      Viral pneumonia    Type 2 diabetes mellitus    Hypertension    Hypothyroidism    Prophylactic measure    Steroid-induced hyperglycemia    HLD (hyperlipidemia)    RECS:  -discussed with family at bedside regarding ct chest findings, have been discussed in the past, patient does not want invasive work up or biopsy for lung masses.  -supportive care for enterovirus  -prednisone x 5 days   -can trial gentle diuresis  -cont O2  -[facility]    Thank you for this consultation, please feel free to call with any questions [phone]  Kathleen Bashir MD

## 2023-01-29 NOTE — PROGRESS NOTE ADULT - PROBLEM SELECTOR PLAN 6
[facility]  PT evaluation  DNR/DNI, e-MOLST previously completed (11/2022) [facility]  PT evaluation  DNR/DNI, e-MOLST previously completed (11/2022)  discussed pts current clinical status , management plan in detail in length with pts family  discussed management plan with acp covering pt

## 2023-01-30 LAB
ANION GAP SERPL CALC-SCNC: 6 MMOL/L — LOW (ref 7–14)
BASOPHILS # BLD AUTO: 0.05 K/UL — SIGNIFICANT CHANGE UP (ref 0–0.2)
BASOPHILS NFR BLD AUTO: 0.6 % — SIGNIFICANT CHANGE UP (ref 0–2)
BUN SERPL-MCNC: 24 MG/DL — HIGH (ref 7–23)
CALCIUM SERPL-MCNC: 9.1 MG/DL — SIGNIFICANT CHANGE UP (ref 8.4–10.5)
CHLORIDE SERPL-SCNC: 100 MMOL/L — SIGNIFICANT CHANGE UP (ref 98–107)
CO2 SERPL-SCNC: 33 MMOL/L — HIGH (ref 22–31)
CREAT SERPL-MCNC: 0.73 MG/DL — SIGNIFICANT CHANGE UP (ref 0.5–1.3)
EGFR: 84 ML/MIN/1.73M2 — SIGNIFICANT CHANGE UP
EOSINOPHIL # BLD AUTO: 0.33 K/UL — SIGNIFICANT CHANGE UP (ref 0–0.5)
EOSINOPHIL NFR BLD AUTO: 4.2 % — SIGNIFICANT CHANGE UP (ref 0–6)
GLUCOSE BLDC GLUCOMTR-MCNC: 114 MG/DL — HIGH (ref 70–99)
GLUCOSE BLDC GLUCOMTR-MCNC: 151 MG/DL — HIGH (ref 70–99)
GLUCOSE BLDC GLUCOMTR-MCNC: 221 MG/DL — HIGH (ref 70–99)
GLUCOSE BLDC GLUCOMTR-MCNC: 273 MG/DL — HIGH (ref 70–99)
GLUCOSE SERPL-MCNC: 188 MG/DL — HIGH (ref 70–99)
HCT VFR BLD CALC: 31.3 % — LOW (ref 34.5–45)
HGB BLD-MCNC: 8.6 G/DL — LOW (ref 11.5–15.5)
IANC: 5.35 K/UL — SIGNIFICANT CHANGE UP (ref 1.8–7.4)
IMM GRANULOCYTES NFR BLD AUTO: 0.4 % — SIGNIFICANT CHANGE UP (ref 0–0.9)
LYMPHOCYTES # BLD AUTO: 1.32 K/UL — SIGNIFICANT CHANGE UP (ref 1–3.3)
LYMPHOCYTES # BLD AUTO: 17 % — SIGNIFICANT CHANGE UP (ref 13–44)
MAGNESIUM SERPL-MCNC: 1.9 MG/DL — SIGNIFICANT CHANGE UP (ref 1.6–2.6)
MCHC RBC-ENTMCNC: 22.8 PG — LOW (ref 27–34)
MCHC RBC-ENTMCNC: 27.5 GM/DL — LOW (ref 32–36)
MCV RBC AUTO: 82.8 FL — SIGNIFICANT CHANGE UP (ref 80–100)
MONOCYTES # BLD AUTO: 0.7 K/UL — SIGNIFICANT CHANGE UP (ref 0–0.9)
MONOCYTES NFR BLD AUTO: 9 % — SIGNIFICANT CHANGE UP (ref 2–14)
NEUTROPHILS # BLD AUTO: 5.35 K/UL — SIGNIFICANT CHANGE UP (ref 1.8–7.4)
NEUTROPHILS NFR BLD AUTO: 68.8 % — SIGNIFICANT CHANGE UP (ref 43–77)
NRBC # BLD: 0 /100 WBCS — SIGNIFICANT CHANGE UP (ref 0–0)
NRBC # FLD: 0 K/UL — SIGNIFICANT CHANGE UP (ref 0–0)
PHOSPHATE SERPL-MCNC: 3 MG/DL — SIGNIFICANT CHANGE UP (ref 2.5–4.5)
PLATELET # BLD AUTO: 231 K/UL — SIGNIFICANT CHANGE UP (ref 150–400)
POTASSIUM SERPL-MCNC: 4.4 MMOL/L — SIGNIFICANT CHANGE UP (ref 3.5–5.3)
POTASSIUM SERPL-SCNC: 4.4 MMOL/L — SIGNIFICANT CHANGE UP (ref 3.5–5.3)
RBC # BLD: 3.78 M/UL — LOW (ref 3.8–5.2)
RBC # FLD: 21.8 % — HIGH (ref 10.3–14.5)
SODIUM SERPL-SCNC: 139 MMOL/L — SIGNIFICANT CHANGE UP (ref 135–145)
WBC # BLD: 7.78 K/UL — SIGNIFICANT CHANGE UP (ref 3.8–10.5)
WBC # FLD AUTO: 7.78 K/UL — SIGNIFICANT CHANGE UP (ref 3.8–10.5)

## 2023-01-30 PROCEDURE — 99232 SBSQ HOSP IP/OBS MODERATE 35: CPT

## 2023-01-30 RX ORDER — INSULIN LISPRO 100/ML
9 VIAL (ML) SUBCUTANEOUS
Refills: 0 | Status: DISCONTINUED | OUTPATIENT
Start: 2023-01-30 | End: 2023-02-01

## 2023-01-30 RX ORDER — SODIUM CHLORIDE 9 MG/ML
4 INJECTION INTRAMUSCULAR; INTRAVENOUS; SUBCUTANEOUS EVERY 12 HOURS
Refills: 0 | Status: DISCONTINUED | OUTPATIENT
Start: 2023-01-30 | End: 2023-02-01

## 2023-01-30 RX ORDER — SODIUM BICARBONATE 1 MEQ/ML
650 SYRINGE (ML) INTRAVENOUS ONCE
Refills: 0 | Status: COMPLETED | OUTPATIENT
Start: 2023-01-30 | End: 2023-01-30

## 2023-01-30 RX ORDER — FUROSEMIDE 40 MG
20 TABLET ORAL ONCE
Refills: 0 | Status: COMPLETED | OUTPATIENT
Start: 2023-01-30 | End: 2023-01-30

## 2023-01-30 RX ORDER — INSULIN GLARGINE 100 [IU]/ML
17 INJECTION, SOLUTION SUBCUTANEOUS AT BEDTIME
Refills: 0 | Status: DISCONTINUED | OUTPATIENT
Start: 2023-01-30 | End: 2023-01-31

## 2023-01-30 RX ADMIN — Medication 50 MICROGRAM(S): at 05:24

## 2023-01-30 RX ADMIN — Medication 3 MILLILITER(S): at 15:25

## 2023-01-30 RX ADMIN — Medication 650 MILLIGRAM(S): at 17:47

## 2023-01-30 RX ADMIN — POLYETHYLENE GLYCOL 3350 17 GRAM(S): 17 POWDER, FOR SOLUTION ORAL at 12:20

## 2023-01-30 RX ADMIN — Medication 20 MILLIGRAM(S): at 05:24

## 2023-01-30 RX ADMIN — Medication 6: at 12:36

## 2023-01-30 RX ADMIN — Medication 3 MILLILITER(S): at 04:56

## 2023-01-30 RX ADMIN — Medication 7 UNIT(S): at 08:33

## 2023-01-30 RX ADMIN — SIMVASTATIN 20 MILLIGRAM(S): 20 TABLET, FILM COATED ORAL at 22:51

## 2023-01-30 RX ADMIN — Medication 3 MILLILITER(S): at 22:12

## 2023-01-30 RX ADMIN — Medication 25 MILLIGRAM(S): at 05:24

## 2023-01-30 RX ADMIN — Medication 10 MILLIGRAM(S): at 22:52

## 2023-01-30 RX ADMIN — Medication 9 UNIT(S): at 13:29

## 2023-01-30 RX ADMIN — Medication 3 MILLILITER(S): at 11:19

## 2023-01-30 RX ADMIN — Medication 100 MILLIGRAM(S): at 17:59

## 2023-01-30 RX ADMIN — INSULIN GLARGINE 17 UNIT(S): 100 INJECTION, SOLUTION SUBCUTANEOUS at 23:06

## 2023-01-30 RX ADMIN — Medication 20 MILLIGRAM(S): at 17:46

## 2023-01-30 RX ADMIN — Medication 650 MILLIGRAM(S): at 13:35

## 2023-01-30 RX ADMIN — Medication 9 UNIT(S): at 17:48

## 2023-01-30 RX ADMIN — Medication 4: at 08:32

## 2023-01-30 RX ADMIN — PANTOPRAZOLE SODIUM 40 MILLIGRAM(S): 20 TABLET, DELAYED RELEASE ORAL at 06:04

## 2023-01-30 RX ADMIN — SENNA PLUS 2 TABLET(S): 8.6 TABLET ORAL at 22:51

## 2023-01-30 RX ADMIN — Medication 2: at 17:47

## 2023-01-30 RX ADMIN — Medication 650 MILLIGRAM(S): at 18:17

## 2023-01-30 NOTE — CHART NOTE - NSCHARTNOTEFT_GEN_A_CORE
Vital Signs Last 24 Hrs  T(C): 36.5 (30 Jan 2023 10:18), Max: 36.9 (29 Jan 2023 17:45)  T(F): 97.7 (30 Jan 2023 10:18), Max: 98.4 (29 Jan 2023 17:45)  HR: 77 (30 Jan 2023 15:34) (71 - 87)  BP: 123/53 (30 Jan 2023 10:18) (113/54 - 123/53)  BP(mean): --  RR: 17 (30 Jan 2023 10:18) (16 - 18)  SpO2: 98% (30 Jan 2023 15:34) (98% - 100%)    Parameters below as of 30 Jan 2023 15:34  Patient On (Oxygen Delivery Method): nasal cannula                          8.6    7.78  )-----------( 231      ( 30 Jan 2023 06:00 )             31.3   01-30    139  |  100  |  24<H>  ----------------------------<  188<H>  4.4   |  33<H>  |  0.73    Ca    9.1      30 Jan 2023 06:00  Phos  3.0     01-30  Mg     1.90     01-30    Patient noted with wheezing and congestion, discussed case with Pulm Dr. Flynn> started on hypersal BID, continue duonebs, recommend gentle diuresis. As per cardiology Dr. Rosenthal> IV lasix 20mg x1 ordered. Plan of care discussed with patient and patient's son at bedside, encouraged patient to perform incentive spirometer and patient return demonstrated. Results and case discussed with Dr. Gonzalez.

## 2023-01-30 NOTE — PROGRESS NOTE ADULT - SUBJECTIVE AND OBJECTIVE BOX
Forrest Rsoenthal MD  Interventional Cardiology / Endovascular Specialist  [place] Office : [phone] [place] [address] Tel:   [place] Office : [phone] [facility] [address]  Tel: [phone]      Subjective/Overnight events: Patient lying in bed no acute distress  	  MEDICATIONS:  furosemide   Injectable 20 milliGRAM(s) IV Push once  metoprolol succinate ER 25 milliGRAM(s) Oral daily      albuterol/ipratropium for Nebulization 3 milliLiter(s) Nebulizer every 6 hours  guaiFENesin Oral Liquid (Sugar-Free) 100 milliGRAM(s) Oral every 6 hours PRN  sodium chloride 7% Inhalation 4 milliLiter(s) Inhalation every 12 hours    acetaminophen     Tablet .. 650 milliGRAM(s) Oral every 6 hours PRN    bisacodyl 10 milliGRAM(s) Oral at bedtime  pantoprazole    Tablet 40 milliGRAM(s) Oral before breakfast  polyethylene glycol 3350 17 Gram(s) Oral daily  senna 2 Tablet(s) Oral at bedtime    dextrose 50% Injectable 25 Gram(s) IV Push once  dextrose 50% Injectable 12.5 Gram(s) IV Push once  dextrose 50% Injectable 25 Gram(s) IV Push once  dextrose Oral Gel 15 Gram(s) Oral once PRN  glucagon  Injectable 1 milliGRAM(s) IntraMuscular once  insulin glargine Injectable (LANTUS) 17 Unit(s) SubCutaneous at bedtime  insulin lispro (ADMELOG) corrective regimen sliding scale   SubCutaneous three times a day before meals  insulin lispro (ADMELOG) corrective regimen sliding scale   SubCutaneous at bedtime  insulin lispro Injectable (ADMELOG) 9 Unit(s) SubCutaneous three times a day before meals  levothyroxine 50 MICROGram(s) Oral daily  predniSONE   Tablet 20 milliGRAM(s) Oral daily  simvastatin 20 milliGRAM(s) Oral at bedtime    dextrose 5%. 1000 milliLiter(s) IV Continuous <Continuous>  dextrose 5%. 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      Cataract of left eye          SOCIAL HISTORY: Substance Use (street drugs): ( x ) never used  (  ) other:    FAMILY HISTORY:  Family history of heart attack (Mother)  mother          PHYSICAL EXAM:  T(C): 36.5 (01-30-23 @ 10:18), Max: 36.9 (01-29-23 @ 17:45)  HR: 77 (01-30-23 @ 15:34) (71 - 87)  BP: 123/53 (01-30-23 @ 10:18) (113/54 - 123/53)  RR: 17 (01-30-23 @ 10:18) (16 - 18)  SpO2: 98% (01-30-23 @ 15:34) (98% - 100%)  Wt(kg): --  I&O's Summary    29 Jan 2023 07:01  -  30 Jan 2023 07:00  --------------------------------------------------------  IN: 0 mL / OUT: 1 mL / NET: -1 mL          GENERAL: NAD  EYES: EOMI, PERRLA, conjunctiva and sclera clear  ENMT: No tonsillar erythema, exudates, or enlargement  Cardiovascular: Normal S1 S2, No JVD, No murmurs, No edema  Respiratory: Lungs b/l rhonchi to auscultation	  Gastrointestinal:  Soft, Non-tender, + BS	  Extremities: No edema                                     8.6    7.78  )-----------( 231      ( 30 Jan 2023 06:00 )             31.3     01-30    139  |  100  |  24<H>  ----------------------------<  188<H>  4.4   |  33<H>  |  0.73    Ca    9.1      30 Jan 2023 06:00  Phos  3.0     01-30  Mg     1.90     01-30      proBNP:   Lipid Profile:   HgA1c:   TSH:     Consultant(s) Notes Reviewed:  [x ] YES  [ ] NO    Care Discussed with Consultants/Other Providers [ x] YES  [ ] NO    Imaging Personally Reviewed independently:  [x] YES  [ ] NO    All labs, radiologic studies, vitals, orders and medications list reviewed. Patient is seen and examined at bedside. Case discussed with medical team.

## 2023-01-30 NOTE — PROGRESS NOTE ADULT - PROBLEM SELECTOR PLAN 6
[facility]  PT evaluation  DNR/DNI, e-MOLST previously completed (11/2022)  discussed pts current clinical status , management plan in detail in length with pts family  discussed management plan with acp covering pt

## 2023-01-30 NOTE — PROGRESS NOTE ADULT - PROBLEM SELECTOR PLAN 1
Discharged on NC 3-4 lts/NC on last visit  Initially requiring NRM now back on 4 lts/NC  D-dimer is normal so unlikely to be PE  Troponin x 2 negative    New hazy airspace opacities in the left upper lobe more consistent with active infection than heart failure  BNP is slightly elevated at 2524  TTE in 11/2022 with e/o diastolic dysfunction and severe pulm HTN  - Likely secondary to positive Entero/rhinovirus on RVP and possible pneumonia given CXR findings  - pt appears stable  at present , not keeping oxygen nc

## 2023-01-30 NOTE — PROGRESS NOTE ADULT - SUBJECTIVE AND OBJECTIVE BOX
SUBJECTIVE / OVERNIGHT EVENTS:pt seen and examined, pts family next to pts bedside , not in distress  01-30-23     MEDICATIONS  (STANDING):  albuterol/ipratropium for Nebulization 3 milliLiter(s) Nebulizer every 6 hours  bisacodyl 10 milliGRAM(s) Oral at bedtime  dextrose 5%. 1000 milliLiter(s) (100 mL/Hr) IV Continuous <Continuous>  dextrose 5%. 1000 milliLiter(s) (50 mL/Hr) IV Continuous <Continuous>  dextrose 50% Injectable 25 Gram(s) IV Push once  dextrose 50% Injectable 12.5 Gram(s) IV Push once  dextrose 50% Injectable 25 Gram(s) IV Push once  glucagon  Injectable 1 milliGRAM(s) IntraMuscular once  influenza  Vaccine (HIGH DOSE) 0.7 milliLiter(s) IntraMuscular once  insulin glargine Injectable (LANTUS) 17 Unit(s) SubCutaneous at bedtime  insulin lispro (ADMELOG) corrective regimen sliding scale   SubCutaneous three times a day before meals  insulin lispro (ADMELOG) corrective regimen sliding scale   SubCutaneous at bedtime  insulin lispro Injectable (ADMELOG) 9 Unit(s) SubCutaneous three times a day before meals  levothyroxine 50 MICROGram(s) Oral daily  metoprolol succinate ER 25 milliGRAM(s) Oral daily  pantoprazole    Tablet 40 milliGRAM(s) Oral before breakfast  polyethylene glycol 3350 17 Gram(s) Oral daily  predniSONE   Tablet 20 milliGRAM(s) Oral daily  senna 2 Tablet(s) Oral at bedtime  simvastatin 20 milliGRAM(s) Oral at bedtime  sodium chloride 7% Inhalation 4 milliLiter(s) Inhalation every 12 hours    MEDICATIONS  (PRN):  acetaminophen     Tablet .. 650 milliGRAM(s) Oral every 6 hours PRN Temp greater or equal to 38C (100.4F), Mild Pain (1 - 3)  dextrose Oral Gel 15 Gram(s) Oral once PRN Blood Glucose LESS THAN 70 milliGRAM(s)/deciliter  guaiFENesin Oral Liquid (Sugar-Free) 100 milliGRAM(s) Oral every 6 hours PRN Cough    Vital Signs Last 24 Hrs  T(C): 36.8 (01-30-23 @ 17:32), Max: 36.8 (01-30-23 @ 17:32)  T(F): 98.2 (01-30-23 @ 17:32), Max: 98.2 (01-30-23 @ 17:32)  HR: 88 (01-30-23 @ 17:32) (77 - 88)  BP: 123/56 (01-30-23 @ 17:32) (120/56 - 123/56)  BP(mean): --  RR: 17 (01-30-23 @ 17:32) (16 - 17)  SpO2: 98% (01-30-23 @ 17:32) (98% - 99%)    PHYSICAL EXAM:  GENERAL: NAD  EYES: EOMI, PERRLA  NECK: Supple, No JVD  CHEST/LUNG: fine exp wheeze+  HEART:  S1 , S2 +  ABDOMEN: soft , bs+  EXTREMITIES:  trace edema+  NEUROLOGY:alert awake    LABS:  01-30    139  |  100  |  24<H>  ----------------------------<  188<H>  4.4   |  33<H>  |  0.73    Ca    9.1      30 Jan 2023 06:00  Phos  3.0     01-30  Mg     1.90     01-30      Creatinine Trend: 0.73 <--, 0.75 <--, 0.82 <--, 0.79 <--, 0.68 <--                        8.6    7.78  )-----------( 231      ( 30 Jan 2023 06:00 )             31.3     Urine Studies:                                                    RADIOLOGY & ADDITIONAL TESTS:    Imaging Personally Reviewed:yes    Consultant(s) Notes Reviewed:  yes    Care Discussed with Consultants/Other Providers:yes

## 2023-01-30 NOTE — PROGRESS NOTE ADULT - SUBJECTIVE AND OBJECTIVE BOX
77yPatient is a 77y old  Female who presents with a chief complaint of SOB (30 Jan 2023 17:06)      Interval history:  Afebrile, cough better.       Allergies:   No Known Allergies      Antimicrobials:      REVIEW OF SYSTEMS:  No chest pain   No abdominal pain  No rash.       Vital Signs Last 24 Hrs  T(C): 36.8 (01-30-23 @ 17:32), Max: 36.8 (01-30-23 @ 17:32)  T(F): 98.2 (01-30-23 @ 17:32), Max: 98.2 (01-30-23 @ 17:32)  HR: 88 (01-30-23 @ 17:32) (77 - 88)  BP: 123/56 (01-30-23 @ 17:32) (120/56 - 123/56)  BP(mean): --  RR: 17 (01-30-23 @ 17:32) (16 - 17)  SpO2: 98% (01-30-23 @ 17:32) (98% - 99%)      PHYSICAL EXAM:  Pt in no acute distress, alert, awake.   breathing comfortably, on supplemental oxygen,   non distended abdomen  no edema LE   no phlebitis                             8.6    7.78  )-----------( 231      ( 30 Jan 2023 06:00 )             31.3   01-30    139  |  100  |  24<H>  ----------------------------<  188<H>  4.4   |  33<H>  |  0.73    Ca    9.1      30 Jan 2023 06:00  Phos  3.0     01-30  Mg     1.90     01-30        Culture - Sputum (collected 29 Jan 2023 11:00)  Source: .Sputum Sputum  Gram Stain (29 Jan 2023 22:24):    Few polymorphonuclear leukocytes per low power field    Numerous Squamous epithelial cells per low power field    Numerous Gram Positive Cocci in Pairs and Chains per oil power field    Moderate Gram Positive Cocci in Clusters per oil power field    Few Gram Negative Diplococci per oil power field    Results consistent with oropharyngeal contamination  Preliminary Report (30 Jan 2023 16:47):    Normal Respiratory Kinza present        Radiology:    < from: CT Chest No Cont (01.29.23 @ 13:21) >  IMPRESSION:    Since CT chest 11/8/2022:    No new lung parenchymal opacities to suggest pneumonia.    Unchanged bilateral upper lobe opacities of unclear etiology, including   the right apical nodular opacity as well as the left upper lobe   ill-defined masslike consolidation which has been increasing in   size/attenuation since 2019. Continued attention on follow-up imaging.

## 2023-01-30 NOTE — PROGRESS NOTE ADULT - PROBLEM SELECTOR PLAN 2
RVP is positive for Entero/rhinovirus  CXR 1/26 shows Chronic changes of the right upper lobe  New hazy airspace opacities in the left upper lobe more consistent with active infection than heart failure  wheezing+  - pulm f/uuuu

## 2023-01-30 NOTE — PROGRESS NOTE ADULT - SUBJECTIVE AND OBJECTIVE BOX
PULMONARY PROGRESS NOTE    MARTIR VU  MRN-[id number]    Patient is a 77y old  Female who presents with a chief complaint of SOB (29 Jan 2023 15:00)      HPI:  -son at bedside  nc displaced  coughing/congested  upperairway secretions unable to expectorate    -    ROS:   -    ACTIVE MEDICATION LIST:  MEDICATIONS  (STANDING):  albuterol/ipratropium for Nebulization 3 milliLiter(s) Nebulizer every 6 hours  bisacodyl 10 milliGRAM(s) Oral at bedtime  dextrose 5%. 1000 milliLiter(s) (100 mL/Hr) IV Continuous <Continuous>  dextrose 5%. 1000 milliLiter(s) (50 mL/Hr) IV Continuous <Continuous>  dextrose 50% Injectable 25 Gram(s) IV Push once  dextrose 50% Injectable 12.5 Gram(s) IV Push once  dextrose 50% Injectable 25 Gram(s) IV Push once  glucagon  Injectable 1 milliGRAM(s) IntraMuscular once  influenza  Vaccine (HIGH DOSE) 0.7 milliLiter(s) IntraMuscular once  insulin glargine Injectable (LANTUS) 15 Unit(s) SubCutaneous at bedtime  insulin lispro (ADMELOG) corrective regimen sliding scale   SubCutaneous three times a day before meals  insulin lispro (ADMELOG) corrective regimen sliding scale   SubCutaneous at bedtime  insulin lispro Injectable (ADMELOG) 7 Unit(s) SubCutaneous three times a day before meals  levothyroxine 50 MICROGram(s) Oral daily  metoprolol succinate ER 25 milliGRAM(s) Oral daily  pantoprazole    Tablet 40 milliGRAM(s) Oral before breakfast  polyethylene glycol 3350 17 Gram(s) Oral daily  predniSONE   Tablet 20 milliGRAM(s) Oral daily  senna 2 Tablet(s) Oral at bedtime  simvastatin 20 milliGRAM(s) Oral at bedtime  sodium bicarbonate 650 milliGRAM(s) Oral once    MEDICATIONS  (PRN):  acetaminophen     Tablet .. 650 milliGRAM(s) Oral every 6 hours PRN Temp greater or equal to 38C (100.4F), Mild Pain (1 - 3)  dextrose Oral Gel 15 Gram(s) Oral once PRN Blood Glucose LESS THAN 70 milliGRAM(s)/deciliter  guaiFENesin Oral Liquid (Sugar-Free) 100 milliGRAM(s) Oral every 6 hours PRN Cough      EXAM:  Vital Signs Last 24 Hrs  T(C): 36.6 (30 Jan 2023 06:15), Max: 36.9 (29 Jan 2023 17:45)  T(F): 97.8 (30 Jan 2023 06:15), Max: 98.4 (29 Jan 2023 17:45)  HR: 87 (30 Jan 2023 06:15) (71 - 87)  BP: 120/56 (30 Jan 2023 06:15) (113/54 - 120/56)  BP(mean): --  RR: 16 (30 Jan 2023 06:15) (16 - 18)  SpO2: 99% (30 Jan 2023 06:15) (99% - 100%)    Parameters below as of 30 Jan 2023 06:15  Patient On (Oxygen Delivery Method): nasal cannula  O2 Flow (L/min): 2      GENERAL: The patient is awake      LUNGS: rhonchi                            8.6    7.78  )-----------( 231      ( 30 Jan 2023 06:00 )             31.3       01-30    139  |  100  |  24<H>  ----------------------------<  188<H>  4.4   |  33<H>  |  0.73    Ca    9.1      30 Jan 2023 06:00  Phos  3.0     01-30  Mg     1.90     01-30       < from: CT Chest No Cont (01.29.23 @ 13:21) >    ACC: [id number] EXAM:  CT CHEST   ORDERED BY: WHITNEY SUAZO     PROCEDURE DATE:  01/29/2023          INTERPRETATION:  CLINICAL INFORMATION: Worsening shortness of breath.   Opacities on recent chest x-ray. Evaluate for pneumonia.    COMPARISON: CT chest abdomen pelvis 11/8/2022.    CONTRAST/COMPLICATIONS:  IV Contrast: NONE  Oral Contrast: NONE  Complications: None reported at time of study completion    PROCEDURE:  CT scan of the chest was obtained without intravenous contrast.    FINDINGS:    LYMPH NODES: Unchanged calcified mediastinal lymph nodes.    HEART/VASCULATURE: Heart size is normal. Hypodensity of the blood pool in   relation to left ventricular myocardium suggests underlying anemia. No   pericardial effusion. Coronary artery calcifications.    AIRWAYS/LUNGS/PLEURA: Unchanged severe narrowing of the right mainstem   bronchus and bronchus intermedius. Unchanged right apical opacity (2:20)   measuring 2.8 x 1.6 cm. Unchanged right upper lobe 1.3 cm nodular opacity   (2:33). Previously seen right upper lobe cystic/cavitary opacity is   similar in size and now predominantly fluid density in attenuation. No   significant change in right upper lobe opacity along the minor fissure.   Unchanged ill-defined masslike consolidation in the left upper lobe   (2:50) which has gradually been enlarging in size and attenuation since   more remote studies from 2019. Mosaic attenuation. No pleural effusion.    UPPER ABDOMEN: Small hiatal hernia.    BONES/SOFT TISSUES: Degenerative changes of the spine. Soft tissues are   unremarkable.    IMPRESSION:    Since CT chest 11/8/2022:    No new lung parenchymal opacities to suggest pneumonia.    Unchanged bilateral upper lobe opacities of unclear etiology, including   the right apical nodular opacity as well as the left upper lobe   ill-defined masslike consolidation which has been increasing in   size/attenuation since 2019. Continued attention on follow-up imaging.          --- End of Report ---        < end of copied text >      PROBLEM LIST:  77y Female with HEALTH ISSUES - PROBLEM Dx:  Acute on chronic respiratory failure with hypoxia and hypercapnia    Viral pneumonia    Type 2 diabetes mellitus    Hypertension    Hypothyroidism    Prophylactic measure    Steroid-induced hyperglycemia    HLD (hyperlipidemia)              RECS:  -Dr Bashir- discussed with family at bedside regarding ct chest findings, have been discussed in the past, patient does not want invasive work up or biopsy for lung masses.  -continue with supportive care for enterovirus  - get IS (ordered)  - continue nebs  - hypersal nebs BID   -finish prednisone x 5 days   -can trial gentle diuresis  -cont O2  -GOC        Please call with any questions.    Dede Flynn DO  [facility] Pulmonary/Sleep Medicine  [phone]

## 2023-01-30 NOTE — PROGRESS NOTE ADULT - SUBJECTIVE AND OBJECTIVE BOX
History: patient reports fair appetite.  Eating about 50% of meal.  Denies n/v.  denies hypoglycemia.  Son at bedside assisting with translation and overnight history    MEDICATIONS  (STANDING):  albuterol/ipratropium for Nebulization 3 milliLiter(s) Nebulizer every 6 hours  bisacodyl 10 milliGRAM(s) Oral at bedtime  dextrose 5%. 1000 milliLiter(s) (100 mL/Hr) IV Continuous <Continuous>  dextrose 5%. 1000 milliLiter(s) (50 mL/Hr) IV Continuous <Continuous>  dextrose 50% Injectable 25 Gram(s) IV Push once  dextrose 50% Injectable 12.5 Gram(s) IV Push once  dextrose 50% Injectable 25 Gram(s) IV Push once  furosemide   Injectable 20 milliGRAM(s) IV Push once  glucagon  Injectable 1 milliGRAM(s) IntraMuscular once  influenza  Vaccine (HIGH DOSE) 0.7 milliLiter(s) IntraMuscular once  insulin glargine Injectable (LANTUS) 17 Unit(s) SubCutaneous at bedtime  insulin lispro (ADMELOG) corrective regimen sliding scale   SubCutaneous three times a day before meals  insulin lispro (ADMELOG) corrective regimen sliding scale   SubCutaneous at bedtime  insulin lispro Injectable (ADMELOG) 9 Unit(s) SubCutaneous three times a day before meals  levothyroxine 50 MICROGram(s) Oral daily  metoprolol succinate ER 25 milliGRAM(s) Oral daily  pantoprazole    Tablet 40 milliGRAM(s) Oral before breakfast  polyethylene glycol 3350 17 Gram(s) Oral daily  predniSONE   Tablet 20 milliGRAM(s) Oral daily  senna 2 Tablet(s) Oral at bedtime  simvastatin 20 milliGRAM(s) Oral at bedtime  sodium chloride 7% Inhalation 4 milliLiter(s) Inhalation every 12 hours    MEDICATIONS  (PRN):  acetaminophen     Tablet .. 650 milliGRAM(s) Oral every 6 hours PRN Temp greater or equal to 38C (100.4F), Mild Pain (1 - 3)  dextrose Oral Gel 15 Gram(s) Oral once PRN Blood Glucose LESS THAN 70 milliGRAM(s)/deciliter  guaiFENesin Oral Liquid (Sugar-Free) 100 milliGRAM(s) Oral every 6 hours PRN Cough      Allergies    No Known Allergies    Intolerances      Review of Systems:  Constitutional: No fever  ALL OTHER SYSTEMS REVIEWED AND NEGATIVE        PHYSICAL EXAM:  VITALS: T(C): 36.5 (01-30-23 @ 10:18)  T(F): 97.7 (01-30-23 @ 10:18), Max: 98.4 (01-29-23 @ 17:45)  HR: 77 (01-30-23 @ 15:34) (71 - 87)  BP: 123/53 (01-30-23 @ 10:18) (113/54 - 123/53)  RR:  (16 - 18)  SpO2:  (98% - 100%)  Wt(kg): --  GENERAL: NAD, well-developed  GI: Soft, nontender, non distended  SKIN: Dry, intact, No rashes or lesions  PSYCH: Alert and oriented x 3, normal affect, normal mood      CAPILLARY BLOOD GLUCOSE  POCT Blood Glucose.: 273 mg/dL (30 Jan 2023 12:05)  POCT Blood Glucose.: 221 mg/dL (30 Jan 2023 07:36)        01-30    139  |  100  |  24<H>  ----------------------------<  188<H>  4.4   |  33<H>  |  0.73    eGFR: 84    Ca    9.1      01-30  Mg     1.90     01-30  Phos  3.0     01-30

## 2023-01-31 ENCOUNTER — TRANSCRIPTION ENCOUNTER (OUTPATIENT)
Age: 78
End: 2023-01-31

## 2023-01-31 LAB
ANION GAP SERPL CALC-SCNC: 7 MMOL/L — SIGNIFICANT CHANGE UP (ref 7–14)
BASOPHILS # BLD AUTO: 0.05 K/UL — SIGNIFICANT CHANGE UP (ref 0–0.2)
BASOPHILS NFR BLD AUTO: 0.6 % — SIGNIFICANT CHANGE UP (ref 0–2)
BUN SERPL-MCNC: 22 MG/DL — SIGNIFICANT CHANGE UP (ref 7–23)
CALCIUM SERPL-MCNC: 9.2 MG/DL — SIGNIFICANT CHANGE UP (ref 8.4–10.5)
CHLORIDE SERPL-SCNC: 98 MMOL/L — SIGNIFICANT CHANGE UP (ref 98–107)
CO2 SERPL-SCNC: 31 MMOL/L — SIGNIFICANT CHANGE UP (ref 22–31)
CREAT SERPL-MCNC: 0.71 MG/DL — SIGNIFICANT CHANGE UP (ref 0.5–1.3)
CULTURE RESULTS: SIGNIFICANT CHANGE UP
EGFR: 86 ML/MIN/1.73M2 — SIGNIFICANT CHANGE UP
EOSINOPHIL # BLD AUTO: 0.3 K/UL — SIGNIFICANT CHANGE UP (ref 0–0.5)
EOSINOPHIL NFR BLD AUTO: 3.9 % — SIGNIFICANT CHANGE UP (ref 0–6)
GLUCOSE BLDC GLUCOMTR-MCNC: 151 MG/DL — HIGH (ref 70–99)
GLUCOSE BLDC GLUCOMTR-MCNC: 156 MG/DL — HIGH (ref 70–99)
GLUCOSE BLDC GLUCOMTR-MCNC: 205 MG/DL — HIGH (ref 70–99)
GLUCOSE BLDC GLUCOMTR-MCNC: 219 MG/DL — HIGH (ref 70–99)
GLUCOSE SERPL-MCNC: 149 MG/DL — HIGH (ref 70–99)
HCT VFR BLD CALC: 31.1 % — LOW (ref 34.5–45)
HGB BLD-MCNC: 8.7 G/DL — LOW (ref 11.5–15.5)
IANC: 4.51 K/UL — SIGNIFICANT CHANGE UP (ref 1.8–7.4)
IMM GRANULOCYTES NFR BLD AUTO: 0.4 % — SIGNIFICANT CHANGE UP (ref 0–0.9)
LYMPHOCYTES # BLD AUTO: 1.95 K/UL — SIGNIFICANT CHANGE UP (ref 1–3.3)
LYMPHOCYTES # BLD AUTO: 25.2 % — SIGNIFICANT CHANGE UP (ref 13–44)
MAGNESIUM SERPL-MCNC: 1.8 MG/DL — SIGNIFICANT CHANGE UP (ref 1.6–2.6)
MCHC RBC-ENTMCNC: 23 PG — LOW (ref 27–34)
MCHC RBC-ENTMCNC: 28 GM/DL — LOW (ref 32–36)
MCV RBC AUTO: 82.1 FL — SIGNIFICANT CHANGE UP (ref 80–100)
MONOCYTES # BLD AUTO: 0.91 K/UL — HIGH (ref 0–0.9)
MONOCYTES NFR BLD AUTO: 11.7 % — SIGNIFICANT CHANGE UP (ref 2–14)
NEUTROPHILS # BLD AUTO: 4.51 K/UL — SIGNIFICANT CHANGE UP (ref 1.8–7.4)
NEUTROPHILS NFR BLD AUTO: 58.2 % — SIGNIFICANT CHANGE UP (ref 43–77)
NRBC # BLD: 0 /100 WBCS — SIGNIFICANT CHANGE UP (ref 0–0)
NRBC # FLD: 0 K/UL — SIGNIFICANT CHANGE UP (ref 0–0)
PHOSPHATE SERPL-MCNC: 3.4 MG/DL — SIGNIFICANT CHANGE UP (ref 2.5–4.5)
PLATELET # BLD AUTO: 261 K/UL — SIGNIFICANT CHANGE UP (ref 150–400)
POTASSIUM SERPL-MCNC: 4.1 MMOL/L — SIGNIFICANT CHANGE UP (ref 3.5–5.3)
POTASSIUM SERPL-SCNC: 4.1 MMOL/L — SIGNIFICANT CHANGE UP (ref 3.5–5.3)
RBC # BLD: 3.79 M/UL — LOW (ref 3.8–5.2)
RBC # FLD: 21.4 % — HIGH (ref 10.3–14.5)
SODIUM SERPL-SCNC: 136 MMOL/L — SIGNIFICANT CHANGE UP (ref 135–145)
SPECIMEN SOURCE: SIGNIFICANT CHANGE UP
WBC # BLD: 7.75 K/UL — SIGNIFICANT CHANGE UP (ref 3.8–10.5)
WBC # FLD AUTO: 7.75 K/UL — SIGNIFICANT CHANGE UP (ref 3.8–10.5)

## 2023-01-31 PROCEDURE — 99232 SBSQ HOSP IP/OBS MODERATE 35: CPT

## 2023-01-31 RX ORDER — INSULIN GLARGINE 100 [IU]/ML
19 INJECTION, SOLUTION SUBCUTANEOUS AT BEDTIME
Refills: 0 | Status: DISCONTINUED | OUTPATIENT
Start: 2023-01-31 | End: 2023-02-01

## 2023-01-31 RX ORDER — ENOXAPARIN SODIUM 100 MG/ML
40 INJECTION SUBCUTANEOUS EVERY 24 HOURS
Refills: 0 | Status: DISCONTINUED | OUTPATIENT
Start: 2023-01-31 | End: 2023-02-01

## 2023-01-31 RX ADMIN — SENNA PLUS 2 TABLET(S): 8.6 TABLET ORAL at 21:42

## 2023-01-31 RX ADMIN — Medication 50 MICROGRAM(S): at 05:24

## 2023-01-31 RX ADMIN — INSULIN GLARGINE 19 UNIT(S): 100 INJECTION, SOLUTION SUBCUTANEOUS at 22:26

## 2023-01-31 RX ADMIN — Medication 650 MILLIGRAM(S): at 21:43

## 2023-01-31 RX ADMIN — Medication 25 MILLIGRAM(S): at 05:24

## 2023-01-31 RX ADMIN — Medication 9 UNIT(S): at 13:07

## 2023-01-31 RX ADMIN — Medication 9 UNIT(S): at 18:57

## 2023-01-31 RX ADMIN — Medication 650 MILLIGRAM(S): at 09:20

## 2023-01-31 RX ADMIN — Medication 3 MILLILITER(S): at 05:21

## 2023-01-31 RX ADMIN — PANTOPRAZOLE SODIUM 40 MILLIGRAM(S): 20 TABLET, DELAYED RELEASE ORAL at 05:24

## 2023-01-31 RX ADMIN — Medication 4: at 08:14

## 2023-01-31 RX ADMIN — Medication 3 MILLILITER(S): at 22:10

## 2023-01-31 RX ADMIN — Medication 10 MILLIGRAM(S): at 21:43

## 2023-01-31 RX ADMIN — Medication 3 MILLILITER(S): at 15:13

## 2023-01-31 RX ADMIN — Medication 3 MILLILITER(S): at 11:25

## 2023-01-31 RX ADMIN — ENOXAPARIN SODIUM 40 MILLIGRAM(S): 100 INJECTION SUBCUTANEOUS at 13:20

## 2023-01-31 RX ADMIN — POLYETHYLENE GLYCOL 3350 17 GRAM(S): 17 POWDER, FOR SOLUTION ORAL at 13:07

## 2023-01-31 RX ADMIN — Medication 650 MILLIGRAM(S): at 22:13

## 2023-01-31 RX ADMIN — Medication 9 UNIT(S): at 08:14

## 2023-01-31 RX ADMIN — Medication 2: at 13:06

## 2023-01-31 RX ADMIN — Medication 2: at 18:57

## 2023-01-31 RX ADMIN — SIMVASTATIN 20 MILLIGRAM(S): 20 TABLET, FILM COATED ORAL at 21:43

## 2023-01-31 RX ADMIN — Medication 20 MILLIGRAM(S): at 05:24

## 2023-01-31 RX ADMIN — Medication 650 MILLIGRAM(S): at 09:50

## 2023-01-31 NOTE — DISCHARGE NOTE PROVIDER - CARE PROVIDERS DIRECT ADDRESSES
,DirectAddress_Unknown ,DirectAddress_Unknown,[email or web address]

## 2023-01-31 NOTE — PROGRESS NOTE ADULT - SUBJECTIVE AND OBJECTIVE BOX
Forrest Rosenthal MD  Interventional Cardiology / Endovascular Specialist  [place] Office : [address] [place] [address] Tel:   [place] Office : [address] [facility] [address]  Tel: [phone]      Subjective/Overnight events: Patient lying in bed comfortably. No acute distress.   	  MEDICATIONS:  enoxaparin Injectable 40 milliGRAM(s) SubCutaneous every 24 hours  metoprolol succinate ER 25 milliGRAM(s) Oral daily      albuterol/ipratropium for Nebulization 3 milliLiter(s) Nebulizer every 6 hours  guaiFENesin Oral Liquid (Sugar-Free) 100 milliGRAM(s) Oral every 6 hours PRN  sodium chloride 7% Inhalation 4 milliLiter(s) Inhalation every 12 hours    acetaminophen     Tablet .. 650 milliGRAM(s) Oral every 6 hours PRN    bisacodyl 10 milliGRAM(s) Oral at bedtime  pantoprazole    Tablet 40 milliGRAM(s) Oral before breakfast  polyethylene glycol 3350 17 Gram(s) Oral daily  senna 2 Tablet(s) Oral at bedtime    dextrose 50% Injectable 25 Gram(s) IV Push once  dextrose 50% Injectable 12.5 Gram(s) IV Push once  dextrose 50% Injectable 25 Gram(s) IV Push once  dextrose Oral Gel 15 Gram(s) Oral once PRN  glucagon  Injectable 1 milliGRAM(s) IntraMuscular once  insulin glargine Injectable (LANTUS) 17 Unit(s) SubCutaneous at bedtime  insulin lispro (ADMELOG) corrective regimen sliding scale   SubCutaneous three times a day before meals  insulin lispro (ADMELOG) corrective regimen sliding scale   SubCutaneous at bedtime  insulin lispro Injectable (ADMELOG) 9 Unit(s) SubCutaneous three times a day before meals  levothyroxine 50 MICROGram(s) Oral daily  simvastatin 20 milliGRAM(s) Oral at bedtime    dextrose 5%. 1000 milliLiter(s) IV Continuous <Continuous>  dextrose 5%. 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      Cataract of left eye          SOCIAL HISTORY: Substance Use (street drugs): ( x ) never used  (  ) other:    FAMILY HISTORY:  Family history of heart attack (Mother)  mother          PHYSICAL EXAM:  T(C): 36.7 (01-31-23 @ 10:48), Max: 36.8 (01-30-23 @ 17:32)  HR: 82 (01-31-23 @ 11:30) (77 - 88)  BP: 127/63 (01-31-23 @ 10:48) (123/56 - 147/68)  RR: 16 (01-31-23 @ 10:48) (16 - 17)  SpO2: 98% (01-31-23 @ 11:30) (98% - 99%)  Wt(kg): --  I&O's Summary        GENERAL: NAD  EYES: EOMI, PERRLA, conjunctiva and sclera clear  ENMT: No tonsillar erythema, exudates, or enlargement  Cardiovascular: Normal S1 S2, No JVD, No murmurs, No edema  Respiratory: Lungs wheezing to auscultation	  Gastrointestinal:  Soft, Non-tender, + BS	  Extremities: No edema                                     8.7    7.75  )-----------( 261      ( 31 Jan 2023 06:10 )             31.1     01-31    136  |  98  |  22  ----------------------------<  149<H>  4.1   |  31  |  0.71    Ca    9.2      31 Jan 2023 06:10  Phos  3.4     01-31  Mg     1.80     01-31      proBNP:   Lipid Profile:   HgA1c:   TSH:     Consultant(s) Notes Reviewed:  [x ] YES  [ ] NO    Care Discussed with Consultants/Other Providers [ x] YES  [ ] NO    Imaging Personally Reviewed independently:  [x] YES  [ ] NO    All labs, radiologic studies, vitals, orders and medications list reviewed. Patient is seen and examined at bedside. Case discussed with medical team.

## 2023-01-31 NOTE — DISCHARGE NOTE PROVIDER - PROVIDER TOKENS
PROVIDER:[TOKEN:[[id number]],FOLLOWUP:[1 week],ESTABLISHEDPATIENT:[T]] PROVIDER:[TOKEN:[[id number]],FOLLOWUP:[1 week],ESTABLISHEDPATIENT:[T]],PROVIDER:[TOKEN:[[id number]]]

## 2023-01-31 NOTE — PROGRESS NOTE ADULT - SUBJECTIVE AND OBJECTIVE BOX
History: patient reports fair appetite.  Eating about 50% of meal.  Denies n/v.  denies hypoglycemia.  Son at bedside assisting with translation and overnight history    MEDICATIONS  (STANDING):  albuterol/ipratropium for Nebulization 3 milliLiter(s) Nebulizer every 6 hours  bisacodyl 10 milliGRAM(s) Oral at bedtime  dextrose 5%. 1000 milliLiter(s) (100 mL/Hr) IV Continuous <Continuous>  dextrose 5%. 1000 milliLiter(s) (50 mL/Hr) IV Continuous <Continuous>  dextrose 50% Injectable 25 Gram(s) IV Push once  dextrose 50% Injectable 12.5 Gram(s) IV Push once  dextrose 50% Injectable 25 Gram(s) IV Push once  furosemide   Injectable 20 milliGRAM(s) IV Push once  glucagon  Injectable 1 milliGRAM(s) IntraMuscular once  influenza  Vaccine (HIGH DOSE) 0.7 milliLiter(s) IntraMuscular once  insulin glargine Injectable (LANTUS) 17 Unit(s) SubCutaneous at bedtime  insulin lispro (ADMELOG) corrective regimen sliding scale   SubCutaneous three times a day before meals  insulin lispro (ADMELOG) corrective regimen sliding scale   SubCutaneous at bedtime  insulin lispro Injectable (ADMELOG) 9 Unit(s) SubCutaneous three times a day before meals  levothyroxine 50 MICROGram(s) Oral daily  metoprolol succinate ER 25 milliGRAM(s) Oral daily  pantoprazole    Tablet 40 milliGRAM(s) Oral before breakfast  polyethylene glycol 3350 17 Gram(s) Oral daily  predniSONE   Tablet 20 milliGRAM(s) Oral daily  senna 2 Tablet(s) Oral at bedtime  simvastatin 20 milliGRAM(s) Oral at bedtime  sodium chloride 7% Inhalation 4 milliLiter(s) Inhalation every 12 hours    MEDICATIONS  (PRN):  acetaminophen     Tablet .. 650 milliGRAM(s) Oral every 6 hours PRN Temp greater or equal to 38C (100.4F), Mild Pain (1 - 3)  dextrose Oral Gel 15 Gram(s) Oral once PRN Blood Glucose LESS THAN 70 milliGRAM(s)/deciliter  guaiFENesin Oral Liquid (Sugar-Free) 100 milliGRAM(s) Oral every 6 hours PRN Cough      Allergies    No Known Allergies    Intolerances      Review of Systems:  Constitutional: No fever  ALL OTHER SYSTEMS REVIEWED AND NEGATIVE        PHYSICAL EXAM:  Vital Signs Last 24 Hrs  T(C): 36.7 (31 Jan 2023 10:48), Max: 36.8 (30 Jan 2023 17:32)  T(F): 98 (31 Jan 2023 10:48), Max: 98.2 (30 Jan 2023 17:32)  HR: 82 (31 Jan 2023 11:30) (81 - 88)  BP: 127/63 (31 Jan 2023 10:48) (123/56 - 147/68)  BP(mean): --  RR: 16 (31 Jan 2023 10:48) (16 - 17)  SpO2: 98% (31 Jan 2023 11:30) (98% - 99%)    Parameters below as of 31 Jan 2023 11:30  Patient On (Oxygen Delivery Method): nasal cannula w/ humidification      GENERAL: NAD, well-developed  GI: Soft, nontender, non distended  SKIN: Dry, intact, No rashes or lesions  PSYCH: Alert and oriented x 3, normal affect, normal mood      CAPILLARY BLOOD GLUCOSE    POCT Blood Glucose.: 156 mg/dL (31 Jan 2023 12:18)  POCT Blood Glucose.: 205 mg/dL (31 Jan 2023 07:20)  POCT Blood Glucose.: 114 mg/dL (30 Jan 2023 22:34)  POCT Blood Glucose.: 151 mg/dL (30 Jan 2023 17:35)  POCT Blood Glucose.: 273 mg/dL (30 Jan 2023 12:05)  POCT Blood Glucose.: 221 mg/dL (30 Jan 2023 07:36)      01-31    136  |  98  |  22  ----------------------------<  149<H>  4.1   |  31  |  0.71    Ca    9.2      31 Jan 2023 06:10  Phos  3.4     01-31  Mg     1.80     01-31    Diet, Consistent Carbohydrate w/Evening Snack:   DASH/TLC Sodium & Cholesterol Restricted (DASH)  Ellen (01-26-23 @ 20:42) [Active]

## 2023-01-31 NOTE — PROGRESS NOTE ADULT - SUBJECTIVE AND OBJECTIVE BOX
PULMONARY PROGRESS NOTE    MARTIR VU  MRN-[id number]    Patient is a 77y old  Female who presents with a chief complaint of SOB (29 Jan 2023 15:00)      HPI:  is doing better today  using incentive rajani    MEDICATIONS  (STANDING):  albuterol/ipratropium for Nebulization 3 milliLiter(s) Nebulizer every 6 hours  bisacodyl 10 milliGRAM(s) Oral at bedtime  dextrose 5%. 1000 milliLiter(s) (100 mL/Hr) IV Continuous <Continuous>  dextrose 5%. 1000 milliLiter(s) (50 mL/Hr) IV Continuous <Continuous>  dextrose 50% Injectable 25 Gram(s) IV Push once  dextrose 50% Injectable 12.5 Gram(s) IV Push once  dextrose 50% Injectable 25 Gram(s) IV Push once  enoxaparin Injectable 40 milliGRAM(s) SubCutaneous every 24 hours  glucagon  Injectable 1 milliGRAM(s) IntraMuscular once  influenza  Vaccine (HIGH DOSE) 0.7 milliLiter(s) IntraMuscular once  insulin glargine Injectable (LANTUS) 17 Unit(s) SubCutaneous at bedtime  insulin lispro (ADMELOG) corrective regimen sliding scale   SubCutaneous three times a day before meals  insulin lispro (ADMELOG) corrective regimen sliding scale   SubCutaneous at bedtime  insulin lispro Injectable (ADMELOG) 9 Unit(s) SubCutaneous three times a day before meals  levothyroxine 50 MICROGram(s) Oral daily  metoprolol succinate ER 25 milliGRAM(s) Oral daily  pantoprazole    Tablet 40 milliGRAM(s) Oral before breakfast  polyethylene glycol 3350 17 Gram(s) Oral daily  senna 2 Tablet(s) Oral at bedtime  simvastatin 20 milliGRAM(s) Oral at bedtime  sodium chloride 7% Inhalation 4 milliLiter(s) Inhalation every 12 hours    MEDICATIONS  (PRN):  acetaminophen     Tablet .. 650 milliGRAM(s) Oral every 6 hours PRN Temp greater or equal to 38C (100.4F), Mild Pain (1 - 3)  benzonatate 100 milliGRAM(s) Oral every 8 hours PRN Cough  dextrose Oral Gel 15 Gram(s) Oral once PRN Blood Glucose LESS THAN 70 milliGRAM(s)/deciliter  guaiFENesin Oral Liquid (Sugar-Free) 100 milliGRAM(s) Oral every 6 hours PRN Cough        EXAM:  Vital Signs Last 24 Hrs  T(C): 36.7 (31 Jan 2023 10:48), Max: 36.8 (30 Jan 2023 17:32)  T(F): 98 (31 Jan 2023 10:48), Max: 98.2 (30 Jan 2023 17:32)  HR: 82 (31 Jan 2023 11:30) (77 - 88)  BP: 127/63 (31 Jan 2023 10:48) (123/56 - 147/68)  BP(mean): --  RR: 16 (31 Jan 2023 10:48) (16 - 17)  SpO2: 98% (31 Jan 2023 11:30) (98% - 99%)    Parameters below as of 31 Jan 2023 11:30  Patient On (Oxygen Delivery Method): nasal cannula w/ humidification        GENERAL: The patient is awake      LUNGS: clear                          8.7    7.75  )-----------( 261      ( 31 Jan 2023 06:10 )             31.1   01-31    136  |  98  |  22  ----------------------------<  149<H>  4.1   |  31  |  0.71    Ca    9.2      31 Jan 2023 06:10  Phos  3.4     01-31  Mg     1.80     01-31         < from: CT Chest No Cont (01.29.23 @ 13:21) >    ACC: [id number] EXAM:  CT CHEST   ORDERED BY: WHITNEY SUAZO     PROCEDURE DATE:  01/29/2023          INTERPRETATION:  CLINICAL INFORMATION: Worsening shortness of breath.   Opacities on recent chest x-ray. Evaluate for pneumonia.    COMPARISON: CT chest abdomen pelvis 11/8/2022.    CONTRAST/COMPLICATIONS:  IV Contrast: NONE  Oral Contrast: NONE  Complications: None reported at time of study completion    PROCEDURE:  CT scan of the chest was obtained without intravenous contrast.    FINDINGS:    LYMPH NODES: Unchanged calcified mediastinal lymph nodes.    HEART/VASCULATURE: Heart size is normal. Hypodensity of the blood pool in   relation to left ventricular myocardium suggests underlying anemia. No   pericardial effusion. Coronary artery calcifications.    AIRWAYS/LUNGS/PLEURA: Unchanged severe narrowing of the right mainstem   bronchus and bronchus intermedius. Unchanged right apical opacity (2:20)   measuring 2.8 x 1.6 cm. Unchanged right upper lobe 1.3 cm nodular opacity   (2:33). Previously seen right upper lobe cystic/cavitary opacity is   similar in size and now predominantly fluid density in attenuation. No   significant change in right upper lobe opacity along the minor fissure.   Unchanged ill-defined masslike consolidation in the left upper lobe   (2:50) which has gradually been enlarging in size and attenuation since   more remote studies from 2019. Mosaic attenuation. No pleural effusion.    UPPER ABDOMEN: Small hiatal hernia.    BONES/SOFT TISSUES: Degenerative changes of the spine. Soft tissues are   unremarkable.    IMPRESSION:    Since CT chest 11/8/2022:    No new lung parenchymal opacities to suggest pneumonia.    Unchanged bilateral upper lobe opacities of unclear etiology, including   the right apical nodular opacity as well as the left upper lobe   ill-defined masslike consolidation which has been increasing in   size/attenuation since 2019. Continued attention on follow-up imaging.          --- End of Report ---        < end of copied text >      PROBLEM LIST:  77y Female with HEALTH ISSUES - PROBLEM Dx:  Acute on chronic respiratory failure with hypoxia and hypercapnia    Viral pneumonia    Type 2 diabetes mellitus    Hypertension    Hypothyroidism    Prophylactic measure    Steroid-induced hyperglycemia    HLD (hyperlipidemia)              RECS:  - I discussed with family at bedside regarding ct chest findings, have been discussed in the past, patient does not want invasive work up or biopsy for lung masses.  -continue with supportive care for enterovirus  - incentive rajani  - continue nebs  - hypersal nebs BID   -finish prednisone x 5 days   -can trial gentle diuresis  -cont O2  -GOC        Please call with any questions.    Kathleen Bashir MD  [facility] Pulmonary/Sleep Medicine  [phone]

## 2023-01-31 NOTE — DISCHARGE NOTE PROVIDER - CARE PROVIDER_API CALL
Alfredo Hannah)  Internal Medicine  [address] [place] [address]  Phone: [phone]  Fax: [phone]  Established Patient  Follow Up Time: 1 week   Alfredo Hannah)  Internal Medicine  [address] [place] [address]  Phone: [phone]  Fax: [phone]  Established Patient  Follow Up Time: 1 week    Kathleen Bashir)  Critical Care Medicine; Internal Medicine; Pulmonary Disease  [address] [facility], [address]  [place] [address]  Phone: [phone]  Fax: [phone]  Follow Up Time:

## 2023-01-31 NOTE — DISCHARGE NOTE PROVIDER - NSDCMRMEDTOKEN_GEN_ALL_CORE_FT
Basaglar KwikPen 100 units/mL subcutaneous solution: 10 unit(s) subcutaneous once a day (at bedtime)  cholecalciferol oral tablet: 1000 unit(s) orally once a day  ipratropium-albuterol 0.5 mg-2.5 mg/3 mL inhalation solution: 3 milliliter(s) inhaled every 12 hours  levothyroxine 50 mcg (0.05 mg) oral tablet: 1 tab(s) orally once a day  metFORMIN 500 mg oral tablet: 1 tab(s) orally 2 times a day   metoprolol succinate 25 mg oral tablet, extended release: 1 tab(s) orally once a day  NovoLOG FlexPen 100 units/mL injectable solution: 4 unit(s) injectable 3 times a day (before meals)  simvastatin 20 mg oral tablet: 1 tab(s) orally once a day (at bedtime)   Basaglar KwikPen 100 units/mL subcutaneous solution: 12 unit(s) subcutaneous once a day (at bedtime)   cholecalciferol oral tablet: 1000 unit(s) orally once a day  ipratropium-albuterol 0.5 mg-2.5 mg/3 mL inhalation solution: 3 milliliter(s) inhaled every 12 hours  levothyroxine 50 mcg (0.05 mg) oral tablet: 1 tab(s) orally once a day  metFORMIN 500 mg oral tablet: 1 tab(s) orally 2 times a day   metoprolol succinate 25 mg oral tablet, extended release: 1 tab(s) orally once a day  NovoLOG FlexPen 100 units/mL injectable solution: 6 unit(s) injectable 3 times a day (before meals)   simvastatin 20 mg oral tablet: 1 tab(s) orally once a day (at bedtime)

## 2023-01-31 NOTE — DISCHARGE NOTE PROVIDER - HOSPITAL COURSE
77 year old female with PMHx of DM, HTN, hypothyroidism and hyperlipidemia recently discharged from [facility] on 12/10/2022 for sepsis with acute hypercapnic respiratory failure secondary to RSV pneumonia and discharged on Home O2 3-4LNC who is now admitted with acute on chronic hypoxemic respiratory failure secondary to likely Entero/rhinovirus pneumonia.    Acute on chronic respiratory failure with hypoxia and hypercapnia.   -Discharged on NC 3-4 lts/NC on last visit  -D-dimer is normal so unlikely to be PE   -Troponin x 2 negative   -positive entero/rhinovirus  -CXR 1/26 shows Chronic changes of the right upper lobe, New hazy airspace opacities in the left upper lobe more consistent with active infection than heart failure  -BNP is slightly elevated at 2524   -TTE in 11/2022 with e/o diastolic dysfunction and severe pulm HTN  -Likely secondary to positive Entero/rhinovirus on RVP    - No definite e/o superimposed bacterial infection   - zosyn dc'd per ID monitor off abx    - IV lasix, assess for further need of IV lasix  --------   - BCx no growth to date  - ID consulted   - sputum Cx >Few PMNs, normal rosalba    - s/p prednisone x5days   - CT chest 1/29 -Since CT chest 11/8/2022 no new lung parenchymal opacities to suggest pneumonia.  Unchanged bilateral upper lobe opacities of unclear etiology, including  the right apical nodular opacity as well as the left upper lobe ill-defined masslike consolidation which has been increasing in size/attenuation since 2019. Continued attention on follow-up imaging. CT Chest with no pneumonia, lt UL opacity present since 2019, not acute finding, can be worked up as outpt.   - pulm consulted -patient does not want invasive work up or biopsy for lung masses  - Chest PT, incentive spirometer  - prn robitussin, tessalon perles     Viral pneumonia.    -RVP is positive for Entero/rhinovirus  -CXR 1/26 shows Chronic changes of the right upper lob, New hazy airspace opacities in the left upper lobe more consistent with active infection than heart failure  - Possible viral pneumonia  - No definite e/o superimposed bacterial infection  - monitor off abx  - Duonebs ATC  - Prednisone 20mg QD x 5 days  - Symptomatic treatment with tessalon/robitussin as needed  - F/up MRSA nasal swab---  - BCX ntd   - sputum cultur normal rosalba     Type 2 diabetes mellitus.   - On basaglar 10U qhs and novolog 4U TID, also on metformin  Borderline controlled with A1C of 8.8 in 12/22  - Endo consulted for hyperglycemia with steroids  - ISS   - C/w simvastatin.    Hypertension.   - On metoprolol 25mg XL QD  - C/w metoprolol  - Should ideally be on ACE-I/ARB    Hypothyroidism.   - On levothyroxine 50 mcg QD  - normal TSH 12/22  - C/w home dose of levothyroxine.    Dispo: home PT     On_________, case was discussed with Dr. Gonzalez, patient is medically cleared and optimized for discharge today. All medications were reviewed with attending, and sent to mutually agreed upon pharmacy.   77 year old female with PMHx of DM, HTN, hypothyroidism and hyperlipidemia recently discharged from [facility] on 12/10/2022 for sepsis with acute hypercapnic respiratory failure secondary to RSV pneumonia and discharged on Home O2 3-4LNC who is now admitted with acute on chronic hypoxemic respiratory failure secondary to likely Entero/rhinovirus pneumonia.    Acute on chronic respiratory failure with hypoxia and hypercapnia.   -Discharged on NC 3-4 lts/NC on last visit  -D-dimer is normal so unlikely to be PE   -Troponin x 2 negative   -positive entero/rhinovirus  -CXR 1/26 shows Chronic changes of the right upper lobe, New hazy airspace opacities in the left upper lobe more consistent with active infection than heart failure  -BNP is slightly elevated at 2524   -TTE in 11/2022 with e/o diastolic dysfunction and severe pulm HTN  -Likely secondary to positive Entero/rhinovirus on RVP    - No definite e/o superimposed bacterial infection   - zosyn dc'd per ID monitor off abx    - IV lasix, assess for further need of IV lasix  --------   - BCx no growth to date  - ID consulted   - sputum Cx >Few PMNs, normal rosalba    - s/p prednisone x5days   - CT chest 1/29 -Since CT chest 11/8/2022 no new lung parenchymal opacities to suggest pneumonia.  Unchanged bilateral upper lobe opacities of unclear etiology, including  the right apical nodular opacity as well as the left upper lobe ill-defined masslike consolidation which has been increasing in size/attenuation since 2019. Continued attention on follow-up imaging. CT Chest with no pneumonia, lt UL opacity present since 2019, not acute finding, can be worked up as outpt.   - pulm consulted -patient does not want invasive work up or biopsy for lung masses  - Chest PT, incentive spirometer  - prn robitussin, tessalon perles     Viral pneumonia.    -RVP is positive for Entero/rhinovirus  -CXR 1/26 shows Chronic changes of the right upper lob, New hazy airspace opacities in the left upper lobe more consistent with active infection than heart failure  - Possible viral pneumonia  - No definite e/o superimposed bacterial infection  - monitor off abx  - Duonebs ATC  - Prednisone 20mg QD x 5 days  - Symptomatic treatment with tessalon/robitussin as needed  - F/up MRSA nasal swab---  - BCX ntd   - sputum cultur normal rosalba     Type 2 diabetes mellitus.   - On basaglar 10U qhs and novolog 4U TID, also on metformin  Borderline controlled with A1C of 8.8 in 12/22  - Endo consulted for hyperglycemia with steroids  - ISS   - C/w simvastatin.    Hypertension.   - On metoprolol 25mg XL QD  - C/w metoprolol  - Should ideally be on ACE-I/ARB    Hypothyroidism.   - On levothyroxine 50 mcg QD  - normal TSH 12/22  - C/w home dose of levothyroxine.    Dispo: home PT     On 2/1/23, case was discussed with Dr. Gonzalez, patient is medically cleared and optimized for discharge today. All medications were reviewed with attending, and sent to mutually agreed upon pharmacy.

## 2023-01-31 NOTE — DISCHARGE NOTE PROVIDER - NSDCCPCAREPLAN_GEN_ALL_CORE_FT
PRINCIPAL DISCHARGE DIAGNOSIS  Diagnosis: PNA (pneumonia)  Assessment and Plan of Treatment: You were seen by the pulmonologist, likely due to entero/rhinovirus, given antibiotics, blood cultures negative till date.   Your CAT Scan revealed no pneumonia, but noted with a left upper lobe ill-defined masslike consolidation increased in size/attenuation since 2019, as per your family discussion with the pulmonlogist, you deferred any further workup/biopsy of the lung mass at this time, Recommend to   followup serial CAT scan as outpatient with the pulmonologist      SECONDARY DISCHARGE DIAGNOSES  Diagnosis: Hypothyroidism  Assessment and Plan of Treatment: Continue your thyroid medications as recommended and follow-up with your outpatient provider for continual thyroid function testing and further medication management.    Diagnosis: Hypertension  Assessment and Plan of Treatment: stable Continue blood pressure medication regimen as directed. Follow a low salt/cholesterol diet. Monitor for any visual changes, headaches or dizziness.  Monitor blood pressure regularly.  Follow up with your primary care provider for further management for high blood pressure.       PRINCIPAL DISCHARGE DIAGNOSIS  Diagnosis: PNA (pneumonia)  Assessment and Plan of Treatment: You were seen by the pulmonologist, likely due to entero/rhinovirus, given antibiotics, blood cultures negative till date.   Your CAT Scan revealed no pneumonia, but noted with a left upper lobe ill-defined masslike consolidation increased in size/attenuation since 2019, as per your family discussion with the pulmonlogist, you deferred any further workup/biopsy of the lung mass at this time, Recommend to   followup serial CAT scan as outpatient with the pulmonologist      SECONDARY DISCHARGE DIAGNOSES  Diagnosis: Type 2 diabetes mellitus  Assessment and Plan of Treatment: Your HgA1C during hospitalization was noted to be 8.8% (Provide such information to your primary care).  Continue your medication regimen and a consistent carbohydrate diet (Meaning eating the same amount of carbohydrates at the same time each day). Monitor blood glucose levels throughout the day before meals and at bedtime. Record blood sugars and bring to outpatient providers appointment in order to be reviewed by your doctor for management modifications. If your sugars are more than 400 or less than 70 you should contact your PCP immediately.   Monitor for signs/symptoms of low blood glucose, such as, dizziness, altered mental status, or cool/clammy skin. In addition, monitor for signs/symptoms of high blood glucose, such as, feeling hot, dry, fatigued, or with increased thirst/urination.   Make regular podiatry appointments in order to have feet checked for wounds and uncontrolled toe nail growth to prevent infections, as well as, appointments with an ophthalmologist to monitor your vision.      Diagnosis: Hypertension  Assessment and Plan of Treatment: stable Continue blood pressure medication regimen as directed. Follow a low salt/cholesterol diet. Monitor for any visual changes, headaches or dizziness.  Monitor blood pressure regularly.  Follow up with your primary care provider for further management for high blood pressure.      Diagnosis: Hypothyroidism  Assessment and Plan of Treatment: Continue your thyroid medications as recommended and follow-up with your outpatient provider for continual thyroid function testing and further medication management.

## 2023-01-31 NOTE — PROGRESS NOTE ADULT - SUBJECTIVE AND OBJECTIVE BOX
SUBJECTIVE / OVERNIGHT EVENTS:pt seen and examined, pts family next to pts bedside , not in distress  01-31-23     MEDICATIONS  (STANDING):  albuterol/ipratropium for Nebulization 3 milliLiter(s) Nebulizer every 6 hours  bisacodyl 10 milliGRAM(s) Oral at bedtime  dextrose 5%. 1000 milliLiter(s) (50 mL/Hr) IV Continuous <Continuous>  dextrose 5%. 1000 milliLiter(s) (100 mL/Hr) IV Continuous <Continuous>  dextrose 50% Injectable 25 Gram(s) IV Push once  dextrose 50% Injectable 12.5 Gram(s) IV Push once  dextrose 50% Injectable 25 Gram(s) IV Push once  enoxaparin Injectable 40 milliGRAM(s) SubCutaneous every 24 hours  glucagon  Injectable 1 milliGRAM(s) IntraMuscular once  influenza  Vaccine (HIGH DOSE) 0.7 milliLiter(s) IntraMuscular once  insulin glargine Injectable (LANTUS) 19 Unit(s) SubCutaneous at bedtime  insulin lispro (ADMELOG) corrective regimen sliding scale   SubCutaneous three times a day before meals  insulin lispro (ADMELOG) corrective regimen sliding scale   SubCutaneous at bedtime  insulin lispro Injectable (ADMELOG) 9 Unit(s) SubCutaneous three times a day before meals  levothyroxine 50 MICROGram(s) Oral daily  metoprolol succinate ER 25 milliGRAM(s) Oral daily  pantoprazole    Tablet 40 milliGRAM(s) Oral before breakfast  polyethylene glycol 3350 17 Gram(s) Oral daily  senna 2 Tablet(s) Oral at bedtime  simvastatin 20 milliGRAM(s) Oral at bedtime  sodium chloride 7% Inhalation 4 milliLiter(s) Inhalation every 12 hours    MEDICATIONS  (PRN):  acetaminophen     Tablet .. 650 milliGRAM(s) Oral every 6 hours PRN Temp greater or equal to 38C (100.4F), Mild Pain (1 - 3)  benzonatate 100 milliGRAM(s) Oral every 8 hours PRN Cough  dextrose Oral Gel 15 Gram(s) Oral once PRN Blood Glucose LESS THAN 70 milliGRAM(s)/deciliter  guaiFENesin Oral Liquid (Sugar-Free) 100 milliGRAM(s) Oral every 6 hours PRN Cough    Vital Signs Last 24 Hrs  T(C): 36.7 (01-31-23 @ 21:30), Max: 36.8 (01-31-23 @ 18:00)  T(F): 98.1 (01-31-23 @ 21:30), Max: 98.2 (01-31-23 @ 18:00)  HR: 82 (01-31-23 @ 21:30) (81 - 86)  BP: 118/52 (01-31-23 @ 21:30) (118/52 - 147/68)  BP(mean): --  RR: 16 (01-31-23 @ 21:30) (16 - 17)  SpO2: 100% (01-31-23 @ 21:30) (98% - 100%)      PHYSICAL EXAM:  GENERAL: NAD  EYES: EOMI, PERRLA  NECK: Supple, No JVD  CHEST/LUNG: fine exp wheeze+  HEART:  S1 , S2 +  ABDOMEN: soft , bs+  EXTREMITIES:  trace edema+  NEUROLOGY:alert awake    LABS:  01-31    136  |  98  |  22  ----------------------------<  149<H>  4.1   |  31  |  0.71    Ca    9.2      31 Jan 2023 06:10  Phos  3.4     01-31  Mg     1.80     01-31      Creatinine Trend: 0.71 <--, 0.73 <--, 0.75 <--, 0.82 <--, 0.79 <--, 0.68 <--                        8.7    7.75  )-----------( 261      ( 31 Jan 2023 06:10 )             31.1     Urine Studies:                                                              RADIOLOGY & ADDITIONAL TESTS:    Imaging Personally Reviewed:yes    Consultant(s) Notes Reviewed:  yes    Care Discussed with Consultants/Other Providers:yes

## 2023-01-31 NOTE — DISCHARGE NOTE PROVIDER - NSDCFUADDAPPT_GEN_ALL_CORE_FT
Patient can f/u outpatient with Endocrinology faculty practice [facility]: Endocrinology at [place].  [address], [address]  [place] [address], Phone: [phone]   Patient can f/u outpatient with Endocrinology faculty practice [facility]: Endocrinology at [place].  [address], [address]  [place] [address], Phone: [phone]    Follow up with outpatient pulmonologist.

## 2023-02-01 ENCOUNTER — TRANSCRIPTION ENCOUNTER (OUTPATIENT)
Age: 78
End: 2023-02-01

## 2023-02-01 VITALS
OXYGEN SATURATION: 98 % | DIASTOLIC BLOOD PRESSURE: 62 MMHG | SYSTOLIC BLOOD PRESSURE: 123 MMHG | RESPIRATION RATE: 17 BRPM | TEMPERATURE: 98 F | HEART RATE: 75 BPM

## 2023-02-01 LAB
GLUCOSE BLDC GLUCOMTR-MCNC: 104 MG/DL — HIGH (ref 70–99)
GLUCOSE BLDC GLUCOMTR-MCNC: 107 MG/DL — HIGH (ref 70–99)
GLUCOSE BLDC GLUCOMTR-MCNC: 90 MG/DL — SIGNIFICANT CHANGE UP (ref 70–99)

## 2023-02-01 PROCEDURE — 99232 SBSQ HOSP IP/OBS MODERATE 35: CPT

## 2023-02-01 RX ORDER — INSULIN GLARGINE 100 [IU]/ML
12 INJECTION, SOLUTION SUBCUTANEOUS
Qty: 15 | Refills: 0
Start: 2023-02-01 | End: 2023-03-02

## 2023-02-01 RX ORDER — INSULIN ASPART 100 [IU]/ML
6 INJECTION, SOLUTION SUBCUTANEOUS
Qty: 15 | Refills: 0
Start: 2023-02-01 | End: 2023-03-02

## 2023-02-01 RX ORDER — INSULIN LISPRO 100/ML
6 VIAL (ML) SUBCUTANEOUS
Refills: 0 | Status: DISCONTINUED | OUTPATIENT
Start: 2023-02-01 | End: 2023-02-01

## 2023-02-01 RX ORDER — INSULIN GLARGINE 100 [IU]/ML
15 INJECTION, SOLUTION SUBCUTANEOUS AT BEDTIME
Refills: 0 | Status: DISCONTINUED | OUTPATIENT
Start: 2023-02-01 | End: 2023-02-01

## 2023-02-01 RX ADMIN — Medication 3 MILLILITER(S): at 09:57

## 2023-02-01 RX ADMIN — SODIUM CHLORIDE 4 MILLILITER(S): 9 INJECTION INTRAMUSCULAR; INTRAVENOUS; SUBCUTANEOUS at 09:57

## 2023-02-01 RX ADMIN — Medication 6 UNIT(S): at 18:38

## 2023-02-01 RX ADMIN — ENOXAPARIN SODIUM 40 MILLIGRAM(S): 100 INJECTION SUBCUTANEOUS at 13:05

## 2023-02-01 RX ADMIN — PANTOPRAZOLE SODIUM 40 MILLIGRAM(S): 20 TABLET, DELAYED RELEASE ORAL at 06:55

## 2023-02-01 RX ADMIN — Medication 6 UNIT(S): at 09:00

## 2023-02-01 RX ADMIN — Medication 50 MICROGRAM(S): at 05:43

## 2023-02-01 RX ADMIN — Medication 6 UNIT(S): at 13:21

## 2023-02-01 RX ADMIN — POLYETHYLENE GLYCOL 3350 17 GRAM(S): 17 POWDER, FOR SOLUTION ORAL at 13:05

## 2023-02-01 RX ADMIN — Medication 25 MILLIGRAM(S): at 05:42

## 2023-02-01 NOTE — PROGRESS NOTE ADULT - PROBLEM SELECTOR PROBLEM 2
Viral pneumonia
Hypertension
Hypertension
Viral pneumonia
Hypertension
Viral pneumonia

## 2023-02-01 NOTE — PROGRESS NOTE ADULT - PROBLEM SELECTOR PROBLEM 3
ED HPI GENERAL MEDICAL PROBLEM





- General


Chief Complaint: Laceration


Stated Complaint: SPLIT LIP


Time Seen by Provider: 06/23/18 21:16


Source of Information: Reports: Patient, Family (spouse)


History Limitations: Reports: No Limitations





- History of Present Illness


INITIAL COMMENTS - FREE TEXT/NARRATIVE: 





Pt fell to the ground landing on his face about 830pm.  Had been drinking.  

Denies loss of consciousness.  Denies headache or visual changes.


Onset: Today, Sudden


Onset Date: 06/23/18


Onset Time: 20:30


Location: Reports: Face, Lower Extremity, Left


Quality: Reports: Ache


Improves with: Reports: None


Worsens with: Reports: None


Context: Reports: Trauma


Associated Symptoms: Reports: No Other Symptoms





- Related Data


 Allergies











Allergy/AdvReac Type Severity Reaction Status Date / Time


 


enviornmental Allergy  Airway Uncoded 06/23/18 20:49





   Tightness  











Home Meds: 


 Home Meds





Albuterol [Ventolin HFA] 2 puff INH Q4H PRN 12/15/14 [History]


LORazepam [Ativan] 1 mg PO BID PRN 12/15/14 [History]


Omeprazole 40 mg PO DAILY 12/15/14 [History]


Venlafaxine [Effexor XR 24 Hr] 75 mg PO DAILY 12/15/14 [History]


traZODone 150 mg PO BEDTIME 12/15/14 [History]











Past Medical History


Gastrointestinal History: Reports: GERD


Musculoskeletal History: Reports: Other (See Below)


Other Musculoskeletal History: R knee pain.  right shoulder pain


Psychiatric History: Reports: Anxiety





- Past Surgical History


HEENT Surgical History: Reports: Tonsillectomy





Social & Family History





- Tobacco Use


Smoking Status *Q: Never Smoker





- Caffeine Use


Caffeine Use: Reports: None





- Alcohol Use


Days Per Week of Alcohol Use: 3


Number of Drinks Per Day: 1


Total Drinks Per Week: 3





- Recreational Drug Use


Recreational Drug Use: No





ED ROS GENERAL





- Review of Systems


Review Of Systems: See Below


Constitutional: Reports: No Symptoms


HEENT: Reports: No Symptoms


Respiratory: Reports: No Symptoms


Cardiovascular: Reports: No Symptoms


Endocrine: Reports: No Symptoms


GI/Abdominal: Reports: No Symptoms


Skin: Reports: Other (laceration to right face above the lip)


Neurological: Reports: No Symptoms


Psychiatric: Reports: No Symptoms


Hematologic/Lymphatic: Reports: No Symptoms


Immunologic: Reports: No Symptoms





ED EXAM, SKIN/RASH


Exam: See Below


Exam Limited By: Intoxication


General Appearance: Alert, WD/WN, No Apparent Distress


Nose: Nasal Swelling (with bruising noted.  No deformity)


Throat/Mouth: Normal Inspection, Normal Lips, Normal Teeth, Normal Gums, Normal 

Oropharynx, Normal Voice, No Airway Compromise


Head: Atraumatic, Normocephalic


Skin: Other (2cm linear laceration from under right nare to upper lip)


Location, Skin: Face


Associated features: Weeping


Lymphatic: No Adenopathy





ED SKIN PROCEDURES





- Laceration/Wound Repair


  ** Middle Face


Lac/Wound length In cm: 2


Distal NVT: Neuro & Vascular Intact


Anesthetic Type: Local


Local Anesthesia - Lidocaine (Xylocaine): 1% with EPI


Local Anesthetic Volume: 3cc


Skin Prep: Chlorhexidine (Hibiciens)


Exploration/Debridement/Repair: Wound Explored, Minimal Debridement


Closed with: Sutures


Suture Size: other (5.0)


# of Sutures: 7


Suture Type: Interrupted, Other (Ethilon)


Sterile Dressing Applied: Provider


Tetanus Status Addressed: Yes





Course





- Vital Signs


Last Recorded V/S: 


 Last Vital Signs











Temp  97.0 F   06/23/18 20:48


 


Pulse  90   06/23/18 20:48


 


Resp  16   06/23/18 20:48


 


BP  111/72   06/23/18 20:48


 


Pulse Ox  95   06/23/18 20:48














- Orders/Labs/Meds


Orders: 


 Active Orders 24 hr











 Category Date Time Status


 


 Vaccines to be Administered [RC] PER UNIT ROUTINE Care  06/23/18 21:15 Active


 


 Lidocaine 1% w/EPINEPHrine [Xylocaine 1% with Med  06/23/18 21:00 Active





 EPINEPHrine 1:100,000]   





 3 ml INFILT STAT   








 Medication Orders





Lidocaine/Epinephrine (Xylocaine 1% With Epinephrine 1:100,000)  3 ml INFILT 

STAT ALHAJI


   Last Admin: 06/23/18 21:32  Dose: 3 ml








Meds: 


Medications











Generic Name Dose Route Start Last Admin





  Trade Name Freq  PRN Reason Stop Dose Admin


 


Lidocaine/Epinephrine  3 ml  06/23/18 21:00  06/23/18 21:32





  Xylocaine 1% With Epinephrine 1:100,000  INFILT   3 ml





  STAT ALHAJI   Administration





     





     





     





     














Discontinued Medications














Generic Name Dose Route Start Last Admin





  Trade Name Freq  PRN Reason Stop Dose Admin


 


Bacitracin  1 dose  06/23/18 20:58  06/23/18 21:31





  Bacitracin Oint 1 Gm  TOP  06/23/18 20:59  1 dose





  ONETIME ONE   Administration





     





     





     





     


 


Diphtheria/Tetanus/Acell Pertussis  0.5 ml  06/23/18 21:14  06/23/18 21:29





  Adacel  IM  06/23/18 21:15  0.5 ml





  .ONCE ONE   Administration





     





     





     





     














Departure





- Departure


Time of Disposition: 21:51


Disposition: Home, Self-Care 01


Condition: Good


Clinical Impression: 


Facial laceration


Qualifiers:


 Encounter type: initial encounter Qualified Code(s): S01.81XA - Laceration 

without foreign body of other part of head, initial encounter








- Discharge Information


Instructions:  Laceration Care, Adult, Stitches, Staples, or Adhesive Wound 

Closure, Easy-to-Read


Referrals: 


Reyes Jackman MD [Primary Care Provider] - 


Forms:  ED Department Discharge


Additional Instructions: 


Tetanus boosted with TdaP today.  Pt to apply bacitracin to wound daily.  To 

have stitches removed in 1 week.  Keep area clean and dry.  Monitor for s/s of 

infection.  Discussed fall precautions. 





- Problem List & Annotations


(1) Facial laceration


SNOMED Code(s): 212510805


   Code(s): S01.81XA - LACERATION W/O FOREIGN BODY OF OTH PART OF HEAD, INIT 

ENCNTR   Status: Acute   Priority: Medium   Current Visit: Yes   


Qualifiers: 


   Encounter type: initial encounter   Qualified Code(s): S01.81XA - Laceration 

without foreign body of other part of head, initial encounter   





- My Orders


Last 24 Hours: 


My Active Orders





06/23/18 21:00


Lidocaine 1% w/EPINEPHrine [Xylocaine 1% with EPINEPHrine 1:100,000]   3 ml 

INFILT STAT 





06/23/18 21:15


Vaccines to be Administered [RC] PER UNIT ROUTINE 














- Assessment/Plan


Last 24 Hours: 


My Active Orders





06/23/18 21:00


Lidocaine 1% w/EPINEPHrine [Xylocaine 1% with EPINEPHrine 1:100,000]   3 ml 

INFILT STAT 





06/23/18 21:15


Vaccines to be Administered [RC] PER UNIT ROUTINE
HLD (hyperlipidemia)
Type 2 diabetes mellitus

## 2023-02-01 NOTE — PROGRESS NOTE ADULT - SUBJECTIVE AND OBJECTIVE BOX
History: patient reports fair appetite.  Eating about 50% of meal.  Denies n/v.  denies hypoglycemia.  Son at bedside assisting with translation and overnight history. dc home today    MEDICATIONS  (STANDING):  albuterol/ipratropium for Nebulization 3 milliLiter(s) Nebulizer every 6 hours  bisacodyl 10 milliGRAM(s) Oral at bedtime  dextrose 5%. 1000 milliLiter(s) (100 mL/Hr) IV Continuous <Continuous>  dextrose 5%. 1000 milliLiter(s) (50 mL/Hr) IV Continuous <Continuous>  dextrose 50% Injectable 25 Gram(s) IV Push once  dextrose 50% Injectable 12.5 Gram(s) IV Push once  dextrose 50% Injectable 25 Gram(s) IV Push once  furosemide   Injectable 20 milliGRAM(s) IV Push once  glucagon  Injectable 1 milliGRAM(s) IntraMuscular once  influenza  Vaccine (HIGH DOSE) 0.7 milliLiter(s) IntraMuscular once  insulin glargine Injectable (LANTUS) 17 Unit(s) SubCutaneous at bedtime  insulin lispro (ADMELOG) corrective regimen sliding scale   SubCutaneous three times a day before meals  insulin lispro (ADMELOG) corrective regimen sliding scale   SubCutaneous at bedtime  insulin lispro Injectable (ADMELOG) 9 Unit(s) SubCutaneous three times a day before meals  levothyroxine 50 MICROGram(s) Oral daily  metoprolol succinate ER 25 milliGRAM(s) Oral daily  pantoprazole    Tablet 40 milliGRAM(s) Oral before breakfast  polyethylene glycol 3350 17 Gram(s) Oral daily  predniSONE   Tablet 20 milliGRAM(s) Oral daily  senna 2 Tablet(s) Oral at bedtime  simvastatin 20 milliGRAM(s) Oral at bedtime  sodium chloride 7% Inhalation 4 milliLiter(s) Inhalation every 12 hours    MEDICATIONS  (PRN):  acetaminophen     Tablet .. 650 milliGRAM(s) Oral every 6 hours PRN Temp greater or equal to 38C (100.4F), Mild Pain (1 - 3)  dextrose Oral Gel 15 Gram(s) Oral once PRN Blood Glucose LESS THAN 70 milliGRAM(s)/deciliter  guaiFENesin Oral Liquid (Sugar-Free) 100 milliGRAM(s) Oral every 6 hours PRN Cough      Allergies    No Known Allergies    Intolerances      Review of Systems:  Constitutional: No fever  ALL OTHER SYSTEMS REVIEWED AND NEGATIVE        Vital Signs Last 24 Hrs  T(C): 36.1 (01 Feb 2023 13:30), Max: 36.8 (31 Jan 2023 18:00)  T(F): 97 (01 Feb 2023 13:30), Max: 98.2 (31 Jan 2023 18:00)  HR: 73 (01 Feb 2023 13:30) (73 - 82)  BP: 125/47 (01 Feb 2023 13:30) (118/52 - 143/67)  BP(mean): --  RR: 17 (01 Feb 2023 13:30) (16 - 17)  SpO2: 98% (01 Feb 2023 13:30) (98% - 100%)    Parameters below as of 01 Feb 2023 13:30  Patient On (Oxygen Delivery Method): nasal cannula  O2 Flow (L/min): 3      GENERAL: NAD, well-developed  GI: Soft, nontender, non distended  SKIN: Dry, intact, No rashes or lesions  PSYCH: Alert and oriented x 3, normal affect, normal mood      CAPILLARY BLOOD GLUCOSE    POCT Blood Glucose.: 90 mg/dL (01 Feb 2023 13:12)  POCT Blood Glucose.: 107 mg/dL (01 Feb 2023 08:48)  POCT Blood Glucose.: 219 mg/dL (31 Jan 2023 22:24)  POCT Blood Glucose.: 151 mg/dL (31 Jan 2023 17:59)  POCT Blood Glucose.: 156 mg/dL (31 Jan 2023 12:18)  POCT Blood Glucose.: 205 mg/dL (31 Jan 2023 07:20)  POCT Blood Glucose.: 114 mg/dL (30 Jan 2023 22:34)  POCT Blood Glucose.: 151 mg/dL (30 Jan 2023 17:35)  POCT Blood Glucose.: 273 mg/dL (30 Jan 2023 12:05)  POCT Blood Glucose.: 221 mg/dL (30 Jan 2023 07:36)    01-31    136  |  98  |  22  ----------------------------<  149<H>  4.1   |  31  |  0.71    Ca    9.2      31 Jan 2023 06:10  Phos  3.4     01-31  Mg     1.80     01-31      Diet, Consistent Carbohydrate w/Evening Snack:   DASH/TLC Sodium & Cholesterol Restricted (DASH)  Ellen (01-26-23 @ 20:42) [Active]

## 2023-02-01 NOTE — PROGRESS NOTE ADULT - PROVIDER SPECIALTY LIST ADULT
Cardiology
Cardiology
Internal Medicine
Internal Medicine
Pulmonology
Cardiology
Endocrinology
Pulmonology
Infectious Disease
Endocrinology
Endocrinology
Internal Medicine

## 2023-02-01 NOTE — DISCHARGE NOTE NURSING/CASE MANAGEMENT/SOCIAL WORK - PATIENT PORTAL LINK FT
You can access the FollowMyHealth Patient Portal offered by [facility] by registering at the following website: [email or web address]. By joining [employer]’s FollowMyHealth portal, you will also be able to view your health information using other applications (apps) compatible with our system.

## 2023-02-01 NOTE — CHART NOTE - NSCHARTNOTEFT_GEN_A_CORE
Due to patient ongoing issues with CAD/MI, deconditioning and generalized weakness the patient will require a wheelchair with elevated leg rest and seat belt. This is necessary to achieve daily task and therapies and cannot be achieved with a cane. Pt is in agreement with equipment use at home and will be assisted by family.     Pietro Isaacs PA-C,   Internal Medicine ACP   In house pager #[phone] Due to patient ongoing issues with respiratory failure with hypoxia and hypercapnia, deconditioning and generalized weakness the patient will require a wheelchair with elevated leg rest and seat belt. This is necessary to achieve daily task and therapies and cannot be achieved with a cane. Pt is in agreement with equipment use at home and will be assisted by family.     Pietro Isaacs PA-C,   Internal Medicine ACP   In house pager #[phone]

## 2023-02-01 NOTE — DISCHARGE NOTE NURSING/CASE MANAGEMENT/SOCIAL WORK - NSDCPEFALRISK_GEN_ALL_CORE
For information on Fall & Injury Prevention, visit: [email or web address] OR  [email or web address] OR  https://www.cdc.gov/steadi/patient.html

## 2023-02-01 NOTE — PROGRESS NOTE ADULT - SUBJECTIVE AND OBJECTIVE BOX
SUBJECTIVE / OVERNIGHT EVENTS:pt seen and examined, pts family next to pts bedside , not in distress  02-01-23     MEDICATIONS  (STANDING):  albuterol/ipratropium for Nebulization 3 milliLiter(s) Nebulizer every 6 hours  bisacodyl 10 milliGRAM(s) Oral at bedtime  dextrose 5%. 1000 milliLiter(s) (100 mL/Hr) IV Continuous <Continuous>  dextrose 5%. 1000 milliLiter(s) (50 mL/Hr) IV Continuous <Continuous>  dextrose 50% Injectable 25 Gram(s) IV Push once  dextrose 50% Injectable 12.5 Gram(s) IV Push once  dextrose 50% Injectable 25 Gram(s) IV Push once  enoxaparin Injectable 40 milliGRAM(s) SubCutaneous every 24 hours  glucagon  Injectable 1 milliGRAM(s) IntraMuscular once  influenza  Vaccine (HIGH DOSE) 0.7 milliLiter(s) IntraMuscular once  insulin glargine Injectable (LANTUS) 15 Unit(s) SubCutaneous at bedtime  insulin lispro (ADMELOG) corrective regimen sliding scale   SubCutaneous three times a day before meals  insulin lispro (ADMELOG) corrective regimen sliding scale   SubCutaneous at bedtime  insulin lispro Injectable (ADMELOG) 6 Unit(s) SubCutaneous three times a day before meals  levothyroxine 50 MICROGram(s) Oral daily  metoprolol succinate ER 25 milliGRAM(s) Oral daily  pantoprazole    Tablet 40 milliGRAM(s) Oral before breakfast  polyethylene glycol 3350 17 Gram(s) Oral daily  senna 2 Tablet(s) Oral at bedtime  simvastatin 20 milliGRAM(s) Oral at bedtime  sodium chloride 7% Inhalation 4 milliLiter(s) Inhalation every 12 hours    MEDICATIONS  (PRN):  acetaminophen     Tablet .. 650 milliGRAM(s) Oral every 6 hours PRN Temp greater or equal to 38C (100.4F), Mild Pain (1 - 3)  benzonatate 100 milliGRAM(s) Oral every 8 hours PRN Cough  dextrose Oral Gel 15 Gram(s) Oral once PRN Blood Glucose LESS THAN 70 milliGRAM(s)/deciliter  guaiFENesin Oral Liquid (Sugar-Free) 100 milliGRAM(s) Oral every 6 hours PRN Cough    Vital Signs Last 24 Hrs  T(C): 36.7 (02-01-23 @ 18:15), Max: 36.7 (02-01-23 @ 18:15)  T(F): 98.1 (02-01-23 @ 18:15), Max: 98.1 (02-01-23 @ 18:15)  HR: 75 (02-01-23 @ 18:15) (73 - 77)  BP: 123/62 (02-01-23 @ 18:15) (123/62 - 143/67)  BP(mean): --  RR: 17 (02-01-23 @ 18:15) (16 - 17)  SpO2: 98% (02-01-23 @ 18:15) (98% - 100%)      PHYSICAL EXAM:  GENERAL: NAD  EYES: EOMI, PERRLA  NECK: Supple, No JVD  CHEST/LUNG: dec  fine exp wheeze+  HEART:  S1 , S2 +  ABDOMEN: soft , bs+  EXTREMITIES:  trace edema+  NEUROLOGY:alert awake    LABS:  01-31    136  |  98  |  22  ----------------------------<  149<H>  4.1   |  31  |  0.71    Ca    9.2      31 Jan 2023 06:10  Phos  3.4     01-31  Mg     1.80     01-31      Creatinine Trend: 0.71 <--, 0.73 <--, 0.75 <--, 0.82 <--, 0.79 <--, 0.68 <--                        8.7    7.75  )-----------( 261      ( 31 Jan 2023 06:10 )             31.1     Urine Studies:                                                                          RADIOLOGY & ADDITIONAL TESTS:    Imaging Personally Reviewed:yes    Consultant(s) Notes Reviewed:  yes    Care Discussed with Consultants/Other Providers:yes

## 2023-02-01 NOTE — PROGRESS NOTE ADULT - SUBJECTIVE AND OBJECTIVE BOX
PULMONARY PROGRESS NOTE    MARTIR VU  MRN-[id number]    Patient is a 77y old  Female who presents with a chief complaint of SOB (31 Jan 2023 18:09)      HPI:  -wheezing  congested  on NC but displaced  little use of IS  -    ROS:   -    ACTIVE MEDICATION LIST:  MEDICATIONS  (STANDING):  albuterol/ipratropium for Nebulization 3 milliLiter(s) Nebulizer every 6 hours  bisacodyl 10 milliGRAM(s) Oral at bedtime  dextrose 5%. 1000 milliLiter(s) (100 mL/Hr) IV Continuous <Continuous>  dextrose 5%. 1000 milliLiter(s) (50 mL/Hr) IV Continuous <Continuous>  dextrose 50% Injectable 25 Gram(s) IV Push once  dextrose 50% Injectable 12.5 Gram(s) IV Push once  dextrose 50% Injectable 25 Gram(s) IV Push once  enoxaparin Injectable 40 milliGRAM(s) SubCutaneous every 24 hours  glucagon  Injectable 1 milliGRAM(s) IntraMuscular once  influenza  Vaccine (HIGH DOSE) 0.7 milliLiter(s) IntraMuscular once  insulin glargine Injectable (LANTUS) 15 Unit(s) SubCutaneous at bedtime  insulin lispro (ADMELOG) corrective regimen sliding scale   SubCutaneous three times a day before meals  insulin lispro (ADMELOG) corrective regimen sliding scale   SubCutaneous at bedtime  insulin lispro Injectable (ADMELOG) 6 Unit(s) SubCutaneous three times a day before meals  levothyroxine 50 MICROGram(s) Oral daily  metoprolol succinate ER 25 milliGRAM(s) Oral daily  pantoprazole    Tablet 40 milliGRAM(s) Oral before breakfast  polyethylene glycol 3350 17 Gram(s) Oral daily  senna 2 Tablet(s) Oral at bedtime  simvastatin 20 milliGRAM(s) Oral at bedtime  sodium chloride 7% Inhalation 4 milliLiter(s) Inhalation every 12 hours    MEDICATIONS  (PRN):  acetaminophen     Tablet .. 650 milliGRAM(s) Oral every 6 hours PRN Temp greater or equal to 38C (100.4F), Mild Pain (1 - 3)  benzonatate 100 milliGRAM(s) Oral every 8 hours PRN Cough  dextrose Oral Gel 15 Gram(s) Oral once PRN Blood Glucose LESS THAN 70 milliGRAM(s)/deciliter  guaiFENesin Oral Liquid (Sugar-Free) 100 milliGRAM(s) Oral every 6 hours PRN Cough      EXAM:  Vital Signs Last 24 Hrs  T(C): 36.3 (01 Feb 2023 05:43), Max: 36.8 (31 Jan 2023 18:00)  T(F): 97.4 (01 Feb 2023 05:43), Max: 98.2 (31 Jan 2023 18:00)  HR: 77 (01 Feb 2023 10:03) (77 - 86)  BP: 143/67 (01 Feb 2023 05:43) (118/52 - 143/67)  BP(mean): --  RR: 16 (01 Feb 2023 05:43) (16 - 17)  SpO2: 100% (01 Feb 2023 10:03) (98% - 100%)    Parameters below as of 01 Feb 2023 10:03  Patient On (Oxygen Delivery Method): nasal cannula        GENERAL: The patient is awake and alert in no apparent distress.     LUNGS: wheezing  congested                             8.7    7.75  )-----------( 261      ( 31 Jan 2023 06:10 )             31.1       01-31    136  |  98  |  22  ----------------------------<  149<H>  4.1   |  31  |  0.71    Ca    9.2      31 Jan 2023 06:10  Phos  3.4     01-31  Mg     1.80     01-31     < from: CT Chest No Cont (01.29.23 @ 13:21) >    ACC: [id number] EXAM:  CT CHEST   ORDERED BY: WHITNEY SUAZO     PROCEDURE DATE:  01/29/2023          INTERPRETATION:  CLINICAL INFORMATION: Worsening shortness of breath.   Opacities on recent chest x-ray. Evaluate for pneumonia.    COMPARISON: CT chest abdomen pelvis 11/8/2022.    CONTRAST/COMPLICATIONS:  IV Contrast: NONE  Oral Contrast: NONE  Complications: None reported at time of study completion    PROCEDURE:  CT scan of the chest was obtained without intravenous contrast.    FINDINGS:    LYMPH NODES: Unchanged calcified mediastinal lymph nodes.    HEART/VASCULATURE: Heart size is normal. Hypodensity of the blood pool in   relation to left ventricular myocardium suggests underlying anemia. No   pericardial effusion. Coronary artery calcifications.    AIRWAYS/LUNGS/PLEURA: Unchanged severe narrowing of the right mainstem   bronchus and bronchus intermedius. Unchanged right apical opacity (2:20)   measuring 2.8 x 1.6 cm. Unchanged right upper lobe 1.3 cm nodular opacity   (2:33). Previously seen right upper lobe cystic/cavitary opacity is   similar in size and now predominantly fluid density in attenuation. No   significant change in right upper lobe opacity along the minor fissure.   Unchanged ill-defined masslike consolidation in the left upper lobe   (2:50) which has gradually been enlarging in size and attenuation since   more remote studies from 2019. Mosaic attenuation. No pleural effusion.    UPPER ABDOMEN: Small hiatal hernia.    BONES/SOFT TISSUES: Degenerative changes of the spine. Soft tissues are   unremarkable.    IMPRESSION:    Since CT chest 11/8/2022:    No new lung parenchymal opacities to suggest pneumonia.    Unchanged bilateral upper lobe opacities of unclear etiology, including   the right apical nodular opacity as well as the left upper lobe   ill-defined masslike consolidation which has been increasing in   size/attenuation since 2019. Continued attention on follow-up imaging.          --- End of Report ---          < end of copied text >  < from: Xray Chest 1 View- PORTABLE-Urgent (Xray Chest 1 View- PORTABLE-Urgent .) (01.26.23 @ 05:27) >    ACC: [id number] EXAM:  XR CHEST PORTABLE URGENT 1V   ORDERED BY: KHALIF BENSON     PROCEDURE DATE:  01/26/2023          INTERPRETATION:  EXAMINATION: XR CHEST URGENT    CLINICAL INDICATION: SOB, cough    TECHNIQUE: Single frontal, portable view of the chest was obtained.    COMPARISON: Chest radiograph 12/1/2022.    FINDINGS:  The heart is not enlarged.  Redemonstration of right upper lobe linear opacities. New hazy airspace   opacities in the left midlung field.  No pleural effusion or pneumothorax.    IMPRESSION:  Chronic changes of the right upper lobe.  New hazy airspace opacities in the left upper lobe more consistent with   active infection than heart failure.    --- End of Report ---          PASHA NARVAEZ MD; Resident Radiology  This document has been electronically signed.  NAT HENNING MD; Attending Radiologist  This document has been electronically signed. Jan 26 2023  6:17AM    < end of copied text >    PROBLEM LIST:  77y Female with HEALTH ISSUES - PROBLEM Dx:  Acute on chronic respiratory failure with hypoxia and hypercapnia    Viral pneumonia    Type 2 diabetes mellitus    Hypertension    Hypothyroidism    Prophylactic measure    Steroid-induced hyperglycemia    HLD (hyperlipidemia)              RECS:  -pulm toilet  nebs  hypersale  mucomyst  IS  off prednisone- finished 5 days  diuresis as tolerated  02       Please call with any questions.    Dede Flynn DO  [facility] Pulmonary/Sleep Medicine  [phone]

## 2023-02-01 NOTE — PROGRESS NOTE ADULT - PROBLEM SELECTOR PROBLEM 1
Steroid-induced hyperglycemia
Acute on chronic respiratory failure with hypoxia and hypercapnia
Steroid-induced hyperglycemia
Steroid-induced hyperglycemia
Acute on chronic respiratory failure with hypoxia and hypercapnia

## 2023-02-01 NOTE — DISCHARGE NOTE NURSING/CASE MANAGEMENT/SOCIAL WORK - NSDCFUADDAPPT_GEN_ALL_CORE_FT
Patient can f/u outpatient with Endocrinology faculty practice [facility]: Endocrinology at [place].  [address], [address]  [place] [address], Phone: [phone]    Follow up with outpatient pulmonologist.

## 2023-02-01 NOTE — PROGRESS NOTE ADULT - PROBLEM SELECTOR PROBLEM 4
Hypertension
Hypertension
Hypothyroidism
Hypothyroidism
Hypertension
Hypertension
Hypothyroidism
Hypertension
Hypertension

## 2023-02-01 NOTE — PROGRESS NOTE ADULT - SUBJECTIVE AND OBJECTIVE BOX
Forrest Rosenthal MD  Interventional Cardiology / Endovascular Specialist  [place] Office : [phone] [place] [address] Tel:   [place] Office : [address] [facility] [address] [address]  Tel: [phone]      Subjective/Overnight events: Patient lying in bed. No acute distress.   	  MEDICATIONS:  enoxaparin Injectable 40 milliGRAM(s) SubCutaneous every 24 hours  metoprolol succinate ER 25 milliGRAM(s) Oral daily      albuterol/ipratropium for Nebulization 3 milliLiter(s) Nebulizer every 6 hours  benzonatate 100 milliGRAM(s) Oral every 8 hours PRN  guaiFENesin Oral Liquid (Sugar-Free) 100 milliGRAM(s) Oral every 6 hours PRN  sodium chloride 7% Inhalation 4 milliLiter(s) Inhalation every 12 hours    acetaminophen     Tablet .. 650 milliGRAM(s) Oral every 6 hours PRN    bisacodyl 10 milliGRAM(s) Oral at bedtime  pantoprazole    Tablet 40 milliGRAM(s) Oral before breakfast  polyethylene glycol 3350 17 Gram(s) Oral daily  senna 2 Tablet(s) Oral at bedtime    dextrose 50% Injectable 25 Gram(s) IV Push once  dextrose 50% Injectable 12.5 Gram(s) IV Push once  dextrose 50% Injectable 25 Gram(s) IV Push once  dextrose Oral Gel 15 Gram(s) Oral once PRN  glucagon  Injectable 1 milliGRAM(s) IntraMuscular once  insulin glargine Injectable (LANTUS) 15 Unit(s) SubCutaneous at bedtime  insulin lispro (ADMELOG) corrective regimen sliding scale   SubCutaneous three times a day before meals  insulin lispro (ADMELOG) corrective regimen sliding scale   SubCutaneous at bedtime  insulin lispro Injectable (ADMELOG) 6 Unit(s) SubCutaneous three times a day before meals  levothyroxine 50 MICROGram(s) Oral daily  simvastatin 20 milliGRAM(s) Oral at bedtime    dextrose 5%. 1000 milliLiter(s) IV Continuous <Continuous>  dextrose 5%. 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      Cataract of left eye          SOCIAL HISTORY: Substance Use (street drugs): ( x ) never used  (  ) other:    FAMILY HISTORY:  Family history of heart attack (Mother)  mother          PHYSICAL EXAM:  T(C): 36.3 (02-01-23 @ 05:43), Max: 36.8 (01-31-23 @ 18:00)  HR: 77 (02-01-23 @ 10:03) (77 - 86)  BP: 143/67 (02-01-23 @ 05:43) (118/52 - 143/67)  RR: 16 (02-01-23 @ 05:43) (16 - 17)  SpO2: 100% (02-01-23 @ 10:03) (98% - 100%)  Wt(kg): --  I&O's Summary    01 Feb 2023 07:01  -  01 Feb 2023 15:08  --------------------------------------------------------  IN: 120 mL / OUT: 0 mL / NET: 120 mL        Weight (kg): 57.3 (02-01 @ 10:00)    GENERAL: NAD  EYES: EOMI, PERRLA, conjunctiva and sclera clear  ENMT: No tonsillar erythema, exudates, or enlargement  Cardiovascular: Normal S1 S2, No JVD, No murmurs, No edema  Respiratory: Lungs wheezing to auscultation	  Gastrointestinal:  Soft, Non-tender, + BS	  Extremities: No edema                                       8.7    7.75  )-----------( 261      ( 31 Jan 2023 06:10 )             31.1     01-31    136  |  98  |  22  ----------------------------<  149<H>  4.1   |  31  |  0.71    Ca    9.2      31 Jan 2023 06:10  Phos  3.4     01-31  Mg     1.80     01-31      proBNP:   Lipid Profile:   HgA1c:   TSH:     Consultant(s) Notes Reviewed:  [x ] YES  [ ] NO    Care Discussed with Consultants/Other Providers [ x] YES  [ ] NO    Imaging Personally Reviewed independently:  [x] YES  [ ] NO    All labs, radiologic studies, vitals, orders and medications list reviewed. Patient is seen and examined at bedside. Case discussed with medical team.

## 2023-02-01 NOTE — PROGRESS NOTE ADULT - PROBLEM SELECTOR PLAN 5
On levothyroxine 50 mcg QD  Normal TSH 12/22  - C/w home dose of levothyroxine

## 2023-02-01 NOTE — PROGRESS NOTE ADULT - PROBLEM SELECTOR PLAN 4
On metoprolol 25mg XL QD  - C/w metoprolol  - Should ideally be on ACE-I/ARB  - Monitor BP closely

## 2023-02-01 NOTE — PROGRESS NOTE ADULT - ASSESSMENT
77 year old female with PMHx of DM, HTN, hypothyroidism, hyperlipidemia, and recent admission for RSV pneumonia and discharged on home O2, now presents with SOB. Found to be enterorhinovirus positive on admission. CXR showing new HERON opacity. ID consulted for further management.    Afebrile, HD stable, 4L NC  WBC 8.5k  RVP +enterorhinovirus    Impression:  #Dyspnea,   #Viral URI  #Abnormal CXR     Lower suspicion for bacterial pneumonia given afebrile, no leukocytosis, and low procalcitonin  Wheezing on exam, possible component of COPD/RAD in setting of enterorhinovirus URI      Recs:  - monitoring off antibiotics  - CT Chest with no pneumonia, lt UL opacity present since 2019, not acute finding, can be worked up as outpt.   - monitor WBCs, no leucocytosis   - sputum cx with normal rosalba.   - supportive care    Plan discussed with Medicine Attending.     Will sign off, please call with questions.     Dorian Isabel  Please contact through MS Teams   If no response or past 5 pm/weekend call [phone].   
78 y/o female with PMHx of DM type 2, hypothyroidism, and HTN who presented to the ED for increased SOB    EKG: NSR no ischemic changes      1. Enterovirus  bronchitis  - on prednisone  -echo normal LV systolic function, no WMA. mild diastolic dysfunction. decreased RV function 11/22  - on steroids and nebs  -can give dose of IV lasix 20mg x1 and assess  -f/u pulm recs    2. HTN  -controlled  -c/w metoprolol  -continue to monitor BP     3. HLD   - on zocor  
76 y/o female with PMHx of DM type 2, hypothyroidism, and HTN who presented to the ED for increased SOB    EKG: NSR no ischemic changes      1. Enterovirus  bronchitis  -on prednisone  -echo normal LV systolic function, no WMA. mild diastolic dysfunction. decreased RV function 11/22  - on steroids and nebs  -f/u pulm recs    2. HTN  -controlled  -c/w metoprolol  -continue to monitor BP     3. HLD   - on zocor      
76 y/o female with PMHx of DM type 2, hypothyroidism, and HTN who presented for increased SOB. Is on prednisone 20mg daily x5 days (starting 1/26) with resultant hyperglycemia. Endocrine consulted for DM2 with steroid-induced hyperglycemia.    Poorly controlled T2DM with steroid-induced hyperglycemia  DM diagnosis: DM2 x20 years  Last A1c: 8.8  Endocrinologist: PCP only  Home DM meds: Basaglar 10 units q24h, Novolog 4-10 units TIDac depending on BG, metformin 500mg BID  Receiving prednisone 20mg daily x5 days (starting 1/26).  -Hold oral DM agents while inpatient  -Increase Lantus to 19 units at bedtime. DO NOT HOLD IF NPO.  Continue Admelog to 9 units TID pre-meal. HOLD IF NPO.  -Use moderate dose Admelog correction scale pre-meal  -Use moderate dose Admelog correction scale at bedtime  -Fingerstick BG before meals and bedtime  -Goal -180  -Carbohydrate consistent diet  Discharge plan:  -Likely to discharge patient home on basal/bolus insulin plus metformin. Final regimen pending clinical course.  -Recommend routine outpatient ophthalmology and PCP vs Endocrine f/u. Patient can f/u outpatient with Endocrinology faculty practice [facility]: Endocrinology at [place].   [address] [place] [address], [address]  [place] [address]  Phone: [phone]  Fax: [phone]    HTN  goal BP <140/90  Management per primary team  outpt mc/cr ratio    HLD  -On simvastatin 20mg daily    Hypothyroidism  TFTs recently normal.  Continue LT4 50mcg daily PO (or 37mcg IV if NPO)    Xochilt Gutiérrez  Nurse Practitioner  Division of Endocrinology & Diabetes  Available via  Teams      If after 6PM or before 9AM, or on weekends/holidays, please call endocrine answering service for assistance ([phone]).  For nonurgent matters email [email or web address] for assistance.  
77 year old female with PMHx of DM, HTN, hypothyroidism and hyperlipidemia recently discharged from [facility] on 12/10/2022 for sepsis with acute hypercapnic respiratory failure secondary to RSV pneumonia and discharged on O2 3-4lts/NC who is now admitted with acute on chronic hypoxemic respiratory failure secondary to likely Entero/rhinovirus pneumonia.
78 y/o female with PMHx of DM type 2, hypothyroidism, and HTN who presented to the ED for increased SOB    EKG: NSR no ischemic changes      1. Enterovirus  bronchitis  -on prednisone  -echo normal LV systolic function, no WMA. mild diastolic dysfunction. decreased RV function 11/22  - on steroids and nebs  -f/u pulm recs    2. HTN  -controlled  -c/w metoprolol  -continue to monitor BP     3. HLD   - on zocor
76 y/o female with PMHx of DM type 2, hypothyroidism, and HTN who presented for increased SOB. Is on prednisone 20mg daily x5 days (starting 1/26) with resultant hyperglycemia. Endocrine consulted for DM2 with steroid-induced hyperglycemia.    Poorly controlled T2DM with steroid-induced hyperglycemia  DM diagnosis: DM2 x20 years  Last A1c: 8.8  Endocrinologist: PCP only  Home DM meds: Basaglar 10 units q24h, Novolog 4-10 units TIDac depending on BG, metformin 500mg BID  Receiving prednisone 20mg daily x5 days (starting 1/26).  -Hold oral DM agents while inpatient  -Increase Lantus to 17 units at bedtime. DO NOT HOLD IF NPO.  -Increase Admelog to 9 units TID pre-meal. HOLD IF NPO.  -Use moderate dose Admelog correction scale pre-meal  -Use moderate dose Admelog correction scale at bedtime  -Fingerstick BG before meals and bedtime  -Goal -180  -Carbohydrate consistent diet  Discharge plan:  -Likely to discharge patient home on basal/bolus insulin plus metformin. Final regimen pending clinical course.  -Recommend routine outpatient ophthalmology and PCP vs Endocrine f/u. Patient can f/u outpatient with Endocrinology faculty practice [facility]: Endocrinology at [place].   [address] [place] [address], [address]  [place] [address]  Phone: [phone]  Fax: [phone]    HTN  goal BP <140/90  Management per primary team  outpt mc/cr ratio    HLD  -On simvastatin 20mg daily    Hypothyroidism  TFTs recently normal.  Continue LT4 50mcg daily PO (or 37mcg IV if NPO)    Xochilt Gutiérrez  Nurse Practitioner  Division of Endocrinology & Diabetes  Available via  Teams      If after 6PM or before 9AM, or on weekends/holidays, please call endocrine answering service for assistance ([phone]).  For nonurgent matters email [email or web address] for assistance.  
78 y/o female with PMHx of DM type 2, hypothyroidism, and HTN who presented for increased SOB. Is on prednisone 20mg daily x5 days (starting 1/26) with resultant hyperglycemia. Endocrine consulted for DM2 with steroid-induced hyperglycemia.    Poorly controlled T2DM with steroid-induced hyperglycemia  DM diagnosis: DM2 x20 years  Last A1c: 8.8  Endocrinologist: PCP only  Home DM meds: Basaglar 10 units q24h, Novolog 4-10 units TIDac depending on BG, metformin 500mg BID  Receiving prednisone 20mg daily x5 days (starting 1/26). steroids complete  -Hold oral DM agents while inpatient  -decrease Lantus to 12 units at bedtime. DO NOT HOLD IF NPO.  -decrease Admelog to 6 units TID pre-meal. HOLD IF NPO.  -Use moderate dose Admelog correction scale pre-meal  -Use moderate dose Admelog correction scale at bedtime  -Fingerstick BG before meals and bedtime  -Goal -180  -Carbohydrate consistent diet  Discharge plan:  -Likely to discharge patient home on basal/bolus insulin plus metformin. Final regimen pending clinical course.  -Recommend routine outpatient ophthalmology and PCP vs Endocrine f/u. Patient can f/u outpatient with Endocrinology faculty practice [facility]: Endocrinology at [place].   [address] [place] [address], [address]  [place] [address]  Phone: [phone]  Fax: [phone]    HTN  goal BP <140/90  Management per primary team  outpt mc/cr ratio    HLD  -On simvastatin 20mg daily    Hypothyroidism  TFTs recently normal.  Continue LT4 50mcg daily PO (or 37mcg IV if NPO)    Xochilt Gutiérrez  Nurse Practitioner  Division of Endocrinology & Diabetes  Available via  Teams      If after 6PM or before 9AM, or on weekends/holidays, please call endocrine answering service for assistance ([phone]).  For nonurgent matters email [email or web address] for assistance.  
77 year old female with PMHx of DM, HTN, hypothyroidism and hyperlipidemia recently discharged from [facility] on 12/10/2022 for sepsis with acute hypercapnic respiratory failure secondary to RSV pneumonia and discharged on O2 3-4lts/NC who is now admitted with acute on chronic hypoxemic respiratory failure secondary to likely Entero/rhinovirus pneumonia.
77 year old female with PMHx of DM, HTN, hypothyroidism and hyperlipidemia recently discharged from [facility] on 12/10/2022 for sepsis with acute hypercapnic respiratory failure secondary to RSV pneumonia and discharged on O2 3-4lts/NC who is now admitted with acute on chronic hypoxemic respiratory failure secondary to likely Entero/rhinovirus pneumonia.
77 year old female with PMHx of DM, HTN, hypothyroidism and hyperlipidemia recently discharged from [facility] on 12/10/2022 for sepsis with acute hypercapnic respiratory failure secondary to RSV pneumonia and discharged on O2 3-4lts/NC who is now admitted with acute on chronic hypoxemic respiratory failure secondary to likely Entero/rhinovirus pneumonia.
77 year old female with PMHx of DM, HTN, hypothyroidism and hyperlipidemia recently discharged from [facility] on 12/10/2022 for sepsis with acute hypercapnic respiratory failure secondary to RSV pneumonia and discharged on O2 3-4lts/NC who is now admitted with acute on chronic hypoxemic respiratory failure secondary to likely Entero/rhinovirus pneumonia.
77 year old female with PMHx of DM, HTN, hypothyroidism and hyperlipidemia recently discharged from [facility] on 12/10/2022 for sepsis with acute hypercapnic respiratory failure secondary to RSV pneumonia and discharged on O2 3-4lts/NC who is now admitted with acute on chronic hypoxemic respiratory failure secondary to likely Entero/rhinovirus pneumonia.

## 2023-02-01 NOTE — CHART NOTE - NSCHARTNOTEFT_GEN_A_CORE
ACP spoke to Endocrine for final discharge recommendations.    Basaglar 12 units Qhs  Novolog 6 units TID pre meal  Metformin 500 mg BID    Medications sent to mutually agreed upon pharmacy discussed with family.    Pietro Isaacs PA-C,   Internal Medicine ACP   In house pager #[phone]

## 2023-02-07 ENCOUNTER — INPATIENT (INPATIENT)
Facility: HOSPITAL | Age: 78
LOS: 8 days | Discharge: HOME HEALTH | End: 2023-02-16
Attending: INTERNAL MEDICINE | Admitting: INTERNAL MEDICINE
Payer: MEDICARE

## 2023-02-07 VITALS
OXYGEN SATURATION: 97 % | HEART RATE: 96 BPM | RESPIRATION RATE: 7 BRPM | SYSTOLIC BLOOD PRESSURE: 127 MMHG | TEMPERATURE: 99 F | HEIGHT: 59 IN | DIASTOLIC BLOOD PRESSURE: 67 MMHG

## 2023-02-07 DIAGNOSIS — J18.9 PNEUMONIA, UNSPECIFIED ORGANISM: ICD-10-CM

## 2023-02-07 DIAGNOSIS — E03.9 HYPOTHYROIDISM, UNSPECIFIED: ICD-10-CM

## 2023-02-07 DIAGNOSIS — E11.9 TYPE 2 DIABETES MELLITUS WITHOUT COMPLICATIONS: ICD-10-CM

## 2023-02-07 DIAGNOSIS — J45.909 UNSPECIFIED ASTHMA, UNCOMPLICATED: ICD-10-CM

## 2023-02-07 DIAGNOSIS — J96.01 ACUTE RESPIRATORY FAILURE WITH HYPOXIA: ICD-10-CM

## 2023-02-07 DIAGNOSIS — H26.9 UNSPECIFIED CATARACT: Chronic | ICD-10-CM

## 2023-02-07 DIAGNOSIS — I10 ESSENTIAL (PRIMARY) HYPERTENSION: ICD-10-CM

## 2023-02-07 DIAGNOSIS — N39.0 URINARY TRACT INFECTION, SITE NOT SPECIFIED: ICD-10-CM

## 2023-02-07 DIAGNOSIS — D72.829 ELEVATED WHITE BLOOD CELL COUNT, UNSPECIFIED: ICD-10-CM

## 2023-02-07 DIAGNOSIS — Z29.9 ENCOUNTER FOR PROPHYLACTIC MEASURES, UNSPECIFIED: ICD-10-CM

## 2023-02-07 LAB
A1C WITH ESTIMATED AVERAGE GLUCOSE RESULT: 7.3 % — HIGH (ref 4–5.6)
ALBUMIN SERPL ELPH-MCNC: 3.8 G/DL — SIGNIFICANT CHANGE UP (ref 3.3–5)
ALP SERPL-CCNC: 64 U/L — SIGNIFICANT CHANGE UP (ref 40–120)
ALT FLD-CCNC: 12 U/L — SIGNIFICANT CHANGE UP (ref 4–33)
ANION GAP SERPL CALC-SCNC: 9 MMOL/L — SIGNIFICANT CHANGE UP (ref 7–14)
ANISOCYTOSIS BLD QL: SIGNIFICANT CHANGE UP
APPEARANCE UR: CLEAR — SIGNIFICANT CHANGE UP
AST SERPL-CCNC: 14 U/L — SIGNIFICANT CHANGE UP (ref 4–32)
B PERT DNA SPEC QL NAA+PROBE: SIGNIFICANT CHANGE UP
B PERT+PARAPERT DNA PNL SPEC NAA+PROBE: SIGNIFICANT CHANGE UP
BASE EXCESS BLDV CALC-SCNC: 5.3 MMOL/L — HIGH (ref -2–3)
BASOPHILS # BLD AUTO: 0.19 K/UL — SIGNIFICANT CHANGE UP (ref 0–0.2)
BASOPHILS NFR BLD AUTO: 0.9 % — SIGNIFICANT CHANGE UP (ref 0–2)
BILIRUB SERPL-MCNC: 0.3 MG/DL — SIGNIFICANT CHANGE UP (ref 0.2–1.2)
BILIRUB UR-MCNC: NEGATIVE — SIGNIFICANT CHANGE UP
BLOOD GAS VENOUS COMPREHENSIVE RESULT: SIGNIFICANT CHANGE UP
BORDETELLA PARAPERTUSSIS (RAPRVP): SIGNIFICANT CHANGE UP
BUN SERPL-MCNC: 18 MG/DL — SIGNIFICANT CHANGE UP (ref 7–23)
C PNEUM DNA SPEC QL NAA+PROBE: SIGNIFICANT CHANGE UP
CALCIUM SERPL-MCNC: 9.4 MG/DL — SIGNIFICANT CHANGE UP (ref 8.4–10.5)
CHLORIDE BLDV-SCNC: 96 MMOL/L — SIGNIFICANT CHANGE UP (ref 96–108)
CHLORIDE SERPL-SCNC: 97 MMOL/L — LOW (ref 98–107)
CO2 BLDV-SCNC: 35 MMOL/L — HIGH (ref 22–26)
CO2 SERPL-SCNC: 29 MMOL/L — SIGNIFICANT CHANGE UP (ref 22–31)
COLOR SPEC: SIGNIFICANT CHANGE UP
CREAT SERPL-MCNC: 0.67 MG/DL — SIGNIFICANT CHANGE UP (ref 0.5–1.3)
DIFF PNL FLD: NEGATIVE — SIGNIFICANT CHANGE UP
EGFR: 88 ML/MIN/1.73M2 — SIGNIFICANT CHANGE UP
EOSINOPHIL # BLD AUTO: 0 K/UL — SIGNIFICANT CHANGE UP (ref 0–0.5)
EOSINOPHIL NFR BLD AUTO: 0 % — SIGNIFICANT CHANGE UP (ref 0–6)
ESTIMATED AVERAGE GLUCOSE: 163 — SIGNIFICANT CHANGE UP
FLUAV SUBTYP SPEC NAA+PROBE: SIGNIFICANT CHANGE UP
FLUBV RNA SPEC QL NAA+PROBE: SIGNIFICANT CHANGE UP
GAS PNL BLDV: 132 MMOL/L — LOW (ref 136–145)
GLUCOSE BLDC GLUCOMTR-MCNC: 132 MG/DL — HIGH (ref 70–99)
GLUCOSE BLDC GLUCOMTR-MCNC: 196 MG/DL — HIGH (ref 70–99)
GLUCOSE BLDC GLUCOMTR-MCNC: 273 MG/DL — HIGH (ref 70–99)
GLUCOSE BLDC GLUCOMTR-MCNC: 296 MG/DL — HIGH (ref 70–99)
GLUCOSE BLDC GLUCOMTR-MCNC: 296 MG/DL — HIGH (ref 70–99)
GLUCOSE BLDV-MCNC: 247 MG/DL — HIGH (ref 70–99)
GLUCOSE SERPL-MCNC: 252 MG/DL — HIGH (ref 70–99)
GLUCOSE UR QL: ABNORMAL
HADV DNA SPEC QL NAA+PROBE: SIGNIFICANT CHANGE UP
HCO3 BLDV-SCNC: 33 MMOL/L — HIGH (ref 22–29)
HCOV 229E RNA SPEC QL NAA+PROBE: SIGNIFICANT CHANGE UP
HCOV HKU1 RNA SPEC QL NAA+PROBE: SIGNIFICANT CHANGE UP
HCOV NL63 RNA SPEC QL NAA+PROBE: SIGNIFICANT CHANGE UP
HCOV OC43 RNA SPEC QL NAA+PROBE: SIGNIFICANT CHANGE UP
HCT VFR BLD CALC: 31.8 % — LOW (ref 34.5–45)
HCT VFR BLDA CALC: 35 % — SIGNIFICANT CHANGE UP (ref 34.5–46.5)
HGB BLD CALC-MCNC: 11.5 G/DL — SIGNIFICANT CHANGE UP (ref 11.5–15.5)
HGB BLD-MCNC: 9.1 G/DL — LOW (ref 11.5–15.5)
HMPV RNA SPEC QL NAA+PROBE: SIGNIFICANT CHANGE UP
HPIV1 RNA SPEC QL NAA+PROBE: SIGNIFICANT CHANGE UP
HPIV2 RNA SPEC QL NAA+PROBE: SIGNIFICANT CHANGE UP
HPIV3 RNA SPEC QL NAA+PROBE: SIGNIFICANT CHANGE UP
HPIV4 RNA SPEC QL NAA+PROBE: SIGNIFICANT CHANGE UP
IANC: 18.63 K/UL — HIGH (ref 1.8–7.4)
KETONES UR-MCNC: NEGATIVE — SIGNIFICANT CHANGE UP
LACTATE BLDV-MCNC: 1.2 MMOL/L — SIGNIFICANT CHANGE UP (ref 0.5–2)
LEGIONELLA AG UR QL: NEGATIVE — SIGNIFICANT CHANGE UP
LEUKOCYTE ESTERASE UR-ACNC: ABNORMAL
LYMPHOCYTES # BLD AUTO: 0.19 K/UL — LOW (ref 1–3.3)
LYMPHOCYTES # BLD AUTO: 0.9 % — LOW (ref 13–44)
M PNEUMO DNA SPEC QL NAA+PROBE: SIGNIFICANT CHANGE UP
MANUAL SMEAR VERIFICATION: SIGNIFICANT CHANGE UP
MCHC RBC-ENTMCNC: 23.1 PG — LOW (ref 27–34)
MCHC RBC-ENTMCNC: 28.6 GM/DL — LOW (ref 32–36)
MCV RBC AUTO: 80.7 FL — SIGNIFICANT CHANGE UP (ref 80–100)
MICROCYTES BLD QL: SLIGHT — SIGNIFICANT CHANGE UP
MONOCYTES # BLD AUTO: 0.36 K/UL — SIGNIFICANT CHANGE UP (ref 0–0.9)
MONOCYTES NFR BLD AUTO: 1.7 % — LOW (ref 2–14)
NEUTROPHILS # BLD AUTO: 20.42 K/UL — HIGH (ref 1.8–7.4)
NEUTROPHILS NFR BLD AUTO: 95.6 % — HIGH (ref 43–77)
NITRITE UR-MCNC: NEGATIVE — SIGNIFICANT CHANGE UP
NT-PROBNP SERPL-SCNC: 2592 PG/ML — HIGH
PCO2 BLDV: 64 MMHG — HIGH (ref 39–42)
PH BLDV: 7.32 — SIGNIFICANT CHANGE UP (ref 7.32–7.43)
PH UR: 6.5 — SIGNIFICANT CHANGE UP (ref 5–8)
PLAT MORPH BLD: NORMAL — SIGNIFICANT CHANGE UP
PLATELET # BLD AUTO: 281 K/UL — SIGNIFICANT CHANGE UP (ref 150–400)
PLATELET COUNT - ESTIMATE: NORMAL — SIGNIFICANT CHANGE UP
PO2 BLDV: 29 MMHG — SIGNIFICANT CHANGE UP
POIKILOCYTOSIS BLD QL AUTO: SLIGHT — SIGNIFICANT CHANGE UP
POLYCHROMASIA BLD QL SMEAR: SLIGHT — SIGNIFICANT CHANGE UP
POTASSIUM BLDV-SCNC: 4.1 MMOL/L — SIGNIFICANT CHANGE UP (ref 3.5–5.1)
POTASSIUM SERPL-MCNC: 4.3 MMOL/L — SIGNIFICANT CHANGE UP (ref 3.5–5.3)
POTASSIUM SERPL-SCNC: 4.3 MMOL/L — SIGNIFICANT CHANGE UP (ref 3.5–5.3)
PROCALCITONIN SERPL-MCNC: 0.1 NG/ML — SIGNIFICANT CHANGE UP (ref 0.02–0.1)
PROT SERPL-MCNC: 6.5 G/DL — SIGNIFICANT CHANGE UP (ref 6–8.3)
PROT UR-MCNC: ABNORMAL
RAPID RVP RESULT: DETECTED
RBC # BLD: 3.94 M/UL — SIGNIFICANT CHANGE UP (ref 3.8–5.2)
RBC # FLD: 19.9 % — HIGH (ref 10.3–14.5)
RBC BLD AUTO: ABNORMAL
RSV RNA SPEC QL NAA+PROBE: SIGNIFICANT CHANGE UP
RV+EV RNA SPEC QL NAA+PROBE: DETECTED
SAO2 % BLDV: 42.5 % — SIGNIFICANT CHANGE UP
SARS-COV-2 RNA SPEC QL NAA+PROBE: SIGNIFICANT CHANGE UP
SODIUM SERPL-SCNC: 135 MMOL/L — SIGNIFICANT CHANGE UP (ref 135–145)
SP GR SPEC: 1.02 — SIGNIFICANT CHANGE UP (ref 1.01–1.05)
TROPONIN T, HIGH SENSITIVITY RESULT: 22 NG/L — SIGNIFICANT CHANGE UP
TROPONIN T, HIGH SENSITIVITY RESULT: 24 NG/L — SIGNIFICANT CHANGE UP
TSH SERPL-MCNC: 2.21 UIU/ML — SIGNIFICANT CHANGE UP (ref 0.27–4.2)
UROBILINOGEN FLD QL: SIGNIFICANT CHANGE UP
VARIANT LYMPHS # BLD: 0.9 % — SIGNIFICANT CHANGE UP (ref 0–6)
WBC # BLD: 21.36 K/UL — HIGH (ref 3.8–10.5)
WBC # FLD AUTO: 21.36 K/UL — HIGH (ref 3.8–10.5)

## 2023-02-07 PROCEDURE — 76705 ECHO EXAM OF ABDOMEN: CPT | Mod: 26

## 2023-02-07 PROCEDURE — 99223 1ST HOSP IP/OBS HIGH 75: CPT

## 2023-02-07 PROCEDURE — 71045 X-RAY EXAM CHEST 1 VIEW: CPT | Mod: 26

## 2023-02-07 PROCEDURE — 99285 EMERGENCY DEPT VISIT HI MDM: CPT

## 2023-02-07 RX ORDER — SODIUM CHLORIDE 9 MG/ML
1000 INJECTION, SOLUTION INTRAVENOUS
Refills: 0 | Status: DISCONTINUED | OUTPATIENT
Start: 2023-02-07 | End: 2023-02-16

## 2023-02-07 RX ORDER — DEXTROSE 50 % IN WATER 50 %
15 SYRINGE (ML) INTRAVENOUS ONCE
Refills: 0 | Status: DISCONTINUED | OUTPATIENT
Start: 2023-02-07 | End: 2023-02-16

## 2023-02-07 RX ORDER — INSULIN GLARGINE 100 [IU]/ML
12 INJECTION, SOLUTION SUBCUTANEOUS AT BEDTIME
Refills: 0 | Status: DISCONTINUED | OUTPATIENT
Start: 2023-02-07 | End: 2023-02-16

## 2023-02-07 RX ORDER — AZITHROMYCIN 500 MG/1
500 TABLET, FILM COATED ORAL ONCE
Refills: 0 | Status: COMPLETED | OUTPATIENT
Start: 2023-02-07 | End: 2023-02-07

## 2023-02-07 RX ORDER — VANCOMYCIN HCL 1 G
1000 VIAL (EA) INTRAVENOUS ONCE
Refills: 0 | Status: COMPLETED | OUTPATIENT
Start: 2023-02-07 | End: 2023-02-07

## 2023-02-07 RX ORDER — DEXTROSE 50 % IN WATER 50 %
25 SYRINGE (ML) INTRAVENOUS ONCE
Refills: 0 | Status: DISCONTINUED | OUTPATIENT
Start: 2023-02-07 | End: 2023-02-16

## 2023-02-07 RX ORDER — INSULIN LISPRO 100/ML
6 VIAL (ML) SUBCUTANEOUS
Refills: 0 | Status: DISCONTINUED | OUTPATIENT
Start: 2023-02-07 | End: 2023-02-16

## 2023-02-07 RX ORDER — CEFTRIAXONE 500 MG/1
1000 INJECTION, POWDER, FOR SOLUTION INTRAMUSCULAR; INTRAVENOUS ONCE
Refills: 0 | Status: COMPLETED | OUTPATIENT
Start: 2023-02-07 | End: 2023-02-07

## 2023-02-07 RX ORDER — INSULIN LISPRO 100/ML
VIAL (ML) SUBCUTANEOUS AT BEDTIME
Refills: 0 | Status: DISCONTINUED | OUTPATIENT
Start: 2023-02-07 | End: 2023-02-16

## 2023-02-07 RX ORDER — INSULIN LISPRO 100/ML
VIAL (ML) SUBCUTANEOUS
Refills: 0 | Status: DISCONTINUED | OUTPATIENT
Start: 2023-02-07 | End: 2023-02-16

## 2023-02-07 RX ORDER — VANCOMYCIN HCL 1 G
750 VIAL (EA) INTRAVENOUS EVERY 12 HOURS
Refills: 0 | Status: DISCONTINUED | OUTPATIENT
Start: 2023-02-07 | End: 2023-02-09

## 2023-02-07 RX ORDER — SIMVASTATIN 20 MG/1
20 TABLET, FILM COATED ORAL AT BEDTIME
Refills: 0 | Status: DISCONTINUED | OUTPATIENT
Start: 2023-02-07 | End: 2023-02-16

## 2023-02-07 RX ORDER — CHOLECALCIFEROL (VITAMIN D3) 125 MCG
1000 CAPSULE ORAL EVERY 24 HOURS
Refills: 0 | Status: DISCONTINUED | OUTPATIENT
Start: 2023-02-07 | End: 2023-02-16

## 2023-02-07 RX ORDER — GLUCAGON INJECTION, SOLUTION 0.5 MG/.1ML
1 INJECTION, SOLUTION SUBCUTANEOUS ONCE
Refills: 0 | Status: DISCONTINUED | OUTPATIENT
Start: 2023-02-07 | End: 2023-02-16

## 2023-02-07 RX ORDER — PIPERACILLIN AND TAZOBACTAM 4; .5 G/20ML; G/20ML
3.38 INJECTION, POWDER, LYOPHILIZED, FOR SOLUTION INTRAVENOUS EVERY 8 HOURS
Refills: 0 | Status: DISCONTINUED | OUTPATIENT
Start: 2023-02-07 | End: 2023-02-09

## 2023-02-07 RX ORDER — LEVOTHYROXINE SODIUM 125 MCG
50 TABLET ORAL DAILY
Refills: 0 | Status: DISCONTINUED | OUTPATIENT
Start: 2023-02-07 | End: 2023-02-16

## 2023-02-07 RX ORDER — PIPERACILLIN AND TAZOBACTAM 4; .5 G/20ML; G/20ML
3.38 INJECTION, POWDER, LYOPHILIZED, FOR SOLUTION INTRAVENOUS ONCE
Refills: 0 | Status: COMPLETED | OUTPATIENT
Start: 2023-02-07 | End: 2023-02-07

## 2023-02-07 RX ORDER — ENOXAPARIN SODIUM 100 MG/ML
40 INJECTION SUBCUTANEOUS EVERY 24 HOURS
Refills: 0 | Status: DISCONTINUED | OUTPATIENT
Start: 2023-02-07 | End: 2023-02-13

## 2023-02-07 RX ORDER — INFLUENZA VIRUS VACCINE 15; 15; 15; 15 UG/.5ML; UG/.5ML; UG/.5ML; UG/.5ML
0.7 SUSPENSION INTRAMUSCULAR ONCE
Refills: 0 | Status: DISCONTINUED | OUTPATIENT
Start: 2023-02-07 | End: 2023-02-16

## 2023-02-07 RX ORDER — METOPROLOL TARTRATE 50 MG
25 TABLET ORAL DAILY
Refills: 0 | Status: DISCONTINUED | OUTPATIENT
Start: 2023-02-07 | End: 2023-02-16

## 2023-02-07 RX ORDER — DEXTROSE 50 % IN WATER 50 %
12.5 SYRINGE (ML) INTRAVENOUS ONCE
Refills: 0 | Status: DISCONTINUED | OUTPATIENT
Start: 2023-02-07 | End: 2023-02-16

## 2023-02-07 RX ADMIN — PIPERACILLIN AND TAZOBACTAM 25 GRAM(S): 4; .5 INJECTION, POWDER, LYOPHILIZED, FOR SOLUTION INTRAVENOUS at 14:00

## 2023-02-07 RX ADMIN — CEFTRIAXONE 100 MILLIGRAM(S): 500 INJECTION, POWDER, FOR SOLUTION INTRAMUSCULAR; INTRAVENOUS at 05:11

## 2023-02-07 RX ADMIN — Medication 1: at 13:29

## 2023-02-07 RX ADMIN — Medication 250 MILLIGRAM(S): at 07:00

## 2023-02-07 RX ADMIN — Medication 6 UNIT(S): at 18:17

## 2023-02-07 RX ADMIN — SIMVASTATIN 20 MILLIGRAM(S): 20 TABLET, FILM COATED ORAL at 22:00

## 2023-02-07 RX ADMIN — PIPERACILLIN AND TAZOBACTAM 200 GRAM(S): 4; .5 INJECTION, POWDER, LYOPHILIZED, FOR SOLUTION INTRAVENOUS at 13:30

## 2023-02-07 RX ADMIN — Medication 3: at 09:03

## 2023-02-07 RX ADMIN — AZITHROMYCIN 255 MILLIGRAM(S): 500 TABLET, FILM COATED ORAL at 06:00

## 2023-02-07 RX ADMIN — PIPERACILLIN AND TAZOBACTAM 25 GRAM(S): 4; .5 INJECTION, POWDER, LYOPHILIZED, FOR SOLUTION INTRAVENOUS at 22:00

## 2023-02-07 RX ADMIN — INSULIN GLARGINE 12 UNIT(S): 100 INJECTION, SOLUTION SUBCUTANEOUS at 22:06

## 2023-02-07 RX ADMIN — Medication 3: at 18:13

## 2023-02-07 RX ADMIN — Medication 250 MILLIGRAM(S): at 22:00

## 2023-02-07 NOTE — ED CLERICAL - NS ED CLERK NOTE PRE-ARRIVAL INFORMATION; ADDITIONAL PRE-ARRIVAL INFORMATION
This patient is eligable for (or currently enrolled in) an outpatient care management program available through [employer]. This program can coordinate outpatient follow up and assist the patient in accessing a variety of outpatient resources.  If discharged from the ED, the patient will be contacted to see if any additional resources are needed.                                                                                    Please call the Nurse Clinical Call Center at [phone] with any questions or for assistance in discharge planning.

## 2023-02-07 NOTE — H&P ADULT - ASSESSMENT
79 y/o F with PMH HTN, HLD, DM, asthma with recent admission to [facility] for viral PNA who presents for diffuse body aches and feeling anxious.

## 2023-02-07 NOTE — ED PROVIDER NOTE - PROGRESS NOTE DETAILS
O'Guayanilla DO PGY-3.  JOSE MANUEL Benedict Side - aware of CXR. Will cover for pna. Will cover for hospital acquired / superimposed MRSA pna given recent hospitalization for pna Gildardo DO PGY-3.  JOSE MANUEL Benedict Side - endorsed pt to Dr. Gonzalez

## 2023-02-07 NOTE — ED PROVIDER NOTE - PHYSICAL EXAMINATION
CONSTITUTIONAL: Well-developed; well-nourished; +appears weak   SKIN: warm, dry  HEAD: Normocephalic; atraumatic.  EYES: no conjunctival injection. PERRL.   ENT: No nasal discharge; airway clear.  NECK: Supple; non tender.  CARD: S1, S2 normal; Regular rate and rhythm.   RESP: +tachypneic. +coarse breath sounds b/l   ABD: +ruq ttp   EXT: No cyanosis   NEURO: AOx3  PSYCH: Cooperative, appropriate.

## 2023-02-07 NOTE — ED ADULT NURSE NOTE - CHIEF COMPLAINT QUOTE
alert oriented Thai speaking c/o cough and SOB since yesterday was discharged from [facility] 2/1/23 for pneumonia PMHx HLD HTN DM2 asthma

## 2023-02-07 NOTE — H&P ADULT - NSHPLABSRESULTS_GEN_ALL_CORE
.  LABS:                         9.1    21.36 )-----------( 281      ( 2023 02:47 )             31.8         135  |  97<L>  |  18  ----------------------------<  252<H>  4.3   |  29  |  0.67    Ca    9.4      2023 02:47    TPro  6.5  /  Alb  3.8  /  TBili  0.3  /  DBili  x   /  AST  14  /  ALT  12  /  AlkPhos  64        Urinalysis Basic - ( 2023 07:51 )    Color: Light Yellow / Appearance: Clear / S.017 / pH: x  Gluc: x / Ketone: Negative  / Bili: Negative / Urobili: <2 mg/dL   Blood: x / Protein: 30 mg/dL / Nitrite: Negative   Leuk Esterase: Moderate / RBC: 2 /HPF / WBC 20-30 /HPF   Sq Epi: x / Non Sq Epi: x / Bacteria: Few          Serum Pro-Brain Natriuretic Peptide: 2592 pg/mL ( @ 02:47)        RADIOLOGY, EKG & ADDITIONAL TESTS: Reviewed.

## 2023-02-07 NOTE — ED PROVIDER NOTE - OBJECTIVE STATEMENT
O'Navarro DO PGY-3.  JOSE MANUEL Benedict Side - 79 y/o F  w/ hx of HTN HLD DM asthma and recent admission to [facility] for pna. Pt p/w body aches, sob, cough w/ productive sputum. No fevers or abd pain. Is on 3L NC at home. Family at bedside for collateral and translation.

## 2023-02-07 NOTE — H&P ADULT - PROBLEM SELECTOR PLAN 1
Pt. recently discharged after being admitted for respiratory failure found with multifocal PNA 2/2 entero/rhinovirus. She received steroids for 5 days on last admission. PE was ruled out. ID had been consulted and she was treated with Zosyn and then d/c'ed. CXR reviewed by me and appears unchanged from prior with multifocal patchy opacities. RVP is also positive for entero/rhinovirus but likely old infection. Pt. with known severe pulm HTN, asthma, and recent viral infection. Likely just not improved.   - Continue on supplemental O2   - Will start vanc/zosyn.

## 2023-02-07 NOTE — ED ADULT NURSE NOTE - OBJECTIVE STATEMENT
78 year old female, received to room 9. Pt A&Ox2, respirations equal and unlabored. Pt Urdo speaking. Pt declined interpretation services. Daughter in law Gabby Burciaga, phone # [phone], at bedside to translate. Pt c/o fever and body aches x3 days as well as a cough. Pt denies sick contacts. Pt on 4L NC at baseline, unable to state why pt requires O2. O2 sat 100% on 4L NC. Pt denies CP, SOB, palpitations, dizziness, blurry vision, headache, N/V/D, any other medical complaints. Neuro intact. PERRLA. Abdomen soft, nontender, nondistended. Skin dry and intact. 20G IV placed to RAC. Labs drawn and sent. Medicated as EMR orders. Pending XR. Will continue to monitor.

## 2023-02-07 NOTE — ED ADULT NURSE REASSESSMENT NOTE - NS ED NURSE REASSESS COMMENT FT1
Pt resting comfortably. VSS. Audible rhonchi and CUNHA noted. Supplemental O2 in place. Continuous SpO2 monitoring and telemetry monitoring in place. Pending room placement.

## 2023-02-07 NOTE — H&P ADULT - PROBLEM SELECTOR PLAN 4
Hx of DM. A1C 7.3.   - Continue with home insulin regimen of lantus 12U qhs and admelog 6U TID.   - ISS   - Monitor sugars

## 2023-02-07 NOTE — PATIENT PROFILE ADULT - FALL HARM RISK - HARM RISK INTERVENTIONS

## 2023-02-07 NOTE — H&P ADULT - HISTORY OF PRESENT ILLNESS
77 y/o F with PMH HTN, HLD, DM, asthma with recent admission to [facility] for viral PNA who presents for diffuse body aches and feeling anxious. As per family member at bedside, patient was having anxiety and reporting that she was feeling off. She was also have body aches. Overall, she was not getting better from the last time she was in the hospital. She continues to have cough, SOB, and body aches. She denies dysuria/hematuria. No fevers/abdominal pain. She remains on 3L at home. No medications have changed since she was discharged.

## 2023-02-07 NOTE — ED ADULT NURSE REASSESSMENT NOTE - NS ED NURSE REASSESS COMMENT FT1
Patient passed dysphagia screening. Insulin given as per protocol. Remains in stable condition at this time. Daughter at bedside. No distress at this time. MARISABEL WEBB

## 2023-02-07 NOTE — H&P ADULT - PROBLEM SELECTOR PLAN 3
WBCs 21.6. On discharge WBCs 7. Recent steroid use however patient completed the course during the last hospitalization and there was no leukocytosis. May be 2/2 UTI vs recurrent PNA.   - C/w vanc/zosyn.   - F/u BCx   - F/u urine legionella and strep   - Sputum culture

## 2023-02-07 NOTE — ED PROVIDER NOTE - ST/T WAVE
“Patient's name, , procedure and correct site were confirmed during the [facility] Timeout.” No ST elevations

## 2023-02-07 NOTE — H&P ADULT - NSHPPHYSICALEXAM_GEN_ALL_CORE
.  VITAL SIGNS:  T(C): 36.9 (02-07-23 @ 09:07), Max: 37.2 (02-07-23 @ 01:40)  T(F): 98.4 (02-07-23 @ 09:07), Max: 98.9 (02-07-23 @ 01:40)  HR: 96 (02-07-23 @ 09:07) (94 - 96)  BP: 118/62 (02-07-23 @ 09:07) (106/54 - 127/67)  BP(mean): --  RR: 18 (02-07-23 @ 09:07) (7 - 18)  SpO2: 99% (02-07-23 @ 09:07) (94% - 99%)  Wt(kg): --    PHYSICAL EXAM:    Constitutional: Elderly female resting comfortably in bed; sleeping  Head: NC/AT  Eyes: PERRL, EOMI, anicteric sclera  ENT: no nasal discharge; uvula midline, no oropharyngeal erythema or exudates; MMM  Neck: supple; no JVD or thyromegaly  Respiratory: Diffuse rhonchi and wheezing bilaterally - mild respiratory distress/tachypnea.   Cardiac: +S1/S2; RRR; no M/R/G; PMI non-displaced  Gastrointestinal: abdomen soft, NT/ND; no rebound or guarding; +BSx4  Back: spine midline, no bony tenderness or step-offs; no CVAT B/L  Extremities: WWP, no clubbing or cyanosis; no peripheral edema  Musculoskeletal: NROM x4; no joint swelling, tenderness or erythema  Vascular: 2+ radial, femoral, DP/PT pulses B/L  Dermatologic: skin warm, dry and intact; no rashes, wounds, or scars  Lymphatic: no submandibular or cervical LAD  Neurologic: AAOx3; CNII-XII grossly intact; no focal deficits  Psychiatric: affect and characteristics of appearance, verbalizations, behaviors are appropriate

## 2023-02-07 NOTE — ED PROVIDER NOTE - CLINICAL SUMMARY MEDICAL DECISION MAKING FREE TEXT BOX
Spoke with Mr Carias's sister, notified her of office visit for one month follow up and test results review for June 19th at 3pm. She verbalized understanding and will bring Mr Carias to the [place] office location on June 19th at 3pm with his medication bottles.   
78F PMH HTN, HLD, DM, asthma, and recent admission to [facility] for pna p/w cough, SOB, body aches. Concern for pna vs fluid overload. Plan: blood work, CXR, ECG, RVP, RUQ US. Will re-assess.

## 2023-02-07 NOTE — H&P ADULT - NSHPREVIEWOFSYSTEMS_GEN_ALL_CORE
REVIEW OF SYSTEMS:    CONSTITUTIONAL: No weakness, fevers or chills  EYES/ENT: No visual changes;  No vertigo or throat pain   NECK: No pain or stiffness  RESPIRATORY: No cough, wheezing, hemoptysis; (+) SOB  CARDIOVASCULAR: No chest pain or palpitations  GASTROINTESTINAL: No abdominal or epigastric pain. No nausea, vomiting, or hematemesis; No diarrhea or constipation. No melena or hematochezia.  GENITOURINARY: No dysuria, frequency or hematuria  NEUROLOGICAL: No numbness or weakness  SKIN: No itching, rashes

## 2023-02-07 NOTE — ED ADULT TRIAGE NOTE - CHIEF COMPLAINT QUOTE
alert oriented Telugu speaking c/o cough and SOB since yesterday was discharged from [facility] 2/1/23 for pneumonia PMHx HLD HTN DM2 asthma

## 2023-02-08 LAB
ANION GAP SERPL CALC-SCNC: 10 MMOL/L — SIGNIFICANT CHANGE UP (ref 7–14)
BUN SERPL-MCNC: 13 MG/DL — SIGNIFICANT CHANGE UP (ref 7–23)
CALCIUM SERPL-MCNC: 9 MG/DL — SIGNIFICANT CHANGE UP (ref 8.4–10.5)
CHLORIDE SERPL-SCNC: 95 MMOL/L — LOW (ref 98–107)
CO2 SERPL-SCNC: 29 MMOL/L — SIGNIFICANT CHANGE UP (ref 22–31)
CREAT SERPL-MCNC: 0.68 MG/DL — SIGNIFICANT CHANGE UP (ref 0.5–1.3)
CULTURE RESULTS: SIGNIFICANT CHANGE UP
EGFR: 88 ML/MIN/1.73M2 — SIGNIFICANT CHANGE UP
GLUCOSE BLDC GLUCOMTR-MCNC: 172 MG/DL — HIGH (ref 70–99)
GLUCOSE BLDC GLUCOMTR-MCNC: 185 MG/DL — HIGH (ref 70–99)
GLUCOSE BLDC GLUCOMTR-MCNC: 188 MG/DL — HIGH (ref 70–99)
GLUCOSE BLDC GLUCOMTR-MCNC: 193 MG/DL — HIGH (ref 70–99)
GLUCOSE BLDC GLUCOMTR-MCNC: 290 MG/DL — HIGH (ref 70–99)
GLUCOSE SERPL-MCNC: 158 MG/DL — HIGH (ref 70–99)
HCT VFR BLD CALC: 32 % — LOW (ref 34.5–45)
HGB BLD-MCNC: 8.9 G/DL — LOW (ref 11.5–15.5)
MAGNESIUM SERPL-MCNC: 1.7 MG/DL — SIGNIFICANT CHANGE UP (ref 1.6–2.6)
MCHC RBC-ENTMCNC: 23 PG — LOW (ref 27–34)
MCHC RBC-ENTMCNC: 27.8 GM/DL — LOW (ref 32–36)
MCV RBC AUTO: 82.7 FL — SIGNIFICANT CHANGE UP (ref 80–100)
NRBC # BLD: 0 /100 WBCS — SIGNIFICANT CHANGE UP (ref 0–0)
NRBC # FLD: 0 K/UL — SIGNIFICANT CHANGE UP (ref 0–0)
PHOSPHATE SERPL-MCNC: 2.6 MG/DL — SIGNIFICANT CHANGE UP (ref 2.5–4.5)
PLATELET # BLD AUTO: 255 K/UL — SIGNIFICANT CHANGE UP (ref 150–400)
POTASSIUM SERPL-MCNC: 4.5 MMOL/L — SIGNIFICANT CHANGE UP (ref 3.5–5.3)
POTASSIUM SERPL-SCNC: 4.5 MMOL/L — SIGNIFICANT CHANGE UP (ref 3.5–5.3)
RBC # BLD: 3.87 M/UL — SIGNIFICANT CHANGE UP (ref 3.8–5.2)
RBC # FLD: 19.7 % — HIGH (ref 10.3–14.5)
S PNEUM AG UR QL: POSITIVE
SODIUM SERPL-SCNC: 134 MMOL/L — LOW (ref 135–145)
SPECIMEN SOURCE: SIGNIFICANT CHANGE UP
WBC # BLD: 16.65 K/UL — HIGH (ref 3.8–10.5)
WBC # FLD AUTO: 16.65 K/UL — HIGH (ref 3.8–10.5)

## 2023-02-08 PROCEDURE — 99233 SBSQ HOSP IP/OBS HIGH 50: CPT

## 2023-02-08 RX ORDER — IPRATROPIUM/ALBUTEROL SULFATE 18-103MCG
3 AEROSOL WITH ADAPTER (GRAM) INHALATION EVERY 6 HOURS
Refills: 0 | Status: DISCONTINUED | OUTPATIENT
Start: 2023-02-08 | End: 2023-02-16

## 2023-02-08 RX ADMIN — SIMVASTATIN 20 MILLIGRAM(S): 20 TABLET, FILM COATED ORAL at 22:37

## 2023-02-08 RX ADMIN — Medication 3 MILLILITER(S): at 15:31

## 2023-02-08 RX ADMIN — PIPERACILLIN AND TAZOBACTAM 25 GRAM(S): 4; .5 INJECTION, POWDER, LYOPHILIZED, FOR SOLUTION INTRAVENOUS at 13:03

## 2023-02-08 RX ADMIN — Medication 6 UNIT(S): at 12:42

## 2023-02-08 RX ADMIN — PIPERACILLIN AND TAZOBACTAM 25 GRAM(S): 4; .5 INJECTION, POWDER, LYOPHILIZED, FOR SOLUTION INTRAVENOUS at 22:36

## 2023-02-08 RX ADMIN — Medication 3 MILLILITER(S): at 20:40

## 2023-02-08 RX ADMIN — Medication 3 MILLILITER(S): at 09:40

## 2023-02-08 RX ADMIN — Medication 6 UNIT(S): at 18:31

## 2023-02-08 RX ADMIN — INSULIN GLARGINE 12 UNIT(S): 100 INJECTION, SOLUTION SUBCUTANEOUS at 22:36

## 2023-02-08 RX ADMIN — PIPERACILLIN AND TAZOBACTAM 25 GRAM(S): 4; .5 INJECTION, POWDER, LYOPHILIZED, FOR SOLUTION INTRAVENOUS at 06:55

## 2023-02-08 RX ADMIN — Medication 3: at 12:42

## 2023-02-08 RX ADMIN — Medication 1: at 08:57

## 2023-02-08 RX ADMIN — Medication 50 MICROGRAM(S): at 06:54

## 2023-02-08 RX ADMIN — Medication 6 UNIT(S): at 08:58

## 2023-02-08 RX ADMIN — Medication 1: at 18:30

## 2023-02-08 RX ADMIN — Medication 250 MILLIGRAM(S): at 11:10

## 2023-02-08 RX ADMIN — Medication 25 MILLIGRAM(S): at 06:54

## 2023-02-08 NOTE — RAPID RESPONSE TEAM SUMMARY - NSSITUATIONBACKGROUNDRRT_GEN_ALL_CORE
77 y/o F with PMH HTN, HLD, DM, asthma with recent admission to [facility] for viral PNA who presents for diffuse body aches and feeling anxious. RRT called for change in mental status, pt appearing to be more confused. Upon arrival, pt appearing lethargic, however responsive to daughter-in-law when asked questions. As per daughter-in-law, pt is at baseline mental status, however does appear to be more sleepy. VS: afebrile, /90s, HR 90s, and 100% oxygen saturation. FS normal. On physical exam, pt moving b/l UE and LE with symmetrical strength. Left pupil appreciated to be normal on exam, right pupil difficult to appreciate on exam, however no obvious changes. Pt not on AC. CBC and CMP with mag and phos collected by primary team prior to rapid, will recommend adding vbg with lytes. Please collect urinalysis. Also recommend CT Head. No events prior to event on telemetry, pt remains on telemetry. At this time differentials include possible toxic metabolic encephalopathy vs intracranial structural change. Primary team at bedside. Plan discussed with primary team.

## 2023-02-08 NOTE — CONSULT NOTE ADULT - SUBJECTIVE AND OBJECTIVE BOX
Forrest Rosenthal MD  Interventional Cardiology / Advance Heart Failure and Cardiac Transplant Specialist  [place] Office : [phone] [place] [id number]  Tel:   [place] Office : [address] [facility] [address] [address]  Tel: [phone]    HISTORY OF PRESENTING ILLNESS:  77 y/o F with PMH HTN, HLD, DM, asthma with recent admission to [facility] for viral PNA who presents for diffuse body aches and feeling anxious. As per family member at bedside, patient was having anxiety and reporting that she was feeling off. She was also have body aches. Overall, she was not getting better from the last time she was in the hospital. She continues to have cough, SOB, and body aches. She denies dysuria/hematuria. No fevers/abdominal pain. She remains on 3L at home. No medications have changed since she was discharged.   	  MEDICATIONS:  enoxaparin Injectable 40 milliGRAM(s) SubCutaneous every 24 hours  metoprolol succinate ER 25 milliGRAM(s) Oral daily    piperacillin/tazobactam IVPB.. 3.375 Gram(s) IV Intermittent every 8 hours  vancomycin  IVPB 750 milliGRAM(s) IV Intermittent every 12 hours    albuterol/ipratropium for Nebulization 3 milliLiter(s) Nebulizer every 6 hours        dextrose 50% Injectable 25 Gram(s) IV Push once  dextrose 50% Injectable 12.5 Gram(s) IV Push once  dextrose 50% Injectable 25 Gram(s) IV Push once  dextrose Oral Gel 15 Gram(s) Oral once PRN  glucagon  Injectable 1 milliGRAM(s) IntraMuscular once  insulin glargine Injectable (LANTUS) 12 Unit(s) SubCutaneous at bedtime  insulin lispro (ADMELOG) corrective regimen sliding scale   SubCutaneous three times a day before meals  insulin lispro (ADMELOG) corrective regimen sliding scale   SubCutaneous at bedtime  insulin lispro Injectable (ADMELOG) 6 Unit(s) SubCutaneous three times a day before meals  levothyroxine 50 MICROGram(s) Oral daily  simvastatin 20 milliGRAM(s) Oral at bedtime    cholecalciferol 1000 Unit(s) Oral every 24 hours  dextrose 5%. 1000 milliLiter(s) IV Continuous <Continuous>  dextrose 5%. 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 No nausea, vomiting, or hematemesis; No diarrhea or constipation. No melena or hematochezia.  GENITOURINARY: No dysuria, frequency, hematuria, or incontinence  NEUROLOGICAL: No headaches, memory loss, loss of strength, numbness, or tremors  ENDOCRINE: No heat or cold intolerance; No hair loss  MUSCULOSKELETAL: No joint pain or swelling; No muscle, back, or extremity pain  PSYCHIATRIC: No depression, anxiety, mood swings, or difficulty sleeping  HEME/LYMPH: No easy bruising, or bleeding gums  All others negative    PHYSICAL EXAM:  T(C): 37 (02-08-23 @ 12:26), Max: 37 (02-08-23 @ 12:26)  HR: 93 (02-08-23 @ 12:26) (90 - 97)  BP: 130/59 (02-08-23 @ 12:26) (121/60 - 133/67)  RR: 19 (02-08-23 @ 12:26) (17 - 20)  SpO2: 95% (02-08-23 @ 12:26) (95% - 100%)  Wt(kg): --  I&O's Summary    08 Feb 2023 07:01  -  08 Feb 2023 14:56  --------------------------------------------------------  IN: 255 mL / OUT: 0 mL / NET: 255 mL      Height (cm): 149.9 (02-07 @ 20:37)  Weight (kg): 55.1 (02-07 @ 20:37)  BMI (kg/m2): 24.5 (02-07 @ 20:37)  BSA (m2): 1.49 (02-07 @ 20:37)    GENERAL: NAD  EYES: EOMI, PERRLA, conjunctiva and sclera clear  ENMT: No tonsillar erythema, exudates, or enlargement  Cardiovascular: Normal S1 S2, No JVD, No murmurs, No edema  Respiratory: Lungs wheezing to auscultation	  Gastrointestinal:  Soft, Non-tender, + BS	  Extremities: No edema                                       8.9    16.65 )-----------( 255      ( 08 Feb 2023 07:00 )             32.0     02-08    134<L>  |  95<L>  |  13  ----------------------------<  158<H>  4.5   |  29  |  0.68    Ca    9.0      08 Feb 2023 07:00  Phos  2.6     02-08  Mg     1.70     02-08    TPro  6.5  /  Alb  3.8  /  TBili  0.3  /  DBili  x   /  AST  14  /  ALT  12  /  AlkPhos  64  02-07    proBNP:   Lipid Profile:   HgA1c:   TSH:     Consultant(s) Notes Reviewed:  [x ] YES  [ ] NO    Care Discussed with Consultants/Other Providers [ x] YES  [ ] NO    Imaging Personally Reviewed independently:  [x] YES  [ ] NO    All labs, radiologic studies, vitals, orders and medications list reviewed. Patient is seen and examined at bedside. Case discussed with medical team.

## 2023-02-08 NOTE — CONSULT NOTE ADULT - ASSESSMENT
79 y/o F with PMH HTN, HLD, DM, asthma with recent admission to [facility] for viral PNA who presents for diffuse body aches and feeling anxious and SOB    1. Acute respiratory failure  -recently discharged after being admitted for respiratory failure found with multifocal PNA 2/2 entero/rhinovirus treated with steroids  -RVP remains positive for entero/rhinovirus  -on abx for PNA   -echo normal LV systolic function, no WMA. mild diastolic dysfunction. decreased RV function 11/22  -CT chest 1/2023 shows Unchanged bilateral upper lobe opacities of unclear etiology, including the right apical nodular opacity as well as the left upper lobe  ill-defined masslike consolidation which has been increasing in  size/attenuation since 2019. recommend pulm f/u     2. HTN  -controlled  -c/w metoprolol  -continue to monitor BP     3. HLD   - on zocor    4. DVT prophylaxis  -lovenox subq

## 2023-02-09 LAB
ANION GAP SERPL CALC-SCNC: 12 MMOL/L — SIGNIFICANT CHANGE UP (ref 7–14)
BUN SERPL-MCNC: 11 MG/DL — SIGNIFICANT CHANGE UP (ref 7–23)
CALCIUM SERPL-MCNC: 8.7 MG/DL — SIGNIFICANT CHANGE UP (ref 8.4–10.5)
CHLORIDE SERPL-SCNC: 95 MMOL/L — LOW (ref 98–107)
CO2 SERPL-SCNC: 29 MMOL/L — SIGNIFICANT CHANGE UP (ref 22–31)
CREAT SERPL-MCNC: 0.76 MG/DL — SIGNIFICANT CHANGE UP (ref 0.5–1.3)
EGFR: 79 ML/MIN/1.73M2 — SIGNIFICANT CHANGE UP
GLUCOSE BLDC GLUCOMTR-MCNC: 159 MG/DL — HIGH (ref 70–99)
GLUCOSE BLDC GLUCOMTR-MCNC: 189 MG/DL — HIGH (ref 70–99)
GLUCOSE BLDC GLUCOMTR-MCNC: 209 MG/DL — HIGH (ref 70–99)
GLUCOSE BLDC GLUCOMTR-MCNC: 247 MG/DL — HIGH (ref 70–99)
GLUCOSE SERPL-MCNC: 216 MG/DL — HIGH (ref 70–99)
HCT VFR BLD CALC: 27.7 % — LOW (ref 34.5–45)
HGB BLD-MCNC: 7.7 G/DL — LOW (ref 11.5–15.5)
MAGNESIUM SERPL-MCNC: 1.8 MG/DL — SIGNIFICANT CHANGE UP (ref 1.6–2.6)
MCHC RBC-ENTMCNC: 23.1 PG — LOW (ref 27–34)
MCHC RBC-ENTMCNC: 27.8 GM/DL — LOW (ref 32–36)
MCV RBC AUTO: 83.2 FL — SIGNIFICANT CHANGE UP (ref 80–100)
NRBC # BLD: 0 /100 WBCS — SIGNIFICANT CHANGE UP (ref 0–0)
NRBC # FLD: 0 K/UL — SIGNIFICANT CHANGE UP (ref 0–0)
PHOSPHATE SERPL-MCNC: 2.4 MG/DL — LOW (ref 2.5–4.5)
PLATELET # BLD AUTO: 232 K/UL — SIGNIFICANT CHANGE UP (ref 150–400)
POTASSIUM SERPL-MCNC: 4.5 MMOL/L — SIGNIFICANT CHANGE UP (ref 3.5–5.3)
POTASSIUM SERPL-SCNC: 4.5 MMOL/L — SIGNIFICANT CHANGE UP (ref 3.5–5.3)
RBC # BLD: 3.33 M/UL — LOW (ref 3.8–5.2)
RBC # FLD: 19.5 % — HIGH (ref 10.3–14.5)
SODIUM SERPL-SCNC: 136 MMOL/L — SIGNIFICANT CHANGE UP (ref 135–145)
VANCOMYCIN TROUGH SERPL-MCNC: 12.9 UG/ML — SIGNIFICANT CHANGE UP (ref 10–20)
WBC # BLD: 10.73 K/UL — HIGH (ref 3.8–10.5)
WBC # FLD AUTO: 10.73 K/UL — HIGH (ref 3.8–10.5)

## 2023-02-09 PROCEDURE — 99232 SBSQ HOSP IP/OBS MODERATE 35: CPT

## 2023-02-09 PROCEDURE — 99222 1ST HOSP IP/OBS MODERATE 55: CPT

## 2023-02-09 RX ORDER — CEFTRIAXONE 500 MG/1
1000 INJECTION, POWDER, FOR SOLUTION INTRAMUSCULAR; INTRAVENOUS EVERY 24 HOURS
Refills: 0 | Status: COMPLETED | OUTPATIENT
Start: 2023-02-09 | End: 2023-02-13

## 2023-02-09 RX ORDER — ACETAMINOPHEN 500 MG
650 TABLET ORAL ONCE
Refills: 0 | Status: COMPLETED | OUTPATIENT
Start: 2023-02-09 | End: 2023-02-09

## 2023-02-09 RX ADMIN — SIMVASTATIN 20 MILLIGRAM(S): 20 TABLET, FILM COATED ORAL at 21:31

## 2023-02-09 RX ADMIN — Medication 650 MILLIGRAM(S): at 04:34

## 2023-02-09 RX ADMIN — PIPERACILLIN AND TAZOBACTAM 25 GRAM(S): 4; .5 INJECTION, POWDER, LYOPHILIZED, FOR SOLUTION INTRAVENOUS at 06:15

## 2023-02-09 RX ADMIN — Medication 3 MILLILITER(S): at 21:03

## 2023-02-09 RX ADMIN — Medication 2: at 18:13

## 2023-02-09 RX ADMIN — INSULIN GLARGINE 12 UNIT(S): 100 INJECTION, SOLUTION SUBCUTANEOUS at 21:36

## 2023-02-09 RX ADMIN — PIPERACILLIN AND TAZOBACTAM 25 GRAM(S): 4; .5 INJECTION, POWDER, LYOPHILIZED, FOR SOLUTION INTRAVENOUS at 13:48

## 2023-02-09 RX ADMIN — CEFTRIAXONE 100 MILLIGRAM(S): 500 INJECTION, POWDER, FOR SOLUTION INTRAMUSCULAR; INTRAVENOUS at 21:39

## 2023-02-09 RX ADMIN — Medication 6 UNIT(S): at 13:04

## 2023-02-09 RX ADMIN — Medication 1: at 13:04

## 2023-02-09 RX ADMIN — Medication 250 MILLIGRAM(S): at 03:36

## 2023-02-09 RX ADMIN — Medication 3 MILLILITER(S): at 15:29

## 2023-02-09 RX ADMIN — Medication 50 MICROGRAM(S): at 06:15

## 2023-02-09 RX ADMIN — Medication 3 MILLILITER(S): at 04:22

## 2023-02-09 RX ADMIN — Medication 6 UNIT(S): at 09:10

## 2023-02-09 RX ADMIN — Medication 3 MILLILITER(S): at 10:42

## 2023-02-09 RX ADMIN — Medication 1000 UNIT(S): at 06:16

## 2023-02-09 RX ADMIN — Medication 25 MILLIGRAM(S): at 06:15

## 2023-02-09 RX ADMIN — Medication 650 MILLIGRAM(S): at 05:00

## 2023-02-09 RX ADMIN — Medication 2: at 09:09

## 2023-02-09 RX ADMIN — Medication 6 UNIT(S): at 18:13

## 2023-02-09 NOTE — CONSULT NOTE ADULT - ASSESSMENT
Pt seen and examined at bedside.     Full consult note to follow.  79 yo F with a PMH of HTN, HLD, DM, asthma, recent admission in January for Rhino/Enterovirus pneumonia (treated with supportive care), who presented to the ED with SOB.     Recent admission in Jan 2023 for Rhino/Enterovirus pneumonia treated with supportive care  No fevers, pt endorsed SOB on admission   Acute on chronic hypoxic respiratory failure, on increased NC O2   Leukocytosis  Strep Pneumoniae Ur Ag positive  Multifocal pneumonia (CXR)  Pt currently on Vancomycin and Zosyn  ID consulted for recommendations    OVERALL  Sepsis, respiratory failure  Superimposed Strep Pneumoniae bacterial pneumonia in setting of recent viral pneumonia   Strep Pneumoniae Ur Ag +    RECOMMENDATIONS  -Stop Vancomycin  -Stop Zosyn  -Start Rocephin 1g IV QD  -Check CT chest (non con)    All recommendations are tentative pending Attending Attestation.    Janet Burciaga MD, PGY-4   ID Fellow  [employer] Teams Preferred  After 5pm/weekends call [phone]

## 2023-02-09 NOTE — CONSULT NOTE ADULT - CONSULT REASON
[facility] Audio Only Virtual Visit Note      Chief Complaint      Chief Complaint   Patient presents with    Follow-up    Fatigue    Nausea       History of Present Illness      Mauri Matias is a 81 y.o. female with chronic conditions of  who presents today for: follow up weakness, episodes of nausea/starving, right lower quadrant abdominal pain.  Having episodes of feeling very weak, hungry and nauseated, usually 4 or so hours after eating.  Pt's son bought pt a glucometer to use during those episodes but she can't figure out how to use it.  Last week, discussed changing protonix to early AM instead of in the middle of the day.  Pt has been doing that but no significant improvement.  Pt has also been trying to increase protein intake.  Still eats a significant amount of simple carbs as her main source of food.  Relates one episode of right inguinal discomfort when walking.  Has not recurred or persisted.  Does feel weak with exercise and takes a while to recover.  Pt had recent nuclear stress test with Dr. Weiss following PCI to mid RCA.  Had a CTA chest/abdomen/pelvis which did not show any bowel abnormalities.    Pt still endorses depressed mood.  Has taken zoloft previously and is interested in getting back on if no other explanation for her symptoms are found.    Past Medical History:  Past Medical History:   Diagnosis Date    CAD (coronary atherosclerotic disease)     Hypertension        Past Surgical History:   has a past surgical history that includes Hysterectomy; Tonsillectomy; Coronary stent placement; Left heart catheterization (Left, 12/5/2019); and Coronary angiography (N/A, 12/5/2019).    Family History:  family history includes Heart attack in her father; Hypertension in her mother; Kidney failure in her mother; Parkinsonism in her father.     Social History:  Social History     Tobacco Use    Smoking status: Never Smoker    Smokeless tobacco: Never Used   Substance Use Topics     Alcohol use: No    Drug use: No       Review of Systems   Constitutional: Negative for chills, fever and malaise/fatigue.   Respiratory: Negative for shortness of breath.    Cardiovascular: Negative for chest pain.   Gastrointestinal: Negative for constipation, diarrhea, nausea and vomiting.   Skin: Negative for rash.   Neurological: Negative for weakness.        Medications:  Outpatient Encounter Medications as of 5/7/2020   Medication Sig Dispense Refill    acetaminophen (TYLENOL) 500 MG tablet Take 500 mg by mouth every 6 (six) hours as needed for Pain.      aspirin 81 MG Chew Take 1 tablet (81 mg total) by mouth once daily.  0    clopidogrel (PLAVIX) 75 mg tablet Take 1 tablet (75 mg total) by mouth once daily. 30 tablet 11    fish oil-omega-3 fatty acids 300-1,000 mg capsule Take 2 g by mouth once daily.      levothyroxine (SYNTHROID) 25 MCG tablet Take 25 mcg by mouth once daily.  11    losartan (COZAAR) 50 MG tablet Take 50 mg by mouth 2 (two) times daily.  11    nitroGLYCERIN (NITROSTAT) 0.4 MG SL tablet TAKE 1 TAB UNDER THE TONGUE EVERY 5 MIN X3 DOSES IF NEEDED FOR CHEST PAIN,IF NO RELIEF GO TO THE ER.  1    ondansetron (ZOFRAN) 4 MG tablet Take 4 mg by mouth every 8 (eight) hours as needed for Nausea.      pantoprazole (PROTONIX) 20 MG tablet Take 1 tablet (20 mg total) by mouth once daily. 30 tablet 3    rosuvastatin (CRESTOR) 40 MG Tab Take 1 tablet (40 mg total) by mouth Daily. (Patient not taking: Reported on 3/4/2020) 90 tablet 3     No facility-administered encounter medications on file as of 5/7/2020.        Allergies:  Review of patient's allergies indicates:   Allergen Reactions    Penicillins Rash       Health Maintenance:    There is no immunization history on file for this patient.   Health Maintenance   Topic Date Due    TETANUS VACCINE  11/19/1956    Pneumococcal Vaccine (65+ Low/Medium Risk) (1 of 2 - PCV13) 11/19/2001    DEXA SCAN  12/19/2020    Lipid Panel  09/23/2024  Strep Pneumonia        Physical Exam      Audio Only Visit    Laboratory:  CBC:  Recent Labs   Lab 12/04/19  1003 12/05/19  0426 12/06/19  0557   WBC 7.04 7.03 7.27   RBC 4.50 4.34 4.09   Hemoglobin 13.7 13.3 12.4   Hematocrit 41.6 39.6 37.8   Platelets 183 186 183   Mean Corpuscular Volume 92 91 92   Mean Corpuscular Hemoglobin 30.4 30.6 30.3   Mean Corpuscular Hemoglobin Conc 32.9 33.6 32.8     CMP:  Recent Labs   Lab 04/06/18  1737 09/23/19  1137 12/04/19  1003  12/06/19  0557   Glucose 134 H 102 97   < > 93   Calcium 9.4 9.3 9.1   < > 7.7 L   Albumin 4.2 4.5 4.1  --   --    Total Protein 7.0 7.5 7.1  --   --    Sodium 137 135 L 138   < > 135 L   Potassium 4.2 3.9 3.9   < > 4.0   CO2 30 H 26 29   < > 24   Chloride 97 99 100   < > 101   BUN, Bld 28 H 15 23 H   < > 20 H   Alkaline Phosphatase 67 73 71  --   --    ALT 34 16 13  --   --    AST 29 28 26  --   --    Total Bilirubin 0.4 0.6 0.5  --   --     < > = values in this interval not displayed.     URINALYSIS:  Recent Labs   Lab 09/23/19  1138   Color, UA Yellow   Specific Gravity, UA 1.020   pH, UA 6.0   Protein, UA Negative   Bacteria Many A   Nitrite, UA Positive A   Leukocytes, UA 1+ A   Urobilinogen, UA Negative   Hyaline Casts, UA 2 A      LIPIDS:  Recent Labs   Lab 09/23/19  1137   TSH 1.970   HDL 54   Cholesterol 120   Triglycerides 46   LDL Cholesterol 56.8 L   Hdl/Cholesterol Ratio 45.0   Non-HDL Cholesterol 66   Total Cholesterol/HDL Ratio 2.2     TSH:  Recent Labs   Lab 09/23/19  1137   TSH 1.970     A1C:        Assessment/Plan     Mauri Matias is a 81 y.o.female with:    1. Nausea  2. Right lower quadrant pain  3. Weakness  Still not sure whether this may be hypoglycemia.  Will look into CGM coverage.    4. Acquired hypothyroidism  Continue current meds.  Will update labs.   5. CAD S/P percutaneous coronary angioplasty  F/U with Dr. Weiss.  I doubt her current symptoms are cardiac related due to recent normal stress test.  Considering mesenteric ischemia but  less likely.  Will discuss this with Dr. Guevara.  6. Gastroesophageal reflux disease, esophagitis presence not specified  Continue current meds.  Although, it doesn't seem to be helping much.    Chronic conditions status updated as per HPI.  Other than changes above, cont current medications and maintain follow up with specialists.  Return to clinic in 1 months.    West Leo MD  [facility]                  Established Patient - Audio Only Telehealth Visit     The patient location is: home   The chief complaint leading to consultation is: Follow up nausea/weakness  Visit type: Virtual visit with audio only (telephone)  Total time spent with patient: 24 min       The reason for the audio only service rather than synchronous audio and video virtual visit was related to technical difficulties or patient preference/necessity.     Each patient to whom I provide medical services by telemedicine is:  (1) informed of the relationship between the physician and patient and the respective role of any other health care provider with respect to management of the patient; and (2) notified that they may decline to receive medical services by telemedicine and may withdraw from such care at any time. Patient verbally consented to receive this service via voice-only telephone call.         This service was not originating from a related E/M service provided within the previous 7 days nor will  to an E/M service or procedure within the next 24 hours or my soonest available appointment.  Prevailing standard of care was able to be met in this audio-only visit.

## 2023-02-09 NOTE — CONSULT NOTE ADULT - ATTENDING COMMENTS
77 yo F with a PMH of HTN, HLD, DM, asthma, recent admission in January for Rhino/Enterovirus pneumonia (treated with supportive care), who presented to the ED with SOB.     Recent admission in Jan 2023 for Rhino/Enterovirus pneumonia treated with supportive care  No fevers, pt endorsed SOB on admission   Acute on chronic hypoxic respiratory failure, on increased NC O2   Leukocytosis  Strep Pneumoniae Ur Ag positive  Multifocal pneumonia (CXR)  Pt currently on Vancomycin and Zosyn  ID consulted for recommendations      OVERALL  Sepsis, leucocytosis, tachycardia,   hypoxia, respiratory failure  Superimposed Strep Pneumoniae bacterial pneumonia in setting of recent viral pneumonia   Strep Pneumoniae Ur Ag +      RECOMMENDATIONS  -Stop Vancomycin  -Stop Zosyn  -Start Rocephin 1g IV QD  -trend cbc for leucocytosis, downtrending   -f/u blood cx, NTD    -wean oxygen as tolerated  -Check CT chest (non con)    Plan discussed with Medicine Attending.     Dorian Isabel  Please contact through MS Teams   If no response or past 5 pm/weekend call [phone].

## 2023-02-09 NOTE — CONSULT NOTE ADULT - SUBJECTIVE AND OBJECTIVE BOX
Incomplete Note  Patient is a 79 yo F who presents with a chief complaint of SOB    HPI:  79 yo F with a PMH of HTN, HLD, DM, asthma, recent admission in January for Rhino/Enterovirus pneumonia (treated with supportive care), who presented to the ED with SOB.     No fevers. Leukocytosis of 21. Acute on chronic hypoxic respiratory failure on admission i/s/o multifocal pneumonia (CXR). Strep Pneumoniae Ur Ag positive. Pt currently on Vancomycin and Zosyn. ID consulted for recommendations.     REVIEW OF SYSTEMS  Limited  Mild SOB and cough (non productive)   No CP, no abd pain, no dysuria, no HA    prior hospital charts reviewed [V]  primary team notes reviewed [V]  other consultant notes reviewed [V]    PAST MEDICAL & SURGICAL HISTORY:  HTN (Hypertension)    Dyslipidemia    DM (Diabetes Mellitus)    Hypothyroidism    Pulmonary TB  Dx 2019, completed 10 month treatment    Cataract of left eye    SOCIAL HISTORY:  - Denied smoking/vaping/alcohol/recreational drug use    FAMILY HISTORY:  Family history of heart attack (Mother)  mother    Allergies  No Known Allergies    ANTIMICROBIALS:  piperacillin/tazobactam IVPB.. 3.375 every 8 hours  vancomycin  IVPB 750 every 12 hours    ANTIMICROBIALS (past 90 days):  MEDICATIONS  (STANDING):  azithromycin  IVPB   255 mL/Hr IV Intermittent (02-07-23 @ 06:00)    cefTRIAXone   IVPB   100 mL/Hr IV Intermittent (02-07-23 @ 05:11)    piperacillin/tazobactam IVPB.   200 mL/Hr IV Intermittent (02-07-23 @ 13:30)    piperacillin/tazobactam IVPB.-   25 mL/Hr IV Intermittent (02-07-23 @ 14:00)    piperacillin/tazobactam IVPB..   25 mL/Hr IV Intermittent (02-09-23 @ 13:48)   25 mL/Hr IV Intermittent (02-09-23 @ 06:15)   25 mL/Hr IV Intermittent (02-08-23 @ 22:36)   25 mL/Hr IV Intermittent (02-08-23 @ 13:03)   25 mL/Hr IV Intermittent (02-08-23 @ 06:55)   25 mL/Hr IV Intermittent (02-07-23 @ 22:00)    vancomycin  IVPB   250 mL/Hr IV Intermittent (02-09-23 @ 03:36)   250 mL/Hr IV Intermittent (02-08-23 @ 11:10)   250 mL/Hr IV Intermittent (02-07-23 @ 22:00)    vancomycin  IVPB.   250 mL/Hr IV Intermittent (02-07-23 @ 07:00)    OTHER MEDS:   MEDICATIONS  (STANDING):  albuterol/ipratropium for Nebulization 3 every 6 hours  dextrose 50% Injectable 25 once  dextrose 50% Injectable 12.5 once  dextrose 50% Injectable 25 once  dextrose Oral Gel 15 once PRN  enoxaparin Injectable 40 every 24 hours  glucagon  Injectable 1 once  influenza  Vaccine (HIGH DOSE) 0.7 once  insulin glargine Injectable (LANTUS) 12 at bedtime  insulin lispro (ADMELOG) corrective regimen sliding scale  three times a day before meals  insulin lispro (ADMELOG) corrective regimen sliding scale  at bedtime  insulin lispro Injectable (ADMELOG) 6 three times a day before meals  levothyroxine 50 daily  metoprolol succinate ER 25 daily  simvastatin 20 at bedtime    VITALS:  Vital Signs Last 24 Hrs  T(F): 97.8 (02-09-23 @ 09:20), Max: 98.9 (02-07-23 @ 01:40)    Vital Signs Last 24 Hrs  HR: 82 (02-09-23 @ 15:29) (70 - 88)  BP: 137/71 (02-09-23 @ 12:31) (124/66 - 138/59)  RR: 18 (02-09-23 @ 12:31)  SpO2: 95% (02-09-23 @ 12:31) (95% - 100%)  Wt(kg): --    EXAM:  General: Patient in no acute distress   HEENT: NCAT  CV: S1+S2  Lungs: Decreased breath sounds b/l, on 5L NC O2   Abd: Soft, nontender  Ext: No cyanosis  Neuro: Calm   Skin: No rash   IV: No phlebitis    Labs:                        7.7    10.73 )-----------( 232      ( 09 Feb 2023 05:31 )             27.7     02-09    136  |  95<L>  |  11  ----------------------------<  216<H>  4.5   |  29  |  0.76    Ca    8.7      09 Feb 2023 05:31  Phos  2.4     02-09  Mg     1.80     02-09    WBC Trend:  WBC Count: 10.73 (02-09-23 @ 05:31)  WBC Count: 16.65 (02-08-23 @ 07:00)  WBC Count: 21.36 (02-07-23 @ 02:47)    Auto Neutrophil #: 20.42 K/uL (02-07-23 @ 02:47)  Auto Neutrophil #: 4.51 K/uL (01-31-23 @ 06:10)  Auto Neutrophil #: 5.35 K/uL (01-30-23 @ 06:00)  Auto Neutrophil #: 9.18 K/uL (01-29-23 @ 07:23)  Auto Neutrophil #: 6.92 K/uL (01-28-23 @ 06:12)    Creatine Trend:  Creatinine, Serum: 0.76 (02-09)  Creatinine, Serum: 0.68 (02-08)  Creatinine, Serum: 0.67 (02-07)    Liver Biochemical Testing Trend:  Alanine Aminotransferase (ALT/SGPT): 12 (02-07)  Alanine Aminotransferase (ALT/SGPT): 12 (12-02)  Alanine Aminotransferase (ALT/SGPT): 15 (12-01)  Alanine Aminotransferase (ALT/SGPT): 18 (11-17)  Alanine Aminotransferase (ALT/SGPT): 40 *H* (11-10)  Aspartate Aminotransferase (AST/SGOT): 14 (02-07-23 @ 02:47)  Aspartate Aminotransferase (AST/SGOT): 22 (12-02-22 @ 05:45)  Aspartate Aminotransferase (AST/SGOT): 37 (12-01-22 @ 12:00)  Aspartate Aminotransferase (AST/SGOT): 14 (11-17-22 @ 06:23)  Aspartate Aminotransferase (AST/SGOT): 32 (11-10-22 @ 06:48)  Bilirubin Total, Serum: 0.3 (02-07)  Bilirubin Total, Serum: 0.4 (12-02)  Bilirubin Total, Serum: 0.4 (12-01)  Bilirubin Direct, Serum: <0.2 (11-17)  Bilirubin Total, Serum: 0.3 (11-17)    Auto Eosinophil %: 0.0 % (02-07-23 @ 02:47)    MICROBIOLOGY:    MRSA PCR Result.: NotDetec (01-29-23 @ 11:00)    Culture - Urine (collected 07 Feb 2023 07:51)  Source: Clean Catch Clean Catch (Midstream)  Final Report:    10,000 - 49,000 CFU/mL Coag Negative Staphylococcus "Susceptibilities not    performed"    <10,000 CFU/ml Normal Urogenital rosalba present    Culture - Blood (collected 07 Feb 2023 07:45)  Source: .Blood Blood-Venous  Preliminary Report:    No growth to date.    Culture - Blood (collected 07 Feb 2023 07:30)  Source: .Blood Blood-Peripheral  Preliminary Report:    No growth to date.    Culture - Sputum (collected 29 Jan 2023 11:00)  Source: .Sputum Sputum  Final Report:    Normal Respiratory Rosalba present    Culture - Blood (collected 26 Jan 2023 07:10)  Source: .Blood Blood  Final Report:    No Growth Final    Culture - Blood (collected 26 Jan 2023 07:03)  Source: .Blood Blood  Final Report:    No Growth Final    Culture - Urine (collected 01 Dec 2022 16:30)  Source: Clean Catch Clean Catch (Midstream)  Final Report:    No growth    Culture - Blood (collected 01 Dec 2022 12:22)  Source: .Blood Blood-Peripheral  Final Report:    No Growth Final    Culture - Blood (collected 01 Dec 2022 12:00)  Source: .Blood Blood-Peripheral  Final Report:    No Growth Final    Culture - Blood (collected 11 Nov 2022 23:20)  Source: .Blood Blood-Venous  Final Report:    No Growth Final    Legionella Antigen, Urine: Negative (02-07 @ 07:51)  Rapid RVP Result: Detected (02-07 @ 03:31)    Procalcitonin, Serum: 0.10 (02-07)    Serum Pro-Brain Natriuretic Peptide: 2592 (02-07)    Troponin T, High Sensitivity Result: 24 (02-07)  Troponin T, High Sensitivity Result: 22 (02-07)    Blood Gas Venous - Lactate: 1.2 (02-07 @ 02:47)    A1C with Estimated Average Glucose Result: 7.3 % (02-07-23 @ 02:47)  A1C with Estimated Average Glucose Result: 8.8 % (12-01-22 @ 12:00)  A1C with Estimated Average Glucose Result: 10.5 % (11-08-22 @ 09:00)    RADIOLOGY:  imaging below personally reviewed    < from: Xray Chest 1 View- PORTABLE-Urgent (Xray Chest 1 View- PORTABLE-Urgent .) (02.07.23 @ 03:37) >    IMPRESSION:  Scattered bilateral patchy and hazy opacities consistent with multifocal   pneumonia unchanged from the last exam.    --- End of Report ---    < end of copied text > Patient is a 79 yo F who presents with a chief complaint of SOB    HPI:  79 yo F with a PMH of HTN, HLD, DM, asthma, recent admission in January for Rhino/Enterovirus pneumonia (treated with supportive care), who presented to the ED with SOB.     No fevers. Leukocytosis of 21. Acute on chronic hypoxic respiratory failure on admission i/s/o multifocal pneumonia (CXR). Strep Pneumoniae Ur Ag positive. Pt currently on Vancomycin and Zosyn. ID consulted for recommendations.       REVIEW OF SYSTEMS:  CONSTITUTIONAL: No fevers or chills  EYES: No discharge   ENT: No throat pain   NECK: supple   RESPIRATORY: + cough, (+) SOB  CARDIOVASCULAR: No chest pain  GASTROINTESTINAL: No abdominal pain. No nausea, vomiting  GENITOURINARY: No dysuria  EXTREMITIES: No edema   NEUROLOGICAL: No new weakness  SKIN: No rashes      prior hospital charts reviewed [V]  primary team notes reviewed [V]  other consultant notes reviewed [V]      PAST MEDICAL & SURGICAL HISTORY:  HTN (Hypertension)    Dyslipidemia    DM (Diabetes Mellitus)    Hypothyroidism    Pulmonary TB  Dx 2019, completed 10 month treatment    Cataract of left eye          SOCIAL HISTORY:  - Denied smoking, lives with family.         FAMILY HISTORY:  Family history of heart attack (Mother)  mother        Allergies  No Known Allergies    ANTIMICROBIALS:  piperacillin/tazobactam IVPB.. 3.375 every 8 hours  vancomycin  IVPB 750 every 12 hours    ANTIMICROBIALS (past 90 days):  MEDICATIONS  (STANDING):  azithromycin  IVPB   255 mL/Hr IV Intermittent (02-07-23 @ 06:00)    cefTRIAXone   IVPB   100 mL/Hr IV Intermittent (02-07-23 @ 05:11)    piperacillin/tazobactam IVPB.   200 mL/Hr IV Intermittent (02-07-23 @ 13:30)    piperacillin/tazobactam IVPB.-   25 mL/Hr IV Intermittent (02-07-23 @ 14:00)    piperacillin/tazobactam IVPB..   25 mL/Hr IV Intermittent (02-09-23 @ 13:48)   25 mL/Hr IV Intermittent (02-09-23 @ 06:15)   25 mL/Hr IV Intermittent (02-08-23 @ 22:36)   25 mL/Hr IV Intermittent (02-08-23 @ 13:03)   25 mL/Hr IV Intermittent (02-08-23 @ 06:55)   25 mL/Hr IV Intermittent (02-07-23 @ 22:00)    vancomycin  IVPB   250 mL/Hr IV Intermittent (02-09-23 @ 03:36)   250 mL/Hr IV Intermittent (02-08-23 @ 11:10)   250 mL/Hr IV Intermittent (02-07-23 @ 22:00)    vancomycin  IVPB.   250 mL/Hr IV Intermittent (02-07-23 @ 07:00)    OTHER MEDS:   MEDICATIONS  (STANDING):  albuterol/ipratropium for Nebulization 3 every 6 hours  dextrose 50% Injectable 25 once  dextrose 50% Injectable 12.5 once  dextrose 50% Injectable 25 once  dextrose Oral Gel 15 once PRN  enoxaparin Injectable 40 every 24 hours  glucagon  Injectable 1 once  influenza  Vaccine (HIGH DOSE) 0.7 once  insulin glargine Injectable (LANTUS) 12 at bedtime  insulin lispro (ADMELOG) corrective regimen sliding scale  three times a day before meals  insulin lispro (ADMELOG) corrective regimen sliding scale  at bedtime  insulin lispro Injectable (ADMELOG) 6 three times a day before meals  levothyroxine 50 daily  metoprolol succinate ER 25 daily  simvastatin 20 at bedtime    VITALS:  Vital Signs Last 24 Hrs  T(F): 97.8 (02-09-23 @ 09:20), Max: 98.9 (02-07-23 @ 01:40)    Vital Signs Last 24 Hrs  HR: 82 (02-09-23 @ 15:29) (70 - 88)  BP: 137/71 (02-09-23 @ 12:31) (124/66 - 138/59)  RR: 18 (02-09-23 @ 12:31)  SpO2: 95% (02-09-23 @ 12:31) (95% - 100%)  Wt(kg): --      EXAM:  General: Patient in no acute distress   EYES: No discharge   ENT: No oral ulcers.   CV: S1+S2 normal.   Lungs: Decreased breath sounds b/l, on 5L NC O2   Abd: Soft, nontender  : No jesus   Ext: No edema   Neuro: Calm, no new focal deficits.   Skin: No rash   IV: No phlebitis      Labs:                        7.7    10.73 )-----------( 232      ( 09 Feb 2023 05:31 )             27.7     02-09    136  |  95<L>  |  11  ----------------------------<  216<H>  4.5   |  29  |  0.76    Ca    8.7      09 Feb 2023 05:31  Phos  2.4     02-09  Mg     1.80     02-09    WBC Trend:  WBC Count: 10.73 (02-09-23 @ 05:31)  WBC Count: 16.65 (02-08-23 @ 07:00)  WBC Count: 21.36 (02-07-23 @ 02:47)    Auto Neutrophil #: 20.42 K/uL (02-07-23 @ 02:47)  Auto Neutrophil #: 4.51 K/uL (01-31-23 @ 06:10)  Auto Neutrophil #: 5.35 K/uL (01-30-23 @ 06:00)  Auto Neutrophil #: 9.18 K/uL (01-29-23 @ 07:23)  Auto Neutrophil #: 6.92 K/uL (01-28-23 @ 06:12)    Creatine Trend:  Creatinine, Serum: 0.76 (02-09)  Creatinine, Serum: 0.68 (02-08)  Creatinine, Serum: 0.67 (02-07)    Liver Biochemical Testing Trend:  Alanine Aminotransferase (ALT/SGPT): 12 (02-07)  Alanine Aminotransferase (ALT/SGPT): 12 (12-02)  Alanine Aminotransferase (ALT/SGPT): 15 (12-01)  Alanine Aminotransferase (ALT/SGPT): 18 (11-17)  Alanine Aminotransferase (ALT/SGPT): 40 *H* (11-10)  Aspartate Aminotransferase (AST/SGOT): 14 (02-07-23 @ 02:47)  Aspartate Aminotransferase (AST/SGOT): 22 (12-02-22 @ 05:45)  Aspartate Aminotransferase (AST/SGOT): 37 (12-01-22 @ 12:00)  Aspartate Aminotransferase (AST/SGOT): 14 (11-17-22 @ 06:23)  Aspartate Aminotransferase (AST/SGOT): 32 (11-10-22 @ 06:48)  Bilirubin Total, Serum: 0.3 (02-07)  Bilirubin Total, Serum: 0.4 (12-02)  Bilirubin Total, Serum: 0.4 (12-01)  Bilirubin Direct, Serum: <0.2 (11-17)  Bilirubin Total, Serum: 0.3 (11-17)    Auto Eosinophil %: 0.0 % (02-07-23 @ 02:47)    MICROBIOLOGY:    MRSA PCR Result.: NotDetec (01-29-23 @ 11:00)    Culture - Urine (collected 07 Feb 2023 07:51)  Source: Clean Catch Clean Catch (Midstream)  Final Report:    10,000 - 49,000 CFU/mL Coag Negative Staphylococcus "Susceptibilities not    performed"    <10,000 CFU/ml Normal Urogenital rosalba present    Culture - Blood (collected 07 Feb 2023 07:45)  Source: .Blood Blood-Venous  Preliminary Report:    No growth to date.    Culture - Blood (collected 07 Feb 2023 07:30)  Source: .Blood Blood-Peripheral  Preliminary Report:    No growth to date.    Culture - Sputum (collected 29 Jan 2023 11:00)  Source: .Sputum Sputum  Final Report:    Normal Respiratory Rosalba present    Culture - Blood (collected 26 Jan 2023 07:10)  Source: .Blood Blood  Final Report:    No Growth Final    Culture - Blood (collected 26 Jan 2023 07:03)  Source: .Blood Blood  Final Report:    No Growth Final    Culture - Urine (collected 01 Dec 2022 16:30)  Source: Clean Catch Clean Catch (Midstream)  Final Report:    No growth    Culture - Blood (collected 01 Dec 2022 12:22)  Source: .Blood Blood-Peripheral  Final Report:    No Growth Final    Culture - Blood (collected 01 Dec 2022 12:00)  Source: .Blood Blood-Peripheral  Final Report:    No Growth Final    Culture - Blood (collected 11 Nov 2022 23:20)  Source: .Blood Blood-Venous  Final Report:    No Growth Final    Legionella Antigen, Urine: Negative (02-07 @ 07:51)  Rapid RVP Result: Detected (02-07 @ 03:31)    Procalcitonin, Serum: 0.10 (02-07)    Serum Pro-Brain Natriuretic Peptide: 2592 (02-07)    Troponin T, High Sensitivity Result: 24 (02-07)  Troponin T, High Sensitivity Result: 22 (02-07)    Blood Gas Venous - Lactate: 1.2 (02-07 @ 02:47)    A1C with Estimated Average Glucose Result: 7.3 % (02-07-23 @ 02:47)  A1C with Estimated Average Glucose Result: 8.8 % (12-01-22 @ 12:00)  A1C with Estimated Average Glucose Result: 10.5 % (11-08-22 @ 09:00)    RADIOLOGY:  imaging below personally reviewed    < from: Xray Chest 1 View- PORTABLE-Urgent (Xray Chest 1 View- PORTABLE-Urgent .) (02.07.23 @ 03:37) >      IMPRESSION:  Scattered bilateral patchy and hazy opacities consistent with multifocal   pneumonia unchanged from the last exam.

## 2023-02-10 LAB
ANION GAP SERPL CALC-SCNC: 8 MMOL/L — SIGNIFICANT CHANGE UP (ref 7–14)
BASOPHILS # BLD AUTO: 0.07 K/UL — SIGNIFICANT CHANGE UP (ref 0–0.2)
BASOPHILS NFR BLD AUTO: 0.7 % — SIGNIFICANT CHANGE UP (ref 0–2)
BLD GP AB SCN SERPL QL: NEGATIVE — SIGNIFICANT CHANGE UP
BUN SERPL-MCNC: 8 MG/DL — SIGNIFICANT CHANGE UP (ref 7–23)
CALCIUM SERPL-MCNC: 9.4 MG/DL — SIGNIFICANT CHANGE UP (ref 8.4–10.5)
CHLORIDE SERPL-SCNC: 99 MMOL/L — SIGNIFICANT CHANGE UP (ref 98–107)
CO2 SERPL-SCNC: 35 MMOL/L — HIGH (ref 22–31)
CREAT SERPL-MCNC: 0.67 MG/DL — SIGNIFICANT CHANGE UP (ref 0.5–1.3)
EGFR: 88 ML/MIN/1.73M2 — SIGNIFICANT CHANGE UP
EOSINOPHIL # BLD AUTO: 0.26 K/UL — SIGNIFICANT CHANGE UP (ref 0–0.5)
EOSINOPHIL NFR BLD AUTO: 2.6 % — SIGNIFICANT CHANGE UP (ref 0–6)
GLUCOSE BLDC GLUCOMTR-MCNC: 234 MG/DL — HIGH (ref 70–99)
GLUCOSE BLDC GLUCOMTR-MCNC: 316 MG/DL — HIGH (ref 70–99)
GLUCOSE BLDC GLUCOMTR-MCNC: 344 MG/DL — HIGH (ref 70–99)
GLUCOSE BLDC GLUCOMTR-MCNC: 352 MG/DL — HIGH (ref 70–99)
GLUCOSE BLDC GLUCOMTR-MCNC: 38 MG/DL — CRITICAL LOW (ref 70–99)
GLUCOSE BLDC GLUCOMTR-MCNC: 40 MG/DL — CRITICAL LOW (ref 70–99)
GLUCOSE BLDC GLUCOMTR-MCNC: 81 MG/DL — SIGNIFICANT CHANGE UP (ref 70–99)
GLUCOSE BLDC GLUCOMTR-MCNC: 90 MG/DL — SIGNIFICANT CHANGE UP (ref 70–99)
GLUCOSE SERPL-MCNC: 54 MG/DL — CRITICAL LOW (ref 70–99)
HCT VFR BLD CALC: 29.8 % — LOW (ref 34.5–45)
HGB BLD-MCNC: 8.2 G/DL — LOW (ref 11.5–15.5)
IANC: 7.69 K/UL — HIGH (ref 1.8–7.4)
IMM GRANULOCYTES NFR BLD AUTO: 0.4 % — SIGNIFICANT CHANGE UP (ref 0–0.9)
LYMPHOCYTES # BLD AUTO: 1.14 K/UL — SIGNIFICANT CHANGE UP (ref 1–3.3)
LYMPHOCYTES # BLD AUTO: 11.3 % — LOW (ref 13–44)
MAGNESIUM SERPL-MCNC: 1.9 MG/DL — SIGNIFICANT CHANGE UP (ref 1.6–2.6)
MCHC RBC-ENTMCNC: 22.5 PG — LOW (ref 27–34)
MCHC RBC-ENTMCNC: 27.5 GM/DL — LOW (ref 32–36)
MCV RBC AUTO: 81.6 FL — SIGNIFICANT CHANGE UP (ref 80–100)
MONOCYTES # BLD AUTO: 0.85 K/UL — SIGNIFICANT CHANGE UP (ref 0–0.9)
MONOCYTES NFR BLD AUTO: 8.5 % — SIGNIFICANT CHANGE UP (ref 2–14)
NEUTROPHILS # BLD AUTO: 7.69 K/UL — HIGH (ref 1.8–7.4)
NEUTROPHILS NFR BLD AUTO: 76.5 % — SIGNIFICANT CHANGE UP (ref 43–77)
NRBC # BLD: 0 /100 WBCS — SIGNIFICANT CHANGE UP (ref 0–0)
NRBC # FLD: 0 K/UL — SIGNIFICANT CHANGE UP (ref 0–0)
PHOSPHATE SERPL-MCNC: 3 MG/DL — SIGNIFICANT CHANGE UP (ref 2.5–4.5)
PLATELET # BLD AUTO: 270 K/UL — SIGNIFICANT CHANGE UP (ref 150–400)
POTASSIUM SERPL-MCNC: 4 MMOL/L — SIGNIFICANT CHANGE UP (ref 3.5–5.3)
POTASSIUM SERPL-SCNC: 4 MMOL/L — SIGNIFICANT CHANGE UP (ref 3.5–5.3)
RBC # BLD: 3.65 M/UL — LOW (ref 3.8–5.2)
RBC # FLD: 19.2 % — HIGH (ref 10.3–14.5)
RH IG SCN BLD-IMP: POSITIVE — SIGNIFICANT CHANGE UP
SODIUM SERPL-SCNC: 142 MMOL/L — SIGNIFICANT CHANGE UP (ref 135–145)
WBC # BLD: 10.05 K/UL — SIGNIFICANT CHANGE UP (ref 3.8–10.5)
WBC # FLD AUTO: 10.05 K/UL — SIGNIFICANT CHANGE UP (ref 3.8–10.5)

## 2023-02-10 PROCEDURE — 99232 SBSQ HOSP IP/OBS MODERATE 35: CPT

## 2023-02-10 PROCEDURE — 70450 CT HEAD/BRAIN W/O DYE: CPT | Mod: 26

## 2023-02-10 RX ORDER — ACETYLCYSTEINE 200 MG/ML
4 VIAL (ML) MISCELLANEOUS
Refills: 0 | Status: DISCONTINUED | OUTPATIENT
Start: 2023-02-10 | End: 2023-02-16

## 2023-02-10 RX ORDER — DEXTROSE 50 % IN WATER 50 %
25 SYRINGE (ML) INTRAVENOUS ONCE
Refills: 0 | Status: COMPLETED | OUTPATIENT
Start: 2023-02-10 | End: 2023-02-10

## 2023-02-10 RX ADMIN — Medication 50 MICROGRAM(S): at 06:42

## 2023-02-10 RX ADMIN — CEFTRIAXONE 100 MILLIGRAM(S): 500 INJECTION, POWDER, FOR SOLUTION INTRAMUSCULAR; INTRAVENOUS at 21:14

## 2023-02-10 RX ADMIN — Medication 2: at 13:52

## 2023-02-10 RX ADMIN — Medication 3 MILLILITER(S): at 03:45

## 2023-02-10 RX ADMIN — Medication 2: at 21:30

## 2023-02-10 RX ADMIN — Medication 6 UNIT(S): at 13:53

## 2023-02-10 RX ADMIN — Medication 25 GRAM(S): at 18:30

## 2023-02-10 RX ADMIN — INSULIN GLARGINE 12 UNIT(S): 100 INJECTION, SOLUTION SUBCUTANEOUS at 21:30

## 2023-02-10 RX ADMIN — Medication 1000 UNIT(S): at 06:42

## 2023-02-10 RX ADMIN — Medication 25 MILLIGRAM(S): at 06:42

## 2023-02-10 RX ADMIN — Medication 3 MILLILITER(S): at 15:32

## 2023-02-10 RX ADMIN — SIMVASTATIN 20 MILLIGRAM(S): 20 TABLET, FILM COATED ORAL at 21:14

## 2023-02-10 RX ADMIN — Medication 3 MILLILITER(S): at 10:02

## 2023-02-10 RX ADMIN — Medication 3 MILLILITER(S): at 21:14

## 2023-02-10 NOTE — PROVIDER CONTACT NOTE (HYPOGLYCEMIA EVENT) - NS PROVIDER CONTACT BACKGROUND-HYPO
Age: 78y    Gender: Female    POCT Blood Glucose:  344 mg/dL (02-10-23 @ 18:49)  352 mg/dL (02-10-23 @ 18:48)  40 mg/dL (02-10-23 @ 18:22)  38 mg/dL (02-10-23 @ 18:17)  234 mg/dL (02-10-23 @ 13:36)  81 mg/dL (02-10-23 @ 09:01)  90 mg/dL (02-10-23 @ 07:44)  189 mg/dL (02-09-23 @ 21:28)      eMAR:  dextrose 50% Injectable   25 Gram(s) IV Push (02-10-23 @ 18:30)    insulin glargine Injectable (LANTUS)   12 Unit(s) SubCutaneous (02-09-23 @ 21:36)    insulin lispro (ADMELOG) corrective regimen sliding scale   2 Unit(s) SubCutaneous (02-10-23 @ 13:52)    insulin lispro Injectable (ADMELOG)   6 Unit(s) SubCutaneous (02-10-23 @ 13:53)    levothyroxine   50 MICROGram(s) Oral (02-10-23 @ 06:42)    simvastatin   20 milliGRAM(s) Oral (02-09-23 @ 21:31)

## 2023-02-11 LAB
ANION GAP SERPL CALC-SCNC: 8 MMOL/L — SIGNIFICANT CHANGE UP (ref 7–14)
BUN SERPL-MCNC: 8 MG/DL — SIGNIFICANT CHANGE UP (ref 7–23)
CALCIUM SERPL-MCNC: 9.4 MG/DL — SIGNIFICANT CHANGE UP (ref 8.4–10.5)
CHLORIDE SERPL-SCNC: 96 MMOL/L — LOW (ref 98–107)
CO2 SERPL-SCNC: 36 MMOL/L — HIGH (ref 22–31)
CREAT SERPL-MCNC: 0.61 MG/DL — SIGNIFICANT CHANGE UP (ref 0.5–1.3)
EGFR: 90 ML/MIN/1.73M2 — SIGNIFICANT CHANGE UP
GLUCOSE BLDC GLUCOMTR-MCNC: 143 MG/DL — HIGH (ref 70–99)
GLUCOSE BLDC GLUCOMTR-MCNC: 143 MG/DL — HIGH (ref 70–99)
GLUCOSE BLDC GLUCOMTR-MCNC: 155 MG/DL — HIGH (ref 70–99)
GLUCOSE BLDC GLUCOMTR-MCNC: 163 MG/DL — HIGH (ref 70–99)
GLUCOSE BLDC GLUCOMTR-MCNC: 167 MG/DL — HIGH (ref 70–99)
GLUCOSE BLDC GLUCOMTR-MCNC: 237 MG/DL — HIGH (ref 70–99)
GLUCOSE BLDC GLUCOMTR-MCNC: 59 MG/DL — LOW (ref 70–99)
GLUCOSE BLDC GLUCOMTR-MCNC: 93 MG/DL — SIGNIFICANT CHANGE UP (ref 70–99)
GLUCOSE SERPL-MCNC: 69 MG/DL — LOW (ref 70–99)
HCT VFR BLD CALC: 27.7 % — LOW (ref 34.5–45)
HGB BLD-MCNC: 7.9 G/DL — LOW (ref 11.5–15.5)
MAGNESIUM SERPL-MCNC: 1.8 MG/DL — SIGNIFICANT CHANGE UP (ref 1.6–2.6)
MCHC RBC-ENTMCNC: 23.3 PG — LOW (ref 27–34)
MCHC RBC-ENTMCNC: 28.5 GM/DL — LOW (ref 32–36)
MCV RBC AUTO: 81.7 FL — SIGNIFICANT CHANGE UP (ref 80–100)
NRBC # BLD: 0 /100 WBCS — SIGNIFICANT CHANGE UP (ref 0–0)
NRBC # FLD: 0 K/UL — SIGNIFICANT CHANGE UP (ref 0–0)
PHOSPHATE SERPL-MCNC: 3.2 MG/DL — SIGNIFICANT CHANGE UP (ref 2.5–4.5)
PLATELET # BLD AUTO: 256 K/UL — SIGNIFICANT CHANGE UP (ref 150–400)
POTASSIUM SERPL-MCNC: 4 MMOL/L — SIGNIFICANT CHANGE UP (ref 3.5–5.3)
POTASSIUM SERPL-SCNC: 4 MMOL/L — SIGNIFICANT CHANGE UP (ref 3.5–5.3)
RBC # BLD: 3.39 M/UL — LOW (ref 3.8–5.2)
RBC # FLD: 18.8 % — HIGH (ref 10.3–14.5)
SODIUM SERPL-SCNC: 140 MMOL/L — SIGNIFICANT CHANGE UP (ref 135–145)
WBC # BLD: 8.51 K/UL — SIGNIFICANT CHANGE UP (ref 3.8–10.5)
WBC # FLD AUTO: 8.51 K/UL — SIGNIFICANT CHANGE UP (ref 3.8–10.5)

## 2023-02-11 RX ORDER — ACETAMINOPHEN 500 MG
650 TABLET ORAL ONCE
Refills: 0 | Status: COMPLETED | OUTPATIENT
Start: 2023-02-11 | End: 2023-02-11

## 2023-02-11 RX ADMIN — Medication 1: at 11:00

## 2023-02-11 RX ADMIN — INSULIN GLARGINE 12 UNIT(S): 100 INJECTION, SOLUTION SUBCUTANEOUS at 22:06

## 2023-02-11 RX ADMIN — Medication 6 UNIT(S): at 11:01

## 2023-02-11 RX ADMIN — Medication 25 MILLIGRAM(S): at 05:43

## 2023-02-11 RX ADMIN — Medication 3 MILLILITER(S): at 10:02

## 2023-02-11 RX ADMIN — Medication 1000 UNIT(S): at 05:44

## 2023-02-11 RX ADMIN — Medication 6 UNIT(S): at 13:21

## 2023-02-11 RX ADMIN — Medication 650 MILLIGRAM(S): at 18:35

## 2023-02-11 RX ADMIN — Medication 1: at 17:46

## 2023-02-11 RX ADMIN — Medication 3 MILLILITER(S): at 16:56

## 2023-02-11 RX ADMIN — Medication 6 UNIT(S): at 17:46

## 2023-02-11 RX ADMIN — Medication 650 MILLIGRAM(S): at 17:35

## 2023-02-11 RX ADMIN — Medication 4 MILLILITER(S): at 22:14

## 2023-02-11 RX ADMIN — CEFTRIAXONE 100 MILLIGRAM(S): 500 INJECTION, POWDER, FOR SOLUTION INTRAMUSCULAR; INTRAVENOUS at 21:19

## 2023-02-11 RX ADMIN — Medication 50 MICROGRAM(S): at 05:44

## 2023-02-11 RX ADMIN — Medication 3 MILLILITER(S): at 03:53

## 2023-02-11 RX ADMIN — Medication 4 MILLILITER(S): at 10:02

## 2023-02-11 RX ADMIN — SIMVASTATIN 20 MILLIGRAM(S): 20 TABLET, FILM COATED ORAL at 21:16

## 2023-02-11 RX ADMIN — Medication 3 MILLILITER(S): at 22:13

## 2023-02-12 LAB
ANION GAP SERPL CALC-SCNC: 8 MMOL/L — SIGNIFICANT CHANGE UP (ref 7–14)
BUN SERPL-MCNC: 9 MG/DL — SIGNIFICANT CHANGE UP (ref 7–23)
CALCIUM SERPL-MCNC: 9.4 MG/DL — SIGNIFICANT CHANGE UP (ref 8.4–10.5)
CHLORIDE SERPL-SCNC: 96 MMOL/L — LOW (ref 98–107)
CO2 SERPL-SCNC: 33 MMOL/L — HIGH (ref 22–31)
CREAT SERPL-MCNC: 0.68 MG/DL — SIGNIFICANT CHANGE UP (ref 0.5–1.3)
CULTURE RESULTS: SIGNIFICANT CHANGE UP
CULTURE RESULTS: SIGNIFICANT CHANGE UP
EGFR: 88 ML/MIN/1.73M2 — SIGNIFICANT CHANGE UP
GLUCOSE BLDC GLUCOMTR-MCNC: 163 MG/DL — HIGH (ref 70–99)
GLUCOSE BLDC GLUCOMTR-MCNC: 191 MG/DL — HIGH (ref 70–99)
GLUCOSE BLDC GLUCOMTR-MCNC: 227 MG/DL — HIGH (ref 70–99)
GLUCOSE BLDC GLUCOMTR-MCNC: 251 MG/DL — HIGH (ref 70–99)
GLUCOSE BLDC GLUCOMTR-MCNC: 354 MG/DL — HIGH (ref 70–99)
GLUCOSE SERPL-MCNC: 234 MG/DL — HIGH (ref 70–99)
HCT VFR BLD CALC: 27.3 % — LOW (ref 34.5–45)
HGB BLD-MCNC: 7.8 G/DL — LOW (ref 11.5–15.5)
MAGNESIUM SERPL-MCNC: 1.8 MG/DL — SIGNIFICANT CHANGE UP (ref 1.6–2.6)
MCHC RBC-ENTMCNC: 22.8 PG — LOW (ref 27–34)
MCHC RBC-ENTMCNC: 28.6 GM/DL — LOW (ref 32–36)
MCV RBC AUTO: 79.8 FL — LOW (ref 80–100)
NRBC # BLD: 0 /100 WBCS — SIGNIFICANT CHANGE UP (ref 0–0)
NRBC # FLD: 0 K/UL — SIGNIFICANT CHANGE UP (ref 0–0)
PHOSPHATE SERPL-MCNC: 3.9 MG/DL — SIGNIFICANT CHANGE UP (ref 2.5–4.5)
PLATELET # BLD AUTO: 267 K/UL — SIGNIFICANT CHANGE UP (ref 150–400)
POTASSIUM SERPL-MCNC: 4.4 MMOL/L — SIGNIFICANT CHANGE UP (ref 3.5–5.3)
POTASSIUM SERPL-SCNC: 4.4 MMOL/L — SIGNIFICANT CHANGE UP (ref 3.5–5.3)
RBC # BLD: 3.42 M/UL — LOW (ref 3.8–5.2)
RBC # FLD: 19.1 % — HIGH (ref 10.3–14.5)
SODIUM SERPL-SCNC: 137 MMOL/L — SIGNIFICANT CHANGE UP (ref 135–145)
SPECIMEN SOURCE: SIGNIFICANT CHANGE UP
SPECIMEN SOURCE: SIGNIFICANT CHANGE UP
WBC # BLD: 9 K/UL — SIGNIFICANT CHANGE UP (ref 3.8–10.5)
WBC # FLD AUTO: 9 K/UL — SIGNIFICANT CHANGE UP (ref 3.8–10.5)

## 2023-02-12 RX ADMIN — Medication 25 MILLIGRAM(S): at 05:51

## 2023-02-12 RX ADMIN — Medication 3 MILLILITER(S): at 04:00

## 2023-02-12 RX ADMIN — Medication 3: at 21:17

## 2023-02-12 RX ADMIN — Medication 6 UNIT(S): at 18:35

## 2023-02-12 RX ADMIN — Medication 3 MILLILITER(S): at 22:45

## 2023-02-12 RX ADMIN — Medication 3 MILLILITER(S): at 15:23

## 2023-02-12 RX ADMIN — SIMVASTATIN 20 MILLIGRAM(S): 20 TABLET, FILM COATED ORAL at 21:17

## 2023-02-12 RX ADMIN — Medication 4 MILLILITER(S): at 22:45

## 2023-02-12 RX ADMIN — INSULIN GLARGINE 12 UNIT(S): 100 INJECTION, SOLUTION SUBCUTANEOUS at 21:18

## 2023-02-12 RX ADMIN — Medication 3: at 09:26

## 2023-02-12 RX ADMIN — CEFTRIAXONE 100 MILLIGRAM(S): 500 INJECTION, POWDER, FOR SOLUTION INTRAMUSCULAR; INTRAVENOUS at 21:16

## 2023-02-12 RX ADMIN — Medication 6 UNIT(S): at 09:27

## 2023-02-12 RX ADMIN — Medication 1000 UNIT(S): at 05:51

## 2023-02-12 RX ADMIN — Medication 3 MILLILITER(S): at 09:34

## 2023-02-12 RX ADMIN — Medication 1: at 18:34

## 2023-02-12 RX ADMIN — Medication 50 MICROGRAM(S): at 05:51

## 2023-02-12 NOTE — PROGRESS NOTE ADULT - PROBLEM SELECTOR PLAN 8
DVT ppx: lovenox 40mg qd.

## 2023-02-13 LAB
ANION GAP SERPL CALC-SCNC: 7 MMOL/L — SIGNIFICANT CHANGE UP (ref 7–14)
BUN SERPL-MCNC: 10 MG/DL — SIGNIFICANT CHANGE UP (ref 7–23)
CALCIUM SERPL-MCNC: 9.7 MG/DL — SIGNIFICANT CHANGE UP (ref 8.4–10.5)
CHLORIDE SERPL-SCNC: 94 MMOL/L — LOW (ref 98–107)
CO2 SERPL-SCNC: 34 MMOL/L — HIGH (ref 22–31)
CREAT SERPL-MCNC: 0.67 MG/DL — SIGNIFICANT CHANGE UP (ref 0.5–1.3)
EGFR: 88 ML/MIN/1.73M2 — SIGNIFICANT CHANGE UP
GLUCOSE BLDC GLUCOMTR-MCNC: 168 MG/DL — HIGH (ref 70–99)
GLUCOSE BLDC GLUCOMTR-MCNC: 192 MG/DL — HIGH (ref 70–99)
GLUCOSE BLDC GLUCOMTR-MCNC: 197 MG/DL — HIGH (ref 70–99)
GLUCOSE BLDC GLUCOMTR-MCNC: 205 MG/DL — HIGH (ref 70–99)
GLUCOSE BLDC GLUCOMTR-MCNC: 207 MG/DL — HIGH (ref 70–99)
GLUCOSE SERPL-MCNC: 227 MG/DL — HIGH (ref 70–99)
HCT VFR BLD CALC: 28.8 % — LOW (ref 34.5–45)
HGB BLD-MCNC: 8.1 G/DL — LOW (ref 11.5–15.5)
MAGNESIUM SERPL-MCNC: 1.7 MG/DL — SIGNIFICANT CHANGE UP (ref 1.6–2.6)
MCHC RBC-ENTMCNC: 22.7 PG — LOW (ref 27–34)
MCHC RBC-ENTMCNC: 28.1 GM/DL — LOW (ref 32–36)
MCV RBC AUTO: 80.7 FL — SIGNIFICANT CHANGE UP (ref 80–100)
NRBC # BLD: 0 /100 WBCS — SIGNIFICANT CHANGE UP (ref 0–0)
NRBC # FLD: 0 K/UL — SIGNIFICANT CHANGE UP (ref 0–0)
PHOSPHATE SERPL-MCNC: 4.2 MG/DL — SIGNIFICANT CHANGE UP (ref 2.5–4.5)
PLATELET # BLD AUTO: 273 K/UL — SIGNIFICANT CHANGE UP (ref 150–400)
POTASSIUM SERPL-MCNC: 4.3 MMOL/L — SIGNIFICANT CHANGE UP (ref 3.5–5.3)
POTASSIUM SERPL-SCNC: 4.3 MMOL/L — SIGNIFICANT CHANGE UP (ref 3.5–5.3)
RBC # BLD: 3.57 M/UL — LOW (ref 3.8–5.2)
RBC # FLD: 18.7 % — HIGH (ref 10.3–14.5)
SODIUM SERPL-SCNC: 135 MMOL/L — SIGNIFICANT CHANGE UP (ref 135–145)
WBC # BLD: 8.9 K/UL — SIGNIFICANT CHANGE UP (ref 3.8–10.5)
WBC # FLD AUTO: 8.9 K/UL — SIGNIFICANT CHANGE UP (ref 3.8–10.5)

## 2023-02-13 PROCEDURE — 99232 SBSQ HOSP IP/OBS MODERATE 35: CPT

## 2023-02-13 RX ORDER — SODIUM CHLORIDE 0.65 %
1 AEROSOL, SPRAY (ML) NASAL
Refills: 0 | Status: DISCONTINUED | OUTPATIENT
Start: 2023-02-13 | End: 2023-02-14

## 2023-02-13 RX ADMIN — Medication 6 UNIT(S): at 13:15

## 2023-02-13 RX ADMIN — Medication 1: at 13:14

## 2023-02-13 RX ADMIN — Medication 3 MILLILITER(S): at 04:41

## 2023-02-13 RX ADMIN — Medication 2: at 09:29

## 2023-02-13 RX ADMIN — Medication 3 MILLILITER(S): at 09:14

## 2023-02-13 RX ADMIN — Medication 3 MILLILITER(S): at 20:39

## 2023-02-13 RX ADMIN — Medication 1: at 18:14

## 2023-02-13 RX ADMIN — Medication 25 MILLIGRAM(S): at 05:09

## 2023-02-13 RX ADMIN — CEFTRIAXONE 100 MILLIGRAM(S): 500 INJECTION, POWDER, FOR SOLUTION INTRAMUSCULAR; INTRAVENOUS at 21:48

## 2023-02-13 RX ADMIN — ENOXAPARIN SODIUM 40 MILLIGRAM(S): 100 INJECTION SUBCUTANEOUS at 12:02

## 2023-02-13 RX ADMIN — Medication 4 MILLILITER(S): at 09:14

## 2023-02-13 RX ADMIN — Medication 3 MILLILITER(S): at 15:25

## 2023-02-13 RX ADMIN — INSULIN GLARGINE 12 UNIT(S): 100 INJECTION, SOLUTION SUBCUTANEOUS at 21:47

## 2023-02-13 RX ADMIN — Medication 6 UNIT(S): at 18:15

## 2023-02-13 RX ADMIN — Medication 1000 UNIT(S): at 07:34

## 2023-02-13 RX ADMIN — SIMVASTATIN 20 MILLIGRAM(S): 20 TABLET, FILM COATED ORAL at 21:47

## 2023-02-13 RX ADMIN — Medication 50 MICROGRAM(S): at 05:08

## 2023-02-13 RX ADMIN — Medication 6 UNIT(S): at 09:30

## 2023-02-13 RX ADMIN — Medication 1 SPRAY(S): at 23:36

## 2023-02-13 RX ADMIN — Medication 4 MILLILITER(S): at 20:39

## 2023-02-13 NOTE — PROVIDER CONTACT NOTE (OTHER) - RECOMMENDATIONS
As per NP, continue to monitor pt. New n.c. applied and pt continues to sustain at 93% on 4 l/min n.c.
Put patient up to 6L NC and continue to monitor

## 2023-02-14 LAB
ANION GAP SERPL CALC-SCNC: 13 MMOL/L — SIGNIFICANT CHANGE UP (ref 7–14)
BUN SERPL-MCNC: 13 MG/DL — SIGNIFICANT CHANGE UP (ref 7–23)
CALCIUM SERPL-MCNC: 9.4 MG/DL — SIGNIFICANT CHANGE UP (ref 8.4–10.5)
CHLORIDE SERPL-SCNC: 94 MMOL/L — LOW (ref 98–107)
CO2 SERPL-SCNC: 30 MMOL/L — SIGNIFICANT CHANGE UP (ref 22–31)
CREAT SERPL-MCNC: 0.71 MG/DL — SIGNIFICANT CHANGE UP (ref 0.5–1.3)
EGFR: 86 ML/MIN/1.73M2 — SIGNIFICANT CHANGE UP
GLUCOSE BLDC GLUCOMTR-MCNC: 140 MG/DL — HIGH (ref 70–99)
GLUCOSE BLDC GLUCOMTR-MCNC: 145 MG/DL — HIGH (ref 70–99)
GLUCOSE BLDC GLUCOMTR-MCNC: 151 MG/DL — HIGH (ref 70–99)
GLUCOSE BLDC GLUCOMTR-MCNC: 189 MG/DL — HIGH (ref 70–99)
GLUCOSE BLDC GLUCOMTR-MCNC: 222 MG/DL — HIGH (ref 70–99)
GLUCOSE SERPL-MCNC: 278 MG/DL — HIGH (ref 70–99)
HCT VFR BLD CALC: 29.3 % — LOW (ref 34.5–45)
HGB BLD-MCNC: 8.1 G/DL — LOW (ref 11.5–15.5)
MAGNESIUM SERPL-MCNC: 1.6 MG/DL — SIGNIFICANT CHANGE UP (ref 1.6–2.6)
MCHC RBC-ENTMCNC: 22.6 PG — LOW (ref 27–34)
MCHC RBC-ENTMCNC: 27.6 GM/DL — LOW (ref 32–36)
MCV RBC AUTO: 81.6 FL — SIGNIFICANT CHANGE UP (ref 80–100)
NRBC # BLD: 0 /100 WBCS — SIGNIFICANT CHANGE UP (ref 0–0)
NRBC # FLD: 0 K/UL — SIGNIFICANT CHANGE UP (ref 0–0)
PHOSPHATE SERPL-MCNC: 3.8 MG/DL — SIGNIFICANT CHANGE UP (ref 2.5–4.5)
PLATELET # BLD AUTO: 277 K/UL — SIGNIFICANT CHANGE UP (ref 150–400)
POTASSIUM SERPL-MCNC: 4.1 MMOL/L — SIGNIFICANT CHANGE UP (ref 3.5–5.3)
POTASSIUM SERPL-SCNC: 4.1 MMOL/L — SIGNIFICANT CHANGE UP (ref 3.5–5.3)
RBC # BLD: 3.59 M/UL — LOW (ref 3.8–5.2)
RBC # FLD: 18.8 % — HIGH (ref 10.3–14.5)
SODIUM SERPL-SCNC: 137 MMOL/L — SIGNIFICANT CHANGE UP (ref 135–145)
WBC # BLD: 8.39 K/UL — SIGNIFICANT CHANGE UP (ref 3.8–10.5)
WBC # FLD AUTO: 8.39 K/UL — SIGNIFICANT CHANGE UP (ref 3.8–10.5)

## 2023-02-14 PROCEDURE — 71250 CT THORAX DX C-: CPT | Mod: 26

## 2023-02-14 PROCEDURE — 99232 SBSQ HOSP IP/OBS MODERATE 35: CPT

## 2023-02-14 RX ORDER — ENOXAPARIN SODIUM 100 MG/ML
40 INJECTION SUBCUTANEOUS EVERY 24 HOURS
Refills: 0 | Status: DISCONTINUED | OUTPATIENT
Start: 2023-02-14 | End: 2023-02-16

## 2023-02-14 RX ORDER — SODIUM CHLORIDE 0.65 %
1 AEROSOL, SPRAY (ML) NASAL
Refills: 0 | Status: DISCONTINUED | OUTPATIENT
Start: 2023-02-14 | End: 2023-02-16

## 2023-02-14 RX ORDER — BUDESONIDE AND FORMOTEROL FUMARATE DIHYDRATE 160; 4.5 UG/1; UG/1
2 AEROSOL RESPIRATORY (INHALATION)
Refills: 0 | Status: DISCONTINUED | OUTPATIENT
Start: 2023-02-14 | End: 2023-02-16

## 2023-02-14 RX ORDER — ACETAMINOPHEN 500 MG
650 TABLET ORAL ONCE
Refills: 0 | Status: DISCONTINUED | OUTPATIENT
Start: 2023-02-14 | End: 2023-02-14

## 2023-02-14 RX ORDER — ACETAMINOPHEN 500 MG
650 TABLET ORAL ONCE
Refills: 0 | Status: COMPLETED | OUTPATIENT
Start: 2023-02-14 | End: 2023-02-14

## 2023-02-14 RX ORDER — CEFTRIAXONE 500 MG/1
1000 INJECTION, POWDER, FOR SOLUTION INTRAMUSCULAR; INTRAVENOUS EVERY 24 HOURS
Refills: 0 | Status: DISCONTINUED | OUTPATIENT
Start: 2023-02-14 | End: 2023-02-15

## 2023-02-14 RX ADMIN — SIMVASTATIN 20 MILLIGRAM(S): 20 TABLET, FILM COATED ORAL at 21:55

## 2023-02-14 RX ADMIN — Medication 6 UNIT(S): at 13:11

## 2023-02-14 RX ADMIN — Medication 3 MILLILITER(S): at 21:43

## 2023-02-14 RX ADMIN — Medication 1: at 13:12

## 2023-02-14 RX ADMIN — Medication 260 MILLIGRAM(S): at 00:56

## 2023-02-14 RX ADMIN — Medication 1 SPRAY(S): at 05:28

## 2023-02-14 RX ADMIN — Medication 6 UNIT(S): at 18:45

## 2023-02-14 RX ADMIN — Medication 650 MILLIGRAM(S): at 01:37

## 2023-02-14 RX ADMIN — INSULIN GLARGINE 12 UNIT(S): 100 INJECTION, SOLUTION SUBCUTANEOUS at 21:55

## 2023-02-14 RX ADMIN — Medication 50 MICROGRAM(S): at 05:28

## 2023-02-14 RX ADMIN — Medication 1 SPRAY(S): at 12:00

## 2023-02-14 RX ADMIN — BUDESONIDE AND FORMOTEROL FUMARATE DIHYDRATE 2 PUFF(S): 160; 4.5 AEROSOL RESPIRATORY (INHALATION) at 22:03

## 2023-02-14 RX ADMIN — Medication 3 MILLILITER(S): at 04:02

## 2023-02-14 RX ADMIN — Medication 3 MILLILITER(S): at 09:47

## 2023-02-14 RX ADMIN — Medication 1 SPRAY(S): at 17:11

## 2023-02-14 RX ADMIN — ENOXAPARIN SODIUM 40 MILLIGRAM(S): 100 INJECTION SUBCUTANEOUS at 17:26

## 2023-02-14 RX ADMIN — Medication 2: at 09:05

## 2023-02-14 RX ADMIN — Medication 1 SPRAY(S): at 22:56

## 2023-02-14 RX ADMIN — Medication 4 MILLILITER(S): at 21:43

## 2023-02-14 RX ADMIN — Medication 3 MILLILITER(S): at 14:57

## 2023-02-14 RX ADMIN — Medication 25 MILLIGRAM(S): at 05:33

## 2023-02-14 RX ADMIN — Medication 6 UNIT(S): at 09:04

## 2023-02-14 RX ADMIN — CEFTRIAXONE 100 MILLIGRAM(S): 500 INJECTION, POWDER, FOR SOLUTION INTRAMUSCULAR; INTRAVENOUS at 05:28

## 2023-02-14 RX ADMIN — Medication 4 MILLILITER(S): at 09:48

## 2023-02-14 RX ADMIN — Medication 1000 UNIT(S): at 05:28

## 2023-02-14 NOTE — CHART NOTE - NSCHARTNOTEFT_GEN_A_CORE
Pt with episode of epistaxis today, pressure held for 20 minutes by RN and cold pack applied. Hemostasis achieved. Lovenox held at this time, to be given later this evening if remains stable. O2 temporarily changed to face tent while holding pressure, plan to replace with humidified O2 via NC. Dr. Gonzalez made aware, will continue to monitor closely.
Nutrition Note:    Pt off the floor at this time, will complete assessment when returns to the floor.    Sanjuana Shen RDN #[id number]
RRT was called by RN for AMS. Daughter in law present at bedside. Pt is an 79 y/o F with PMH HTN, HLD, DM, asthma with recent admission to [facility] for viral PNA who presents for diffuse body aches. RVP +enterovirus, pt being treated for PNA and possible UTI, currently on vanc and zosyn. Urine cx testing.  As per RN pt appeared to be more confused and lethargic this morning. As per daughter-in-law, pt is now at baseline mental status, however pt does endorse SOB. VS: afebrile, /90s, HR 90s, and 100% oxygen saturation on 4L NC. . On Physical exam lungs with rhonchi in b/l lower lobes. Will order Duonebs. Will follow up urine cx and VBG w/lytes. CT head ordered. Discussed with Dr. Gonzalez.

## 2023-02-15 ENCOUNTER — TRANSCRIPTION ENCOUNTER (OUTPATIENT)
Age: 78
End: 2023-02-15

## 2023-02-15 LAB
ANION GAP SERPL CALC-SCNC: 9 MMOL/L — SIGNIFICANT CHANGE UP (ref 7–14)
BUN SERPL-MCNC: 13 MG/DL — SIGNIFICANT CHANGE UP (ref 7–23)
CALCIUM SERPL-MCNC: 9.7 MG/DL — SIGNIFICANT CHANGE UP (ref 8.4–10.5)
CHLORIDE SERPL-SCNC: 96 MMOL/L — LOW (ref 98–107)
CO2 SERPL-SCNC: 31 MMOL/L — SIGNIFICANT CHANGE UP (ref 22–31)
CREAT SERPL-MCNC: 0.67 MG/DL — SIGNIFICANT CHANGE UP (ref 0.5–1.3)
EGFR: 88 ML/MIN/1.73M2 — SIGNIFICANT CHANGE UP
GLUCOSE BLDC GLUCOMTR-MCNC: 163 MG/DL — HIGH (ref 70–99)
GLUCOSE BLDC GLUCOMTR-MCNC: 218 MG/DL — HIGH (ref 70–99)
GLUCOSE BLDC GLUCOMTR-MCNC: 219 MG/DL — HIGH (ref 70–99)
GLUCOSE BLDC GLUCOMTR-MCNC: 229 MG/DL — HIGH (ref 70–99)
GLUCOSE SERPL-MCNC: 236 MG/DL — HIGH (ref 70–99)
HCT VFR BLD CALC: 29 % — LOW (ref 34.5–45)
HGB BLD-MCNC: 8.2 G/DL — LOW (ref 11.5–15.5)
MAGNESIUM SERPL-MCNC: 1.6 MG/DL — SIGNIFICANT CHANGE UP (ref 1.6–2.6)
MCHC RBC-ENTMCNC: 22.5 PG — LOW (ref 27–34)
MCHC RBC-ENTMCNC: 28.3 GM/DL — LOW (ref 32–36)
MCV RBC AUTO: 79.7 FL — LOW (ref 80–100)
NRBC # BLD: 0 /100 WBCS — SIGNIFICANT CHANGE UP (ref 0–0)
NRBC # FLD: 0 K/UL — SIGNIFICANT CHANGE UP (ref 0–0)
PHOSPHATE SERPL-MCNC: 3.3 MG/DL — SIGNIFICANT CHANGE UP (ref 2.5–4.5)
PLATELET # BLD AUTO: 303 K/UL — SIGNIFICANT CHANGE UP (ref 150–400)
POTASSIUM SERPL-MCNC: 4 MMOL/L — SIGNIFICANT CHANGE UP (ref 3.5–5.3)
POTASSIUM SERPL-SCNC: 4 MMOL/L — SIGNIFICANT CHANGE UP (ref 3.5–5.3)
RBC # BLD: 3.64 M/UL — LOW (ref 3.8–5.2)
RBC # FLD: 18.6 % — HIGH (ref 10.3–14.5)
SODIUM SERPL-SCNC: 136 MMOL/L — SIGNIFICANT CHANGE UP (ref 135–145)
WBC # BLD: 8.13 K/UL — SIGNIFICANT CHANGE UP (ref 3.8–10.5)
WBC # FLD AUTO: 8.13 K/UL — SIGNIFICANT CHANGE UP (ref 3.8–10.5)

## 2023-02-15 PROCEDURE — 99232 SBSQ HOSP IP/OBS MODERATE 35: CPT

## 2023-02-15 RX ORDER — SODIUM CHLORIDE 0.65 %
1 AEROSOL, SPRAY (ML) NASAL THREE TIMES A DAY
Refills: 0 | Status: DISCONTINUED | OUTPATIENT
Start: 2023-02-15 | End: 2023-02-15

## 2023-02-15 RX ORDER — PANTOPRAZOLE SODIUM 20 MG/1
40 TABLET, DELAYED RELEASE ORAL
Refills: 0 | Status: DISCONTINUED | OUTPATIENT
Start: 2023-02-15 | End: 2023-02-16

## 2023-02-15 RX ORDER — LANOLIN ALCOHOL/MO/W.PET/CERES
3 CREAM (GRAM) TOPICAL AT BEDTIME
Refills: 0 | Status: DISCONTINUED | OUTPATIENT
Start: 2023-02-15 | End: 2023-02-16

## 2023-02-15 RX ORDER — PETROLATUM,WHITE
1 JELLY (GRAM) TOPICAL THREE TIMES A DAY
Refills: 0 | Status: DISCONTINUED | OUTPATIENT
Start: 2023-02-15 | End: 2023-02-16

## 2023-02-15 RX ADMIN — Medication 1 APPLICATION(S): at 21:24

## 2023-02-15 RX ADMIN — Medication 1 SPRAY(S): at 12:17

## 2023-02-15 RX ADMIN — Medication 6 UNIT(S): at 09:19

## 2023-02-15 RX ADMIN — BUDESONIDE AND FORMOTEROL FUMARATE DIHYDRATE 2 PUFF(S): 160; 4.5 AEROSOL RESPIRATORY (INHALATION) at 21:24

## 2023-02-15 RX ADMIN — Medication 4 MILLILITER(S): at 20:20

## 2023-02-15 RX ADMIN — Medication 6 UNIT(S): at 18:29

## 2023-02-15 RX ADMIN — SIMVASTATIN 20 MILLIGRAM(S): 20 TABLET, FILM COATED ORAL at 21:24

## 2023-02-15 RX ADMIN — Medication 3 MILLILITER(S): at 03:35

## 2023-02-15 RX ADMIN — Medication 2: at 09:19

## 2023-02-15 RX ADMIN — CEFTRIAXONE 100 MILLIGRAM(S): 500 INJECTION, POWDER, FOR SOLUTION INTRAMUSCULAR; INTRAVENOUS at 05:35

## 2023-02-15 RX ADMIN — Medication 1000 UNIT(S): at 05:32

## 2023-02-15 RX ADMIN — BUDESONIDE AND FORMOTEROL FUMARATE DIHYDRATE 2 PUFF(S): 160; 4.5 AEROSOL RESPIRATORY (INHALATION) at 09:20

## 2023-02-15 RX ADMIN — Medication 50 MICROGRAM(S): at 05:33

## 2023-02-15 RX ADMIN — Medication 1: at 18:30

## 2023-02-15 RX ADMIN — Medication 1 APPLICATION(S): at 14:02

## 2023-02-15 RX ADMIN — Medication 2: at 13:19

## 2023-02-15 RX ADMIN — ENOXAPARIN SODIUM 40 MILLIGRAM(S): 100 INJECTION SUBCUTANEOUS at 18:11

## 2023-02-15 RX ADMIN — INSULIN GLARGINE 12 UNIT(S): 100 INJECTION, SOLUTION SUBCUTANEOUS at 23:33

## 2023-02-15 RX ADMIN — Medication 25 MILLIGRAM(S): at 05:32

## 2023-02-15 RX ADMIN — Medication 3 MILLILITER(S): at 15:21

## 2023-02-15 RX ADMIN — Medication 6 UNIT(S): at 13:19

## 2023-02-15 RX ADMIN — Medication 3 MILLILITER(S): at 20:19

## 2023-02-15 RX ADMIN — Medication 3 MILLILITER(S): at 09:11

## 2023-02-15 RX ADMIN — Medication 4 MILLILITER(S): at 09:12

## 2023-02-15 RX ADMIN — Medication 100 MILLIGRAM(S): at 21:27

## 2023-02-15 RX ADMIN — Medication 1 SPRAY(S): at 05:10

## 2023-02-15 RX ADMIN — Medication 1 SPRAY(S): at 18:32

## 2023-02-15 NOTE — PROGRESS NOTE ADULT - PROBLEM SELECTOR PLAN 6
Hx of asthma   - C/w home meds
DVT ppx: lovenox 40mg qd.
Hx of asthma   - C/w home meds
DVT ppx: lovenox 40mg qd.
DVT ppx: lovenox 40mg qd.
Hx of asthma   - C/w home meds

## 2023-02-15 NOTE — PROVIDER CONTACT NOTE (OTHER) - ASSESSMENT
On assessment, pt. is asymptomatic. Denies SOB and/or chest pain at this time, observed eating and talking with family. 02 increased to 6 l/min, pt. continued to sustain 78%, n.c. then readjusted and 02 increased to 93% on 6 l/min. NP made aware that decrease in 02 likely due to leak in tubing.
Patient's BP this evening was 127/57  Diastolic running low
Patient's BP this morning was 117/50  Diastolic running on the low side  Patient stable
Patient's BP was 154/101 and she her O2 is staying between 86% and 88% on 4L NC  Patient stable
See flow sheet for vital signs. Pt is stable

## 2023-02-15 NOTE — PROVIDER CONTACT NOTE (OTHER) - BACKGROUND
77 y/o female with PMH HTN, HLD, DM, admitted for pneumonia
Admitting dx: pneumonia. Dx: asthma, HTN, DM, UTI, acute respiratory failure with hypoxia.

## 2023-02-15 NOTE — DISCHARGE NOTE PROVIDER - NSDCMRMEDTOKEN_GEN_ALL_CORE_FT
Basaglar KwikPen 100 units/mL subcutaneous solution: 12 unit(s) subcutaneous once a day (at bedtime)   cholecalciferol oral tablet: 1000 unit(s) orally once a day  ipratropium-albuterol 0.5 mg-2.5 mg/3 mL inhalation solution: 3 milliliter(s) inhaled every 12 hours  levothyroxine 50 mcg (0.05 mg) oral tablet: 1 tab(s) orally once a day  metFORMIN 500 mg oral tablet: 1 tab(s) orally 2 times a day   metoprolol succinate 25 mg oral tablet, extended release: 1 tab(s) orally once a day  NovoLOG FlexPen 100 units/mL injectable solution: 6 unit(s) injectable 3 times a day (before meals)   simvastatin 20 mg oral tablet: 1 tab(s) orally once a day (at bedtime)   Basaglar KwikPen 100 units/mL subcutaneous solution: 12 unit(s) subcutaneous once a day (at bedtime)   benzonatate 100 mg oral capsule: 1 cap(s) orally 3 times a day, As Needed   budesonide-formoterol 160 mcg-4.5 mcg/inh inhalation aerosol: 2 puff(s) inhaled 2 times a day   cholecalciferol oral tablet: 1000 unit(s) orally once a day  ipratropium-albuterol 0.5 mg-2.5 mg/3 mL inhalation solution: 3 milliliter(s) inhaled every 12 hours  levothyroxine 50 mcg (0.05 mg) oral tablet: 1 tab(s) orally once a day  metFORMIN 500 mg oral tablet: 1 tab(s) orally 2 times a day   metoprolol succinate 25 mg oral tablet, extended release: 1 tab(s) orally once a day  NovoLOG FlexPen 100 units/mL injectable solution: 6 unit(s) injectable 3 times a day (before meals)   pantoprazole 40 mg oral delayed release tablet: 1 tab(s) orally once a day (before a meal)  simvastatin 20 mg oral tablet: 1 tab(s) orally once a day (at bedtime)  sodium chloride 0.65% nasal spray: 1 spray(s) nasal 4 times a day

## 2023-02-15 NOTE — PROVIDER CONTACT NOTE (OTHER) - REASON
Diastolic low
Patient's BP and O2
Patient's BP
Patient's BP
02 sustaining 78% on 4 l/min, pt asymptomatic.

## 2023-02-15 NOTE — DISCHARGE NOTE PROVIDER - PROVIDER TOKENS
FREE:[LAST:[Alfredo Al],PHONE:[phone]],FAX:[(   [phone]    -],ADDRESS:[[id number]]],PROVIDER:[TOKEN:[[id number]]]

## 2023-02-15 NOTE — PROGRESS NOTE ADULT - PROBLEM SELECTOR PLAN 4
Hx of DM. A1C 7.3.   - Continue with home insulin regimen of lantus 12U qhs and admelog 6U TID.   - ISS   - Monitor sugars
Hx of asthma   - C/w home meds

## 2023-02-15 NOTE — DIETITIAN INITIAL EVALUATION ADULT - ORAL INTAKE PTA/DIET HISTORY
Pt speaks Panjabi, writer was able to speak pt's preferred language and reports fair po intake at home but not compliant to CHO consistent diet pta.

## 2023-02-15 NOTE — DIETITIAN INITIAL EVALUATION ADULT - PROBLEM SELECTOR PROBLEM 2
Patient is calling to request a prescription refill.    Medication Name: Metoprolol  Medication dosage: 50mg  Patient is requesting a: 90 day supply  Pharmacy Name: [facility]   Pharmacy Location: [facility] and [place]    1 tablet every 12 hrs     Advised patient that the nurse will call if there are questions or concerns, otherwise refill processing may take 24-48 hours.  Patient instructed to call pharmacy directly for future refills.    Flag encounter as urgent if patient is out of medication.    
Urinary tract infection

## 2023-02-15 NOTE — PROGRESS NOTE ADULT - PROBLEM SELECTOR PROBLEM 3
Leukocytosis
HTN (hypertension)
HTN (hypertension)
Leukocytosis
HTN (hypertension)
Leukocytosis

## 2023-02-15 NOTE — DISCHARGE NOTE PROVIDER - NSDCCPCAREPLAN_GEN_ALL_CORE_FT
PRINCIPAL DISCHARGE DIAGNOSIS  Diagnosis: Pneumonia  Assessment and Plan of Treatment: you were found with infection in the lung and completed 7 days of IV antibiotics. You were seen by infectious disease provider. Followup with your primary care provider in 1-2 weeks      SECONDARY DISCHARGE DIAGNOSES  Diagnosis: Acute respiratory failure with hypoxia  Assessment and Plan of Treatment: you were seen by pulmonologist this admission, please continue inhaler and supplement oxygen as directed and followup with outpatient pulmonologist or Dr Flynn in 1-2 weeks    Diagnosis: Asthma  Assessment and Plan of Treatment: you were seen by pulmonologist this admission, please continue inhaler and supplement oxygen as directed and followup with outpatient pulmonologist or Dr Flynn in 1-2 weeks    Diagnosis: HTN (hypertension)  Assessment and Plan of Treatment: Low sodium and fat diet, continue anti-hypertensive medications, and follow up with primary care physician.    Diagnosis: Hypothyroid  Assessment and Plan of Treatment: Please continue to take your Synthroid as prescribed and follow up with your PMD and endocrinologist for follow up.    Diagnosis: Diabetes  Assessment and Plan of Treatment: Goal A1C 7.0. Your A1C is 7.3  Hypoglycemia management: please check your fingerstick every morning or if you are not feeling well. If your FS is >300mg/dl X3 or more readings please contact your PMD/Endocrinologist. If your FS is low <70mg/dl and/or you have symptoms of very low blood sugar FIRST drink1/2 cup of apple juice (or take 4 glucose tab/tube of glucose gel) and recheck FS in 15mins. Repeat these steps until blood sugar is above 100mg/dl, if NECESSARY. Then call your provider to discuss low blood sugar.   What to expend at followup appointment: please bring a log of your fingerstick and/or your glucometer to you appointment. Your blood sugar tracking will help your diabetes team determine the best plan     PRINCIPAL DISCHARGE DIAGNOSIS  Diagnosis: Pneumonia  Assessment and Plan of Treatment: you were found with infection in the lung and completed 7 days of IV antibiotics. You were seen by infectious disease provider. Followup with your primary care provider in 1-2 weeks      SECONDARY DISCHARGE DIAGNOSES  Diagnosis: Acute respiratory failure with hypoxia  Assessment and Plan of Treatment: you were seen by pulmonologist this admission, please continue inhaler and supplement oxygen as directed and followup with outpatient pulmonologist or Dr Flynn in 1-2 weeks    Diagnosis: Asthma  Assessment and Plan of Treatment: you were seen by pulmonologist this admission, please continue inhaler and supplement oxygen as directed and followup with outpatient pulmonologist or Dr Flynn in 1-2 weeks    Diagnosis: HTN (hypertension)  Assessment and Plan of Treatment: Low sodium and fat diet, continue anti-hypertensive medications, and follow up with primary care physician.    Diagnosis: Hypothyroid  Assessment and Plan of Treatment: Please continue to take your Synthroid as prescribed and follow up with your PMD and endocrinologist for follow up.    Diagnosis: Diabetes  Assessment and Plan of Treatment: Goal A1C 7.0. Your A1C is 7.3  Hypoglycemia management: please check your fingerstick every morning or if you are not feeling well. If your FS is >300mg/dl X3 or more readings please contact your PMD/Endocrinologist. If your FS is low <70mg/dl and/or you have symptoms of very low blood sugar FIRST drink1/2 cup of apple juice (or take 4 glucose tab/tube of glucose gel) and recheck FS in 15mins. Repeat these steps until blood sugar is above 100mg/dl, if NECESSARY. Then call your provider to discuss low blood sugar.   What to expend at followup appointment: please bring a log of your fingerstick and/or your glucometer to you appointment. Your blood sugar tracking will help your diabetes team determine the best plan    Diagnosis: Abnormal CT scan, lung  Assessment and Plan of Treatment: your CT lung are abnormal with ? mass please followup with pulmonary for followup

## 2023-02-15 NOTE — PROVIDER CONTACT NOTE (OTHER) - ACTION/TREATMENT ORDERED:
ACP made aware
ACP aware
ACP made aware
No new orders at this time
As per NP, continue to monitor pt. New n.c. applied and pt continues to sustain at 93% on 4 l/min n.c.

## 2023-02-15 NOTE — DISCHARGE NOTE PROVIDER - CARE PROVIDER_API CALL
Alfredo Al,   PMD  Phone: [phone]  Fax: (   )    -  Follow Up Time:     Namrata Flynn (DO)  Internal Medicine; Pulmonary Disease; Sleep Medicine  [address], [address]  [place] [address]  Phone: [phone]  Fax: [phone]  Follow Up Time:

## 2023-02-15 NOTE — SWALLOW BEDSIDE ASSESSMENT ADULT - ADDITIONAL RECOMMENDATIONS
2. Medical team advised to reconsult this department with any change in medical status and/or as patient becomes medically optimized. 2. Medical team advised to reconsult this department with any change in medical status and/or as patient becomes medically optimized. 3. This service to follow-up as schedule permits.

## 2023-02-15 NOTE — DIETITIAN INITIAL EVALUATION ADULT - NS FNS DIET ORDER
Diet, DASH/TLC:   Sodium & Cholesterol Restricted  Consistent Carbohydrate {No Snacks} (CSTCHO)  Halal  No Beef  No Pork (02-08-23 @ 03:59)

## 2023-02-15 NOTE — PROGRESS NOTE ADULT - PROBLEM SELECTOR PLAN 5
Hx of HTN.   - C/w home meds
C/w synthroid
Hx of HTN.   - C/w home meds

## 2023-02-15 NOTE — PROGRESS NOTE ADULT - PROBLEM SELECTOR PLAN 1
Pt. recently discharged after being admitted for respiratory failure found with multifocal PNA 2/2 entero/rhinovirus. She received steroids for 5 days on last admission. PE was ruled out. ID had been consulted and she was treated with Zosyn and then d/c'ed. CXR reviewed by me and appears unchanged from prior with multifocal patchy opacities. RVP is also positive for entero/rhinovirus but likely old infection. Pt. with known severe pulm HTN, asthma, and recent viral infection. Likely just not improved.   - Continue on supplemental O2
Pt. recently discharged after being admitted for respiratory failure found with multifocal PNA 2/2 entero/rhinovirus. She received steroids for 5 days on last admission. PE was ruled out. ID had been consulted and she was treated with Zosyn and then d/c'ed. CXR reviewed by me and appears unchanged from prior with multifocal patchy opacities. RVP is also positive for entero/rhinovirus but likely old infection. Pt. with known severe pulm HTN, asthma, and recent viral infection. Likely just not improved.   - Continue on supplemental O2  Superimposed Strep Pneumoniae bacterial pneumonia in setting of recent viral pneumonia   Strep Pneumoniae Ur Ag +  -  CT 2/14 few patchy bilateral opacities   other consolidations unchanged    concern for inflammatory, malignancy    daughter reiterated that patient refuses diagnostic bronchoscopy       completed > 7 days IV antibiotics
Pt. recently discharged after being admitted for respiratory failure found with multifocal PNA 2/2 entero/rhinovirus. She received steroids for 5 days on last admission. PE was ruled out. ID had been consulted and she was treated with Zosyn and then d/c'ed. CXR reviewed by me and appears unchanged from prior with multifocal patchy opacities. RVP is also positive for entero/rhinovirus but likely old infection. Pt. with known severe pulm HTN, asthma, and recent viral infection. Likely just not improved.   - Continue on supplemental O2
Pt. recently discharged after being admitted for respiratory failure found with multifocal PNA 2/2 entero/rhinovirus. She received steroids for 5 days on last admission. PE was ruled out. ID had been consulted and she was treated with Zosyn and then d/c'ed. CXR reviewed by me and appears unchanged from prior with multifocal patchy opacities. RVP is also positive for entero/rhinovirus but likely old infection. Pt. with known severe pulm HTN, asthma, and recent viral infection. Likely just not improved.   - Continue on supplemental O2
Pt. recently discharged after being admitted for respiratory failure found with multifocal PNA 2/2 entero/rhinovirus. She received steroids for 5 days on last admission. PE was ruled out. ID had been consulted and she was treated with Zosyn and then d/c'ed. CXR reviewed by me and appears unchanged from prior with multifocal patchy opacities. RVP is also positive for entero/rhinovirus but likely old infection. Pt. with known severe pulm HTN, asthma, and recent viral infection. Likely just not improved.   - Continue on supplemental O2  Superimposed Strep Pneumoniae bacterial pneumonia in setting of recent viral pneumonia   Strep Pneumoniae Ur Ag +  - abx as per ID  - f/u repeat ct chest
Pt. recently discharged after being admitted for respiratory failure found with multifocal PNA 2/2 entero/rhinovirus. She received steroids for 5 days on last admission. PE was ruled out. ID had been consulted and she was treated with Zosyn and then d/c'ed. CXR reviewed by me and appears unchanged from prior with multifocal patchy opacities. RVP is also positive for entero/rhinovirus but likely old infection. Pt. with known severe pulm HTN, asthma, and recent viral infection. Likely just not improved.   - Continue on supplemental O2  Superimposed Strep Pneumoniae bacterial pneumonia in setting of recent viral pneumonia   Strep Pneumoniae Ur Ag +  - abx as per ID  - f/u repeat ct chest
Pt. recently discharged after being admitted for respiratory failure found with multifocal PNA 2/2 entero/rhinovirus. She received steroids for 5 days on last admission. PE was ruled out. ID had been consulted and she was treated with Zosyn and then d/c'ed. CXR reviewed by me and appears unchanged from prior with multifocal patchy opacities. RVP is also positive for entero/rhinovirus but likely old infection. Pt. with known severe pulm HTN, asthma, and recent viral infection. Likely just not improved.   - Continue on supplemental O2
Pt. recently discharged after being admitted for respiratory failure found with multifocal PNA 2/2 entero/rhinovirus. She received steroids for 5 days on last admission. PE was ruled out. ID had been consulted and she was treated with Zosyn and then d/c'ed. CXR reviewed by me and appears unchanged from prior with multifocal patchy opacities. RVP is also positive for entero/rhinovirus but likely old infection. Pt. with known severe pulm HTN, asthma, and recent viral infection. Likely just not improved.   - Continue on supplemental O2

## 2023-02-15 NOTE — PROGRESS NOTE ADULT - PROBLEM SELECTOR PROBLEM 5
HTN (hypertension)
HTN (hypertension)
Hypothyroid
HTN (hypertension)
Hypothyroid
Hypothyroid
HTN (hypertension)
HTN (hypertension)

## 2023-02-15 NOTE — PROGRESS NOTE ADULT - PROBLEM SELECTOR PLAN 3
WBCs 21.6. On discharge WBCs 7. Recent steroid use however patient completed the course during the last hospitalization and there was no leukocytosis. May be 2/2 UTI vs recurrent PNA.   - C/w vanc/zosyn.   - F/u BCx   - F/u urine legionella and strep   - Sputum culture
Hx of HTN.   - C/w home meds
WBCs 21.6. On discharge WBCs 7. Recent steroid use however patient completed the course during the last hospitalization and there was no leukocytosis. May be 2/2 UTI vs recurrent PNA.   - C/w vanc/zosyn.   - F/u BCx   - F/u urine legionella and strep   - Sputum culture

## 2023-02-15 NOTE — PROVIDER CONTACT NOTE (OTHER) - SITUATION
Patient's BP this evening was 127/57  Diastolic running low
Patient's BP this morning was 117/50  Diastolic running on the low side  Patient stable
Patient's BP was 154/101 and she her O2 is staying between 86% and 88% on 4L NC  Patient stable
02 sustaining 78% on 4 l/min, pt asymptomatic.
Diastolic low; /56

## 2023-02-15 NOTE — DIETITIAN INITIAL EVALUATION ADULT - PERTINENT MEDS FT
MEDICATIONS  (STANDING):  acetylcysteine 10%  Inhalation 4 milliLiter(s) Inhalation two times a day  albuterol/ipratropium for Nebulization 3 milliLiter(s) Nebulizer every 6 hours  benzonatate 100 milliGRAM(s) Oral three times a day  budesonide 160 MICROgram(s)/formoterol 4.5 MICROgram(s) Inhaler 2 Puff(s) Inhalation two times a day  cholecalciferol 1000 Unit(s) Oral every 24 hours  dextrose 5%. 1000 milliLiter(s) (100 mL/Hr) IV Continuous <Continuous>  dextrose 5%. 1000 milliLiter(s) (50 mL/Hr) IV Continuous <Continuous>  dextrose 50% Injectable 25 Gram(s) IV Push once  dextrose 50% Injectable 12.5 Gram(s) IV Push once  dextrose 50% Injectable 25 Gram(s) IV Push once  enoxaparin Injectable 40 milliGRAM(s) SubCutaneous every 24 hours  glucagon  Injectable 1 milliGRAM(s) IntraMuscular once  influenza  Vaccine (HIGH DOSE) 0.7 milliLiter(s) IntraMuscular once  insulin glargine Injectable (LANTUS) 12 Unit(s) SubCutaneous at bedtime  insulin lispro (ADMELOG) corrective regimen sliding scale   SubCutaneous three times a day before meals  insulin lispro (ADMELOG) corrective regimen sliding scale   SubCutaneous at bedtime  insulin lispro Injectable (ADMELOG) 6 Unit(s) SubCutaneous three times a day before meals  levothyroxine 50 MICROGram(s) Oral daily  metoprolol succinate ER 25 milliGRAM(s) Oral daily  pantoprazole    Tablet 40 milliGRAM(s) Oral before breakfast  petrolatum white Ointment 1 Application(s) Topical three times a day  simvastatin 20 milliGRAM(s) Oral at bedtime  sodium chloride 0.65% Nasal 1 Spray(s) Both Nostrils four times a day    MEDICATIONS  (PRN):  dextrose Oral Gel 15 Gram(s) Oral once PRN Blood Glucose LESS THAN 70 milliGRAM(s)/deciliter  melatonin 3 milliGRAM(s) Oral at bedtime PRN Insomnia

## 2023-02-15 NOTE — PROGRESS NOTE ADULT - PROBLEM SELECTOR PLAN 2
Hx of DM. A1C 7.3.   - Continue with home insulin regimen of lantus 12U qhs and admelog 6U TID.   - ISS   - Monitor sugars
Hx of DM. A1C 7.3.   - Continue with home insulin regimen of lantus 12U qhs and admelog 6U TID.   - ISS   - Monitor sugars
UA positive.   - F/u UCx   - On Zosyn
Hx of DM. A1C 7.3.   - Continue with home insulin regimen of lantus 12U qhs and admelog 6U TID.   - ISS   - Monitor sugars
UA positive.   - F/u UCx   - On Zosyn

## 2023-02-15 NOTE — DIETITIAN INITIAL EVALUATION ADULT - PERTINENT LABORATORY DATA
02-15    136  |  96<L>  |  13  ----------------------------<  236<H>  4.0   |  31  |  0.67    Ca    9.7      15 Feb 2023 06:17  Phos  3.3     02-15  Mg     1.60     02-15    POCT Blood Glucose.: 229 mg/dL (02-15-23 @ 13:08)  A1C with Estimated Average Glucose Result: 7.3 % (02-07-23 @ 02:47)  A1C with Estimated Average Glucose Result: 8.8 % (12-01-22 @ 12:00)  A1C with Estimated Average Glucose Result: 10.5 % (11-08-22 @ 09:00)

## 2023-02-15 NOTE — DIETITIAN INITIAL EVALUATION ADULT - OTHER INFO
77 y/o F with PMH HTN, HLD, DM, asthma with recent admission to [facility] for viral PNA who presents for diffuse body aches and feeling anxious.     Pt seen and reports 50% intake of meals with No GI distress (nausea/vomiting/diarrhea/constipation.) at present. SLP was unable to evaluate swallow due to patient presenting with respiratory compromise which may increase the risk of aspiration with oral intake. Noted skin ecchymosis, no edema pe rnursign flow sheet. Labs reviewed. A1c- 7.3% (H) due to diet non-compliance.

## 2023-02-15 NOTE — SWALLOW BEDSIDE ASSESSMENT ADULT - SWALLOW EVAL: RECOMMENDED DIET
1. Defer to MD given unable to evaluate swallow due to patient presenting with respiratory compromise which may increase the risk of aspiration with oral intake

## 2023-02-15 NOTE — PROGRESS NOTE ADULT - PROBLEM SELECTOR PROBLEM 2
Urinary tract infection
Urinary tract infection
Diabetes
Urinary tract infection
Diabetes
Diabetes
Urinary tract infection
Urinary tract infection

## 2023-02-15 NOTE — DISCHARGE NOTE PROVIDER - HOSPITAL COURSE
79 y/o F with PMH HTN, HLD, DM, asthma (has home O2) with recent admission to [facility] for viral PNA who presents for diffuse body aches and feeling anxious.     Hospital course:    acute respiratory failure with hypoxia/Sepsis/PNA   recently discharged after being admitted for respiratory failure found with multifocal PNA 2/2 entero/rhinovirus.  s/p steroids for 5 days on last admission. PE was ruled out. ID had been consulted and she was treated with Zosyn and then d/c'ed  CXR - multifocal PNA - unchanged from last exam   RVP is also positive for entero/rhinovirus but likely old infection.   strep urine AG +  S&S: **************  CT chest: few patchy and clustered bilateral lung opacities are new from the   prior study may be infectious or inflammatory. Other bilateral lung opacities and consolidations are unchanged from the most recent prior study and indeterminate.  ID: Sepsis, Superimposed Strep Pneumoniae bacterial pneumonia in setting of recent viral pneumonia. 7 days of CTX  Pulm: symbicort 160 BID, PPI for persistent cough  BCx: NO growth     DM2   a1c 7.3  lantus, pre-meals, sliding scale     HTN, HLD   metoprolol, zocor     hypothyroid   TSH WNL    synthroid    RRT for AMS?  CTH: chronic microvascular ischemic change  Pt returned to baseline     PT *****    Case discussed with Dr Gonzalez on *****. Reviewed discharge medications with patient; All new medications requiring new prescription sent to pharmacy of patients choice. Reviewed need for prescription for previous home medication and new prescriptions sent if requested. Patient in agreement and understands.     79 y/o F with PMH HTN, HLD, DM, asthma (has home O2) with recent admission to [facility] for viral PNA who presents for diffuse body aches and feeling anxious.     Hospital course:    acute respiratory failure with hypoxia/Sepsis/PNA   recently discharged after being admitted for respiratory failure found with multifocal PNA 2/2 entero/rhinovirus.  s/p steroids for 5 days on last admission. PE was ruled out. ID had been consulted and she was treated with Zosyn and then d/c'ed  CXR - multifocal PNA - unchanged from last exam   RVP is also positive for entero/rhinovirus but likely old infection.   strep urine AG +  S&S: regular, thin  CT chest: few patchy and clustered bilateral lung opacities are new from the   prior study may be infectious or inflammatory. Other bilateral lung opacities and consolidations are unchanged from the most recent prior study and indeterminate.  ID: Sepsis, Superimposed Strep Pneumoniae bacterial pneumonia in setting of recent viral pneumonia. 7 days of CTX  Pulm: symbicort 160 BID, PPI for persistent cough  BCx: NO growth     DM2   a1c 7.3  lantus, pre-meals, sliding scale     HTN, HLD   metoprolol, zocor     hypothyroid   TSH WNL    synthroid    RRT for AMS?  CTH: chronic microvascular ischemic change  Pt returned to baseline     PT VARGAS, family wants home with service     Case discussed with Dr Gonzalez on 2/16 pt cleared. Reviewed discharge medications with patient; All new medications requiring new prescription sent to pharmacy of patients choice. Reviewed need for prescription for previous home medication and new prescriptions sent if requested. Patient in agreement and understands.     79 y/o F with PMH HTN, HLD, DM, asthma (has home O2) with recent admission to [facility] for viral PNA who presents for diffuse body aches and feeling anxious.     Hospital course:    acute respiratory failure with hypoxia/Sepsis/PNA   recently discharged after being admitted for respiratory failure found with multifocal PNA 2/2 entero/rhinovirus.  s/p steroids for 5 days on last admission. PE was ruled out. ID had been consulted and she was treated with Zosyn and then d/c'ed  CXR - multifocal PNA - unchanged from last exam   RVP is also positive for entero/rhinovirus but likely old infection.   strep urine AG +  S&S: regular, thin. No need for cinesophagram per attending   CT chest: few patchy and clustered bilateral lung opacities are new from the   prior study may be infectious or inflammatory. Other bilateral lung opacities and consolidations are unchanged from the most recent prior study and indeterminate.  ID: Sepsis, Superimposed Strep Pneumoniae bacterial pneumonia in setting of recent viral pneumonia. 7 days of CTX  Pulm: symbicort 160 BID, PPI for persistent cough. outpatient followup for lung opacity. Family had deferred bronch  BCx: NO growth     DM2   a1c 7.3  lantus, pre-meals, sliding scale     HTN, HLD   metoprolol, zocor     hypothyroid   TSH WNL    synthroid    RRT for AMS?  CTH: chronic microvascular ischemic change  Pt returned to baseline     PT VARGAS, family wants home with service     Case discussed with Dr Gonzalez on 2/16 pt cleared. Reviewed discharge medications with patient; All new medications requiring new prescription sent to pharmacy of patients choice. Reviewed need for prescription for previous home medication and new prescriptions sent if requested. Patient in agreement and understands.

## 2023-02-16 ENCOUNTER — TRANSCRIPTION ENCOUNTER (OUTPATIENT)
Age: 78
End: 2023-02-16

## 2023-02-16 VITALS
SYSTOLIC BLOOD PRESSURE: 136 MMHG | RESPIRATION RATE: 17 BRPM | HEART RATE: 79 BPM | DIASTOLIC BLOOD PRESSURE: 66 MMHG | OXYGEN SATURATION: 96 % | TEMPERATURE: 98 F

## 2023-02-16 LAB
ANION GAP SERPL CALC-SCNC: 13 MMOL/L — SIGNIFICANT CHANGE UP (ref 7–14)
BUN SERPL-MCNC: 11 MG/DL — SIGNIFICANT CHANGE UP (ref 7–23)
CALCIUM SERPL-MCNC: 9.6 MG/DL — SIGNIFICANT CHANGE UP (ref 8.4–10.5)
CHLORIDE SERPL-SCNC: 98 MMOL/L — SIGNIFICANT CHANGE UP (ref 98–107)
CO2 SERPL-SCNC: 27 MMOL/L — SIGNIFICANT CHANGE UP (ref 22–31)
CREAT SERPL-MCNC: 0.65 MG/DL — SIGNIFICANT CHANGE UP (ref 0.5–1.3)
EGFR: 89 ML/MIN/1.73M2 — SIGNIFICANT CHANGE UP
GLUCOSE BLDC GLUCOMTR-MCNC: 189 MG/DL — HIGH (ref 70–99)
GLUCOSE BLDC GLUCOMTR-MCNC: 194 MG/DL — HIGH (ref 70–99)
GLUCOSE BLDC GLUCOMTR-MCNC: 197 MG/DL — HIGH (ref 70–99)
GLUCOSE SERPL-MCNC: 158 MG/DL — HIGH (ref 70–99)
HCT VFR BLD CALC: 28.9 % — LOW (ref 34.5–45)
HGB BLD-MCNC: 8.3 G/DL — LOW (ref 11.5–15.5)
MAGNESIUM SERPL-MCNC: 1.8 MG/DL — SIGNIFICANT CHANGE UP (ref 1.6–2.6)
MCHC RBC-ENTMCNC: 22.8 PG — LOW (ref 27–34)
MCHC RBC-ENTMCNC: 28.7 GM/DL — LOW (ref 32–36)
MCV RBC AUTO: 79.4 FL — LOW (ref 80–100)
NRBC # BLD: 0 /100 WBCS — SIGNIFICANT CHANGE UP (ref 0–0)
NRBC # FLD: 0 K/UL — SIGNIFICANT CHANGE UP (ref 0–0)
PHOSPHATE SERPL-MCNC: 3.3 MG/DL — SIGNIFICANT CHANGE UP (ref 2.5–4.5)
PLATELET # BLD AUTO: 214 K/UL — SIGNIFICANT CHANGE UP (ref 150–400)
POTASSIUM SERPL-MCNC: 4.1 MMOL/L — SIGNIFICANT CHANGE UP (ref 3.5–5.3)
POTASSIUM SERPL-SCNC: 4.1 MMOL/L — SIGNIFICANT CHANGE UP (ref 3.5–5.3)
RBC # BLD: 3.64 M/UL — LOW (ref 3.8–5.2)
RBC # FLD: 18.5 % — HIGH (ref 10.3–14.5)
SODIUM SERPL-SCNC: 138 MMOL/L — SIGNIFICANT CHANGE UP (ref 135–145)
WBC # BLD: 7.23 K/UL — SIGNIFICANT CHANGE UP (ref 3.8–10.5)
WBC # FLD AUTO: 7.23 K/UL — SIGNIFICANT CHANGE UP (ref 3.8–10.5)

## 2023-02-16 PROCEDURE — 99232 SBSQ HOSP IP/OBS MODERATE 35: CPT

## 2023-02-16 RX ORDER — PANTOPRAZOLE SODIUM 20 MG/1
1 TABLET, DELAYED RELEASE ORAL
Qty: 30 | Refills: 0
Start: 2023-02-16 | End: 2023-03-17

## 2023-02-16 RX ORDER — IPRATROPIUM/ALBUTEROL SULFATE 18-103MCG
3 AEROSOL WITH ADAPTER (GRAM) INHALATION
Qty: 60 | Refills: 0
Start: 2023-02-16 | End: 2023-03-17

## 2023-02-16 RX ORDER — SODIUM CHLORIDE 0.65 %
1 AEROSOL, SPRAY (ML) NASAL
Qty: 15 | Refills: 0
Start: 2023-02-16 | End: 2023-03-17

## 2023-02-16 RX ORDER — BUDESONIDE AND FORMOTEROL FUMARATE DIHYDRATE 160; 4.5 UG/1; UG/1
2 AEROSOL RESPIRATORY (INHALATION)
Qty: 1 | Refills: 0
Start: 2023-02-16 | End: 2023-03-17

## 2023-02-16 RX ADMIN — Medication 1: at 17:52

## 2023-02-16 RX ADMIN — ENOXAPARIN SODIUM 40 MILLIGRAM(S): 100 INJECTION SUBCUTANEOUS at 17:29

## 2023-02-16 RX ADMIN — BUDESONIDE AND FORMOTEROL FUMARATE DIHYDRATE 2 PUFF(S): 160; 4.5 AEROSOL RESPIRATORY (INHALATION) at 09:24

## 2023-02-16 RX ADMIN — Medication 1: at 13:01

## 2023-02-16 RX ADMIN — PANTOPRAZOLE SODIUM 40 MILLIGRAM(S): 20 TABLET, DELAYED RELEASE ORAL at 05:47

## 2023-02-16 RX ADMIN — Medication 1: at 09:22

## 2023-02-16 RX ADMIN — Medication 3 MILLILITER(S): at 08:49

## 2023-02-16 RX ADMIN — Medication 100 MILLIGRAM(S): at 13:43

## 2023-02-16 RX ADMIN — Medication 1 SPRAY(S): at 17:29

## 2023-02-16 RX ADMIN — Medication 1 SPRAY(S): at 00:50

## 2023-02-16 RX ADMIN — Medication 25 MILLIGRAM(S): at 05:46

## 2023-02-16 RX ADMIN — Medication 6 UNIT(S): at 09:22

## 2023-02-16 RX ADMIN — Medication 3 MILLILITER(S): at 04:36

## 2023-02-16 RX ADMIN — Medication 6 UNIT(S): at 17:52

## 2023-02-16 RX ADMIN — Medication 4 MILLILITER(S): at 08:49

## 2023-02-16 RX ADMIN — Medication 1000 UNIT(S): at 05:46

## 2023-02-16 RX ADMIN — Medication 1 SPRAY(S): at 13:41

## 2023-02-16 RX ADMIN — Medication 1 APPLICATION(S): at 13:00

## 2023-02-16 RX ADMIN — Medication 3 MILLILITER(S): at 15:30

## 2023-02-16 RX ADMIN — Medication 50 MICROGRAM(S): at 05:47

## 2023-02-16 RX ADMIN — Medication 100 MILLIGRAM(S): at 05:47

## 2023-02-16 RX ADMIN — Medication 6 UNIT(S): at 13:02

## 2023-02-16 RX ADMIN — Medication 1 SPRAY(S): at 05:47

## 2023-02-16 NOTE — SWALLOW BEDSIDE ASSESSMENT ADULT - ASR SWALLOW RECOMMEND DIAG
Objective testing is NOT indicated at this time due to compromised respiratory status
2. Consider Cinesophagram at MD's discretion if concern that new opacities on chest imaging are dysphagia related./VFSS/MBS

## 2023-02-16 NOTE — SWALLOW BEDSIDE ASSESSMENT ADULT - ADDITIONAL RECOMMENDATIONS
3. This service to follow-up as schedule permits for diet tolerance. 4. Medical team further advised to reconsult this department with any change in medical status and/or observed change in tolerance of recommended PO diet.

## 2023-02-16 NOTE — PROGRESS NOTE ADULT - PROVIDER SPECIALTY LIST ADULT
Cardiology
Infectious Disease
Infectious Disease
Pulmonology
Pulmonology
Infectious Disease
Infectious Disease
Pulmonology
Pulmonology
Cardiology
Internal Medicine
Pulmonology
Pulmonology
Cardiology
Internal Medicine
Cardiology
Internal Medicine

## 2023-02-16 NOTE — SWALLOW BEDSIDE ASSESSMENT ADULT - MODE OF PRESENTATION
Diastolic 40s-60s at times Admitted for pancreatitis Patient was admitted with pancreatitis and is receiving iv fluids. self fed independent single small cup sips/cup/self fed spoon/fed by clinician

## 2023-02-16 NOTE — PROGRESS NOTE ADULT - ASSESSMENT
77 y/o F with PMH HTN, HLD, DM, asthma with recent admission to [facility] for viral PNA who presents for diffuse body aches and feeling anxious. 
79 y/o F with PMH HTN, HLD, DM, asthma with recent admission to [facility] for viral PNA who presents for diffuse body aches and feeling anxious and SOB    1. Acute respiratory failure  -recently discharged after being admitted for respiratory failure found with multifocal PNA 2/2 entero/rhinovirus treated with steroids  -RVP remains positive for entero/rhinovirus  -on abx for PNA   -echo normal LV systolic function, no WMA. mild diastolic dysfunction. decreased RV function 11/22  -CT chest 1/2023 shows Unchanged bilateral upper lobe opacities of unclear etiology, including the right apical nodular opacity as well as the left upper lobe  ill-defined masslike consolidation which has been increasing in  size/attenuation since 2019. appreciate  pulm f/u pt family decline workup in the past     2. HTN  -controlled  -c/w metoprolol  -continue to monitor BP     3. HLD   - on zocor    4. DVT prophylaxis  -lovenox subq  
79 y/o F with PMH HTN, HLD, DM, asthma with recent admission to [facility] for viral PNA who presents for diffuse body aches and feeling anxious and SOB    1. Acute respiratory failure  -recently discharged after being admitted for respiratory failure found with multifocal PNA 2/2 entero/rhinovirus treated with steroids  -RVP remains positive for entero/rhinovirus  -on abx for PNA   -echo normal LV systolic function, no WMA. mild diastolic dysfunction. decreased RV function 11/22  -CT chest 1/2023 shows Unchanged bilateral upper lobe opacities of unclear etiology, including the right apical nodular opacity as well as the left upper lobe  ill-defined masslike consolidation which has been increasing in  size/attenuation since 2019. appreciate  pulm f/u pt family decline workup in the past     2. HTN  -controlled  -c/w metoprolol  -continue to monitor BP     3. HLD   - on zocor    4. DVT prophylaxis  -lovenox subq
79 y/o F with PMH HTN, HLD, DM, asthma with recent admission to [facility] for viral PNA who presents for diffuse body aches and feeling anxious and SOB    1. Acute respiratory failure  -recently discharged after being admitted for respiratory failure found with multifocal PNA 2/2 entero/rhinovirus treated with steroids  -RVP remains positive for entero/rhinovirus  -on abx for PNA   -echo normal LV systolic function, no WMA. mild diastolic dysfunction. decreased RV function 11/22  -CT chest 1/2023 shows Unchanged bilateral upper lobe opacities of unclear etiology, including the right apical nodular opacity as well as the left upper lobe  ill-defined masslike consolidation which has been increasing in  size/attenuation since 2019. appreciate  pulm f/u pt family decline workup in the past     2. HTN  -controlled  -c/w metoprolol  -continue to monitor BP     3. HLD   - on zocor    4. DVT prophylaxis  -lovenox subq
79 yo F with a PMH of HTN, HLD, DM, asthma, recent admission in January for Rhino/Enterovirus pneumonia (treated with supportive care), who presented to the ED with SOB.     Recent admission in Jan 2023 for Rhino/Enterovirus pneumonia treated with supportive care  No fevers, pt endorsed SOB on admission   Acute on chronic hypoxic respiratory failure, on increased NC O2   Leukocytosis  Strep Pneumoniae Ur Ag positive  Multifocal pneumonia (CXR)  Pt currently on Vancomycin and Zosyn  ID consulted for recommendations      OVERALL  Sepsis, leucocytosis, tachycardia, resolved.   Superimposed Strep Pneumoniae bacterial pneumonia in setting of recent viral pneumonia   Strep Pneumoniae Ur Ag +      RECOMMENDATIONS  -c/w  Rocephin 1g IV QD  -trend cbc for leucocytosis, resolved   -f/u blood cx, NTD    -wean oxygen as tolerated  -Ct chest pending  -can transition to po ceftin on discharge.         Plan discussed with Medicine Attending and ADAL Isabel  Please contact through MS Teams   If no response or past 5 pm/weekend call [phone]. 
77 y/o F with PMH HTN, HLD, DM, asthma with recent admission to [facility] for viral PNA who presents for diffuse body aches and feeling anxious and SOB    1. Acute respiratory failure  -recently discharged after being admitted for respiratory failure found with multifocal PNA 2/2 entero/rhinovirus treated with steroids  -RVP remains positive for entero/rhinovirus  -on abx for PNA   -echo normal LV systolic function, no WMA. mild diastolic dysfunction. decreased RV function 11/22  -CT chest 1/2023 shows Unchanged bilateral upper lobe opacities of unclear etiology, including the right apical nodular opacity as well as the left upper lobe  ill-defined masslike consolidation which has been increasing in  size/attenuation since 2019. appreciate  pulm f/u pt family decline workup in the past     2. HTN  -controlled  -c/w metoprolol  -continue to monitor BP     3. HLD   - on zocor    4. DVT prophylaxis  -lovenox subq
77 yo F with a PMH of  DM, asthma, pulmonary TB, s/p admission in January for Rhino/Enterovirus pneumonia (treated with supportive care), who presented 2/7 to the ED with SOB and leucocytosis    Recent admission in Jan 2023 for Rhino/Enterovirus pneumonia treated with supportive care  No fevers, pt endorsed SOB on admission   Acute on chronic hypoxic respiratory failure, on increased NC O2   Leukocytosis WBC 21K on admission now normal   Strep Pneumoniae Ur Ag positive  Multifocal pneumonia (CXR)      Sepsis, leucocytosis, tachycardia, resolved.   Superimposed Strep Pneumoniae bacterial pneumonia in setting of recent viral pneumonia   Strep Pneumoniae Ur Ag +    CT 2/14 few patchy bilateral opacities   other consolidations unchanged    concern for inflammatory, malignancy    daughter reiterated that patient refuses diagnostic bronchoscopy       completed > 7 days IV antibiotics         RECOMMENDATIONS    - observe off antibiotics           
77 yo F with a PMH of HTN, HLD, DM, asthma, recent admission in January for Rhino/Enterovirus pneumonia (treated with supportive care), who presented to the ED with SOB.     Recent admission in Jan 2023 for Rhino/Enterovirus pneumonia treated with supportive care  No fevers, pt endorsed SOB on admission   Acute on chronic hypoxic respiratory failure, on increased NC O2   Leukocytosis  Strep Pneumoniae Ur Ag positive  Multifocal pneumonia (CXR)  Pt currently on Vancomycin and Zosyn  ID consulted for recommendations      OVERALL  Sepsis, leucocytosis, tachycardia,   hypoxia, respiratory failure  Superimposed Strep Pneumoniae bacterial pneumonia in setting of recent viral pneumonia   Strep Pneumoniae Ur Ag +      RECOMMENDATIONS  -c/w  Rocephin 1g IV QD  -trend cbc for leucocytosis, resolved   -f/u blood cx, NTD    -wean oxygen as tolerated  -Ct chest pending  -can transition to po ceftin in 24-48 hrs.       Dorian Isabel  Please contact through MS Teams   If no response or past 5 pm/weekend call [phone]. 
77 yo F with a PMH of HTN, HLD, DM, asthma, recent admission in January for Rhino/Enterovirus pneumonia (treated with supportive care), who presented to the ED with SOB.     Recent admission in Jan 2023 for Rhino/Enterovirus pneumonia treated with supportive care  No fevers, pt endorsed SOB on admission   Acute on chronic hypoxic respiratory failure, on increased NC O2   Leukocytosis  Strep Pneumoniae Ur Ag positive  Multifocal pneumonia (CXR)  Pt currently on Vancomycin and Zosyn  ID consulted for recommendations      OVERALL  Sepsis, leucocytosis, tachycardia, resolved.   Superimposed Strep Pneumoniae bacterial pneumonia in setting of recent viral pneumonia   Strep Pneumoniae Ur Ag +      RECOMMENDATIONS  -c/w  Rocephin 1g IV QD  -day 7/7 of therapy today.   -trend cbc for leucocytosis, resolved   -f/u blood cx, NTD    -wean oxygen as tolerated  -Ct chest pending      Plan discussed with Medicine ADAL Isabel  Please contact through MS Teams   If no response or past 5 pm/weekend call [phone]. 
77 y/o F with PMH HTN, HLD, DM, asthma with recent admission to [facility] for viral PNA who presents for diffuse body aches and feeling anxious and SOB    1. Acute respiratory failure  -recently discharged after being admitted for respiratory failure found with multifocal PNA 2/2 entero/rhinovirus treated with steroids  -RVP remains positive for entero/rhinovirus  -on abx for PNA   -echo normal LV systolic function, no WMA. mild diastolic dysfunction. decreased RV function 11/22  -CT chest 1/2023 shows Unchanged bilateral upper lobe opacities of unclear etiology, including the right apical nodular opacity as well as the left upper lobe  ill-defined masslike consolidation which has been increasing in  size/attenuation since 2019. appreciate  pulm f/u pt family decline workup in the past     2. HTN  -controlled  -c/w metoprolol  -continue to monitor BP     3. HLD   - on zocor    4. DVT prophylaxis  -lovenox subq  
79 y/o F with PMH HTN, HLD, DM, asthma with recent admission to [facility] for viral PNA who presents for diffuse body aches and feeling anxious and SOB    1. Acute respiratory failure  -recently discharged after being admitted for respiratory failure found with multifocal PNA 2/2 entero/rhinovirus treated with steroids  -RVP remains positive for entero/rhinovirus  -on abx for PNA   -echo normal LV systolic function, no WMA. mild diastolic dysfunction. decreased RV function 11/22  -CT chest 1/2023 shows Unchanged bilateral upper lobe opacities of unclear etiology, including the right apical nodular opacity as well as the left upper lobe  ill-defined masslike consolidation which has been increasing in  size/attenuation since 2019. appreciate  pulm f/u pt family decline workup in the past     2. HTN  -controlled  -c/w metoprolol  -continue to monitor BP     3. HLD   - on zocor    4. DVT prophylaxis  -lovenox subq  
77 y/o F with PMH HTN, HLD, DM, asthma with recent admission to [facility] for viral PNA who presents for diffuse body aches and feeling anxious. 
77 y/o F with PMH HTN, HLD, DM, asthma with recent admission to [facility] for viral PNA who presents for diffuse body aches and feeling anxious. 
77 y/o F with PMH HTN, HLD, DM, asthma with recent admission to [facility] for viral PNA who presents for diffuse body aches and feeling anxious. 
79 y/o F with PMH HTN, HLD, DM, asthma with recent admission to [facility] for viral PNA who presents for diffuse body aches and feeling anxious. 
79 y/o F with PMH HTN, HLD, DM, asthma with recent admission to [facility] for viral PNA who presents for diffuse body aches and feeling anxious. 
77 y/o F with PMH HTN, HLD, DM, asthma with recent admission to [facility] for viral PNA who presents for diffuse body aches and feeling anxious. 
77 y/o F with PMH HTN, HLD, DM, asthma with recent admission to [facility] for viral PNA who presents for diffuse body aches and feeling anxious.

## 2023-02-16 NOTE — PROGRESS NOTE ADULT - SUBJECTIVE AND OBJECTIVE BOX
Forrest Rosenthal MD  Interventional Cardiology / Endovascular Specialist  [place] Office : [address] [place] [address] Tel:   [place] Office : [address] [facility] [address] [address]  Tel: [phone]      Subjective/Overnight events: Patient lying in bed. No acute distress.   	  MEDICATIONS:  enoxaparin Injectable 40 milliGRAM(s) SubCutaneous every 24 hours  metoprolol succinate ER 25 milliGRAM(s) Oral daily      acetylcysteine 10%  Inhalation 4 milliLiter(s) Inhalation two times a day  albuterol/ipratropium for Nebulization 3 milliLiter(s) Nebulizer every 6 hours  benzonatate 100 milliGRAM(s) Oral three times a day  budesonide 160 MICROgram(s)/formoterol 4.5 MICROgram(s) Inhaler 2 Puff(s) Inhalation two times a day    melatonin 3 milliGRAM(s) Oral at bedtime PRN    pantoprazole    Tablet 40 milliGRAM(s) Oral before breakfast    dextrose 50% Injectable 25 Gram(s) IV Push once  dextrose 50% Injectable 12.5 Gram(s) IV Push once  dextrose 50% Injectable 25 Gram(s) IV Push once  dextrose Oral Gel 15 Gram(s) Oral once PRN  glucagon  Injectable 1 milliGRAM(s) IntraMuscular once  insulin glargine Injectable (LANTUS) 12 Unit(s) SubCutaneous at bedtime  insulin lispro (ADMELOG) corrective regimen sliding scale   SubCutaneous three times a day before meals  insulin lispro (ADMELOG) corrective regimen sliding scale   SubCutaneous at bedtime  insulin lispro Injectable (ADMELOG) 6 Unit(s) SubCutaneous three times a day before meals  levothyroxine 50 MICROGram(s) Oral daily  simvastatin 20 milliGRAM(s) Oral at bedtime    cholecalciferol 1000 Unit(s) Oral every 24 hours  dextrose 5%. 1000 milliLiter(s) IV Continuous <Continuous>  dextrose 5%. 1000 milliLiter(s) IV Continuous <Continuous>  influenza  Vaccine (HIGH DOSE) 0.7 milliLiter(s) IntraMuscular once  petrolatum white Ointment 1 Application(s) Topical three times a day  sodium chloride 0.65% Nasal 1 Spray(s) Both Nostrils four times a day      PAST MEDICAL/SURGICAL HISTORY  PAST MEDICAL & SURGICAL HISTORY:  HTN (Hypertension)      Dyslipidemia      DM (Diabetes Mellitus)      Hypothyroidism      Pulmonary TB  Dx 2019, completed 10 month treatment      Cataract of left eye          SOCIAL HISTORY: Substance Use (street drugs): ( x ) never used  (  ) other:    FAMILY HISTORY:  Family history of heart attack (Mother)  mother          PHYSICAL EXAM:  T(C): 36.7 (02-16-23 @ 05:45), Max: 36.7 (02-15-23 @ 12:15)  HR: 81 (02-16-23 @ 09:16) (73 - 86)  BP: 143/56 (02-16-23 @ 05:45) (132/56 - 146/76)  RR: 18 (02-16-23 @ 05:45) (16 - 18)  SpO2: 99% (02-16-23 @ 09:16) (95% - 99%)  Wt(kg): --  I&O's Summary    15 Feb 2023 07:01  -  16 Feb 2023 07:00  --------------------------------------------------------  IN: 360 mL / OUT: 0 mL / NET: 360 mL          GENERAL: NAD  EYES: EOMI, PERRLA, conjunctiva and sclera clear  ENMT: No tonsillar erythema, exudates, or enlargement  Cardiovascular: Normal S1 S2, No JVD, No murmurs, No edema  Respiratory: Lungs rhonchi to auscultation	  Gastrointestinal:  Soft, Non-tender, + BS	  Extremities: No edema                                     8.3    7.23  )-----------( 214      ( 16 Feb 2023 06:40 )             28.9     02-16    138  |  98  |  11  ----------------------------<  158<H>  4.1   |  27  |  0.65    Ca    9.6      16 Feb 2023 06:40  Phos  3.3     02-16  Mg     1.80     02-16      proBNP:   Lipid Profile:   HgA1c:   TSH:     Consultant(s) Notes Reviewed:  [x ] YES  [ ] NO    Care Discussed with Consultants/Other Providers [ x] YES  [ ] NO    Imaging Personally Reviewed independently:  [x] YES  [ ] NO    All labs, radiologic studies, vitals, orders and medications list reviewed. Patient is seen and examined at bedside. Case discussed with medical team.              
78yPatient is a 78y old  Female who presents with a chief complaint of SOB (14 Feb 2023 11:54)      Interval history:    afebrile  cough+      daughter at bedside     Allergies:   No Known Allergies    Antimicrobials:  completed ceftriaxone 2/15      REVIEW OF SYSTEMS:  No chest pain   No abdominal pain  No rash.     Vital Signs Last 24 Hrs  T(F): 97.7 (02-15-23 @ 18:12), Max: 98.1 (02-15-23 @ 06:00)  HR: 75 (02-15-23 @ 18:12)  BP: 146/76 (02-15-23 @ 18:12)  RR: 16 (02-15-23 @ 18:12)  SpO2: 95% (02-15-23 @ 18:12) (95% - 100%)    PHYSICAL EXAM:  non toxic   nasal O2  + air entry B/L lung fields,   soft, nondistended abdomen.   no jesus  IV right                             8.2    8.13  )-----------( 303      ( 15 Feb 2023 06:17 )             29.0 02-15    136  |  96  |  13  ----------------------------<  236  4.0   |  31  |  0.67  Ca    9.7      15 Feb 2023 06:17Phos  3.3     02-15Mg     1.60     02-15        culCulture - Blood (02.07.23 @ 07:45)   Specimen Source: .Blood Blood-Venous   Culture Results:   No Growth Final Culture - Blood (02.07.23 @ 07:30)   Specimen Source: .Blood Blood-Peripheral   Culture Results:   No Growth Final Specimen Source: .Sputum Sputum   Culture Results:   Normal Respiratory Kinza present         rd< from: CT Chest No Cont (02.14.23 @ 21:22) >  ACC: [id number] EXAM:  CT CHEST   ORDERED BY: CHINEDU ABREU     PROCEDURE DATE:  02/14/2023          INTERPRETATION:  EXAMINATION: CT CHEST    CLINICAL INDICATION: Pneumonia.    TECHNIQUE: Noncontrast CT of the chest was obtained.    COMPARISON: Multiple CT, most recent 1/29/2023.    FINDINGS:    AIRWAYS AND LUNGS: The central tracheobronchial tree is patent.  A few   patchy and clustered bilateral lung opacities are new from the prior   study. Other bilateral nodular opacities and consolidations are all   unchanged from 1/29/2023 but some are enlarged from older studies.    MEDIASTINUM AND PLEURA: Calcified mediastinal lymph nodes. The visualized   portion of the thyroid gland is unremarkable. There is no pleural   effusion. There is no pneumothorax.    HEART AND VESSELS: There is mild cardiomegaly.  There are atherosclerotic   calcifications of the aorta and coronary arteries.  There is no   pericardial effusion.    UPPER ABDOMEN: Images of the upper abdomen demonstrate no abnormality.    BONES AND SOFT TISSUES: The bones are unremarkable.  The soft tissues are   unremarkable.    IMPRESSION:  A few patchy and clustered bilateral lung opacities are new from the   prior study may be infectious or inflammatory.    Other bilateral lung opacities and consolidations are unchanged from the   most recent prior study and indeterminate.    --- End of Report ---            LILIANA FAULKNER MD; Attending Radiologist  This document has been electronically signed. Feb 15 2023  7:17AM    < end of copied text >  
PULMONARY PROGRESS NOTE    MARTIR VU  MRN-[id number]    Patient is a 78y old  Female who presents with a chief complaint of SOB (15 Feb 2023 18:56)      HPI:  on 2L  family at bedside  same cough, same dyspnea with exertion   -    ROS:   -    ACTIVE MEDICATION LIST:  MEDICATIONS  (STANDING):  acetylcysteine 10%  Inhalation 4 milliLiter(s) Inhalation two times a day  albuterol/ipratropium for Nebulization 3 milliLiter(s) Nebulizer every 6 hours  benzonatate 100 milliGRAM(s) Oral three times a day  budesonide 160 MICROgram(s)/formoterol 4.5 MICROgram(s) Inhaler 2 Puff(s) Inhalation two times a day  cholecalciferol 1000 Unit(s) Oral every 24 hours  dextrose 5%. 1000 milliLiter(s) (100 mL/Hr) IV Continuous <Continuous>  dextrose 5%. 1000 milliLiter(s) (50 mL/Hr) IV Continuous <Continuous>  dextrose 50% Injectable 25 Gram(s) IV Push once  dextrose 50% Injectable 12.5 Gram(s) IV Push once  dextrose 50% Injectable 25 Gram(s) IV Push once  enoxaparin Injectable 40 milliGRAM(s) SubCutaneous every 24 hours  glucagon  Injectable 1 milliGRAM(s) IntraMuscular once  influenza  Vaccine (HIGH DOSE) 0.7 milliLiter(s) IntraMuscular once  insulin glargine Injectable (LANTUS) 12 Unit(s) SubCutaneous at bedtime  insulin lispro (ADMELOG) corrective regimen sliding scale   SubCutaneous three times a day before meals  insulin lispro (ADMELOG) corrective regimen sliding scale   SubCutaneous at bedtime  insulin lispro Injectable (ADMELOG) 6 Unit(s) SubCutaneous three times a day before meals  levothyroxine 50 MICROGram(s) Oral daily  metoprolol succinate ER 25 milliGRAM(s) Oral daily  pantoprazole    Tablet 40 milliGRAM(s) Oral before breakfast  petrolatum white Ointment 1 Application(s) Topical three times a day  simvastatin 20 milliGRAM(s) Oral at bedtime  sodium chloride 0.65% Nasal 1 Spray(s) Both Nostrils four times a day    MEDICATIONS  (PRN):  dextrose Oral Gel 15 Gram(s) Oral once PRN Blood Glucose LESS THAN 70 milliGRAM(s)/deciliter  melatonin 3 milliGRAM(s) Oral at bedtime PRN Insomnia      EXAM:  Vital Signs Last 24 Hrs  T(C): 36.7 (16 Feb 2023 05:45), Max: 36.7 (15 Feb 2023 12:15)  T(F): 98 (16 Feb 2023 05:45), Max: 98 (15 Feb 2023 12:15)  HR: 81 (16 Feb 2023 05:45) (73 - 86)  BP: 143/56 (16 Feb 2023 05:45) (132/56 - 146/76)  BP(mean): --  RR: 18 (16 Feb 2023 05:45) (16 - 18)  SpO2: 99% (16 Feb 2023 05:45) (95% - 99%)    Parameters below as of 16 Feb 2023 05:45  Patient On (Oxygen Delivery Method): nasal cannula  O2 Flow (L/min): 2      GENERAL: The patient is awake and alert in no apparent distress.     LUNGS: rhonchi  not wheezing                              8.3    7.23  )-----------( 214      ( 16 Feb 2023 06:40 )             28.9       02-16    138  |  98  |  11  ----------------------------<  158<H>  4.1   |  27  |  0.65    Ca    9.6      16 Feb 2023 06:40  Phos  3.3     02-16  Mg     1.80     02-16    < from: CT Chest No Cont (02.14.23 @ 21:22) >    ACC: [id number] EXAM:  CT CHEST   ORDERED BY: CHINEDU ABREU     PROCEDURE DATE:  02/14/2023          INTERPRETATION:  EXAMINATION: CT CHEST    CLINICAL INDICATION: Pneumonia.    TECHNIQUE: Noncontrast CT of the chest was obtained.    COMPARISON: Multiple CT, most recent 1/29/2023.    FINDINGS:    AIRWAYS AND LUNGS: The central tracheobronchial tree is patent.  A few   patchy and clustered bilateral lung opacities are new from the prior   study. Other bilateral nodular opacities and consolidations are all   unchanged from 1/29/2023 but some are enlarged from older studies.    MEDIASTINUM AND PLEURA: Calcified mediastinal lymph nodes. The visualized   portion of the thyroid gland is unremarkable. There is no pleural   effusion. There is no pneumothorax.    HEART AND VESSELS: There is mild cardiomegaly.  There are atherosclerotic   calcifications of the aorta and coronary arteries.  There is no   pericardial effusion.    UPPER ABDOMEN: Images of the upper abdomen demonstrate no abnormality.    BONES AND SOFT TISSUES: The bones are unremarkable.  The soft tissues are   unremarkable.    IMPRESSION:  A few patchy and clustered bilateral lung opacities are new from the   prior study may be infectious or inflammatory.    Other bilateral lung opacities and consolidations are unchanged from the   most recent prior study and indeterminate.    --- End of Report ---            LILIANA FAULKNER MD; Attending Radiologist  This document has been electronically signed. Feb 15 2023  7:17AM    < end of copied text >         PROBLEM LIST:  78y Female with HEALTH ISSUES - PROBLEM Dx:  Acute respiratory failure with hypoxia    Urinary tract infection    Leukocytosis    Diabetes    HTN (hypertension)    Asthma    Hypothyroid    Prophylactic measure        RECS:  continue symbicort BID with mouth rinse outpatient  duonebs PRN  can stop hypersal  continue PPI outrpatient  f/u swallow eval- can it be done while she is on 02?  go up to 3L prior to movement/activity  patient does not want to investigate lung findings  she returns frequently for dyspnea- suggest palliative consult      Please call with any questions.    Dede Flynn, DO  [facility] Pulmonary/Sleep Medicine  [phone]  
PULMONARY PROGRESS NOTE    MARTIR VU  MRN-[id number]    Patient is a 78y old  Female who presents with a chief complaint of SOB (13 Feb 2023 17:13)      HPI:  -seen this morning  family at bedside, say she fels better after nebs  cough remains  slightly better after neb treatments for short time  -    ROS:   -    ACTIVE MEDICATION LIST:  MEDICATIONS  (STANDING):  acetylcysteine 10%  Inhalation 4 milliLiter(s) Inhalation two times a day  albuterol/ipratropium for Nebulization 3 milliLiter(s) Nebulizer every 6 hours  cefTRIAXone   IVPB 1000 milliGRAM(s) IV Intermittent every 24 hours  cholecalciferol 1000 Unit(s) Oral every 24 hours  dextrose 5%. 1000 milliLiter(s) (100 mL/Hr) IV Continuous <Continuous>  dextrose 5%. 1000 milliLiter(s) (50 mL/Hr) IV Continuous <Continuous>  dextrose 50% Injectable 25 Gram(s) IV Push once  dextrose 50% Injectable 12.5 Gram(s) IV Push once  dextrose 50% Injectable 25 Gram(s) IV Push once  glucagon  Injectable 1 milliGRAM(s) IntraMuscular once  influenza  Vaccine (HIGH DOSE) 0.7 milliLiter(s) IntraMuscular once  insulin glargine Injectable (LANTUS) 12 Unit(s) SubCutaneous at bedtime  insulin lispro (ADMELOG) corrective regimen sliding scale   SubCutaneous three times a day before meals  insulin lispro (ADMELOG) corrective regimen sliding scale   SubCutaneous at bedtime  insulin lispro Injectable (ADMELOG) 6 Unit(s) SubCutaneous three times a day before meals  levothyroxine 50 MICROGram(s) Oral daily  metoprolol succinate ER 25 milliGRAM(s) Oral daily  simvastatin 20 milliGRAM(s) Oral at bedtime  sodium chloride 0.65% Nasal 1 Spray(s) Both Nostrils four times a day    MEDICATIONS  (PRN):  dextrose Oral Gel 15 Gram(s) Oral once PRN Blood Glucose LESS THAN 70 milliGRAM(s)/deciliter      EXAM:  Vital Signs Last 24 Hrs  T(C): 37.1 (14 Feb 2023 06:00), Max: 37.1 (13 Feb 2023 12:26)  T(F): 98.8 (14 Feb 2023 06:00), Max: 98.8 (14 Feb 2023 06:00)  HR: 74 (14 Feb 2023 07:30) (73 - 85)  BP: 129/61 (14 Feb 2023 06:00) (112/67 - 129/61)  BP(mean): --  RR: 16 (14 Feb 2023 06:00) (16 - 18)  SpO2: 100% (14 Feb 2023 06:00) (78% - 100%)    Parameters below as of 14 Feb 2023 06:00  Patient On (Oxygen Delivery Method): nasal cannula  O2 Flow (L/min): 4      GENERAL: The patient is awake and alert in no apparent distress.     LUNGS:  not labored  not wheezing  some rhonchi                             8.1    8.39  )-----------( 277      ( 14 Feb 2023 06:25 )             29.3       02-14    137  |  94<L>  |  13  ----------------------------<  278<H>  4.1   |  30  |  0.71    Ca    9.4      14 Feb 2023 06:25  Phos  3.8     02-14  Mg     1.60     02-14       < from: CT Chest No Cont (01.29.23 @ 13:21) >    ACC: [id number] EXAM:  CT CHEST   ORDERED BY: WHITNEY SUAZO     PROCEDURE DATE:  01/29/2023          INTERPRETATION:  CLINICAL INFORMATION: Worsening shortness of breath.   Opacities on recent chest x-ray. Evaluate for pneumonia.    COMPARISON: CT chest abdomen pelvis 11/8/2022.    CONTRAST/COMPLICATIONS:  IV Contrast: NONE  Oral Contrast: NONE  Complications: None reported at time of study completion    PROCEDURE:  CT scan of the chest was obtained without intravenous contrast.    FINDINGS:    LYMPH NODES: Unchanged calcified mediastinal lymph nodes.    HEART/VASCULATURE: Heart size is normal. Hypodensity of the blood pool in   relation to left ventricular myocardium suggests underlying anemia. No   pericardial effusion. Coronary artery calcifications.    AIRWAYS/LUNGS/PLEURA: Unchanged severe narrowing of the right mainstem   bronchus and bronchus intermedius. Unchanged right apical opacity (2:20)   measuring 2.8 x 1.6 cm. Unchanged right upper lobe 1.3 cm nodular opacity   (2:33). Previously seen right upper lobe cystic/cavitary opacity is   similar in size and now predominantly fluid density in attenuation. No   significant change in right upper lobe opacity along the minor fissure.   Unchanged ill-defined masslike consolidation in the left upper lobe   (2:50) which has gradually been enlarging in size and attenuation since   more remote studies from 2019. Mosaic attenuation. No pleural effusion.    UPPER ABDOMEN: Small hiatal hernia.    BONES/SOFT TISSUES: Degenerative changes of the spine. Soft tissues are   unremarkable.    IMPRESSION:    Since CT chest 11/8/2022:    No new lung parenchymal opacities to suggest pneumonia.    Unchanged bilateral upper lobe opacities of unclear etiology, including   the right apical nodular opacity as well as the left upper lobe   ill-defined masslike consolidation which has been increasing in   size/attenuation since 2019. Continued attention on follow-up imaging.          --- End of Report ---          < end of copied text >  < from: Xray Chest 1 View- PORTABLE-Urgent (Xray Chest 1 View- PORTABLE-Urgent .) (02.07.23 @ 03:37) >    ACC: [id number] EXAM:  XR CHEST PORTABLE URGENT 1V   ORDERED BY: SHINE BLEVINS     PROCEDURE DATE:  02/07/2023          INTERPRETATION:  EXAMINATION: XR CHEST URGENT    CLINICAL INDICATION: Shortness of breath.    TECHNIQUE: Single frontal portable view of the chest from 2/7/2023 3:37 AM    COMPARISON: 1/6/2023 chest x-ray. CT chest from 1/29/2023.    FINDINGS:    The heart size is normal.  Scattered bilateral patchy and hazy opacities, predominantly in the upper   peripheral lung fields.  There is no pneumothorax or pleural effusion.    IMPRESSION:  Scattered bilateral patchy and hazy opacities consistent with multifocal   pneumonia unchanged from the last exam.    --- End of Report ---          GALE GILLIAM MD; Resident Radiologist  This document has been electronically signed.  NAT HENNING MD; Attending Radiologist  This document has been electronically signed. Feb 7 2023  6:30AM    < end of copied text >    PROBLEM LIST:  78y Female with HEALTH ISSUES - PROBLEM Dx:  Acute respiratory failure with hypoxia    Urinary tract infection    Leukocytosis    Diabetes    HTN (hypertension)    Asthma    Hypothyroid    Prophylactic measure         RECS:  if she has not had swallow eval- suggest that- r/o aspiration causing her to cough  seems to be around the clock duonebs that help  can try symbicort 160 BID with mouth rinse to see if we can taper how often she needs nebs  on abx  noted plan for ct chest  unclear why lovenox stopped? for dvt prophylaxis        Please call with any questions.    Dede Flynn,   [facility] Pulmonary/Sleep Medicine  [phone]  
78yPatient is a 78y old  Female who presents with a chief complaint of SOB (14 Feb 2023 11:54)      Interval history:  Afebrile, denies any pain.     Allergies:   No Known Allergies    Antimicrobials:  cefTRIAXone   IVPB 1000 milliGRAM(s) IV Intermittent every 24 hours      REVIEW OF SYSTEMS:  No chest pain   No abdominal pain  No rash.       Vital Signs Last 24 Hrs  T(C): 36.8 (02-14-23 @ 17:21), Max: 37.1 (02-14-23 @ 06:00)  T(F): 98.2 (02-14-23 @ 17:21), Max: 98.8 (02-14-23 @ 06:00)  HR: 83 (02-14-23 @ 17:21) (74 - 83)  BP: 127/65 (02-14-23 @ 17:21) (114/65 - 129/61)  BP(mean): --  RR: 16 (02-14-23 @ 17:21) (16 - 16)  SpO2: 99% (02-14-23 @ 17:21) (95% - 100%)      PHYSICAL EXAM:  Patient in no acute distress. Awake, eating.   + air entry B/L lung fields,   soft, nondistended abdomen.   Extremities: no edema.  IV sites not inflamed.                            8.1    8.39  )-----------( 277      ( 14 Feb 2023 06:25 )             29.3   02-14    137  |  94<L>  |  13  ----------------------------<  278<H>  4.1   |  30  |  0.71    Ca    9.4      14 Feb 2023 06:25  Phos  3.8     02-14  Mg     1.60     02-14            
Forrest Rosenthal MD  Interventional Cardiology / Advance Heart Failure and Cardiac Transplant Specialist  [place] Office : [address] [place] [address] [place]. [address]  Tel:   [place] Office : [address] [facility] [place] [address]  Tel: [phone]       Pt is lying in bed comfortable not in distress, some SOB no palpitations  	  MEDICATIONS:  enoxaparin Injectable 40 milliGRAM(s) SubCutaneous every 24 hours  metoprolol succinate ER 25 milliGRAM(s) Oral daily  cefTRIAXone   IVPB 1000 milliGRAM(s) IV Intermittent every 24 hours  acetylcysteine 10%  Inhalation 4 milliLiter(s) Inhalation two times a day  albuterol/ipratropium for Nebulization 3 milliLiter(s) Nebulizer every 6 hours  dextrose 50% Injectable 25 Gram(s) IV Push once  dextrose 50% Injectable 12.5 Gram(s) IV Push once  dextrose 50% Injectable 25 Gram(s) IV Push once  dextrose Oral Gel 15 Gram(s) Oral once PRN  glucagon  Injectable 1 milliGRAM(s) IntraMuscular once  insulin glargine Injectable (LANTUS) 12 Unit(s) SubCutaneous at bedtime  insulin lispro (ADMELOG) corrective regimen sliding scale   SubCutaneous three times a day before meals  insulin lispro (ADMELOG) corrective regimen sliding scale   SubCutaneous at bedtime  insulin lispro Injectable (ADMELOG) 6 Unit(s) SubCutaneous three times a day before meals  levothyroxine 50 MICROGram(s) Oral daily  simvastatin 20 milliGRAM(s) Oral at bedtime  cholecalciferol 1000 Unit(s) Oral every 24 hours  dextrose 5%. 1000 milliLiter(s) IV Continuous <Continuous>  dextrose 5%. 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      Cataract of left eye          SOCIAL HISTORY: Substance Use (street drugs): ( x ) never used  (  ) other:    FAMILY HISTORY:  Family history of heart attack (Mother)  mother       PHYSICAL EXAM:  T(C): 36.4 (02-11-23 @ 11:24), Max: 36.5 (02-11-23 @ 06:00)  HR: 82 (02-11-23 @ 11:24) (80 - 90)  BP: 146/68 (02-11-23 @ 11:24) (146/68 - 154/101)  RR: 18 (02-11-23 @ 11:24) (17 - 19)  SpO2: 96% (02-11-23 @ 11:24) (89% - 100%)  Wt(kg): --  I&O's Summary    10 Feb 2023 07:01  -  11 Feb 2023 07:00  --------------------------------------------------------  IN: 480 mL / OUT: 0 mL / NET: 480 mL        EYES:   PERRLA   ENMT:   Moist mucous membranes, Good dentition, No lesions  Cardiovascular: Normal S1 S2, No JVD, No murmurs, No edema  Respiratory: b/l rhonchi   Gastrointestinal:  Soft, Non-tender, + BS	  Extremities: no edema                                    7.9    8.51  )-----------( 256      ( 11 Feb 2023 06:39 )             27.7     02-11    140  |  96<L>  |  8   ----------------------------<  69<L>  4.0   |  36<H>  |  0.61    Ca    9.4      11 Feb 2023 06:39  Phos  3.2     02-11  Mg     1.80     02-11      proBNP:   Lipid Profile:   HgA1c:   TSH:     Consultant(s) Notes Reviewed:  [x ] YES  [ ] NO    Care Discussed with Consultants/Other Providers [ x] YES  [ ] NO    Imaging Personally Reviewed independently:  [x] YES  [ ] NO    All labs, radiologic studies, vitals, orders and medications list reviewed. Patient is seen and examined at bedside. Case discussed with medical team.        
PULMONARY PROGRESS NOTE    MARTIR VU  MRN-[id number]    Patient is a 78y old  Female who presents with a chief complaint of SOB (14 Feb 2023 19:30)      HPI:  -seen this morning  family at bedside  per RN- was able to take symbicort  cough same  on room air- desaturations when awake down to 85%  on 2L- dry nostrils despite humidified o2  -    ROS:   -    ACTIVE MEDICATION LIST:  MEDICATIONS  (STANDING):  acetylcysteine 10%  Inhalation 4 milliLiter(s) Inhalation two times a day  albuterol/ipratropium for Nebulization 3 milliLiter(s) Nebulizer every 6 hours  budesonide 160 MICROgram(s)/formoterol 4.5 MICROgram(s) Inhaler 2 Puff(s) Inhalation two times a day  cholecalciferol 1000 Unit(s) Oral every 24 hours  dextrose 5%. 1000 milliLiter(s) (100 mL/Hr) IV Continuous <Continuous>  dextrose 5%. 1000 milliLiter(s) (50 mL/Hr) IV Continuous <Continuous>  dextrose 50% Injectable 25 Gram(s) IV Push once  dextrose 50% Injectable 12.5 Gram(s) IV Push once  dextrose 50% Injectable 25 Gram(s) IV Push once  enoxaparin Injectable 40 milliGRAM(s) SubCutaneous every 24 hours  glucagon  Injectable 1 milliGRAM(s) IntraMuscular once  influenza  Vaccine (HIGH DOSE) 0.7 milliLiter(s) IntraMuscular once  insulin glargine Injectable (LANTUS) 12 Unit(s) SubCutaneous at bedtime  insulin lispro (ADMELOG) corrective regimen sliding scale   SubCutaneous three times a day before meals  insulin lispro (ADMELOG) corrective regimen sliding scale   SubCutaneous at bedtime  insulin lispro Injectable (ADMELOG) 6 Unit(s) SubCutaneous three times a day before meals  levothyroxine 50 MICROGram(s) Oral daily  metoprolol succinate ER 25 milliGRAM(s) Oral daily  simvastatin 20 milliGRAM(s) Oral at bedtime  sodium chloride 0.65% Nasal 1 Spray(s) Both Nostrils four times a day    MEDICATIONS  (PRN):  dextrose Oral Gel 15 Gram(s) Oral once PRN Blood Glucose LESS THAN 70 milliGRAM(s)/deciliter  melatonin 3 milliGRAM(s) Oral at bedtime PRN Insomnia      EXAM:  Vital Signs Last 24 Hrs  T(C): 36.7 (15 Feb 2023 06:00), Max: 36.9 (14 Feb 2023 12:00)  T(F): 98.1 (15 Feb 2023 06:00), Max: 98.4 (14 Feb 2023 12:00)  HR: 82 (15 Feb 2023 06:00) (74 - 83)  BP: 117/50 (15 Feb 2023 06:00) (114/65 - 140/71)  BP(mean): --  RR: 16 (15 Feb 2023 06:00) (16 - 19)  SpO2: 97% (15 Feb 2023 06:00) (95% - 100%)    Parameters below as of 15 Feb 2023 06:00  Patient On (Oxygen Delivery Method): nasal cannula  O2 Flow (L/min): 2      GENERAL: The patient is awake and alert in no apparent distress.     LUNGS:  rhonchi when coughing  not labored                          8.2    8.13  )-----------( 303      ( 15 Feb 2023 06:17 )             29.0       02-15    136  |  96<L>  |  13  ----------------------------<  236<H>  4.0   |  31  |  0.67    Ca    9.7      15 Feb 2023 06:17  Phos  3.3     02-15  Mg     1.60     02-15       < from: CT Chest No Cont (02.14.23 @ 21:22) >    ACC: [id number] EXAM:  CT CHEST   ORDERED BY: CHINEDU ABREU     PROCEDURE DATE:  02/14/2023          INTERPRETATION:  EXAMINATION: CT CHEST    CLINICAL INDICATION: Pneumonia.    TECHNIQUE: Noncontrast CT of the chest was obtained.    COMPARISON: Multiple CT, most recent 1/29/2023.    FINDINGS:    AIRWAYS AND LUNGS: The central tracheobronchial tree is patent.  A few   patchy and clustered bilateral lung opacities are new from the prior   study. Other bilateral nodular opacities and consolidations are all   unchanged from 1/29/2023 but some are enlarged from older studies.    MEDIASTINUM AND PLEURA: Calcified mediastinal lymph nodes. The visualized   portion of the thyroid gland is unremarkable. There is no pleural   effusion. There is no pneumothorax.    HEART AND VESSELS: There is mild cardiomegaly.  There are atherosclerotic   calcifications of the aorta and coronary arteries.  There is no   pericardial effusion.    UPPER ABDOMEN: Images of the upper abdomen demonstrate no abnormality.    BONES AND SOFT TISSUES: The bones are unremarkable.  The soft tissues are   unremarkable.    IMPRESSION:  A few patchy and clustered bilateral lung opacities are new from the   prior study may be infectious or inflammatory.    Other bilateral lung opacities and consolidations are unchanged from the   most recent prior study and indeterminate.    --- End of Report ---            LILIANA FAULKNER MD; Attending Radiologist  This document has been electronically signed. Feb 15 2023  7:17AM    < end of copied text >  < from: CT Angio Chest PE Protocol w/ IV Cont (11.08.22 @ 11:39) >    ACC: [id number] EXAM:  CT ABDOMEN AND PELVIS IC                        ACC: [id number] EXAM:  CT ANGIO CHEST PULM ART [facility]                          PROCEDURE DATE:  11/08/2022          INTERPRETATION:  CTA CHEST AND CT ABDOMEN AND PELVIS    INDICATION: Syncope. Evaluate for pneumonia.    TECHNIQUE: Enhanced helical images were obtained of the chest, abdomen   and pelvis. Coronal and sagittal images were reconstructed. Maximum   intensity projection images were generated.    CONTRAST/COMPLICATIONS:  IV Contrast: Omnipaque 350 (accession [id number]), IV contrast documented   in associated exam (accession [id number])  90 cc administered   10 cc   discarded  Oral Contrast: NONE  Complications: None reported at time of study completion    COMPARISON: 5/29/2021 CT chest and 12/6/2021 CT abdomen.    FINDINGS:    PULMONARY ARTERIES: No pulmonary embolism detected. Please note that   multiple distal segmental and subsegmental pulmonary arterial branches   are not adequately assessed due to motion.    MEDIASTINUM: The right ventricle is qualitatively mildly dilated and   there is suggestion of flattening of the interventricular septum. No   pericardial effusion. Thoracic aorta normal caliber.  No large   mediastinal lymph nodes.    AIRWAYS, LUNGS, PLEURA: Severe narrowing of the right mainstem bronchus   and bronchus intermedius.    Right apical soft tissue opacification measuring 2.8 x 1.6 cm (image 13,   series 2) is similar to marginally decreased in size compared to   5/29/2021 where it was 2.9 x 1.8 cm when remeasured. An additional   anterior right upper lobe 1.3 cm nodular opacity (image 19, series 2) is   unchanged. Right apical cystic/cavitary opacity is likewise similar in   appearance. No significant interval change in right upperlobe   opacification along the minor fissure.    Left upper lobe parenchymal opacification measuring 3.6 x 2.2 cm (image   156, series 4) is without significant interval change compared to   5/29/2021, but progressed compared to an even earlier examination dated   10/26/2019.    No pleural effusion.    ABDOMEN and PELVIS: Peripheral enhancement of hepatic segment 7 (image   22, series 3) may be perfusional. Gallbladder, pancreas, spleen, adrenal   glands unremarkable. No hydronephrosis.    Unremarkable urinary bladder and uterus.    No bowel obstruction. Ascending and transverse colon are decompressed. No   free fluid. No abdominal or pelvic lymphadenopathy. Abdominal aorta   normal caliber.    BONES: Unremarkable.    SOFT TISSUES: Unremarkable.    IMPRESSION:    No pulmonary embolism detected.    No new lung parenchymal opacity to suggest acute pneumonia.    Indeterminate left upper lobe 3.6 x 2.2 cm opacity is similar compared to   5/29/2021, but progressed compared to 10/26/2019; the exact etiology is   unclear, but it is difficult to exclude lung neoplasm.    Additional right upper lobe opacities also unchanged compared to   5/29/2021.    Peripheral enhancement of the liver as described above may represent   perfusional abnormality. Otherwise, unremarkable evaluation of the   abdomen and pelvis.    --- End of Report ---            DONIS YATES MD; Attending Radiologist  This document has been electronically signed. Nov 8 2022 12:06PM    < end of copied text >      PROBLEM LIST:  78y Female with HEALTH ISSUES - PROBLEM Dx:  Acute respiratory failure with hypoxia    Urinary tract infection    Leukocytosis    Diabetes    HTN (hypertension)    Asthma    Hypothyroid    Prophylactic measure              RECS:  ayr gel to affected nostrils  continue 02 wtih humidification  ct chest reviewed- abx per ID  family and patient do not want ot pursue any investigation on the lung lesions  continue symbicort BID wtih mouth rinse  continue duoneb q6hrs  cough seems dry- can try to stop the Hypersal nebs dont think they are helping much  if she has not had swallow eval- suggest that- r/o aspiration causing her to cough  PPI if cough due tO GERD?      discussed with family at bedside  discussed with RN         Please call with any questions.    Dede Flynn, DO  [facility] Pulmonary/Sleep Medicine  [phone]  
PULMONARY PROGRESS NOTE    MARTIR VU  [id number]    Patient is a 78y old  Female who presents with a chief complaint of SOB (09 Feb 2023 14:18)      HPI:  seen this morning  family at bedsdie better sleep  less cough  less bodyaches  NS displaced    ROS:   -    ACTIVE MEDICATION LIST:  MEDICATIONS  (STANDING):  albuterol/ipratropium for Nebulization 3 milliLiter(s) Nebulizer every 6 hours  cefTRIAXone   IVPB 1000 milliGRAM(s) IV Intermittent every 24 hours  cholecalciferol 1000 Unit(s) Oral every 24 hours  dextrose 5%. 1000 milliLiter(s) (100 mL/Hr) IV Continuous <Continuous>  dextrose 5%. 1000 milliLiter(s) (50 mL/Hr) IV Continuous <Continuous>  dextrose 50% Injectable 25 Gram(s) IV Push once  dextrose 50% Injectable 12.5 Gram(s) IV Push once  dextrose 50% Injectable 25 Gram(s) IV Push once  enoxaparin Injectable 40 milliGRAM(s) SubCutaneous every 24 hours  glucagon  Injectable 1 milliGRAM(s) IntraMuscular once  influenza  Vaccine (HIGH DOSE) 0.7 milliLiter(s) IntraMuscular once  insulin glargine Injectable (LANTUS) 12 Unit(s) SubCutaneous at bedtime  insulin lispro (ADMELOG) corrective regimen sliding scale   SubCutaneous three times a day before meals  insulin lispro (ADMELOG) corrective regimen sliding scale   SubCutaneous at bedtime  insulin lispro Injectable (ADMELOG) 6 Unit(s) SubCutaneous three times a day before meals  levothyroxine 50 MICROGram(s) Oral daily  metoprolol succinate ER 25 milliGRAM(s) Oral daily  simvastatin 20 milliGRAM(s) Oral at bedtime    MEDICATIONS  (PRN):  dextrose Oral Gel 15 Gram(s) Oral once PRN Blood Glucose LESS THAN 70 milliGRAM(s)/deciliter      EXAM:  Vital Signs Last 24 Hrs  T(C): 37 (10 Feb 2023 06:39), Max: 37 (10 Feb 2023 06:39)  T(F): 98.6 (10 Feb 2023 06:39), Max: 98.6 (10 Feb 2023 06:39)  HR: 89 (10 Feb 2023 10:02) (70 - 92)  BP: 143/73 (10 Feb 2023 06:39) (125/71 - 143/73)  BP(mean): 108 (09 Feb 2023 12:31) (108 - 108)  RR: 18 (10 Feb 2023 06:39) (17 - 18)  SpO2: 100% (10 Feb 2023 06:39) (95% - 100%)    Parameters below as of 10 Feb 2023 06:39  Patient On (Oxygen Delivery Method): nasal cannula  O2 Flow (L/min): 4      GENERAL: The patient is awake and alert in no apparent distress.     LUNGS: Clear to auscultation without wheezing, rales or rhonchi; respirations unlabored                               8.2    10.05 )-----------( 270      ( 10 Feb 2023 05:42 )             29.8       02-10    142  |  99  |  8   ----------------------------<  54<LL>  4.0   |  35<H>  |  0.67    Ca    9.4      10 Feb 2023 05:42  Phos  3.0     02-10  Mg     1.90     02-10     < from: Xray Chest 1 View- PORTABLE-Urgent (Xray Chest 1 View- PORTABLE-Urgent .) (02.07.23 @ 03:37) >    ACC: [id number] EXAM:  XR CHEST PORTABLE URGENT 1V   ORDERED BY: SHINE BLEVINS     PROCEDURE DATE:  02/07/2023          INTERPRETATION:  EXAMINATION: XR CHEST URGENT    CLINICAL INDICATION: Shortness of breath.    TECHNIQUE: Single frontal portable view of the chest from 2/7/2023 3:37 AM    COMPARISON: 1/6/2023 chest x-ray. CT chest from 1/29/2023.    FINDINGS:    The heart size is normal.  Scattered bilateral patchy and hazy opacities, predominantly in the upper   peripheral lung fields.  There is no pneumothorax or pleural effusion.    IMPRESSION:  Scattered bilateral patchy and hazy opacities consistent with multifocal   pneumonia unchanged from the last exam.    --- End of Report ---      < end of copied text >  < from: CT Chest No Cont (01.29.23 @ 13:21) >    ACC: [id number] EXAM:  CT CHEST   ORDERED BY: WHITNEY SUAZO     PROCEDURE DATE:  01/29/2023          INTERPRETATION:  CLINICAL INFORMATION: Worsening shortness of breath.   Opacities on recent chest x-ray. Evaluate for pneumonia.    COMPARISON: CT chest abdomen pelvis 11/8/2022.    CONTRAST/COMPLICATIONS:  IV Contrast: NONE  Oral Contrast: NONE  Complications: None reported at time of study completion    PROCEDURE:  CT scan of the chest was obtained without intravenous contrast.    FINDINGS:    LYMPH NODES: Unchanged calcified mediastinal lymph nodes.    HEART/VASCULATURE: Heart size is normal. Hypodensity of the blood pool in   relation to left ventricular myocardium suggests underlying anemia. No   pericardial effusion. Coronary artery calcifications.    AIRWAYS/LUNGS/PLEURA: Unchanged severe narrowing of the right mainstem   bronchus and bronchus intermedius. Unchanged right apical opacity (2:20)   measuring 2.8 x 1.6 cm. Unchanged right upper lobe 1.3 cm nodular opacity   (2:33). Previously seen right upper lobe cystic/cavitary opacity is   similar in size and now predominantly fluid density in attenuation. No   significant change in right upper lobe opacity along the minor fissure.   Unchanged ill-defined masslike consolidation in the left upper lobe   (2:50) which has gradually been enlarging in size and attenuation since   more remote studies from 2019. Mosaic attenuation. No pleural effusion.    UPPER ABDOMEN: Small hiatal hernia.    BONES/SOFT TISSUES: Degenerative changes of the spine. Soft tissues are   unremarkable.    IMPRESSION:    Since CT chest 11/8/2022:    No new lung parenchymal opacities to suggest pneumonia.    Unchanged bilateral upper lobe opacities of unclear etiology, including   the right apical nodular opacity as well as the left upper lobe   ill-defined masslike consolidation which has been increasing in   size/attenuation since 2019. Continued attention on follow-up imaging.          --- End of Report ---    < end of copied text >      PROBLEM LIST:  78y Female with HEALTH ISSUES - PROBLEM Dx:  Acute respiratory failure with hypoxia    Urinary tract infection    Leukocytosis    Diabetes    HTN (hypertension)    Asthma    Hypothyroid    Prophylactic measure             RECS:  taper down 02  continue airway clearance  she has a recent ct < 1 month old that continues to show opacities/ mucoid impaction/ narrowing of bronchus intermedius ( unchanged from previous)  family aware of these findings from previous admission and do not want any procedures  i told them on this admission that some of those findings can be contributing to her cough  would suggest to continue management for pulm toilet with neds/ mucomyst/ acapella  change duoneb to PRN  abx per ID  speech/swallow eval? any aspiration causing her to cough      Please call with any questions over the weekend     Dede Flynn DO  [facility] Pulmonary/Sleep Medicine  [phone]  
PULMONARY PROGRESS NOTE    MARTIR VU  MRN-[id number]    Patient is a 78y old  Female who presents with a chief complaint of SOB (08 Feb 2023 16:12)      HPI:  on 2L  family at bedside  complaining of fatigue/aches  no cough      ROS:   -    ACTIVE MEDICATION LIST:  MEDICATIONS  (STANDING):  albuterol/ipratropium for Nebulization 3 milliLiter(s) Nebulizer every 6 hours  cholecalciferol 1000 Unit(s) Oral every 24 hours  dextrose 5%. 1000 milliLiter(s) (100 mL/Hr) IV Continuous <Continuous>  dextrose 5%. 1000 milliLiter(s) (50 mL/Hr) IV Continuous <Continuous>  dextrose 50% Injectable 25 Gram(s) IV Push once  dextrose 50% Injectable 12.5 Gram(s) IV Push once  dextrose 50% Injectable 25 Gram(s) IV Push once  enoxaparin Injectable 40 milliGRAM(s) SubCutaneous every 24 hours  glucagon  Injectable 1 milliGRAM(s) IntraMuscular once  influenza  Vaccine (HIGH DOSE) 0.7 milliLiter(s) IntraMuscular once  insulin glargine Injectable (LANTUS) 12 Unit(s) SubCutaneous at bedtime  insulin lispro (ADMELOG) corrective regimen sliding scale   SubCutaneous three times a day before meals  insulin lispro (ADMELOG) corrective regimen sliding scale   SubCutaneous at bedtime  insulin lispro Injectable (ADMELOG) 6 Unit(s) SubCutaneous three times a day before meals  levothyroxine 50 MICROGram(s) Oral daily  metoprolol succinate ER 25 milliGRAM(s) Oral daily  piperacillin/tazobactam IVPB.. 3.375 Gram(s) IV Intermittent every 8 hours  simvastatin 20 milliGRAM(s) Oral at bedtime  vancomycin  IVPB 750 milliGRAM(s) IV Intermittent every 12 hours    MEDICATIONS  (PRN):  dextrose Oral Gel 15 Gram(s) Oral once PRN Blood Glucose LESS THAN 70 milliGRAM(s)/deciliter      EXAM:  Vital Signs Last 24 Hrs  T(C): 36.6 (09 Feb 2023 09:20), Max: 37 (08 Feb 2023 12:26)  T(F): 97.8 (09 Feb 2023 09:20), Max: 98.6 (08 Feb 2023 12:26)  HR: 71 (09 Feb 2023 09:20) (71 - 95)  BP: 138/59 (09 Feb 2023 09:20) (124/66 - 138/59)  BP(mean): 79 (09 Feb 2023 09:20) (77 - 79)  RR: 18 (09 Feb 2023 09:20) (18 - 19)  SpO2: 100% (09 Feb 2023 09:20) (95% - 100%)    Parameters below as of 09 Feb 2023 09:20  Patient On (Oxygen Delivery Method): nasal cannula  O2 Flow (L/min): 4      GENERAL: The patient is awake and alert in no apparent distress.     LUNGS:   respirations unlabored                             7.7    10.73 )-----------( 232      ( 09 Feb 2023 05:31 )             27.7       02-09    136  |  95<L>  |  11  ----------------------------<  216<H>  4.5   |  29  |  0.76    Ca    8.7      09 Feb 2023 05:31  Phos  2.4     02-09  Mg     1.80     02-09       < from: Xray Chest 1 View- PORTABLE-Urgent (Xray Chest 1 View- PORTABLE-Urgent .) (02.07.23 @ 03:37) >    ACC: [id number] EXAM:  XR CHEST PORTABLE URGENT 1V   ORDERED BY: SHINE BLEVINS     PROCEDURE DATE:  02/07/2023          INTERPRETATION:  EXAMINATION: XR CHEST URGENT    CLINICAL INDICATION: Shortness of breath.    TECHNIQUE: Single frontal portable view of the chest from 2/7/2023 3:37 AM    COMPARISON: 1/6/2023 chest x-ray. CT chest from 1/29/2023.    FINDINGS:    The heart size is normal.  Scattered bilateral patchy and hazy opacities, predominantly in the upper   peripheral lung fields.  There is no pneumothorax or pleural effusion.    IMPRESSION:  Scattered bilateral patchy and hazy opacities consistent with multifocal   pneumonia unchanged from the last exam.    --- End of Report ---          GALE GILLIAM MD; Resident Radiologist  This document has been electronically signed.  NAT HENNING MD; Attending Radiologist  This document has been electronically signed. Feb 7 2023  6:30AM    < end of copied text >  < from: CT Chest No Cont (01.29.23 @ 13:21) >    ACC: [id number] EXAM:  CT CHEST   ORDERED BY: WHITNEY SUAZO     PROCEDURE DATE:  01/29/2023          INTERPRETATION:  CLINICAL INFORMATION: Worsening shortness of breath.   Opacities on recent chest x-ray. Evaluate for pneumonia.    COMPARISON: CT chest abdomen pelvis 11/8/2022.    CONTRAST/COMPLICATIONS:  IV Contrast: NONE  Oral Contrast: NONE  Complications: None reported at time of study completion    PROCEDURE:  CT scan of the chest was obtained without intravenous contrast.    FINDINGS:    LYMPH NODES: Unchanged calcified mediastinal lymph nodes.    HEART/VASCULATURE: Heart size is normal. Hypodensity of the blood pool in   relation to left ventricular myocardium suggests underlying anemia. No   pericardial effusion. Coronary artery calcifications.    AIRWAYS/LUNGS/PLEURA: Unchanged severe narrowing of the right mainstem   bronchus and bronchus intermedius. Unchanged right apical opacity (2:20)   measuring 2.8 x 1.6 cm. Unchanged right upper lobe 1.3 cm nodular opacity   (2:33). Previously seen right upper lobe cystic/cavitary opacity is   similar in size and now predominantly fluid density in attenuation. No   significant change in right upper lobe opacity along the minor fissure.   Unchanged ill-defined masslike consolidation in the left upper lobe   (2:50) which has gradually been enlarging in size and attenuation since   more remote studies from 2019. Mosaic attenuation. No pleural effusion.    UPPER ABDOMEN: Small hiatal hernia.    BONES/SOFT TISSUES: Degenerative changes of the spine. Soft tissues are   unremarkable.    IMPRESSION:    Since CT chest 11/8/2022:    No new lung parenchymal opacities to suggest pneumonia.    Unchanged bilateral upper lobe opacities of unclear etiology, including   the right apical nodular opacity as well as the left upper lobe   ill-defined masslike consolidation which has been increasing in   size/attenuation since 2019. Continued attention on follow-up imaging.          --- End of Report ---          < end of copied text >      PROBLEM LIST:  78y Female with HEALTH ISSUES - PROBLEM Dx:  Acute respiratory failure with hypoxia    Urinary tract infection    Leukocytosis    Diabetes    HTN (hypertension)    Asthma    Hypothyroid    Prophylactic measure         RECS:  opacities on xray/ct chest were addressed on previous admisison- family does not want to intervention  she is stabl donnell 02  defer to primary team re: aches  suggest palliative consult- frequent readmissions          Please call with any questions.    Dede Flynn DO  [facility] Pulmonary/Sleep Medicine  [phone]  
  Forrest Rosenthal MD  Interventional Cardiology / Endovascular Specialist  [place] Office : [phone] [place] [address] Tel:   [place] Office : [address] [facility] [address]  Tel: [phone]      Subjective/Overnight events: Patient lying in bed comfortably. No acute distress. Denies chest pain,  SOB improving    MEDICATIONS:  enoxaparin Injectable 40 milliGRAM(s) SubCutaneous every 24 hours  metoprolol succinate ER 25 milliGRAM(s) Oral daily    cefTRIAXone   IVPB 1000 milliGRAM(s) IV Intermittent every 24 hours    acetylcysteine 10%  Inhalation 4 milliLiter(s) Inhalation two times a day  albuterol/ipratropium for Nebulization 3 milliLiter(s) Nebulizer every 6 hours        dextrose 50% Injectable 25 Gram(s) IV Push once  dextrose 50% Injectable 12.5 Gram(s) IV Push once  dextrose 50% Injectable 25 Gram(s) IV Push once  dextrose Oral Gel 15 Gram(s) Oral once PRN  glucagon  Injectable 1 milliGRAM(s) IntraMuscular once  insulin glargine Injectable (LANTUS) 12 Unit(s) SubCutaneous at bedtime  insulin lispro (ADMELOG) corrective regimen sliding scale   SubCutaneous three times a day before meals  insulin lispro (ADMELOG) corrective regimen sliding scale   SubCutaneous at bedtime  insulin lispro Injectable (ADMELOG) 6 Unit(s) SubCutaneous three times a day before meals  levothyroxine 50 MICROGram(s) Oral daily  simvastatin 20 milliGRAM(s) Oral at bedtime    cholecalciferol 1000 Unit(s) Oral every 24 hours  dextrose 5%. 1000 milliLiter(s) IV Continuous <Continuous>  dextrose 5%. 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      Cataract of left eye          SOCIAL HISTORY: Substance Use (street drugs): ( x ) never used  (  ) other:    FAMILY HISTORY:  Family history of heart attack (Mother)  mother          PHYSICAL EXAM:  T(C): 37.1 (02-13-23 @ 12:26), Max: 37.1 (02-13-23 @ 12:26)  HR: 73 (02-13-23 @ 12:26) (66 - 88)  BP: 127/60 (02-13-23 @ 12:26) (127/60 - 154/75)  RR: 18 (02-13-23 @ 12:26) (17 - 19)  SpO2: 100% (02-13-23 @ 12:26) (78% - 100%)  Wt(kg): --  I&O's Summary    13 Feb 2023 07:01  -  13 Feb 2023 13:17  --------------------------------------------------------  IN: 207 mL / OUT: 0 mL / NET: 207 mL            EYES:   PERRLA   ENMT:   Moist mucous membranes, Good dentition, No lesions  Cardiovascular: Normal S1 S2, No JVD, No murmurs, No edema  Respiratory: b/l rhonchi   Gastrointestinal:  Soft, Non-tender, + BS	  Extremities: no edema                                8.1    8.90  )-----------( 273      ( 13 Feb 2023 06:15 )             28.8     02-13    135  |  94<L>  |  10  ----------------------------<  227<H>  4.3   |  34<H>  |  0.67    Ca    9.7      13 Feb 2023 06:15  Phos  4.2     02-13  Mg     1.70     02-13      proBNP:   Lipid Profile:   HgA1c:   TSH:     Consultant(s) Notes Reviewed:  [x ] YES  [ ] NO    Care Discussed with Consultants/Other Providers [ x] YES  [ ] NO    Imaging Personally Reviewed independently:  [x] YES  [ ] NO    All labs, radiologic studies, vitals, orders and medications list reviewed. Patient is seen and examined at bedside. Case discussed with medical team.              
78yPatient is a 78y old  Female who presents with a chief complaint of SOB (13 Feb 2023 13:17)      Interval history:  Afebrile, tired per family member.       Allergies:   No Known Allergies      Antimicrobials:  cefTRIAXone   IVPB 1000 milliGRAM(s) IV Intermittent every 24 hours      REVIEW OF SYSTEMS:  No chest pain   No abdominal pain   No rash.       Vital Signs Last 24 Hrs  T(C): 37.1 (02-13-23 @ 12:26), Max: 37.1 (02-13-23 @ 12:26)  T(F): 98.7 (02-13-23 @ 12:26), Max: 98.7 (02-13-23 @ 12:26)  HR: 74 (02-13-23 @ 15:25) (66 - 88)  BP: 127/60 (02-13-23 @ 12:26) (127/60 - 154/75)  BP(mean): --  RR: 18 (02-13-23 @ 12:26) (17 - 19)  SpO2: 100% (02-13-23 @ 15:25) (78% - 100%)      PHYSICAL EXAM:  Patient in no acute distress. Arousable, sleepy.   + air entry B/L lung fields, + wheezing.   soft, nondistended abdomen.   Extremities: no edema.  IV sites not inflamed.                            8.1    8.90  )-----------( 273      ( 13 Feb 2023 06:15 )             28.8   02-13    135  |  94<L>  |  10  ----------------------------<  227<H>  4.3   |  34<H>  |  0.67    Ca    9.7      13 Feb 2023 06:15  Phos  4.2     02-13  Mg     1.70     02-13      Culture - Urine (02.07.23 @ 07:51)   Specimen Source: Clean Catch Clean Catch (Midstream)   Culture Results:   10,000 - 49,000 CFU/mL Coag Negative Staphylococcus "Susceptibilities not   performed"   <10,000 CFU/ml Normal Urogenital rosalba present     Culture - Blood (02.07.23 @ 07:45)   Specimen Source: .Blood Blood-Venous   Culture Results:   No Growth Final     Radiology:  < from: CT Head No Cont (02.10.23 @ 10:48) >  IMPRESSION:    No significant change compared with 11/8/2022.  1. No evidence of acute territorial infarction, hemorrhage, mass or mass   effect.  2. Periventricular hypoattenuation; a nonspecific finding which   statistically reflects chronic microvascular ischemic change.      < from: US Abdomen Upper Quadrant Right (02.07.23 @ 04:57) >  IMPRESSION:  No gallstones or evidence of acute cholecystitis.          
Forrest Rosenthal MD  Interventional Cardiology / Advance Heart Failure and Cardiac Transplant Specialist  [place] Office : [address] [place] [address] [place]. [address]  Tel:   [place] Office : [address] [facility] [place] [address]  Tel: [phone]       Pt is lying in bed comfortable not in distress, no chest pains some SOB no palpitations  	  MEDICATIONS:  enoxaparin Injectable 40 milliGRAM(s) SubCutaneous every 24 hours  metoprolol succinate ER 25 milliGRAM(s) Oral daily    cefTRIAXone   IVPB 1000 milliGRAM(s) IV Intermittent every 24 hours    acetylcysteine 10%  Inhalation 4 milliLiter(s) Inhalation two times a day  albuterol/ipratropium for Nebulization 3 milliLiter(s) Nebulizer every 6 hours        dextrose 50% Injectable 25 Gram(s) IV Push once  dextrose 50% Injectable 12.5 Gram(s) IV Push once  dextrose 50% Injectable 25 Gram(s) IV Push once  dextrose Oral Gel 15 Gram(s) Oral once PRN  glucagon  Injectable 1 milliGRAM(s) IntraMuscular once  insulin glargine Injectable (LANTUS) 12 Unit(s) SubCutaneous at bedtime  insulin lispro (ADMELOG) corrective regimen sliding scale   SubCutaneous three times a day before meals  insulin lispro (ADMELOG) corrective regimen sliding scale   SubCutaneous at bedtime  insulin lispro Injectable (ADMELOG) 6 Unit(s) SubCutaneous three times a day before meals  levothyroxine 50 MICROGram(s) Oral daily  simvastatin 20 milliGRAM(s) Oral at bedtime    cholecalciferol 1000 Unit(s) Oral every 24 hours  dextrose 5%. 1000 milliLiter(s) IV Continuous <Continuous>  dextrose 5%. 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      Cataract of left eye          SOCIAL HISTORY: Substance Use (street drugs): ( x ) never used  (  ) other:    FAMILY HISTORY:  Family history of heart attack (Mother)  mother         PHYSICAL EXAM:  T(C): 36.4 (02-12-23 @ 05:51), Max: 37.1 (02-11-23 @ 17:54)  HR: 75 (02-12-23 @ 09:34) (64 - 89)  BP: 148/70 (02-12-23 @ 05:51) (116/64 - 148/70)  RR: 18 (02-12-23 @ 05:51) (18 - 18)  SpO2: 100% (02-12-23 @ 09:34) (98% - 100%)  Wt(kg): --  I&O's Summary        EYES:   PERRLA   ENMT:   Moist mucous membranes, Good dentition, No lesions  Cardiovascular: Normal S1 S2, No JVD, No murmurs, No edema  Respiratory: b/l rhonchi   Gastrointestinal:  Soft, Non-tender, + BS	  Extremities: no edema                                    7.8    9.00  )-----------( 267      ( 12 Feb 2023 07:02 )             27.3     02-12    137  |  96<L>  |  9   ----------------------------<  234<H>  4.4   |  33<H>  |  0.68    Ca    9.4      12 Feb 2023 07:02  Phos  3.9     02-12  Mg     1.80     02-12      proBNP:   Lipid Profile:   HgA1c:   TSH:     Consultant(s) Notes Reviewed:  [x ] YES  [ ] NO    Care Discussed with Consultants/Other Providers [ x] YES  [ ] NO    Imaging Personally Reviewed independently:  [x] YES  [ ] NO    All labs, radiologic studies, vitals, orders and medications list reviewed. Patient is seen and examined at bedside. Case discussed with medical team.        
PULMONARY PROGRESS NOTE    MARTIR VU  MRN-[id number]    Patient is a 78y old  Female who presents with a chief complaint of SOB (08 Feb 2023 14:56)      HPI:  -  -77 y/o F with PMH HTN, HLD, DM, abnormal CT chest/xrays known to patient with recent admission to [facility] for viral PNA   been home for about a week  back in for weakness, cough    family at bedside    ROS:   -    ACTIVE MEDICATION LIST:  MEDICATIONS  (STANDING):  albuterol/ipratropium for Nebulization 3 milliLiter(s) Nebulizer every 6 hours  cholecalciferol 1000 Unit(s) Oral every 24 hours  dextrose 5%. 1000 milliLiter(s) (100 mL/Hr) IV Continuous <Continuous>  dextrose 5%. 1000 milliLiter(s) (50 mL/Hr) IV Continuous <Continuous>  dextrose 50% Injectable 25 Gram(s) IV Push once  dextrose 50% Injectable 12.5 Gram(s) IV Push once  dextrose 50% Injectable 25 Gram(s) IV Push once  enoxaparin Injectable 40 milliGRAM(s) SubCutaneous every 24 hours  glucagon  Injectable 1 milliGRAM(s) IntraMuscular once  influenza  Vaccine (HIGH DOSE) 0.7 milliLiter(s) IntraMuscular once  insulin glargine Injectable (LANTUS) 12 Unit(s) SubCutaneous at bedtime  insulin lispro (ADMELOG) corrective regimen sliding scale   SubCutaneous three times a day before meals  insulin lispro (ADMELOG) corrective regimen sliding scale   SubCutaneous at bedtime  insulin lispro Injectable (ADMELOG) 6 Unit(s) SubCutaneous three times a day before meals  levothyroxine 50 MICROGram(s) Oral daily  metoprolol succinate ER 25 milliGRAM(s) Oral daily  piperacillin/tazobactam IVPB.. 3.375 Gram(s) IV Intermittent every 8 hours  simvastatin 20 milliGRAM(s) Oral at bedtime  vancomycin  IVPB 750 milliGRAM(s) IV Intermittent every 12 hours    MEDICATIONS  (PRN):  dextrose Oral Gel 15 Gram(s) Oral once PRN Blood Glucose LESS THAN 70 milliGRAM(s)/deciliter      EXAM:  Vital Signs Last 24 Hrs  T(C): 37 (08 Feb 2023 12:26), Max: 37 (08 Feb 2023 12:26)  T(F): 98.6 (08 Feb 2023 12:26), Max: 98.6 (08 Feb 2023 12:26)  HR: 93 (08 Feb 2023 15:31) (90 - 97)  BP: 130/59 (08 Feb 2023 12:26) (121/60 - 133/67)  BP(mean): 77 (08 Feb 2023 12:26) (77 - 77)  RR: 19 (08 Feb 2023 12:26) (17 - 20)  SpO2: 95% (08 Feb 2023 15:31) (95% - 100%)    Parameters below as of 08 Feb 2023 15:31  Patient On (Oxygen Delivery Method): nasal cannula        GENERAL: The patient is easy to wake up follow commands  on 02  weak  not wheezing  poor effort tho                            8.9    16.65 )-----------( 255      ( 08 Feb 2023 07:00 )             32.0       02-08    134<L>  |  95<L>  |  13  ----------------------------<  158<H>  4.5   |  29  |  0.68    Ca    9.0      08 Feb 2023 07:00  Phos  2.6     02-08  Mg     1.70     02-08    TPro  6.5  /  Alb  3.8  /  TBili  0.3  /  DBili  x   /  AST  14  /  ALT  12  /  AlkPhos  64  02-07     < from: Xray Chest 1 View- PORTABLE-Urgent (Xray Chest 1 View- PORTABLE-Urgent .) (02.07.23 @ 03:37) >    ACC: [id number] EXAM:  XR CHEST PORTABLE URGENT 1V   ORDERED BY: SHINE BLEVINS     PROCEDURE DATE:  02/07/2023          INTERPRETATION:  EXAMINATION: XR CHEST URGENT    CLINICAL INDICATION: Shortness of breath.    TECHNIQUE: Single frontal portable view of the chest from 2/7/2023 3:37 AM    COMPARISON: 1/6/2023 chest x-ray. CT chest from 1/29/2023.    FINDINGS:    The heart size is normal.  Scattered bilateral patchy and hazy opacities, predominantly in the upper   peripheral lung fields.  There is no pneumothorax or pleural effusion.    IMPRESSION:  Scattered bilateral patchy and hazy opacities consistent with multifocal   pneumonia unchanged from the last exam.    --- End of Report ---        < end of copied text >  < from: Xray Chest 1 View- PORTABLE-Urgent (Xray Chest 1 View- PORTABLE-Urgent .) (01.26.23 @ 05:27) >    ACC: [id number] EXAM:  XR CHEST PORTABLE URGENT 1V   ORDERED BY: KHALIF BENSON     PROCEDURE DATE:  01/26/2023          INTERPRETATION:  EXAMINATION: XR CHEST URGENT    CLINICAL INDICATION: SOB, cough    TECHNIQUE: Single frontal, portable view of the chest was obtained.    COMPARISON: Chest radiograph 12/1/2022.    FINDINGS:  The heart is not enlarged.  Redemonstration of right upper lobe linear opacities. New hazy airspace   opacities in the left midlung field.  No pleural effusion or pneumothorax.    IMPRESSION:  Chronic changes of the right upper lobe.  New hazy airspace opacities in the left upper lobe more consistent with   active infection than heart failure.    --- End of Report ---        < end of copied text >    PROBLEM LIST:  78y Female with HEALTH ISSUES - PROBLEM Dx:  Acute respiratory failure with hypoxia    Urinary tract infection    Leukocytosis    Diabetes    HTN (hypertension)    Asthma    Hypothyroid    Prophylactic measure              RECS:  abx  nebs  02  palliative eval      Please call with any questions.    Dede lFynn,   [facility] Pulmonary/Sleep Medicine  [phone]  
  Forrest Rosenthal MD  Interventional Cardiology / Endovascular Specialist  [place] Office : [address] [place] [address] Tel:   [place] Office : [address] [facility] [address]  Tel: [phone]      Subjective/Overnight events: Patient lying in bed No acute distress.   	  MEDICATIONS:  metoprolol succinate ER 25 milliGRAM(s) Oral daily    cefTRIAXone   IVPB 1000 milliGRAM(s) IV Intermittent every 24 hours    acetylcysteine 10%  Inhalation 4 milliLiter(s) Inhalation two times a day  albuterol/ipratropium for Nebulization 3 milliLiter(s) Nebulizer every 6 hours        dextrose 50% Injectable 25 Gram(s) IV Push once  dextrose 50% Injectable 12.5 Gram(s) IV Push once  dextrose 50% Injectable 25 Gram(s) IV Push once  dextrose Oral Gel 15 Gram(s) Oral once PRN  glucagon  Injectable 1 milliGRAM(s) IntraMuscular once  insulin glargine Injectable (LANTUS) 12 Unit(s) SubCutaneous at bedtime  insulin lispro (ADMELOG) corrective regimen sliding scale   SubCutaneous three times a day before meals  insulin lispro (ADMELOG) corrective regimen sliding scale   SubCutaneous at bedtime  insulin lispro Injectable (ADMELOG) 6 Unit(s) SubCutaneous three times a day before meals  levothyroxine 50 MICROGram(s) Oral daily  simvastatin 20 milliGRAM(s) Oral at bedtime    cholecalciferol 1000 Unit(s) Oral every 24 hours  dextrose 5%. 1000 milliLiter(s) IV Continuous <Continuous>  dextrose 5%. 1000 milliLiter(s) IV Continuous <Continuous>  influenza  Vaccine (HIGH DOSE) 0.7 milliLiter(s) IntraMuscular once  sodium chloride 0.65% Nasal 1 Spray(s) Both Nostrils four times a day      PAST MEDICAL/SURGICAL HISTORY  PAST MEDICAL & SURGICAL HISTORY:  HTN (Hypertension)      Dyslipidemia      DM (Diabetes Mellitus)      Hypothyroidism      Pulmonary TB  Dx 2019, completed 10 month treatment      Cataract of left eye          SOCIAL HISTORY: Substance Use (street drugs): ( x ) never used  (  ) other:    FAMILY HISTORY:  Family history of heart attack (Mother)  mother            PHYSICAL EXAM:  T(C): 37.1 (02-14-23 @ 06:00), Max: 37.1 (02-13-23 @ 12:26)  HR: 75 (02-14-23 @ 09:48) (73 - 85)  BP: 129/61 (02-14-23 @ 06:00) (112/67 - 129/61)  RR: 16 (02-14-23 @ 06:00) (16 - 18)  SpO2: 100% (02-14-23 @ 06:00) (99% - 100%)  Wt(kg): --  I&O's Summary    13 Feb 2023 07:01  -  14 Feb 2023 07:00  --------------------------------------------------------  IN: 414 mL / OUT: 0 mL / NET: 414 mL    14 Feb 2023 07:01  -  14 Feb 2023 11:54  --------------------------------------------------------  IN: 207 mL / OUT: 0 mL / NET: 207 mL          GENERAL: NAD  EYES: EOMI, PERRLA, conjunctiva and sclera clear  ENMT: No tonsillar erythema, exudates, or enlargement  Cardiovascular: Normal S1 S2, No JVD, No murmurs, No edema  Respiratory: Lungs rhonchi to auscultation	  Gastrointestinal:  Soft, Non-tender, + BS	  Extremities: No edema                                     8.1    8.39  )-----------( 277      ( 14 Feb 2023 06:25 )             29.3     02-14    137  |  94<L>  |  13  ----------------------------<  278<H>  4.1   |  30  |  0.71    Ca    9.4      14 Feb 2023 06:25  Phos  3.8     02-14  Mg     1.60     02-14      proBNP:   Lipid Profile:   HgA1c:   TSH:     Consultant(s) Notes Reviewed:  [x ] YES  [ ] NO    Care Discussed with Consultants/Other Providers [ x] YES  [ ] NO    Imaging Personally Reviewed independently:  [x] YES  [ ] NO    All labs, radiologic studies, vitals, orders and medications list reviewed. Patient is seen and examined at bedside. Case discussed with medical team.              
  Forrest Rosenthal MD  Interventional Cardiology / Endovascular Specialist  [place] Office : [address] [place] [address] Tel:   [place] Office : [address] [facility] [address]  Tel: [phone]      Subjective/Overnight events: Patient lying in bed comfortably. No acute distress.   	  MEDICATIONS:  enoxaparin Injectable 40 milliGRAM(s) SubCutaneous every 24 hours  metoprolol succinate ER 25 milliGRAM(s) Oral daily      acetylcysteine 10%  Inhalation 4 milliLiter(s) Inhalation two times a day  albuterol/ipratropium for Nebulization 3 milliLiter(s) Nebulizer every 6 hours  budesonide 160 MICROgram(s)/formoterol 4.5 MICROgram(s) Inhaler 2 Puff(s) Inhalation two times a day    melatonin 3 milliGRAM(s) Oral at bedtime PRN      dextrose 50% Injectable 25 Gram(s) IV Push once  dextrose 50% Injectable 12.5 Gram(s) IV Push once  dextrose 50% Injectable 25 Gram(s) IV Push once  dextrose Oral Gel 15 Gram(s) Oral once PRN  glucagon  Injectable 1 milliGRAM(s) IntraMuscular once  insulin glargine Injectable (LANTUS) 12 Unit(s) SubCutaneous at bedtime  insulin lispro (ADMELOG) corrective regimen sliding scale   SubCutaneous three times a day before meals  insulin lispro (ADMELOG) corrective regimen sliding scale   SubCutaneous at bedtime  insulin lispro Injectable (ADMELOG) 6 Unit(s) SubCutaneous three times a day before meals  levothyroxine 50 MICROGram(s) Oral daily  simvastatin 20 milliGRAM(s) Oral at bedtime    cholecalciferol 1000 Unit(s) Oral every 24 hours  dextrose 5%. 1000 milliLiter(s) IV Continuous <Continuous>  dextrose 5%. 1000 milliLiter(s) IV Continuous <Continuous>  influenza  Vaccine (HIGH DOSE) 0.7 milliLiter(s) IntraMuscular once  petrolatum white Ointment 1 Application(s) Topical three times a day  sodium chloride 0.65% Nasal 1 Spray(s) Both Nostrils four times a day      PAST MEDICAL/SURGICAL HISTORY  PAST MEDICAL & SURGICAL HISTORY:  HTN (Hypertension)      Dyslipidemia      DM (Diabetes Mellitus)      Hypothyroidism      Pulmonary TB  Dx 2019, completed 10 month treatment      Cataract of left eye          SOCIAL HISTORY: Substance Use (street drugs): ( x ) never used  (  ) other:    FAMILY HISTORY:  Family history of heart attack (Mother)  mother          PHYSICAL EXAM:  T(C): 36.7 (02-15-23 @ 06:00), Max: 36.9 (02-14-23 @ 12:00)  HR: 74 (02-15-23 @ 09:12) (74 - 83)  BP: 117/50 (02-15-23 @ 06:00) (114/65 - 140/71)  RR: 16 (02-15-23 @ 06:00) (16 - 19)  SpO2: 97% (02-15-23 @ 06:00) (95% - 100%)  Wt(kg): --  I&O's Summary    14 Feb 2023 07:01  -  15 Feb 2023 07:00  --------------------------------------------------------  IN: 414 mL / OUT: 200 mL / NET: 214 mL    15 Feb 2023 07:01  -  15 Feb 2023 11:45  --------------------------------------------------------  IN: 120 mL / OUT: 0 mL / NET: 120 mL          GENERAL: NAD  EYES: EOMI, PERRLA, conjunctiva and sclera clear  ENMT: No tonsillar erythema, exudates, or enlargement  Cardiovascular: Normal S1 S2, No JVD, No murmurs, No edema  Respiratory: Lungs rhonchi to auscultation	  Gastrointestinal:  Soft, Non-tender, + BS	  Extremities: No edema                                           8.2    8.13  )-----------( 303      ( 15 Feb 2023 06:17 )             29.0     02-15    136  |  96<L>  |  13  ----------------------------<  236<H>  4.0   |  31  |  0.67    Ca    9.7      15 Feb 2023 06:17  Phos  3.3     02-15  Mg     1.60     02-15      proBNP:   Lipid Profile:   HgA1c:   TSH:     Consultant(s) Notes Reviewed:  [x ] YES  [ ] NO    Care Discussed with Consultants/Other Providers [ x] YES  [ ] NO    Imaging Personally Reviewed independently:  [x] YES  [ ] NO    All labs, radiologic studies, vitals, orders and medications list reviewed. Patient is seen and examined at bedside. Case discussed with medical team.              
78yPatient is a 78y old  Female who presents with a chief complaint of SOB (10 Feb 2023 16:15)      Interval history:  Afebrile, + cough. family member at bedside.       Allergies:   No Known Allergies      Antimicrobials:  cefTRIAXone   IVPB 1000 milliGRAM(s) IV Intermittent every 24 hours      REVIEW OF SYSTEMS:  No chest pain   No abdominal pain  No rash.       Vital Signs Last 24 Hrs  T(C): 36.3 (02-10-23 @ 22:00), Max: 37 (02-10-23 @ 06:39)  T(F): 97.4 (02-10-23 @ 22:00), Max: 98.6 (02-10-23 @ 06:39)  HR: 88 (02-10-23 @ 22:00) (82 - 92)  BP: 154/101 (02-10-23 @ 22:00) (143/73 - 168/77)  BP(mean): --  RR: 17 (02-10-23 @ 22:44) (17 - 20)  SpO2: 95% (02-10-23 @ 22:44) (89% - 100%)      PHYSICAL EXAM:  Patient in no acute distress. Alert, awake, sitting in bed.   S1S2 normal.  + air entry B/L lung fields.  soft, nontender, nondistended abdomen.   Extremities: no edema.  IV sites not inflamed.                             8.2    10.05 )-----------( 270      ( 10 Feb 2023 05:42 )             29.8   02-10    142  |  99  |  8   ----------------------------<  54<LL>  4.0   |  35<H>  |  0.67    Ca    9.4      10 Feb 2023 05:42  Phos  3.0     02-10  Mg     1.90     02-10      Culture - Urine (02.07.23 @ 07:51)   Specimen Source: Clean Catch Clean Catch (Midstream)   Culture Results:   10,000 - 49,000 CFU/mL Coag Negative Staphylococcus "Susceptibilities not   performed"   <10,000 CFU/ml Normal Urogenital rosalba present         Radiology:  < from: CT Head No Cont (02.10.23 @ 10:48) >  IMPRESSION:    No significant change compared with 11/8/2022.  1. No evidence of acute territorial infarction, hemorrhage, mass or mass   effect.  2. Periventricular hypoattenuation; a nonspecific finding which   statistically reflects chronic microvascular ischemic change.  3. Additional findings above.          
    SUBJECTIVE / OVERNIGHT EVENTS:pt seen and examined, pts family next to pts bed  02-15-23     MEDICATIONS  (STANDING):  acetylcysteine 10%  Inhalation 4 milliLiter(s) Inhalation two times a day  albuterol/ipratropium for Nebulization 3 milliLiter(s) Nebulizer every 6 hours  benzonatate 100 milliGRAM(s) Oral three times a day  budesonide 160 MICROgram(s)/formoterol 4.5 MICROgram(s) Inhaler 2 Puff(s) Inhalation two times a day  cholecalciferol 1000 Unit(s) Oral every 24 hours  dextrose 5%. 1000 milliLiter(s) (100 mL/Hr) IV Continuous <Continuous>  dextrose 5%. 1000 milliLiter(s) (50 mL/Hr) IV Continuous <Continuous>  dextrose 50% Injectable 25 Gram(s) IV Push once  dextrose 50% Injectable 12.5 Gram(s) IV Push once  dextrose 50% Injectable 25 Gram(s) IV Push once  enoxaparin Injectable 40 milliGRAM(s) SubCutaneous every 24 hours  glucagon  Injectable 1 milliGRAM(s) IntraMuscular once  influenza  Vaccine (HIGH DOSE) 0.7 milliLiter(s) IntraMuscular once  insulin glargine Injectable (LANTUS) 12 Unit(s) SubCutaneous at bedtime  insulin lispro (ADMELOG) corrective regimen sliding scale   SubCutaneous three times a day before meals  insulin lispro (ADMELOG) corrective regimen sliding scale   SubCutaneous at bedtime  insulin lispro Injectable (ADMELOG) 6 Unit(s) SubCutaneous three times a day before meals  levothyroxine 50 MICROGram(s) Oral daily  metoprolol succinate ER 25 milliGRAM(s) Oral daily  pantoprazole    Tablet 40 milliGRAM(s) Oral before breakfast  petrolatum white Ointment 1 Application(s) Topical three times a day  simvastatin 20 milliGRAM(s) Oral at bedtime  sodium chloride 0.65% Nasal 1 Spray(s) Both Nostrils four times a day    MEDICATIONS  (PRN):  dextrose Oral Gel 15 Gram(s) Oral once PRN Blood Glucose LESS THAN 70 milliGRAM(s)/deciliter  melatonin 3 milliGRAM(s) Oral at bedtime PRN Insomnia    Vital Signs Last 24 Hrs  T(C): 36.5 (02-15-23 @ 18:12), Max: 36.7 (02-15-23 @ 06:00)  T(F): 97.7 (02-15-23 @ 18:12), Max: 98.1 (02-15-23 @ 06:00)  HR: 75 (02-15-23 @ 18:12) (73 - 82)  BP: 146/76 (02-15-23 @ 18:12) (117/50 - 146/76)  BP(mean): --  RR: 16 (02-15-23 @ 18:12) (16 - 19)  SpO2: 95% (02-15-23 @ 18:12) (95% - 100%)          PHYSICAL EXAM:  GENERAL: NAD  EYES: EOMI, PERRLA  NECK: Supple, No JVD  CHEST/LUNG: dec breath sounds at bases, rhonchi+ant  HEART:  S1 , S2 +  ABDOMEN: soft, bs+  EXTREMITIES:  trace edema  NEUROLOGY:alert awake confused      LABS:  02-15    136  |  96<L>  |  13  ----------------------------<  236<H>  4.0   |  31  |  0.67    Ca    9.7      15 Feb 2023 06:17  Phos  3.3     02-15  Mg     1.60     02-15      Creatinine Trend: 0.67 <--, 0.71 <--, 0.67 <--, 0.68 <--, 0.61 <--, 0.67 <--, 0.76 <--                        8.2    8.13  )-----------( 303      ( 15 Feb 2023 06:17 )             29.0     Urine Studies:                                                            Imaging Personally Reviewed:yes    Consultant(s) Notes Reviewed:  yes    Care Discussed with Consultants/Other Providers:yes  
    SUBJECTIVE / OVERNIGHT EVENTS:pt seen and examined, pts family next to pts bed  23     MEDICATIONS  (STANDING):  albuterol/ipratropium for Nebulization 3 milliLiter(s) Nebulizer every 6 hours  cefTRIAXone   IVPB 1000 milliGRAM(s) IV Intermittent every 24 hours  cholecalciferol 1000 Unit(s) Oral every 24 hours  dextrose 5%. 1000 milliLiter(s) (100 mL/Hr) IV Continuous <Continuous>  dextrose 5%. 1000 milliLiter(s) (50 mL/Hr) IV Continuous <Continuous>  dextrose 50% Injectable 25 Gram(s) IV Push once  dextrose 50% Injectable 12.5 Gram(s) IV Push once  dextrose 50% Injectable 25 Gram(s) IV Push once  enoxaparin Injectable 40 milliGRAM(s) SubCutaneous every 24 hours  glucagon  Injectable 1 milliGRAM(s) IntraMuscular once  influenza  Vaccine (HIGH DOSE) 0.7 milliLiter(s) IntraMuscular once  insulin glargine Injectable (LANTUS) 12 Unit(s) SubCutaneous at bedtime  insulin lispro (ADMELOG) corrective regimen sliding scale   SubCutaneous three times a day before meals  insulin lispro (ADMELOG) corrective regimen sliding scale   SubCutaneous at bedtime  insulin lispro Injectable (ADMELOG) 6 Unit(s) SubCutaneous three times a day before meals  levothyroxine 50 MICROGram(s) Oral daily  metoprolol succinate ER 25 milliGRAM(s) Oral daily  simvastatin 20 milliGRAM(s) Oral at bedtime    MEDICATIONS  (PRN):  dextrose Oral Gel 15 Gram(s) Oral once PRN Blood Glucose LESS THAN 70 milliGRAM(s)/deciliter    ICU Vital Signs Last 24 Hrs  T(C): 36.8 (2023 21:26), Max: 36.8 (2023 21:26)  T(F): 98.2 (2023 21:26), Max: 98.2 (2023 21:26)  HR: 90 (2023 21:26) (70 - 90)  BP: 134/61 (2023 21:26) (125/71 - 138/59)  BP(mean): 108 (2023 12:31) (79 - 108)  ABP: --  ABP(mean): --  RR: 18 (2023 21:26) (17 - 18)  SpO2: 100% (2023 21:26) (95% - 100%)    O2 Parameters below as of 2023 21:26  Patient On (Oxygen Delivery Method): nasal cannula  O2 Flow (L/min): 4          PHYSICAL EXAM:  GENERAL: NAD  EYES: EOMI, PERRLA  NECK: Supple, No JVD  CHEST/LUNG: dec breath sounds at bases  HEART:  S1 , S2 +  ABDOMEN: soft, bs+  EXTREMITIES:  trace edema  NEUROLOGY:alert awake confused      LABS:      136  |  95<L>  |  11  ----------------------------<  216<H>  4.5   |  29  |  0.76    Ca    8.7      2023 05:31  Phos  2.4       Mg     1.80           Creatinine Trend: 0.76 <--, 0.68 <--, 0.67 <--                        7.7    10.73 )-----------( 232      ( 2023 05:31 )             27.7     Urine Studies:  Urinalysis Basic - ( 2023 07:51 )    Color: Light Yellow / Appearance: Clear / S.017 / pH:   Gluc:  / Ketone: Negative  / Bili: Negative / Urobili: <2 mg/dL   Blood:  / Protein: 30 mg/dL / Nitrite: Negative   Leuk Esterase: Moderate / RBC: 2 /HPF / WBC 20-30 /HPF   Sq Epi:  / Non Sq Epi:  / Bacteria: Few                        RADIOLOGY & ADDITIONAL TESTS:    Imaging Personally Reviewed:yes    Consultant(s) Notes Reviewed:  yes    Care Discussed with Consultants/Other Providers:yes  
    SUBJECTIVE / OVERNIGHT EVENTS:pt seen and examined, pts family next to pts bed  23     MEDICATIONS  (STANDING):  acetylcysteine 10%  Inhalation 4 milliLiter(s) Inhalation two times a day  albuterol/ipratropium for Nebulization 3 milliLiter(s) Nebulizer every 6 hours  cefTRIAXone   IVPB 1000 milliGRAM(s) IV Intermittent every 24 hours  cholecalciferol 1000 Unit(s) Oral every 24 hours  dextrose 5%. 1000 milliLiter(s) (100 mL/Hr) IV Continuous <Continuous>  dextrose 5%. 1000 milliLiter(s) (50 mL/Hr) IV Continuous <Continuous>  dextrose 50% Injectable 25 Gram(s) IV Push once  dextrose 50% Injectable 12.5 Gram(s) IV Push once  dextrose 50% Injectable 25 Gram(s) IV Push once  enoxaparin Injectable 40 milliGRAM(s) SubCutaneous every 24 hours  glucagon  Injectable 1 milliGRAM(s) IntraMuscular once  influenza  Vaccine (HIGH DOSE) 0.7 milliLiter(s) IntraMuscular once  insulin glargine Injectable (LANTUS) 12 Unit(s) SubCutaneous at bedtime  insulin lispro (ADMELOG) corrective regimen sliding scale   SubCutaneous three times a day before meals  insulin lispro (ADMELOG) corrective regimen sliding scale   SubCutaneous at bedtime  insulin lispro Injectable (ADMELOG) 6 Unit(s) SubCutaneous three times a day before meals  levothyroxine 50 MICROGram(s) Oral daily  metoprolol succinate ER 25 milliGRAM(s) Oral daily  simvastatin 20 milliGRAM(s) Oral at bedtime    MEDICATIONS  (PRN):  dextrose Oral Gel 15 Gram(s) Oral once PRN Blood Glucose LESS THAN 70 milliGRAM(s)/deciliter    Vital Signs Last 24 Hrs  T(C): 36.7 (23 @ 18:50), Max: 37.1 (23 @ 12:26)  T(F): 98.1 (23 @ 18:50), Max: 98.7 (23 @ 12:26)  HR: 84 (23 @ 20:39) (73 - 88)  BP: 112/67 (23 @ 18:50) (112/67 - 141/74)  BP(mean): --  RR: 18 (02-13-23 @ 18:50) (17 - 19)  SpO2: 99% (- @ 20:39) (78% - 100%)            PHYSICAL EXAM:  GENERAL: NAD  EYES: EOMI, PERRLA  NECK: Supple, No JVD  CHEST/LUNG: dec breath sounds at bases, rhonchi+ant  HEART:  S1 , S2 +  ABDOMEN: soft, bs+  EXTREMITIES:  trace edema  NEUROLOGY:alert awake confused      LABS:      135  |  94<L>  |  10  ----------------------------<  227<H>  4.3   |  34<H>  |  0.67    Ca    9.7      2023 06:15  Phos  4.2       Mg     1.70           Creatinine Trend: 0.67 <--, 0.68 <--, 0.61 <--, 0.67 <--, 0.76 <--, 0.68 <--, 0.67 <--                        8.1    8.90  )-----------( 273      ( 2023 06:15 )             28.8     Urine Studies:  Urinalysis Basic - ( 2023 07:51 )    Color: Light Yellow / Appearance: Clear / S.017 / pH:   Gluc:  / Ketone: Negative  / Bili: Negative / Urobili: <2 mg/dL   Blood:  / Protein: 30 mg/dL / Nitrite: Negative   Leuk Esterase: Moderate / RBC: 2 /HPF / WBC 20-30 /HPF   Sq Epi:  / Non Sq Epi:  / Bacteria: Few                                        Imaging Personally Reviewed:yes    Consultant(s) Notes Reviewed:  yes    Care Discussed with Consultants/Other Providers:yes  
    SUBJECTIVE / OVERNIGHT EVENTS:pt seen and examined, pts family next to pts bed  02-14-23     MEDICATIONS  (STANDING):  acetylcysteine 10%  Inhalation 4 milliLiter(s) Inhalation two times a day  albuterol/ipratropium for Nebulization 3 milliLiter(s) Nebulizer every 6 hours  budesonide 160 MICROgram(s)/formoterol 4.5 MICROgram(s) Inhaler 2 Puff(s) Inhalation two times a day  cefTRIAXone   IVPB 1000 milliGRAM(s) IV Intermittent every 24 hours  cholecalciferol 1000 Unit(s) Oral every 24 hours  dextrose 5%. 1000 milliLiter(s) (50 mL/Hr) IV Continuous <Continuous>  dextrose 5%. 1000 milliLiter(s) (100 mL/Hr) IV Continuous <Continuous>  dextrose 50% Injectable 25 Gram(s) IV Push once  dextrose 50% Injectable 12.5 Gram(s) IV Push once  dextrose 50% Injectable 25 Gram(s) IV Push once  enoxaparin Injectable 40 milliGRAM(s) SubCutaneous every 24 hours  glucagon  Injectable 1 milliGRAM(s) IntraMuscular once  influenza  Vaccine (HIGH DOSE) 0.7 milliLiter(s) IntraMuscular once  insulin glargine Injectable (LANTUS) 12 Unit(s) SubCutaneous at bedtime  insulin lispro (ADMELOG) corrective regimen sliding scale   SubCutaneous three times a day before meals  insulin lispro (ADMELOG) corrective regimen sliding scale   SubCutaneous at bedtime  insulin lispro Injectable (ADMELOG) 6 Unit(s) SubCutaneous three times a day before meals  levothyroxine 50 MICROGram(s) Oral daily  metoprolol succinate ER 25 milliGRAM(s) Oral daily  simvastatin 20 milliGRAM(s) Oral at bedtime  sodium chloride 0.65% Nasal 1 Spray(s) Both Nostrils four times a day    MEDICATIONS  (PRN):  dextrose Oral Gel 15 Gram(s) Oral once PRN Blood Glucose LESS THAN 70 milliGRAM(s)/deciliter    Vital Signs Last 24 Hrs  T(C): 36.8 (02-14-23 @ 17:21), Max: 37.1 (02-14-23 @ 06:00)  T(F): 98.2 (02-14-23 @ 17:21), Max: 98.8 (02-14-23 @ 06:00)  HR: 83 (02-14-23 @ 17:21) (74 - 83)  BP: 127/65 (02-14-23 @ 17:21) (114/65 - 129/61)  BP(mean): --  RR: 16 (02-14-23 @ 17:21) (16 - 18)  SpO2: 99% (02-14-23 @ 17:21) (95% - 100%)              PHYSICAL EXAM:  GENERAL: NAD  EYES: EOMI, PERRLA  NECK: Supple, No JVD  CHEST/LUNG: dec breath sounds at bases, rhonchi+ant  HEART:  S1 , S2 +  ABDOMEN: soft, bs+  EXTREMITIES:  trace edema  NEUROLOGY:alert awake confused      LABS:  02-14    137  |  94<L>  |  13  ----------------------------<  278<H>  4.1   |  30  |  0.71    Ca    9.4      14 Feb 2023 06:25  Phos  3.8     02-14  Mg     1.60     02-14      Creatinine Trend: 0.71 <--, 0.67 <--, 0.68 <--, 0.61 <--, 0.67 <--, 0.76 <--, 0.68 <--                        8.1    8.39  )-----------( 277      ( 14 Feb 2023 06:25 )             29.3     Urine Studies:                                                  Imaging Personally Reviewed:yes    Consultant(s) Notes Reviewed:  yes    Care Discussed with Consultants/Other Providers:yes  
    SUBJECTIVE / OVERNIGHT EVENTS:pt seen and examined, pts family next to pts bed  02-10-23     MEDICATIONS  (STANDING):  acetylcysteine 10%  Inhalation 4 milliLiter(s) Inhalation two times a day  albuterol/ipratropium for Nebulization 3 milliLiter(s) Nebulizer every 6 hours  cefTRIAXone   IVPB 1000 milliGRAM(s) IV Intermittent every 24 hours  cholecalciferol 1000 Unit(s) Oral every 24 hours  dextrose 5%. 1000 milliLiter(s) (100 mL/Hr) IV Continuous <Continuous>  dextrose 5%. 1000 milliLiter(s) (50 mL/Hr) IV Continuous <Continuous>  dextrose 50% Injectable 25 Gram(s) IV Push once  dextrose 50% Injectable 12.5 Gram(s) IV Push once  dextrose 50% Injectable 25 Gram(s) IV Push once  enoxaparin Injectable 40 milliGRAM(s) SubCutaneous every 24 hours  glucagon  Injectable 1 milliGRAM(s) IntraMuscular once  influenza  Vaccine (HIGH DOSE) 0.7 milliLiter(s) IntraMuscular once  insulin glargine Injectable (LANTUS) 12 Unit(s) SubCutaneous at bedtime  insulin lispro (ADMELOG) corrective regimen sliding scale   SubCutaneous three times a day before meals  insulin lispro (ADMELOG) corrective regimen sliding scale   SubCutaneous at bedtime  insulin lispro Injectable (ADMELOG) 6 Unit(s) SubCutaneous three times a day before meals  levothyroxine 50 MICROGram(s) Oral daily  metoprolol succinate ER 25 milliGRAM(s) Oral daily  simvastatin 20 milliGRAM(s) Oral at bedtime    MEDICATIONS  (PRN):  dextrose Oral Gel 15 Gram(s) Oral once PRN Blood Glucose LESS THAN 70 milliGRAM(s)/deciliter    Vital Signs Last 24 Hrs  T(C): 36.5 (02-10-23 @ 13:13), Max: 37 (02-10-23 @ 06:39)  T(F): 97.7 (02-10-23 @ 13:13), Max: 98.6 (02-10-23 @ 06:39)  HR: 86 (02-10-23 @ 15:32) (82 - 92)  BP: 168/77 (02-10-23 @ 13:13) (134/61 - 168/77)  BP(mean): --  RR: 20 (02-10-23 @ 13:13) (18 - 20)  SpO2: 100% (02-10-23 @ 13:13) (100% - 100%)        PHYSICAL EXAM:  GENERAL: NAD  EYES: EOMI, PERRLA  NECK: Supple, No JVD  CHEST/LUNG: dec breath sounds at bases  HEART:  S1 , S2 +  ABDOMEN: soft, bs+  EXTREMITIES:  trace edema  NEUROLOGY:alert awake confused      LABS:  02-10    142  |  99  |  8   ----------------------------<  54<LL>  4.0   |  35<H>  |  0.67    Ca    9.4      10 Feb 2023 05:42  Phos  3.0     02-10  Mg     1.90     02-10      Creatinine Trend: 0.67 <--, 0.76 <--, 0.68 <--, 0.67 <--                        8.2    10.05 )-----------( 270      ( 10 Feb 2023 05:42 )             29.8     Urine Studies:  Urinalysis Basic - ( 2023 07:51 )    Color: Light Yellow / Appearance: Clear / S.017 / pH:   Gluc:  / Ketone: Negative  / Bili: Negative / Urobili: <2 mg/dL   Blood:  / Protein: 30 mg/dL / Nitrite: Negative   Leuk Esterase: Moderate / RBC: 2 /HPF / WBC 20-30 /HPF   Sq Epi:  / Non Sq Epi:  / Bacteria: Few                    Imaging Personally Reviewed:yes    Consultant(s) Notes Reviewed:  yes    Care Discussed with Consultants/Other Providers:yes  
    SUBJECTIVE / OVERNIGHT EVENTS:pt seen and examined, pts family next to pts bed  23     MEDICATIONS  (STANDING):  acetylcysteine 10%  Inhalation 4 milliLiter(s) Inhalation two times a day  albuterol/ipratropium for Nebulization 3 milliLiter(s) Nebulizer every 6 hours  cefTRIAXone   IVPB 1000 milliGRAM(s) IV Intermittent every 24 hours  cholecalciferol 1000 Unit(s) Oral every 24 hours  dextrose 5%. 1000 milliLiter(s) (100 mL/Hr) IV Continuous <Continuous>  dextrose 5%. 1000 milliLiter(s) (50 mL/Hr) IV Continuous <Continuous>  dextrose 50% Injectable 25 Gram(s) IV Push once  dextrose 50% Injectable 12.5 Gram(s) IV Push once  dextrose 50% Injectable 25 Gram(s) IV Push once  enoxaparin Injectable 40 milliGRAM(s) SubCutaneous every 24 hours  glucagon  Injectable 1 milliGRAM(s) IntraMuscular once  influenza  Vaccine (HIGH DOSE) 0.7 milliLiter(s) IntraMuscular once  insulin glargine Injectable (LANTUS) 12 Unit(s) SubCutaneous at bedtime  insulin lispro (ADMELOG) corrective regimen sliding scale   SubCutaneous three times a day before meals  insulin lispro (ADMELOG) corrective regimen sliding scale   SubCutaneous at bedtime  insulin lispro Injectable (ADMELOG) 6 Unit(s) SubCutaneous three times a day before meals  levothyroxine 50 MICROGram(s) Oral daily  metoprolol succinate ER 25 milliGRAM(s) Oral daily  simvastatin 20 milliGRAM(s) Oral at bedtime    MEDICATIONS  (PRN):  dextrose Oral Gel 15 Gram(s) Oral once PRN Blood Glucose LESS THAN 70 milliGRAM(s)/deciliter    Vital Signs Last 24 Hrs  T(C): 36.8 (23 @ 18:00), Max: 36.8 (23 @ 18:00)  T(F): 98.3 (23 @ 18:00), Max: 98.3 (23 @ 18:00)  HR: 66 (23 @ 18:00) (66 - 87)  BP: 154/75 (23 @ 18:00) (146/59 - 154/75)  BP(mean): --  RR: 19 (02-12-23 @ 18:00) (17 - 19)  SpO2: 97% (- @ 18:00) (95% - 100%)          PHYSICAL EXAM:  GENERAL: NAD  EYES: EOMI, PERRLA  NECK: Supple, No JVD  CHEST/LUNG: dec breath sounds at bases  HEART:  S1 , S2 +  ABDOMEN: soft, bs+  EXTREMITIES:  trace edema  NEUROLOGY:alert awake confused      LABS:      137  |  96<L>  |  9   ----------------------------<  234<H>  4.4   |  33<H>  |  0.68    Ca    9.4      2023 07:02  Phos  3.9       Mg     1.80           Creatinine Trend: 0.68 <--, 0.61 <--, 0.67 <--, 0.76 <--, 0.68 <--, 0.67 <--                        7.8    9.00  )-----------( 267      ( 2023 07:02 )             27.3     Urine Studies:  Urinalysis Basic - ( 2023 07:51 )    Color: Light Yellow / Appearance: Clear / S.017 / pH:   Gluc:  / Ketone: Negative  / Bili: Negative / Urobili: <2 mg/dL   Blood:  / Protein: 30 mg/dL / Nitrite: Negative   Leuk Esterase: Moderate / RBC: 2 /HPF / WBC 20-30 /HPF   Sq Epi:  / Non Sq Epi:  / Bacteria: Few                        Imaging Personally Reviewed:yes    Consultant(s) Notes Reviewed:  yes    Care Discussed with Consultants/Other Providers:yes  
    SUBJECTIVE / OVERNIGHT EVENTS:pt seen and examined, pts family next to pts bed  23     MEDICATIONS  (STANDING):  acetylcysteine 10%  Inhalation 4 milliLiter(s) Inhalation two times a day  albuterol/ipratropium for Nebulization 3 milliLiter(s) Nebulizer every 6 hours  cefTRIAXone   IVPB 1000 milliGRAM(s) IV Intermittent every 24 hours  cholecalciferol 1000 Unit(s) Oral every 24 hours  dextrose 5%. 1000 milliLiter(s) (100 mL/Hr) IV Continuous <Continuous>  dextrose 5%. 1000 milliLiter(s) (50 mL/Hr) IV Continuous <Continuous>  dextrose 50% Injectable 25 Gram(s) IV Push once  dextrose 50% Injectable 12.5 Gram(s) IV Push once  dextrose 50% Injectable 25 Gram(s) IV Push once  enoxaparin Injectable 40 milliGRAM(s) SubCutaneous every 24 hours  glucagon  Injectable 1 milliGRAM(s) IntraMuscular once  influenza  Vaccine (HIGH DOSE) 0.7 milliLiter(s) IntraMuscular once  insulin glargine Injectable (LANTUS) 12 Unit(s) SubCutaneous at bedtime  insulin lispro (ADMELOG) corrective regimen sliding scale   SubCutaneous three times a day before meals  insulin lispro (ADMELOG) corrective regimen sliding scale   SubCutaneous at bedtime  insulin lispro Injectable (ADMELOG) 6 Unit(s) SubCutaneous three times a day before meals  levothyroxine 50 MICROGram(s) Oral daily  metoprolol succinate ER 25 milliGRAM(s) Oral daily  simvastatin 20 milliGRAM(s) Oral at bedtime    MEDICATIONS  (PRN):  dextrose Oral Gel 15 Gram(s) Oral once PRN Blood Glucose LESS THAN 70 milliGRAM(s)/deciliter    Vital Signs Last 24 Hrs  T(C): 36.7 (23 @ 22:00), Max: 37.1 (23 @ 17:54)  T(F): 98 (23 @ 22:00), Max: 98.8 (23 @ 17:54)  HR: 77 (23 @ 22:20) (64 - 89)  BP: 146/59 (23 @ 22:00) (116/64 - 152/71)  BP(mean): --  RR: 18 (23 @ 22:00) (18 - 19)  SpO2: 100% (23 @ 22:20) (96% - 100%)          PHYSICAL EXAM:  GENERAL: NAD  EYES: EOMI, PERRLA  NECK: Supple, No JVD  CHEST/LUNG: dec breath sounds at bases  HEART:  S1 , S2 +  ABDOMEN: soft, bs+  EXTREMITIES:  trace edema  NEUROLOGY:alert awake confused      LABS:      140  |  96<L>  |  8   ----------------------------<  69<L>  4.0   |  36<H>  |  0.61    Ca    9.4      2023 06:39  Phos  3.2       Mg     1.80           Creatinine Trend: 0.61 <--, 0.67 <--, 0.76 <--, 0.68 <--, 0.67 <--                        7.9    8.51  )-----------( 256      ( 2023 06:39 )             27.7     Urine Studies:  Urinalysis Basic - ( 2023 07:51 )    Color: Light Yellow / Appearance: Clear / S.017 / pH:   Gluc:  / Ketone: Negative  / Bili: Negative / Urobili: <2 mg/dL   Blood:  / Protein: 30 mg/dL / Nitrite: Negative   Leuk Esterase: Moderate / RBC: 2 /HPF / WBC 20-30 /HPF   Sq Epi:  / Non Sq Epi:  / Bacteria: Few                            Imaging Personally Reviewed:yes    Consultant(s) Notes Reviewed:  yes    Care Discussed with Consultants/Other Providers:yes  
    SUBJECTIVE / OVERNIGHT EVENTS:pt seen and examined, pts family next to pts bed  23     MEDICATIONS  (STANDING):  albuterol/ipratropium for Nebulization 3 milliLiter(s) Nebulizer every 6 hours  cholecalciferol 1000 Unit(s) Oral every 24 hours  dextrose 5%. 1000 milliLiter(s) (100 mL/Hr) IV Continuous <Continuous>  dextrose 5%. 1000 milliLiter(s) (50 mL/Hr) IV Continuous <Continuous>  dextrose 50% Injectable 25 Gram(s) IV Push once  dextrose 50% Injectable 12.5 Gram(s) IV Push once  dextrose 50% Injectable 25 Gram(s) IV Push once  enoxaparin Injectable 40 milliGRAM(s) SubCutaneous every 24 hours  glucagon  Injectable 1 milliGRAM(s) IntraMuscular once  influenza  Vaccine (HIGH DOSE) 0.7 milliLiter(s) IntraMuscular once  insulin glargine Injectable (LANTUS) 12 Unit(s) SubCutaneous at bedtime  insulin lispro (ADMELOG) corrective regimen sliding scale   SubCutaneous three times a day before meals  insulin lispro (ADMELOG) corrective regimen sliding scale   SubCutaneous at bedtime  insulin lispro Injectable (ADMELOG) 6 Unit(s) SubCutaneous three times a day before meals  levothyroxine 50 MICROGram(s) Oral daily  metoprolol succinate ER 25 milliGRAM(s) Oral daily  piperacillin/tazobactam IVPB.. 3.375 Gram(s) IV Intermittent every 8 hours  simvastatin 20 milliGRAM(s) Oral at bedtime  vancomycin  IVPB 750 milliGRAM(s) IV Intermittent every 12 hours    MEDICATIONS  (PRN):  dextrose Oral Gel 15 Gram(s) Oral once PRN Blood Glucose LESS THAN 70 milliGRAM(s)/deciliter    T(C): 36.7 (23 @ 18:05), Max: 37 (23 @ 12:26)  HR: 87 (23 @ 20:40) (87 - 97)  BP: 124/66 (23 @ 18:05) (124/66 - 133/67)  RR: 18 (23 @ 18:05) (18 - 20)  SpO2: 100% (23 @ 20:40) (95% - 100%)    CAPILLARY BLOOD GLUCOSE      POCT Blood Glucose.: 193 mg/dL (2023 18:02)  POCT Blood Glucose.: 290 mg/dL (2023 11:49)  POCT Blood Glucose.: 172 mg/dL (2023 08:45)  POCT Blood Glucose.: 188 mg/dL (2023 06:35)    I&O's Summary    2023 07:01  -  2023 21:46  --------------------------------------------------------  IN: 255 mL / OUT: 0 mL / NET: 255 mL        PHYSICAL EXAM:  GENERAL: NAD  EYES: EOMI, PERRLA  NECK: Supple, No JVD  CHEST/LUNG: dec breath sounds at bases  HEART:  S1 , S2 +  ABDOMEN: soft, bs+  EXTREMITIES:  trace edema  NEUROLOGY:alert awake confused      LABS:                        8.9    16.65 )-----------( 255      ( 2023 07:00 )             32.0     02-08    134<L>  |  95<L>  |  13  ----------------------------<  158<H>  4.5   |  29  |  0.68    Ca    9.0      2023 07:00  Phos  2.6     02-08  Mg     1.70     02-08    TPro  6.5  /  Alb  3.8  /  TBili  0.3  /  DBili  x   /  AST  14  /  ALT  12  /  AlkPhos  64  02-07          Urinalysis Basic - ( 2023 07:51 )    Color: Light Yellow / Appearance: Clear / S.017 / pH: x  Gluc: x / Ketone: Negative  / Bili: Negative / Urobili: <2 mg/dL   Blood: x / Protein: 30 mg/dL / Nitrite: Negative   Leuk Esterase: Moderate / RBC: 2 /HPF / WBC 20-30 /HPF   Sq Epi: x / Non Sq Epi: x / Bacteria: Few        RADIOLOGY & ADDITIONAL TESTS:    Imaging Personally Reviewed:yes    Consultant(s) Notes Reviewed:  yes    Care Discussed with Consultants/Other Providers:yes

## 2023-02-16 NOTE — SWALLOW BEDSIDE ASSESSMENT ADULT - SWALLOW EVAL: DIAGNOSIS
Upon entering the room, patient noted with increased work of breath, utilizing accessory muscles with SPO2 noted ~86%. Nursing informed and adjusted nasal cannula. Per nursing, patient received breathing treatment one hour prior. SPO2 improved slightly to ~89-90% after five minute period, however patient continued to present with increased work of breath and use of accessory muscles. Oral and pharyngeal phases of the swallow could not be assessed given patient's respiratory status appears compromised evidenced by shortness of breath, use of accessory muscles and desaturation to ~86%.
1. Functional oral phase for puree, soft and bite sized, regular solids, mildly-thick and thin liquids marked by adequate acceptance and containment, adequate mastication of solids, adequate oral transit and adequate oral clearance. 2. Functional pharyngeal phase for aforementioned consistencies marked by hyolaryngeal excursion present upon palpation and no overt s/s of penetration/aspiration evidenced.

## 2023-02-16 NOTE — PHYSICAL THERAPY INITIAL EVALUATION ADULT - PERTINENT HX OF CURRENT PROBLEM, REHAB EVAL
78 year old Female with PMH HTN, HLD, DM, asthma with recent admission to [facility] for viral PNA who presents for diffuse body aches and feeling anxious

## 2023-02-16 NOTE — SWALLOW BEDSIDE ASSESSMENT ADULT - COMMENTS
As per Internal Medicine note dated 2/14/23, "79 y/o F with PMH HTN, HLD, DM, asthma with recent admission to [facility] for viral PNA who presents for diffuse body aches and feeling anxious."    CT Chest 2/14/23 "IMPRESSION: A few patchy and clustered bilateral lung opacities are new from the prior study may be infectious or inflammatory. Other bilateral lung opacities and consolidations are unchanged from the most recent prior study and indeterminate."    Patient visited at bedside for clinical swallow evaluation. Patient presents as awake and alert, sitting upright in bed. Baseline congested cough noted.
As per Internal Medicine note dated 2/15/23, "79 y/o F with PMH HTN, HLD, DM, asthma with recent admission to [facility] for viral PNA who presents for diffuse body aches and feeling anxious."    Per Pulmonology 2/15/23, "if she has not had swallow eval- suggest that- r/o aspiration causing her to cough"    CT Chest 2/14/23 "IMPRESSION: A few patchy and clustered bilateral lung opacities are new from the prior study may be infectious or inflammatory. Other bilateral lung opacities and consolidations are unchanged from the most recent prior study and indeterminate."    Patient is known to this service, seen on 2/15/23 with recommendation of "Defer to MD given unable to evaluate swallow due to patient presenting with respiratory compromise which may increase the risk of aspiration with oral intake" (See note for details).    Patient visited at bedside for clinical swallow evaluation with daughter-in-law present.  services offered in patient's primary language of Lao, however declined with patient preference for daughter-in-law to provide translation. Patient presents as awake and alert, able to follow basic commands and make basic wants/needs known. Baseline cough observed with expectoration of thick secretions. Patient receiving supplemental oxygen via nasal cannula with oxygen saturation ~100% upon initiation of evaluation and maintained ~98% upon completion.

## 2023-02-16 NOTE — DISCHARGE NOTE NURSING/CASE MANAGEMENT/SOCIAL WORK - PATIENT PORTAL LINK FT
You can access the FollowMyHealth Patient Portal offered by [facility] by registering at the following website: [email or web address]. By joining [facility]’s FollowMyHealth portal, you will also be able to view your health information using other applications (apps) compatible with our system.

## 2023-02-16 NOTE — PHYSICAL THERAPY INITIAL EVALUATION ADULT - ADDITIONAL COMMENTS
Pt spo2 at start of session assessed to be 96% on 2 liter supplemental o2 via NC. After standing and ambulating 2-4 side steps, pt. spo2 decreased to 79% and was presenting with SOB. Pt. immediately returned semi-supine in bed, nursing staff at bedside during event. Spo2 returned to 90% after rest break. Pt. left comfortable in bed with all tubes/lines intact, call bell in reach and in NAD.    daughter provided translation services for pt. during session

## 2023-02-16 NOTE — SWALLOW BEDSIDE ASSESSMENT ADULT - SWALLOW EVAL: RECOMMENDED DIET
1. Regular Solids with Thin Liquids 1. Regular Solids with Thin Liquids. Monitor patient's oxygen saturation before, during and after meals.

## 2023-02-16 NOTE — DISCHARGE NOTE NURSING/CASE MANAGEMENT/SOCIAL WORK - NSDCPEFALRISK_GEN_ALL_CORE
For information on Fall & Injury Prevention, visit: [email or web address] OR  [email or web address] OR  https://www.cdc.gov/steadi/patient.html

## 2023-04-09 ENCOUNTER — INPATIENT (INPATIENT)
Facility: HOSPITAL | Age: 78
LOS: 11 days | Discharge: HOSPICE | End: 2023-04-21
Attending: INTERNAL MEDICINE | Admitting: INTERNAL MEDICINE
Payer: MEDICARE

## 2023-04-09 VITALS
DIASTOLIC BLOOD PRESSURE: 83 MMHG | SYSTOLIC BLOOD PRESSURE: 146 MMHG | OXYGEN SATURATION: 97 % | TEMPERATURE: 98 F | RESPIRATION RATE: 18 BRPM | HEART RATE: 64 BPM

## 2023-04-09 DIAGNOSIS — Z29.9 ENCOUNTER FOR PROPHYLACTIC MEASURES, UNSPECIFIED: ICD-10-CM

## 2023-04-09 DIAGNOSIS — I10 ESSENTIAL (PRIMARY) HYPERTENSION: ICD-10-CM

## 2023-04-09 DIAGNOSIS — J90 PLEURAL EFFUSION, NOT ELSEWHERE CLASSIFIED: ICD-10-CM

## 2023-04-09 DIAGNOSIS — E87.5 HYPERKALEMIA: ICD-10-CM

## 2023-04-09 DIAGNOSIS — E03.9 HYPOTHYROIDISM, UNSPECIFIED: ICD-10-CM

## 2023-04-09 DIAGNOSIS — H26.9 UNSPECIFIED CATARACT: Chronic | ICD-10-CM

## 2023-04-09 DIAGNOSIS — R55 SYNCOPE AND COLLAPSE: ICD-10-CM

## 2023-04-09 DIAGNOSIS — D50.9 IRON DEFICIENCY ANEMIA, UNSPECIFIED: ICD-10-CM

## 2023-04-09 DIAGNOSIS — J18.9 PNEUMONIA, UNSPECIFIED ORGANISM: ICD-10-CM

## 2023-04-09 DIAGNOSIS — E11.9 TYPE 2 DIABETES MELLITUS WITHOUT COMPLICATIONS: ICD-10-CM

## 2023-04-09 LAB
ACANTHOCYTES BLD QL SMEAR: SLIGHT — SIGNIFICANT CHANGE UP
ALBUMIN SERPL ELPH-MCNC: 3.8 G/DL — SIGNIFICANT CHANGE UP (ref 3.3–5)
ALBUMIN SERPL ELPH-MCNC: 3.8 G/DL — SIGNIFICANT CHANGE UP (ref 3.3–5)
ALBUMIN SERPL ELPH-MCNC: 4 G/DL — SIGNIFICANT CHANGE UP (ref 3.3–5)
ALP SERPL-CCNC: 163 U/L — HIGH (ref 40–120)
ALP SERPL-CCNC: 170 U/L — HIGH (ref 40–120)
ALP SERPL-CCNC: 197 U/L — HIGH (ref 40–120)
ALT FLD-CCNC: 104 U/L — HIGH (ref 4–33)
ALT FLD-CCNC: 122 U/L — HIGH (ref 4–33)
ALT FLD-CCNC: 131 U/L — HIGH (ref 4–33)
ANION GAP SERPL CALC-SCNC: 10 MMOL/L — SIGNIFICANT CHANGE UP (ref 7–14)
ANION GAP SERPL CALC-SCNC: 11 MMOL/L — SIGNIFICANT CHANGE UP (ref 7–14)
ANION GAP SERPL CALC-SCNC: 13 MMOL/L — SIGNIFICANT CHANGE UP (ref 7–14)
ANISOCYTOSIS BLD QL: SLIGHT — SIGNIFICANT CHANGE UP
APPEARANCE UR: CLEAR — SIGNIFICANT CHANGE UP
APTT BLD: 24.6 SEC — LOW (ref 27–36.3)
AST SERPL-CCNC: 107 U/L — HIGH (ref 4–32)
AST SERPL-CCNC: 62 U/L — HIGH (ref 4–32)
AST SERPL-CCNC: 84 U/L — HIGH (ref 4–32)
B PERT DNA SPEC QL NAA+PROBE: SIGNIFICANT CHANGE UP
B PERT+PARAPERT DNA PNL SPEC NAA+PROBE: SIGNIFICANT CHANGE UP
BACTERIA # UR AUTO: NEGATIVE — SIGNIFICANT CHANGE UP
BASE EXCESS BLDV CALC-SCNC: -1.6 MMOL/L — SIGNIFICANT CHANGE UP (ref -2–3)
BASOPHILS # BLD AUTO: 0.09 K/UL — SIGNIFICANT CHANGE UP (ref 0–0.2)
BASOPHILS NFR BLD AUTO: 0.9 % — SIGNIFICANT CHANGE UP (ref 0–2)
BILIRUB SERPL-MCNC: 0.3 MG/DL — SIGNIFICANT CHANGE UP (ref 0.2–1.2)
BILIRUB SERPL-MCNC: 0.3 MG/DL — SIGNIFICANT CHANGE UP (ref 0.2–1.2)
BILIRUB SERPL-MCNC: 0.9 MG/DL — SIGNIFICANT CHANGE UP (ref 0.2–1.2)
BILIRUB UR-MCNC: NEGATIVE — SIGNIFICANT CHANGE UP
BLD GP AB SCN SERPL QL: NEGATIVE — SIGNIFICANT CHANGE UP
BLOOD GAS VENOUS COMPREHENSIVE RESULT: SIGNIFICANT CHANGE UP
BORDETELLA PARAPERTUSSIS (RAPRVP): SIGNIFICANT CHANGE UP
BUN SERPL-MCNC: 20 MG/DL — SIGNIFICANT CHANGE UP (ref 7–23)
BUN SERPL-MCNC: 21 MG/DL — SIGNIFICANT CHANGE UP (ref 7–23)
BUN SERPL-MCNC: 22 MG/DL — SIGNIFICANT CHANGE UP (ref 7–23)
C PNEUM DNA SPEC QL NAA+PROBE: SIGNIFICANT CHANGE UP
CALCIUM SERPL-MCNC: 9.1 MG/DL — SIGNIFICANT CHANGE UP (ref 8.4–10.5)
CALCIUM SERPL-MCNC: 9.2 MG/DL — SIGNIFICANT CHANGE UP (ref 8.4–10.5)
CALCIUM SERPL-MCNC: 9.8 MG/DL — SIGNIFICANT CHANGE UP (ref 8.4–10.5)
CHLORIDE BLDV-SCNC: 97 MMOL/L — SIGNIFICANT CHANGE UP (ref 96–108)
CHLORIDE SERPL-SCNC: 100 MMOL/L — SIGNIFICANT CHANGE UP (ref 98–107)
CHLORIDE SERPL-SCNC: 100 MMOL/L — SIGNIFICANT CHANGE UP (ref 98–107)
CHLORIDE SERPL-SCNC: 97 MMOL/L — LOW (ref 98–107)
CO2 BLDV-SCNC: 29.1 MMOL/L — HIGH (ref 22–26)
CO2 SERPL-SCNC: 23 MMOL/L — SIGNIFICANT CHANGE UP (ref 22–31)
CO2 SERPL-SCNC: 27 MMOL/L — SIGNIFICANT CHANGE UP (ref 22–31)
CO2 SERPL-SCNC: 27 MMOL/L — SIGNIFICANT CHANGE UP (ref 22–31)
COLOR SPEC: SIGNIFICANT CHANGE UP
CREAT SERPL-MCNC: 0.77 MG/DL — SIGNIFICANT CHANGE UP (ref 0.5–1.3)
CREAT SERPL-MCNC: 0.86 MG/DL — SIGNIFICANT CHANGE UP (ref 0.5–1.3)
CREAT SERPL-MCNC: 0.86 MG/DL — SIGNIFICANT CHANGE UP (ref 0.5–1.3)
DIFF PNL FLD: NEGATIVE — SIGNIFICANT CHANGE UP
EGFR: 68 ML/MIN/1.73M2 — SIGNIFICANT CHANGE UP
EGFR: 68 ML/MIN/1.73M2 — SIGNIFICANT CHANGE UP
EGFR: 78 ML/MIN/1.73M2 — SIGNIFICANT CHANGE UP
ELLIPTOCYTES BLD QL SMEAR: SLIGHT — SIGNIFICANT CHANGE UP
EOSINOPHIL # BLD AUTO: 0.09 K/UL — SIGNIFICANT CHANGE UP (ref 0–0.5)
EOSINOPHIL NFR BLD AUTO: 0.9 % — SIGNIFICANT CHANGE UP (ref 0–6)
EPI CELLS # UR: 3 /HPF — SIGNIFICANT CHANGE UP (ref 0–5)
FERRITIN SERPL-MCNC: 23 NG/ML — SIGNIFICANT CHANGE UP (ref 15–150)
FLUAV SUBTYP SPEC NAA+PROBE: SIGNIFICANT CHANGE UP
FLUBV RNA SPEC QL NAA+PROBE: SIGNIFICANT CHANGE UP
GAS PNL BLDV: 133 MMOL/L — LOW (ref 136–145)
GAS PNL BLDV: SIGNIFICANT CHANGE UP
GIANT PLATELETS BLD QL SMEAR: PRESENT — SIGNIFICANT CHANGE UP
GLUCOSE BLDC GLUCOMTR-MCNC: 151 MG/DL — HIGH (ref 70–99)
GLUCOSE BLDC GLUCOMTR-MCNC: 171 MG/DL — HIGH (ref 70–99)
GLUCOSE BLDV-MCNC: 238 MG/DL — HIGH (ref 70–99)
GLUCOSE SERPL-MCNC: 167 MG/DL — HIGH (ref 70–99)
GLUCOSE SERPL-MCNC: 201 MG/DL — HIGH (ref 70–99)
GLUCOSE SERPL-MCNC: 253 MG/DL — HIGH (ref 70–99)
GLUCOSE UR QL: ABNORMAL
HADV DNA SPEC QL NAA+PROBE: SIGNIFICANT CHANGE UP
HCO3 BLDV-SCNC: 27 MMOL/L — SIGNIFICANT CHANGE UP (ref 22–29)
HCOV 229E RNA SPEC QL NAA+PROBE: SIGNIFICANT CHANGE UP
HCOV HKU1 RNA SPEC QL NAA+PROBE: SIGNIFICANT CHANGE UP
HCOV NL63 RNA SPEC QL NAA+PROBE: SIGNIFICANT CHANGE UP
HCOV OC43 RNA SPEC QL NAA+PROBE: SIGNIFICANT CHANGE UP
HCT VFR BLD CALC: 29.1 % — LOW (ref 34.5–45)
HCT VFR BLDA CALC: 26 % — LOW (ref 34.5–46.5)
HGB BLD CALC-MCNC: 8.5 G/DL — LOW (ref 11.7–16.1)
HGB BLD-MCNC: 7.7 G/DL — LOW (ref 11.5–15.5)
HMPV RNA SPEC QL NAA+PROBE: SIGNIFICANT CHANGE UP
HPIV1 RNA SPEC QL NAA+PROBE: SIGNIFICANT CHANGE UP
HPIV2 RNA SPEC QL NAA+PROBE: SIGNIFICANT CHANGE UP
HPIV3 RNA SPEC QL NAA+PROBE: SIGNIFICANT CHANGE UP
HPIV4 RNA SPEC QL NAA+PROBE: SIGNIFICANT CHANGE UP
HYALINE CASTS # UR AUTO: 0 /LPF — SIGNIFICANT CHANGE UP (ref 0–7)
HYPOCHROMIA BLD QL: SLIGHT — SIGNIFICANT CHANGE UP
IANC: 8.35 K/UL — HIGH (ref 1.8–7.4)
INR BLD: 1.15 RATIO — SIGNIFICANT CHANGE UP (ref 0.88–1.16)
IRON SATN MFR SERPL: 18 UG/DL — LOW (ref 30–160)
IRON SATN MFR SERPL: 4 % — LOW (ref 14–50)
KETONES UR-MCNC: NEGATIVE — SIGNIFICANT CHANGE UP
LACTATE BLDV-MCNC: 5 MMOL/L — CRITICAL HIGH (ref 0.5–2)
LACTATE SERPL-SCNC: 1.6 MMOL/L — SIGNIFICANT CHANGE UP (ref 0.5–2)
LEUKOCYTE ESTERASE UR-ACNC: NEGATIVE — SIGNIFICANT CHANGE UP
LIDOCAIN IGE QN: 18 U/L — SIGNIFICANT CHANGE UP (ref 7–60)
LYMPHOCYTES # BLD AUTO: 0.92 K/UL — LOW (ref 1–3.3)
LYMPHOCYTES # BLD AUTO: 8.8 % — LOW (ref 13–44)
M PNEUMO DNA SPEC QL NAA+PROBE: SIGNIFICANT CHANGE UP
MANUAL SMEAR VERIFICATION: SIGNIFICANT CHANGE UP
MCHC RBC-ENTMCNC: 19.3 PG — LOW (ref 27–34)
MCHC RBC-ENTMCNC: 26.5 GM/DL — LOW (ref 32–36)
MCV RBC AUTO: 72.9 FL — LOW (ref 80–100)
MICROCYTES BLD QL: SLIGHT — SIGNIFICANT CHANGE UP
MONOCYTES # BLD AUTO: 0.64 K/UL — SIGNIFICANT CHANGE UP (ref 0–0.9)
MONOCYTES NFR BLD AUTO: 6.1 % — SIGNIFICANT CHANGE UP (ref 2–14)
NEUTROPHILS # BLD AUTO: 8.72 K/UL — HIGH (ref 1.8–7.4)
NEUTROPHILS NFR BLD AUTO: 82.4 % — HIGH (ref 43–77)
NEUTS BAND # BLD: 0.9 % — SIGNIFICANT CHANGE UP (ref 0–6)
NITRITE UR-MCNC: NEGATIVE — SIGNIFICANT CHANGE UP
NT-PROBNP SERPL-SCNC: 4995 PG/ML — HIGH
OVALOCYTES BLD QL SMEAR: SLIGHT — SIGNIFICANT CHANGE UP
PCO2 BLDV: 69 MMHG — HIGH (ref 39–52)
PH BLDV: 7.2 — LOW (ref 7.32–7.43)
PH UR: 6.5 — SIGNIFICANT CHANGE UP (ref 5–8)
PLAT MORPH BLD: NORMAL — SIGNIFICANT CHANGE UP
PLATELET # BLD AUTO: 226 K/UL — SIGNIFICANT CHANGE UP (ref 150–400)
PLATELET COUNT - ESTIMATE: NORMAL — SIGNIFICANT CHANGE UP
PO2 BLDV: 46 MMHG — HIGH (ref 25–45)
POIKILOCYTOSIS BLD QL AUTO: SLIGHT — SIGNIFICANT CHANGE UP
POLYCHROMASIA BLD QL SMEAR: SIGNIFICANT CHANGE UP
POTASSIUM BLDV-SCNC: 5.4 MMOL/L — HIGH (ref 3.5–5.1)
POTASSIUM SERPL-MCNC: 5.3 MMOL/L — SIGNIFICANT CHANGE UP (ref 3.5–5.3)
POTASSIUM SERPL-MCNC: 5.9 MMOL/L — HIGH (ref 3.5–5.3)
POTASSIUM SERPL-MCNC: 5.9 MMOL/L — HIGH (ref 3.5–5.3)
POTASSIUM SERPL-SCNC: 5.3 MMOL/L — SIGNIFICANT CHANGE UP (ref 3.5–5.3)
POTASSIUM SERPL-SCNC: 5.9 MMOL/L — HIGH (ref 3.5–5.3)
POTASSIUM SERPL-SCNC: 5.9 MMOL/L — HIGH (ref 3.5–5.3)
PROT SERPL-MCNC: 5.9 G/DL — LOW (ref 6–8.3)
PROT SERPL-MCNC: 6.2 G/DL — SIGNIFICANT CHANGE UP (ref 6–8.3)
PROT SERPL-MCNC: 6.4 G/DL — SIGNIFICANT CHANGE UP (ref 6–8.3)
PROT UR-MCNC: ABNORMAL
PROTHROM AB SERPL-ACNC: 13.4 SEC — SIGNIFICANT CHANGE UP (ref 10.5–13.4)
RAPID RVP RESULT: SIGNIFICANT CHANGE UP
RBC # BLD: 3.99 M/UL — SIGNIFICANT CHANGE UP (ref 3.8–5.2)
RBC # FLD: 19.6 % — HIGH (ref 10.3–14.5)
RBC BLD AUTO: ABNORMAL
RBC CASTS # UR COMP ASSIST: 0 /HPF — SIGNIFICANT CHANGE UP (ref 0–4)
RH IG SCN BLD-IMP: POSITIVE — SIGNIFICANT CHANGE UP
RSV RNA SPEC QL NAA+PROBE: SIGNIFICANT CHANGE UP
RV+EV RNA SPEC QL NAA+PROBE: SIGNIFICANT CHANGE UP
SAO2 % BLDV: 67.9 % — SIGNIFICANT CHANGE UP (ref 67–88)
SARS-COV-2 RNA SPEC QL NAA+PROBE: SIGNIFICANT CHANGE UP
SCHISTOCYTES BLD QL AUTO: SLIGHT — SIGNIFICANT CHANGE UP
SODIUM SERPL-SCNC: 133 MMOL/L — LOW (ref 135–145)
SODIUM SERPL-SCNC: 137 MMOL/L — SIGNIFICANT CHANGE UP (ref 135–145)
SODIUM SERPL-SCNC: 138 MMOL/L — SIGNIFICANT CHANGE UP (ref 135–145)
SP GR SPEC: >1.05 (ref 1.01–1.05)
STOMATOCYTES BLD QL SMEAR: SLIGHT — SIGNIFICANT CHANGE UP
TIBC SERPL-MCNC: 411 UG/DL — SIGNIFICANT CHANGE UP (ref 220–430)
TROPONIN T, HIGH SENSITIVITY RESULT: 29 NG/L — SIGNIFICANT CHANGE UP
UIBC SERPL-MCNC: 393 UG/DL — HIGH (ref 110–370)
UROBILINOGEN FLD QL: SIGNIFICANT CHANGE UP
WBC # BLD: 10.47 K/UL — SIGNIFICANT CHANGE UP (ref 3.8–10.5)
WBC # FLD AUTO: 10.47 K/UL — SIGNIFICANT CHANGE UP (ref 3.8–10.5)
WBC UR QL: 2 /HPF — SIGNIFICANT CHANGE UP (ref 0–5)

## 2023-04-09 PROCEDURE — 99497 ADVNCD CARE PLAN 30 MIN: CPT | Mod: 25

## 2023-04-09 PROCEDURE — 99223 1ST HOSP IP/OBS HIGH 75: CPT

## 2023-04-09 PROCEDURE — 71045 X-RAY EXAM CHEST 1 VIEW: CPT | Mod: 26

## 2023-04-09 PROCEDURE — 74177 CT ABD & PELVIS W/CONTRAST: CPT | Mod: 26,MA

## 2023-04-09 PROCEDURE — 70450 CT HEAD/BRAIN W/O DYE: CPT | Mod: 26,MA

## 2023-04-09 PROCEDURE — 99285 EMERGENCY DEPT VISIT HI MDM: CPT

## 2023-04-09 RX ORDER — ACETAMINOPHEN 500 MG
650 TABLET ORAL EVERY 6 HOURS
Refills: 0 | Status: DISCONTINUED | OUTPATIENT
Start: 2023-04-09 | End: 2023-04-21

## 2023-04-09 RX ORDER — INSULIN GLARGINE 100 [IU]/ML
6 INJECTION, SOLUTION SUBCUTANEOUS AT BEDTIME
Refills: 0 | Status: DISCONTINUED | OUTPATIENT
Start: 2023-04-10 | End: 2023-04-21

## 2023-04-09 RX ORDER — SODIUM CHLORIDE 9 MG/ML
1000 INJECTION, SOLUTION INTRAVENOUS
Refills: 0 | Status: DISCONTINUED | OUTPATIENT
Start: 2023-04-09 | End: 2023-04-21

## 2023-04-09 RX ORDER — DEXTROSE 50 % IN WATER 50 %
25 SYRINGE (ML) INTRAVENOUS ONCE
Refills: 0 | Status: DISCONTINUED | OUTPATIENT
Start: 2023-04-09 | End: 2023-04-21

## 2023-04-09 RX ORDER — DEXTROSE 50 % IN WATER 50 %
12.5 SYRINGE (ML) INTRAVENOUS ONCE
Refills: 0 | Status: DISCONTINUED | OUTPATIENT
Start: 2023-04-09 | End: 2023-04-21

## 2023-04-09 RX ORDER — PIPERACILLIN AND TAZOBACTAM 4; .5 G/20ML; G/20ML
3.38 INJECTION, POWDER, LYOPHILIZED, FOR SOLUTION INTRAVENOUS ONCE
Refills: 0 | Status: COMPLETED | OUTPATIENT
Start: 2023-04-10 | End: 2023-04-10

## 2023-04-09 RX ORDER — ALBUTEROL 90 UG/1
2 AEROSOL, METERED ORAL
Refills: 0 | Status: DISCONTINUED | OUTPATIENT
Start: 2023-04-09 | End: 2023-04-12

## 2023-04-09 RX ORDER — VANCOMYCIN HCL 1 G
1000 VIAL (EA) INTRAVENOUS EVERY 12 HOURS
Refills: 0 | Status: DISCONTINUED | OUTPATIENT
Start: 2023-04-09 | End: 2023-04-10

## 2023-04-09 RX ORDER — SIMVASTATIN 20 MG/1
20 TABLET, FILM COATED ORAL AT BEDTIME
Refills: 0 | Status: DISCONTINUED | OUTPATIENT
Start: 2023-04-09 | End: 2023-04-21

## 2023-04-09 RX ORDER — PANTOPRAZOLE SODIUM 20 MG/1
40 TABLET, DELAYED RELEASE ORAL
Refills: 0 | Status: DISCONTINUED | OUTPATIENT
Start: 2023-04-09 | End: 2023-04-10

## 2023-04-09 RX ORDER — PIPERACILLIN AND TAZOBACTAM 4; .5 G/20ML; G/20ML
3.38 INJECTION, POWDER, LYOPHILIZED, FOR SOLUTION INTRAVENOUS ONCE
Refills: 0 | Status: COMPLETED | OUTPATIENT
Start: 2023-04-09 | End: 2023-04-09

## 2023-04-09 RX ORDER — ONDANSETRON 8 MG/1
4 TABLET, FILM COATED ORAL ONCE
Refills: 0 | Status: COMPLETED | OUTPATIENT
Start: 2023-04-09 | End: 2023-04-09

## 2023-04-09 RX ORDER — IPRATROPIUM/ALBUTEROL SULFATE 18-103MCG
3 AEROSOL WITH ADAPTER (GRAM) INHALATION EVERY 6 HOURS
Refills: 0 | Status: DISCONTINUED | OUTPATIENT
Start: 2023-04-09 | End: 2023-04-21

## 2023-04-09 RX ORDER — PIPERACILLIN AND TAZOBACTAM 4; .5 G/20ML; G/20ML
3.38 INJECTION, POWDER, LYOPHILIZED, FOR SOLUTION INTRAVENOUS EVERY 8 HOURS
Refills: 0 | Status: COMPLETED | OUTPATIENT
Start: 2023-04-10 | End: 2023-04-17

## 2023-04-09 RX ORDER — LANOLIN ALCOHOL/MO/W.PET/CERES
3 CREAM (GRAM) TOPICAL AT BEDTIME
Refills: 0 | Status: DISCONTINUED | OUTPATIENT
Start: 2023-04-09 | End: 2023-04-21

## 2023-04-09 RX ORDER — INSULIN LISPRO 100/ML
VIAL (ML) SUBCUTANEOUS
Refills: 0 | Status: DISCONTINUED | OUTPATIENT
Start: 2023-04-09 | End: 2023-04-21

## 2023-04-09 RX ORDER — TIOTROPIUM BROMIDE 18 UG/1
2 CAPSULE ORAL; RESPIRATORY (INHALATION) DAILY
Refills: 0 | Status: DISCONTINUED | OUTPATIENT
Start: 2023-04-09 | End: 2023-04-21

## 2023-04-09 RX ORDER — SODIUM ZIRCONIUM CYCLOSILICATE 10 G/10G
10 POWDER, FOR SUSPENSION ORAL ONCE
Refills: 0 | Status: COMPLETED | OUTPATIENT
Start: 2023-04-09 | End: 2023-04-09

## 2023-04-09 RX ORDER — BUDESONIDE AND FORMOTEROL FUMARATE DIHYDRATE 160; 4.5 UG/1; UG/1
2 AEROSOL RESPIRATORY (INHALATION)
Refills: 0 | Status: DISCONTINUED | OUTPATIENT
Start: 2023-04-09 | End: 2023-04-21

## 2023-04-09 RX ORDER — ALBUTEROL 90 UG/1
2 AEROSOL, METERED ORAL EVERY 6 HOURS
Refills: 0 | Status: DISCONTINUED | OUTPATIENT
Start: 2023-04-09 | End: 2023-04-09

## 2023-04-09 RX ORDER — LEVOTHYROXINE SODIUM 125 MCG
50 TABLET ORAL DAILY
Refills: 0 | Status: DISCONTINUED | OUTPATIENT
Start: 2023-04-09 | End: 2023-04-21

## 2023-04-09 RX ORDER — GLUCAGON INJECTION, SOLUTION 0.5 MG/.1ML
1 INJECTION, SOLUTION SUBCUTANEOUS ONCE
Refills: 0 | Status: DISCONTINUED | OUTPATIENT
Start: 2023-04-09 | End: 2023-04-21

## 2023-04-09 RX ORDER — INSULIN LISPRO 100/ML
VIAL (ML) SUBCUTANEOUS AT BEDTIME
Refills: 0 | Status: DISCONTINUED | OUTPATIENT
Start: 2023-04-09 | End: 2023-04-21

## 2023-04-09 RX ORDER — DEXTROSE 50 % IN WATER 50 %
15 SYRINGE (ML) INTRAVENOUS ONCE
Refills: 0 | Status: DISCONTINUED | OUTPATIENT
Start: 2023-04-09 | End: 2023-04-21

## 2023-04-09 RX ORDER — FAMOTIDINE 10 MG/ML
20 INJECTION INTRAVENOUS ONCE
Refills: 0 | Status: COMPLETED | OUTPATIENT
Start: 2023-04-09 | End: 2023-04-09

## 2023-04-09 RX ORDER — ATROPINE SULFATE 0.1 MG/ML
1 SYRINGE (ML) INJECTION ONCE
Refills: 0 | Status: COMPLETED | OUTPATIENT
Start: 2023-04-09 | End: 2023-04-09

## 2023-04-09 RX ADMIN — Medication 1 MILLIGRAM(S): at 14:20

## 2023-04-09 RX ADMIN — ONDANSETRON 4 MILLIGRAM(S): 8 TABLET, FILM COATED ORAL at 10:42

## 2023-04-09 RX ADMIN — FAMOTIDINE 20 MILLIGRAM(S): 10 INJECTION INTRAVENOUS at 10:42

## 2023-04-09 NOTE — ED PROVIDER NOTE - CLINICAL SUMMARY MEDICAL DECISION MAKING FREE TEXT BOX
77 y/o F with PMH HTN, HLD, DM, asthma (has home 4L O2) coming in for syncope; patient appears pale on exam; concerns for anemia vs electrolyte abnormalities vs infectious etiology. Anticipate admission

## 2023-04-09 NOTE — ED ADULT NURSE NOTE - OBJECTIVE STATEMENT
Patient is a 78-year-old female, primarily Chad speaking Phx of HTN, DM, pulmonary TB, hypothyroidism, with son Tish Burciaga at bedside to translate [phone] at bedside received to room 3, had X 3 syncopal episodes last night followed by an episode of vomiting. Endorsing nausea. Pt appears tachypneic and O2 noted to go to 77% on room air. Placed on 6L nasal cannula and O2 to 100%. Pt appearing pale in color. 20 G IVL placed in L AC. Labs drawn and sent. MD Venegas and Favio Kraft at bedside for evaluation. Bed set in lowest position. Side rails X 2. NSR on cardiac monitor. Safety implemented. Will continue to monitor.

## 2023-04-09 NOTE — H&P ADULT - PROBLEM SELECTOR PLAN 9
- patient is high risk for bleeding, hold heparin subq for now  - venodyne boots  - As per current hospital policy, this patient will be followed by the private attending and to take over the case in the morning, and assume complete responsibility by Dr. Gonzalez as listed on the attending of record as above on the chart.

## 2023-04-09 NOTE — ED PROVIDER NOTE - OBJECTIVE STATEMENT
79 y/o F with PMH HTN, HLD, DM, asthma (has home 4L O2) coming in for syncope. Patient primarily speaks carleen and translation is provided by son as patient deferred translational services. Son states that she has had 3 syncopal episodes in the past 12 hours while patient walks to the bathroom; she feels very tired and brings herself to the floor. Son notes that patient appears pale but denies any bloody BM or dark colored stools. Son states that she appears more tired over the past 2 weeks.

## 2023-04-09 NOTE — H&P ADULT - PROBLEM SELECTOR PLAN 1
Assessment:  - patient presented for new onset syncope  - likely secondary to orthostatic hypotension vs. underlying infection  - CT head negative  - troponins neg x1, EKG shows NSR    Plan:  - tele monitoring  - orthostatics to be checked  - hold BP meds for now  - cardiology consult

## 2023-04-09 NOTE — ED ADULT NURSE REASSESSMENT NOTE - NS ED NURSE REASSESS COMMENT FT1
Break RN: Pt is alert and resting in stretcher. Pt does not appear to be in any acute distress at this time. Respirations even and unlabored, chest rise equal b/l. NSR on cardiac monitor. Pt remains on Zoll. VS as noted in flow sheets. Safety maintained throughout, will continue to monitor.

## 2023-04-09 NOTE — H&P ADULT - PROBLEM SELECTOR PLAN 6
- patient taking basaglar 12 U QD and novolog 6U TID at home  - will start with 6U QHs and low dose sliding scale  - check finger stick glucose

## 2023-04-09 NOTE — H&P ADULT - PROBLEM SELECTOR PLAN 3
Assessment:  - CT abd/pelvis shows small pleural effusion  - can be contributory to her SOB    Plan:  - pulm consult  - c/w oxygen supplementation

## 2023-04-09 NOTE — H&P ADULT - TIME-BASED BILLING (NON-CRITICAL CARE)
Time-based billing (NON-critical care) Sarecycline Counseling: Patient advised regarding possible photosensitivity and discoloration of the teeth, skin, lips, tongue and gums.  Patient instructed to avoid sunlight, if possible.  When exposed to sunlight, patients should wear protective clothing, sunglasses, and sunscreen.  The patient was instructed to call the office immediately if the following severe adverse effects occur:  hearing changes, easy bruising/bleeding, severe headache, or vision changes.  The patient verbalized understanding of the proper use and possible adverse effects of sarecycline.  All of the patient's questions and concerns were addressed.

## 2023-04-09 NOTE — H&P ADULT - PROBLEM SELECTOR PLAN 2
Assessment:  - has an incidental finding of K 5.9  - EKG does not show peaked T waves    Plan:  - nata ordered  - will repeat BMP  - tele monitoring

## 2023-04-09 NOTE — H&P ADULT - ASSESSMENT
77 y/o F with pmhx of HTN, DM2, Asthma, presented to the ED for new onset syncope. Found to have anemia, with possible nonresolving pneumonia. Admit for IV abx and syncope work up.

## 2023-04-09 NOTE — H&P ADULT - NSHPLABSRESULTS_GEN_ALL_CORE
7.7    10.47 )-----------( 226      ( 09 Apr 2023 09:14 )             29.1       04-09    138  |  100  |  22  ----------------------------<  167<H>  5.9<H>   |  27  |  0.86    Ca    9.2      09 Apr 2023 20:30    TPro  5.9<L>  /  Alb  3.8  /  TBili  0.9  /  DBili  x   /  AST  62<H>  /  ALT  104<H>  /  AlkPhos  163<H>  04-09            PT/INR - ( 09 Apr 2023 09:14 )   PT: 13.4 sec;   INR: 1.15 ratio         PTT - ( 09 Apr 2023 09:14 )  PTT:24.6 sec    14:38 - VBG - pH: 7.20  | pCO2: 69    | pO2: 46    | Lactate: 5.0        Urinalysis Basic - ( 09 Apr 2023 14:38 )    Color: Light Yellow / Appearance: Clear / SG: >1.050 / pH: x  Gluc: x / Ketone: Negative  / Bili: Negative / Urobili: <2 mg/dL   Blood: x / Protein: 30 mg/dL / Nitrite: Negative   Leuk Esterase: Negative / RBC: 0 /HPF / WBC 2 /HPF   Sq Epi: x / Non Sq Epi: x / Bacteria: Negative            CXR: As per my read - b/l patchy opacities, Will wait for prelim/official read    EKG: As per my read - NSR QTc 452 ms

## 2023-04-09 NOTE — H&P ADULT - HISTORY OF PRESENT ILLNESS
This is an 79 y/o F with pmhx of HTN, DM2, Asthma, presented to the ED for new onset syncope. The patient today was in the bathroom. She had 3 syncope episodes within the last 12 hours. The patient's son is at bedside who is providing translation. The patient gets these syncopical episodes when she sits down or stand up after she uses the bathroom. The patient had LOC for 5 minutes then returned back to baseline. Her fall was caught by the aide. Denies head trauma. This is an 79 y/o F with pmhx of HTN, DM2, Asthma, presented to the ED for new onset syncope. The patient today was in the bathroom. She had 3 syncope episodes within the last 12 hours. The patient's son is at bedside who is providing translation. The patient gets these syncopical episodes when she sits down or stand up after she uses the bathroom. The patient had LOC for 5 minutes then returned back to baseline. Her fall was caught by the aide. Denies head trauma. The patient's son endorsed that she has dark brown stool for a long time, not completely black melonic stool. Does not know if she has a prior hx of anemia. No medication changes recently. Also endorsed shortness of breath, also increased SOB with exertion can only walk a few steps compared to before. The patient had prior work up for his lung findings, but the patient had refused work up, decision still unchanged today.

## 2023-04-09 NOTE — H&P ADULT - NSHPREVIEWOFSYSTEMS_GEN_ALL_CORE
REVIEW OF SYSTEMS:    CONSTITUTIONAL: No weakness, fevers or chills, + syncope  EYES/ENT: No visual changes;  No dysphagia; No sore throat; No rhinorrhea; No sinus pain/pressure  NECK: No pain or stiffness  RESPIRATORY: No cough, wheezing, hemoptysis; + shortness of breath  CARDIOVASCULAR: No chest pain or palpitations; No lower extremity edema  GASTROINTESTINAL: No abdominal or epigastric pain. No nausea, vomiting, or hematemesis; No diarrhea or constipation. No melena or hematochezia.  GENITOURINARY: No dysuria, frequency or hematuria  NEUROLOGICAL: No numbness or weakness  MSK: ambulates with walker  SKIN: No itching, burning, rashes, or lesions   All other review of systems is negative unless indicated above.

## 2023-04-09 NOTE — ED ADULT NURSE REASSESSMENT NOTE - NS ED NURSE REASSESS COMMENT FT1
Pt received at baseline mental status on 6LO2 via NC. Respirations even and unlabored, airway patent. Son at bedside for translation. Pt does not endorse any acute complaints at this time. Awaiting CT results Pt received at baseline mental status on 6LO2 via NC. Respirations even and unlabored, airway patent. Son at bedside for translation. Pt does not endorse any acute complaints at this time. Sinus rhythm on monitor. Bed in lowest position. Call bell within reach. Awaiting CT results

## 2023-04-09 NOTE — H&P ADULT - PROBLEM SELECTOR PLAN 4
Assessment:  - patient found to have anemia with hb 7.7  - no obvious reports of bleeding, however was having a small nose bleed from the nasal canula  - Hb has been at baseline  - Iron panel shows iron deficiency anemia    Plan:  - will call GI consult to evaluate for need of endoscopy  - if patient is not getting a colonoscopy - will start iron supplementation  - s/p 1U prbc, trend CBC  - monitor for further bleeding Assessment:  - patient found to have anemia with hb 7.7  - no obvious reports of bleeding, however was having a small nose bleed from the nasal canula  - Hb has been at baseline  - Iron panel shows iron deficiency anemia    Plan:  - will call GI consult to evaluate for need of endoscopy  - if patient is not getting a colonoscopy - will start iron supplementation  - s/p 1U prbc, trend CBC  - monitor for further bleeding  - c/w protonix PO for now

## 2023-04-09 NOTE — ED PROVIDER NOTE - PHYSICAL EXAMINATION
GENERAL: well appearing in no apparent distress  HEAD: normocephalic, atraumatic  HENT: airway intact, neck supple  EYES: conjunctival pallor  CARDIAC: regular rate and rhythm, normal S1S2, no appreciable murmurs, 2+ pulses in UE/LE b/l  PULM: normal breath sounds, clear to ascultation bilaterally, no rales, rhonchi, wheezing  GI: abdomen nondistended, soft, nontender, no guarding, rebound tenderness  NEURO: no focal motor or sensory deficits  MSK: no peripheral edema  SKIN: well-perfused, extremities warm, no visible rashes

## 2023-04-09 NOTE — H&P ADULT - NSHPPHYSICALEXAM_GEN_ALL_CORE
PHYSICAL EXAM:  VITALS: Vital Signs Last 24 Hrs  T(C): 36.5 (09 Apr 2023 22:23), Max: 36.7 (09 Apr 2023 09:26)  T(F): 97.7 (09 Apr 2023 22:23), Max: 98.1 (09 Apr 2023 11:35)  HR: 64 (09 Apr 2023 22:23) (62 - 64)  BP: 150/73 (09 Apr 2023 22:23) (82/53 - 154/67)  BP(mean): 61 (09 Apr 2023 15:43) (61 - 61)  RR: 19 (09 Apr 2023 22:23) (16 - 27)  SpO2: 100% (09 Apr 2023 22:23) (97% - 100%)    Parameters below as of 09 Apr 2023 22:23  Patient On (Oxygen Delivery Method): nasal cannula  O2 Flow (L/min): 6    GENERAL: NAD, comfortable at bedside  HEAD:  Atraumatic, Normocephalic  EYES: EOMI, PERRL, conjunctiva and sclera clear  ENT: Moist Mucus Membranes present, no ulcers appreciated, + small nose bleed  NECK: Supple, No JVD  CHEST/LUNG: + crackles on the R lower lung field, No wheezes, rales or rhonchi, no accessory muscle use  HEART: Regular rate and rhythm; No murmurs, rubs, or gallops, (+)S1, S2  ABDOMEN: Soft, Nontender, Nondistended; Normal Bowel sounds   EXTREMITIES:  2+ Peripheral Pulses, No clubbing, cyanosis, or edema  PSYCH: normal mood and affect  NEUROLOGY: AAOx3, non-focal  SKIN: No rashes or lesions  RECTAL: patient refusing

## 2023-04-09 NOTE — ED ADULT TRIAGE NOTE - CHIEF COMPLAINT QUOTE
pt had 3 syncopal episodes between last night and this morning followed by an episode of vomiting. pt noted to be pale in appearance. son states pt's abd noted to be distended. pt room air sat 88%, placed on 3L NC by EMS. MS MAYITO.S to triage from stroke eval. no code stroke called.

## 2023-04-09 NOTE — ED PROVIDER NOTE - PROGRESS NOTE DETAILS
Swapnil, PGY3: patient @1425 was noted to be diaphoretic and clamy while on toilet. Was escorted to the bed by myself, attending and RN. Patient became momentarily apneic and bradycardic to the 20s; atropine 1mg given at 1432 with return of NSR in the 80s. Verbally discussed w/ son at bedside who wants to make patient DNR/DNI. Molst to follow

## 2023-04-09 NOTE — ED PROVIDER NOTE - ATTENDING CONTRIBUTION TO CARE
Attending note:   After face to face evaluation of this patient, I concur with above noted hx, pe, and care plan for this patient.  Castro: 78-year-old female with past medical history of diabetes, hypertension hyperlipidemia with history of asthma on home O2.  Patient is brought in by son who is providing translation.  Patient has had 3 syncopal episodes while walking at home.  Patient feels lightheaded and tired and lays down and has passed out.  Patient has been pale for the last 1 week.  There has been no bleeding or dark stools noted.  Son denies all other complaints.  On exam patient's blood pressure is stable she is afebrile and heart rate is in the 60s.  Patient appears extremely pale and slightly yellow but not jaundice.  Oropharynx is slightly dry.  Lung sounds are clear.  Heart is regular and abdomen is soft.  There is no pitting edema noted.  Patient moving all extremities equal bilaterally.  And sensation is intact.  There is no facial droop noted.  And as per son speech is at baseline.  MDM: 6-year-old female with 3 syncopal episodes mainly with walking and on physical exam patient appears extremely pale patient may be anemic.  EMS had mentioned that patient had possible lung mass diagnosed on previous admission but unable to find record of that in EMR.  Will await labs, will check head CT given multiple syncopal episodes that brought her to the ground.  Will check chest x-ray.  Patient is hypoxic to 76% on room air.  But improves on O2.  Patient will require admission.  Patient's PMD is Dr. Alfredo Hannah and will admit to Dr. Gonzalez.

## 2023-04-09 NOTE — ED ADULT NURSE REASSESSMENT NOTE - NS ED NURSE REASSESS COMMENT FT1
Pt was assisted to commode by writer, then became bradycardic to the 30s. Dr. Castro and Dr. Kraft were called to assist patient back to bed. Pt has an episode of syncope when placed in bed. Dr. Castro verbally ordered 1mg Atropine. Dr. Kraft administered atropine. IV placed in right AC. Additional labs drawn and sent per orders.

## 2023-04-09 NOTE — H&P ADULT - PROBLEM SELECTOR PLAN 5
Assessment:  - patient found to have b/l opacities on CXR  - appears to have worsened compared to previous  - known to have old findings, concerning for malignancy however patient refusing work up at this time  - no leukocytosis, however had hypothermia    Plan:  - start zosyn and vanco  - ID consult  - Pulm consult  - Will obtain CT chest to evaluate for pneumonia vs. old findings  - blood cultures pending

## 2023-04-09 NOTE — H&P ADULT - PROBLEM SELECTOR PROBLEM 1
Pt d/c'd to home per md orders.  l knee site remains nava.  0 bleed, 0 hematoma.  Instructed pt on home precautions and follow up visits.  Pt verbalizes understanding.  No medication changes were made.  Pt in no acute distress and verbalizes no complaints.     Syncope

## 2023-04-09 NOTE — ED PROVIDER NOTE - NS ED ROS FT
General: denies fever, chills  HENT: denies nasal congestion, rhinorrhea  Eyes: denies visual changes, blurred vision  CV: near syncope  Resp: denies difficulty breathing, cough  Abdominal: denies nausea, vomiting, diarrhea, abdominal pain  : denies urinary pain or discharge  MSK: denies muscle aches, leg swelling  Neuro: denies headaches, numbness, tingling  Skin: denies rashes, bruises

## 2023-04-10 DIAGNOSIS — Z71.89 OTHER SPECIFIED COUNSELING: ICD-10-CM

## 2023-04-10 LAB
ALBUMIN SERPL ELPH-MCNC: 4 G/DL — SIGNIFICANT CHANGE UP (ref 3.3–5)
ALP SERPL-CCNC: 151 U/L — HIGH (ref 40–120)
ALT FLD-CCNC: 97 U/L — HIGH (ref 4–33)
ANION GAP SERPL CALC-SCNC: 12 MMOL/L — SIGNIFICANT CHANGE UP (ref 7–14)
AST SERPL-CCNC: 44 U/L — HIGH (ref 4–32)
BASE EXCESS BLDV CALC-SCNC: 5.3 MMOL/L — HIGH (ref -2–3)
BASOPHILS # BLD AUTO: 0.07 K/UL — SIGNIFICANT CHANGE UP (ref 0–0.2)
BASOPHILS NFR BLD AUTO: 0.7 % — SIGNIFICANT CHANGE UP (ref 0–2)
BILIRUB SERPL-MCNC: 0.8 MG/DL — SIGNIFICANT CHANGE UP (ref 0.2–1.2)
BLOOD GAS VENOUS COMPREHENSIVE RESULT: SIGNIFICANT CHANGE UP
BUN SERPL-MCNC: 24 MG/DL — HIGH (ref 7–23)
CALCIUM SERPL-MCNC: 9.2 MG/DL — SIGNIFICANT CHANGE UP (ref 8.4–10.5)
CHLORIDE BLDV-SCNC: 98 MMOL/L — SIGNIFICANT CHANGE UP (ref 96–108)
CHLORIDE SERPL-SCNC: 98 MMOL/L — SIGNIFICANT CHANGE UP (ref 98–107)
CO2 BLDV-SCNC: 35.1 MMOL/L — HIGH (ref 22–26)
CO2 SERPL-SCNC: 28 MMOL/L — SIGNIFICANT CHANGE UP (ref 22–31)
CREAT SERPL-MCNC: 0.99 MG/DL — SIGNIFICANT CHANGE UP (ref 0.5–1.3)
EGFR: 58 ML/MIN/1.73M2 — LOW
EOSINOPHIL # BLD AUTO: 0.16 K/UL — SIGNIFICANT CHANGE UP (ref 0–0.5)
EOSINOPHIL NFR BLD AUTO: 1.6 % — SIGNIFICANT CHANGE UP (ref 0–6)
GAS PNL BLDV: 137 MMOL/L — SIGNIFICANT CHANGE UP (ref 136–145)
GAS PNL BLDV: SIGNIFICANT CHANGE UP
GLUCOSE BLDC GLUCOMTR-MCNC: 175 MG/DL — HIGH (ref 70–99)
GLUCOSE BLDC GLUCOMTR-MCNC: 194 MG/DL — HIGH (ref 70–99)
GLUCOSE BLDC GLUCOMTR-MCNC: 256 MG/DL — HIGH (ref 70–99)
GLUCOSE BLDV-MCNC: 136 MG/DL — HIGH (ref 70–99)
GLUCOSE SERPL-MCNC: 151 MG/DL — HIGH (ref 70–99)
HCO3 BLDV-SCNC: 33 MMOL/L — HIGH (ref 22–29)
HCT VFR BLD CALC: 33.4 % — LOW (ref 34.5–45)
HCT VFR BLDA CALC: 28 % — LOW (ref 34.5–46.5)
HGB BLD CALC-MCNC: 9.4 G/DL — LOW (ref 11.7–16.1)
HGB BLD-MCNC: 9.3 G/DL — LOW (ref 11.5–15.5)
IANC: 7.18 K/UL — SIGNIFICANT CHANGE UP (ref 1.8–7.4)
IMM GRANULOCYTES NFR BLD AUTO: 0.4 % — SIGNIFICANT CHANGE UP (ref 0–0.9)
LACTATE BLDV-MCNC: 1 MMOL/L — SIGNIFICANT CHANGE UP (ref 0.5–2)
LYMPHOCYTES # BLD AUTO: 1.91 K/UL — SIGNIFICANT CHANGE UP (ref 1–3.3)
LYMPHOCYTES # BLD AUTO: 18.8 % — SIGNIFICANT CHANGE UP (ref 13–44)
MAGNESIUM SERPL-MCNC: 1.9 MG/DL — SIGNIFICANT CHANGE UP (ref 1.6–2.6)
MCHC RBC-ENTMCNC: 20.8 PG — LOW (ref 27–34)
MCHC RBC-ENTMCNC: 27.8 GM/DL — LOW (ref 32–36)
MCV RBC AUTO: 74.7 FL — LOW (ref 80–100)
MONOCYTES # BLD AUTO: 0.81 K/UL — SIGNIFICANT CHANGE UP (ref 0–0.9)
MONOCYTES NFR BLD AUTO: 8 % — SIGNIFICANT CHANGE UP (ref 2–14)
NEUTROPHILS # BLD AUTO: 7.18 K/UL — SIGNIFICANT CHANGE UP (ref 1.8–7.4)
NEUTROPHILS NFR BLD AUTO: 70.5 % — SIGNIFICANT CHANGE UP (ref 43–77)
NRBC # BLD: 0 /100 WBCS — SIGNIFICANT CHANGE UP (ref 0–0)
NRBC # FLD: 0.03 K/UL — HIGH (ref 0–0)
PCO2 BLDV: 67 MMHG — HIGH (ref 39–52)
PH BLDV: 7.3 — LOW (ref 7.32–7.43)
PHOSPHATE SERPL-MCNC: 3.9 MG/DL — SIGNIFICANT CHANGE UP (ref 2.5–4.5)
PLATELET # BLD AUTO: 236 K/UL — SIGNIFICANT CHANGE UP (ref 150–400)
PO2 BLDV: 35 MMHG — SIGNIFICANT CHANGE UP (ref 25–45)
POTASSIUM BLDV-SCNC: 4.7 MMOL/L — SIGNIFICANT CHANGE UP (ref 3.5–5.1)
POTASSIUM SERPL-MCNC: 4.7 MMOL/L — SIGNIFICANT CHANGE UP (ref 3.5–5.3)
POTASSIUM SERPL-SCNC: 4.7 MMOL/L — SIGNIFICANT CHANGE UP (ref 3.5–5.3)
PROT SERPL-MCNC: 6.5 G/DL — SIGNIFICANT CHANGE UP (ref 6–8.3)
RBC # BLD: 4.47 M/UL — SIGNIFICANT CHANGE UP (ref 3.8–5.2)
RBC # FLD: 18.7 % — HIGH (ref 10.3–14.5)
SAO2 % BLDV: 56.3 % — LOW (ref 67–88)
SODIUM SERPL-SCNC: 138 MMOL/L — SIGNIFICANT CHANGE UP (ref 135–145)
WBC # BLD: 10.17 K/UL — SIGNIFICANT CHANGE UP (ref 3.8–10.5)
WBC # FLD AUTO: 10.17 K/UL — SIGNIFICANT CHANGE UP (ref 3.8–10.5)

## 2023-04-10 PROCEDURE — 99223 1ST HOSP IP/OBS HIGH 75: CPT | Mod: GC

## 2023-04-10 PROCEDURE — 99223 1ST HOSP IP/OBS HIGH 75: CPT

## 2023-04-10 PROCEDURE — 93306 TTE W/DOPPLER COMPLETE: CPT | Mod: 26

## 2023-04-10 PROCEDURE — 99232 SBSQ HOSP IP/OBS MODERATE 35: CPT

## 2023-04-10 RX ORDER — PANTOPRAZOLE SODIUM 20 MG/1
40 TABLET, DELAYED RELEASE ORAL
Refills: 0 | Status: DISCONTINUED | OUTPATIENT
Start: 2023-04-10 | End: 2023-04-21

## 2023-04-10 RX ADMIN — BUDESONIDE AND FORMOTEROL FUMARATE DIHYDRATE 2 PUFF(S): 160; 4.5 AEROSOL RESPIRATORY (INHALATION) at 09:57

## 2023-04-10 RX ADMIN — PANTOPRAZOLE SODIUM 40 MILLIGRAM(S): 20 TABLET, DELAYED RELEASE ORAL at 18:31

## 2023-04-10 RX ADMIN — SODIUM ZIRCONIUM CYCLOSILICATE 10 GRAM(S): 10 POWDER, FOR SUSPENSION ORAL at 00:20

## 2023-04-10 RX ADMIN — Medication 1: at 18:55

## 2023-04-10 RX ADMIN — PIPERACILLIN AND TAZOBACTAM 25 GRAM(S): 4; .5 INJECTION, POWDER, LYOPHILIZED, FOR SOLUTION INTRAVENOUS at 03:12

## 2023-04-10 RX ADMIN — PIPERACILLIN AND TAZOBACTAM 200 GRAM(S): 4; .5 INJECTION, POWDER, LYOPHILIZED, FOR SOLUTION INTRAVENOUS at 00:20

## 2023-04-10 RX ADMIN — Medication 50 MICROGRAM(S): at 07:11

## 2023-04-10 RX ADMIN — Medication 3 MILLILITER(S): at 09:57

## 2023-04-10 RX ADMIN — Medication 3: at 13:04

## 2023-04-10 RX ADMIN — Medication 3 MILLILITER(S): at 03:11

## 2023-04-10 RX ADMIN — Medication 1: at 10:00

## 2023-04-10 RX ADMIN — Medication 3 MILLILITER(S): at 22:45

## 2023-04-10 RX ADMIN — PIPERACILLIN AND TAZOBACTAM 25 GRAM(S): 4; .5 INJECTION, POWDER, LYOPHILIZED, FOR SOLUTION INTRAVENOUS at 21:54

## 2023-04-10 RX ADMIN — TIOTROPIUM BROMIDE 2 PUFF(S): 18 CAPSULE ORAL; RESPIRATORY (INHALATION) at 19:39

## 2023-04-10 RX ADMIN — Medication 250 MILLIGRAM(S): at 07:28

## 2023-04-10 RX ADMIN — PANTOPRAZOLE SODIUM 40 MILLIGRAM(S): 20 TABLET, DELAYED RELEASE ORAL at 07:08

## 2023-04-10 RX ADMIN — PIPERACILLIN AND TAZOBACTAM 25 GRAM(S): 4; .5 INJECTION, POWDER, LYOPHILIZED, FOR SOLUTION INTRAVENOUS at 12:51

## 2023-04-10 RX ADMIN — BUDESONIDE AND FORMOTEROL FUMARATE DIHYDRATE 2 PUFF(S): 160; 4.5 AEROSOL RESPIRATORY (INHALATION) at 21:52

## 2023-04-10 RX ADMIN — SIMVASTATIN 20 MILLIGRAM(S): 20 TABLET, FILM COATED ORAL at 21:54

## 2023-04-10 RX ADMIN — INSULIN GLARGINE 6 UNIT(S): 100 INJECTION, SOLUTION SUBCUTANEOUS at 22:17

## 2023-04-10 NOTE — CONSULT NOTE ADULT - SUBJECTIVE AND OBJECTIVE BOX
Forrest Rosenthal MD  Interventional Cardiology / Endovascular Specialist  [place] Office : [phone] [place] [address] [place] [address]  Tel:   [place] Office : [phone] [facility] [place] [address]  Tel: [phone]        HISTORY OF PRESENTING ILLNESS:   77 y/o F with pmhx of HTN, DM2, Asthma, presented to the ED for new onset syncope. The patient today was in the bathroom. She had 3 syncope episodes within the last 12 hours. The patient's son is at bedside who is providing translation. The patient gets these syncopical episodes when she sits down or stand up after she uses the bathroom. The patient had LOC for 5 minutes then returned back to baseline. Her fall was caught by the aide. Denies head trauma. The patient's son endorsed that she has dark brown stool for a long time, not completely black melonic stool. Does not know if she has a prior hx of anemia. No medication changes recently. Also endorsed shortness of breath, also increased SOB with exertion can only walk a few steps compared to before. The patient had prior work up for his lung findings, but the patient had refused work up, decision still unchanged today.      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 No nausea, vomiting, or hematemesis; No diarrhea or constipation. No melena or hematochezia.  GENITOURINARY: No dysuria, frequency, hematuria, or incontinence  NEUROLOGICAL: No headaches, memory loss, loss of strength, numbness, or tremors  ENDOCRINE: No heat or cold intolerance; No hair loss  MUSCULOSKELETAL: No joint pain or swelling; No muscle, back, or extremity pain  PSYCHIATRIC: No depression, anxiety, mood swings, or difficulty sleeping  HEME/LYMPH: No easy bruising, or bleeding gums  All others negative    MEDICATIONS:    piperacillin/tazobactam IVPB.- 3.375 Gram(s) IV Intermittent once  piperacillin/tazobactam IVPB.. 3.375 Gram(s) IV Intermittent every 8 hours    albuterol    90 MICROgram(s) HFA Inhaler 2 Puff(s) Inhalation every 3 hours PRN  albuterol/ipratropium for Nebulization 3 milliLiter(s) Nebulizer every 6 hours  budesonide 160 MICROgram(s)/formoterol 4.5 MICROgram(s) Inhaler 2 Puff(s) Inhalation two times a day  tiotropium 2.5 MICROgram(s) Inhaler 2 Puff(s) Inhalation daily    acetaminophen     Tablet .. 650 milliGRAM(s) Oral every 6 hours PRN  melatonin 3 milliGRAM(s) Oral at bedtime PRN    pantoprazole    Tablet 40 milliGRAM(s) Oral two times a day    dextrose 50% Injectable 25 Gram(s) IV Push once  dextrose 50% Injectable 12.5 Gram(s) IV Push once  dextrose 50% Injectable 25 Gram(s) IV Push once  dextrose Oral Gel 15 Gram(s) Oral once PRN  glucagon  Injectable 1 milliGRAM(s) IntraMuscular once  insulin glargine Injectable (LANTUS) 6 Unit(s) SubCutaneous at bedtime  insulin lispro (ADMELOG) corrective regimen sliding scale   SubCutaneous three times a day before meals  insulin lispro (ADMELOG) corrective regimen sliding scale   SubCutaneous at bedtime  levothyroxine 50 MICROGram(s) Oral daily  simvastatin 20 milliGRAM(s) Oral at bedtime    dextrose 5%. 1000 milliLiter(s) IV Continuous <Continuous>  dextrose 5%. 1000 milliLiter(s) IV Continuous <Continuous>      FAMILY HISTORY:  Family history of heart attack (Mother)  mother          Allergies    No Known Allergies    Intolerances    	      PHYSICAL EXAM:  T(C): 37.1 (04-10-23 @ 18:30), Max: 37.1 (04-10-23 @ 18:30)  HR: 88 (04-10-23 @ 18:30) (64 - 88)  BP: 102/56 (04-10-23 @ 18:30) (102/56 - 150/73)  RR: 20 (04-10-23 @ 18:30) (19 - 22)  SpO2: 98% (04-10-23 @ 18:30) (97% - 100%)  Wt(kg): --  I&O's Summary    GENERAL: NAD  EYES: EOMI, PERRLA, conjunctiva and sclera clear  ENMT: No tonsillar erythema, exudates, or enlargement  Cardiovascular: Normal S1 S2, No JVD, No murmurs, No edema  Respiratory: Lungs rhonchi to auscultation	  Gastrointestinal:  Soft, Non-tender, + BS	  Extremities: No edema     LABS:	 	    CARDIAC MARKERS:                                  9.3    10.17 )-----------( 236      ( 10 Apr 2023 04:37 )             33.4     04-10    138  |  98  |  24<H>  ----------------------------<  151<H>  4.7   |  28  |  0.99    Ca    9.2      10 Apr 2023 04:37  Phos  3.9     04-10  Mg     1.90     04-10    TPro  6.5  /  Alb  4.0  /  TBili  0.8  /  DBili  x   /  AST  44<H>  /  ALT  97<H>  /  AlkPhos  151<H>  04-10    proBNP:   Lipid Profile:   HgA1c:   TSH:     Consultant(s) Notes Reviewed:  [x ] YES  [ ] NO    Care Discussed with Consultants/Other Providers [ x] YES  [ ] NO    Imaging Personally Reviewed independently:  [x] YES  [ ] NO    All labs, radiologic studies, vitals, orders and medications list reviewed. Patient is seen and examined at bedside. Case discussed with medical team.    ASSESSMENT/PLAN:

## 2023-04-10 NOTE — CONSULT NOTE ADULT - SUBJECTIVE AND OBJECTIVE BOX
Pt admits to occasional diet nonadherence but overall tried to eat carbohydrate-balanced meals/fair "HPI:  This is an 79 y/o F with pmhx of HTN, DM2, Asthma, presented to the ED for new onset syncope. The patient today was in the bathroom. She had 3 syncope episodes within the last 12 hours. The patient's son is at bedside who is providing translation. The patient gets these syncopical episodes when she sits down or stand up after she uses the bathroom. The patient had LOC for 5 minutes then returned back to baseline. Her fall was caught by the aide. Denies head trauma. The patient's son endorsed that she has dark brown stool for a long time, not completely black melonic stool. Does not know if she has a prior hx of anemia. No medication changes recently. Also endorsed shortness of breath, also increased SOB with exertion can only walk a few steps compared to before. The patient had prior work up for his lung findings, but the patient had refused work up, decision still unchanged today. (09 Apr 2023 22:50)"    Above reviewed. 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 3.375 every 8 hours  vancomycin  IVPB 1000 every 12 hours    OTHER MEDS:  acetaminophen     Tablet .. 650 milliGRAM(s) Oral every 6 hours PRN  albuterol    90 MICROgram(s) HFA Inhaler 2 Puff(s) Inhalation every 3 hours PRN  albuterol/ipratropium for Nebulization 3 milliLiter(s) Nebulizer every 6 hours  budesonide 160 MICROgram(s)/formoterol 4.5 MICROgram(s) Inhaler 2 Puff(s) Inhalation two times a day  dextrose 5%. 1000 milliLiter(s) IV Continuous <Continuous>  dextrose 5%. 1000 milliLiter(s) IV Continuous <Continuous>  dextrose 50% Injectable 25 Gram(s) IV Push once  dextrose 50% Injectable 12.5 Gram(s) IV Push once  dextrose 50% Injectable 25 Gram(s) IV Push once  dextrose Oral Gel 15 Gram(s) Oral once PRN  glucagon  Injectable 1 milliGRAM(s) IntraMuscular once  insulin glargine Injectable (LANTUS) 6 Unit(s) SubCutaneous at bedtime  insulin lispro (ADMELOG) corrective regimen sliding scale   SubCutaneous three times a day before meals  insulin lispro (ADMELOG) corrective regimen sliding scale   SubCutaneous at bedtime  levothyroxine 50 MICROGram(s) Oral daily  melatonin 3 milliGRAM(s) Oral at bedtime PRN  pantoprazole    Tablet 40 milliGRAM(s) Oral before breakfast  simvastatin 20 milliGRAM(s) Oral at bedtime  tiotropium 2.5 MICROgram(s) Inhaler 2 Puff(s) Inhalation daily    SOCIAL HISTORY: No tobacco, no alcohol, no illicit drugs    FAMILY HISTORY:  Family history of heart attack (Mother)  mother    Drug Dosing Weight  Height (cm): 149.9 (07 Feb 2023 20:37)  Weight (kg): 55.1 (07 Feb 2023 20:37)  BMI (kg/m2): 24.5 (07 Feb 2023 20:37)  BSA (m2): 1.49 (07 Feb 2023 20:37)    PE:    Vital Signs Last 24 Hrs  T(C): 36.7 (10 Apr 2023 10:15), Max: 36.8 (10 Apr 2023 07:03)  T(F): 98 (10 Apr 2023 10:15), Max: 98.3 (10 Apr 2023 07:03)  HR: 73 (10 Apr 2023 10:15) (62 - 77)  BP: 139/61 (10 Apr 2023 10:15) (82/53 - 150/73)  BP(mean): 61 (09 Apr 2023 15:43) (61 - 61)  RR: 20 (10 Apr 2023 10:15) (16 - 22)  SpO2: 98% (10 Apr 2023 10:15) (97% - 100%)    Gen: AOx3, NAD, non-toxic, pleasant  CV: S1+S2 normal, nontachycardic  Resp: Clear bilat, no resp distress, no crackles/wheezes, NC  Abd: Soft, nontender, +BS  Ext: No LE edema, no wounds  : No Rasmussen  IV/Skin: No thrombophlebitis  Msk: No low back pain, no arthralgias, no joint swelling  Neuro: No sensory deficits, no motor deficits    LABS:                        9.3    10.17 )-----------( 236      ( 10 Apr 2023 04:37 )             33.4     04-10    138  |  98  |  24<H>  ----------------------------<  151<H>  4.7   |  28  |  0.99    Ca    9.2      10 Apr 2023 04:37  Phos  3.9     04-10  Mg     1.90     04-10    TPro  6.5  /  Alb  4.0  /  TBili  0.8  /  DBili  x   /  AST  44<H>  /  ALT  97<H>  /  AlkPhos  151<H>  04-10    Urinalysis Basic - ( 09 Apr 2023 14:38 )    Color: Light Yellow / Appearance: Clear / SG: >1.050 / pH: x  Gluc: x / Ketone: Negative  / Bili: Negative / Urobili: <2 mg/dL   Blood: x / Protein: 30 mg/dL / Nitrite: Negative   Leuk Esterase: Negative / RBC: 0 /HPF / WBC 2 /HPF   Sq Epi: x / Non Sq Epi: x / Bacteria: Negative    MICROBIOLOGY:    Rapid RVP Result: NotDetec (04-09 @ 09:14)    RADIOLOGY:    4/9 CT:    IMPRESSION:  Lower lobe parenchymal abnormality is not significantly changed compared   to prior studies. There is a new left pleural effusion.    No acute intra-abdominal pathology seen to explain patient's pain.

## 2023-04-10 NOTE — PATIENT PROFILE ADULT - RELATIONSHIP TO PATIENT
[FreeTextEntry1] : dm2: cont Lantus to 20 units QHS, restart admelog to 15 units TIDAC Reassess on NV. Stop jardiance 25 mg given reported recurrent vaginosis. Verbalized understanding and agrees with treatment plan, will contact MD and seek emergency medical care if condition changes. explained the potential high risk and toxicities with chronic insulin use including, but not limited to life threatening hypoglycemia and death, Verbalized understanding and agrees with treatment plan, will contact MD and seek emergency medical care if condition changes.\par \par \par \par Hypothyroidism, appears clinically/biochemically euthyroid,continue 88 mcg, proper intake reviewed. repeat TFTs prior to NV\par \par graves eye disease: moderate severity, clinical disease score stabilized, eye sight conserved, hold prednisone 40 mg QD, cont f/u w/ ophthalmology, next due on 10/2021. Consider rituximab on the NV.\par \par OP screening: Reports no recent screening. Not taking vitamin D. Not on folsamax for unclear reasons. Referral for DEXA on 10/2019 [Carbohydrate Consistent Diet] : carbohydrate consistent diet [Diabetes Foot Care] : diabetes foot care [Long Term Vascular Complications] : long term vascular complications of diabetes [Importance of Diet and Exercise] : importance of diet and exercise to improve glycemic control, achieve weight loss and improve cardiovascular health [Action and use of Insulin] : action and use of short and long-acting insulin [Incretin Mimetic Therapy] : Risks and benefits of incretin mimetic therapy were discussed with the patient including nauseau, pancreatitis and potential risk of medullary thyroid cancer [phone]

## 2023-04-10 NOTE — PROGRESS NOTE ADULT - PROBLEM SELECTOR PLAN 1
Assessment:  - patient presented for new onset syncope  - likely secondary to orthostatic hypotension vs. underlying infection  - CT head negative  - troponin neg x1, EKG shows NSR    Plan:  - tele monitoring  - orthostatics to be checked  - hold BP meds for now  - cardiology consult

## 2023-04-10 NOTE — GOALS OF CARE CONVERSATION - ADVANCED CARE PLANNING - CONVERSATION DETAILS
I explained the diagnosis, prognosis and treatment plan to the son at bedside. MOLST form also reassessed and confirmed by patient. I explained to the patient's family member what cardiopulmonary resuscitation is. Explained the risks and benefits of CPR such has hypoxic brain injury and rib fractures, and low likelihood of survival. I also explained to the patient's family member the risks and benefits of artifical ventilation such as failure to wean off mechanical ventilation. The patient's son wants DNR/DNI.    Decision is reflected on the MOLST form. Order for DNR/DNI is in the EMR.

## 2023-04-10 NOTE — CONSULT NOTE ADULT - ASSESSMENT
79 y/o F with PMH HTN, HLD, DM, asthma with recent admission to [facility] for viral PNA who presents for diffuse body aches and feeling anxious and SOB    1. syncope ;  - get orthos   - CT head negative   - r/o infection   - get echo   - monitor on tele     2. HTN  -controlled  -c/w metoprolol  -continue to monitor BP     3. HLD   - on zocor    4. DVT prophylaxis  -lovenox subq

## 2023-04-10 NOTE — PROGRESS NOTE ADULT - SUBJECTIVE AND OBJECTIVE BOX
PULMONARY PROGRESS NOTE    MARTIR VU  MRN-[id number]    Patient is a 78y old  Female who presents with a chief complaint of syncope (09 Apr 2023 22:50)      HPI:  -  -    ROS:   -    ACTIVE MEDICATION LIST:  MEDICATIONS  (STANDING):  albuterol/ipratropium for Nebulization 3 milliLiter(s) Nebulizer every 6 hours  budesonide 160 MICROgram(s)/formoterol 4.5 MICROgram(s) Inhaler 2 Puff(s) Inhalation two times a day  dextrose 5%. 1000 milliLiter(s) (50 mL/Hr) IV Continuous <Continuous>  dextrose 5%. 1000 milliLiter(s) (100 mL/Hr) IV Continuous <Continuous>  dextrose 50% Injectable 25 Gram(s) IV Push once  dextrose 50% Injectable 12.5 Gram(s) IV Push once  dextrose 50% Injectable 25 Gram(s) IV Push once  glucagon  Injectable 1 milliGRAM(s) IntraMuscular once  insulin glargine Injectable (LANTUS) 6 Unit(s) SubCutaneous at bedtime  insulin lispro (ADMELOG) corrective regimen sliding scale   SubCutaneous three times a day before meals  insulin lispro (ADMELOG) corrective regimen sliding scale   SubCutaneous at bedtime  levothyroxine 50 MICROGram(s) Oral daily  pantoprazole    Tablet 40 milliGRAM(s) Oral before breakfast  piperacillin/tazobactam IVPB.- 3.375 Gram(s) IV Intermittent once  piperacillin/tazobactam IVPB.- 3.375 Gram(s) IV Intermittent once  piperacillin/tazobactam IVPB.. 3.375 Gram(s) IV Intermittent every 8 hours  simvastatin 20 milliGRAM(s) Oral at bedtime  tiotropium 2.5 MICROgram(s) Inhaler 2 Puff(s) Inhalation daily  vancomycin  IVPB 1000 milliGRAM(s) IV Intermittent every 12 hours    MEDICATIONS  (PRN):  acetaminophen     Tablet .. 650 milliGRAM(s) Oral every 6 hours PRN Temp greater or equal to 38C (100.4F), Mild Pain (1 - 3)  albuterol    90 MICROgram(s) HFA Inhaler 2 Puff(s) Inhalation every 3 hours PRN Shortness of Breath and/or Wheezing  dextrose Oral Gel 15 Gram(s) Oral once PRN Blood Glucose LESS THAN 70 milliGRAM(s)/deciliter  melatonin 3 milliGRAM(s) Oral at bedtime PRN Insomnia      EXAM:  Vital Signs Last 24 Hrs  T(C): 36.8 (10 Apr 2023 07:03), Max: 36.8 (10 Apr 2023 07:03)  T(F): 98.3 (10 Apr 2023 07:03), Max: 98.3 (10 Apr 2023 07:03)  HR: 77 (10 Apr 2023 07:03) (62 - 77)  BP: 124/58 (10 Apr 2023 07:03) (82/53 - 154/67)  BP(mean): 61 (09 Apr 2023 15:43) (61 - 61)  RR: 22 (10 Apr 2023 07:03) (16 - 22)  SpO2: 97% (10 Apr 2023 07:03) (97% - 100%)    Parameters below as of 10 Apr 2023 07:03  Patient On (Oxygen Delivery Method): nasal cannula  O2 Flow (L/min): 6      GENERAL: The patient is awake and alert in no apparent distress.     LUNGS: Clear to auscultation without wheezing, rales or rhonchi; respirations unlabored    HEART: Regular rate and rhythm without murmur.                            9.3    10.17 )-----------( 236      ( 10 Apr 2023 04:37 )             33.4       04-10    138  |  98  |  24<H>  ----------------------------<  151<H>  4.7   |  28  |  0.99    Ca    9.2      10 Apr 2023 04:37  Phos  3.9     04-10  Mg     1.90     04-10    TPro  6.5  /  Alb  4.0  /  TBili  0.8  /  DBili  x   /  AST  44<H>  /  ALT  97<H>  /  AlkPhos  151<H>  04-10    >>> <<<    PROBLEM LIST:  78y Female with HEALTH ISSUES - PROBLEM Dx:  Syncope    Multifocal pneumonia    DM2 (diabetes mellitus, type 2)    Benign essential HTN    Hypothyroidism    Prophylactic measure    Iron deficiency anemia    Pleural effusion    Hyperkalemia    Goals of care, counseling/discussion              RECS:        Please call with any questions.    Dede Flynn DO  [facility] Pulmonary/Sleep Medicine  [phone]   PULMONARY PROGRESS NOTE    MARTIR VU  MRN-[id number]    Patient is a 78y old  Female who presents with a chief complaint of syncope (09 Apr 2023 22:50)      HPI:  -77 yo female admitted with loc/fall  seen earlier today on her home 02- 3L wiht family at bedside  breathing stable  continues to be on 02 at home, sometimes feels its hard to breath  some wheezing    -    ROS:   -    ACTIVE MEDICATION LIST:  MEDICATIONS  (STANDING):  albuterol/ipratropium for Nebulization 3 milliLiter(s) Nebulizer every 6 hours  budesonide 160 MICROgram(s)/formoterol 4.5 MICROgram(s) Inhaler 2 Puff(s) Inhalation two times a day  dextrose 5%. 1000 milliLiter(s) (50 mL/Hr) IV Continuous <Continuous>  dextrose 5%. 1000 milliLiter(s) (100 mL/Hr) IV Continuous <Continuous>  dextrose 50% Injectable 25 Gram(s) IV Push once  dextrose 50% Injectable 12.5 Gram(s) IV Push once  dextrose 50% Injectable 25 Gram(s) IV Push once  glucagon  Injectable 1 milliGRAM(s) IntraMuscular once  insulin glargine Injectable (LANTUS) 6 Unit(s) SubCutaneous at bedtime  insulin lispro (ADMELOG) corrective regimen sliding scale   SubCutaneous three times a day before meals  insulin lispro (ADMELOG) corrective regimen sliding scale   SubCutaneous at bedtime  levothyroxine 50 MICROGram(s) Oral daily  pantoprazole    Tablet 40 milliGRAM(s) Oral before breakfast  piperacillin/tazobactam IVPB.- 3.375 Gram(s) IV Intermittent once  piperacillin/tazobactam IVPB.- 3.375 Gram(s) IV Intermittent once  piperacillin/tazobactam IVPB.. 3.375 Gram(s) IV Intermittent every 8 hours  simvastatin 20 milliGRAM(s) Oral at bedtime  tiotropium 2.5 MICROgram(s) Inhaler 2 Puff(s) Inhalation daily  vancomycin  IVPB 1000 milliGRAM(s) IV Intermittent every 12 hours    MEDICATIONS  (PRN):  acetaminophen     Tablet .. 650 milliGRAM(s) Oral every 6 hours PRN Temp greater or equal to 38C (100.4F), Mild Pain (1 - 3)  albuterol    90 MICROgram(s) HFA Inhaler 2 Puff(s) Inhalation every 3 hours PRN Shortness of Breath and/or Wheezing  dextrose Oral Gel 15 Gram(s) Oral once PRN Blood Glucose LESS THAN 70 milliGRAM(s)/deciliter  melatonin 3 milliGRAM(s) Oral at bedtime PRN Insomnia      EXAM:  Vital Signs Last 24 Hrs  T(C): 36.8 (10 Apr 2023 07:03), Max: 36.8 (10 Apr 2023 07:03)  T(F): 98.3 (10 Apr 2023 07:03), Max: 98.3 (10 Apr 2023 07:03)  HR: 77 (10 Apr 2023 07:03) (62 - 77)  BP: 124/58 (10 Apr 2023 07:03) (82/53 - 154/67)  BP(mean): 61 (09 Apr 2023 15:43) (61 - 61)  RR: 22 (10 Apr 2023 07:03) (16 - 22)  SpO2: 97% (10 Apr 2023 07:03) (97% - 100%)    Parameters below as of 10 Apr 2023 07:03  Patient On (Oxygen Delivery Method): nasal cannula  O2 Flow (L/min): 6      GENERAL: The patient is awake  in no resp distress  not wheezing today                            9.3    10.17 )-----------( 236      ( 10 Apr 2023 04:37 )             33.4       04-10    138  |  98  |  24<H>  ----------------------------<  151<H>  4.7   |  28  |  0.99    Ca    9.2      10 Apr 2023 04:37  Phos  3.9     04-10  Mg     1.90     04-10    TPro  6.5  /  Alb  4.0  /  TBili  0.8  /  DBili  x   /  AST  44<H>  /  ALT  97<H>  /  AlkPhos  151<H>  04-10    < from: CT Head No Cont (04.09.23 @ 11:56) >    ACC: [id number] EXAM:  CT BRAIN   ORDERED BY: SRINIVAS CASTANEDA     PROCEDURE DATE:  04/09/2023          INTERPRETATION:  CLINICAL INDICATION:  Multiple syncopal episodes.    TECHNIQUE : Axial CT scanning of the brain was obtained from the skull   base to the vertex without the administration of intravenous contrast.   Coronal and sagittal reformatted images were subsequently obtained.    COMPARISON: CT head 2/10/2023    FINDINGS:    There is sulcal and ventricular prominence consistent with age   appropriate involutional change. There are periventricular and   subcortical white matter hypodensities, consistent with mild   microvascular changes.    There is no acute intracranial hemorrhage, mass effect, hydrocephalus, or   midline shift.  There areno extra-axial collections.    The visualized paranasal sinuses and mastoid air cells are clear.    The calvarium is intact. Bilateral lens replacements.    IMPRESSION:  No acute intra-cranial hemorrhage, mass effect, or midline shift.    --- End of Report ---          LASHAUN BLANCO DO; Resident Radiologist  This document has been electronically signed.  FRANK RODRIGUEZ MD; Attending Radiologist  This document has been electronically signed. Apr 9 2023 12:25PM    < end of copied text >  < from: Xray Chest 1 View- PORTABLE-Urgent (Xray Chest 1 View- PORTABLE-Urgent .) (04.09.23 @ 10:00) >    ACC: [id number] EXAM:  XR CHEST PORTABLE URGENT 1V   ORDERED BY: SRINIVAS CASTANEDA     PROCEDURE DATE:  04/09/2023          INTERPRETATION:  EXAMINATION: XR CHEST URGENT    CLINICAL INDICATION: syncope    TECHNIQUE: Single frontal, portable view of the chest was obtained.    COMPARISON: Chest x-ray 2/7/2023; chest CT 2/14/2023    FINDINGS:  The heart size is not well evaluated on this projection.  Scattered bilateral patchy opacities predominantly within the right upper   and left midlung fields, similar to prior exam.  There is no pneumothorax or pleural effusion.    IMPRESSION:  Scattered bilateral patchy opacities and generalized increased lung   markings similar to prior exam.    --- End of Report ---          MARJAN MG MD; Resident Radiologist  This document has been electronically signed.  RANI RENO MD; Attending Radiologist  This document has been electronically signed. Apr 9 2023 10:12AM    < end of copied text >  < from: CT Chest No Cont (02.14.23 @ 21:22) >    ACC: [id number] EXAM:  CT CHEST   ORDERED BY: CHINEDU ABREU     PROCEDURE DATE:  02/14/2023          INTERPRETATION:  EXAMINATION: CT CHEST    CLINICAL INDICATION: Pneumonia.    TECHNIQUE: Noncontrast CT of the chest was obtained.    COMPARISON: Multiple CT, most recent 1/29/2023.    FINDINGS:    AIRWAYS AND LUNGS: The central tracheobronchial tree is patent.  A few   patchy and clustered bilateral lung opacities are new from the prior   study. Other bilateral nodular opacities and consolidations are all   unchanged from 1/29/2023 but some are enlarged from older studies.    MEDIASTINUM AND PLEURA: Calcified mediastinal lymph nodes. The visualized   portion of the thyroid gland is unremarkable. There is no pleural   effusion. There is no pneumothorax.    HEART AND VESSELS: There is mild cardiomegaly.  There are atherosclerotic   calcifications of the aorta and coronary arteries.  There is no   pericardial effusion.    UPPER ABDOMEN: Images of the upper abdomen demonstrate no abnormality.    BONES AND SOFT TISSUES: The bones are unremarkable.  The soft tissues are   unremarkable.    IMPRESSION:  A few patchy and clustered bilateral lung opacities are new from the   prior study may be infectious or inflammatory.    Other bilateral lung opacities and consolidations are unchanged from the   most recent prior study and indeterminate.    --- End of Report ---            LILIANA FAULKNER MD; Attending Radiologist  This document has been electronically signed. Feb 15 2023  7:17AM    < end of copied text >  >>> <<<    PROBLEM LIST:  78y Female with HEALTH ISSUES - PROBLEM Dx:  Syncope    Multifocal pneumonia    DM2 (diabetes mellitus, type 2)    Benign essential HTN    Hypothyroidism    Prophylactic measure    Iron deficiency anemia    Pleural effusion    Hyperkalemia    Goals of care, counseling/discussion          RECS:  known to pulm service  multiple ct chest done in the past- family does not want any procedures for the lung findings  resp status appears stable  contnue 02 humidified   continue symbicort BID wtih mouth rinse  continue duoneb q6hrs prn     Please call with any questions.    Dede Flynn DO  [facility] Pulmonary/Sleep Medicine  [phone]

## 2023-04-10 NOTE — CONSULT NOTE ADULT - ASSESSMENT
77 yo F with HTN, DM2, asthma, with syncope  No fever, no leukocytosis  Patient with increasing shortness of breath for past few days; multiple episodes syncope  RVP neg  Elevated LFTs  CT with Lower lobe abnormality, not changed, pleural effusion; intra-abd normal  CXR with bilateral opacities  Possible bacterial pneumonia as cause SOB, although minimal inflammatory findings  Overall, SOB, syncope, abnormal finding on imaging  - Zosyn 3.375g q 8  - DC Vanco  - F/U BCXs  - Trend LFTs to normal--reactive vs congestion?  - Check CT Chest for better clarity on pulmonary process  - Check Legionella, check MRSA PCR    Jacek Angel MD  Contact on TEAMS messaging from 9am - 5pm  From 5pm-9am, on weekends, or if no response call [phone]

## 2023-04-10 NOTE — CONSULT NOTE ADULT - SUBJECTIVE AND OBJECTIVE BOX
HPI: 78 carleen-speaking F Hx pulmonary TB (2019) s/p 10 months of treatment (AFB negative x3 5/2021, now with RUL cavitary lesion -- pt has refused further work up), acute on chronic SOB (?asthma vs COPD, on 3L O2 at home), DM, HTN, recent admission for AHRF 2/2 viral PNA (2/2023) now presenting with syncopal episodes. GI consulted for several week Hx of dark stools + CHEO in this context.     Son reports pt with 4 syncopal episodes over the past day when walking to the bathroom or trying to lift herself off toilet (denies head trauma, reportedly 5min of LOC). Endorses black stool x 1-2 episodes daily x 3-4 weeks. 1 episode of abdominal pain 2 days ago, which son reports resolved. Reports alleve use 1-2 tabs most days of the week (might skip 2-3x per week) Denies hematochezia, hematemesis, n/v/d/f/c, weight loss. Denies prior Hx of EGD/colonoscopy.     Also has on-going CUNHA + SOB x 1 year. Son reports she's been on 3L O2 at home. Has been seen by pulmonary service several times since 2021 (most recently 2/2023 for viral PNA). Has been noted to have cavitary lung lesions in setting of Hx of pulmonary TB s/p tx -- pt/family have not wanted further investigation for lung lesions.      Allergies:  No Known Allergies      Home Medications:    Hospital Medications:  acetaminophen     Tablet .. 650 milliGRAM(s) Oral every 6 hours PRN  albuterol    90 MICROgram(s) HFA Inhaler 2 Puff(s) Inhalation every 3 hours PRN  albuterol/ipratropium for Nebulization 3 milliLiter(s) Nebulizer every 6 hours  budesonide 160 MICROgram(s)/formoterol 4.5 MICROgram(s) Inhaler 2 Puff(s) Inhalation two times a day  dextrose 5%. 1000 milliLiter(s) IV Continuous <Continuous>  dextrose 5%. 1000 milliLiter(s) IV Continuous <Continuous>  dextrose 50% Injectable 25 Gram(s) IV Push once  dextrose 50% Injectable 12.5 Gram(s) IV Push once  dextrose 50% Injectable 25 Gram(s) IV Push once  dextrose Oral Gel 15 Gram(s) Oral once PRN  glucagon  Injectable 1 milliGRAM(s) IntraMuscular once  insulin glargine Injectable (LANTUS) 6 Unit(s) SubCutaneous at bedtime  insulin lispro (ADMELOG) corrective regimen sliding scale   SubCutaneous three times a day before meals  insulin lispro (ADMELOG) corrective regimen sliding scale   SubCutaneous at bedtime  levothyroxine 50 MICROGram(s) Oral daily  melatonin 3 milliGRAM(s) Oral at bedtime PRN  pantoprazole    Tablet 40 milliGRAM(s) Oral before breakfast  piperacillin/tazobactam IVPB.- 3.375 Gram(s) IV Intermittent once  piperacillin/tazobactam IVPB.- 3.375 Gram(s) IV Intermittent once  piperacillin/tazobactam IVPB.. 3.375 Gram(s) IV Intermittent every 8 hours  simvastatin 20 milliGRAM(s) Oral at bedtime  tiotropium 2.5 MICROgram(s) Inhaler 2 Puff(s) Inhalation daily  vancomycin  IVPB 1000 milliGRAM(s) IV Intermittent every 12 hours      PMHX/PSHX:  HTN (Hypertension)    Dyslipidemia    DM (Diabetes Mellitus)    Hypothyroidism    Pulmonary TB    Cataract of left eye        Family history:  Family history of heart attack (Mother)        Denies family history of colon cancer/polyps, stomach cancer/polyps, pancreatic cancer/masses, liver cancer/disease, ovarian cancer and endometrial cancer.    Social History:     Tob: Denies  EtOH: Denies  Illicit Drugs: Denies    ROS: see above    PHYSICAL EXAM:     GENERAL:  No acute distress, 6L NC   HEENT:  Normocephalic/atraumatic, no scleral icterus  CHEST: no accessory muscle use  HEART:  Regular rate and rhythm, no murmurs/rubs/gallops  ABDOMEN:  Soft, non-tender, non-distended  EXTREMITIES: No cyanosis, clubbing, or edema  SKIN:  No rash/warm/dry  NEURO:  Alert    Vital Signs:  Vital Signs Last 24 Hrs  T(C): 36.7 (10 Apr 2023 10:15), Max: 36.8 (10 Apr 2023 07:03)  T(F): 98 (10 Apr 2023 10:15), Max: 98.3 (10 Apr 2023 07:03)  HR: 73 (10 Apr 2023 10:15) (62 - 77)  BP: 139/61 (10 Apr 2023 10:15) (82/53 - 154/67)  BP(mean): 61 (09 Apr 2023 15:43) (61 - 61)  RR: 20 (10 Apr 2023 10:15) (16 - 22)  SpO2: 98% (10 Apr 2023 10:15) (97% - 100%)    Parameters below as of 10 Apr 2023 10:15  Patient On (Oxygen Delivery Method): nasal cannula  O2 Flow (L/min): 6    Daily     Daily     LABS:                        9.3    10.17 )-----------( 236      ( 10 Apr 2023 04:37 )             33.4     Mean Cell Volume: 74.7 fL (04-10-23 @ 04:37)    04-10    138  |  98  |  24<H>  ----------------------------<  151<H>  4.7   |  28  |  0.99    Ca    9.2      10 Apr 2023 04:37  Phos  3.9     04-10  Mg     1.90     04-10    TPro  6.5  /  Alb  4.0  /  TBili  0.8  /  DBili  x   /  AST  44<H>  /  ALT  97<H>  /  AlkPhos  151<H>  04-10    LIVER FUNCTIONS - ( 10 Apr 2023 04:37 )  Alb: 4.0 g/dL / Pro: 6.5 g/dL / ALK PHOS: 151 U/L / ALT: 97 U/L / AST: 44 U/L / GGT: x           PT/INR - ( 09 Apr 2023 09:14 )   PT: 13.4 sec;   INR: 1.15 ratio         PTT - ( 09 Apr 2023 09:14 )  PTT:24.6 sec  Urinalysis Basic - ( 09 Apr 2023 14:38 )    Color: Light Yellow / Appearance: Clear / SG: >1.050 / pH: x  Gluc: x / Ketone: Negative  / Bili: Negative / Urobili: <2 mg/dL   Blood: x / Protein: 30 mg/dL / Nitrite: Negative   Leuk Esterase: Negative / RBC: 0 /HPF / WBC 2 /HPF   Sq Epi: x / Non Sq Epi: x / Bacteria: Negative                              9.3    10.17 )-----------( 236      ( 10 Apr 2023 04:37 )             33.4                         7.7    10.47 )-----------( 226      ( 09 Apr 2023 09:14 )             29.1       Imaging:             HPI: 78 carleen-speaking F Hx pulmonary TB (2019) s/p 10 months of treatment (AFB negative x3 5/2021, now with RUL cavitary lesion -- pt has refused further work up), acute on chronic SOB (?asthma vs COPD, on 3L O2 at home), DM, HTN, recent admission for AHRF 2/2 viral PNA (2/2023) now presenting with syncopal episodes. GI consulted for several week Hx of dark stools + CHEO in this context.     Son reports pt with 4 syncopal episodes over the past day when walking to the bathroom or trying to lift herself off toilet (denies head trauma, reportedly 5min of LOC). Endorses black stool x 1-2 episodes daily x 3-4 weeks. 1 episode of abdominal pain 2 days ago, which son reports resolved. Reports alleve use 1-2 tabs most days of the week (might skip 2-3x per week) Denies hematochezia, hematemesis, n/v/d/f/c, weight loss. Denies prior Hx of EGD/colonoscopy.     Also has on-going CUNHA + SOB x 1 year. Son reports she's been on 3L O2 at home. Has been seen by pulmonary service several times since 2021 (most recently 2/2023 for viral PNA). Has been noted to have cavitary lung lesions in setting of Hx of pulmonary TB s/p tx -- pt/family have not wanted further investigation for lung lesions.      Allergies:  No Known Allergies    Hospital Medications:  acetaminophen     Tablet .. 650 milliGRAM(s) Oral every 6 hours PRN  albuterol    90 MICROgram(s) HFA Inhaler 2 Puff(s) Inhalation every 3 hours PRN  albuterol/ipratropium for Nebulization 3 milliLiter(s) Nebulizer every 6 hours  budesonide 160 MICROgram(s)/formoterol 4.5 MICROgram(s) Inhaler 2 Puff(s) Inhalation two times a day  dextrose 5%. 1000 milliLiter(s) IV Continuous <Continuous>  dextrose 5%. 1000 milliLiter(s) IV Continuous <Continuous>  dextrose 50% Injectable 25 Gram(s) IV Push once  dextrose 50% Injectable 12.5 Gram(s) IV Push once  dextrose 50% Injectable 25 Gram(s) IV Push once  dextrose Oral Gel 15 Gram(s) Oral once PRN  glucagon  Injectable 1 milliGRAM(s) IntraMuscular once  insulin glargine Injectable (LANTUS) 6 Unit(s) SubCutaneous at bedtime  insulin lispro (ADMELOG) corrective regimen sliding scale   SubCutaneous three times a day before meals  insulin lispro (ADMELOG) corrective regimen sliding scale   SubCutaneous at bedtime  levothyroxine 50 MICROGram(s) Oral daily  melatonin 3 milliGRAM(s) Oral at bedtime PRN  pantoprazole    Tablet 40 milliGRAM(s) Oral before breakfast  piperacillin/tazobactam IVPB.- 3.375 Gram(s) IV Intermittent once  piperacillin/tazobactam IVPB.- 3.375 Gram(s) IV Intermittent once  piperacillin/tazobactam IVPB.. 3.375 Gram(s) IV Intermittent every 8 hours  simvastatin 20 milliGRAM(s) Oral at bedtime  tiotropium 2.5 MICROgram(s) Inhaler 2 Puff(s) Inhalation daily  vancomycin  IVPB 1000 milliGRAM(s) IV Intermittent every 12 hours      PMHX/PSHX:  HTN (Hypertension)    Dyslipidemia    DM (Diabetes Mellitus)    Hypothyroidism    Pulmonary TB    Cataract of left eye        Family history:  Family history of heart attack (Mother)      Social History:     Tob: Denies  EtOH: Denies  Illicit Drugs: Denies    ROS: see above    PHYSICAL EXAM:     GENERAL:  No acute distress, 6L NC   HEENT:  Normocephalic/atraumatic, no scleral icterus  CHEST: no accessory muscle use  HEART:  Regular rate and rhythm, no murmurs/rubs/gallops  ABDOMEN:  Soft, non-tender, non-distended  EXTREMITIES: No cyanosis, clubbing, or edema  SKIN:  No rash/warm/dry  NEURO:  Alert, awake, interacting with family during evaluation    Vital Signs:  Vital Signs Last 24 Hrs  T(C): 36.7 (10 Apr 2023 10:15), Max: 36.8 (10 Apr 2023 07:03)  T(F): 98 (10 Apr 2023 10:15), Max: 98.3 (10 Apr 2023 07:03)  HR: 73 (10 Apr 2023 10:15) (62 - 77)  BP: 139/61 (10 Apr 2023 10:15) (82/53 - 154/67)  BP(mean): 61 (09 Apr 2023 15:43) (61 - 61)  RR: 20 (10 Apr 2023 10:15) (16 - 22)  SpO2: 98% (10 Apr 2023 10:15) (97% - 100%)    Parameters below as of 10 Apr 2023 10:15  Patient On (Oxygen Delivery Method): nasal cannula  O2 Flow (L/min): 6      LABS:                        9.3    10.17 )-----------( 236      ( 10 Apr 2023 04:37 )             33.4     Mean Cell Volume: 74.7 fL (04-10-23 @ 04:37)    04-10    138  |  98  |  24<H>  ----------------------------<  151<H>  4.7   |  28  |  0.99    Ca    9.2      10 Apr 2023 04:37  Phos  3.9     04-10  Mg     1.90     04-10    TPro  6.5  /  Alb  4.0  /  TBili  0.8  /  DBili  x   /  AST  44<H>  /  ALT  97<H>  /  AlkPhos  151<H>  04-10    LIVER FUNCTIONS - ( 10 Apr 2023 04:37 )  Alb: 4.0 g/dL / Pro: 6.5 g/dL / ALK PHOS: 151 U/L / ALT: 97 U/L / AST: 44 U/L / GGT: x           PT/INR - ( 09 Apr 2023 09:14 )   PT: 13.4 sec;   INR: 1.15 ratio         PTT - ( 09 Apr 2023 09:14 )  PTT:24.6 sec  Urinalysis Basic - ( 09 Apr 2023 14:38 )    Color: Light Yellow / Appearance: Clear / SG: >1.050 / pH: x  Gluc: x / Ketone: Negative  / Bili: Negative / Urobili: <2 mg/dL   Blood: x / Protein: 30 mg/dL / Nitrite: Negative   Leuk Esterase: Negative / RBC: 0 /HPF / WBC 2 /HPF   Sq Epi: x / Non Sq Epi: x / Bacteria: Negative                              9.3    10.17 )-----------( 236      ( 10 Apr 2023 04:37 )             33.4                         7.7    10.47 )-----------( 226      ( 09 Apr 2023 09:14 )             29.1      What Is The Reason For Today's Visit?: Upper Body Skin Exam

## 2023-04-10 NOTE — PATIENT PROFILE ADULT - FALL HARM RISK - HARM RISK INTERVENTIONS
Assistance with ambulation/Assistance OOB with selected safe patient handling equipment/Communicate Risk of Fall with Harm to all staff/Discuss with provider need for PT consult/Monitor for mental status changes/Monitor gait and stability/Provide patient with walking aids - walker, cane, crutches/Reinforce activity limits and safety measures with patient and family/Tailored Fall Risk Interventions/Visual Cue: Yellow wristband and red socks/Bed in lowest position, wheels locked, appropriate side rails in place/Call bell, personal items and telephone in reach/Instruct patient to call for assistance before getting out of bed or chair/Non-slip footwear when patient is out of bed/[place] to call system/Physically safe environment - no spills, clutter or unnecessary equipment/Purposeful Proactive Rounding/Room/bathroom lighting operational, light cord in reach

## 2023-04-10 NOTE — CONSULT NOTE ADULT - ATTENDING COMMENTS
# CHEO, dark stools x 3-4 weeks  # Acute on chronic dyspnea on home O2, recent admission for AHRF 2/2 viral PNA (2/2023)  # Recurrent syncopal episodes  # History of pulmonary TB  # Recent NSAID use    --If patient/family amenable, can plan for EGD +/- colonoscopy once medically optimized. Patient's son reports that prior pulmonary workup was deferred given patient's concerns to receive sedation; explained that endoscopy would require sedation as well and he would like to await further evaluation by cardiology and pulmonary teams  --PPI BID for now  --Monitor CBC and for recurrent overt bleeding    Additional recommendations as above. Please reach out if questions, concerns or acute change in clinical status as more urgent endoscopic evaluation may be warranted.

## 2023-04-10 NOTE — CONSULT NOTE ADULT - ASSESSMENT
78 carleen-speaking F Hx pulmonary TB (2019) s/p 10 months of treatment (AFB negative x3 5/2021, now with RUL cavitary lesion -- pt has refused further work up), acute on chronic SOB (?asthma vs COPD, on 3L O2 at home), DM, HTN, recent admission for AHRF 2/2 viral PNA (2/2023) now presenting with syncopal episodes. GI consulted for several week Hx of dark stools + CHEO in this context.       Impression:   #Dark stools   #Iron deficiency anemia  #Syncopal episodes  pw syncopal episodes with dark stools x 1 month, in setting of NSAID use. HD stable, Hb 7.7 on admission --> 1U --> Hb 9.3, mild BUN elevation (to 24) with anemia labs c/w iron deficiency anemia. No prior endoscopic evaluation.  -- In this clinical context, would be c/f possible UGI bleed, ddx includes PUD, malignancy, erosive esophagitis, erosive gastropathy, angioectasias (vs if negative for UGI bleeding --> then would investigate LGI bleeding sources)  -- Likely would also benefit from colonoscopy for CHEO as an outpatient if within GOC/inpatient work up does not require colonoscopic evaluation     #Acute on chronic SOB  #AHRF requiring 6L NC  #Hx pulmonary TB now with cavitary lesions on CT imaging       Recommendations:   - Obtain cardiology + pulmonary clearance     78 carleen-speaking F Hx pulmonary TB (2019) s/p 10 months of treatment (AFB negative x3 5/2021, now with RUL cavitary lesion -- pt has refused further work up), acute on chronic SOB (?asthma vs COPD, on 3L O2 at home), DM, HTN, recent admission for AHRF 2/2 viral PNA (2/2023) now presenting with syncopal episodes. GI consulted for several week Hx of dark stools + CHEO in this context.       Impression:   #Dark stools   #Iron deficiency anemia  #Syncopal episodes  pw syncopal episodes with dark stools x 1 month, in setting of NSAID use. HD stable, Hb 7.7 on admission --> 1U --> Hb 9.3, mild BUN elevation (to 24) with anemia labs c/w iron deficiency anemia. No prior endoscopic evaluation.  -- In this clinical context, would be c/f possible UGI bleed, ddx includes PUD, malignancy, erosive esophagitis, erosive gastropathy, angioectasias (vs if negative for UGI bleeding --> then would investigate LGI bleeding sources)  -- Likely would also benefit from colonoscopy for CHEO as an outpatient if within GOC/inpatient work up does not require colonoscopic evaluation     #Acute on chronic SOB  #AHRF requiring 6L NC  #Hx pulmonary TB now with cavitary lesions on CT imaging       Recommendations:   - Obtain cardiology + pulmonary evaluation   - When medically optimized from cardiology/pulmonary perspective, would consider EGD to evaluate for UGI bleeding  - IV PPI BID   - Ok to place on clears --> can advance to regular diet if repeat CBC stable  - Trend CBC, transfuse Hb < 7  - Active T&S  - If worsening Hb, with on-going GI bleeding, change in hemodynamics --> would call ICU + call back GI for more urgent endoscopic evaluation   - Depending on results of EGD, will determine necessity of inpatient colonoscopy vs outpatient colonoscopy (for evaluation of CHEO, if within pt's GOC)      *Note not final unless signed by attending    Thank you for involving us in the care of this patient, please reach out if any further questions.     Tej Shin MD  Gastroenterology/Hepatology Fellow, PGY6    Available on [employer] Teams  [phone] ([facility])  [id number] ([facility])  Please contact on call fellow weekdays after 5pm-7am and weekends: [phone]

## 2023-04-10 NOTE — PATIENT PROFILE ADULT - LIVING ENVIRONMENT
DIABETES WITHOUT RETINOPATHY OU:  NO RETINOPATHY TODAY.   RETURN FOR FOLLOW-UP AS SCHEDULED FOR DILATED EYE EXAM. no

## 2023-04-11 LAB
ALBUMIN SERPL ELPH-MCNC: 3.6 G/DL — SIGNIFICANT CHANGE UP (ref 3.3–5)
ALP SERPL-CCNC: 113 U/L — SIGNIFICANT CHANGE UP (ref 40–120)
ALT FLD-CCNC: 64 U/L — HIGH (ref 4–33)
ANION GAP SERPL CALC-SCNC: 9 MMOL/L — SIGNIFICANT CHANGE UP (ref 7–14)
AST SERPL-CCNC: 23 U/L — SIGNIFICANT CHANGE UP (ref 4–32)
BASOPHILS # BLD AUTO: 0.07 K/UL — SIGNIFICANT CHANGE UP (ref 0–0.2)
BASOPHILS NFR BLD AUTO: 0.7 % — SIGNIFICANT CHANGE UP (ref 0–2)
BILIRUB SERPL-MCNC: 0.5 MG/DL — SIGNIFICANT CHANGE UP (ref 0.2–1.2)
BUN SERPL-MCNC: 19 MG/DL — SIGNIFICANT CHANGE UP (ref 7–23)
CALCIUM SERPL-MCNC: 8.4 MG/DL — SIGNIFICANT CHANGE UP (ref 8.4–10.5)
CHLORIDE SERPL-SCNC: 98 MMOL/L — SIGNIFICANT CHANGE UP (ref 98–107)
CO2 SERPL-SCNC: 29 MMOL/L — SIGNIFICANT CHANGE UP (ref 22–31)
CREAT SERPL-MCNC: 1.03 MG/DL — SIGNIFICANT CHANGE UP (ref 0.5–1.3)
EGFR: 55 ML/MIN/1.73M2 — LOW
EOSINOPHIL # BLD AUTO: 0.31 K/UL — SIGNIFICANT CHANGE UP (ref 0–0.5)
EOSINOPHIL NFR BLD AUTO: 2.9 % — SIGNIFICANT CHANGE UP (ref 0–6)
GLUCOSE BLDC GLUCOMTR-MCNC: 219 MG/DL — HIGH (ref 70–99)
GLUCOSE BLDC GLUCOMTR-MCNC: 226 MG/DL — HIGH (ref 70–99)
GLUCOSE BLDC GLUCOMTR-MCNC: 252 MG/DL — HIGH (ref 70–99)
GLUCOSE BLDC GLUCOMTR-MCNC: 258 MG/DL — HIGH (ref 70–99)
GLUCOSE BLDC GLUCOMTR-MCNC: 268 MG/DL — HIGH (ref 70–99)
GLUCOSE SERPL-MCNC: 225 MG/DL — HIGH (ref 70–99)
HCT VFR BLD CALC: 30.4 % — LOW (ref 34.5–45)
HCT VFR BLD CALC: 30.7 % — LOW (ref 34.5–45)
HGB BLD-MCNC: 8.4 G/DL — LOW (ref 11.5–15.5)
HGB BLD-MCNC: 8.5 G/DL — LOW (ref 11.5–15.5)
IANC: 8.17 K/UL — HIGH (ref 1.8–7.4)
IMM GRANULOCYTES NFR BLD AUTO: 0.5 % — SIGNIFICANT CHANGE UP (ref 0–0.9)
LYMPHOCYTES # BLD AUTO: 1.3 K/UL — SIGNIFICANT CHANGE UP (ref 1–3.3)
LYMPHOCYTES # BLD AUTO: 12.1 % — LOW (ref 13–44)
MAGNESIUM SERPL-MCNC: 2 MG/DL — SIGNIFICANT CHANGE UP (ref 1.6–2.6)
MCHC RBC-ENTMCNC: 21.1 PG — LOW (ref 27–34)
MCHC RBC-ENTMCNC: 21.4 PG — LOW (ref 27–34)
MCHC RBC-ENTMCNC: 27.6 GM/DL — LOW (ref 32–36)
MCHC RBC-ENTMCNC: 27.7 GM/DL — LOW (ref 32–36)
MCV RBC AUTO: 76.2 FL — LOW (ref 80–100)
MCV RBC AUTO: 77.3 FL — LOW (ref 80–100)
MONOCYTES # BLD AUTO: 0.85 K/UL — SIGNIFICANT CHANGE UP (ref 0–0.9)
MONOCYTES NFR BLD AUTO: 7.9 % — SIGNIFICANT CHANGE UP (ref 2–14)
NEUTROPHILS # BLD AUTO: 8.17 K/UL — HIGH (ref 1.8–7.4)
NEUTROPHILS NFR BLD AUTO: 75.9 % — SIGNIFICANT CHANGE UP (ref 43–77)
NRBC # BLD: 0 /100 WBCS — SIGNIFICANT CHANGE UP (ref 0–0)
NRBC # BLD: 0 /100 WBCS — SIGNIFICANT CHANGE UP (ref 0–0)
NRBC # FLD: 0 K/UL — SIGNIFICANT CHANGE UP (ref 0–0)
NRBC # FLD: 0 K/UL — SIGNIFICANT CHANGE UP (ref 0–0)
PHOSPHATE SERPL-MCNC: 2.8 MG/DL — SIGNIFICANT CHANGE UP (ref 2.5–4.5)
PLATELET # BLD AUTO: 207 K/UL — SIGNIFICANT CHANGE UP (ref 150–400)
PLATELET # BLD AUTO: 208 K/UL — SIGNIFICANT CHANGE UP (ref 150–400)
POTASSIUM SERPL-MCNC: 4.2 MMOL/L — SIGNIFICANT CHANGE UP (ref 3.5–5.3)
POTASSIUM SERPL-SCNC: 4.2 MMOL/L — SIGNIFICANT CHANGE UP (ref 3.5–5.3)
PROT SERPL-MCNC: 5.9 G/DL — LOW (ref 6–8.3)
RBC # BLD: 3.97 M/UL — SIGNIFICANT CHANGE UP (ref 3.8–5.2)
RBC # BLD: 3.99 M/UL — SIGNIFICANT CHANGE UP (ref 3.8–5.2)
RBC # FLD: 19.5 % — HIGH (ref 10.3–14.5)
RBC # FLD: 19.5 % — HIGH (ref 10.3–14.5)
SODIUM SERPL-SCNC: 136 MMOL/L — SIGNIFICANT CHANGE UP (ref 135–145)
WBC # BLD: 10.75 K/UL — HIGH (ref 3.8–10.5)
WBC # BLD: 9.25 K/UL — SIGNIFICANT CHANGE UP (ref 3.8–10.5)
WBC # FLD AUTO: 10.75 K/UL — HIGH (ref 3.8–10.5)
WBC # FLD AUTO: 9.25 K/UL — SIGNIFICANT CHANGE UP (ref 3.8–10.5)

## 2023-04-11 PROCEDURE — 71250 CT THORAX DX C-: CPT | Mod: 26

## 2023-04-11 PROCEDURE — 99232 SBSQ HOSP IP/OBS MODERATE 35: CPT | Mod: GC

## 2023-04-11 PROCEDURE — 99232 SBSQ HOSP IP/OBS MODERATE 35: CPT

## 2023-04-11 RX ORDER — POLYETHYLENE GLYCOL 3350 17 G/17G
17 POWDER, FOR SOLUTION ORAL DAILY
Refills: 0 | Status: DISCONTINUED | OUTPATIENT
Start: 2023-04-11 | End: 2023-04-21

## 2023-04-11 RX ORDER — SENNA PLUS 8.6 MG/1
2 TABLET ORAL AT BEDTIME
Refills: 0 | Status: DISCONTINUED | OUTPATIENT
Start: 2023-04-11 | End: 2023-04-21

## 2023-04-11 RX ADMIN — PIPERACILLIN AND TAZOBACTAM 25 GRAM(S): 4; .5 INJECTION, POWDER, LYOPHILIZED, FOR SOLUTION INTRAVENOUS at 06:20

## 2023-04-11 RX ADMIN — Medication 50 MICROGRAM(S): at 06:00

## 2023-04-11 RX ADMIN — BUDESONIDE AND FORMOTEROL FUMARATE DIHYDRATE 2 PUFF(S): 160; 4.5 AEROSOL RESPIRATORY (INHALATION) at 09:19

## 2023-04-11 RX ADMIN — Medication 3: at 11:50

## 2023-04-11 RX ADMIN — BUDESONIDE AND FORMOTEROL FUMARATE DIHYDRATE 2 PUFF(S): 160; 4.5 AEROSOL RESPIRATORY (INHALATION) at 21:42

## 2023-04-11 RX ADMIN — Medication 2: at 07:51

## 2023-04-11 RX ADMIN — PIPERACILLIN AND TAZOBACTAM 25 GRAM(S): 4; .5 INJECTION, POWDER, LYOPHILIZED, FOR SOLUTION INTRAVENOUS at 14:56

## 2023-04-11 RX ADMIN — TIOTROPIUM BROMIDE 2 PUFF(S): 18 CAPSULE ORAL; RESPIRATORY (INHALATION) at 09:21

## 2023-04-11 RX ADMIN — Medication 3: at 16:58

## 2023-04-11 RX ADMIN — INSULIN GLARGINE 6 UNIT(S): 100 INJECTION, SOLUTION SUBCUTANEOUS at 22:56

## 2023-04-11 RX ADMIN — PIPERACILLIN AND TAZOBACTAM 25 GRAM(S): 4; .5 INJECTION, POWDER, LYOPHILIZED, FOR SOLUTION INTRAVENOUS at 21:45

## 2023-04-11 RX ADMIN — PANTOPRAZOLE SODIUM 40 MILLIGRAM(S): 20 TABLET, DELAYED RELEASE ORAL at 06:00

## 2023-04-11 RX ADMIN — Medication 3 MILLILITER(S): at 04:11

## 2023-04-11 RX ADMIN — Medication 3 MILLILITER(S): at 22:54

## 2023-04-11 RX ADMIN — SENNA PLUS 2 TABLET(S): 8.6 TABLET ORAL at 21:43

## 2023-04-11 RX ADMIN — SIMVASTATIN 20 MILLIGRAM(S): 20 TABLET, FILM COATED ORAL at 21:42

## 2023-04-11 RX ADMIN — Medication 3 MILLIGRAM(S): at 21:42

## 2023-04-11 RX ADMIN — PANTOPRAZOLE SODIUM 40 MILLIGRAM(S): 20 TABLET, DELAYED RELEASE ORAL at 18:08

## 2023-04-11 RX ADMIN — Medication 3 MILLILITER(S): at 10:10

## 2023-04-11 RX ADMIN — Medication 3 MILLILITER(S): at 16:09

## 2023-04-11 NOTE — PROGRESS NOTE ADULT - SUBJECTIVE AND OBJECTIVE BOX
CC: F/U for PNA    Saw/spoke to patient. Unchanged. No new complaints.    Allergies  No Known Allergies    ANTIMICROBIALS:  piperacillin/tazobactam IVPB.. 3.375 every 8 hours    PE:    Vital Signs Last 24 Hrs  T(C): 36.2 (11 Apr 2023 11:15), Max: 37.1 (10 Apr 2023 18:30)  T(F): 97.2 (11 Apr 2023 11:15), Max: 98.7 (10 Apr 2023 18:30)  HR: 88 (11 Apr 2023 11:15) (77 - 95)  BP: 101/51 (11 Apr 2023 11:15) (101/51 - 120/63)  RR: 18 (11 Apr 2023 11:15) (18 - 20)  SpO2: 98% (11 Apr 2023 11:15) (95% - 100%)    Gen: AOx3, NAD, non-toxic  CV: Nontachycardic  Resp: Breathing comfortably, RA  Abd: Soft, nontender  IV/Skin: No thrombophlebitis    LABS:                        8.5    10.75 )-----------( 207      ( 11 Apr 2023 05:20 )             30.7     04-11    136  |  98  |  19  ----------------------------<  225<H>  4.2   |  29  |  1.03    Ca    8.4      11 Apr 2023 05:20  Phos  2.8     04-11  Mg     2.00     04-11    TPro  5.9<L>  /  Alb  3.6  /  TBili  0.5  /  DBili  x   /  AST  23  /  ALT  64<H>  /  AlkPhos  113  04-11    MICROBIOLOGY:    .Blood Blood-Peripheral  04-10-23   No growth to date.  --  --    .Blood Blood-Peripheral  04-10-23   No growth to date.  --  --    Rapid RVP Result: Deborah (04-09 @ 09:14)    (otherwise reviewed)    RADIOLOGY:    4/10 CT:    IMPRESSION:  Lower lobe parenchymal abnormality is not significantly changed compared   to prior studies. There is a new left pleural effusion.    No acute intra-abdominal pathology seen to explain patient's pain.

## 2023-04-11 NOTE — PROGRESS NOTE ADULT - SUBJECTIVE AND OBJECTIVE BOX
SUBJECTIVE / OVERNIGHT EVENTS:pt seen and examined  04-11-23     MEDICATIONS  (STANDING):  albuterol/ipratropium for Nebulization 3 milliLiter(s) Nebulizer every 6 hours  budesonide 160 MICROgram(s)/formoterol 4.5 MICROgram(s) Inhaler 2 Puff(s) Inhalation two times a day  dextrose 5%. 1000 milliLiter(s) (50 mL/Hr) IV Continuous <Continuous>  dextrose 5%. 1000 milliLiter(s) (100 mL/Hr) IV Continuous <Continuous>  dextrose 50% Injectable 25 Gram(s) IV Push once  dextrose 50% Injectable 12.5 Gram(s) IV Push once  dextrose 50% Injectable 25 Gram(s) IV Push once  glucagon  Injectable 1 milliGRAM(s) IntraMuscular once  insulin glargine Injectable (LANTUS) 6 Unit(s) SubCutaneous at bedtime  insulin lispro (ADMELOG) corrective regimen sliding scale   SubCutaneous three times a day before meals  insulin lispro (ADMELOG) corrective regimen sliding scale   SubCutaneous at bedtime  levothyroxine 50 MICROGram(s) Oral daily  pantoprazole    Tablet 40 milliGRAM(s) Oral two times a day  piperacillin/tazobactam IVPB.. 3.375 Gram(s) IV Intermittent every 8 hours  polyethylene glycol 3350 17 Gram(s) Oral daily  senna 2 Tablet(s) Oral at bedtime  simvastatin 20 milliGRAM(s) Oral at bedtime  tiotropium 2.5 MICROgram(s) Inhaler 2 Puff(s) Inhalation daily    MEDICATIONS  (PRN):  acetaminophen     Tablet .. 650 milliGRAM(s) Oral every 6 hours PRN Temp greater or equal to 38C (100.4F), Mild Pain (1 - 3)  albuterol    90 MICROgram(s) HFA Inhaler 2 Puff(s) Inhalation every 3 hours PRN Shortness of Breath and/or Wheezing  dextrose Oral Gel 15 Gram(s) Oral once PRN Blood Glucose LESS THAN 70 milliGRAM(s)/deciliter  melatonin 3 milliGRAM(s) Oral at bedtime PRN Insomnia    Vital Signs Last 24 Hrs  T(C): 36.2 (04-11-23 @ 11:15), Max: 37.1 (04-10-23 @ 18:30)  T(F): 97.2 (04-11-23 @ 11:15), Max: 98.7 (04-10-23 @ 18:30)  HR: 86 (04-11-23 @ 16:09) (77 - 95)  BP: 101/51 (04-11-23 @ 11:15) (101/51 - 120/63)  BP(mean): --  RR: 18 (04-11-23 @ 11:15) (18 - 20)  SpO2: 98% (04-11-23 @ 16:09) (95% - 100%)    Orthostatic VS  04-10-23 @ 10:15  Lying BP: 113/56 HR: 79  Sitting BP: 122/61 HR: 81  Standing BP: 127/49 HR: 82  Site: upper left arm  Mode: electronic      PHYSICAL EXAM:  GENERAL: NAD  EYES: EOMI, PERRLA  NECK: Supple, No JVD  CHEST/LUNG: dec breath sounds at bases  HEART:  S1 , S2 +  ABDOMEN: soft , bs+  EXTREMITIES:    NEUROLOGY:alert awake      LABS:  04-11    136  |  98  |  19  ----------------------------<  225<H>  4.2   |  29  |  1.03    Ca    8.4      11 Apr 2023 05:20  Phos  2.8     04-11  Mg     2.00     04-11    TPro  5.9<L>  /  Alb  3.6  /  TBili  0.5  /  DBili      /  AST  23  /  ALT  64<H>  /  AlkPhos  113  04-11    Creatinine Trend: 1.03 <--, 0.99 <--, 0.86 <--, 0.77 <--, 0.86 <--                        8.5    10.75 )-----------( 207      ( 11 Apr 2023 05:20 )             30.7     Urine Studies:  Urinalysis Basic - ( 09 Apr 2023 14:38 )    Color: Light Yellow / Appearance: Clear / SG: >1.050 / pH:   Gluc:  / Ketone: Negative  / Bili: Negative / Urobili: <2 mg/dL   Blood:  / Protein: 30 mg/dL / Nitrite: Negative   Leuk Esterase: Negative / RBC: 0 /HPF / WBC 2 /HPF   Sq Epi:  / Non Sq Epi:  / Bacteria: Negative              LIVER FUNCTIONS - ( 11 Apr 2023 05:20 )  Alb: 3.6 g/dL / Pro: 5.9 g/dL / ALK PHOS: 113 U/L / ALT: 64 U/L / AST: 23 U/L / GGT: x                   RADIOLOGY & ADDITIONAL TESTS:    Imaging Personally Reviewed:yes    Consultant(s) Notes Reviewed:  yes    Care Discussed with Consultants/Other Providers:yes

## 2023-04-11 NOTE — PROGRESS NOTE ADULT - PROBLEM SELECTOR PLAN 1
Assessment:  - patient presented for new onset syncope  - likely secondary to orthostatic hypotension vs. underlying infection  - CT head negative  - troponin neg x1, EKG shows NSR:  - tele monitoring  - orthostatics to be checked  - hold BP meds for now

## 2023-04-11 NOTE — PROGRESS NOTE ADULT - SUBJECTIVE AND OBJECTIVE BOX
Interval Events:   Bradycardic to 50s overnight --> currently in 70-90s HR with SBPs in 100-120s  Reports no BM since admission, denies melena, hematochezia, abd pain, n/v  On-going significant SOB with accessory muscle use during interview on 6L NC  WBC uptrending to 10.75  Hb 8.5 <-- 9.3     Allergies:  No Known Allergies        Hospital Medications:  acetaminophen     Tablet .. 650 milliGRAM(s) Oral every 6 hours PRN  albuterol    90 MICROgram(s) HFA Inhaler 2 Puff(s) Inhalation every 3 hours PRN  albuterol/ipratropium for Nebulization 3 milliLiter(s) Nebulizer every 6 hours  budesonide 160 MICROgram(s)/formoterol 4.5 MICROgram(s) Inhaler 2 Puff(s) Inhalation two times a day  dextrose 5%. 1000 milliLiter(s) IV Continuous <Continuous>  dextrose 5%. 1000 milliLiter(s) IV Continuous <Continuous>  dextrose 50% Injectable 12.5 Gram(s) IV Push once  dextrose 50% Injectable 25 Gram(s) IV Push once  dextrose 50% Injectable 25 Gram(s) IV Push once  dextrose Oral Gel 15 Gram(s) Oral once PRN  glucagon  Injectable 1 milliGRAM(s) IntraMuscular once  insulin glargine Injectable (LANTUS) 6 Unit(s) SubCutaneous at bedtime  insulin lispro (ADMELOG) corrective regimen sliding scale   SubCutaneous three times a day before meals  insulin lispro (ADMELOG) corrective regimen sliding scale   SubCutaneous at bedtime  levothyroxine 50 MICROGram(s) Oral daily  melatonin 3 milliGRAM(s) Oral at bedtime PRN  pantoprazole    Tablet 40 milliGRAM(s) Oral two times a day  piperacillin/tazobactam IVPB.. 3.375 Gram(s) IV Intermittent every 8 hours  simvastatin 20 milliGRAM(s) Oral at bedtime  tiotropium 2.5 MICROgram(s) Inhaler 2 Puff(s) Inhalation daily      PMHX/PSHX:  HTN (Hypertension)    Dyslipidemia    DM (Diabetes Mellitus)    Hypothyroidism    Pulmonary TB    Cataract of left eye        Family history:  Family history of heart attack (Mother)        ROS:     General:  No wt loss, fevers, chills, night sweats, + fatigue,   Eyes:  Good vision, no reported pain  ENT:  No sore throat, pain, runny nose, dysphagia  CV:  No pain, palpitations, hypo/hypertension  Pulm:  No dyspnea, cough, tachypnea, wheezing  GI:  see above  :  No pain, bleeding, incontinence, nocturia  Muscle:  No pain, weakness  Neuro:  No weakness, tingling, memory problems  Psych:  No fatigue, insomnia, mood problems, depression  Endocrine:  No polyuria, polydipsia, cold/heat intolerance  Heme:  No petechiae, ecchymosis, easy bruisability  Skin:  No rash, tattoos, scars, edema      PHYSICAL EXAM:   Vital Signs:  Vital Signs Last 24 Hrs  T(C): 36.8 (11 Apr 2023 05:55), Max: 37.1 (10 Apr 2023 18:30)  T(F): 98.2 (11 Apr 2023 05:55), Max: 98.7 (10 Apr 2023 18:30)  HR: 95 (11 Apr 2023 05:55) (73 - 95)  BP: 120/63 (11 Apr 2023 05:55) (102/56 - 139/61)  BP(mean): --  RR: 18 (11 Apr 2023 05:55) (18 - 20)  SpO2: 97% (11 Apr 2023 05:55) (95% - 100%)    Parameters below as of 11 Apr 2023 05:55  Patient On (Oxygen Delivery Method): nasal cannula  O2 Flow (L/min): 6    Daily Height in cm: 149.86 (10 Apr 2023 18:30)    Daily     GENERAL:  No acute distress, +6L NC  HEENT:  no scleral icterus  CHEST:  + accessory muscle use during interview  HEART:  Regular rate and rhythm, no murmurs/rubs/gallops  ABDOMEN:  Soft, non-tender, non-distended  EXTREMITIES:  No cyanosis, clubbing, or edema  SKIN:  No rash/warm/dry  NEURO:  Alert and oriented, answers questions appropriately and interactive during interview     LABS:                        8.5    10.75 )-----------( 207      ( 11 Apr 2023 05:20 )             30.7     Mean Cell Volume: 77.3 fL (04-11-23 @ 05:20)    04-11    136  |  98  |  19  ----------------------------<  225<H>  4.2   |  29  |  1.03    Ca    8.4      11 Apr 2023 05:20  Phos  2.8     04-11  Mg     2.00     04-11    TPro  5.9<L>  /  Alb  3.6  /  TBili  0.5  /  DBili  x   /  AST  23  /  ALT  64<H>  /  AlkPhos  113  04-11    LIVER FUNCTIONS - ( 11 Apr 2023 05:20 )  Alb: 3.6 g/dL / Pro: 5.9 g/dL / ALK PHOS: 113 U/L / ALT: 64 U/L / AST: 23 U/L / GGT: x           PT/INR - ( 09 Apr 2023 09:14 )   PT: 13.4 sec;   INR: 1.15 ratio         PTT - ( 09 Apr 2023 09:14 )  PTT:24.6 sec  Urinalysis Basic - ( 09 Apr 2023 14:38 )    Color: Light Yellow / Appearance: Clear / SG: >1.050 / pH: x  Gluc: x / Ketone: Negative  / Bili: Negative / Urobili: <2 mg/dL   Blood: x / Protein: 30 mg/dL / Nitrite: Negative   Leuk Esterase: Negative / RBC: 0 /HPF / WBC 2 /HPF   Sq Epi: x / Non Sq Epi: x / Bacteria: Negative                              8.5    10.75 )-----------( 207      ( 11 Apr 2023 05:20 )             30.7                         9.3    10.17 )-----------( 236      ( 10 Apr 2023 04:37 )             33.4                         7.7    10.47 )-----------( 226      ( 09 Apr 2023 09:14 )             29.1       Imaging:             Interval Events:   Bradycardic to 50s overnight --> currently in 70-90s HR with SBPs in 100-120s  Reports no BM since admission, denies melena, hematochezia, abd pain, n/v  On-going significant SOB with accessory muscle use during interview on 6L NC  WBC uptrending to 10.75  Hb 8.5 <-- 9.3     Allergies:  No Known Allergies        Hospital Medications:  acetaminophen     Tablet .. 650 milliGRAM(s) Oral every 6 hours PRN  albuterol    90 MICROgram(s) HFA Inhaler 2 Puff(s) Inhalation every 3 hours PRN  albuterol/ipratropium for Nebulization 3 milliLiter(s) Nebulizer every 6 hours  budesonide 160 MICROgram(s)/formoterol 4.5 MICROgram(s) Inhaler 2 Puff(s) Inhalation two times a day  dextrose 5%. 1000 milliLiter(s) IV Continuous <Continuous>  dextrose 5%. 1000 milliLiter(s) IV Continuous <Continuous>  dextrose 50% Injectable 12.5 Gram(s) IV Push once  dextrose 50% Injectable 25 Gram(s) IV Push once  dextrose 50% Injectable 25 Gram(s) IV Push once  dextrose Oral Gel 15 Gram(s) Oral once PRN  glucagon  Injectable 1 milliGRAM(s) IntraMuscular once  insulin glargine Injectable (LANTUS) 6 Unit(s) SubCutaneous at bedtime  insulin lispro (ADMELOG) corrective regimen sliding scale   SubCutaneous three times a day before meals  insulin lispro (ADMELOG) corrective regimen sliding scale   SubCutaneous at bedtime  levothyroxine 50 MICROGram(s) Oral daily  melatonin 3 milliGRAM(s) Oral at bedtime PRN  pantoprazole    Tablet 40 milliGRAM(s) Oral two times a day  piperacillin/tazobactam IVPB.. 3.375 Gram(s) IV Intermittent every 8 hours  simvastatin 20 milliGRAM(s) Oral at bedtime  tiotropium 2.5 MICROgram(s) Inhaler 2 Puff(s) Inhalation daily      PMHX/PSHX:  HTN (Hypertension)    Dyslipidemia    DM (Diabetes Mellitus)    Hypothyroidism    Pulmonary TB    Cataract of left eye        Family history:  Family history of heart attack (Mother)        ROS:     General:  No wt loss, fevers, chills, night sweats, + fatigue,   Eyes:  Good vision, no reported pain  ENT:  No sore throat, pain, runny nose, dysphagia  CV:  No pain, palpitations, hypo/hypertension  Pulm:  No dyspnea, cough, tachypnea, wheezing  GI:  see above  :  No pain, bleeding, incontinence, nocturia  Muscle:  No pain, weakness  Neuro:  No weakness, tingling, memory problems  Psych:  No fatigue, insomnia, mood problems, depression  Endocrine:  No polyuria, polydipsia, cold/heat intolerance  Heme:  No petechiae, ecchymosis, easy bruisability  Skin:  No rash, tattoos, scars, edema      PHYSICAL EXAM:   Vital Signs:  Vital Signs Last 24 Hrs  T(C): 36.8 (11 Apr 2023 05:55), Max: 37.1 (10 Apr 2023 18:30)  T(F): 98.2 (11 Apr 2023 05:55), Max: 98.7 (10 Apr 2023 18:30)  HR: 95 (11 Apr 2023 05:55) (73 - 95)  BP: 120/63 (11 Apr 2023 05:55) (102/56 - 139/61)  BP(mean): --  RR: 18 (11 Apr 2023 05:55) (18 - 20)  SpO2: 97% (11 Apr 2023 05:55) (95% - 100%)    Parameters below as of 11 Apr 2023 05:55  Patient On (Oxygen Delivery Method): nasal cannula  O2 Flow (L/min): 6    Daily Height in cm: 149.86 (10 Apr 2023 18:30)    Daily     GENERAL:  No acute distress, +6L NC  HEENT:  no scleral icterus  CHEST:  + accessory muscle use during interview  HEART:  Regular rate and rhythm, no murmurs/rubs/gallops  ABDOMEN:  Soft, non-tender, non-distended  EXTREMITIES:  No cyanosis, clubbing, or edema  SKIN:  No rash/warm/dry  NEURO:  Alert and oriented, answers questions appropriately and interactive during interview     LABS:                        8.5    10.75 )-----------( 207      ( 11 Apr 2023 05:20 )             30.7     Mean Cell Volume: 77.3 fL (04-11-23 @ 05:20)    04-11    136  |  98  |  19  ----------------------------<  225<H>  4.2   |  29  |  1.03    Ca    8.4      11 Apr 2023 05:20  Phos  2.8     04-11  Mg     2.00     04-11    TPro  5.9<L>  /  Alb  3.6  /  TBili  0.5  /  DBili  x   /  AST  23  /  ALT  64<H>  /  AlkPhos  113  04-11    LIVER FUNCTIONS - ( 11 Apr 2023 05:20 )  Alb: 3.6 g/dL / Pro: 5.9 g/dL / ALK PHOS: 113 U/L / ALT: 64 U/L / AST: 23 U/L / GGT: x           PT/INR - ( 09 Apr 2023 09:14 )   PT: 13.4 sec;   INR: 1.15 ratio         PTT - ( 09 Apr 2023 09:14 )  PTT:24.6 sec  Urinalysis Basic - ( 09 Apr 2023 14:38 )    Color: Light Yellow / Appearance: Clear / SG: >1.050 / pH: x  Gluc: x / Ketone: Negative  / Bili: Negative / Urobili: <2 mg/dL   Blood: x / Protein: 30 mg/dL / Nitrite: Negative   Leuk Esterase: Negative / RBC: 0 /HPF / WBC 2 /HPF   Sq Epi: x / Non Sq Epi: x / Bacteria: Negative                              8.5    10.75 )-----------( 207      ( 11 Apr 2023 05:20 )             30.7                         9.3    10.17 )-----------( 236      ( 10 Apr 2023 04:37 )             33.4                         7.7    10.47 )-----------( 226      ( 09 Apr 2023 09:14 )             29.1

## 2023-04-11 NOTE — PHYSICAL THERAPY INITIAL EVALUATION ADULT - PATIENT PROFILE REVIEW, REHAB EVAL
PT initial evaluation received and chart review completed. Pt agreeable to participate in PT evaluation. Pt cleared by TRACEY Quick./yes

## 2023-04-11 NOTE — PHYSICAL THERAPY INITIAL EVALUATION ADULT - PERTINENT HX OF CURRENT PROBLEM, REHAB EVAL
Pt is an 78 year old female presenting with new onset of syncope. Pt has had 3 syncope episodes within the last 12 hours. Per son providing translation, the patient gets these syncopical episodes when she sits down or stand up after she uses the bathroom. The patient had LOC for 5 minutes then returned back to baseline. CT head negative for acute findings. CT A/P revealed new left pleural effusion. CXR revealed scattered bilateral patchy opacities and generalized increased lung markings similar to prior exam. In ED, pt found to be anemic and possible nonresolving pneumonia. Pt admitted for syncope, hyperkalemia, pleural effusion, and multifocal pneumonia.

## 2023-04-11 NOTE — PROGRESS NOTE ADULT - ASSESSMENT
79 yo F with HTN, DM2, asthma, with syncope  No fever, no leukocytosis  Patient with increasing shortness of breath for past few days; multiple episodes syncope  RVP neg  Elevated LFTs  CT with Lower lobe abnormality, not changed, pleural effusion; intra-abd normal  CXR with bilateral opacities  Possible bacterial pneumonia as cause SOB, although minimal inflammatory findings  Overall, SOB, syncope, abnormal finding on imaging  - Zosyn 3.375g q 8  - F/U BCXs  - Trend LFTs to normal--reactive vs congestion?  - Check CT Chest for better clarity on pulmonary process  - Check Legionella, check MRSA PCR    Jacek Angel MD  Contact on TEAMS messaging from 9am - 5pm  From 5pm-9am, on weekends, or if no response call [phone]

## 2023-04-11 NOTE — PROGRESS NOTE ADULT - ATTENDING COMMENTS
No overt bleeding reported. Hgb 8s today. Evaluated by ID and zosyn recommended for possible PNA. Also evaluated by cardiology and TTE recommended.     # CHEO, dark stools x 3-4 weeks  # Acute on chronic dyspnea on home O2, recent admission for AHRF 2/2 viral PNA (2/2023)  # Recurrent syncopal episodes  # History of pulmonary TB  # Recent NSAID use    --If patient/family amenable, can plan for EGD +/- colonoscopy once medically optimized. As previously noted, patient's son had reported that prior pulmonary workup was deferred given patient's concerns to receive sedation; explained that endoscopy would require sedation as well and he would like to await further evaluation by ID, cardiology and pulmonary teams  --PPI BID for now  --Monitor CBC and for recurrent overt bleeding    Additional recommendations as above. Please reach out if questions, concerns or acute change in clinical status as more urgent endoscopic evaluation may be warranted.

## 2023-04-11 NOTE — PROGRESS NOTE ADULT - ASSESSMENT
78 carleen-speaking F Hx pulmonary TB (2019) s/p 10 months of treatment (AFB negative x3 5/2021, now with RUL cavitary lesion -- pt has refused further work up), acute on chronic SOB (?asthma vs COPD, on 3L O2 at home), DM, HTN, recent admission for AHRF 2/2 viral PNA (2/2023) now presenting with syncopal episodes. GI consulted for several week Hx of dark stools + CHEO in this context.       Impression:   #Dark stools   #Iron deficiency anemia  #Syncopal episodes  pw syncopal episodes with dark stools x 1 month, in setting of NSAID use. HD stable, Hb 7.7 on admission --> 1U --> Hb 9.3, mild BUN elevation (to 24) with anemia labs c/w iron deficiency anemia. No prior endoscopic evaluation.  -- In this clinical context, would be c/f possible UGI bleed, ddx includes PUD, malignancy, erosive esophagitis, erosive gastropathy, angioectasias (vs if negative for UGI bleeding --> then would investigate LGI bleeding sources)  -- Likely would also benefit from colonoscopy for CHEO as an outpatient if within GOC/inpatient work up does not require colonoscopic evaluation     #Acute on chronic SOB  #AHRF requiring 6L NC  #Hx pulmonary TB now with cavitary lesions on CT imaging       Recommendations:   - Currently awaiting further optimization from cardiopulmonary standpoint + possible c/f PNA at this time   - When medically optimized from cardiology/pulmonary/infectious disease perspective, would consider EGD to evaluate for UGI bleeding (currently without further melena/appropriate response to pRBC transfusion)   - IV PPI BID   - can advance to regular diet if repeat CBC stable  - Trend CBC, transfuse Hb < 7  - Active T&S  - If worsening Hb, with on-going GI bleeding, change in hemodynamics --> would call ICU + call back GI for more urgent endoscopic evaluation   - Depending on results of EGD, will determine necessity of inpatient colonoscopy vs outpatient colonoscopy (for evaluation of CHEO, if within pt's GOC)      *Note not final unless signed by attending    Thank you for involving us in the care of this patient, please reach out if any further questions.     Tej Shin MD  Gastroenterology/Hepatology Fellow, PGY6    Available on Microsoft Teams  [phone] ([facility])  [phone] ([facility])  Please contact on call fellow weekdays after 5pm-7am and weekends: [phone] 78 [place]-speaking F Hx pulmonary TB (2019) s/p 10 months of treatment (AFB negative x3 5/2021, now with RUL cavitary lesion -- pt has refused further work up), acute on chronic SOB (?asthma vs COPD, on 3L O2 at home), DM, HTN, recent admission for AHRF 2/2 viral PNA (2/2023) now presenting with syncopal episodes. GI consulted for several week Hx of dark stools + CHEO in this context.       Impression:   #Dark stools   #Iron deficiency anemia  #Syncopal episodes  pw syncopal episodes with dark stools x 1 month, in setting of NSAID use. HD stable, Hb 7.7 on admission --> 1U --> Hb 9.3, mild BUN elevation (to 24) with anemia labs c/w iron deficiency anemia. No prior endoscopic evaluation.  -- In this clinical context, would be c/f possible UGI bleed, ddx includes PUD, malignancy, erosive esophagitis, erosive gastropathy, angioectasias (vs if negative for UGI bleeding --> then would investigate LGI bleeding sources)  -- Likely would also benefit from colonoscopy for CHEO as an outpatient if within GOC/inpatient work up does not require colonoscopic evaluation     #Acute on chronic SOB  #AHRF requiring 6L NC  #Hx pulmonary TB now with cavitary lesions on CT imaging       Recommendations:   - Currently awaiting further optimization from cardiopulmonary standpoint + possible c/f PNA at this time and started on broad spectrum antibiotics by ID  - When medically optimized from cardiology/pulmonary/infectious disease perspective, would consider EGD to evaluate for UGI bleeding (currently without further melena/appropriate response to pRBC transfusion)   - IV PPI BID   - can advance to regular diet if repeat CBC stable  - Trend CBC, transfuse Hb < 7  - Active T&S  - If worsening Hb, with on-going GI bleeding, change in hemodynamics --> would call ICU + call back GI for more urgent endoscopic evaluation   - Depending on results of EGD, will determine necessity of inpatient colonoscopy vs outpatient colonoscopy (for evaluation of CHEO, if within pt's GOC)      *Note not final unless signed by attending    Thank you for involving us in the care of this patient, please reach out if any further questions.     Tej Shin MD  Gastroenterology/Hepatology Fellow, PGY6    Available on Microsoft Teams  [phone] ([facility])  [phone] ([facility])  Please contact on call fellow weekdays after 5pm-7am and weekends: [phone]

## 2023-04-11 NOTE — PHYSICAL THERAPY INITIAL EVALUATION ADULT - GENERAL OBSERVATIONS, REHAB EVAL
Upon entry, pt semi-supine in bed in NAD; + telemetry, + nasal cannula. HR 86 bpm SpO2 97% on 6L O2 via nasal cannula. Son present at bedside.

## 2023-04-11 NOTE — PHYSICAL THERAPY INITIAL EVALUATION ADULT - IMPAIRMENTS FOUND, PT EVAL
aerobic capacity/endurance/decreased midline orientation/ergonomics and body mechanics/gait, locomotion, and balance/gross motor/muscle strength/neuromotor development and sensory integration/posture

## 2023-04-11 NOTE — PROGRESS NOTE ADULT - PROBLEM SELECTOR PLAN 3
Assessment:  - CT abd/pelvis shows small pleural effusion  - can be contributory to her SOB  - pulm f/u

## 2023-04-12 LAB
GLUCOSE BLDC GLUCOMTR-MCNC: 212 MG/DL — HIGH (ref 70–99)
GLUCOSE BLDC GLUCOMTR-MCNC: 251 MG/DL — HIGH (ref 70–99)
GLUCOSE BLDC GLUCOMTR-MCNC: 301 MG/DL — HIGH (ref 70–99)
GLUCOSE BLDC GLUCOMTR-MCNC: 307 MG/DL — HIGH (ref 70–99)
GLUCOSE BLDC GLUCOMTR-MCNC: 307 MG/DL — HIGH (ref 70–99)
LEGIONELLA AG UR QL: NEGATIVE — SIGNIFICANT CHANGE UP

## 2023-04-12 PROCEDURE — 71045 X-RAY EXAM CHEST 1 VIEW: CPT | Mod: 26

## 2023-04-12 PROCEDURE — 99232 SBSQ HOSP IP/OBS MODERATE 35: CPT

## 2023-04-12 RX ORDER — ALBUTEROL 90 UG/1
2 AEROSOL, METERED ORAL EVERY 6 HOURS
Refills: 0 | Status: DISCONTINUED | OUTPATIENT
Start: 2023-04-12 | End: 2023-04-21

## 2023-04-12 RX ORDER — SODIUM CHLORIDE 9 MG/ML
4 INJECTION INTRAMUSCULAR; INTRAVENOUS; SUBCUTANEOUS EVERY 12 HOURS
Refills: 0 | Status: DISCONTINUED | OUTPATIENT
Start: 2023-04-12 | End: 2023-04-21

## 2023-04-12 RX ADMIN — INSULIN GLARGINE 6 UNIT(S): 100 INJECTION, SOLUTION SUBCUTANEOUS at 22:12

## 2023-04-12 RX ADMIN — PIPERACILLIN AND TAZOBACTAM 25 GRAM(S): 4; .5 INJECTION, POWDER, LYOPHILIZED, FOR SOLUTION INTRAVENOUS at 05:44

## 2023-04-12 RX ADMIN — BUDESONIDE AND FORMOTEROL FUMARATE DIHYDRATE 2 PUFF(S): 160; 4.5 AEROSOL RESPIRATORY (INHALATION) at 21:14

## 2023-04-12 RX ADMIN — Medication 3 MILLILITER(S): at 22:48

## 2023-04-12 RX ADMIN — PIPERACILLIN AND TAZOBACTAM 25 GRAM(S): 4; .5 INJECTION, POWDER, LYOPHILIZED, FOR SOLUTION INTRAVENOUS at 14:25

## 2023-04-12 RX ADMIN — Medication 3: at 08:43

## 2023-04-12 RX ADMIN — Medication 3 MILLILITER(S): at 09:43

## 2023-04-12 RX ADMIN — Medication 3 MILLIGRAM(S): at 21:20

## 2023-04-12 RX ADMIN — Medication 4: at 12:04

## 2023-04-12 RX ADMIN — PANTOPRAZOLE SODIUM 40 MILLIGRAM(S): 20 TABLET, DELAYED RELEASE ORAL at 17:17

## 2023-04-12 RX ADMIN — Medication 50 MICROGRAM(S): at 05:38

## 2023-04-12 RX ADMIN — SENNA PLUS 2 TABLET(S): 8.6 TABLET ORAL at 21:16

## 2023-04-12 RX ADMIN — Medication 2: at 17:00

## 2023-04-12 RX ADMIN — Medication 3 MILLILITER(S): at 16:07

## 2023-04-12 RX ADMIN — POLYETHYLENE GLYCOL 3350 17 GRAM(S): 17 POWDER, FOR SOLUTION ORAL at 14:25

## 2023-04-12 RX ADMIN — Medication 3 MILLILITER(S): at 04:21

## 2023-04-12 RX ADMIN — Medication 2: at 22:13

## 2023-04-12 RX ADMIN — SIMVASTATIN 20 MILLIGRAM(S): 20 TABLET, FILM COATED ORAL at 21:14

## 2023-04-12 RX ADMIN — TIOTROPIUM BROMIDE 2 PUFF(S): 18 CAPSULE ORAL; RESPIRATORY (INHALATION) at 09:50

## 2023-04-12 RX ADMIN — PIPERACILLIN AND TAZOBACTAM 25 GRAM(S): 4; .5 INJECTION, POWDER, LYOPHILIZED, FOR SOLUTION INTRAVENOUS at 21:21

## 2023-04-12 RX ADMIN — PANTOPRAZOLE SODIUM 40 MILLIGRAM(S): 20 TABLET, DELAYED RELEASE ORAL at 05:37

## 2023-04-12 RX ADMIN — BUDESONIDE AND FORMOTEROL FUMARATE DIHYDRATE 2 PUFF(S): 160; 4.5 AEROSOL RESPIRATORY (INHALATION) at 08:51

## 2023-04-12 NOTE — PROGRESS NOTE ADULT - SUBJECTIVE AND OBJECTIVE BOX
CC: F/U for PNA    Saw/spoke to patient. No fevers, no chills. No new complaints.    Allergies  No Known Allergies    ANTIMICROBIALS:  piperacillin/tazobactam IVPB.. 3.375 every 8 hours    PE:    Vital Signs Last 24 Hrs  T(C): 36.3 (12 Apr 2023 11:10), Max: 36.6 (11 Apr 2023 17:45)  T(F): 97.3 (12 Apr 2023 11:10), Max: 97.9 (11 Apr 2023 17:45)  HR: 91 (12 Apr 2023 11:10) (78 - 92)  BP: 137/70 (12 Apr 2023 11:10) (107/76 - 141/62)  RR: 17 (12 Apr 2023 11:10) (17 - 18)  SpO2: 94% (12 Apr 2023 11:10) (94% - 100%)    Gen: AOx3, NAD, non-toxic  CV: Nontachycardic  Resp: Breathing comfortably, RA  Abd: Soft, nontender  IV/Skin: No thrombophlebitis    LABS:                        8.4    9.25  )-----------( 208      ( 11 Apr 2023 21:13 )             30.4     04-11    136  |  98  |  19  ----------------------------<  225<H>  4.2   |  29  |  1.03    Ca    8.4      11 Apr 2023 05:20  Phos  2.8     04-11  Mg     2.00     04-11    TPro  5.9<L>  /  Alb  3.6  /  TBili  0.5  /  DBili  x   /  AST  23  /  ALT  64<H>  /  AlkPhos  113  04-11    MICROBIOLOGY:    .Blood Blood-Peripheral  04-10-23   No growth to date.  --  --    .Blood Blood-Peripheral  04-10-23   No growth to date.  --  --    Rapid RVP Result: [place] (04-09 @ 09:14)    (otherwise reviewed)    RADIOLOGY:    4/11 CT:    IMPRESSION:    1.  Since 2/14/2023, the bilateral opacities are unchanged and are of   uncertain etiology.

## 2023-04-12 NOTE — PROGRESS NOTE ADULT - ASSESSMENT
79 y/o F with PMH HTN, HLD, DM, asthma with recent admission to [facility] for viral PNA who presents for diffuse body aches and feeling anxious and SOB    1. 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 HTN  -controlled  -c/w metoprolol  -continue to monitor BP     3. HLD   - on zocor    4. DVT prophylaxis  -lovenox subq

## 2023-04-12 NOTE — PROGRESS NOTE ADULT - SUBJECTIVE AND OBJECTIVE BOX
Forrest Rosenthal MD  Interventional Cardiology / Endovascular Specialist  [place] Office : [phone] [place] [address] [place] [address]  Tel:   [place] Office : [phone] [facility] [place] [address]  Tel: [phone]    Subjective/Overnight events: Patient lying in bed. No acute distress.   	  MEDICATIONS:    piperacillin/tazobactam IVPB.. 3.375 Gram(s) IV Intermittent every 8 hours    albuterol    90 MICROgram(s) HFA Inhaler 2 Puff(s) Inhalation every 3 hours PRN  albuterol/ipratropium for Nebulization 3 milliLiter(s) Nebulizer every 6 hours  budesonide 160 MICROgram(s)/formoterol 4.5 MICROgram(s) Inhaler 2 Puff(s) Inhalation two times a day  tiotropium 2.5 MICROgram(s) Inhaler 2 Puff(s) Inhalation daily    acetaminophen     Tablet .. 650 milliGRAM(s) Oral every 6 hours PRN  melatonin 3 milliGRAM(s) Oral at bedtime PRN    pantoprazole    Tablet 40 milliGRAM(s) Oral two times a day  polyethylene glycol 3350 17 Gram(s) Oral daily  senna 2 Tablet(s) Oral at bedtime    dextrose 50% Injectable 25 Gram(s) IV Push once  dextrose 50% Injectable 12.5 Gram(s) IV Push once  dextrose 50% Injectable 25 Gram(s) IV Push once  dextrose Oral Gel 15 Gram(s) Oral once PRN  glucagon  Injectable 1 milliGRAM(s) IntraMuscular once  insulin glargine Injectable (LANTUS) 6 Unit(s) SubCutaneous at bedtime  insulin lispro (ADMELOG) corrective regimen sliding scale   SubCutaneous three times a day before meals  insulin lispro (ADMELOG) corrective regimen sliding scale   SubCutaneous at bedtime  levothyroxine 50 MICROGram(s) Oral daily  simvastatin 20 milliGRAM(s) Oral at bedtime    dextrose 5%. 1000 milliLiter(s) IV Continuous <Continuous>  dextrose 5%. 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 No nausea, vomiting, or hematemesis; No diarrhea or constipation. No melena or hematochezia.  GENITOURINARY: No dysuria, frequency, hematuria, or incontinence  NEUROLOGICAL: No headaches, memory loss, loss of strength, numbness, or tremors  ENDOCRINE: No heat or cold intolerance; No hair loss  MUSCULOSKELETAL: No joint pain or swelling; No muscle, back, or extremity pain  PSYCHIATRIC: No depression, anxiety, mood swings, or difficulty sleeping  HEME/LYMPH: No easy bruising, or bleeding gums  All others negative    PHYSICAL EXAM:  T(C): 36.6 (04-12-23 @ 05:45), Max: 36.6 (04-11-23 @ 17:45)  HR: 88 (04-12-23 @ 09:44) (78 - 92)  BP: 107/76 (04-12-23 @ 05:45) (107/76 - 141/62)  RR: 17 (04-12-23 @ 05:45) (17 - 18)  SpO2: 98% (04-12-23 @ 09:44) (98% - 100%)  Wt(kg): --  I&O's Summary    GENERAL: NAD  EYES: conjunctiva and sclera clear  ENMT: No tonsillar erythema, exudates, or enlargement  Cardiovascular: Normal S1 S2, No JVD, No murmurs, No edema  Respiratory: Lungs rhonchi to auscultation	  Gastrointestinal:  Soft, Non-tender, + BS	  Extremities: No edema                           8.4    9.25  )-----------( 208      ( 11 Apr 2023 21:13 )             30.4     04-11    136  |  98  |  19  ----------------------------<  225<H>  4.2   |  29  |  1.03    Ca    8.4      11 Apr 2023 05:20  Phos  2.8     04-11  Mg     2.00     04-11    TPro  5.9<L>  /  Alb  3.6  /  TBili  0.5  /  DBili  x   /  AST  23  /  ALT  64<H>  /  AlkPhos  113  04-11    proBNP:   Lipid Profile:   HgA1c:   TSH:     Consultant(s) Notes Reviewed:  [x ] YES  [ ] NO    Care Discussed with Consultants/Other Providers [ x] YES  [ ] NO    Imaging Personally Reviewed independently:  [x] YES  [ ] NO    All labs, radiologic studies, vitals, orders and medications list reviewed. Patient is seen and examined at bedside. Case discussed with medical team.

## 2023-04-12 NOTE — PROGRESS NOTE ADULT - ASSESSMENT
79 yo F with HTN, DM2, asthma, with syncope  No fever, no leukocytosis  Patient with increasing shortness of breath for past few days; multiple episodes syncope  RVP neg  Elevated LFTs  CT with Lower lobe abnormality, not changed, pleural effusion; intra-abd normal  CT with evidence bilateral lower lobe opacity unchanged since Feb  CXR with bilateral opacities  Possible bacterial pneumonia as cause SOB, although minimal inflammatory findings  Overall, SOB, syncope, abnormal finding on imaging  - Zosyn 3.375g q 8 to complete 7 days, when discharge planning send out on PO Ceftin 500mg q 12 to complete 7 days  - F/U BCXs  - Trend LFTs to normal--reactive vs congestion?  - F/U legionella  - Noninfectious workup for longstanding CT findings per team/pulm    Jacek Angel MD  Contact on TEAMS messaging from 9am - 5pm  From 5pm-9am, on weekends, or if no response call [phone]

## 2023-04-12 NOTE — PROGRESS NOTE ADULT - PROBLEM SELECTOR PLAN 10
- patient is DNR/DNI, MOLST form discussed and in the chart. Please refer to GOC note

## 2023-04-12 NOTE — PROGRESS NOTE ADULT - SUBJECTIVE AND OBJECTIVE BOX
SUBJECTIVE / OVERNIGHT EVENTS:pt seen and examined  04-12-23     MEDICATIONS  (STANDING):  albuterol/ipratropium for Nebulization 3 milliLiter(s) Nebulizer every 6 hours  budesonide 160 MICROgram(s)/formoterol 4.5 MICROgram(s) Inhaler 2 Puff(s) Inhalation two times a day  dextrose 5%. 1000 milliLiter(s) (50 mL/Hr) IV Continuous <Continuous>  dextrose 5%. 1000 milliLiter(s) (100 mL/Hr) IV Continuous <Continuous>  dextrose 50% Injectable 25 Gram(s) IV Push once  dextrose 50% Injectable 12.5 Gram(s) IV Push once  dextrose 50% Injectable 25 Gram(s) IV Push once  glucagon  Injectable 1 milliGRAM(s) IntraMuscular once  insulin glargine Injectable (LANTUS) 6 Unit(s) SubCutaneous at bedtime  insulin lispro (ADMELOG) corrective regimen sliding scale   SubCutaneous three times a day before meals  insulin lispro (ADMELOG) corrective regimen sliding scale   SubCutaneous at bedtime  levothyroxine 50 MICROGram(s) Oral daily  pantoprazole    Tablet 40 milliGRAM(s) Oral two times a day  piperacillin/tazobactam IVPB.. 3.375 Gram(s) IV Intermittent every 8 hours  polyethylene glycol 3350 17 Gram(s) Oral daily  senna 2 Tablet(s) Oral at bedtime  simvastatin 20 milliGRAM(s) Oral at bedtime  sodium chloride 7% Inhalation 4 milliLiter(s) Inhalation every 12 hours  tiotropium 2.5 MICROgram(s) Inhaler 2 Puff(s) Inhalation daily    MEDICATIONS  (PRN):  acetaminophen     Tablet .. 650 milliGRAM(s) Oral every 6 hours PRN Temp greater or equal to 38C (100.4F), Mild Pain (1 - 3)  albuterol    90 MICROgram(s) HFA Inhaler 2 Puff(s) Inhalation every 6 hours PRN Shortness of Breath and/or Wheezing  dextrose Oral Gel 15 Gram(s) Oral once PRN Blood Glucose LESS THAN 70 milliGRAM(s)/deciliter  melatonin 3 milliGRAM(s) Oral at bedtime PRN Insomnia    Vital Signs Last 24 Hrs  T(C): 36.3 (04-12-23 @ 17:23), Max: 36.6 (04-12-23 @ 05:45)  T(F): 97.4 (04-12-23 @ 17:23), Max: 97.8 (04-12-23 @ 05:45)  HR: 89 (04-12-23 @ 17:23) (78 - 92)  BP: 130/72 (04-12-23 @ 17:23) (107/76 - 141/62)  BP(mean): --  RR: 17 (04-12-23 @ 17:23) (17 - 18)  SpO2: 99% (04-12-23 @ 17:23) (94% - 100%)      PHYSICAL EXAM:  GENERAL: NAD  EYES: EOMI, PERRLA  NECK: Supple, No JVD  CHEST/LUNG: dec breath sounds at bases  HEART:  S1 , S2 +  ABDOMEN: soft , bs+  EXTREMITIES:    NEUROLOGY:alert awake    LABS:  04-11    136  |  98  |  19  ----------------------------<  225<H>  4.2   |  29  |  1.03    Ca    8.4      11 Apr 2023 05:20  Phos  2.8     04-11  Mg     2.00     04-11    TPro  5.9<L>  /  Alb  3.6  /  TBili  0.5  /  DBili      /  AST  23  /  ALT  64<H>  /  AlkPhos  113  04-11    Creatinine Trend: 1.03 <--, 0.99 <--, 0.86 <--, 0.77 <--, 0.86 <--                        8.4    9.25  )-----------( 208      ( 11 Apr 2023 21:13 )             30.4     Urine Studies:  Urinalysis Basic - ( 09 Apr 2023 14:38 )    Color: Light Yellow / Appearance: Clear / SG: >1.050 / pH:   Gluc:  / Ketone: Negative  / Bili: Negative / Urobili: <2 mg/dL   Blood:  / Protein: 30 mg/dL / Nitrite: Negative   Leuk Esterase: Negative / RBC: 0 /HPF / WBC 2 /HPF   Sq Epi:  / Non Sq Epi:  / Bacteria: Negative              LIVER FUNCTIONS - ( 11 Apr 2023 05:20 )  Alb: 3.6 g/dL / Pro: 5.9 g/dL / ALK PHOS: 113 U/L / ALT: 64 U/L / AST: 23 U/L / GGT: x                     LIVER FUNCTIONS - ( 11 Apr 2023 05:20 )  Alb: 3.6 g/dL / Pro: 5.9 g/dL / ALK PHOS: 113 U/L / ALT: 64 U/L / AST: 23 U/L / GGT: x                   RADIOLOGY & ADDITIONAL TESTS:    Imaging Personally Reviewed:yes    Consultant(s) Notes Reviewed:  yes    Care Discussed with Consultants/Other Providers:yes

## 2023-04-12 NOTE — PROGRESS NOTE ADULT - SUBJECTIVE AND OBJECTIVE BOX
PULMONARY PROGRESS NOTE    MARTIR VU  MRN-[id number]    Patient is a 78y old  Female who presents with a chief complaint of syncope (12 Apr 2023 11:57)      HPI:  -awake alert  on 4L  99^%  grandson/family at bedside  wants to be changed     -    ROS:   -    ACTIVE MEDICATION LIST:  MEDICATIONS  (STANDING):  albuterol/ipratropium for Nebulization 3 milliLiter(s) Nebulizer every 6 hours  budesonide 160 MICROgram(s)/formoterol 4.5 MICROgram(s) Inhaler 2 Puff(s) Inhalation two times a day  dextrose 5%. 1000 milliLiter(s) (50 mL/Hr) IV Continuous <Continuous>  dextrose 5%. 1000 milliLiter(s) (100 mL/Hr) IV Continuous <Continuous>  dextrose 50% Injectable 25 Gram(s) IV Push once  dextrose 50% Injectable 12.5 Gram(s) IV Push once  dextrose 50% Injectable 25 Gram(s) IV Push once  glucagon  Injectable 1 milliGRAM(s) IntraMuscular once  insulin glargine Injectable (LANTUS) 6 Unit(s) SubCutaneous at bedtime  insulin lispro (ADMELOG) corrective regimen sliding scale   SubCutaneous three times a day before meals  insulin lispro (ADMELOG) corrective regimen sliding scale   SubCutaneous at bedtime  levothyroxine 50 MICROGram(s) Oral daily  pantoprazole    Tablet 40 milliGRAM(s) Oral two times a day  piperacillin/tazobactam IVPB.. 3.375 Gram(s) IV Intermittent every 8 hours  polyethylene glycol 3350 17 Gram(s) Oral daily  senna 2 Tablet(s) Oral at bedtime  simvastatin 20 milliGRAM(s) Oral at bedtime  tiotropium 2.5 MICROgram(s) Inhaler 2 Puff(s) Inhalation daily    MEDICATIONS  (PRN):  acetaminophen     Tablet .. 650 milliGRAM(s) Oral every 6 hours PRN Temp greater or equal to 38C (100.4F), Mild Pain (1 - 3)  albuterol    90 MICROgram(s) HFA Inhaler 2 Puff(s) Inhalation every 3 hours PRN Shortness of Breath and/or Wheezing  dextrose Oral Gel 15 Gram(s) Oral once PRN Blood Glucose LESS THAN 70 milliGRAM(s)/deciliter  melatonin 3 milliGRAM(s) Oral at bedtime PRN Insomnia      EXAM:  Vital Signs Last 24 Hrs  T(C): 36.3 (12 Apr 2023 11:10), Max: 36.6 (11 Apr 2023 17:45)  T(F): 97.3 (12 Apr 2023 11:10), Max: 97.9 (11 Apr 2023 17:45)  HR: 91 (12 Apr 2023 11:10) (78 - 92)  BP: 137/70 (12 Apr 2023 11:10) (107/76 - 141/62)  BP(mean): --  RR: 17 (12 Apr 2023 11:10) (17 - 18)  SpO2: 94% (12 Apr 2023 11:10) (94% - 100%)    Parameters below as of 12 Apr 2023 11:10  Patient On (Oxygen Delivery Method): nasal cannula        GENERAL: The patient is awake and alert in no apparent distress.     LUNGS:  she is not wheezing  she is mildy tachypneic when she speaks- her baseline                               8.4    9.25  )-----------( 208      ( 11 Apr 2023 21:13 )             30.4       04-11    136  |  98  |  19  ----------------------------<  225<H>  4.2   |  29  |  1.03    Ca    8.4      11 Apr 2023 05:20  Phos  2.8     04-11  Mg     2.00     04-11    TPro  5.9<L>  /  Alb  3.6  /  TBili  0.5  /  DBili  x   /  AST  23  /  ALT  64<H>  /  AlkPhos  113  04-11   < from: CT Chest No Cont (04.11.23 @ 19:59) >    ACC: [id number] EXAM:  CT CHEST   ORDERED BY: SPENCER JAIME     PROCEDURE DATE:  04/11/2023          INTERPRETATION:  CT CHEST WITHOUT CONTRAST    INDICATION: Pleural effusion. Syncope. Vomiting. Hypertension and   hyperlipidemia.    TECHNIQUE: Unenhanced helical images were obtained of the chest. Coronal   and sagittal images were reconstructed. Maximum intensity projection   images were generated.    COMPARISON: CT chest 2/14/2023, 1/29/2023, and 11/8/2022. CT chest   10/25/2017 and 10/26/2019. Chest radiograph 4/9/2023.    FINDINGS:    Tubes/Lines: None.    Lungs, airways and pleura: Since 2/14/2023, the bilateral groundglass   opacities, septal thickening, and patchy consolidation most pronounced   within the bilateral upper lobes are unchanged.    Scant bilateral pleural effusions. No pneumothorax. The airways are   unremarkable.    Mediastinum: The calcified and noncalcified chest lymph nodes are   unchanged. Hiatal hernia. Unremarkable thyroid gland.    Cardiomegaly. Coronary artery calcifications. The aorta is normal in   caliber. Aortic calcifications.    Upper Abdomen: Cholelithiasis.    Bones And Soft Tissues: Spondylosis of the cervical and thoracic spine.    The soft tissues are unremarkable.      IMPRESSION:    1.  Since 2/14/2023, the bilateral opacities are unchanged and are of   uncertain etiology.    --- End of Report ---            NAIF CASTANEDA MD; Attending Radiologist  This document has been electronically signed. Apr 12 2023  8:37AM    < end of copied text >      PROBLEM LIST:  78y Female with HEALTH ISSUES - PROBLEM Dx:  Syncope    Multifocal pneumonia    DM2 (diabetes mellitus, type 2)    Benign essential HTN    Hypothyroidism    Prophylactic measure    Iron deficiency anemia    Pleural effusion    Hyperkalemia    Goals of care, counseling/discussion      RECS:  i tapered her down to home 3L she remained > 95%  was called earlier for resp concerns, can repeat thexray  continue with symbicort and spiriva  change the albuterol- q3 hrs is too frequent even if prn  can try nebs- hypersal BID if needed  family does not want any interventions procedures for her lung fiends  unchanged from previous discussion   rest of care as primary team  venodyne boots for dvt prophylaxis      Please call with any questions.    Dede Flynn DO  [facility] Pulmonary/Sleep Medicine  [phone]

## 2023-04-13 DIAGNOSIS — J96.01 ACUTE RESPIRATORY FAILURE WITH HYPOXIA: ICD-10-CM

## 2023-04-13 LAB
ALBUMIN SERPL ELPH-MCNC: 3.8 G/DL — SIGNIFICANT CHANGE UP (ref 3.3–5)
ALP SERPL-CCNC: 92 U/L — SIGNIFICANT CHANGE UP (ref 40–120)
ALT FLD-CCNC: 39 U/L — HIGH (ref 4–33)
ANION GAP SERPL CALC-SCNC: 10 MMOL/L — SIGNIFICANT CHANGE UP (ref 7–14)
AST SERPL-CCNC: 15 U/L — SIGNIFICANT CHANGE UP (ref 4–32)
BILIRUB SERPL-MCNC: 0.6 MG/DL — SIGNIFICANT CHANGE UP (ref 0.2–1.2)
BUN SERPL-MCNC: 12 MG/DL — SIGNIFICANT CHANGE UP (ref 7–23)
CALCIUM SERPL-MCNC: 9 MG/DL — SIGNIFICANT CHANGE UP (ref 8.4–10.5)
CHLORIDE SERPL-SCNC: 99 MMOL/L — SIGNIFICANT CHANGE UP (ref 98–107)
CO2 SERPL-SCNC: 30 MMOL/L — SIGNIFICANT CHANGE UP (ref 22–31)
CREAT SERPL-MCNC: 0.88 MG/DL — SIGNIFICANT CHANGE UP (ref 0.5–1.3)
EGFR: 66 ML/MIN/1.73M2 — SIGNIFICANT CHANGE UP
GLUCOSE BLDC GLUCOMTR-MCNC: 137 MG/DL — HIGH (ref 70–99)
GLUCOSE BLDC GLUCOMTR-MCNC: 262 MG/DL — HIGH (ref 70–99)
GLUCOSE BLDC GLUCOMTR-MCNC: 270 MG/DL — HIGH (ref 70–99)
GLUCOSE BLDC GLUCOMTR-MCNC: 279 MG/DL — HIGH (ref 70–99)
GLUCOSE BLDC GLUCOMTR-MCNC: 294 MG/DL — HIGH (ref 70–99)
GLUCOSE SERPL-MCNC: 125 MG/DL — HIGH (ref 70–99)
HCT VFR BLD CALC: 32.1 % — LOW (ref 34.5–45)
HGB BLD-MCNC: 8.6 G/DL — LOW (ref 11.5–15.5)
MAGNESIUM SERPL-MCNC: 2 MG/DL — SIGNIFICANT CHANGE UP (ref 1.6–2.6)
MCHC RBC-ENTMCNC: 20.5 PG — LOW (ref 27–34)
MCHC RBC-ENTMCNC: 26.8 GM/DL — LOW (ref 32–36)
MCV RBC AUTO: 76.4 FL — LOW (ref 80–100)
NRBC # BLD: 0 /100 WBCS — SIGNIFICANT CHANGE UP (ref 0–0)
NRBC # FLD: 0 K/UL — SIGNIFICANT CHANGE UP (ref 0–0)
NT-PROBNP SERPL-SCNC: 5489 PG/ML — HIGH
PHOSPHATE SERPL-MCNC: 2.9 MG/DL — SIGNIFICANT CHANGE UP (ref 2.5–4.5)
PLATELET # BLD AUTO: 209 K/UL — SIGNIFICANT CHANGE UP (ref 150–400)
POTASSIUM SERPL-MCNC: 4.2 MMOL/L — SIGNIFICANT CHANGE UP (ref 3.5–5.3)
POTASSIUM SERPL-SCNC: 4.2 MMOL/L — SIGNIFICANT CHANGE UP (ref 3.5–5.3)
PROT SERPL-MCNC: 6.3 G/DL — SIGNIFICANT CHANGE UP (ref 6–8.3)
RBC # BLD: 4.2 M/UL — SIGNIFICANT CHANGE UP (ref 3.8–5.2)
RBC # FLD: 20 % — HIGH (ref 10.3–14.5)
SODIUM SERPL-SCNC: 139 MMOL/L — SIGNIFICANT CHANGE UP (ref 135–145)
WBC # BLD: 8.59 K/UL — SIGNIFICANT CHANGE UP (ref 3.8–10.5)
WBC # FLD AUTO: 8.59 K/UL — SIGNIFICANT CHANGE UP (ref 3.8–10.5)

## 2023-04-13 PROCEDURE — 99231 SBSQ HOSP IP/OBS SF/LOW 25: CPT

## 2023-04-13 PROCEDURE — 99233 SBSQ HOSP IP/OBS HIGH 50: CPT

## 2023-04-13 PROCEDURE — 99232 SBSQ HOSP IP/OBS MODERATE 35: CPT

## 2023-04-13 RX ORDER — FUROSEMIDE 40 MG
40 TABLET ORAL
Refills: 0 | Status: DISCONTINUED | OUTPATIENT
Start: 2023-04-13 | End: 2023-04-16

## 2023-04-13 RX ORDER — FUROSEMIDE 40 MG
40 TABLET ORAL ONCE
Refills: 0 | Status: COMPLETED | OUTPATIENT
Start: 2023-04-13 | End: 2023-04-13

## 2023-04-13 RX ORDER — HEPARIN SODIUM 5000 [USP'U]/ML
5000 INJECTION INTRAVENOUS; SUBCUTANEOUS EVERY 8 HOURS
Refills: 0 | Status: DISCONTINUED | OUTPATIENT
Start: 2023-04-13 | End: 2023-04-21

## 2023-04-13 RX ADMIN — Medication 3 MILLILITER(S): at 15:29

## 2023-04-13 RX ADMIN — Medication 40 MILLIGRAM(S): at 21:55

## 2023-04-13 RX ADMIN — SIMVASTATIN 20 MILLIGRAM(S): 20 TABLET, FILM COATED ORAL at 21:55

## 2023-04-13 RX ADMIN — INSULIN GLARGINE 6 UNIT(S): 100 INJECTION, SOLUTION SUBCUTANEOUS at 22:42

## 2023-04-13 RX ADMIN — PIPERACILLIN AND TAZOBACTAM 25 GRAM(S): 4; .5 INJECTION, POWDER, LYOPHILIZED, FOR SOLUTION INTRAVENOUS at 21:55

## 2023-04-13 RX ADMIN — Medication 3: at 17:06

## 2023-04-13 RX ADMIN — Medication 3 MILLILITER(S): at 04:19

## 2023-04-13 RX ADMIN — Medication 40 MILLIGRAM(S): at 09:58

## 2023-04-13 RX ADMIN — Medication 3 MILLILITER(S): at 09:16

## 2023-04-13 RX ADMIN — Medication 3: at 11:49

## 2023-04-13 RX ADMIN — Medication 100 MILLIGRAM(S): at 01:48

## 2023-04-13 RX ADMIN — BUDESONIDE AND FORMOTEROL FUMARATE DIHYDRATE 2 PUFF(S): 160; 4.5 AEROSOL RESPIRATORY (INHALATION) at 21:55

## 2023-04-13 RX ADMIN — Medication 1: at 22:42

## 2023-04-13 RX ADMIN — Medication 50 MICROGRAM(S): at 06:05

## 2023-04-13 RX ADMIN — SENNA PLUS 2 TABLET(S): 8.6 TABLET ORAL at 21:56

## 2023-04-13 RX ADMIN — POLYETHYLENE GLYCOL 3350 17 GRAM(S): 17 POWDER, FOR SOLUTION ORAL at 11:49

## 2023-04-13 RX ADMIN — HEPARIN SODIUM 5000 UNIT(S): 5000 INJECTION INTRAVENOUS; SUBCUTANEOUS at 21:56

## 2023-04-13 RX ADMIN — PIPERACILLIN AND TAZOBACTAM 25 GRAM(S): 4; .5 INJECTION, POWDER, LYOPHILIZED, FOR SOLUTION INTRAVENOUS at 06:06

## 2023-04-13 RX ADMIN — BUDESONIDE AND FORMOTEROL FUMARATE DIHYDRATE 2 PUFF(S): 160; 4.5 AEROSOL RESPIRATORY (INHALATION) at 08:48

## 2023-04-13 RX ADMIN — Medication 3 MILLILITER(S): at 21:40

## 2023-04-13 RX ADMIN — PANTOPRAZOLE SODIUM 40 MILLIGRAM(S): 20 TABLET, DELAYED RELEASE ORAL at 17:06

## 2023-04-13 RX ADMIN — PANTOPRAZOLE SODIUM 40 MILLIGRAM(S): 20 TABLET, DELAYED RELEASE ORAL at 06:03

## 2023-04-13 RX ADMIN — PIPERACILLIN AND TAZOBACTAM 25 GRAM(S): 4; .5 INJECTION, POWDER, LYOPHILIZED, FOR SOLUTION INTRAVENOUS at 14:44

## 2023-04-13 RX ADMIN — TIOTROPIUM BROMIDE 2 PUFF(S): 18 CAPSULE ORAL; RESPIRATORY (INHALATION) at 08:49

## 2023-04-13 NOTE — PROGRESS NOTE ADULT - ASSESSMENT
79 yo F with HTN, DM2, asthma, with syncope  No fever, no leukocytosis  Patient with increasing shortness of breath for past few days prior to arrival; multiple episodes syncope  RVP neg  Elevated LFTs  CT with Lower lobe abnormality, not changed, pleural effusion; intra-abd normal  CT with evidence bilateral lower lobe opacity unchanged since Feb  CXR with bilateral opacities  Possible bacterial pneumonia as cause SOB, although minimal inflammatory findings  Worsening O2, but suspected to be due to pulmonary edema  No signs infectious breakthrough  Overall, SOB, syncope, abnormal finding on imaging  - Zosyn 3.375g q 8 to complete 7 days, when discharge planning send out on PO Ceftin 500mg q 12 to complete 7 days  - F/U BCXs  - Trend LFTs to normal--reactive vs congestion?  - Noninfectious workup for longstanding CT findings per team/pulm    Signing off. Please call with further questions or change in status.    Jacek Angel MD  Contact on TEAMS messaging from 9am - 5pm  From 5pm-9am, on weekends, or if no response call [phone]

## 2023-04-13 NOTE — CHART NOTE - NSCHARTNOTEFT_GEN_A_CORE
Patient seen and examined at bedside earlier this morning. Patient's oxygen saturation dropped to 82-83% on 6L NC. CXR was performed showing progression of pulmonary infiltrates, left larger than right. ProBNP noted to be elevated 5489. Discussed with pulmonary, recommends to place patient on HFNC. Discussed with cardiology, recommends to administer STAT IV Lasix 40mg x 1. Recommends to start patient on standing IV Lasix 40mg BID. Patient's oxygen saturation stable on HFNC at this time. Attending Dr. Gonzalez made aware.

## 2023-04-13 NOTE — PROGRESS NOTE ADULT - PROBLEM SELECTOR PLAN 2
Assessment:  - patient presented for new onset syncope  - likely secondary to orthostatic hypotension vs. underlying infection

## 2023-04-13 NOTE — CHART NOTE - NSCHARTNOTEFT_GEN_A_CORE
called earlier by team for hypoxic/ higher 02 requirements  xray looks like pulm edema- official read pending  suggest HFNC instead of NRB- to avoid co2 retention  suggest diuresis- high probnp/ edema on xray- cardio following patient  venodyne boots  if she does not respond to diuretics would need to consider a VTE since she is not on pharmaceutical dvt prophylaxis

## 2023-04-13 NOTE — PROGRESS NOTE ADULT - SUBJECTIVE AND OBJECTIVE BOX
CC: F/U for SOB    Saw/spoke to patient. No fevers, no chills. No new complaints.    Allergies  No Known Allergies    ANTIMICROBIALS:  piperacillin/tazobactam IVPB.. 3.375 every 8 hours    PE:    Vital Signs Last 24 Hrs  T(C): 36.4 (13 Apr 2023 09:55), Max: 36.5 (13 Apr 2023 05:45)  T(F): 97.5 (13 Apr 2023 09:55), Max: 97.7 (13 Apr 2023 05:45)  HR: 93 (13 Apr 2023 15:34) (80 - 96)  BP: 146/72 (13 Apr 2023 09:55) (130/72 - 164/70)  RR: 20 (13 Apr 2023 14:02) (17 - 20)  SpO2: 96% (13 Apr 2023 15:34) (95% - 100%)    Gen: AOx3, NAD, non-toxic  CV: Nontachycardic  Resp: Breathing comfortably, HFNC  Abd: Soft, nontender  IV/Skin: No thrombophlebitis    LABS:                        8.6    8.59  )-----------( 209      ( 13 Apr 2023 05:38 )             32.1     04-13    139  |  99  |  12  ----------------------------<  125<H>  4.2   |  30  |  0.88    Ca    9.0      13 Apr 2023 05:38  Phos  2.9     04-13  Mg     2.00     04-13    TPro  6.3  /  Alb  3.8  /  TBili  0.6  /  DBili  x   /  AST  15  /  ALT  39<H>  /  AlkPhos  92  04-13    MICROBIOLOGY:    .Blood Blood-Peripheral  04-10-23   No growth to date.  --  --    .Blood Blood-Peripheral  04-10-23   No growth to date.  --  --    Rapid RVP Result: NotDetec (04-09 @ 09:14)    (otherwise reviewed)    RADIOLOGY:    4/12 XR:    Frontal expiratory view of the chest shows the heart to be similar in   size. The lungs show progression of pulmonary infiltrates, left larger   than right and there is no evidence of pneumothorax nor pleural effusion.    IMPRESSION:  Progression of infiltrates.

## 2023-04-13 NOTE — PROGRESS NOTE ADULT - SUBJECTIVE AND OBJECTIVE BOX
PULMONARY PROGRESS NOTE    MARTIR VU  MRN-[id number]    Patient is a 78y old  Female who presents with a chief complaint of syncope (13 Apr 2023 12:04)      HPI:  asleep  easily to awaken  shes on HFNC  40% and 40L at 99%      ROS:   -    ACTIVE MEDICATION LIST:  MEDICATIONS  (STANDING):  albuterol/ipratropium for Nebulization 3 milliLiter(s) Nebulizer every 6 hours  budesonide 160 MICROgram(s)/formoterol 4.5 MICROgram(s) Inhaler 2 Puff(s) Inhalation two times a day  dextrose 5%. 1000 milliLiter(s) (50 mL/Hr) IV Continuous <Continuous>  dextrose 5%. 1000 milliLiter(s) (100 mL/Hr) IV Continuous <Continuous>  dextrose 50% Injectable 25 Gram(s) IV Push once  dextrose 50% Injectable 12.5 Gram(s) IV Push once  dextrose 50% Injectable 25 Gram(s) IV Push once  furosemide   Injectable 40 milliGRAM(s) IV Push two times a day  glucagon  Injectable 1 milliGRAM(s) IntraMuscular once  insulin glargine Injectable (LANTUS) 6 Unit(s) SubCutaneous at bedtime  insulin lispro (ADMELOG) corrective regimen sliding scale   SubCutaneous three times a day before meals  insulin lispro (ADMELOG) corrective regimen sliding scale   SubCutaneous at bedtime  levothyroxine 50 MICROGram(s) Oral daily  pantoprazole    Tablet 40 milliGRAM(s) Oral two times a day  piperacillin/tazobactam IVPB.. 3.375 Gram(s) IV Intermittent every 8 hours  polyethylene glycol 3350 17 Gram(s) Oral daily  senna 2 Tablet(s) Oral at bedtime  simvastatin 20 milliGRAM(s) Oral at bedtime  sodium chloride 7% Inhalation 4 milliLiter(s) Inhalation every 12 hours  tiotropium 2.5 MICROgram(s) Inhaler 2 Puff(s) Inhalation daily    MEDICATIONS  (PRN):  acetaminophen     Tablet .. 650 milliGRAM(s) Oral every 6 hours PRN Temp greater or equal to 38C (100.4F), Mild Pain (1 - 3)  albuterol    90 MICROgram(s) HFA Inhaler 2 Puff(s) Inhalation every 6 hours PRN Shortness of Breath and/or Wheezing  dextrose Oral Gel 15 Gram(s) Oral once PRN Blood Glucose LESS THAN 70 milliGRAM(s)/deciliter  guaiFENesin Oral Liquid (Sugar-Free) 100 milliGRAM(s) Oral every 6 hours PRN Cough  melatonin 3 milliGRAM(s) Oral at bedtime PRN Insomnia      EXAM:  Vital Signs Last 24 Hrs  T(C): 36.4 (13 Apr 2023 09:55), Max: 36.5 (13 Apr 2023 05:45)  T(F): 97.5 (13 Apr 2023 09:55), Max: 97.7 (13 Apr 2023 05:45)  HR: 96 (13 Apr 2023 14:02) (80 - 96)  BP: 146/72 (13 Apr 2023 09:55) (130/72 - 164/70)  BP(mean): --  RR: 20 (13 Apr 2023 14:02) (17 - 20)  SpO2: 96% (13 Apr 2023 14:02) (95% - 100%)    Parameters below as of 13 Apr 2023 14:02  Patient On (Oxygen Delivery Method): nasal cannula, high flow  O2 Flow (L/min): 40  O2 Concentration (%): 60    GENERAL: The patient is awake and alert in no resp distress  not wheezing  not labored                              8.6    8.59  )-----------( 209      ( 13 Apr 2023 05:38 )             32.1       04-13    139  |  99  |  12  ----------------------------<  125<H>  4.2   |  30  |  0.88    Ca    9.0      13 Apr 2023 05:38  Phos  2.9     04-13  Mg     2.00     04-13    TPro  6.3  /  Alb  3.8  /  TBili  0.6  /  DBili  x   /  AST  15  /  ALT  39<H>  /  AlkPhos  92  04-13     < from: CT Chest No Cont (04.11.23 @ 19:59) >    ACC: [id number] EXAM:  CT CHEST   ORDERED BY: SPENCER MO DATE:  04/11/2023          INTERPRETATION:  CT CHEST WITHOUT CONTRAST    INDICATION: Pleural effusion. Syncope. Vomiting. Hypertension and   hyperlipidemia.    TECHNIQUE: Unenhanced helical images were obtained of the chest. Coronal   and sagittal images were reconstructed. Maximum intensity projection   images were generated.    COMPARISON: CT chest 2/14/2023, 1/29/2023, and 11/8/2022. CT chest   10/25/2017 and 10/26/2019. Chest radiograph 4/9/2023.    FINDINGS:    Tubes/Lines: None.    Lungs, airways and pleura: Since 2/14/2023, the bilateral groundglass   opacities, septal thickening, and patchy consolidation most pronounced   within the bilateral upper lobes are unchanged.    Scant bilateral pleural effusions. No pneumothorax. The airways are   unremarkable.    Mediastinum: The calcified and noncalcified chest lymph nodes are   unchanged. Hiatal hernia. Unremarkable thyroid gland.    Cardiomegaly. Coronary artery calcifications. The aorta is normal in   caliber. Aortic calcifications.    Upper Abdomen: Cholelithiasis.    Bones And Soft Tissues: Spondylosis of the cervical and thoracic spine.    The soft tissues are unremarkable.      IMPRESSION:    1.  Since 2/14/2023, the bilateral opacities are unchanged and are of   uncertain etiology.    --- End of Report ---            NAIF CASTANEDA MD; Attending Radiologist  This document has been electronically signed. Apr 12 2023  8:37AM    < end of copied text >  < from: Xray Chest 1 View- PORTABLE-Urgent (Xray Chest 1 View- PORTABLE-Urgent .) (04.12.23 @ 14:59) >    ACC: [id number] EXAM:  XR CHEST PORTABLE URGENT 1V   ORDERED BY: JACQUES BONILLA     PROCEDURE DATE:  04/12/2023          INTERPRETATION:  Chest one view    HISTORY: Shortness of breath    COMPARISON STUDY: 4/9/2023    Frontal expiratory view of the chest shows the heart to be similar in   size. The lungs show progression of pulmonary infiltrates, left larger   than right and there is no evidence of pneumothorax nor pleural effusion.    IMPRESSION:  Progression of infiltrates.        Thank you forthe courtesy of this referral.    --- End of Report ---            EBEN ROBLEDO MD; Attending Interventional Radiologist  This document has been electronically signed. Apr 13 2023 11:10AM    < end of copied text >      PROBLEM LIST:  78y Female with HEALTH ISSUES - PROBLEM Dx:  Syncope    Multifocal pneumonia    DM2 (diabetes mellitus, type 2)    Benign essential HTN    Hypothyroidism    Prophylactic measure    Iron deficiency anemia    Pleural effusion    Hyperkalemia    Goals of care, counseling/discussion        RECS:  as discussed with team- diuresis  40% and 40L today  would switch her back to NC tomorrow (4/14)  continue bronchodilators  not wheezing now and if she she does- may be due to pulm edema- would hold on steroids for now  consider restarting dvt prophylaxis  abx per primary team  multiple admissions for resp issues/ does not want any  diagnostic workup for her pulm lesions, consider palliative for GOC discussion     Please call with any questions.    Dede Flynn DO  [facility] Pulmonary/Sleep Medicine  [phone]

## 2023-04-13 NOTE — PROGRESS NOTE ADULT - ASSESSMENT
79 y/o F with PMH HTN, HLD, DM, asthma with recent admission to [facility] for viral PNA who presents for diffuse body aches and feeling anxious and SOB    1. syncope ;  - CT head negative   - echo with sever PHTN and RV dysfunction   - monitor on tele     2. Hypoxia   - CXR with pulm edema   - Prob np elevated  - start lasix 40 IV BID        3. HTN  -controlled  -c/w metoprolol  -continue to monitor BP     4. HLD   - on zocor    5. DVT prophylaxis  -lovenox subq

## 2023-04-13 NOTE — PROGRESS NOTE ADULT - SUBJECTIVE AND OBJECTIVE BOX
Interval Events: pt seen and examined. no overt gib per nursing. currently on nonrebreather. HH has been stable since transfusion 4/9. echo noted.      Allergies:  No Known Allergies      Hospital Medications:  acetaminophen     Tablet .. 650 milliGRAM(s) Oral every 6 hours PRN  albuterol    90 MICROgram(s) HFA Inhaler 2 Puff(s) Inhalation every 6 hours PRN  albuterol/ipratropium for Nebulization 3 milliLiter(s) Nebulizer every 6 hours  budesonide 160 MICROgram(s)/formoterol 4.5 MICROgram(s) Inhaler 2 Puff(s) Inhalation two times a day  dextrose 5%. 1000 milliLiter(s) IV Continuous <Continuous>  dextrose 5%. 1000 milliLiter(s) IV Continuous <Continuous>  dextrose 50% Injectable 25 Gram(s) IV Push once  dextrose 50% Injectable 12.5 Gram(s) IV Push once  dextrose 50% Injectable 25 Gram(s) IV Push once  dextrose Oral Gel 15 Gram(s) Oral once PRN  glucagon  Injectable 1 milliGRAM(s) IntraMuscular once  guaiFENesin Oral Liquid (Sugar-Free) 100 milliGRAM(s) Oral every 6 hours PRN  insulin glargine Injectable (LANTUS) 6 Unit(s) SubCutaneous at bedtime  insulin lispro (ADMELOG) corrective regimen sliding scale   SubCutaneous three times a day before meals  insulin lispro (ADMELOG) corrective regimen sliding scale   SubCutaneous at bedtime  levothyroxine 50 MICROGram(s) Oral daily  melatonin 3 milliGRAM(s) Oral at bedtime PRN  pantoprazole    Tablet 40 milliGRAM(s) Oral two times a day  piperacillin/tazobactam IVPB.. 3.375 Gram(s) IV Intermittent every 8 hours  polyethylene glycol 3350 17 Gram(s) Oral daily  senna 2 Tablet(s) Oral at bedtime  simvastatin 20 milliGRAM(s) Oral at bedtime  sodium chloride 7% Inhalation 4 milliLiter(s) Inhalation every 12 hours  tiotropium 2.5 MICROgram(s) Inhaler 2 Puff(s) Inhalation daily      ROS: unable to obtain from pt    Vital Signs:  Vital Signs Last 24 Hrs  T(C): 36.4 (13 Apr 2023 09:55), Max: 36.5 (13 Apr 2023 05:45)  T(F): 97.5 (13 Apr 2023 09:55), Max: 97.7 (13 Apr 2023 05:45)  HR: 94 (13 Apr 2023 09:55) (80 - 94)  BP: 146/72 (13 Apr 2023 09:55) (130/72 - 164/70)  BP(mean): --  RR: 19 (13 Apr 2023 09:55) (17 - 20)  SpO2: 100% (13 Apr 2023 09:55) (94% - 100%)    Parameters below as of 13 Apr 2023 09:55  Patient On (Oxygen Delivery Method): mask, nonrebreather  O2 Flow (L/min): 15    Daily     Daily         LABS:                        8.6    8.59  )-----------( 209      ( 13 Apr 2023 05:38 )             32.1     Mean Cell Volume: 76.4 fL (04-13-23 @ 05:38)    04-13    139  |  99  |  12  ----------------------------<  125<H>  4.2   |  30  |  0.88    Ca    9.0      13 Apr 2023 05:38  Phos  2.9     04-13  Mg     2.00     04-13    TPro  6.3  /  Alb  3.8  /  TBili  0.6  /  DBili  x   /  AST  15  /  ALT  39<H>  /  AlkPhos  92  04-13    LIVER FUNCTIONS - ( 13 Apr 2023 05:38 )  Alb: 3.8 g/dL / Pro: 6.3 g/dL / ALK PHOS: 92 U/L / ALT: 39 U/L / AST: 15 U/L / GGT: x                                       8.6    8.59  )-----------( 209      ( 13 Apr 2023 05:38 )             32.1                         8.4    9.25  )-----------( 208      ( 11 Apr 2023 21:13 )             30.4                         8.5    10.75 )-----------( 207      ( 11 Apr 2023 05:20 )             30.7       PHYSICAL EXAM  GENERAL: lying in bed, frail, chronically ill appearing  HEENT: NCAT  EYES: anicteric sclerae, moist conjunctivae  NECK: trachea midline, FROM, neck supple  CHEST:  respirations even, non labored on non rebreather  ABDOMEN:  soft, non-tender, non-distended, no RT/no guarding  EXTREMITIES: WWP  SKIN:  warm/dry, no visible rash appreciated  PSYCH: awake alert  NEURO: no tremor noted

## 2023-04-13 NOTE — PROGRESS NOTE ADULT - PROBLEM SELECTOR PLAN 1
-AS PER PULM ,  diuresis  40% and 40L today  would switch her back to NC tomorrow (4/14)  continue bronchodilators  not wheezing now and if she she does- may be due to pulm edema- would hold on steroids for now

## 2023-04-13 NOTE — PROGRESS NOTE ADULT - ASSESSMENT
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 GI consulted for several week Hx of dark stools + CHEO in this context.     Impression:   #Dark stools   #Iron deficiency anemia  #Syncopal episodes  pw syncopal episodes with dark stools x 1 month, in setting of NSAID use. HD stable, Hb 7.7 on admission --> 1U --> Hb 9.3, mild BUN elevation (to 24) with anemia labs c/w iron deficiency anemia. No prior endoscopic evaluation.  -- In this clinical context, would be c/f possible UGI bleed, ddx includes PUD, malignancy, erosive esophagitis, erosive gastropathy, angioectasias (vs if negative for UGI bleeding --> then would investigate LGI bleeding sources)  #Acute on chronic SOB  #AHRF requiring non rebreather  #Hx pulmonary TB now with cavitary lesions on CT imaging       Recommendations:   -no overt gib reported  -HH has been stable since last transfusion on 4/9  -maintain active t&s  -trend cbc, transfuse for hgb >/=7  -continue PPI BID   -given stable HH without overt gib and ongoing respiratory issues without plans for further pulmonary intervention, suspect risks of endoscopic evaluation outweigh any potential benefits at this time; procedure would additionally require reversal of current code status  -recommend further goc discussions w/ pt/family to delineate overall goc  -if pt develops overt gib, dropping counts, increasing prbc requirements, please notify GI team  -rest of care as per primary team    dw primary acp  please reach out to GI team with any further questions/concerns    *all recommendations are tentative until note is attested by attending*    DIANE Jackson PA-C, RD, CDN  available on TEAMS for questions    after 5pm/weekends, please contact the on-call GI fellow at [phone]

## 2023-04-13 NOTE — PROGRESS NOTE ADULT - ASSESSMENT
79 y/o F with pmhx of HTN, DM2, Asthma, presented to the ED for new onset syncope. Found to have anemia, with possible nonresolving pneumonia. Admit for IV abx and syncope work up.

## 2023-04-13 NOTE — PROGRESS NOTE ADULT - SUBJECTIVE AND OBJECTIVE BOX
SUBJECTIVE / OVERNIGHT EVENTS:pt seen and examined, inc in oxygen requirements placed on hi guillaume  04-13-23     MEDICATIONS  (STANDING):  albuterol/ipratropium for Nebulization 3 milliLiter(s) Nebulizer every 6 hours  budesonide 160 MICROgram(s)/formoterol 4.5 MICROgram(s) Inhaler 2 Puff(s) Inhalation two times a day  dextrose 5%. 1000 milliLiter(s) (50 mL/Hr) IV Continuous <Continuous>  dextrose 5%. 1000 milliLiter(s) (100 mL/Hr) IV Continuous <Continuous>  dextrose 50% Injectable 25 Gram(s) IV Push once  dextrose 50% Injectable 12.5 Gram(s) IV Push once  dextrose 50% Injectable 25 Gram(s) IV Push once  furosemide   Injectable 40 milliGRAM(s) IV Push two times a day  glucagon  Injectable 1 milliGRAM(s) IntraMuscular once  insulin glargine Injectable (LANTUS) 6 Unit(s) SubCutaneous at bedtime  insulin lispro (ADMELOG) corrective regimen sliding scale   SubCutaneous three times a day before meals  insulin lispro (ADMELOG) corrective regimen sliding scale   SubCutaneous at bedtime  levothyroxine 50 MICROGram(s) Oral daily  pantoprazole    Tablet 40 milliGRAM(s) Oral two times a day  piperacillin/tazobactam IVPB.. 3.375 Gram(s) IV Intermittent every 8 hours  polyethylene glycol 3350 17 Gram(s) Oral daily  senna 2 Tablet(s) Oral at bedtime  simvastatin 20 milliGRAM(s) Oral at bedtime  sodium chloride 7% Inhalation 4 milliLiter(s) Inhalation every 12 hours  tiotropium 2.5 MICROgram(s) Inhaler 2 Puff(s) Inhalation daily    MEDICATIONS  (PRN):  acetaminophen     Tablet .. 650 milliGRAM(s) Oral every 6 hours PRN Temp greater or equal to 38C (100.4F), Mild Pain (1 - 3)  albuterol    90 MICROgram(s) HFA Inhaler 2 Puff(s) Inhalation every 6 hours PRN Shortness of Breath and/or Wheezing  dextrose Oral Gel 15 Gram(s) Oral once PRN Blood Glucose LESS THAN 70 milliGRAM(s)/deciliter  guaiFENesin Oral Liquid (Sugar-Free) 100 milliGRAM(s) Oral every 6 hours PRN Cough  melatonin 3 milliGRAM(s) Oral at bedtime PRN Insomnia    Vital Signs Last 24 Hrs  T(C): 36.8 (04-13-23 @ 17:17), Max: 36.8 (04-13-23 @ 17:17)  T(F): 98.2 (04-13-23 @ 17:17), Max: 98.2 (04-13-23 @ 17:17)  HR: 78 (04-13-23 @ 17:17) (78 - 96)  BP: 148/62 (04-13-23 @ 17:17) (146/72 - 164/70)  BP(mean): --  RR: 20 (04-13-23 @ 17:17) (18 - 20)  SpO2: 98% (04-13-23 @ 17:17) (95% - 100%)        PHYSICAL EXAM:  GENERAL: NAD  EYES: EOMI, PERRLA  NECK: Supple, No JVD  CHEST/LUNG: dec breath sounds at bases  HEART:  S1 , S2 +  ABDOMEN: soft , bs+  EXTREMITIES:    NEUROLOGY:alert awake    LABS:  04-13    139  |  99  |  12  ----------------------------<  125<H>  4.2   |  30  |  0.88    Ca    9.0      13 Apr 2023 05:38  Phos  2.9     04-13  Mg     2.00     04-13    TPro  6.3  /  Alb  3.8  /  TBili  0.6  /  DBili      /  AST  15  /  ALT  39<H>  /  AlkPhos  92  04-13    Creatinine Trend: 0.88 <--, 1.03 <--, 0.99 <--, 0.86 <--, 0.77 <--, 0.86 <--                        8.6    8.59  )-----------( 209      ( 13 Apr 2023 05:38 )             32.1     Urine Studies:  Urinalysis Basic - ( 09 Apr 2023 14:38 )    Color: Light Yellow / Appearance: Clear / SG: >1.050 / pH:   Gluc:  / Ketone: Negative  / Bili: Negative / Urobili: <2 mg/dL   Blood:  / Protein: 30 mg/dL / Nitrite: Negative   Leuk Esterase: Negative / RBC: 0 /HPF / WBC 2 /HPF   Sq Epi:  / Non Sq Epi:  / Bacteria: Negative              LIVER FUNCTIONS - ( 13 Apr 2023 05:38 )  Alb: 3.8 g/dL / Pro: 6.3 g/dL / ALK PHOS: 92 U/L / ALT: 39 U/L / AST: 15 U/L / GGT: x                               RADIOLOGY & ADDITIONAL TESTS:    Imaging Personally Reviewed:yes    Consultant(s) Notes Reviewed:  yes    Care Discussed with Consultants/Other Providers:yes

## 2023-04-13 NOTE — PROGRESS NOTE ADULT - SUBJECTIVE AND OBJECTIVE BOX
Forrest Rosenthal MD  Interventional Cardiology / Endovascular Specialist  [place] Office : [phone] [place] [address] [place] [address]  Tel:   [place] Office : [phone] [facility] [place] [address]  Tel: [phone]    Subjective/Overnight events: Patient lying in bed. No acute distress.   	  MEDICATIONS:    piperacillin/tazobactam IVPB.. 3.375 Gram(s) IV Intermittent every 8 hours    albuterol    90 MICROgram(s) HFA Inhaler 2 Puff(s) Inhalation every 6 hours PRN  albuterol/ipratropium for Nebulization 3 milliLiter(s) Nebulizer every 6 hours  budesonide 160 MICROgram(s)/formoterol 4.5 MICROgram(s) Inhaler 2 Puff(s) Inhalation two times a day  guaiFENesin Oral Liquid (Sugar-Free) 100 milliGRAM(s) Oral every 6 hours PRN  sodium chloride 7% Inhalation 4 milliLiter(s) Inhalation every 12 hours  tiotropium 2.5 MICROgram(s) Inhaler 2 Puff(s) Inhalation daily    acetaminophen     Tablet .. 650 milliGRAM(s) Oral every 6 hours PRN  melatonin 3 milliGRAM(s) Oral at bedtime PRN    pantoprazole    Tablet 40 milliGRAM(s) Oral two times a day  polyethylene glycol 3350 17 Gram(s) Oral daily  senna 2 Tablet(s) Oral at bedtime    dextrose 50% Injectable 25 Gram(s) IV Push once  dextrose 50% Injectable 12.5 Gram(s) IV Push once  dextrose 50% Injectable 25 Gram(s) IV Push once  dextrose Oral Gel 15 Gram(s) Oral once PRN  glucagon  Injectable 1 milliGRAM(s) IntraMuscular once  insulin glargine Injectable (LANTUS) 6 Unit(s) SubCutaneous at bedtime  insulin lispro (ADMELOG) corrective regimen sliding scale   SubCutaneous three times a day before meals  insulin lispro (ADMELOG) corrective regimen sliding scale   SubCutaneous at bedtime  levothyroxine 50 MICROGram(s) Oral daily  simvastatin 20 milliGRAM(s) Oral at bedtime    dextrose 5%. 1000 milliLiter(s) IV Continuous <Continuous>  dextrose 5%. 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 No nausea, vomiting, or hematemesis; No diarrhea or constipation. No melena or hematochezia.  GENITOURINARY: No dysuria, frequency, hematuria, or incontinence  NEUROLOGICAL: No headaches, memory loss, loss of strength, numbness, or tremors  ENDOCRINE: No heat or cold intolerance; No hair loss  MUSCULOSKELETAL: No joint pain or swelling; No muscle, back, or extremity pain  PSYCHIATRIC: No depression, anxiety, mood swings, or difficulty sleeping  HEME/LYMPH: No easy bruising, or bleeding gums  All others negative    PHYSICAL EXAM:  T(C): 36.4 (04-13-23 @ 09:55), Max: 36.5 (04-13-23 @ 05:45)  HR: 95 (04-13-23 @ 10:53) (80 - 95)  BP: 146/72 (04-13-23 @ 09:55) (130/72 - 164/70)  RR: 20 (04-13-23 @ 10:53) (17 - 20)  SpO2: 95% (04-13-23 @ 10:53) (95% - 100%)  Wt(kg): --  I&O's Summary      GENERAL: NAD  EYES: conjunctiva and sclera clear  ENMT: No tonsillar erythema, exudates, or enlargement  Cardiovascular: Normal S1 S2, No JVD, No murmurs, No edema  Respiratory: Lungs rhonchi to auscultation	  Gastrointestinal:  Soft, Non-tender, + BS	  Extremities: No edema                           8.6    8.59  )-----------( 209      ( 13 Apr 2023 05:38 )             32.1     04-13    139  |  99  |  12  ----------------------------<  125<H>  4.2   |  30  |  0.88    Ca    9.0      13 Apr 2023 05:38  Phos  2.9     04-13  Mg     2.00     04-13    TPro  6.3  /  Alb  3.8  /  TBili  0.6  /  DBili  x   /  AST  15  /  ALT  39<H>  /  AlkPhos  92  04-13    proBNP:   Lipid Profile:   HgA1c:   TSH:     Consultant(s) Notes Reviewed:  [x ] YES  [ ] NO    Care Discussed with Consultants/Other Providers [ x] YES  [ ] NO    Imaging Personally Reviewed independently:  [x] YES  [ ] NO    All labs, radiologic studies, vitals, orders and medications list reviewed. Patient is seen and examined at bedside. Case discussed with medical team.

## 2023-04-14 DIAGNOSIS — R06.00 DYSPNEA, UNSPECIFIED: ICD-10-CM

## 2023-04-14 DIAGNOSIS — R53.81 OTHER MALAISE: ICD-10-CM

## 2023-04-14 DIAGNOSIS — Z51.5 ENCOUNTER FOR PALLIATIVE CARE: ICD-10-CM

## 2023-04-14 DIAGNOSIS — Z71.89 OTHER SPECIFIED COUNSELING: ICD-10-CM

## 2023-04-14 LAB
ALBUMIN SERPL ELPH-MCNC: 4.2 G/DL — SIGNIFICANT CHANGE UP (ref 3.3–5)
ALP SERPL-CCNC: 100 U/L — SIGNIFICANT CHANGE UP (ref 40–120)
ALT FLD-CCNC: 29 U/L — SIGNIFICANT CHANGE UP (ref 4–33)
ANION GAP SERPL CALC-SCNC: 11 MMOL/L — SIGNIFICANT CHANGE UP (ref 7–14)
AST SERPL-CCNC: 15 U/L — SIGNIFICANT CHANGE UP (ref 4–32)
BILIRUB SERPL-MCNC: 0.6 MG/DL — SIGNIFICANT CHANGE UP (ref 0.2–1.2)
BUN SERPL-MCNC: 15 MG/DL — SIGNIFICANT CHANGE UP (ref 7–23)
CALCIUM SERPL-MCNC: 9.8 MG/DL — SIGNIFICANT CHANGE UP (ref 8.4–10.5)
CHLORIDE SERPL-SCNC: 90 MMOL/L — LOW (ref 98–107)
CO2 SERPL-SCNC: 35 MMOL/L — HIGH (ref 22–31)
CREAT SERPL-MCNC: 0.99 MG/DL — SIGNIFICANT CHANGE UP (ref 0.5–1.3)
EGFR: 58 ML/MIN/1.73M2 — LOW
GLUCOSE BLDC GLUCOMTR-MCNC: 188 MG/DL — HIGH (ref 70–99)
GLUCOSE BLDC GLUCOMTR-MCNC: 235 MG/DL — HIGH (ref 70–99)
GLUCOSE BLDC GLUCOMTR-MCNC: 244 MG/DL — HIGH (ref 70–99)
GLUCOSE BLDC GLUCOMTR-MCNC: 260 MG/DL — HIGH (ref 70–99)
GLUCOSE BLDC GLUCOMTR-MCNC: 275 MG/DL — HIGH (ref 70–99)
GLUCOSE SERPL-MCNC: 187 MG/DL — HIGH (ref 70–99)
HCT VFR BLD CALC: 35.9 % — SIGNIFICANT CHANGE UP (ref 34.5–45)
HGB BLD-MCNC: 10 G/DL — LOW (ref 11.5–15.5)
MAGNESIUM SERPL-MCNC: 1.8 MG/DL — SIGNIFICANT CHANGE UP (ref 1.6–2.6)
MCHC RBC-ENTMCNC: 21.1 PG — LOW (ref 27–34)
MCHC RBC-ENTMCNC: 27.9 GM/DL — LOW (ref 32–36)
MCV RBC AUTO: 75.9 FL — LOW (ref 80–100)
NRBC # BLD: 0 /100 WBCS — SIGNIFICANT CHANGE UP (ref 0–0)
NRBC # FLD: 0 K/UL — SIGNIFICANT CHANGE UP (ref 0–0)
PHOSPHATE SERPL-MCNC: 3.4 MG/DL — SIGNIFICANT CHANGE UP (ref 2.5–4.5)
PLATELET # BLD AUTO: 221 K/UL — SIGNIFICANT CHANGE UP (ref 150–400)
POTASSIUM SERPL-MCNC: 3.9 MMOL/L — SIGNIFICANT CHANGE UP (ref 3.5–5.3)
POTASSIUM SERPL-SCNC: 3.9 MMOL/L — SIGNIFICANT CHANGE UP (ref 3.5–5.3)
PROT SERPL-MCNC: 7 G/DL — SIGNIFICANT CHANGE UP (ref 6–8.3)
RBC # BLD: 4.73 M/UL — SIGNIFICANT CHANGE UP (ref 3.8–5.2)
RBC # FLD: 20.9 % — HIGH (ref 10.3–14.5)
SODIUM SERPL-SCNC: 136 MMOL/L — SIGNIFICANT CHANGE UP (ref 135–145)
WBC # BLD: 10.03 K/UL — SIGNIFICANT CHANGE UP (ref 3.8–10.5)
WBC # FLD AUTO: 10.03 K/UL — SIGNIFICANT CHANGE UP (ref 3.8–10.5)

## 2023-04-14 PROCEDURE — 99223 1ST HOSP IP/OBS HIGH 75: CPT

## 2023-04-14 PROCEDURE — 99497 ADVNCD CARE PLAN 30 MIN: CPT | Mod: 25

## 2023-04-14 RX ADMIN — BUDESONIDE AND FORMOTEROL FUMARATE DIHYDRATE 2 PUFF(S): 160; 4.5 AEROSOL RESPIRATORY (INHALATION) at 10:25

## 2023-04-14 RX ADMIN — Medication 1: at 07:55

## 2023-04-14 RX ADMIN — HEPARIN SODIUM 5000 UNIT(S): 5000 INJECTION INTRAVENOUS; SUBCUTANEOUS at 07:04

## 2023-04-14 RX ADMIN — INSULIN GLARGINE 6 UNIT(S): 100 INJECTION, SOLUTION SUBCUTANEOUS at 23:26

## 2023-04-14 RX ADMIN — HEPARIN SODIUM 5000 UNIT(S): 5000 INJECTION INTRAVENOUS; SUBCUTANEOUS at 23:25

## 2023-04-14 RX ADMIN — Medication 3: at 12:20

## 2023-04-14 RX ADMIN — SENNA PLUS 2 TABLET(S): 8.6 TABLET ORAL at 23:26

## 2023-04-14 RX ADMIN — Medication 40 MILLIGRAM(S): at 07:04

## 2023-04-14 RX ADMIN — Medication 3 MILLILITER(S): at 09:23

## 2023-04-14 RX ADMIN — TIOTROPIUM BROMIDE 2 PUFF(S): 18 CAPSULE ORAL; RESPIRATORY (INHALATION) at 10:26

## 2023-04-14 RX ADMIN — SIMVASTATIN 20 MILLIGRAM(S): 20 TABLET, FILM COATED ORAL at 23:25

## 2023-04-14 RX ADMIN — Medication 50 MICROGRAM(S): at 07:04

## 2023-04-14 RX ADMIN — BUDESONIDE AND FORMOTEROL FUMARATE DIHYDRATE 2 PUFF(S): 160; 4.5 AEROSOL RESPIRATORY (INHALATION) at 23:21

## 2023-04-14 RX ADMIN — PIPERACILLIN AND TAZOBACTAM 25 GRAM(S): 4; .5 INJECTION, POWDER, LYOPHILIZED, FOR SOLUTION INTRAVENOUS at 14:32

## 2023-04-14 RX ADMIN — Medication 3 MILLILITER(S): at 03:41

## 2023-04-14 RX ADMIN — PANTOPRAZOLE SODIUM 40 MILLIGRAM(S): 20 TABLET, DELAYED RELEASE ORAL at 17:27

## 2023-04-14 RX ADMIN — POLYETHYLENE GLYCOL 3350 17 GRAM(S): 17 POWDER, FOR SOLUTION ORAL at 14:25

## 2023-04-14 RX ADMIN — Medication 0: at 23:28

## 2023-04-14 RX ADMIN — SODIUM CHLORIDE 4 MILLILITER(S): 9 INJECTION INTRAMUSCULAR; INTRAVENOUS; SUBCUTANEOUS at 16:51

## 2023-04-14 RX ADMIN — Medication 40 MILLIGRAM(S): at 14:25

## 2023-04-14 RX ADMIN — HEPARIN SODIUM 5000 UNIT(S): 5000 INJECTION INTRAVENOUS; SUBCUTANEOUS at 14:26

## 2023-04-14 RX ADMIN — Medication 3 MILLILITER(S): at 16:51

## 2023-04-14 RX ADMIN — PANTOPRAZOLE SODIUM 40 MILLIGRAM(S): 20 TABLET, DELAYED RELEASE ORAL at 07:04

## 2023-04-14 RX ADMIN — PIPERACILLIN AND TAZOBACTAM 25 GRAM(S): 4; .5 INJECTION, POWDER, LYOPHILIZED, FOR SOLUTION INTRAVENOUS at 07:01

## 2023-04-14 RX ADMIN — Medication 3 MILLILITER(S): at 22:07

## 2023-04-14 RX ADMIN — Medication 2: at 17:24

## 2023-04-14 RX ADMIN — PIPERACILLIN AND TAZOBACTAM 25 GRAM(S): 4; .5 INJECTION, POWDER, LYOPHILIZED, FOR SOLUTION INTRAVENOUS at 23:21

## 2023-04-14 NOTE — CONSULT NOTE ADULT - CONSULT REASON
Syncope
complex medical decision making in the setting of serious illness
dark stools, CHEO
Syncope, SOB

## 2023-04-14 NOTE — CONSULT NOTE ADULT - PROBLEM SELECTOR RECOMMENDATION 9
- refused workup in the past for prior CT lung findings, possible superimposed pulmonary edema or infection   - on HFNC for worsening hypoxia   - Patient comfortable at this moment. If symptomatic, could start IV morphine 2 mg q4h PRN for dyspnea

## 2023-04-14 NOTE — CONSULT NOTE ADULT - SUBJECTIVE AND OBJECTIVE BOX
HPI:  Patient is an 77 y/o F with pmhx of HTN, DM2, hx of TB (completed treatment per [facility]), presented to the ED for new onset syncope. Patient found to have acute hypoxemic respiratory failure, now on HFNC. Patient has had findings on CT scans for some years however patient always refused further invasive workup. Palliative consulted for complex medical decision making in the setting of serious illness.     Multiple hospitalizations 5 since Nov PERTINENT PM/SXH:   HTN (Hypertension)    Dyslipidemia    DM (Diabetes Mellitus)    Hypothyroidism    Pulmonary TB      Cataract of left eye      FAMILY HISTORY:  Family history of heart attack (Mother)  mother    ------------------------------------------------------------------------------------------------------------  ITEMS NOT CHECKED ARE NOT PRESENT    SOCIAL HISTORY:   Living Situation: [X ]Home  [ ]Long term care  [ ]Rehab [ ]Other  Support:     Substance hx:  [ ]   Tobacco hx:  [ ]   Alcohol hx: [ ]   Family Hx substance abuse [ ]yes [ ]no    Anabaptism/Spirituality:  PCSSQ[Palliative Care Spiritual Screening Question]   Severity (0-10): 0  Score of 4 or > indicate consideration of Chaplaincy referral.  Chaplaincy Referral: [ ] yes [X ] refused [ ] following    Anticipatory Grief present?:  [ ] yes [X ] no  [ ] Deferred                      Other Referrals:    [ ]Hospice   [ ]Caregiver [place] Support  [ ]Child Life    [ ]Patient & Family Centered Care Referral  [ ]Holistic Therapy     ------------------------------------------------------------------------------------------------------------    PRESENT SYMPTOMS:  [ ] No     [ ] Unable to self-report      [ ] CPOT (ICU)     [ ] PAINADs     [ ] RDOS    [ ] Yes     Source if other than patient:  [ ]Family   [ ]Team     PAIN:   If blank, patient unable to specify   [ ]yes [ ]no  QOL impact-   Location -                    Aggravating factors -  Quality -  Radiation -  Timing-  Pain at most severe level (0-10 scale):  Pain at minimal acceptable level/Pain Goal (0-10 scale):     SYMPTOMS:   Dyspnea:                           [ ]Mild [ ]Moderate [ ]Severe  Anxiety:                             [ ]Mild [ ]Moderate [ ]Severe  Fatigue:                             [ ]Mild [ ]Moderate [ ]Severe  Nausea/Vomiting:              [ ]Mild [ ]Moderate [ ]Severe  Loss of appetite:                [ ]Mild [ ]Moderate [ ]Severe  Constipation:                     [ ]Mild [ ]Moderate [ ]Severe    Other Symptoms:  [X ]All other review of systems negative     Home Medications for symptoms if any:  I-Stop Reference No:    ------------------------------------------------------------------------------------------------------------    FUNCTIONAL STATUS:     Baseline ADL (prior to admission):  [ ] Independent  [ ] Moderate Assistance [ ] Dependent  Palliative Performance Score:     [ ]PPSV2 < or = to 30%     NUTRITIONAL STATUS:     Protein Calorie Malnutrition Present:   [ ]mild   [ ]moderate   [ ]severe   [ ]poor nutritional intake   [ ]artificial nutrition      Weight:   [ ]underweight (BMI 18.5 or less)   [ ]morbid obesity (BMI 30 or higher)   [ ]anasarca  [ ]significant weight loss     Height (cm): 149.9 (04-10-23 @ 18:30), 149.9 (02-07-23 @ 20:37), 149.9 (01-26-23 @ 02:29)  Weight (kg): 64.9 (04-10-23 @ 18:30), 55.1 (02-07-23 @ 20:37), 57.3 (02-01-23 @ 10:00)  BMI (kg/m2): 28.9 (04-10-23 @ 18:30), 24.5 (02-07-23 @ 20:37), 25.5 (02-01-23 @ 10:00)    ------------------------------------------------------------------------------------------------------------    PRIOR ADVANCE DIRECTIVES:    [ ] DNR/MOLST    [ ] Living Will    [ ] Health Care Proxy(s)    DECISION MAKER(s):  [ ] Patient    [ ] Surrogate(s)  [ ] HCP   [ ] Guardian             ------------------------------------------------------------------------------------------------------------  PHYSICAL EXAM:  Vital Signs Last 24 Hrs  T(C): 36.3 (14 Apr 2023 07:00), Max: 36.8 (13 Apr 2023 17:17)  T(F): 97.4 (14 Apr 2023 07:00), Max: 98.2 (13 Apr 2023 17:17)  HR: 75 (14 Apr 2023 09:25) (71 - 96)  BP: 140/68 (14 Apr 2023 07:00) (140/68 - 148/62)  BP(mean): --  RR: 17 (14 Apr 2023 07:46) (17 - 22)  SpO2: 98% (14 Apr 2023 09:25) (95% - 100%)    Parameters below as of 14 Apr 2023 09:25  Patient On (Oxygen Delivery Method): nasal cannula, high flow     I&O's Summary    13 Apr 2023 07:01 - 14 Apr 2023 07:00  --------------------------------------------------------  IN: 160 mL / OUT: 2000 mL / NET: -1840 mL        GENERAL:  [ ]Cachexia  [ ] Frail  [ ]Awake  [ ]Oriented x   [ ]Lethargic  [ ]Unarousable  [ ]Verbal  [ ]Non-Verbal    BEHAVIORAL:   [ ] Anxiety  [ ] Delirium [ ] Agitation [ ] Other    HEENT:   [ ]Normal   [ ]Dry mouth   [ ]ET Tube/Trach  [ ]Oral lesions    PULMONARY:   [ ]Clear [ ]Tachypnea  [ ]Audible excessive secretions   [ ]Rhonchi        [ ]Right [ ]Left [ ]Bilateral  [ ]Crackles        [ ]Right [ ]Left [ ]Bilateral  [ ]Wheezing     [ ]Right [ ]Left [ ]Bilateral  [ ]Diminished breath sounds [ ]right [ ]left [ ]bilateral    CARDIOVASCULAR:    [ ]Regular [ ]Irregular [ ]Tachy  [ ]Arvind [ ]Murmur [ ]Other    GASTROINTESTINAL:  [ ]Soft  [ ]Distended   [ ]+BS  [ ]Non tender [ ]Tender  [ ]Other [ ]PEG [ ]OGT/ NGT      GENITOURINARY:  [ ]Normal [ ] Incontinent   [ ]Oliguria/Anuria   [ ]Rasmussen    MUSCULOSKELETAL:   [ ]Normal   [ ]Weakness  [ ]Bed/Wheelchair bound [ ]Edema    NEUROLOGIC:   [ ]No focal deficits  [ ]Cognitive impairment  [ ]Dysphagia [ ]Dysarthria [ ]Paresis [ ]Other     SKIN:   [ ]Normal  [ ]Rash  [ ]Other  [ ]Pressure ulcer(s)       Present on admission [ ]y [ ]n    ------------------------------------------------------------------------------------------------------------    LABS:                        10.0   10.03 )-----------( 221 ( 14 Apr 2023 05:40 )             35.9   04-14    136  |  90<L>  |  15  ----------------------------<  187<H>  3.9   |  35<H>  |  0.99    Ca    9.8      14 Apr 2023 05:40  Phos  3.4     04-14  Mg     1.80     04-14    TPro  7.0  /  Alb  4.2  /  TBili  0.6  /  DBili  x   /  AST  15  /  ALT  29  /  AlkPhos  100  04-14    ------------------------------------------------------------------------------------------------------------    CRITICAL CARE:  [ ]Shock Present  [ ]Septic [ ]Cardiogenic [ ]Neurologic [ ]Hypovolemic [ ] Undifferentiated/Mixed   [ ]Vasopressors [ ]Inotropes     [ ]Respiratory failure present: [ ]Acute  [ ]Chronic [ ]Hypoxic  [ ]Hypercarbic   [ ]Mechanical ventilation   [ ]Trach collar   [ ]Non-invasive ventilatory support   [ ]High flow    [ ]Non-rebreather/Venti     [ ]Other organ failure     ------------------------------------------------------------------------------------------------------------    RADIOLOGY & ADDITIONAL STUDIES:    < from: Xray Chest 1 View- PORTABLE-Urgent (Xray Chest 1 View- PORTABLE-Urgent .) (04.12.23 @ 14:59) >  Frontal expiratory view of the chest shows the heart to be similar in   size. The lungs show progression of pulmonary infiltrates, left larger   than right and there is no evidence of pneumothorax nor pleural effusion.    IMPRESSION:  Progression of infiltrates.    < end of copied text >    < from: CT Head No Cont (04.09.23 @ 11:56) >    There is sulcal and ventricular prominence consistent with age   appropriate involutional change. There are periventricular and   subcortical white matter hypodensities, consistent with mild   microvascular changes.    There is no acute intracranial hemorrhage, mass effect, hydrocephalus, or   midline shift.  There areno extra-axial collections.    The visualized paranasal sinuses and mastoid air cells are clear.    The calvarium is intact. Bilateral lens replacements.    IMPRESSION:  No acute intra-cranial hemorrhage, mass effect, or midline shift.    < end of copied text >    < from: CT Abdomen and Pelvis w/ IV Cont (04.09.23 @ 11:56) >  IMPRESSION:  Lower lobe parenchymal abnormality is not significantly changed compared   to prior studies. There is a new left pleural effusion.    No acute intra-abdominal pathology seen to explain patient's pain.    < end of copied text >    < from: CT Chest No Cont (04.11.23 @ 19:59) >  IMPRESSION:    1.  Since 2/14/2023, the bilateral opacities are unchanged and are of   uncertain etiology.    < end of copied text >    < from: Transthoracic Echocardiogram (04.10.23 @ 13:49) >  CONCLUSIONS:  1. Calcified trileaflet aortic valve with mildly decreased  opening.  2. Normal left ventricular systolic function. No segmental  wall motion abnormalities. Septal motion consistent with  right ventricular overload.  3. Right ventricular enlargement with decreased right  ventricular systolic function.  4. Normal tricuspid valve. Severe tricuspid regurgitation.    < end of copied text >    ------------------------------------------------------------------------------------------------------------    ALLERGIES:  Allergies    No Known Allergies    Intolerances        MEDICATIONS  (STANDING):  albuterol/ipratropium for Nebulization 3 milliLiter(s) Nebulizer every 6 hours  budesonide 160 MICROgram(s)/formoterol 4.5 MICROgram(s) Inhaler 2 Puff(s) Inhalation two times a day  dextrose 5%. 1000 milliLiter(s) (100 mL/Hr) IV Continuous <Continuous>  dextrose 5%. 1000 milliLiter(s) (50 mL/Hr) IV Continuous <Continuous>  dextrose 50% Injectable 25 Gram(s) IV Push once  dextrose 50% Injectable 12.5 Gram(s) IV Push once  dextrose 50% Injectable 25 Gram(s) IV Push once  furosemide   Injectable 40 milliGRAM(s) IV Push two times a day  glucagon  Injectable 1 milliGRAM(s) IntraMuscular once  heparin   Injectable 5000 Unit(s) SubCutaneous every 8 hours  insulin glargine Injectable (LANTUS) 6 Unit(s) SubCutaneous at bedtime  insulin lispro (ADMELOG) corrective regimen sliding scale   SubCutaneous three times a day before meals  insulin lispro (ADMELOG) corrective regimen sliding scale   SubCutaneous at bedtime  levothyroxine 50 MICROGram(s) Oral daily  pantoprazole    Tablet 40 milliGRAM(s) Oral two times a day  piperacillin/tazobactam IVPB.. 3.375 Gram(s) IV Intermittent every 8 hours  polyethylene glycol 3350 17 Gram(s) Oral daily  senna 2 Tablet(s) Oral at bedtime  simvastatin 20 milliGRAM(s) Oral at bedtime  sodium chloride 7% Inhalation 4 milliLiter(s) Inhalation every 12 hours  tiotropium 2.5 MICROgram(s) Inhaler 2 Puff(s) Inhalation daily    MEDICATIONS  (PRN):  acetaminophen     Tablet .. 650 milliGRAM(s) Oral every 6 hours PRN Temp greater or equal to 38C (100.4F), Mild Pain (1 - 3)  albuterol    90 MICROgram(s) HFA Inhaler 2 Puff(s) Inhalation every 6 hours PRN Shortness of Breath and/or Wheezing  dextrose Oral Gel 15 Gram(s) Oral once PRN Blood Glucose LESS THAN 70 milliGRAM(s)/deciliter  guaiFENesin Oral Liquid (Sugar-Free) 100 milliGRAM(s) Oral every 6 hours PRN Cough  melatonin 3 milliGRAM(s) Oral at bedtime PRN Insomnia           HPI:  Patient is an 79 y/o F with pmhx of HTN, DM2, hx of TB (completed treatment per [facility]), presented to the ED for new onset syncope. Patient found to have acute hypoxemic respiratory failure, now on HFNC. Patient has had findings on CT scans for some years however patient always refused further invasive workup. Palliative consulted for complex medical decision making in the setting of serious illness.     Patient     PERTINENT PM/SXH:   HTN (Hypertension)    Dyslipidemia    DM (Diabetes Mellitus)    Hypothyroidism    Pulmonary TB      Cataract of left eye      FAMILY HISTORY:  Family history of heart attack (Mother)  mother    ------------------------------------------------------------------------------------------------------------  ITEMS NOT CHECKED ARE NOT PRESENT    SOCIAL HISTORY:   Living Situation: [X ]Home  [ ]Long term care  [ ]Rehab [ ]Other  Support:     Substance hx:  [ ]   Tobacco hx:  [ ]   Alcohol hx: [ ]   Family Hx substance abuse [ ]yes [ ]no    Mosque/Spirituality:  PCSSQ[Palliative Care Spiritual Screening Question]   Severity (0-10): 0  Score of 4 or > indicate consideration of Chaplaincy referral.  Chaplaincy Referral: [ ] yes [X ] refused [ ] following    Anticipatory Grief present?:  [ ] yes [X ] no  [ ] Deferred                      Other Referrals:    [ ]Hospice   [ ]Caregiver [place] Support  [ ]Child Life    [ ]Patient & Family Centered Care Referral  [ ]Holistic Therapy     ------------------------------------------------------------------------------------------------------------    PRESENT SYMPTOMS:  [ ] No     [ ] Unable to self-report      [ ] CPOT (ICU)     [ ] PAINADs     [ ] RDOS    [ ] Yes     Source if other than patient:  [ ]Family   [ ]Team     PAIN:   If blank, patient unable to specify   [ ]yes [ ]no  QOL impact-   Location -                    Aggravating factors -  Quality -  Radiation -  Timing-  Pain at most severe level (0-10 scale):  Pain at minimal acceptable level/Pain Goal (0-10 scale):     SYMPTOMS:   Dyspnea:                           [ ]Mild [ ]Moderate [ ]Severe  Anxiety:                             [ ]Mild [ ]Moderate [ ]Severe  Fatigue:                             [ ]Mild [ ]Moderate [ ]Severe  Nausea/Vomiting:              [ ]Mild [ ]Moderate [ ]Severe  Loss of appetite:                [ ]Mild [ ]Moderate [ ]Severe  Constipation:                     [ ]Mild [ ]Moderate [ ]Severe    Other Symptoms:  [X ]All other review of systems negative     Home Medications for symptoms if any:  I-Stop Reference No:    ------------------------------------------------------------------------------------------------------------    FUNCTIONAL STATUS:     Baseline ADL (prior to admission):  [ ] Independent  [ ] Moderate Assistance [ ] Dependent  Palliative Performance Score:     [ ]PPSV2 < or = to 30%     NUTRITIONAL STATUS:     Protein Calorie Malnutrition Present:   [ ]mild   [ ]moderate   [ ]severe   [ ]poor nutritional intake   [ ]artificial nutrition      Weight:   [ ]underweight (BMI 18.5 or less)   [ ]morbid obesity (BMI 30 or higher)   [ ]anasarca  [ ]significant weight loss     Height (cm): 149.9 (04-10-23 @ 18:30), 149.9 (02-07-23 @ 20:37), 149.9 (01-26-23 @ 02:29)  Weight (kg): 64.9 (04-10-23 @ 18:30), 55.1 (02-07-23 @ 20:37), 57.3 (02-01-23 @ 10:00)  BMI (kg/m2): 28.9 (04-10-23 @ 18:30), 24.5 (02-07-23 @ 20:37), 25.5 (02-01-23 @ 10:00)    ------------------------------------------------------------------------------------------------------------    PRIOR ADVANCE DIRECTIVES:    [ ] DNR/MOLST    [ ] Living Will    [ ] Health Care Proxy(s)    DECISION MAKER(s):  [ ] Patient    [ ] Surrogate(s)  [ ] HCP   [ ] Guardian             ------------------------------------------------------------------------------------------------------------  PHYSICAL EXAM:  Vital Signs Last 24 Hrs  T(C): 36.3 (14 Apr 2023 07:00), Max: 36.8 (13 Apr 2023 17:17)  T(F): 97.4 (14 Apr 2023 07:00), Max: 98.2 (13 Apr 2023 17:17)  HR: 75 (14 Apr 2023 09:25) (71 - 96)  BP: 140/68 (14 Apr 2023 07:00) (140/68 - 148/62)  BP(mean): --  RR: 17 (14 Apr 2023 07:46) (17 - 22)  SpO2: 98% (14 Apr 2023 09:25) (95% - 100%)    Parameters below as of 14 Apr 2023 09:25  Patient On (Oxygen Delivery Method): nasal cannula, high flow     I&O's Summary    13 Apr 2023 07:01  -  14 Apr 2023 07:00  --------------------------------------------------------  IN: 160 mL / OUT: 2000 mL / NET: -1840 mL        GENERAL:  [ ]Cachexia  [ ] Frail  [ ]Awake  [ ]Oriented x   [ ]Lethargic  [ ]Unarousable  [ ]Verbal  [ ]Non-Verbal    BEHAVIORAL:   [ ] Anxiety  [ ] Delirium [ ] Agitation [ ] Other    HEENT:   [ ]Normal   [ ]Dry mouth   [ ]ET Tube/Trach  [ ]Oral lesions    PULMONARY:   [ ]Clear [ ]Tachypnea  [ ]Audible excessive secretions   [ ]Rhonchi        [ ]Right [ ]Left [ ]Bilateral  [ ]Crackles        [ ]Right [ ]Left [ ]Bilateral  [ ]Wheezing     [ ]Right [ ]Left [ ]Bilateral  [ ]Diminished breath sounds [ ]right [ ]left [ ]bilateral    CARDIOVASCULAR:    [ ]Regular [ ]Irregular [ ]Tachy  [ ]Arvind [ ]Murmur [ ]Other    GASTROINTESTINAL:  [ ]Soft  [ ]Distended   [ ]+BS  [ ]Non tender [ ]Tender  [ ]Other [ ]PEG [ ]OGT/ NGT      GENITOURINARY:  [ ]Normal [ ] Incontinent   [ ]Oliguria/Anuria   [ ]Rasmussen    MUSCULOSKELETAL:   [ ]Normal   [ ]Weakness  [ ]Bed/Wheelchair bound [ ]Edema    NEUROLOGIC:   [ ]No focal deficits  [ ]Cognitive impairment  [ ]Dysphagia [ ]Dysarthria [ ]Paresis [ ]Other     SKIN:   [ ]Normal  [ ]Rash  [ ]Other  [ ]Pressure ulcer(s)       Present on admission [ ]y [ ]n    ------------------------------------------------------------------------------------------------------------    LABS:                        10.0   10.03 )-----------( 221      ( 14 Apr 2023 05:40 )             35.9   04-14    136  |  90<L>  |  15  ----------------------------<  187<H>  3.9   |  35<H>  |  0.99    Ca    9.8      14 Apr 2023 05:40  Phos  3.4     04-14  Mg     1.80     04-14    TPro  7.0  /  Alb  4.2  /  TBili  0.6  /  DBili  x   /  AST  15  /  ALT  29  /  AlkPhos  100  04-14    ------------------------------------------------------------------------------------------------------------    CRITICAL CARE:  [ ]Shock Present  [ ]Septic [ ]Cardiogenic [ ]Neurologic [ ]Hypovolemic [ ] Undifferentiated/Mixed   [ ]Vasopressors [ ]Inotropes     [ ]Respiratory failure present: [ ]Acute  [ ]Chronic [ ]Hypoxic  [ ]Hypercarbic   [ ]Mechanical ventilation   [ ]Trach collar   [ ]Non-invasive ventilatory support   [ ]High flow    [ ]Non-rebreather/Venti     [ ]Other organ failure     ------------------------------------------------------------------------------------------------------------    RADIOLOGY & ADDITIONAL STUDIES:    < from: Xray Chest 1 View- PORTABLE-Urgent (Xray Chest 1 View- PORTABLE-Urgent .) (04.12.23 @ 14:59) >  Frontal expiratory view of the chest shows the heart to be similar in   size. The lungs show progression of pulmonary infiltrates, left larger   than right and there is no evidence of pneumothorax nor pleural effusion.    IMPRESSION:  Progression of infiltrates.    < end of copied text >    < from: CT Head No Cont (04.09.23 @ 11:56) >    There is sulcal and ventricular prominence consistent with age   appropriate involutional change. There are periventricular and   subcortical white matter hypodensities, consistent with mild   microvascular changes.    There is no acute intracranial hemorrhage, mass effect, hydrocephalus, or   midline shift.  There areno extra-axial collections.    The visualized paranasal sinuses and mastoid air cells are clear.    The calvarium is intact. Bilateral lens replacements.    IMPRESSION:  No acute intra-cranial hemorrhage, mass effect, or midline shift.    < end of copied text >    < from: CT Abdomen and Pelvis w/ IV Cont (04.09.23 @ 11:56) >  IMPRESSION:  Lower lobe parenchymal abnormality is not significantly changed compared   to prior studies. There is a new left pleural effusion.    No acute intra-abdominal pathology seen to explain patient's pain.    < end of copied text >    < from: CT Chest No Cont (04.11.23 @ 19:59) >  IMPRESSION:    1.  Since 2/14/2023, the bilateral opacities are unchanged and are of   uncertain etiology.    < end of copied text >    < from: Transthoracic Echocardiogram (04.10.23 @ 13:49) >  CONCLUSIONS:  1. Calcified trileaflet aortic valve with mildly decreased  opening.  2. Normal left ventricular systolic function. No segmental  wall motion abnormalities. Septal motion consistent with  right ventricular overload.  3. Right ventricular enlargement with decreased right  ventricular systolic function.  4. Normal tricuspid valve. Severe tricuspid regurgitation.    < end of copied text >    ------------------------------------------------------------------------------------------------------------    ALLERGIES:  Allergies    No Known Allergies    Intolerances        MEDICATIONS  (STANDING):  albuterol/ipratropium for Nebulization 3 milliLiter(s) Nebulizer every 6 hours  budesonide 160 MICROgram(s)/formoterol 4.5 MICROgram(s) Inhaler 2 Puff(s) Inhalation two times a day  dextrose 5%. 1000 milliLiter(s) (100 mL/Hr) IV Continuous <Continuous>  dextrose 5%. 1000 milliLiter(s) (50 mL/Hr) IV Continuous <Continuous>  dextrose 50% Injectable 25 Gram(s) IV Push once  dextrose 50% Injectable 12.5 Gram(s) IV Push once  dextrose 50% Injectable 25 Gram(s) IV Push once  furosemide   Injectable 40 milliGRAM(s) IV Push two times a day  glucagon  Injectable 1 milliGRAM(s) IntraMuscular once  heparin   Injectable 5000 Unit(s) SubCutaneous every 8 hours  insulin glargine Injectable (LANTUS) 6 Unit(s) SubCutaneous at bedtime  insulin lispro (ADMELOG) corrective regimen sliding scale   SubCutaneous three times a day before meals  insulin lispro (ADMELOG) corrective regimen sliding scale   SubCutaneous at bedtime  levothyroxine 50 MICROGram(s) Oral daily  pantoprazole    Tablet 40 milliGRAM(s) Oral two times a day  piperacillin/tazobactam IVPB.. 3.375 Gram(s) IV Intermittent every 8 hours  polyethylene glycol 3350 17 Gram(s) Oral daily  senna 2 Tablet(s) Oral at bedtime  simvastatin 20 milliGRAM(s) Oral at bedtime  sodium chloride 7% Inhalation 4 milliLiter(s) Inhalation every 12 hours  tiotropium 2.5 MICROgram(s) Inhaler 2 Puff(s) Inhalation daily    MEDICATIONS  (PRN):  acetaminophen     Tablet .. 650 milliGRAM(s) Oral every 6 hours PRN Temp greater or equal to 38C (100.4F), Mild Pain (1 - 3)  albuterol    90 MICROgram(s) HFA Inhaler 2 Puff(s) Inhalation every 6 hours PRN Shortness of Breath and/or Wheezing  dextrose Oral Gel 15 Gram(s) Oral once PRN Blood Glucose LESS THAN 70 milliGRAM(s)/deciliter  guaiFENesin Oral Liquid (Sugar-Free) 100 milliGRAM(s) Oral every 6 hours PRN Cough  melatonin 3 milliGRAM(s) Oral at bedtime PRN Insomnia           HPI:  Patient is an 79 y/o F with pmhx of HTN, DM2, hx of TB (completed treatment per [facility]), presented to the ED for new onset syncope. Patient found to have acute hypoxemic respiratory failure, now on HFNC. Patient has had findings on CT scans for some years however patient always refused further invasive workup. Palliative consulted for complex medical decision making in the setting of serious illness.     Patient seen this AM, on HFNC, fatigued but in no distress. Son Tish at bedside. See below for GOC.     PERTINENT PM/SXH:   HTN (Hypertension)    Dyslipidemia    DM (Diabetes Mellitus)    Hypothyroidism    Pulmonary TB      Cataract of left eye      FAMILY HISTORY:  Family history of heart attack (Mother)  mother    ------------------------------------------------------------------------------------------------------------  ITEMS NOT CHECKED ARE NOT PRESENT    SOCIAL HISTORY:   Living Situation: [X ]Home  [ ]Long term care  [ ]Rehab [ ]Other  Support: Lives with william Winston and his family. Has other children in different states, also in [place].   [place]     Substance hx:  [ ]   Tobacco hx:  [ ]   Alcohol hx: [ ]   Family Hx substance abuse [ ]yes [ ]no    Tenriism/Spirituality:   PCSSQ[Palliative Care Spiritual Screening Question]   Severity (0-10): 0  Score of 4 or > indicate consideration of Chaplaincy referral.  Chaplaincy Referral: [ ] yes [X ] refused [ ] following    Anticipatory Grief present?:  [ ] yes [X ] no  [ ] Deferred                      Other Referrals:    [ ]Hospice   [ ]Caregiver [place] Support  [ ]Child Life    [ ]Patient & Family Centered Care Referral  [ ]Holistic Therapy     ------------------------------------------------------------------------------------------------------------    PRESENT SYMPTOMS:  [X ] No     [ ] Unable to self-report      [ ] CPOT (ICU)     [ ] PAINADs     [ ] RDOS    [ ] Yes     Source if other than patient:  [ ]Family   [ ]Team     PAIN:   If blank, patient unable to specify   [ ]yes [ ]no  QOL impact-   Location -                    Aggravating factors -  Quality -  Radiation -  Timing-  Pain at most severe level (0-10 scale):  Pain at minimal acceptable level/Pain Goal (0-10 scale):     SYMPTOMS:   Dyspnea:                           [ ]Mild [ ]Moderate [ ]Severe  Anxiety:                             [ ]Mild [ ]Moderate [ ]Severe  Fatigue:                             [ ]Mild [ ]Moderate [ ]Severe  Nausea/Vomiting:              [ ]Mild [ ]Moderate [ ]Severe  Loss of appetite:                [ ]Mild [ ]Moderate [ ]Severe  Constipation:                     [ ]Mild [ ]Moderate [ ]Severe    Other Symptoms:  [X ]All other review of systems negative     Home Medications for symptoms if any:  I-Stop Reference No:    ------------------------------------------------------------------------------------------------------------    FUNCTIONAL STATUS:     Baseline ADL (prior to admission):  [ ] Independent  [ ] Moderate Assistance [X ] Dependent  Palliative Performance Score: 30%   Dependent on all ADLs   Per son was eating well     [X ]PPSV2 < or = to 30%     NUTRITIONAL STATUS:     Protein Calorie Malnutrition Present:   [ ]mild   [ ]moderate   [ ]severe   [ ]poor nutritional intake   [ ]artificial nutrition      Weight:   [ ]underweight (BMI 18.5 or less)   [ ]morbid obesity (BMI 30 or higher)   [ ]anasarca  [ ]significant weight loss     Height (cm): 149.9 (04-10-23 @ 18:30), 149.9 (02-07-23 @ 20:37), 149.9 (01-26-23 @ 02:29)  Weight (kg): 64.9 (04-10-23 @ 18:30), 55.1 (02-07-23 @ 20:37), 57.3 (02-01-23 @ 10:00)  BMI (kg/m2): 28.9 (04-10-23 @ 18:30), 24.5 (02-07-23 @ 20:37), 25.5 (02-01-23 @ 10:00)    ------------------------------------------------------------------------------------------------------------    PRIOR ADVANCE DIRECTIVES:    [X ] DNR/MOLST    [ ] Living Will    [ ] Health Care Proxy(s)    DECISION MAKER(s):  [ ] Patient    [X ] Surrogate(s)- william Winston, other children   [ ] HCP   [ ] Guardian             ------------------------------------------------------------------------------------------------------------  PHYSICAL EXAM:  Vital Signs Last 24 Hrs  T(C): 36.3 (14 Apr 2023 07:00), Max: 36.8 (13 Apr 2023 17:17)  T(F): 97.4 (14 Apr 2023 07:00), Max: 98.2 (13 Apr 2023 17:17)  HR: 75 (14 Apr 2023 09:25) (71 - 96)  BP: 140/68 (14 Apr 2023 07:00) (140/68 - 148/62)  BP(mean): --  RR: 17 (14 Apr 2023 07:46) (17 - 22)  SpO2: 98% (14 Apr 2023 09:25) (95% - 100%)    Parameters below as of 14 Apr 2023 09:25  Patient On (Oxygen Delivery Method): nasal cannula, high flow     I&O's Summary    13 Apr 2023 07:01  -  14 Apr 2023 07:00  --------------------------------------------------------  IN: 160 mL / OUT: 2000 mL / NET: -1840 mL      GENERAL:  [X ]Cachexia  [X ] Frail  [X ]Awake  [X ]Oriented x 2  [X ]Lethargic  [ ]Unarousable  [X ]Verbal  [ ]Non-Verbal    BEHAVIORAL:   [ ] Anxiety  [ ] Delirium [ ] Agitation [ ] Other    HEENT:   [ ]Normal   [ ]Dry mouth   [ ]ET Tube/Trach  [ ]Oral lesions    PULMONARY:   [ ]Clear [ ]Tachypnea  [ ]Audible excessive secretions   [ ]Rhonchi        [ ]Right [ ]Left [ ]Bilateral  [ ]Crackles        [ ]Right [ ]Left [ ]Bilateral  [ ]Wheezing     [ ]Right [ ]Left [ ]Bilateral  [X ]Diminished breath sounds [ ]right [ ]left [X ]bilateral    CARDIOVASCULAR:    [X ]Regular [ ]Irregular [ ]Tachy  [ ]Arvind [ ]Murmur [ ]Other    GASTROINTESTINAL:  [X ]Soft  [ ]Distended   [ ]+BS  [X ]Non tender [ ]Tender  [ ]Other [ ]PEG [ ]OGT/ NGT      GENITOURINARY:  [ ]Normal [X ] Incontinent   [ ]Oliguria/Anuria   [ ]Rasmussen    MUSCULOSKELETAL:   [ ]Normal   [ ]Weakness  [X ]Bed/Wheelchair bound [ ]Edema    NEUROLOGIC:   [X ]No focal deficits  [ ]Cognitive impairment  [ ]Dysphagia [ ]Dysarthria [ ]Paresis [ ]Other     SKIN:   [X ]Normal  [ ]Rash  [ ]Other  [ ]Pressure ulcer(s)       Present on admission [ ]y [ ]n    ------------------------------------------------------------------------------------------------------------    LABS:                        10.0   10.03 )-----------( 221      ( 14 Apr 2023 05:40 )             35.9   04-14    136  |  90<L>  |  15  ----------------------------<  187<H>  3.9   |  35<H>  |  0.99    Ca    9.8      14 Apr 2023 05:40  Phos  3.4     04-14  Mg     1.80     04-14    TPro  7.0  /  Alb  4.2  /  TBili  0.6  /  DBili  x   /  AST  15  /  ALT  29  /  AlkPhos  100  04-14    ------------------------------------------------------------------------------------------------------------    CRITICAL CARE:  [ ]Shock Present  [ ]Septic [ ]Cardiogenic [ ]Neurologic [ ]Hypovolemic [ ] Undifferentiated/Mixed   [ ]Vasopressors [ ]Inotropes     [X ]Respiratory failure present: [X ]Acute  [ ]Chronic [ ]Hypoxic  [ ]Hypercarbic   [ ]Mechanical ventilation   [ ]Trach collar   [ ]Non-invasive ventilatory support   [X ]High flow    [ ]Non-rebreather/Venti     [ ]Other organ failure     ------------------------------------------------------------------------------------------------------------    RADIOLOGY & ADDITIONAL STUDIES:    < from: Xray Chest 1 View- PORTABLE-Urgent (Xray Chest 1 View- PORTABLE-Urgent .) (04.12.23 @ 14:59) >  Frontal expiratory view of the chest shows the heart to be similar in   size. The lungs show progression of pulmonary infiltrates, left larger   than right and there is no evidence of pneumothorax nor pleural effusion.    IMPRESSION:  Progression of infiltrates.    < end of copied text >    < from: CT Head No Cont (04.09.23 @ 11:56) >    There is sulcal and ventricular prominence consistent with age   appropriate involutional change. There are periventricular and   subcortical white matter hypodensities, consistent with mild   microvascular changes.    There is no acute intracranial hemorrhage, mass effect, hydrocephalus, or   midline shift.  There areno extra-axial collections.    The visualized paranasal sinuses and mastoid air cells are clear.    The calvarium is intact. Bilateral lens replacements.    IMPRESSION:  No acute intra-cranial hemorrhage, mass effect, or midline shift.    < end of copied text >    < from: CT Abdomen and Pelvis w/ IV Cont (04.09.23 @ 11:56) >  IMPRESSION:  Lower lobe parenchymal abnormality is not significantly changed compared   to prior studies. There is a new left pleural effusion.    No acute intra-abdominal pathology seen to explain patient's pain.    < end of copied text >    < from: CT Chest No Cont (04.11.23 @ 19:59) >  IMPRESSION:    1.  Since 2/14/2023, the bilateral opacities are unchanged and are of   uncertain etiology.    < end of copied text >    < from: Transthoracic Echocardiogram (04.10.23 @ 13:49) >  CONCLUSIONS:  1. Calcified trileaflet aortic valve with mildly decreased  opening.  2. Normal left ventricular systolic function. No segmental  wall motion abnormalities. Septal motion consistent with  right ventricular overload.  3. Right ventricular enlargement with decreased right  ventricular systolic function.  4. Normal tricuspid valve. Severe tricuspid regurgitation.    < end of copied text >    ------------------------------------------------------------------------------------------------------------    ALLERGIES:  Allergies    No Known Allergies    Intolerances    MEDICATIONS  (STANDING):  albuterol/ipratropium for Nebulization 3 milliLiter(s) Nebulizer every 6 hours  budesonide 160 MICROgram(s)/formoterol 4.5 MICROgram(s) Inhaler 2 Puff(s) Inhalation two times a day  dextrose 5%. 1000 milliLiter(s) (100 mL/Hr) IV Continuous <Continuous>  dextrose 5%. 1000 milliLiter(s) (50 mL/Hr) IV Continuous <Continuous>  dextrose 50% Injectable 25 Gram(s) IV Push once  dextrose 50% Injectable 12.5 Gram(s) IV Push once  dextrose 50% Injectable 25 Gram(s) IV Push once  furosemide   Injectable 40 milliGRAM(s) IV Push two times a day  glucagon  Injectable 1 milliGRAM(s) IntraMuscular once  heparin   Injectable 5000 Unit(s) SubCutaneous every 8 hours  insulin glargine Injectable (LANTUS) 6 Unit(s) SubCutaneous at bedtime  insulin lispro (ADMELOG) corrective regimen sliding scale   SubCutaneous three times a day before meals  insulin lispro (ADMELOG) corrective regimen sliding scale   SubCutaneous at bedtime  levothyroxine 50 MICROGram(s) Oral daily  pantoprazole    Tablet 40 milliGRAM(s) Oral two times a day  piperacillin/tazobactam IVPB.. 3.375 Gram(s) IV Intermittent every 8 hours  polyethylene glycol 3350 17 Gram(s) Oral daily  senna 2 Tablet(s) Oral at bedtime  simvastatin 20 milliGRAM(s) Oral at bedtime  sodium chloride 7% Inhalation 4 milliLiter(s) Inhalation every 12 hours  tiotropium 2.5 MICROgram(s) Inhaler 2 Puff(s) Inhalation daily    MEDICATIONS  (PRN):  acetaminophen     Tablet .. 650 milliGRAM(s) Oral every 6 hours PRN Temp greater or equal to 38C (100.4F), Mild Pain (1 - 3)  albuterol    90 MICROgram(s) HFA Inhaler 2 Puff(s) Inhalation every 6 hours PRN Shortness of Breath and/or Wheezing  dextrose Oral Gel 15 Gram(s) Oral once PRN Blood Glucose LESS THAN 70 milliGRAM(s)/deciliter  guaiFENesin Oral Liquid (Sugar-Free) 100 milliGRAM(s) Oral every 6 hours PRN Cough  melatonin 3 milliGRAM(s) Oral at bedtime PRN Insomnia

## 2023-04-14 NOTE — CONSULT NOTE ADULT - PROBLEM SELECTOR RECOMMENDATION 3
- Already DNR/DNI - Already DNR/DNI  - Decision maker: surrogates- main surrogate is william Winston. Patient has other children in other areas   - 4/14 -See [facility] note. I spent 20 minutes addressing advanced care planning with patient and/or decision maker(s). Discussed option of comfort care. Son has yet to speak to his other siblings about patient's critical state. Recommended he update them that she may have limited time. - Already DNR/DNI  - Decision maker: surrogates- main surrogate is william Winston, but patient has other children in other areas   - 4/14 -See [facility] note. I spent 20 minutes addressing advanced care planning with patient and/or decision maker(s). Discussed option of comfort care. Son has yet to speak to his other siblings about patient's critical state. Recommended he update them that she may have limited time.

## 2023-04-14 NOTE — PROGRESS NOTE ADULT - SUBJECTIVE AND OBJECTIVE BOX
Forrest Rosenthal MD  Interventional Cardiology / Endovascular Specialist  [place] Office : [phone] [place] [address] [place] [address]  Tel:   [place] Office : [phone] [facility] [place] [address]  Tel: [phone]    Subjective/Overnight events: Patient lying in bed. No acute distress.   	  MEDICATIONS:  furosemide   Injectable 40 milliGRAM(s) IV Push two times a day  heparin   Injectable 5000 Unit(s) SubCutaneous every 8 hours    piperacillin/tazobactam IVPB.. 3.375 Gram(s) IV Intermittent every 8 hours    albuterol    90 MICROgram(s) HFA Inhaler 2 Puff(s) Inhalation every 6 hours PRN  albuterol/ipratropium for Nebulization 3 milliLiter(s) Nebulizer every 6 hours  budesonide 160 MICROgram(s)/formoterol 4.5 MICROgram(s) Inhaler 2 Puff(s) Inhalation two times a day  guaiFENesin Oral Liquid (Sugar-Free) 100 milliGRAM(s) Oral every 6 hours PRN  sodium chloride 7% Inhalation 4 milliLiter(s) Inhalation every 12 hours  tiotropium 2.5 MICROgram(s) Inhaler 2 Puff(s) Inhalation daily    acetaminophen     Tablet .. 650 milliGRAM(s) Oral every 6 hours PRN  melatonin 3 milliGRAM(s) Oral at bedtime PRN    pantoprazole    Tablet 40 milliGRAM(s) Oral two times a day  polyethylene glycol 3350 17 Gram(s) Oral daily  senna 2 Tablet(s) Oral at bedtime    dextrose 50% Injectable 25 Gram(s) IV Push once  dextrose 50% Injectable 12.5 Gram(s) IV Push once  dextrose 50% Injectable 25 Gram(s) IV Push once  dextrose Oral Gel 15 Gram(s) Oral once PRN  glucagon  Injectable 1 milliGRAM(s) IntraMuscular once  insulin glargine Injectable (LANTUS) 6 Unit(s) SubCutaneous at bedtime  insulin lispro (ADMELOG) corrective regimen sliding scale   SubCutaneous three times a day before meals  insulin lispro (ADMELOG) corrective regimen sliding scale   SubCutaneous at bedtime  levothyroxine 50 MICROGram(s) Oral daily  simvastatin 20 milliGRAM(s) Oral at bedtime    dextrose 5%. 1000 milliLiter(s) IV Continuous <Continuous>  dextrose 5%. 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 No nausea, vomiting, or hematemesis; No diarrhea or constipation. No melena or hematochezia.  GENITOURINARY: No dysuria, frequency, hematuria, or incontinence  NEUROLOGICAL: No headaches, memory loss, loss of strength, numbness, or tremors  ENDOCRINE: No heat or cold intolerance; No hair loss  MUSCULOSKELETAL: No joint pain or swelling; No muscle, back, or extremity pain  PSYCHIATRIC: No depression, anxiety, mood swings, or difficulty sleeping  HEME/LYMPH: No easy bruising, or bleeding gums  All others negative    PHYSICAL EXAM:  T(C): 36.8 (04-14-23 @ 09:30), Max: 36.8 (04-13-23 @ 17:17)  HR: 72 (04-14-23 @ 11:19) (71 - 96)  BP: 113/55 (04-14-23 @ 09:30) (113/55 - 148/62)  RR: 16 (04-14-23 @ 11:19) (16 - 22)  SpO2: 98% (04-14-23 @ 11:19) (96% - 100%)  Wt(kg): --  I&O's Summary    13 Apr 2023 07:01  -  14 Apr 2023 07:00  --------------------------------------------------------  IN: 160 mL / OUT: 2000 mL / NET: -1840 mL      GENERAL: NAD  EYES: conjunctiva and sclera clear  ENMT: No tonsillar erythema, exudates, or enlargement  Cardiovascular: Normal S1 S2, No JVD, No murmurs, No edema  Respiratory: Lungs rhonchi to auscultation	  Gastrointestinal:  Soft, Non-tender, + BS	  Extremities: No edema                                            10.0   10.03 )-----------( 221      ( 14 Apr 2023 05:40 )             35.9     04-14    136  |  90<L>  |  15  ----------------------------<  187<H>  3.9   |  35<H>  |  0.99    Ca    9.8      14 Apr 2023 05:40  Phos  3.4     04-14  Mg     1.80     04-14    TPro  7.0  /  Alb  4.2  /  TBili  0.6  /  DBili  x   /  AST  15  /  ALT  29  /  AlkPhos  100  04-14    proBNP:   Lipid Profile:   HgA1c:   TSH:     Consultant(s) Notes Reviewed:  [x ] YES  [ ] NO    Care Discussed with Consultants/Other Providers [ x] YES  [ ] NO    Imaging Personally Reviewed independently:  [x] YES  [ ] NO    All labs, radiologic studies, vitals, orders and medications list reviewed. Patient is seen and examined at bedside. Case discussed with medical team.

## 2023-04-14 NOTE — PROGRESS NOTE ADULT - SUBJECTIVE AND OBJECTIVE BOX
SUBJECTIVE / OVERNIGHT EVENTS:pt seen and examined,  04-14-23     MEDICATIONS  (STANDING):  albuterol/ipratropium for Nebulization 3 milliLiter(s) Nebulizer every 6 hours  budesonide 160 MICROgram(s)/formoterol 4.5 MICROgram(s) Inhaler 2 Puff(s) Inhalation two times a day  dextrose 5%. 1000 milliLiter(s) (50 mL/Hr) IV Continuous <Continuous>  dextrose 5%. 1000 milliLiter(s) (100 mL/Hr) IV Continuous <Continuous>  dextrose 50% Injectable 25 Gram(s) IV Push once  dextrose 50% Injectable 12.5 Gram(s) IV Push once  dextrose 50% Injectable 25 Gram(s) IV Push once  furosemide   Injectable 40 milliGRAM(s) IV Push two times a day  glucagon  Injectable 1 milliGRAM(s) IntraMuscular once  heparin   Injectable 5000 Unit(s) SubCutaneous every 8 hours  insulin glargine Injectable (LANTUS) 6 Unit(s) SubCutaneous at bedtime  insulin lispro (ADMELOG) corrective regimen sliding scale   SubCutaneous three times a day before meals  insulin lispro (ADMELOG) corrective regimen sliding scale   SubCutaneous at bedtime  levothyroxine 50 MICROGram(s) Oral daily  pantoprazole    Tablet 40 milliGRAM(s) Oral two times a day  piperacillin/tazobactam IVPB.. 3.375 Gram(s) IV Intermittent every 8 hours  polyethylene glycol 3350 17 Gram(s) Oral daily  senna 2 Tablet(s) Oral at bedtime  simvastatin 20 milliGRAM(s) Oral at bedtime  sodium chloride 7% Inhalation 4 milliLiter(s) Inhalation every 12 hours  tiotropium 2.5 MICROgram(s) Inhaler 2 Puff(s) Inhalation daily    MEDICATIONS  (PRN):  acetaminophen     Tablet .. 650 milliGRAM(s) Oral every 6 hours PRN Temp greater or equal to 38C (100.4F), Mild Pain (1 - 3)  albuterol    90 MICROgram(s) HFA Inhaler 2 Puff(s) Inhalation every 6 hours PRN Shortness of Breath and/or Wheezing  dextrose Oral Gel 15 Gram(s) Oral once PRN Blood Glucose LESS THAN 70 milliGRAM(s)/deciliter  guaiFENesin Oral Liquid (Sugar-Free) 100 milliGRAM(s) Oral every 6 hours PRN Cough  melatonin 3 milliGRAM(s) Oral at bedtime PRN Insomnia    Vital Signs Last 24 Hrs  T(C): 36.7 (04-14-23 @ 17:48), Max: 36.8 (04-14-23 @ 09:30)  T(F): 98.1 (04-14-23 @ 17:48), Max: 98.2 (04-14-23 @ 09:30)  HR: 76 (04-14-23 @ 17:48) (71 - 81)  BP: 136/66 (04-14-23 @ 17:48) (113/55 - 145/89)  BP(mean): --  RR: 18 (04-14-23 @ 17:48) (16 - 21)  SpO2: 100% (04-14-23 @ 17:48) (95% - 100%)        PHYSICAL EXAM:  GENERAL: NAD  EYES: EOMI, PERRLA  NECK: Supple, No JVD  CHEST/LUNG: dec breath sounds at bases  HEART:  S1 , S2 +  ABDOMEN: soft , bs+  EXTREMITIES:    NEUROLOGY:alert awake    LABS:  04-14    136  |  90<L>  |  15  ----------------------------<  187<H>  3.9   |  35<H>  |  0.99    Ca    9.8      14 Apr 2023 05:40  Phos  3.4     04-14  Mg     1.80     04-14    TPro  7.0  /  Alb  4.2  /  TBili  0.6  /  DBili      /  AST  15  /  ALT  29  /  AlkPhos  100  04-14    Creatinine Trend: 0.99 <--, 0.88 <--, 1.03 <--, 0.99 <--, 0.86 <--, 0.77 <--, 0.86 <--                        10.0   10.03 )-----------( 221      ( 14 Apr 2023 05:40 )             35.9     Urine Studies:  Urinalysis Basic - ( 09 Apr 2023 14:38 )    Color: Light Yellow / Appearance: Clear / SG: >1.050 / pH:   Gluc:  / Ketone: Negative  / Bili: Negative / Urobili: <2 mg/dL   Blood:  / Protein: 30 mg/dL / Nitrite: Negative   Leuk Esterase: Negative / RBC: 0 /HPF / WBC 2 /HPF   Sq Epi:  / Non Sq Epi:  / Bacteria: Negative              LIVER FUNCTIONS - ( 14 Apr 2023 05:40 )  Alb: 4.2 g/dL / Pro: 7.0 g/dL / ALK PHOS: 100 U/L / ALT: 29 U/L / AST: 15 U/L / GGT: x                     LIVER FUNCTIONS - ( 13 Apr 2023 05:38 )  Alb: 3.8 g/dL / Pro: 6.3 g/dL / ALK PHOS: 92 U/L / ALT: 39 U/L / AST: 15 U/L / GGT: x                               RADIOLOGY & ADDITIONAL TESTS:    Imaging Personally Reviewed:yes    Consultant(s) Notes Reviewed:  yes    Care Discussed with Consultants/Other Providers:yes

## 2023-04-14 NOTE — CONSULT NOTE ADULT - PROBLEM SELECTOR RECOMMENDATION 4
Palliative consulted for complex medical decision making in the setting of serious illness.    In the event of worsening symptoms, please contact the Palliative Medicine team via pager (if the patient is at [facility] #[phone] or if the patient is at [facility] #[phone]) The Geriatric and Palliative Medicine service has coverage 24 hours a day/ 7 days a week to provide medical recommendations regarding symptom management needs via telephone

## 2023-04-14 NOTE — PROGRESS NOTE ADULT - ASSESSMENT
77 y/o F with PMH HTN, HLD, DM, asthma with recent admission to [facility] for viral PNA who presents for diffuse body aches and feeling anxious and SOB    1. syncope ;  - CT head negative   - echo with sever PHTN and RV dysfunction   - monitor on tele     2. Hypoxia   - CXR with pulm edema   - Prob np elevated  - c/w lasix 40 IV BID       3. HTN  -controlled  -c/w metoprolol  -continue to monitor BP     4. HLD   - on zocor    5. DVT prophylaxis  -lovenox subq

## 2023-04-14 NOTE — CONSULT NOTE ADULT - PROBLEM SELECTOR RECOMMENDATION 2
Multiple hospitalizations 5 since Nov - DM/HTN, history of TB, unknown lung pathology now leading to acute hypoxemic respiratory failure   - Multiple recent hospitalizations  - PPS currently 20%

## 2023-04-14 NOTE — CONSULT NOTE ADULT - ASSESSMENT
Patient is an 79 y/o F with pmhx of HTN, DM2, hx of TB (completed treatment per [facility]), presented to the ED for new onset syncope. Patient found to have acute hypoxemic respiratory failure, now on HFNC. Patient has had findings on CT scans for some years however patient always refused further invasive workup. Palliative consulted for complex medical decision making in the setting of serious illness.

## 2023-04-14 NOTE — CONSULT NOTE ADULT - NS ATTEST RISK PROBLEM GEN_ALL_CORE FT
Patient is seriously ill with unspecified chronic lung disease (refused workup) now with new worsening acute hypoxemic respiratory failure on HFNC  High risk of complications, further morbidity and mortality   Decision for DNR/DNI

## 2023-04-14 NOTE — CONSULT NOTE ADULT - CONVERSATION DETAILS
Spoke to patient's son Tish about patient's deteriorating condition. Discussed with her frailty and now severe acute respiratory failure, there was concern patient's condition can worsen and she may have limited time. Discussed option of comfort care to prioritize end of life symptom management with deescalation of other interventions that would be burdensome at end of life including continued blood draws, imaging. Son states patient has children in other states and one in [place] and he has not told then how sick she is. Recommended he first communicate with his siblings that she is seriously ill and should come visit in case patient's condition worsens. He reaffirmed patient's prior wish to be DNR/DNI.

## 2023-04-15 LAB
ANION GAP SERPL CALC-SCNC: 13 MMOL/L — SIGNIFICANT CHANGE UP (ref 7–14)
BUN SERPL-MCNC: 22 MG/DL — SIGNIFICANT CHANGE UP (ref 7–23)
CALCIUM SERPL-MCNC: 9.8 MG/DL — SIGNIFICANT CHANGE UP (ref 8.4–10.5)
CHLORIDE SERPL-SCNC: 88 MMOL/L — LOW (ref 98–107)
CO2 SERPL-SCNC: 33 MMOL/L — HIGH (ref 22–31)
CREAT SERPL-MCNC: 1.14 MG/DL — SIGNIFICANT CHANGE UP (ref 0.5–1.3)
CULTURE RESULTS: SIGNIFICANT CHANGE UP
CULTURE RESULTS: SIGNIFICANT CHANGE UP
EGFR: 49 ML/MIN/1.73M2 — LOW
GLUCOSE BLDC GLUCOMTR-MCNC: 229 MG/DL — HIGH (ref 70–99)
GLUCOSE BLDC GLUCOMTR-MCNC: 264 MG/DL — HIGH (ref 70–99)
GLUCOSE BLDC GLUCOMTR-MCNC: 285 MG/DL — HIGH (ref 70–99)
GLUCOSE BLDC GLUCOMTR-MCNC: 295 MG/DL — HIGH (ref 70–99)
GLUCOSE SERPL-MCNC: 223 MG/DL — HIGH (ref 70–99)
HCT VFR BLD CALC: 37.1 % — SIGNIFICANT CHANGE UP (ref 34.5–45)
HGB BLD-MCNC: 10.3 G/DL — LOW (ref 11.5–15.5)
MAGNESIUM SERPL-MCNC: 1.8 MG/DL — SIGNIFICANT CHANGE UP (ref 1.6–2.6)
MCHC RBC-ENTMCNC: 20.9 PG — LOW (ref 27–34)
MCHC RBC-ENTMCNC: 27.8 GM/DL — LOW (ref 32–36)
MCV RBC AUTO: 75.1 FL — LOW (ref 80–100)
NRBC # BLD: 0 /100 WBCS — SIGNIFICANT CHANGE UP (ref 0–0)
NRBC # FLD: 0 K/UL — SIGNIFICANT CHANGE UP (ref 0–0)
PHOSPHATE SERPL-MCNC: 3.5 MG/DL — SIGNIFICANT CHANGE UP (ref 2.5–4.5)
PLATELET # BLD AUTO: 251 K/UL — SIGNIFICANT CHANGE UP (ref 150–400)
POTASSIUM SERPL-MCNC: 3.5 MMOL/L — SIGNIFICANT CHANGE UP (ref 3.5–5.3)
POTASSIUM SERPL-SCNC: 3.5 MMOL/L — SIGNIFICANT CHANGE UP (ref 3.5–5.3)
RBC # BLD: 4.94 M/UL — SIGNIFICANT CHANGE UP (ref 3.8–5.2)
RBC # FLD: 21 % — HIGH (ref 10.3–14.5)
SODIUM SERPL-SCNC: 134 MMOL/L — LOW (ref 135–145)
SPECIMEN SOURCE: SIGNIFICANT CHANGE UP
SPECIMEN SOURCE: SIGNIFICANT CHANGE UP
WBC # BLD: 10.51 K/UL — HIGH (ref 3.8–10.5)
WBC # FLD AUTO: 10.51 K/UL — HIGH (ref 3.8–10.5)

## 2023-04-15 RX ORDER — POTASSIUM CHLORIDE 20 MEQ
40 PACKET (EA) ORAL ONCE
Refills: 0 | Status: COMPLETED | OUTPATIENT
Start: 2023-04-15 | End: 2023-04-15

## 2023-04-15 RX ADMIN — Medication 40 MILLIGRAM(S): at 18:05

## 2023-04-15 RX ADMIN — Medication 50 MICROGRAM(S): at 05:21

## 2023-04-15 RX ADMIN — Medication 3: at 11:44

## 2023-04-15 RX ADMIN — Medication 3 MILLILITER(S): at 15:45

## 2023-04-15 RX ADMIN — Medication 3 MILLILITER(S): at 03:55

## 2023-04-15 RX ADMIN — SODIUM CHLORIDE 4 MILLILITER(S): 9 INJECTION INTRAMUSCULAR; INTRAVENOUS; SUBCUTANEOUS at 09:00

## 2023-04-15 RX ADMIN — BUDESONIDE AND FORMOTEROL FUMARATE DIHYDRATE 2 PUFF(S): 160; 4.5 AEROSOL RESPIRATORY (INHALATION) at 08:57

## 2023-04-15 RX ADMIN — Medication 2: at 07:39

## 2023-04-15 RX ADMIN — PIPERACILLIN AND TAZOBACTAM 25 GRAM(S): 4; .5 INJECTION, POWDER, LYOPHILIZED, FOR SOLUTION INTRAVENOUS at 13:58

## 2023-04-15 RX ADMIN — SODIUM CHLORIDE 4 MILLILITER(S): 9 INJECTION INTRAMUSCULAR; INTRAVENOUS; SUBCUTANEOUS at 22:07

## 2023-04-15 RX ADMIN — HEPARIN SODIUM 5000 UNIT(S): 5000 INJECTION INTRAVENOUS; SUBCUTANEOUS at 14:01

## 2023-04-15 RX ADMIN — HEPARIN SODIUM 5000 UNIT(S): 5000 INJECTION INTRAVENOUS; SUBCUTANEOUS at 05:21

## 2023-04-15 RX ADMIN — SIMVASTATIN 20 MILLIGRAM(S): 20 TABLET, FILM COATED ORAL at 21:31

## 2023-04-15 RX ADMIN — PANTOPRAZOLE SODIUM 40 MILLIGRAM(S): 20 TABLET, DELAYED RELEASE ORAL at 18:05

## 2023-04-15 RX ADMIN — PANTOPRAZOLE SODIUM 40 MILLIGRAM(S): 20 TABLET, DELAYED RELEASE ORAL at 05:21

## 2023-04-15 RX ADMIN — Medication 3 MILLILITER(S): at 09:00

## 2023-04-15 RX ADMIN — HEPARIN SODIUM 5000 UNIT(S): 5000 INJECTION INTRAVENOUS; SUBCUTANEOUS at 21:30

## 2023-04-15 RX ADMIN — Medication 40 MILLIGRAM(S): at 05:21

## 2023-04-15 RX ADMIN — Medication 3 MILLILITER(S): at 22:06

## 2023-04-15 RX ADMIN — Medication 1: at 21:58

## 2023-04-15 RX ADMIN — TIOTROPIUM BROMIDE 2 PUFF(S): 18 CAPSULE ORAL; RESPIRATORY (INHALATION) at 08:57

## 2023-04-15 RX ADMIN — PIPERACILLIN AND TAZOBACTAM 25 GRAM(S): 4; .5 INJECTION, POWDER, LYOPHILIZED, FOR SOLUTION INTRAVENOUS at 05:14

## 2023-04-15 RX ADMIN — Medication 3: at 17:34

## 2023-04-15 RX ADMIN — SENNA PLUS 2 TABLET(S): 8.6 TABLET ORAL at 21:31

## 2023-04-15 RX ADMIN — PIPERACILLIN AND TAZOBACTAM 25 GRAM(S): 4; .5 INJECTION, POWDER, LYOPHILIZED, FOR SOLUTION INTRAVENOUS at 21:28

## 2023-04-15 RX ADMIN — BUDESONIDE AND FORMOTEROL FUMARATE DIHYDRATE 2 PUFF(S): 160; 4.5 AEROSOL RESPIRATORY (INHALATION) at 21:30

## 2023-04-15 RX ADMIN — INSULIN GLARGINE 6 UNIT(S): 100 INJECTION, SOLUTION SUBCUTANEOUS at 21:58

## 2023-04-15 NOTE — PROGRESS NOTE ADULT - PROBLEM SELECTOR PLAN 1
-AS PER PULM ,  diuresis  - cont oxygen suppl - wean as tolerated  continue bronchodilators  not wheezing now and if she she does- may be due to pulm edema- would hold on steroids for now  consider restarting dvt prophylaxis  abx per primary team  multiple admissions for resp issues/ does not want any  diagnostic workup for her pulm lesions, consider palliative for GOC discussion

## 2023-04-15 NOTE — PROGRESS NOTE ADULT - SUBJECTIVE AND OBJECTIVE BOX
Forrest Rosenthal MD  Interventional Cardiology / Endovascular Specialist  [place] Office : [phone] [place] [address] [place] [address]  Tel:   [place] Office : [address] [facility] [place] [address]  Tel: [phone]    Subjective/Overnight events: Patient lying in bed. No acute distress.   	  MEDICATIONS:  furosemide   Injectable 40 milliGRAM(s) IV Push two times a day  heparin   Injectable 5000 Unit(s) SubCutaneous every 8 hours  piperacillin/tazobactam IVPB.. 3.375 Gram(s) IV Intermittent every 8 hours  albuterol    90 MICROgram(s) HFA Inhaler 2 Puff(s) Inhalation every 6 hours PRN  albuterol/ipratropium for Nebulization 3 milliLiter(s) Nebulizer every 6 hours  budesonide 160 MICROgram(s)/formoterol 4.5 MICROgram(s) Inhaler 2 Puff(s) Inhalation two times a day  guaiFENesin Oral Liquid (Sugar-Free) 100 milliGRAM(s) Oral every 6 hours PRN  sodium chloride 7% Inhalation 4 milliLiter(s) Inhalation every 12 hours  tiotropium 2.5 MICROgram(s) Inhaler 2 Puff(s) Inhalation daily  acetaminophen     Tablet .. 650 milliGRAM(s) Oral every 6 hours PRN  melatonin 3 milliGRAM(s) Oral at bedtime PRN  pantoprazole    Tablet 40 milliGRAM(s) Oral two times a day  polyethylene glycol 3350 17 Gram(s) Oral daily  senna 2 Tablet(s) Oral at bedtime    dextrose 50% Injectable 25 Gram(s) IV Push once  dextrose 50% Injectable 12.5 Gram(s) IV Push once  dextrose 50% Injectable 25 Gram(s) IV Push once  dextrose Oral Gel 15 Gram(s) Oral once PRN  glucagon  Injectable 1 milliGRAM(s) IntraMuscular once  insulin glargine Injectable (LANTUS) 6 Unit(s) SubCutaneous at bedtime  insulin lispro (ADMELOG) corrective regimen sliding scale   SubCutaneous three times a day before meals  insulin lispro (ADMELOG) corrective regimen sliding scale   SubCutaneous at bedtime  levothyroxine 50 MICROGram(s) Oral daily  simvastatin 20 milliGRAM(s) Oral at bedtime    dextrose 5%. 1000 milliLiter(s) IV Continuous <Continuous>  dextrose 5%. 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      Cataract of left eye          SOCIAL HISTORY: Substance Use (street drugs): ( x ) never used  (  ) other:    FAMILY HISTORY:  Family history of heart attack (Mother)  mother        PHYSICAL EXAM:  T(C): 36.6 (04-15-23 @ 20:50), Max: 36.7 (04-15-23 @ 05:20)  HR: 76 (04-15-23 @ 22:08) (75 - 86)  BP: 138/65 (04-15-23 @ 20:50) (113/66 - 138/65)  RR: 18 (04-15-23 @ 20:50) (17 - 20)  SpO2: 100% (04-15-23 @ 22:08) (95% - 100%)  Wt(kg): --  I&O's Summary    14 Apr 2023 07:01  -  15 Apr 2023 07:00  --------------------------------------------------------  IN: 0 mL / OUT: 300 mL / NET: -300 mL      GENERAL: NAD  EYES: conjunctiva and sclera clear  ENMT: No tonsillar erythema, exudates, or enlargement  Cardiovascular: Normal S1 S2, No JVD, No murmurs, No edema  Respiratory: Lungs rhonchi to auscultation	  Gastrointestinal:  Soft, Non-tender, + BS	  Extremities: No edema                             10.3   10.51 )-----------( 251      ( 15 Apr 2023 06:00 )             37.1     04-15    134<L>  |  88<L>  |  22  ----------------------------<  223<H>  3.5   |  33<H>  |  1.14    Ca    9.8      15 Apr 2023 06:00  Phos  3.5     04-15  Mg     1.80     04-15    TPro  7.0  /  Alb  4.2  /  TBili  0.6  /  DBili  x   /  AST  15  /  ALT  29  /  AlkPhos  100  04-14    proBNP:   Lipid Profile:   HgA1c:   TSH:     Consultant(s) Notes Reviewed:  [x ] YES  [ ] NO    Care Discussed with Consultants/Other Providers [ x] YES  [ ] NO    Imaging Personally Reviewed independently:  [x] YES  [ ] NO    All labs, radiologic studies, vitals, orders and medications list reviewed. Patient is seen and examined at bedside. Case discussed with medical team.

## 2023-04-15 NOTE — PROGRESS NOTE ADULT - ASSESSMENT
79 y/o F with PMH HTN, HLD, DM, asthma with recent admission to [facility] for viral PNA who presents for diffuse body aches and feeling anxious and SOB    1. syncope ;  - CT head negative   - echo with sever PHTN and RV dysfunction   - monitor on tele     2. Hypoxia   - CXR with pulm edema   - Prob np elevated  - c/w lasix 40 IV increase to TID        3. HTN  -controlled  -c/w metoprolol  -continue to monitor BP     4. HLD   - on zocor    5. DVT prophylaxis  -lovenox subq

## 2023-04-15 NOTE — PROGRESS NOTE ADULT - SUBJECTIVE AND OBJECTIVE BOX
SUBJECTIVE / OVERNIGHT EVENTS:pt seen and examined,, pts family next to pts bed  04-15-23     MEDICATIONS  (STANDING):  albuterol/ipratropium for Nebulization 3 milliLiter(s) Nebulizer every 6 hours  budesonide 160 MICROgram(s)/formoterol 4.5 MICROgram(s) Inhaler 2 Puff(s) Inhalation two times a day  dextrose 5%. 1000 milliLiter(s) (50 mL/Hr) IV Continuous <Continuous>  dextrose 5%. 1000 milliLiter(s) (100 mL/Hr) IV Continuous <Continuous>  dextrose 50% Injectable 25 Gram(s) IV Push once  dextrose 50% Injectable 12.5 Gram(s) IV Push once  dextrose 50% Injectable 25 Gram(s) IV Push once  furosemide   Injectable 40 milliGRAM(s) IV Push two times a day  glucagon  Injectable 1 milliGRAM(s) IntraMuscular once  heparin   Injectable 5000 Unit(s) SubCutaneous every 8 hours  insulin glargine Injectable (LANTUS) 6 Unit(s) SubCutaneous at bedtime  insulin lispro (ADMELOG) corrective regimen sliding scale   SubCutaneous three times a day before meals  insulin lispro (ADMELOG) corrective regimen sliding scale   SubCutaneous at bedtime  levothyroxine 50 MICROGram(s) Oral daily  pantoprazole    Tablet 40 milliGRAM(s) Oral two times a day  piperacillin/tazobactam IVPB.. 3.375 Gram(s) IV Intermittent every 8 hours  polyethylene glycol 3350 17 Gram(s) Oral daily  senna 2 Tablet(s) Oral at bedtime  simvastatin 20 milliGRAM(s) Oral at bedtime  sodium chloride 7% Inhalation 4 milliLiter(s) Inhalation every 12 hours  tiotropium 2.5 MICROgram(s) Inhaler 2 Puff(s) Inhalation daily    MEDICATIONS  (PRN):  acetaminophen     Tablet .. 650 milliGRAM(s) Oral every 6 hours PRN Temp greater or equal to 38C (100.4F), Mild Pain (1 - 3)  albuterol    90 MICROgram(s) HFA Inhaler 2 Puff(s) Inhalation every 6 hours PRN Shortness of Breath and/or Wheezing  dextrose Oral Gel 15 Gram(s) Oral once PRN Blood Glucose LESS THAN 70 milliGRAM(s)/deciliter  guaiFENesin Oral Liquid (Sugar-Free) 100 milliGRAM(s) Oral every 6 hours PRN Cough  melatonin 3 milliGRAM(s) Oral at bedtime PRN Insomnia  \  Vital Signs Last 24 Hrs  T(C): 36.6 (04-15-23 @ 10:59), Max: 36.7 (04-14-23 @ 14:30)  T(F): 97.9 (04-15-23 @ 10:59), Max: 98.1 (04-14-23 @ 17:48)  HR: 85 (04-15-23 @ 11:10) (76 - 86)  BP: 137/63 (04-15-23 @ 10:59) (109/67 - 137/63)  BP(mean): --  RR: 18 (04-15-23 @ 11:10) (17 - 20)  SpO2: 98% (04-15-23 @ 11:10) (95% - 100%)      PHYSICAL EXAM:  GENERAL: NAD, on hi guillaume  EYES: EOMI, PERRLA  NECK: Supple, No JVD  CHEST/LUNG: dec breath sounds at bases  HEART:  S1 , S2 +  ABDOMEN: soft , bs+  EXTREMITIES:    NEUROLOGY:alert awake    LABS:  04-15    134<L>  |  88<L>  |  22  ----------------------------<  223<H>  3.5   |  33<H>  |  1.14    Ca    9.8      15 Apr 2023 06:00  Phos  3.5     04-15  Mg     1.80     04-15    TPro  7.0  /  Alb  4.2  /  TBili  0.6  /  DBili      /  AST  15  /  ALT  29  /  AlkPhos  100  04-14    Creatinine Trend: 1.14 <--, 0.99 <--, 0.88 <--, 1.03 <--, 0.99 <--, 0.86 <--, 0.77 <--, 0.86 <--                        10.3   10.51 )-----------( 251      ( 15 Apr 2023 06:00 )             37.1     Urine Studies:  Urinalysis Basic - ( 09 Apr 2023 14:38 )    Color: Light Yellow / Appearance: Clear / SG: >1.050 / pH:   Gluc:  / Ketone: Negative  / Bili: Negative / Urobili: <2 mg/dL   Blood:  / Protein: 30 mg/dL / Nitrite: Negative   Leuk Esterase: Negative / RBC: 0 /HPF / WBC 2 /HPF   Sq Epi:  / Non Sq Epi:  / Bacteria: Negative              LIVER FUNCTIONS - ( 14 Apr 2023 05:40 )  Alb: 4.2 g/dL / Pro: 7.0 g/dL / ALK PHOS: 100 U/L / ALT: 29 U/L / AST: 15 U/L / GGT: x                     LIVER FUNCTIONS - ( 14 Apr 2023 05:40 )  Alb: 4.2 g/dL / Pro: 7.0 g/dL / ALK PHOS: 100 U/L / ALT: 29 U/L / AST: 15 U/L / GGT: x                     LIVER FUNCTIONS - ( 13 Apr 2023 05:38 )  Alb: 3.8 g/dL / Pro: 6.3 g/dL / ALK PHOS: 92 U/L / ALT: 39 U/L / AST: 15 U/L / GGT: x                               RADIOLOGY & ADDITIONAL TESTS:    Imaging Personally Reviewed:yes    Consultant(s) Notes Reviewed:  yes    Care Discussed with Consultants/Other Providers:yes

## 2023-04-16 LAB
ANION GAP SERPL CALC-SCNC: 14 MMOL/L — SIGNIFICANT CHANGE UP (ref 7–14)
BUN SERPL-MCNC: 21 MG/DL — SIGNIFICANT CHANGE UP (ref 7–23)
CALCIUM SERPL-MCNC: 9.8 MG/DL — SIGNIFICANT CHANGE UP (ref 8.4–10.5)
CHLORIDE SERPL-SCNC: 86 MMOL/L — LOW (ref 98–107)
CO2 SERPL-SCNC: 34 MMOL/L — HIGH (ref 22–31)
CREAT SERPL-MCNC: 1.12 MG/DL — SIGNIFICANT CHANGE UP (ref 0.5–1.3)
EGFR: 50 ML/MIN/1.73M2 — LOW
GLUCOSE BLDC GLUCOMTR-MCNC: 261 MG/DL — HIGH (ref 70–99)
GLUCOSE BLDC GLUCOMTR-MCNC: 327 MG/DL — HIGH (ref 70–99)
GLUCOSE BLDC GLUCOMTR-MCNC: 345 MG/DL — HIGH (ref 70–99)
GLUCOSE BLDC GLUCOMTR-MCNC: 383 MG/DL — HIGH (ref 70–99)
GLUCOSE SERPL-MCNC: 250 MG/DL — HIGH (ref 70–99)
HCT VFR BLD CALC: 37.3 % — SIGNIFICANT CHANGE UP (ref 34.5–45)
HGB BLD-MCNC: 10.4 G/DL — LOW (ref 11.5–15.5)
MAGNESIUM SERPL-MCNC: 1.9 MG/DL — SIGNIFICANT CHANGE UP (ref 1.6–2.6)
MCHC RBC-ENTMCNC: 20.6 PG — LOW (ref 27–34)
MCHC RBC-ENTMCNC: 27.9 GM/DL — LOW (ref 32–36)
MCV RBC AUTO: 73.9 FL — LOW (ref 80–100)
NRBC # BLD: 0 /100 WBCS — SIGNIFICANT CHANGE UP (ref 0–0)
NRBC # FLD: 0 K/UL — SIGNIFICANT CHANGE UP (ref 0–0)
PHOSPHATE SERPL-MCNC: 3.6 MG/DL — SIGNIFICANT CHANGE UP (ref 2.5–4.5)
PLATELET # BLD AUTO: 264 K/UL — SIGNIFICANT CHANGE UP (ref 150–400)
POTASSIUM SERPL-MCNC: 3.2 MMOL/L — LOW (ref 3.5–5.3)
POTASSIUM SERPL-SCNC: 3.2 MMOL/L — LOW (ref 3.5–5.3)
RBC # BLD: 5.05 M/UL — SIGNIFICANT CHANGE UP (ref 3.8–5.2)
RBC # FLD: 20.5 % — HIGH (ref 10.3–14.5)
SODIUM SERPL-SCNC: 134 MMOL/L — LOW (ref 135–145)
WBC # BLD: 8.58 K/UL — SIGNIFICANT CHANGE UP (ref 3.8–10.5)
WBC # FLD AUTO: 8.58 K/UL — SIGNIFICANT CHANGE UP (ref 3.8–10.5)

## 2023-04-16 PROCEDURE — 99233 SBSQ HOSP IP/OBS HIGH 50: CPT

## 2023-04-16 RX ORDER — FUROSEMIDE 40 MG
40 TABLET ORAL THREE TIMES A DAY
Refills: 0 | Status: DISCONTINUED | OUTPATIENT
Start: 2023-04-16 | End: 2023-04-18

## 2023-04-16 RX ORDER — POTASSIUM CHLORIDE 20 MEQ
10 PACKET (EA) ORAL
Refills: 0 | Status: COMPLETED | OUTPATIENT
Start: 2023-04-16 | End: 2023-04-16

## 2023-04-16 RX ADMIN — Medication 2: at 22:16

## 2023-04-16 RX ADMIN — Medication 4: at 07:38

## 2023-04-16 RX ADMIN — PIPERACILLIN AND TAZOBACTAM 25 GRAM(S): 4; .5 INJECTION, POWDER, LYOPHILIZED, FOR SOLUTION INTRAVENOUS at 22:36

## 2023-04-16 RX ADMIN — Medication 100 MILLIEQUIVALENT(S): at 21:22

## 2023-04-16 RX ADMIN — Medication 3 MILLILITER(S): at 15:52

## 2023-04-16 RX ADMIN — TIOTROPIUM BROMIDE 2 PUFF(S): 18 CAPSULE ORAL; RESPIRATORY (INHALATION) at 10:20

## 2023-04-16 RX ADMIN — HEPARIN SODIUM 5000 UNIT(S): 5000 INJECTION INTRAVENOUS; SUBCUTANEOUS at 05:30

## 2023-04-16 RX ADMIN — SODIUM CHLORIDE 4 MILLILITER(S): 9 INJECTION INTRAMUSCULAR; INTRAVENOUS; SUBCUTANEOUS at 10:00

## 2023-04-16 RX ADMIN — PIPERACILLIN AND TAZOBACTAM 25 GRAM(S): 4; .5 INJECTION, POWDER, LYOPHILIZED, FOR SOLUTION INTRAVENOUS at 05:31

## 2023-04-16 RX ADMIN — Medication 40 MILLIGRAM(S): at 05:30

## 2023-04-16 RX ADMIN — PANTOPRAZOLE SODIUM 40 MILLIGRAM(S): 20 TABLET, DELAYED RELEASE ORAL at 05:30

## 2023-04-16 RX ADMIN — Medication 3: at 12:13

## 2023-04-16 RX ADMIN — INSULIN GLARGINE 6 UNIT(S): 100 INJECTION, SOLUTION SUBCUTANEOUS at 22:13

## 2023-04-16 RX ADMIN — PANTOPRAZOLE SODIUM 40 MILLIGRAM(S): 20 TABLET, DELAYED RELEASE ORAL at 17:04

## 2023-04-16 RX ADMIN — Medication 100 MILLIEQUIVALENT(S): at 20:07

## 2023-04-16 RX ADMIN — Medication 3 MILLILITER(S): at 10:00

## 2023-04-16 RX ADMIN — PIPERACILLIN AND TAZOBACTAM 25 GRAM(S): 4; .5 INJECTION, POWDER, LYOPHILIZED, FOR SOLUTION INTRAVENOUS at 14:27

## 2023-04-16 RX ADMIN — Medication 100 MILLIEQUIVALENT(S): at 18:51

## 2023-04-16 RX ADMIN — Medication 40 MILLIGRAM(S): at 14:28

## 2023-04-16 RX ADMIN — SENNA PLUS 2 TABLET(S): 8.6 TABLET ORAL at 22:39

## 2023-04-16 RX ADMIN — BUDESONIDE AND FORMOTEROL FUMARATE DIHYDRATE 2 PUFF(S): 160; 4.5 AEROSOL RESPIRATORY (INHALATION) at 22:17

## 2023-04-16 RX ADMIN — HEPARIN SODIUM 5000 UNIT(S): 5000 INJECTION INTRAVENOUS; SUBCUTANEOUS at 22:15

## 2023-04-16 RX ADMIN — BUDESONIDE AND FORMOTEROL FUMARATE DIHYDRATE 2 PUFF(S): 160; 4.5 AEROSOL RESPIRATORY (INHALATION) at 09:28

## 2023-04-16 RX ADMIN — Medication 40 MILLIGRAM(S): at 22:16

## 2023-04-16 RX ADMIN — SODIUM CHLORIDE 4 MILLILITER(S): 9 INJECTION INTRAMUSCULAR; INTRAVENOUS; SUBCUTANEOUS at 21:30

## 2023-04-16 RX ADMIN — Medication 5: at 17:04

## 2023-04-16 RX ADMIN — SIMVASTATIN 20 MILLIGRAM(S): 20 TABLET, FILM COATED ORAL at 22:16

## 2023-04-16 RX ADMIN — HEPARIN SODIUM 5000 UNIT(S): 5000 INJECTION INTRAVENOUS; SUBCUTANEOUS at 14:27

## 2023-04-16 RX ADMIN — Medication 50 MICROGRAM(S): at 05:30

## 2023-04-16 RX ADMIN — Medication 3 MILLILITER(S): at 21:30

## 2023-04-16 RX ADMIN — Medication 3 MILLILITER(S): at 04:29

## 2023-04-16 NOTE — PROGRESS NOTE ADULT - SUBJECTIVE AND OBJECTIVE BOX
PULMONARY PROGRESS NOTE    MARTIR VU  MRN-[id number]    Patient is a 78y old  Female who presents with a chief complaint of syncope (16 Apr 2023 17:13)      HPI:  -seen with family at bedside  on HFNC  40% and 45L she is 98%  cough stable/ unchanged    -    ROS:   -    ACTIVE MEDICATION LIST:  MEDICATIONS  (STANDING):  albuterol/ipratropium for Nebulization 3 milliLiter(s) Nebulizer every 6 hours  budesonide 160 MICROgram(s)/formoterol 4.5 MICROgram(s) Inhaler 2 Puff(s) Inhalation two times a day  dextrose 5%. 1000 milliLiter(s) (50 mL/Hr) IV Continuous <Continuous>  dextrose 5%. 1000 milliLiter(s) (100 mL/Hr) IV Continuous <Continuous>  dextrose 50% Injectable 12.5 Gram(s) IV Push once  dextrose 50% Injectable 25 Gram(s) IV Push once  dextrose 50% Injectable 25 Gram(s) IV Push once  furosemide   Injectable 40 milliGRAM(s) IV Push three times a day  glucagon  Injectable 1 milliGRAM(s) IntraMuscular once  heparin   Injectable 5000 Unit(s) SubCutaneous every 8 hours  insulin glargine Injectable (LANTUS) 6 Unit(s) SubCutaneous at bedtime  insulin lispro (ADMELOG) corrective regimen sliding scale   SubCutaneous at bedtime  insulin lispro (ADMELOG) corrective regimen sliding scale   SubCutaneous three times a day before meals  levothyroxine 50 MICROGram(s) Oral daily  pantoprazole    Tablet 40 milliGRAM(s) Oral two times a day  piperacillin/tazobactam IVPB.. 3.375 Gram(s) IV Intermittent every 8 hours  polyethylene glycol 3350 17 Gram(s) Oral daily  potassium chloride  10 mEq/100 mL IVPB 10 milliEquivalent(s) IV Intermittent every 1 hour  senna 2 Tablet(s) Oral at bedtime  simvastatin 20 milliGRAM(s) Oral at bedtime  sodium chloride 7% Inhalation 4 milliLiter(s) Inhalation every 12 hours  tiotropium 2.5 MICROgram(s) Inhaler 2 Puff(s) Inhalation daily    MEDICATIONS  (PRN):  acetaminophen     Tablet .. 650 milliGRAM(s) Oral every 6 hours PRN Temp greater or equal to 38C (100.4F), Mild Pain (1 - 3)  albuterol    90 MICROgram(s) HFA Inhaler 2 Puff(s) Inhalation every 6 hours PRN Shortness of Breath and/or Wheezing  bisacodyl Suppository 10 milliGRAM(s) Rectal daily PRN Constipation  dextrose Oral Gel 15 Gram(s) Oral once PRN Blood Glucose LESS THAN 70 milliGRAM(s)/deciliter  guaiFENesin Oral Liquid (Sugar-Free) 100 milliGRAM(s) Oral every 6 hours PRN Cough  melatonin 3 milliGRAM(s) Oral at bedtime PRN Insomnia      EXAM:  Vital Signs Last 24 Hrs  T(C): 36.7 (16 Apr 2023 17:02), Max: 36.7 (16 Apr 2023 17:02)  T(F): 98.1 (16 Apr 2023 17:02), Max: 98.1 (16 Apr 2023 17:02)  HR: 89 (16 Apr 2023 17:02) (72 - 93)  BP: 153/80 (16 Apr 2023 17:02) (110/63 - 153/80)  BP(mean): --  RR: 20 (16 Apr 2023 17:02) (18 - 20)  SpO2: 97% (16 Apr 2023 17:02) (97% - 100%)    Parameters below as of 16 Apr 2023 17:02  Patient On (Oxygen Delivery Method): nasal cannula, high flow        GENERAL: The patient is awake and alert in no apparent distress.     LUNGS:  not labored  not wheezing                            10.4   8.58  )-----------( 264      ( 16 Apr 2023 05:15 )             37.3       04-16    134<L>  |  86<L>  |  21  ----------------------------<  250<H>  3.2<L>   |  34<H>  |  1.12    Ca    9.8      16 Apr 2023 05:15  Phos  3.6     04-16  Mg     1.90     04-16       < from: Xray Chest 1 View- PORTABLE-Urgent (Xray Chest 1 View- PORTABLE-Urgent .) (04.12.23 @ 14:59) >    ACC: [id number] EXAM:  XR CHEST PORTABLE URGENT 1V   ORDERED BY: JACQUES BONILLA     PROCEDURE DATE:  04/12/2023          INTERPRETATION:  Chest one view    HISTORY: Shortness of breath    COMPARISON STUDY: 4/9/2023    Frontal expiratory view of the chest shows the heart to be similar in   size. The lungs show progression of pulmonary infiltrates, left larger   than right and there is no evidence of pneumothorax nor pleural effusion.    IMPRESSION:  Progression of infiltrates.        Thank you forthe courtesy of this referral.    --- End of Report ---            EBEN ROLBEDO MD; Attending Interventional Radiologist  This document has been electronically signed. Apr 13 2023 11:10AM    < end of copied text >  < from: CT Chest No Cont (04.11.23 @ 19:59) >    ACC: [id number] EXAM:  CT CHEST   ORDERED BY: SPENCER JAIME     PROCEDURE DATE:  04/11/2023          INTERPRETATION:  CT CHEST WITHOUT CONTRAST    INDICATION: Pleural effusion. Syncope. Vomiting. Hypertension and   hyperlipidemia.    TECHNIQUE: Unenhanced helical images were obtained of the chest. Coronal   and sagittal images were reconstructed. Maximum intensity projection   images were generated.    COMPARISON: CT chest 2/14/2023, 1/29/2023, and 11/8/2022. CT chest   10/25/2017 and 10/26/2019. Chest radiograph 4/9/2023.    FINDINGS:    Tubes/Lines: None.    Lungs, airways and pleura: Since 2/14/2023, the bilateral groundglass   opacities, septal thickening, and patchy consolidation most pronounced   within the bilateral upper lobes are unchanged.    Scant bilateral pleural effusions. No pneumothorax. The airways are   unremarkable.    Mediastinum: The calcified and noncalcified chest lymph nodes are   unchanged. Hiatal hernia. Unremarkable thyroid gland.    Cardiomegaly. Coronary artery calcifications. The aorta is normal in   caliber. Aortic calcifications.    Upper Abdomen: Cholelithiasis.    Bones And Soft Tissues: Spondylosis of the cervical and thoracic spine.    The soft tissues are unremarkable.      IMPRESSION:    1.  Since 2/14/2023, the bilateral opacities are unchanged and are of   uncertain etiology.    --- End of Report ---            NAIF CASTANEDA MD; Attending Radiologist  This document has been electronically signed. Apr 12 2023  8:37AM    < end of copied text >      PROBLEM LIST:  78y Female with HEALTH ISSUES - PROBLEM Dx:  Syncope    Multifocal pneumonia    DM2 (diabetes mellitus, type 2)    Benign essential HTN    Hypothyroidism    Prophylactic measure    Iron deficiency anemia    Pleural effusion    Hyperkalemia    Goals of care, counseling/discussion    Acute respiratory failure with hypoxia    Dyspnea    ACP (advance care planning)    Debility    Palliative care encounter              RECS:  on IV diuretics  i tapered her downt o 40% and 40L and she remains at 95%  would suggest leaving her at 40% and 40L tonight and if she tolerates it switching to NC tomorrow (4/17)  repeat chest xray tomorrow am  noted cardio recommendations for cta- although reasonable to rule out PE can try to see if we can get her back onto NC prior to the contrast study  continue bronchodilators  PPI  nebs prn        Please call with any questions.    Dede Flynn DO  [facility] Pulmonary/Sleep Medicine  [phone]

## 2023-04-16 NOTE — PROGRESS NOTE ADULT - ASSESSMENT
77 y/o F with PMH HTN, HLD, DM, asthma with recent admission to [facility] for viral PNA who presents for diffuse body aches and feeling anxious and SOB    1. syncope ;  - CT head negative   - echo with sever PHTN and RV dysfunction   - monitor on tele     2. Hypoxia   - CXR with pulm edema   - Prob np elevated  - c/w lasix 40 IV TID       3. HTN  -controlled  -c/w metoprolol  -continue to monitor BP     4. HLD   - on zocor    5. DVT prophylaxis  -lovenox subq 79 y/o F with PMH HTN, HLD, DM, asthma with recent admission to [facility] for viral PNA who presents for diffuse body aches and feeling anxious and SOB    1. syncope ;  - CT head negative   - echo with sever PHTN and RV dysfunction   - monitor on tele     2. Hypoxia   - CXR with pulm edema   - Prob np elevated  - c/w lasix 40 IV TID   - recommend CT chest with IV contrast given significant hypoxia     3. HTN  -controlled  -c/w metoprolol  -continue to monitor BP     4. HLD   - on zocor    5. DVT prophylaxis  -lovenox subq

## 2023-04-16 NOTE — PROVIDER CONTACT NOTE (OTHER) - BACKGROUND
78 yr old female presented with new onset syncope. Hx of anemia, acute respiratory failure, pleural effusion.

## 2023-04-16 NOTE — PROVIDER CONTACT NOTE (OTHER) - BACKGROUND
78 yr old female admitted for new onset syncope. Found to have anemia. Hx of diabetes, acute respiratory failure, pleural effusion.

## 2023-04-16 NOTE — PROGRESS NOTE ADULT - PROBLEM SELECTOR PLAN 1
-AS PER PULM ,  diuresis  - cont oxygen suppl - wean as tolerated  continue bronchodilators  not wheezing now and if she she does- may be due to pulm edema- would hold on steroids for now  consider restarting dvt prophylaxis  abx per primary team  multiple admissions for resp issues/ does not want any  diagnostic workup for her pulm lesions,

## 2023-04-16 NOTE — CHART NOTE - NSCHARTNOTEFT_GEN_A_CORE
notified by RN patient bladder scan is 1000. Discuss with RN patient needs straight catherization. RN notified patient refuses. Went to speak bedside with patient and daughter in law ,  number [phone]. Patient was exaplained the need to straight cath and the importance of one, patient is still refusing. Called son/HCP to discuss straight cath, son states he wants to speak to his mother first to see if he can convince her.     Son is to come speak to patient bedside.  Discussed with .    Signed out to night team pending family discussion.

## 2023-04-16 NOTE — PROGRESS NOTE ADULT - SUBJECTIVE AND OBJECTIVE BOX
SUBJECTIVE / OVERNIGHT EVENTS:pt seen and examined,, pts family next to pts bed  04-16-23     MEDICATIONS  (STANDING):  albuterol/ipratropium for Nebulization 3 milliLiter(s) Nebulizer every 6 hours  budesonide 160 MICROgram(s)/formoterol 4.5 MICROgram(s) Inhaler 2 Puff(s) Inhalation two times a day  dextrose 5%. 1000 milliLiter(s) (100 mL/Hr) IV Continuous <Continuous>  dextrose 5%. 1000 milliLiter(s) (50 mL/Hr) IV Continuous <Continuous>  dextrose 50% Injectable 25 Gram(s) IV Push once  dextrose 50% Injectable 12.5 Gram(s) IV Push once  dextrose 50% Injectable 25 Gram(s) IV Push once  furosemide   Injectable 40 milliGRAM(s) IV Push three times a day  glucagon  Injectable 1 milliGRAM(s) IntraMuscular once  heparin   Injectable 5000 Unit(s) SubCutaneous every 8 hours  insulin glargine Injectable (LANTUS) 6 Unit(s) SubCutaneous at bedtime  insulin lispro (ADMELOG) corrective regimen sliding scale   SubCutaneous at bedtime  insulin lispro (ADMELOG) corrective regimen sliding scale   SubCutaneous three times a day before meals  levothyroxine 50 MICROGram(s) Oral daily  pantoprazole    Tablet 40 milliGRAM(s) Oral two times a day  piperacillin/tazobactam IVPB.. 3.375 Gram(s) IV Intermittent every 8 hours  polyethylene glycol 3350 17 Gram(s) Oral daily  senna 2 Tablet(s) Oral at bedtime  simvastatin 20 milliGRAM(s) Oral at bedtime  sodium chloride 7% Inhalation 4 milliLiter(s) Inhalation every 12 hours  tiotropium 2.5 MICROgram(s) Inhaler 2 Puff(s) Inhalation daily    MEDICATIONS  (PRN):  acetaminophen     Tablet .. 650 milliGRAM(s) Oral every 6 hours PRN Temp greater or equal to 38C (100.4F), Mild Pain (1 - 3)  albuterol    90 MICROgram(s) HFA Inhaler 2 Puff(s) Inhalation every 6 hours PRN Shortness of Breath and/or Wheezing  bisacodyl Suppository 10 milliGRAM(s) Rectal daily PRN Constipation  dextrose Oral Gel 15 Gram(s) Oral once PRN Blood Glucose LESS THAN 70 milliGRAM(s)/deciliter  guaiFENesin Oral Liquid (Sugar-Free) 100 milliGRAM(s) Oral every 6 hours PRN Cough  melatonin 3 milliGRAM(s) Oral at bedtime PRN Insomnia    Vital Signs Last 24 Hrs  T(C): 36.7 (04-16-23 @ 17:02), Max: 36.7 (04-16-23 @ 17:02)  T(F): 98.1 (04-16-23 @ 17:02), Max: 98.1 (04-16-23 @ 17:02)  HR: 89 (04-16-23 @ 17:02) (72 - 93)  BP: 153/80 (04-16-23 @ 17:02) (110/63 - 153/80)  BP(mean): --  RR: 20 (04-16-23 @ 17:02) (18 - 20)  SpO2: 97% (04-16-23 @ 17:02) (97% - 100%)      PHYSICAL EXAM:  GENERAL: NAD, on hi guillaume  EYES: EOMI, PERRLA  NECK: Supple, No JVD  CHEST/LUNG: dec breath sounds at bases  HEART:  S1 , S2 +  ABDOMEN: soft , bs+  EXTREMITIES:    NEUROLOGY:alert awake      LABS:  04-16    134<L>  |  86<L>  |  21  ----------------------------<  250<H>  3.2<L>   |  34<H>  |  1.12    Ca    9.8      16 Apr 2023 05:15  Phos  3.6     04-16  Mg     1.90     04-16      Creatinine Trend: 1.12 <--, 1.14 <--, 0.99 <--, 0.88 <--, 1.03 <--, 0.99 <--                        10.4   8.58  )-----------( 264      ( 16 Apr 2023 05:15 )             37.3     Urine Studies:                        RADIOLOGY & ADDITIONAL TESTS:    Imaging Personally Reviewed:yes    Consultant(s) Notes Reviewed:  yes    Care Discussed with Consultants/Other Providers:yes

## 2023-04-17 LAB
ANION GAP SERPL CALC-SCNC: 14 MMOL/L — SIGNIFICANT CHANGE UP (ref 7–14)
BUN SERPL-MCNC: 20 MG/DL — SIGNIFICANT CHANGE UP (ref 7–23)
CALCIUM SERPL-MCNC: 9.6 MG/DL — SIGNIFICANT CHANGE UP (ref 8.4–10.5)
CHLORIDE SERPL-SCNC: 88 MMOL/L — LOW (ref 98–107)
CO2 SERPL-SCNC: 32 MMOL/L — HIGH (ref 22–31)
CREAT SERPL-MCNC: 1.1 MG/DL — SIGNIFICANT CHANGE UP (ref 0.5–1.3)
EGFR: 51 ML/MIN/1.73M2 — LOW
GLUCOSE BLDC GLUCOMTR-MCNC: 222 MG/DL — HIGH (ref 70–99)
GLUCOSE BLDC GLUCOMTR-MCNC: 284 MG/DL — HIGH (ref 70–99)
GLUCOSE BLDC GLUCOMTR-MCNC: 303 MG/DL — HIGH (ref 70–99)
GLUCOSE BLDC GLUCOMTR-MCNC: 323 MG/DL — HIGH (ref 70–99)
GLUCOSE SERPL-MCNC: 216 MG/DL — HIGH (ref 70–99)
HCT VFR BLD CALC: 39.1 % — SIGNIFICANT CHANGE UP (ref 34.5–45)
HGB BLD-MCNC: 10.8 G/DL — LOW (ref 11.5–15.5)
MAGNESIUM SERPL-MCNC: 2 MG/DL — SIGNIFICANT CHANGE UP (ref 1.6–2.6)
MCHC RBC-ENTMCNC: 20.1 PG — LOW (ref 27–34)
MCHC RBC-ENTMCNC: 27.6 GM/DL — LOW (ref 32–36)
MCV RBC AUTO: 72.8 FL — LOW (ref 80–100)
NRBC # BLD: 0 /100 WBCS — SIGNIFICANT CHANGE UP (ref 0–0)
NRBC # FLD: 0 K/UL — SIGNIFICANT CHANGE UP (ref 0–0)
PHOSPHATE SERPL-MCNC: 2.6 MG/DL — SIGNIFICANT CHANGE UP (ref 2.5–4.5)
PLATELET # BLD AUTO: 253 K/UL — SIGNIFICANT CHANGE UP (ref 150–400)
POTASSIUM SERPL-MCNC: 3.2 MMOL/L — LOW (ref 3.5–5.3)
POTASSIUM SERPL-SCNC: 3.2 MMOL/L — LOW (ref 3.5–5.3)
RBC # BLD: 5.37 M/UL — HIGH (ref 3.8–5.2)
RBC # FLD: 20.5 % — HIGH (ref 10.3–14.5)
SODIUM SERPL-SCNC: 134 MMOL/L — LOW (ref 135–145)
WBC # BLD: 8.92 K/UL — SIGNIFICANT CHANGE UP (ref 3.8–10.5)
WBC # FLD AUTO: 8.92 K/UL — SIGNIFICANT CHANGE UP (ref 3.8–10.5)

## 2023-04-17 PROCEDURE — 71045 X-RAY EXAM CHEST 1 VIEW: CPT | Mod: 26

## 2023-04-17 PROCEDURE — 99232 SBSQ HOSP IP/OBS MODERATE 35: CPT

## 2023-04-17 RX ORDER — POTASSIUM CHLORIDE 20 MEQ
10 PACKET (EA) ORAL
Refills: 0 | Status: COMPLETED | OUTPATIENT
Start: 2023-04-17 | End: 2023-04-17

## 2023-04-17 RX ADMIN — Medication 50 MICROGRAM(S): at 05:49

## 2023-04-17 RX ADMIN — TIOTROPIUM BROMIDE 2 PUFF(S): 18 CAPSULE ORAL; RESPIRATORY (INHALATION) at 09:08

## 2023-04-17 RX ADMIN — Medication 40 MILLIGRAM(S): at 05:49

## 2023-04-17 RX ADMIN — Medication 3 MILLILITER(S): at 09:22

## 2023-04-17 RX ADMIN — HEPARIN SODIUM 5000 UNIT(S): 5000 INJECTION INTRAVENOUS; SUBCUTANEOUS at 22:41

## 2023-04-17 RX ADMIN — Medication 100 MILLIEQUIVALENT(S): at 20:40

## 2023-04-17 RX ADMIN — Medication 100 MILLIEQUIVALENT(S): at 22:59

## 2023-04-17 RX ADMIN — SIMVASTATIN 20 MILLIGRAM(S): 20 TABLET, FILM COATED ORAL at 22:41

## 2023-04-17 RX ADMIN — PANTOPRAZOLE SODIUM 40 MILLIGRAM(S): 20 TABLET, DELAYED RELEASE ORAL at 05:49

## 2023-04-17 RX ADMIN — PIPERACILLIN AND TAZOBACTAM 25 GRAM(S): 4; .5 INJECTION, POWDER, LYOPHILIZED, FOR SOLUTION INTRAVENOUS at 14:09

## 2023-04-17 RX ADMIN — HEPARIN SODIUM 5000 UNIT(S): 5000 INJECTION INTRAVENOUS; SUBCUTANEOUS at 05:50

## 2023-04-17 RX ADMIN — SODIUM CHLORIDE 4 MILLILITER(S): 9 INJECTION INTRAMUSCULAR; INTRAVENOUS; SUBCUTANEOUS at 22:51

## 2023-04-17 RX ADMIN — Medication 3 MILLILITER(S): at 21:30

## 2023-04-17 RX ADMIN — HEPARIN SODIUM 5000 UNIT(S): 5000 INJECTION INTRAVENOUS; SUBCUTANEOUS at 14:09

## 2023-04-17 RX ADMIN — Medication 40 MILLIGRAM(S): at 22:41

## 2023-04-17 RX ADMIN — PANTOPRAZOLE SODIUM 40 MILLIGRAM(S): 20 TABLET, DELAYED RELEASE ORAL at 17:15

## 2023-04-17 RX ADMIN — Medication 40 MILLIGRAM(S): at 14:09

## 2023-04-17 RX ADMIN — Medication 3: at 11:42

## 2023-04-17 RX ADMIN — BUDESONIDE AND FORMOTEROL FUMARATE DIHYDRATE 2 PUFF(S): 160; 4.5 AEROSOL RESPIRATORY (INHALATION) at 09:07

## 2023-04-17 RX ADMIN — Medication 2: at 22:40

## 2023-04-17 RX ADMIN — Medication 3 MILLILITER(S): at 17:20

## 2023-04-17 RX ADMIN — SODIUM CHLORIDE 4 MILLILITER(S): 9 INJECTION INTRAMUSCULAR; INTRAVENOUS; SUBCUTANEOUS at 09:23

## 2023-04-17 RX ADMIN — Medication 100 MILLIEQUIVALENT(S): at 21:48

## 2023-04-17 RX ADMIN — Medication 3 MILLILITER(S): at 03:35

## 2023-04-17 RX ADMIN — BUDESONIDE AND FORMOTEROL FUMARATE DIHYDRATE 2 PUFF(S): 160; 4.5 AEROSOL RESPIRATORY (INHALATION) at 22:42

## 2023-04-17 RX ADMIN — Medication 2: at 07:52

## 2023-04-17 RX ADMIN — SENNA PLUS 2 TABLET(S): 8.6 TABLET ORAL at 22:59

## 2023-04-17 RX ADMIN — INSULIN GLARGINE 6 UNIT(S): 100 INJECTION, SOLUTION SUBCUTANEOUS at 22:40

## 2023-04-17 RX ADMIN — Medication 4: at 17:14

## 2023-04-17 RX ADMIN — PIPERACILLIN AND TAZOBACTAM 25 GRAM(S): 4; .5 INJECTION, POWDER, LYOPHILIZED, FOR SOLUTION INTRAVENOUS at 05:50

## 2023-04-17 NOTE — PROGRESS NOTE ADULT - SUBJECTIVE AND OBJECTIVE BOX
PULMONARY PROGRESS NOTE    MARTIR VU  MRN-[id number]    Patient is a 78y old  Female who presents with a chief complaint of syncope (16 Apr 2023 18:33)      HPI:  -oN HFNC 40% and 40 L   pulse ox is 97%  diff family at bedside  some cough  unchanged    -    ROS:   -    ACTIVE MEDICATION LIST:  MEDICATIONS  (STANDING):  albuterol/ipratropium for Nebulization 3 milliLiter(s) Nebulizer every 6 hours  budesonide 160 MICROgram(s)/formoterol 4.5 MICROgram(s) Inhaler 2 Puff(s) Inhalation two times a day  dextrose 5%. 1000 milliLiter(s) (100 mL/Hr) IV Continuous <Continuous>  dextrose 5%. 1000 milliLiter(s) (50 mL/Hr) IV Continuous <Continuous>  dextrose 50% Injectable 25 Gram(s) IV Push once  dextrose 50% Injectable 25 Gram(s) IV Push once  dextrose 50% Injectable 12.5 Gram(s) IV Push once  furosemide   Injectable 40 milliGRAM(s) IV Push three times a day  glucagon  Injectable 1 milliGRAM(s) IntraMuscular once  heparin   Injectable 5000 Unit(s) SubCutaneous every 8 hours  insulin glargine Injectable (LANTUS) 6 Unit(s) SubCutaneous at bedtime  insulin lispro (ADMELOG) corrective regimen sliding scale   SubCutaneous at bedtime  insulin lispro (ADMELOG) corrective regimen sliding scale   SubCutaneous three times a day before meals  levothyroxine 50 MICROGram(s) Oral daily  pantoprazole    Tablet 40 milliGRAM(s) Oral two times a day  piperacillin/tazobactam IVPB.. 3.375 Gram(s) IV Intermittent every 8 hours  polyethylene glycol 3350 17 Gram(s) Oral daily  senna 2 Tablet(s) Oral at bedtime  simvastatin 20 milliGRAM(s) Oral at bedtime  sodium chloride 7% Inhalation 4 milliLiter(s) Inhalation every 12 hours  tiotropium 2.5 MICROgram(s) Inhaler 2 Puff(s) Inhalation daily    MEDICATIONS  (PRN):  acetaminophen     Tablet .. 650 milliGRAM(s) Oral every 6 hours PRN Temp greater or equal to 38C (100.4F), Mild Pain (1 - 3)  albuterol    90 MICROgram(s) HFA Inhaler 2 Puff(s) Inhalation every 6 hours PRN Shortness of Breath and/or Wheezing  bisacodyl Suppository 10 milliGRAM(s) Rectal daily PRN Constipation  dextrose Oral Gel 15 Gram(s) Oral once PRN Blood Glucose LESS THAN 70 milliGRAM(s)/deciliter  guaiFENesin Oral Liquid (Sugar-Free) 100 milliGRAM(s) Oral every 6 hours PRN Cough  melatonin 3 milliGRAM(s) Oral at bedtime PRN Insomnia      EXAM:  Vital Signs Last 24 Hrs  T(C): 36.7 (17 Apr 2023 10:30), Max: 36.8 (16 Apr 2023 22:10)  T(F): 98.1 (17 Apr 2023 10:30), Max: 98.2 (16 Apr 2023 22:10)  HR: 88 (17 Apr 2023 12:06) (70 - 93)  BP: 138/62 (17 Apr 2023 10:30) (126/61 - 153/80)  BP(mean): --  RR: 20 (17 Apr 2023 10:30) (18 - 20)  SpO2: 98% (17 Apr 2023 12:06) (95% - 100%)    Parameters below as of 17 Apr 2023 12:06  Patient On (Oxygen Delivery Method): nasal cannula, high flow  O2 Flow (L/min): 35  O2 Concentration (%): 45    GENERAL: The patient is  in no resp distress                           10.8   8.92  )-----------( 253      ( 17 Apr 2023 05:40 )             39.1       04-17    134<L>  |  88<L>  |  20  ----------------------------<  216<H>  3.2<L>   |  32<H>  |  1.10    Ca    9.6      17 Apr 2023 05:40  Phos  2.6     04-17  Mg     2.00     04-17     < from: Xray Chest 1 View- PORTABLE-Urgent (Xray Chest 1 View- PORTABLE-Urgent .) (04.12.23 @ 14:59) >    ACC: [id number] EXAM:  XR CHEST PORTABLE URGENT 1V   ORDERED BY: JACQUES BONILLA     PROCEDURE DATE:  04/12/2023          INTERPRETATION:  Chest one view    HISTORY: Shortness of breath    COMPARISON STUDY: 4/9/2023    Frontal expiratory view of the chest shows the heart to be similar in   size. The lungs show progression of pulmonary infiltrates, left larger   than right and there is no evidence of pneumothorax nor pleural effusion.    IMPRESSION:  Progression of infiltrates.        Thank you forthe courtesy of this referral.    --- End of Report ---            EBEN ROBLEDO MD; Attending Interventional Radiologist  This document has been electronically signed. Apr 13 2023 11:10AM    < end of copied text >      PROBLEM LIST:  78y Female with HEALTH ISSUES - PROBLEM Dx:  Syncope    Multifocal pneumonia    DM2 (diabetes mellitus, type 2)    Benign essential HTN    Hypothyroidism    Prophylactic measure    Iron deficiency anemia    Pleural effusion    Hyperkalemia    Goals of care, counseling/discussion    Acute respiratory failure with hypoxia    Dyspnea    ACP (advance care planning)    Debility    Palliative care encounter              RECS:  cristela improvement in the opacities/ infiltrates on this morning xray- fiollow up officei report  ask RN to switch her tO NC  if she does not tolerate, then agree with cardio re :C TA to eval fo rPE  continue bronchodilators  f/u palliative      Please call with any questions.    Dede Flynn, DO  [facility]  [phone]

## 2023-04-17 NOTE — PROVIDER CONTACT NOTE (OTHER) - NAME OF MD/NP/PA/DO NOTIFIED:
Amelie Bradshaw
Amelie Bradshaw
ACP
Amelie Barfield
Julio Cesar [id number]
Amelie Bradshaw
Amelie Bradshaw
Ela T

## 2023-04-17 NOTE — PROVIDER CONTACT NOTE (OTHER) - REASON
Patient refused straight cath
damaris
Bladder scan results 1195
Patient had a vomiting episode after having eggs and pudding.
Patient refused straight cath
Pt refusing labs at this time following multiple attempts to reeducate
Patient complaints of suprapubic pain and unable to urinate
Bradycardia on tele

## 2023-04-17 NOTE — CHART NOTE - NSCHARTNOTEFT_GEN_A_CORE
Notified by RN that patient is retaining approximately 1000 ml of  urine and patient refuses Rasmussen catheterisation. Patient seen and assessed at the bedside. Patient is Yohana speaking,  patient's younger son was present at the bedside. Interpreting done with the help of son. Explained to the patient about the importance of placing catheter. Patient is in agreement with Rasmussen catheterisation . Spoke to patient's son Nga Joseph  ( [facility] proxy -[phone]) over the phone . Attending Dr. Gonzalez made aware. Rasmussne cath placed by RN and drained 1200 ml of clear urine. Notified by RN that patient is retaining approximately 1000 ml of  urine and patient refuses Rasmussen catheterisation. Patient seen and assessed at the bedside. Patient is Yohana speaking,  patient's younger son was present at the bedside. Patient prefers son for translation. Explained to the patient about the importance of placing catheter. Patient is in agreement with Rasmussen catheterisation . Spoke to patient's son Nga Joseph  ( [facility] proxy -[phone]) over the phone who is in agreement with the plan.   Attending Dr. Gonzalez made aware. Rasmussen cath placed by RN and drained 1200 ml of clear urine.

## 2023-04-17 NOTE — PROVIDER CONTACT NOTE (OTHER) - RECOMMENDATIONS
Amelie Barfield was contacted 3 times. Later Amelie Barfield came to the floor at 8:40 am. Amelie Barfield made aware. Later Amelie Barfield came to the floor at 8:40 am.

## 2023-04-17 NOTE — DIETITIAN INITIAL EVALUATION ADULT - PERTINENT MEDS FT
MEDICATIONS  (STANDING):  albuterol/ipratropium for Nebulization 3 milliLiter(s) Nebulizer every 6 hours  budesonide 160 MICROgram(s)/formoterol 4.5 MICROgram(s) Inhaler 2 Puff(s) Inhalation two times a day  dextrose 5%. 1000 milliLiter(s) (50 mL/Hr) IV Continuous <Continuous>  dextrose 5%. 1000 milliLiter(s) (100 mL/Hr) IV Continuous <Continuous>  dextrose 50% Injectable 25 Gram(s) IV Push once  dextrose 50% Injectable 12.5 Gram(s) IV Push once  dextrose 50% Injectable 25 Gram(s) IV Push once  furosemide   Injectable 40 milliGRAM(s) IV Push three times a day  glucagon  Injectable 1 milliGRAM(s) IntraMuscular once  heparin   Injectable 5000 Unit(s) SubCutaneous every 8 hours  insulin glargine Injectable (LANTUS) 6 Unit(s) SubCutaneous at bedtime  insulin lispro (ADMELOG) corrective regimen sliding scale   SubCutaneous at bedtime  insulin lispro (ADMELOG) corrective regimen sliding scale   SubCutaneous three times a day before meals  levothyroxine 50 MICROGram(s) Oral daily  pantoprazole    Tablet 40 milliGRAM(s) Oral two times a day  polyethylene glycol 3350 17 Gram(s) Oral daily  senna 2 Tablet(s) Oral at bedtime  simvastatin 20 milliGRAM(s) Oral at bedtime  sodium chloride 7% Inhalation 4 milliLiter(s) Inhalation every 12 hours  tiotropium 2.5 MICROgram(s) Inhaler 2 Puff(s) Inhalation daily    MEDICATIONS  (PRN):  acetaminophen     Tablet .. 650 milliGRAM(s) Oral every 6 hours PRN Temp greater or equal to 38C (100.4F), Mild Pain (1 - 3)  albuterol    90 MICROgram(s) HFA Inhaler 2 Puff(s) Inhalation every 6 hours PRN Shortness of Breath and/or Wheezing  bisacodyl Suppository 10 milliGRAM(s) Rectal daily PRN Constipation  dextrose Oral Gel 15 Gram(s) Oral once PRN Blood Glucose LESS THAN 70 milliGRAM(s)/deciliter  guaiFENesin Oral Liquid (Sugar-Free) 100 milliGRAM(s) Oral every 6 hours PRN Cough  melatonin 3 milliGRAM(s) Oral at bedtime PRN Insomnia

## 2023-04-17 NOTE — PROVIDER CONTACT NOTE (OTHER) - ASSESSMENT
Asymptomatic  VS taken and stable   Currently HR 73 and patient resting comfortably
Bladder scan results 1195.
Patient complaints of suprapubic pain and unable to urinate.
VS taken and stable
Bladder scan repeated after voiding 1055.
Patient had an episode of vomiting after having eggs and pudding. Denies any stomach pain, chest pain or headache
Patient refusing straight cath.
patient was resting on bed. denied chest pain and SOB. damaris to 51 on tele and return back to 70s. patient is asymptomatic.

## 2023-04-17 NOTE — DIETITIAN INITIAL EVALUATION ADULT - ORAL INTAKE PTA/DIET HISTORY
Pt lives at home with her family who assist with cooking and grocery shopping for patient. No specific diet followed PTA, family at bedside report that pt eats regular texture. No supplements/Vitamins taken PTA. Pt with fair appetite at home, would eat small meals.

## 2023-04-17 NOTE — DIETITIAN INITIAL EVALUATION ADULT - PERTINENT LABORATORY DATA
04-17 Na 134 mmol/L<L> Glu 216 mg/dL<H> K+ 3.2 mmol/L<L> Cr 1.10 mg/dL BUN 20 mg/dL Phos 2.6 mg/dL  04-17 @ 17:00 POCT 303 mg/dL  A1C with Estimated Average Glucose Result: 7.3 % (02-07-23 @ 02:47)  A1C with Estimated Average Glucose Result: 8.8 % (12-01-22 @ 12:00)  A1C with Estimated Average Glucose Result: 10.5 % (11-08-22 @ 09:00)

## 2023-04-17 NOTE — PROVIDER CONTACT NOTE (OTHER) - SITUATION
Bradycardic on tele to 51 BPM
patient was noted episode of Arvind 51 on tele
Patient had an episode of vomiting after having eggs and pudding. Denies any stomach pain, chest pain or headache.
Patient refused straight cath after son discussed with patient. Patient voided and had a bowel movement again.  used to educated patient. Family also at bedside.
Bladder scan results 1195.
Patient complaints of suprapubic pain and unable to urinate.
Patient refused straight cath, voided and had a bowel movement. Bladder scan repeated 1055.   used to educated patient. Family also at bedside.
Pt refusing labs at this time following multiple attempts to reeducate

## 2023-04-17 NOTE — DIETITIAN INITIAL EVALUATION ADULT - PROBLEM SELECTOR PLAN 4
Assessment:  - patient found to have anemia with hb 7.7  - no obvious reports of bleeding, however was having a small nose bleed from the nasal canula  - Hb has been at baseline  - Iron panel shows iron deficiency anemia    Plan:  - will call GI consult to evaluate for need of endoscopy  - if patient is not getting a colonoscopy - will start iron supplementation  - s/p 1U prbc, trend CBC  - monitor for further bleeding  - c/w protonix PO for now

## 2023-04-17 NOTE — DIETITIAN INITIAL EVALUATION ADULT - ADD RECOMMEND
1) Continue CSTCHO diet   2) Recommend swallow evaluation to assess appropriateness of current diet consistency   3) Monitor weights, labs, BM's, skin integrity, p.o. intake. Please document % PO intake in RN flowsheets   4) Encourage PO intake and honor food preferences as able.

## 2023-04-17 NOTE — DIETITIAN INITIAL EVALUATION ADULT - LITERATURE/VIDEOS GIVEN
Iron deficiency Anemia nutrition education discussed with daughter at bedside. Encouraged intake of high protein food to help with blood glucose stabilization.

## 2023-04-17 NOTE — PROGRESS NOTE ADULT - SUBJECTIVE AND OBJECTIVE BOX
Forrest Rosenthal MD  Interventional Cardiology / Advance Heart Failure and Cardiac Transplant Specialist  [place] Office : [phone] [place] [address] [place] [address]  Tel:   [place] Office : [phone] [facility] [place] [address]  Tel: [phone]       Pt is lying in bed comfortable not in distress, no chest pains no SOB no palpitations  	  MEDICATIONS:  furosemide   Injectable 40 milliGRAM(s) IV Push three times a day  heparin   Injectable 5000 Unit(s) SubCutaneous every 8 hours      albuterol    90 MICROgram(s) HFA Inhaler 2 Puff(s) Inhalation every 6 hours PRN  albuterol/ipratropium for Nebulization 3 milliLiter(s) Nebulizer every 6 hours  budesonide 160 MICROgram(s)/formoterol 4.5 MICROgram(s) Inhaler 2 Puff(s) Inhalation two times a day  guaiFENesin Oral Liquid (Sugar-Free) 100 milliGRAM(s) Oral every 6 hours PRN  sodium chloride 7% Inhalation 4 milliLiter(s) Inhalation every 12 hours  tiotropium 2.5 MICROgram(s) Inhaler 2 Puff(s) Inhalation daily    acetaminophen     Tablet .. 650 milliGRAM(s) Oral every 6 hours PRN  melatonin 3 milliGRAM(s) Oral at bedtime PRN    bisacodyl Suppository 10 milliGRAM(s) Rectal daily PRN  pantoprazole    Tablet 40 milliGRAM(s) Oral two times a day  polyethylene glycol 3350 17 Gram(s) Oral daily  senna 2 Tablet(s) Oral at bedtime    dextrose 50% Injectable 12.5 Gram(s) IV Push once  dextrose 50% Injectable 25 Gram(s) IV Push once  dextrose 50% Injectable 25 Gram(s) IV Push once  dextrose Oral Gel 15 Gram(s) Oral once PRN  glucagon  Injectable 1 milliGRAM(s) IntraMuscular once  insulin glargine Injectable (LANTUS) 6 Unit(s) SubCutaneous at bedtime  insulin lispro (ADMELOG) corrective regimen sliding scale   SubCutaneous at bedtime  insulin lispro (ADMELOG) corrective regimen sliding scale   SubCutaneous three times a day before meals  levothyroxine 50 MICROGram(s) Oral daily  simvastatin 20 milliGRAM(s) Oral at bedtime    dextrose 5%. 1000 milliLiter(s) IV Continuous <Continuous>  dextrose 5%. 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      Cataract of left eye          SOCIAL HISTORY: Substance Use (street drugs): ( x ) never used  (  ) other:    FAMILY HISTORY:  Family history of heart attack (Mother)  mother         PHYSICAL EXAM:  T(C): 36.7 (04-17-23 @ 10:30), Max: 36.8 (04-16-23 @ 22:10)  HR: 83 (04-17-23 @ 17:28) (70 - 88)  BP: 138/62 (04-17-23 @ 10:30) (136/67 - 138/62)  RR: 19 (04-17-23 @ 14:36) (19 - 20)  SpO2: 100% (04-17-23 @ 17:28) (95% - 100%)  Wt(kg): --  I&O's Summary    16 Apr 2023 07:01  -  17 Apr 2023 07:00  --------------------------------------------------------  IN: 250 mL / OUT: 1200 mL / NET: -950 mL    17 Apr 2023 07:01  -  17 Apr 2023 18:55  --------------------------------------------------------  IN: 0 mL / OUT: 650 mL / NET: -650 mL            EYES:   PERRLA   ENMT:   Moist mucous membranes, Good dentition, No lesions  Cardiovascular: Normal S1 S2, No JVD, No murmurs, No edema  Respiratory: b/l rhonchi   Gastrointestinal:  Soft, Non-tender, + BS	  Extremities: no edema                                    10.8   8.92  )-----------( 253      ( 17 Apr 2023 05:40 )             39.1     04-17    134<L>  |  88<L>  |  20  ----------------------------<  216<H>  3.2<L>   |  32<H>  |  1.10    Ca    9.6      17 Apr 2023 05:40  Phos  2.6     04-17  Mg     2.00     04-17      proBNP:   Lipid Profile:   HgA1c:   TSH:     Consultant(s) Notes Reviewed:  [x ] YES  [ ] NO    Care Discussed with Consultants/Other Providers [ x] YES  [ ] NO    Imaging Personally Reviewed independently:  [x] YES  [ ] NO    All labs, radiologic studies, vitals, orders and medications list reviewed. Patient is seen and examined at bedside. Case discussed with medical team.

## 2023-04-17 NOTE — PROVIDER CONTACT NOTE (OTHER) - ACTION/TREATMENT ORDERED:
JUDAH Hrarell made aware, will continue to monitor
No recommendations made by Amelie Barfield. Day shift RN also made aware. Will continue to monitor. Safety maintained.
provider aware, closely monitoring
English
Provider notified. Bladder scan ordered.
Provider notified. Will come to assess.
Provider notified. Straight cath ordered.
ACP notified and aware, no new orders at this time
Provider notified. Will come to assess.

## 2023-04-17 NOTE — PROGRESS NOTE ADULT - PROBLEM SELECTOR PLAN 1
-AS PER PULM ,  diuresis  - cont oxygen suppl - wean as tolerated  continue bronchodilators  not wheezing now and if she she does- may be due to pulm edema- would hold on steroids for now    abx   multiple admissions for resp issues/ does not want any  diagnostic workup for her pulm lesions,

## 2023-04-17 NOTE — PROVIDER CONTACT NOTE (OTHER) - DATE AND TIME:
16-Apr-2023 14:32
16-Apr-2023 15:28
16-Apr-2023 18:59
10-Apr-2023 19:36
10-Apr-2023 19:59
12-Apr-2023 07:00
17-Apr-2023 08:10
16-Apr-2023 14:39

## 2023-04-17 NOTE — PROGRESS NOTE ADULT - ASSESSMENT
77 y/o F with PMH HTN, HLD, DM, asthma with recent admission to [facility] for viral PNA who presents for diffuse body aches and feeling anxious and SOB    1. syncope ;  - CT head negative   - echo with sever PHTN and RV dysfunction   - monitor on tele     2. Hypoxia   - CXR with pulm edema   - Prob np elevated  - c/w lasix 40 IV TID   - recommend CT chest with IV contrast given significant hypoxia     3. HTN  -controlled  -c/w metoprolol  -continue to monitor BP     4. HLD   - on zocor    5. DVT prophylaxis  -lovenox subq

## 2023-04-17 NOTE — PROGRESS NOTE ADULT - SUBJECTIVE AND OBJECTIVE BOX
SUBJECTIVE / OVERNIGHT EVENTS:pt seen and examined,, pts family next to pts bed  pt found to have urian retention , edin was placed  04-17-23     MEDICATIONS  (STANDING):  albuterol/ipratropium for Nebulization 3 milliLiter(s) Nebulizer every 6 hours  budesonide 160 MICROgram(s)/formoterol 4.5 MICROgram(s) Inhaler 2 Puff(s) Inhalation two times a day  dextrose 5%. 1000 milliLiter(s) (50 mL/Hr) IV Continuous <Continuous>  dextrose 5%. 1000 milliLiter(s) (100 mL/Hr) IV Continuous <Continuous>  dextrose 50% Injectable 12.5 Gram(s) IV Push once  dextrose 50% Injectable 25 Gram(s) IV Push once  dextrose 50% Injectable 25 Gram(s) IV Push once  furosemide   Injectable 40 milliGRAM(s) IV Push three times a day  glucagon  Injectable 1 milliGRAM(s) IntraMuscular once  heparin   Injectable 5000 Unit(s) SubCutaneous every 8 hours  insulin glargine Injectable (LANTUS) 6 Unit(s) SubCutaneous at bedtime  insulin lispro (ADMELOG) corrective regimen sliding scale   SubCutaneous at bedtime  insulin lispro (ADMELOG) corrective regimen sliding scale   SubCutaneous three times a day before meals  levothyroxine 50 MICROGram(s) Oral daily  pantoprazole    Tablet 40 milliGRAM(s) Oral two times a day  polyethylene glycol 3350 17 Gram(s) Oral daily  potassium chloride  10 mEq/100 mL IVPB 10 milliEquivalent(s) IV Intermittent every 1 hour  senna 2 Tablet(s) Oral at bedtime  simvastatin 20 milliGRAM(s) Oral at bedtime  sodium chloride 7% Inhalation 4 milliLiter(s) Inhalation every 12 hours  tiotropium 2.5 MICROgram(s) Inhaler 2 Puff(s) Inhalation daily    MEDICATIONS  (PRN):  acetaminophen     Tablet .. 650 milliGRAM(s) Oral every 6 hours PRN Temp greater or equal to 38C (100.4F), Mild Pain (1 - 3)  albuterol    90 MICROgram(s) HFA Inhaler 2 Puff(s) Inhalation every 6 hours PRN Shortness of Breath and/or Wheezing  bisacodyl Suppository 10 milliGRAM(s) Rectal daily PRN Constipation  dextrose Oral Gel 15 Gram(s) Oral once PRN Blood Glucose LESS THAN 70 milliGRAM(s)/deciliter  guaiFENesin Oral Liquid (Sugar-Free) 100 milliGRAM(s) Oral every 6 hours PRN Cough  melatonin 3 milliGRAM(s) Oral at bedtime PRN Insomnia    Vital Signs Last 24 Hrs  T(C): 36.5 (04-17-23 @ 19:29), Max: 36.7 (04-17-23 @ 05:55)  T(F): 97.7 (04-17-23 @ 19:29), Max: 98.1 (04-17-23 @ 10:30)  HR: 90 (04-17-23 @ 19:29) (70 - 90)  BP: 123/75 (04-17-23 @ 19:29) (123/75 - 138/62)  BP(mean): --  RR: 18 (04-17-23 @ 19:29) (18 - 20)  SpO2: 98% (04-17-23 @ 19:29) (95% - 100%)      PHYSICAL EXAM:  GENERAL: NAD, on hi guillaume  EYES: EOMI, PERRLA  NECK: Supple, No JVD  CHEST/LUNG: dec breath sounds at bases  HEART:  S1 , S2 +  ABDOMEN: soft , bs+  EXTREMITIES:  trace edema+  NEUROLOGY:alert awake  jesus+    LABS:  04-17    134<L>  |  88<L>  |  20  ----------------------------<  216<H>  3.2<L>   |  32<H>  |  1.10    Ca    9.6      17 Apr 2023 05:40  Phos  2.6     04-17  Mg     2.00     04-17      Creatinine Trend: 1.10 <--, 1.12 <--, 1.14 <--, 0.99 <--, 0.88 <--, 1.03 <--                        10.8   8.92  )-----------( 253      ( 17 Apr 2023 05:40 )             39.1     Urine Studies:                                    RADIOLOGY & ADDITIONAL TESTS:    Imaging Personally Reviewed:yes    Consultant(s) Notes Reviewed:  yes    Care Discussed with Consultants/Other Providers:yes

## 2023-04-18 DIAGNOSIS — J96.01 ACUTE RESPIRATORY FAILURE WITH HYPOXIA: ICD-10-CM

## 2023-04-18 LAB
ANION GAP SERPL CALC-SCNC: 12 MMOL/L — SIGNIFICANT CHANGE UP (ref 7–14)
BUN SERPL-MCNC: 20 MG/DL — SIGNIFICANT CHANGE UP (ref 7–23)
CALCIUM SERPL-MCNC: 9.6 MG/DL — SIGNIFICANT CHANGE UP (ref 8.4–10.5)
CHLORIDE SERPL-SCNC: 89 MMOL/L — LOW (ref 98–107)
CO2 SERPL-SCNC: 33 MMOL/L — HIGH (ref 22–31)
CREAT SERPL-MCNC: 1.04 MG/DL — SIGNIFICANT CHANGE UP (ref 0.5–1.3)
EGFR: 54 ML/MIN/1.73M2 — LOW
GLUCOSE BLDC GLUCOMTR-MCNC: 216 MG/DL — HIGH (ref 70–99)
GLUCOSE BLDC GLUCOMTR-MCNC: 284 MG/DL — HIGH (ref 70–99)
GLUCOSE BLDC GLUCOMTR-MCNC: 286 MG/DL — HIGH (ref 70–99)
GLUCOSE BLDC GLUCOMTR-MCNC: 293 MG/DL — HIGH (ref 70–99)
GLUCOSE SERPL-MCNC: 218 MG/DL — HIGH (ref 70–99)
HCT VFR BLD CALC: 36.7 % — SIGNIFICANT CHANGE UP (ref 34.5–45)
HGB BLD-MCNC: 10.2 G/DL — LOW (ref 11.5–15.5)
MAGNESIUM SERPL-MCNC: 2 MG/DL — SIGNIFICANT CHANGE UP (ref 1.6–2.6)
MCHC RBC-ENTMCNC: 20.5 PG — LOW (ref 27–34)
MCHC RBC-ENTMCNC: 27.8 GM/DL — LOW (ref 32–36)
MCV RBC AUTO: 73.7 FL — LOW (ref 80–100)
NRBC # BLD: 0 /100 WBCS — SIGNIFICANT CHANGE UP (ref 0–0)
NRBC # FLD: 0 K/UL — SIGNIFICANT CHANGE UP (ref 0–0)
PHOSPHATE SERPL-MCNC: 2.9 MG/DL — SIGNIFICANT CHANGE UP (ref 2.5–4.5)
PLATELET # BLD AUTO: 257 K/UL — SIGNIFICANT CHANGE UP (ref 150–400)
POTASSIUM SERPL-MCNC: 3.5 MMOL/L — SIGNIFICANT CHANGE UP (ref 3.5–5.3)
POTASSIUM SERPL-SCNC: 3.5 MMOL/L — SIGNIFICANT CHANGE UP (ref 3.5–5.3)
RBC # BLD: 4.98 M/UL — SIGNIFICANT CHANGE UP (ref 3.8–5.2)
RBC # FLD: 19.9 % — HIGH (ref 10.3–14.5)
SODIUM SERPL-SCNC: 134 MMOL/L — LOW (ref 135–145)
WBC # BLD: 9.14 K/UL — SIGNIFICANT CHANGE UP (ref 3.8–10.5)
WBC # FLD AUTO: 9.14 K/UL — SIGNIFICANT CHANGE UP (ref 3.8–10.5)

## 2023-04-18 PROCEDURE — 99233 SBSQ HOSP IP/OBS HIGH 50: CPT

## 2023-04-18 PROCEDURE — 99497 ADVNCD CARE PLAN 30 MIN: CPT | Mod: 25

## 2023-04-18 RX ORDER — FUROSEMIDE 40 MG
40 TABLET ORAL
Refills: 0 | Status: DISCONTINUED | OUTPATIENT
Start: 2023-04-18 | End: 2023-04-21

## 2023-04-18 RX ADMIN — Medication 3: at 12:02

## 2023-04-18 RX ADMIN — HEPARIN SODIUM 5000 UNIT(S): 5000 INJECTION INTRAVENOUS; SUBCUTANEOUS at 17:04

## 2023-04-18 RX ADMIN — HEPARIN SODIUM 5000 UNIT(S): 5000 INJECTION INTRAVENOUS; SUBCUTANEOUS at 06:06

## 2023-04-18 RX ADMIN — BUDESONIDE AND FORMOTEROL FUMARATE DIHYDRATE 2 PUFF(S): 160; 4.5 AEROSOL RESPIRATORY (INHALATION) at 22:23

## 2023-04-18 RX ADMIN — INSULIN GLARGINE 6 UNIT(S): 100 INJECTION, SOLUTION SUBCUTANEOUS at 22:16

## 2023-04-18 RX ADMIN — TIOTROPIUM BROMIDE 2 PUFF(S): 18 CAPSULE ORAL; RESPIRATORY (INHALATION) at 10:01

## 2023-04-18 RX ADMIN — BUDESONIDE AND FORMOTEROL FUMARATE DIHYDRATE 2 PUFF(S): 160; 4.5 AEROSOL RESPIRATORY (INHALATION) at 10:01

## 2023-04-18 RX ADMIN — Medication 3: at 17:04

## 2023-04-18 RX ADMIN — Medication 3 MILLILITER(S): at 15:51

## 2023-04-18 RX ADMIN — Medication 50 MICROGRAM(S): at 06:07

## 2023-04-18 RX ADMIN — SIMVASTATIN 20 MILLIGRAM(S): 20 TABLET, FILM COATED ORAL at 22:16

## 2023-04-18 RX ADMIN — Medication 2: at 07:45

## 2023-04-18 RX ADMIN — Medication 40 MILLIGRAM(S): at 06:07

## 2023-04-18 RX ADMIN — HEPARIN SODIUM 5000 UNIT(S): 5000 INJECTION INTRAVENOUS; SUBCUTANEOUS at 22:16

## 2023-04-18 RX ADMIN — PANTOPRAZOLE SODIUM 40 MILLIGRAM(S): 20 TABLET, DELAYED RELEASE ORAL at 06:07

## 2023-04-18 RX ADMIN — PANTOPRAZOLE SODIUM 40 MILLIGRAM(S): 20 TABLET, DELAYED RELEASE ORAL at 17:05

## 2023-04-18 RX ADMIN — SODIUM CHLORIDE 4 MILLILITER(S): 9 INJECTION INTRAMUSCULAR; INTRAVENOUS; SUBCUTANEOUS at 10:39

## 2023-04-18 RX ADMIN — Medication 1: at 22:20

## 2023-04-18 RX ADMIN — Medication 3 MILLILITER(S): at 21:38

## 2023-04-18 RX ADMIN — Medication 40 MILLIGRAM(S): at 17:05

## 2023-04-18 RX ADMIN — SODIUM CHLORIDE 4 MILLILITER(S): 9 INJECTION INTRAMUSCULAR; INTRAVENOUS; SUBCUTANEOUS at 21:38

## 2023-04-18 RX ADMIN — Medication 3 MILLILITER(S): at 10:39

## 2023-04-18 RX ADMIN — SENNA PLUS 2 TABLET(S): 8.6 TABLET ORAL at 22:17

## 2023-04-18 RX ADMIN — Medication 3 MILLILITER(S): at 03:28

## 2023-04-18 NOTE — PROGRESS NOTE ADULT - PROBLEM SELECTOR PLAN 1
- refused workup in the past for prior CT lung findings, possible superimposed pulmonary edema or infection   - was on HFNC, now on NC   - Comfortable - refused workup in the past for prior CT lung findings, possible superimposed pulmonary edema or infection   - was on HFNC, now weaned to NC   - Comfortable currently

## 2023-04-18 NOTE — PROGRESS NOTE ADULT - PROBLEM SELECTOR PLAN 2
- DM/HTN, history of TB, unknown lung pathology now leading to acute hypoxemic respiratory failure   - Multiple recent hospitalizations  - PPS currently 20% - DM/HTN, history of TB, unknown lung pathology now leading to acute hypoxemic respiratory failure, anemia   - Multiple recent hospitalizations (5 since Nov 2022)   - PPS currently 20%

## 2023-04-18 NOTE — PROGRESS NOTE ADULT - CONVERSATION DETAILS
Spoke to patient's son Tish and two other of her children at bedside about patient's condition. Explained while patient has improved, patient was still high risk of further medical events, hospitalizations. Explained that patient was a hospice candidate for end of life care, discussed hospice philosophy and holistic services. Patient's children need to discuss amongst themselves first. Agreed to follow up on a decision tomorrow.

## 2023-04-18 NOTE — PROGRESS NOTE ADULT - SUBJECTIVE AND OBJECTIVE BOX
SUBJECTIVE / OVERNIGHT EVENTS:pt seen and examined,, pts family next to pts bed  pt found to have urian retention , edin was placed  04-18-23     MEDICATIONS  (STANDING):  albuterol/ipratropium for Nebulization 3 milliLiter(s) Nebulizer every 6 hours  budesonide 160 MICROgram(s)/formoterol 4.5 MICROgram(s) Inhaler 2 Puff(s) Inhalation two times a day  dextrose 5%. 1000 milliLiter(s) (50 mL/Hr) IV Continuous <Continuous>  dextrose 5%. 1000 milliLiter(s) (100 mL/Hr) IV Continuous <Continuous>  dextrose 50% Injectable 12.5 Gram(s) IV Push once  dextrose 50% Injectable 25 Gram(s) IV Push once  dextrose 50% Injectable 25 Gram(s) IV Push once  furosemide    Tablet 40 milliGRAM(s) Oral two times a day  glucagon  Injectable 1 milliGRAM(s) IntraMuscular once  heparin   Injectable 5000 Unit(s) SubCutaneous every 8 hours  insulin glargine Injectable (LANTUS) 6 Unit(s) SubCutaneous at bedtime  insulin lispro (ADMELOG) corrective regimen sliding scale   SubCutaneous at bedtime  insulin lispro (ADMELOG) corrective regimen sliding scale   SubCutaneous three times a day before meals  levothyroxine 50 MICROGram(s) Oral daily  pantoprazole    Tablet 40 milliGRAM(s) Oral two times a day  polyethylene glycol 3350 17 Gram(s) Oral daily  senna 2 Tablet(s) Oral at bedtime  simvastatin 20 milliGRAM(s) Oral at bedtime  sodium chloride 7% Inhalation 4 milliLiter(s) Inhalation every 12 hours  tiotropium 2.5 MICROgram(s) Inhaler 2 Puff(s) Inhalation daily    MEDICATIONS  (PRN):  acetaminophen     Tablet .. 650 milliGRAM(s) Oral every 6 hours PRN Temp greater or equal to 38C (100.4F), Mild Pain (1 - 3)  albuterol    90 MICROgram(s) HFA Inhaler 2 Puff(s) Inhalation every 6 hours PRN Shortness of Breath and/or Wheezing  bisacodyl Suppository 10 milliGRAM(s) Rectal daily PRN Constipation  dextrose Oral Gel 15 Gram(s) Oral once PRN Blood Glucose LESS THAN 70 milliGRAM(s)/deciliter  guaiFENesin Oral Liquid (Sugar-Free) 100 milliGRAM(s) Oral every 6 hours PRN Cough  melatonin 3 milliGRAM(s) Oral at bedtime PRN Insomnia    Vital Signs Last 24 Hrs  T(C): 36.8 (04-18-23 @ 22:10), Max: 37.2 (04-18-23 @ 17:35)  T(F): 98.3 (04-18-23 @ 22:10), Max: 98.9 (04-18-23 @ 17:35)  HR: 90 (04-18-23 @ 22:10) (85 - 95)  BP: 148/87 (04-18-23 @ 22:10) (136/76 - 152/77)  BP(mean): --  RR: 18 (04-18-23 @ 22:10) (18 - 18)  SpO2: 100% (04-18-23 @ 22:10) (96% - 100%)    )      PHYSICAL EXAM:  GENERAL: NAD, on hi guillaume  EYES: EOMI, PERRLA  NECK: Supple, No JVD  CHEST/LUNG: dec breath sounds at bases  HEART:  S1 , S2 +  ABDOMEN: soft , bs+  EXTREMITIES:  trace edema+  NEUROLOGY:alert awake  jesus+    LABS:  04-18    134<L>  |  89<L>  |  20  ----------------------------<  218<H>  3.5   |  33<H>  |  1.04    Ca    9.6      18 Apr 2023 06:00  Phos  2.9     04-18  Mg     2.00     04-18      Creatinine Trend: 1.04 <--, 1.10 <--, 1.12 <--, 1.14 <--, 0.99 <--, 0.88 <--                        10.2   9.14  )-----------( 257      ( 18 Apr 2023 06:00 )             36.7     Urine Studies:                                    RADIOLOGY & ADDITIONAL TESTS:    Imaging Personally Reviewed:yes    Consultant(s) Notes Reviewed:  yes    Care Discussed with Consultants/Other Providers:yes

## 2023-04-18 NOTE — PROGRESS NOTE ADULT - PROBLEM SELECTOR PLAN 3
- Already DNR/DNI  - Decision maker: surrogates- main surrogate is william Winston, but patient has other children in other areas   - 4/14 -See [facility] note. I spent 20 minutes addressing advanced care planning with patient and/or decision maker(s). Discussed option of comfort care. Son has yet to speak to his other siblings about patient's critical state. Recommended he update them that she may have limited time. - Already DNR/DNI  - Decision maker: surrogates- main surrogate is william Winston, but patient has other children in other areas   - 4/14 - Discussed option of comfort care. Son has yet to speak to his other siblings about patient's critical state. Recommended he update them that she may have limited time.  - 4/18 -See [facility] note. I spent 20 minutes addressing advanced care planning with patient and/or decision maker(s). Recommended home hospice. Family have to discuss

## 2023-04-18 NOTE — PROGRESS NOTE ADULT - SUBJECTIVE AND OBJECTIVE BOX
Forrest Rosenthal MD  Interventional Cardiology / Advance Heart Failure and Cardiac Transplant Specialist  [place] Office : [phone] [place] [id number]  Tel:   [place] Office : [address] [facility] [address]  Tel: [phone]      Subjective/Overnight events: Pt is lying in bed comfortable not in distress, no chest pains no SOB no palpitations  	  MEDICATIONS:  furosemide    Tablet 40 milliGRAM(s) Oral two times a day  heparin   Injectable 5000 Unit(s) SubCutaneous every 8 hours      albuterol    90 MICROgram(s) HFA Inhaler 2 Puff(s) Inhalation every 6 hours PRN  albuterol/ipratropium for Nebulization 3 milliLiter(s) Nebulizer every 6 hours  budesonide 160 MICROgram(s)/formoterol 4.5 MICROgram(s) Inhaler 2 Puff(s) Inhalation two times a day  guaiFENesin Oral Liquid (Sugar-Free) 100 milliGRAM(s) Oral every 6 hours PRN  sodium chloride 7% Inhalation 4 milliLiter(s) Inhalation every 12 hours  tiotropium 2.5 MICROgram(s) Inhaler 2 Puff(s) Inhalation daily    acetaminophen     Tablet .. 650 milliGRAM(s) Oral every 6 hours PRN  melatonin 3 milliGRAM(s) Oral at bedtime PRN    bisacodyl Suppository 10 milliGRAM(s) Rectal daily PRN  pantoprazole    Tablet 40 milliGRAM(s) Oral two times a day  polyethylene glycol 3350 17 Gram(s) Oral daily  senna 2 Tablet(s) Oral at bedtime    dextrose 50% Injectable 12.5 Gram(s) IV Push once  dextrose 50% Injectable 25 Gram(s) IV Push once  dextrose 50% Injectable 25 Gram(s) IV Push once  dextrose Oral Gel 15 Gram(s) Oral once PRN  glucagon  Injectable 1 milliGRAM(s) IntraMuscular once  insulin glargine Injectable (LANTUS) 6 Unit(s) SubCutaneous at bedtime  insulin lispro (ADMELOG) corrective regimen sliding scale   SubCutaneous at bedtime  insulin lispro (ADMELOG) corrective regimen sliding scale   SubCutaneous three times a day before meals  levothyroxine 50 MICROGram(s) Oral daily  simvastatin 20 milliGRAM(s) Oral at bedtime    dextrose 5%. 1000 milliLiter(s) IV Continuous <Continuous>  dextrose 5%. 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      Cataract of left eye          SOCIAL HISTORY: Substance Use (street drugs): ( x ) never used  (  ) other:    FAMILY HISTORY:  Family history of heart attack (Mother)  mother            PHYSICAL EXAM:  T(C): 36.6 (04-18-23 @ 10:20), Max: 36.6 (04-18-23 @ 10:20)  HR: 85 (04-18-23 @ 15:56) (83 - 92)  BP: 152/77 (04-18-23 @ 10:20) (123/75 - 152/77)  RR: 18 (04-18-23 @ 10:20) (18 - 20)  SpO2: 100% (04-18-23 @ 10:20) (96% - 100%)  Wt(kg): --  I&O's Summary    17 Apr 2023 07:01  -  18 Apr 2023 07:00  --------------------------------------------------------  IN: 970 mL / OUT: 1150 mL / NET: -180 mL    18 Apr 2023 07:01  -  18 Apr 2023 16:03  --------------------------------------------------------  IN: 0 mL / OUT: 600 mL / NET: -600 mL          GENERAL: NAD  EYES: EOMI, PERRLA, conjunctiva and sclera clear  ENMT: No tonsillar erythema, exudates, or enlargement  Cardiovascular: Normal S1 S2, No JVD, No murmurs, No edema  Respiratory: Lungs clear to auscultation	  Gastrointestinal:  Soft, Non-tender, + BS	  Extremities: No edema                                     10.2   9.14  )-----------( 257      ( 18 Apr 2023 06:00 )             36.7     04-18    134<L>  |  89<L>  |  20  ----------------------------<  218<H>  3.5   |  33<H>  |  1.04    Ca    9.6      18 Apr 2023 06:00  Phos  2.9     04-18  Mg     2.00     04-18      proBNP:   Lipid Profile:   HgA1c:   TSH:     Consultant(s) Notes Reviewed:  [x ] YES  [ ] NO    Care Discussed with Consultants/Other Providers [ x] YES  [ ] NO    Imaging Personally Reviewed independently:  [x] YES  [ ] NO    All labs, radiologic studies, vitals, orders and medications list reviewed. Patient is seen and examined at bedside. Case discussed with medical team.

## 2023-04-18 NOTE — PROGRESS NOTE ADULT - ASSESSMENT
78F w/ PMHx of HTN, DM2, Asthma, presented to the ED for new onset syncope. Found to have anemia, with possible nonresolving PNA. Admit for IV abx and syncope work up.    1. Syncope  - CT head negative   - echo with sever PHTN and RV dysfunction   - monitor on tele     2. Hypoxia   - CXR with pulm edema   - Prob np elevated  -  lasix 40 IV TID volume status improving will switch to PO lasix 40mg BID  - recommend CT chest with IV contrast given significant hypoxia     3. HTN  -controlled  -c/w metoprolol  -continue to monitor BP     4. HLD   - on zocor    5. DVT prophylaxis  -lovenox subq

## 2023-04-18 NOTE — PROGRESS NOTE ADULT - ASSESSMENT
Patient is an 77 y/o F with pmhx of HTN, DM2, hx of TB (completed treatment per [facility]NAC[facility]), presented to the ED for new onset syncope. Patient found to have acute hypoxemic respiratory failure, now on HFNC. Patient has had findings on CT scans for some years however patient always refused further invasive workup. Palliative consulted for complex medical decision making in the setting of serious illness.

## 2023-04-18 NOTE — PROGRESS NOTE ADULT - SUBJECTIVE AND OBJECTIVE BOX
Indication of Geriatrics and Palliative Medicine Services:  [ X ] Complex Medical Decision Making   [  ] Symptom/Pain management     DNR on chart:  Yes    INTERVAL EVENTS: Patient is now off HFNC, on nasal cannula, looking improved from prior. Patient is awake and speaking with family. See GOC below.     -------------------------------------------------------------------------------------------------------    PRESENT SYMPTOMS:     [X ] No     [ ] Unable to self-report      [ ] CPOT (ICU)     [ ] PAINADs     [ ] RDOS    [ ] Yes     Source if other than patient:  [ ]Family   [ ]Team     PAIN:   If blank, patient unable to specify   [ ]yes [ ]no  QOL impact-   Location -                    Aggravating factors -  Quality -  Radiation -  Timing-  Pain at most severe level (0-10 scale):  Pain at minimal acceptable level/Pain Goal (0-10 scale):     SYMPTOMS:   Dyspnea:                           [ ]Mild [ ]Moderate [ ]Severe  Anxiety:                             [ ]Mild [ ]Moderate [ ]Severe  Fatigue:                             [ ]Mild [ ]Moderate [ ]Severe  Nausea/Vomiting:              [ ]Mild [ ]Moderate [ ]Severe  Loss of appetite:                [ ]Mild [ ]Moderate [ ]Severe  Constipation:                     [ ]Mild [ ]Moderate [ ]Severe    Other Symptoms:  [X ]All other review of systems negative     Home Medications for symptoms if any:  I-Stop Reference No:(from initial)     -------------------------------------------------------------------------------------------------------    ITEMS UNCHECKED ARE NOT PRESENT    PHYSICAL:  Vital Signs Last 24 Hrs  T(C): 36.6 (18 Apr 2023 10:20), Max: 36.6 (18 Apr 2023 10:20)  T(F): 97.8 (18 Apr 2023 10:20), Max: 97.8 (18 Apr 2023 10:20)  HR: 87 (18 Apr 2023 10:20) (83 - 92)  BP: 152/77 (18 Apr 2023 10:20) (123/75 - 152/77)  BP(mean): --  RR: 18 (18 Apr 2023 10:20) (18 - 20)  SpO2: 100% (18 Apr 2023 10:20) (96% - 100%)    Parameters below as of 18 Apr 2023 06:06  Patient On (Oxygen Delivery Method): nasal cannula  O2 Flow (L/min): 5   I&O's Summary    17 Apr 2023 07:01  -  18 Apr 2023 07:00  --------------------------------------------------------  IN: 970 mL / OUT: 1150 mL / NET: -180 mL    18 Apr 2023 07:01  -  18 Apr 2023 11:13  --------------------------------------------------------  IN: 0 mL / OUT: 600 mL / NET: -600 mL    GENERAL:  [X ]Cachexia  [X ] Frail  [X ]Awake  [X ]Oriented x 2  [X ]Lethargic  [ ]Unarousable  [X ]Verbal  [ ]Non-Verbal    BEHAVIORAL:   [ ] Anxiety  [ ] Delirium [ ] Agitation [ ] Other    HEENT:   [ ]Normal   [ ]Dry mouth   [ ]ET Tube/Trach  [ ]Oral lesions    PULMONARY:   [ ]Clear [ ]Tachypnea  [ ]Audible excessive secretions   [ ]Rhonchi        [ ]Right [ ]Left [ ]Bilateral  [ ]Crackles        [ ]Right [ ]Left [ ]Bilateral  [ ]Wheezing     [ ]Right [ ]Left [ ]Bilateral  [X ]Diminished breath sounds [ ]right [ ]left [X ]bilateral    CARDIOVASCULAR:    [X ]Regular [ ]Irregular [ ]Tachy  [ ]Arvind [ ]Murmur [ ]Other    GASTROINTESTINAL:  [X ]Soft  [ ]Distended   [ ]+BS  [X ]Non tender [ ]Tender  [ ]Other [ ]PEG [ ]OGT/ NGT      GENITOURINARY:  [ ]Normal [X ] Incontinent   [ ]Oliguria/Anuria   [ ]Rasmussen    MUSCULOSKELETAL:   [ ]Normal   [ ]Weakness  [X ]Bed/Wheelchair bound [ ]Edema    NEUROLOGIC:   [X ]No focal deficits  [ ]Cognitive impairment  [ ]Dysphagia [ ]Dysarthria [ ]Paresis [ ]Other     SKIN:   [X ]Normal  [ ]Rash  [ ]Other  [ ]Pressure ulcer(s)       Present on admission [ ]y [ ]n    -------------------------------------------------------------------------------------------------------    LABS:                        10.2   9.14  )-----------( 257      ( 18 Apr 2023 06:00 )             36.7   04-18    134<L>  |  89<L>  |  20  ----------------------------<  218<H>  3.5   |  33<H>  |  1.04    Ca    9.6      18 Apr 2023 06:00  Phos  2.9     04-18  Mg     2.00     04-18    -------------------------------------------------------------------------------------------------------    CRITICAL CARE:  [ ]Shock Present  [ ]Septic [ ]Cardiogenic [ ]Neurologic [ ]Hypovolemic [ ]Undifferentiated    [ ]Vasopressors [ ]Inotropes    [ ]Respiratory failure present [ ]Acute  [ ]Chronic [ ]Hypoxic  [ ]Hypercarbic [ ]Mixed   [ ]Mechanical Ventilation  [ ]Trach collar   [ ]Non-invasive ventilatory support   [ ]High-Flow   [ ]Oxygen mask/venti     [ ]Other organ failure   -------------------------------------------------------------------------------------------------------    RADIOLOGY & ADDITIONAL STUDIES:     < from: Xray Chest 1 View- PORTABLE-Urgent (Xray Chest 1 View- PORTABLE-Urgent .) (04.12.23 @ 14:59) >  Frontal expiratory view of the chest shows the heart to be similar in   size. The lungs show progression of pulmonary infiltrates, left larger   than right and there is no evidence of pneumothorax nor pleural effusion.    IMPRESSION:  Progression of infiltrates.    < end of copied text >    < from: CT Head No Cont (04.09.23 @ 11:56) >    There is sulcal and ventricular prominence consistent with age   appropriate involutional change. There are periventricular and   subcortical white matter hypodensities, consistent with mild   microvascular changes.    There is no acute intracranial hemorrhage, mass effect, hydrocephalus, or   midline shift.  There areno extra-axial collections.    The visualized paranasal sinuses and mastoid air cells are clear.    The calvarium is intact. Bilateral lens replacements.    IMPRESSION:  No acute intra-cranial hemorrhage, mass effect, or midline shift.    < end of copied text >    < from: CT Abdomen and Pelvis w/ IV Cont (04.09.23 @ 11:56) >  IMPRESSION:  Lower lobe parenchymal abnormality is not significantly changed compared   to prior studies. There is a new left pleural effusion.    No acute intra-abdominal pathology seen to explain patient's pain.    < end of copied text >    < from: CT Chest No Cont (04.11.23 @ 19:59) >  IMPRESSION:    1.  Since 2/14/2023, the bilateral opacities are unchanged and are of   uncertain etiology.    < end of copied text >    < from: Transthoracic Echocardiogram (04.10.23 @ 13:49) >  CONCLUSIONS:  1. Calcified trileaflet aortic valve with mildly decreased  opening.  2. Normal left ventricular systolic function. No segmental  wall motion abnormalities. Septal motion consistent with  right ventricular overload.  3. Right ventricular enlargement with decreased right  ventricular systolic function.  4. Normal tricuspid valve. Severe tricuspid regurgitation.    < end of copied text >    -------------------------------------------------------------------------------------------------------  MEDICATIONS:     MEDICATIONS  (STANDING):  albuterol/ipratropium for Nebulization 3 milliLiter(s) Nebulizer every 6 hours  budesonide 160 MICROgram(s)/formoterol 4.5 MICROgram(s) Inhaler 2 Puff(s) Inhalation two times a day  dextrose 5%. 1000 milliLiter(s) (100 mL/Hr) IV Continuous <Continuous>  dextrose 5%. 1000 milliLiter(s) (50 mL/Hr) IV Continuous <Continuous>  dextrose 50% Injectable 25 Gram(s) IV Push once  dextrose 50% Injectable 25 Gram(s) IV Push once  dextrose 50% Injectable 12.5 Gram(s) IV Push once  furosemide   Injectable 40 milliGRAM(s) IV Push three times a day  glucagon  Injectable 1 milliGRAM(s) IntraMuscular once  heparin   Injectable 5000 Unit(s) SubCutaneous every 8 hours  insulin glargine Injectable (LANTUS) 6 Unit(s) SubCutaneous at bedtime  insulin lispro (ADMELOG) corrective regimen sliding scale   SubCutaneous at bedtime  insulin lispro (ADMELOG) corrective regimen sliding scale   SubCutaneous three times a day before meals  levothyroxine 50 MICROGram(s) Oral daily  pantoprazole    Tablet 40 milliGRAM(s) Oral two times a day  polyethylene glycol 3350 17 Gram(s) Oral daily  senna 2 Tablet(s) Oral at bedtime  simvastatin 20 milliGRAM(s) Oral at bedtime  sodium chloride 7% Inhalation 4 milliLiter(s) Inhalation every 12 hours  tiotropium 2.5 MICROgram(s) Inhaler 2 Puff(s) Inhalation daily    MEDICATIONS  (PRN):  acetaminophen     Tablet .. 650 milliGRAM(s) Oral every 6 hours PRN Temp greater or equal to 38C (100.4F), Mild Pain (1 - 3)  albuterol    90 MICROgram(s) HFA Inhaler 2 Puff(s) Inhalation every 6 hours PRN Shortness of Breath and/or Wheezing  bisacodyl Suppository 10 milliGRAM(s) Rectal daily PRN Constipation  dextrose Oral Gel 15 Gram(s) Oral once PRN Blood Glucose LESS THAN 70 milliGRAM(s)/deciliter  guaiFENesin Oral Liquid (Sugar-Free) 100 milliGRAM(s) Oral every 6 hours PRN Cough  melatonin 3 milliGRAM(s) Oral at bedtime PRN Insomnia         Indication of Geriatrics and Palliative Medicine Services:  [ X ] Complex Medical Decision Making   [  ] Symptom/Pain management     DNR on chart:  Yes    INTERVAL EVENTS: Patient is now off HFNC, on nasal cannula, looking improved from prior. Patient is awake and speaking with family. See GOC below.     -------------------------------------------------------------------------------------------------------    PRESENT SYMPTOMS:     [X ] No     [ ] Unable to self-report      [ ] CPOT (ICU)     [ ] PAINADs     [ ] RDOS    [ ] Yes     Source if other than patient:  [ ]Family   [ ]Team     PAIN:   If blank, patient unable to specify   [ ]yes [ ]no  QOL impact-   Location -                    Aggravating factors -  Quality -  Radiation -  Timing-  Pain at most severe level (0-10 scale):  Pain at minimal acceptable level/Pain Goal (0-10 scale):     SYMPTOMS:   Dyspnea:                           [ ]Mild [ ]Moderate [ ]Severe  Anxiety:                             [ ]Mild [ ]Moderate [ ]Severe  Fatigue:                             [ ]Mild [ ]Moderate [ ]Severe  Nausea/Vomiting:              [ ]Mild [ ]Moderate [ ]Severe  Loss of appetite:                [ ]Mild [ ]Moderate [ ]Severe  Constipation:                     [ ]Mild [ ]Moderate [ ]Severe    Other Symptoms:  [X ]All other review of systems negative     Home Medications for symptoms if any:  I-Stop Reference No:(from initial)     -------------------------------------------------------------------------------------------------------    ITEMS UNCHECKED ARE NOT PRESENT    PHYSICAL:  Vital Signs Last 24 Hrs  T(C): 36.6 (18 Apr 2023 10:20), Max: 36.6 (18 Apr 2023 10:20)  T(F): 97.8 (18 Apr 2023 10:20), Max: 97.8 (18 Apr 2023 10:20)  HR: 87 (18 Apr 2023 10:20) (83 - 92)  BP: 152/77 (18 Apr 2023 10:20) (123/75 - 152/77)  BP(mean): --  RR: 18 (18 Apr 2023 10:20) (18 - 20)  SpO2: 100% (18 Apr 2023 10:20) (96% - 100%)    Parameters below as of 18 Apr 2023 06:06  Patient On (Oxygen Delivery Method): nasal cannula  O2 Flow (L/min): 5   I&O's Summary    17 Apr 2023 07:01  -  18 Apr 2023 07:00  --------------------------------------------------------  IN: 970 mL / OUT: 1150 mL / NET: -180 mL    18 Apr 2023 07:01  -  18 Apr 2023 11:13  --------------------------------------------------------  IN: 0 mL / OUT: 600 mL / NET: -600 mL    GENERAL:  [X ]Cachexia  [X ] Frail  [X ]Awake  [X ]Oriented x 2  [ ]Lethargic  [ ]Unarousable  [X ]Verbal  [ ]Non-Verbal    BEHAVIORAL:   [ ] Anxiety  [ ] Delirium [ ] Agitation [ ] Other    HEENT:   [ ]Normal   [ ]Dry mouth   [ ]ET Tube/Trach  [ ]Oral lesions    PULMONARY:   [ ]Clear [ ]Tachypnea  [ ]Audible excessive secretions   [ ]Rhonchi        [ ]Right [ ]Left [ ]Bilateral  [ ]Crackles        [ ]Right [ ]Left [ ]Bilateral  [ ]Wheezing     [ ]Right [ ]Left [ ]Bilateral  [X ]Diminished breath sounds [ ]right [ ]left [X ]bilateral    CARDIOVASCULAR:    [X ]Regular [ ]Irregular [ ]Tachy  [ ]Arvind [ ]Murmur [ ]Other    GASTROINTESTINAL:  [X ]Soft  [ ]Distended   [ ]+BS  [X ]Non tender [ ]Tender  [ ]Other [ ]PEG [ ]OGT/ NGT      GENITOURINARY:  [ ]Normal [X ] Incontinent   [ ]Oliguria/Anuria   [ ]Rasmussen    MUSCULOSKELETAL:   [ ]Normal   [ ]Weakness  [X ]Bed/Wheelchair bound [ ]Edema    NEUROLOGIC:   [X ]No focal deficits  [ ]Cognitive impairment  [ ]Dysphagia [ ]Dysarthria [ ]Paresis [ ]Other     SKIN:   [X ]Normal  [ ]Rash  [ ]Other  [ ]Pressure ulcer(s)       Present on admission [ ]y [ ]n    -------------------------------------------------------------------------------------------------------    LABS:                        10.2   9.14  )-----------( 257      ( 18 Apr 2023 06:00 )             36.7   04-18    134<L>  |  89<L>  |  20  ----------------------------<  218<H>  3.5   |  33<H>  |  1.04    Ca    9.6      18 Apr 2023 06:00  Phos  2.9     04-18  Mg     2.00     04-18    -------------------------------------------------------------------------------------------------------    CRITICAL CARE:  [ ]Shock Present  [ ]Septic [ ]Cardiogenic [ ]Neurologic [ ]Hypovolemic [ ]Undifferentiated    [ ]Vasopressors [ ]Inotropes    [ ]Respiratory failure present [ ]Acute  [ ]Chronic [ ]Hypoxic  [ ]Hypercarbic [ ]Mixed   [ ]Mechanical Ventilation  [ ]Trach collar   [ ]Non-invasive ventilatory support   [ ]High-Flow   [ ]Oxygen mask/venti     [ ]Other organ failure   -------------------------------------------------------------------------------------------------------    RADIOLOGY & ADDITIONAL STUDIES:     < from: Xray Chest 1 View- PORTABLE-Urgent (Xray Chest 1 View- PORTABLE-Urgent .) (04.12.23 @ 14:59) >  Frontal expiratory view of the chest shows the heart to be similar in   size. The lungs show progression of pulmonary infiltrates, left larger   than right and there is no evidence of pneumothorax nor pleural effusion.    IMPRESSION:  Progression of infiltrates.    < end of copied text >    < from: CT Head No Cont (04.09.23 @ 11:56) >    There is sulcal and ventricular prominence consistent with age   appropriate involutional change. There are periventricular and   subcortical white matter hypodensities, consistent with mild   microvascular changes.    There is no acute intracranial hemorrhage, mass effect, hydrocephalus, or   midline shift.  There areno extra-axial collections.    The visualized paranasal sinuses and mastoid air cells are clear.    The calvarium is intact. Bilateral lens replacements.    IMPRESSION:  No acute intra-cranial hemorrhage, mass effect, or midline shift.    < end of copied text >    < from: CT Abdomen and Pelvis w/ IV Cont (04.09.23 @ 11:56) >  IMPRESSION:  Lower lobe parenchymal abnormality is not significantly changed compared   to prior studies. There is a new left pleural effusion.    No acute intra-abdominal pathology seen to explain patient's pain.    < end of copied text >    < from: CT Chest No Cont (04.11.23 @ 19:59) >  IMPRESSION:    1.  Since 2/14/2023, the bilateral opacities are unchanged and are of   uncertain etiology.    < end of copied text >    < from: Transthoracic Echocardiogram (04.10.23 @ 13:49) >  CONCLUSIONS:  1. Calcified trileaflet aortic valve with mildly decreased  opening.  2. Normal left ventricular systolic function. No segmental  wall motion abnormalities. Septal motion consistent with  right ventricular overload.  3. Right ventricular enlargement with decreased right  ventricular systolic function.  4. Normal tricuspid valve. Severe tricuspid regurgitation.    < end of copied text >    -------------------------------------------------------------------------------------------------------  MEDICATIONS:     MEDICATIONS  (STANDING):  albuterol/ipratropium for Nebulization 3 milliLiter(s) Nebulizer every 6 hours  budesonide 160 MICROgram(s)/formoterol 4.5 MICROgram(s) Inhaler 2 Puff(s) Inhalation two times a day  dextrose 5%. 1000 milliLiter(s) (100 mL/Hr) IV Continuous <Continuous>  dextrose 5%. 1000 milliLiter(s) (50 mL/Hr) IV Continuous <Continuous>  dextrose 50% Injectable 25 Gram(s) IV Push once  dextrose 50% Injectable 25 Gram(s) IV Push once  dextrose 50% Injectable 12.5 Gram(s) IV Push once  furosemide   Injectable 40 milliGRAM(s) IV Push three times a day  glucagon  Injectable 1 milliGRAM(s) IntraMuscular once  heparin   Injectable 5000 Unit(s) SubCutaneous every 8 hours  insulin glargine Injectable (LANTUS) 6 Unit(s) SubCutaneous at bedtime  insulin lispro (ADMELOG) corrective regimen sliding scale   SubCutaneous at bedtime  insulin lispro (ADMELOG) corrective regimen sliding scale   SubCutaneous three times a day before meals  levothyroxine 50 MICROGram(s) Oral daily  pantoprazole    Tablet 40 milliGRAM(s) Oral two times a day  polyethylene glycol 3350 17 Gram(s) Oral daily  senna 2 Tablet(s) Oral at bedtime  simvastatin 20 milliGRAM(s) Oral at bedtime  sodium chloride 7% Inhalation 4 milliLiter(s) Inhalation every 12 hours  tiotropium 2.5 MICROgram(s) Inhaler 2 Puff(s) Inhalation daily    MEDICATIONS  (PRN):  acetaminophen     Tablet .. 650 milliGRAM(s) Oral every 6 hours PRN Temp greater or equal to 38C (100.4F), Mild Pain (1 - 3)  albuterol    90 MICROgram(s) HFA Inhaler 2 Puff(s) Inhalation every 6 hours PRN Shortness of Breath and/or Wheezing  bisacodyl Suppository 10 milliGRAM(s) Rectal daily PRN Constipation  dextrose Oral Gel 15 Gram(s) Oral once PRN Blood Glucose LESS THAN 70 milliGRAM(s)/deciliter  guaiFENesin Oral Liquid (Sugar-Free) 100 milliGRAM(s) Oral every 6 hours PRN Cough  melatonin 3 milliGRAM(s) Oral at bedtime PRN Insomnia

## 2023-04-19 ENCOUNTER — TRANSCRIPTION ENCOUNTER (OUTPATIENT)
Age: 78
End: 2023-04-19

## 2023-04-19 LAB
ANION GAP SERPL CALC-SCNC: 13 MMOL/L — SIGNIFICANT CHANGE UP (ref 7–14)
BUN SERPL-MCNC: 21 MG/DL — SIGNIFICANT CHANGE UP (ref 7–23)
CALCIUM SERPL-MCNC: 9.7 MG/DL — SIGNIFICANT CHANGE UP (ref 8.4–10.5)
CHLORIDE SERPL-SCNC: 91 MMOL/L — LOW (ref 98–107)
CO2 SERPL-SCNC: 32 MMOL/L — HIGH (ref 22–31)
CREAT SERPL-MCNC: 0.91 MG/DL — SIGNIFICANT CHANGE UP (ref 0.5–1.3)
EGFR: 64 ML/MIN/1.73M2 — SIGNIFICANT CHANGE UP
GLUCOSE BLDC GLUCOMTR-MCNC: 274 MG/DL — HIGH (ref 70–99)
GLUCOSE BLDC GLUCOMTR-MCNC: 283 MG/DL — HIGH (ref 70–99)
GLUCOSE BLDC GLUCOMTR-MCNC: 284 MG/DL — HIGH (ref 70–99)
GLUCOSE BLDC GLUCOMTR-MCNC: 302 MG/DL — HIGH (ref 70–99)
GLUCOSE SERPL-MCNC: 238 MG/DL — HIGH (ref 70–99)
HCT VFR BLD CALC: 39.2 % — SIGNIFICANT CHANGE UP (ref 34.5–45)
HGB BLD-MCNC: 10.8 G/DL — LOW (ref 11.5–15.5)
MAGNESIUM SERPL-MCNC: 2.2 MG/DL — SIGNIFICANT CHANGE UP (ref 1.6–2.6)
MCHC RBC-ENTMCNC: 20.4 PG — LOW (ref 27–34)
MCHC RBC-ENTMCNC: 27.6 GM/DL — LOW (ref 32–36)
MCV RBC AUTO: 74.1 FL — LOW (ref 80–100)
NRBC # BLD: 0 /100 WBCS — SIGNIFICANT CHANGE UP (ref 0–0)
NRBC # FLD: 0 K/UL — SIGNIFICANT CHANGE UP (ref 0–0)
PHOSPHATE SERPL-MCNC: 3 MG/DL — SIGNIFICANT CHANGE UP (ref 2.5–4.5)
PLATELET # BLD AUTO: 247 K/UL — SIGNIFICANT CHANGE UP (ref 150–400)
POTASSIUM SERPL-MCNC: 3.6 MMOL/L — SIGNIFICANT CHANGE UP (ref 3.5–5.3)
POTASSIUM SERPL-SCNC: 3.6 MMOL/L — SIGNIFICANT CHANGE UP (ref 3.5–5.3)
RBC # BLD: 5.29 M/UL — HIGH (ref 3.8–5.2)
RBC # FLD: 19.9 % — HIGH (ref 10.3–14.5)
SODIUM SERPL-SCNC: 136 MMOL/L — SIGNIFICANT CHANGE UP (ref 135–145)
WBC # BLD: 8.7 K/UL — SIGNIFICANT CHANGE UP (ref 3.8–10.5)
WBC # FLD AUTO: 8.7 K/UL — SIGNIFICANT CHANGE UP (ref 3.8–10.5)

## 2023-04-19 PROCEDURE — 99232 SBSQ HOSP IP/OBS MODERATE 35: CPT

## 2023-04-19 PROCEDURE — 99233 SBSQ HOSP IP/OBS HIGH 50: CPT

## 2023-04-19 RX ORDER — INSULIN LISPRO 100/ML
3 VIAL (ML) SUBCUTANEOUS
Refills: 0 | Status: DISCONTINUED | OUTPATIENT
Start: 2023-04-19 | End: 2023-04-21

## 2023-04-19 RX ADMIN — HEPARIN SODIUM 5000 UNIT(S): 5000 INJECTION INTRAVENOUS; SUBCUTANEOUS at 21:51

## 2023-04-19 RX ADMIN — Medication 40 MILLIGRAM(S): at 06:31

## 2023-04-19 RX ADMIN — SODIUM CHLORIDE 4 MILLILITER(S): 9 INJECTION INTRAMUSCULAR; INTRAVENOUS; SUBCUTANEOUS at 22:30

## 2023-04-19 RX ADMIN — Medication 3: at 17:28

## 2023-04-19 RX ADMIN — SIMVASTATIN 20 MILLIGRAM(S): 20 TABLET, FILM COATED ORAL at 21:51

## 2023-04-19 RX ADMIN — SODIUM CHLORIDE 4 MILLILITER(S): 9 INJECTION INTRAMUSCULAR; INTRAVENOUS; SUBCUTANEOUS at 10:37

## 2023-04-19 RX ADMIN — HEPARIN SODIUM 5000 UNIT(S): 5000 INJECTION INTRAVENOUS; SUBCUTANEOUS at 06:31

## 2023-04-19 RX ADMIN — Medication 4: at 12:04

## 2023-04-19 RX ADMIN — POLYETHYLENE GLYCOL 3350 17 GRAM(S): 17 POWDER, FOR SOLUTION ORAL at 12:05

## 2023-04-19 RX ADMIN — INSULIN GLARGINE 6 UNIT(S): 100 INJECTION, SOLUTION SUBCUTANEOUS at 21:47

## 2023-04-19 RX ADMIN — Medication 3 MILLILITER(S): at 03:24

## 2023-04-19 RX ADMIN — TIOTROPIUM BROMIDE 2 PUFF(S): 18 CAPSULE ORAL; RESPIRATORY (INHALATION) at 09:55

## 2023-04-19 RX ADMIN — Medication 3 MILLILITER(S): at 16:27

## 2023-04-19 RX ADMIN — BUDESONIDE AND FORMOTEROL FUMARATE DIHYDRATE 2 PUFF(S): 160; 4.5 AEROSOL RESPIRATORY (INHALATION) at 09:55

## 2023-04-19 RX ADMIN — Medication 3 MILLILITER(S): at 10:38

## 2023-04-19 RX ADMIN — Medication 50 MICROGRAM(S): at 06:31

## 2023-04-19 RX ADMIN — Medication 3 UNIT(S): at 17:29

## 2023-04-19 RX ADMIN — PANTOPRAZOLE SODIUM 40 MILLIGRAM(S): 20 TABLET, DELAYED RELEASE ORAL at 17:29

## 2023-04-19 RX ADMIN — Medication 40 MILLIGRAM(S): at 12:05

## 2023-04-19 RX ADMIN — PANTOPRAZOLE SODIUM 40 MILLIGRAM(S): 20 TABLET, DELAYED RELEASE ORAL at 06:31

## 2023-04-19 RX ADMIN — Medication 3: at 07:52

## 2023-04-19 RX ADMIN — Medication 3 MILLILITER(S): at 22:26

## 2023-04-19 RX ADMIN — HEPARIN SODIUM 5000 UNIT(S): 5000 INJECTION INTRAVENOUS; SUBCUTANEOUS at 12:05

## 2023-04-19 RX ADMIN — Medication 1: at 21:48

## 2023-04-19 RX ADMIN — BUDESONIDE AND FORMOTEROL FUMARATE DIHYDRATE 2 PUFF(S): 160; 4.5 AEROSOL RESPIRATORY (INHALATION) at 21:52

## 2023-04-19 RX ADMIN — SENNA PLUS 2 TABLET(S): 8.6 TABLET ORAL at 21:51

## 2023-04-19 NOTE — PROGRESS NOTE ADULT - PROBLEM SELECTOR PLAN 3
- Already DNR/DNI  - Decision maker: surrogates- main surrogate is william Winston, but patient has other children in other areas   - 4/14 - Discussed option of comfort care. Son has yet to speak to his other siblings about patient's critical state. Recommended he update them that she may have limited time.  - 4/18 -See [facility] note. I spent 20 minutes addressing advanced care planning with patient and/or decision maker(s). Recommended home hospice. Family have to discuss  -4/19- Home hospice referral

## 2023-04-19 NOTE — PROGRESS NOTE ADULT - SUBJECTIVE AND OBJECTIVE BOX
PULMONARY PROGRESS NOTE    MARTIR VU  MRN-[id number]    Patient is a 78y old  Female who presents with a chief complaint of syncope (19 Apr 2023 12:36)      HPI:  -  -    ROS:   -    ACTIVE MEDICATION LIST:  MEDICATIONS  (STANDING):  albuterol/ipratropium for Nebulization 3 milliLiter(s) Nebulizer every 6 hours  budesonide 160 MICROgram(s)/formoterol 4.5 MICROgram(s) Inhaler 2 Puff(s) Inhalation two times a day  dextrose 5%. 1000 milliLiter(s) (50 mL/Hr) IV Continuous <Continuous>  dextrose 5%. 1000 milliLiter(s) (100 mL/Hr) IV Continuous <Continuous>  dextrose 50% Injectable 25 Gram(s) IV Push once  dextrose 50% Injectable 12.5 Gram(s) IV Push once  dextrose 50% Injectable 25 Gram(s) IV Push once  furosemide    Tablet 40 milliGRAM(s) Oral two times a day  glucagon  Injectable 1 milliGRAM(s) IntraMuscular once  heparin   Injectable 5000 Unit(s) SubCutaneous every 8 hours  insulin glargine Injectable (LANTUS) 6 Unit(s) SubCutaneous at bedtime  insulin lispro (ADMELOG) corrective regimen sliding scale   SubCutaneous at bedtime  insulin lispro (ADMELOG) corrective regimen sliding scale   SubCutaneous three times a day before meals  insulin lispro Injectable (ADMELOG) 3 Unit(s) SubCutaneous three times a day before meals  levothyroxine 50 MICROGram(s) Oral daily  pantoprazole    Tablet 40 milliGRAM(s) Oral two times a day  polyethylene glycol 3350 17 Gram(s) Oral daily  senna 2 Tablet(s) Oral at bedtime  simvastatin 20 milliGRAM(s) Oral at bedtime  sodium chloride 7% Inhalation 4 milliLiter(s) Inhalation every 12 hours  tiotropium 2.5 MICROgram(s) Inhaler 2 Puff(s) Inhalation daily    MEDICATIONS  (PRN):  acetaminophen     Tablet .. 650 milliGRAM(s) Oral every 6 hours PRN Temp greater or equal to 38C (100.4F), Mild Pain (1 - 3)  albuterol    90 MICROgram(s) HFA Inhaler 2 Puff(s) Inhalation every 6 hours PRN Shortness of Breath and/or Wheezing  bisacodyl Suppository 10 milliGRAM(s) Rectal daily PRN Constipation  dextrose Oral Gel 15 Gram(s) Oral once PRN Blood Glucose LESS THAN 70 milliGRAM(s)/deciliter  guaiFENesin Oral Liquid (Sugar-Free) 100 milliGRAM(s) Oral every 6 hours PRN Cough  melatonin 3 milliGRAM(s) Oral at bedtime PRN Insomnia      EXAM:  Vital Signs Last 24 Hrs  T(C): 36.5 (19 Apr 2023 09:25), Max: 37.2 (18 Apr 2023 17:35)  T(F): 97.7 (19 Apr 2023 09:25), Max: 98.9 (18 Apr 2023 17:35)  HR: 92 (19 Apr 2023 10:43) (85 - 95)  BP: 155/78 (19 Apr 2023 09:25) (138/63 - 155/78)  BP(mean): --  RR: 18 (19 Apr 2023 09:25) (18 - 18)  SpO2: 100% (19 Apr 2023 09:25) (96% - 100%)    Parameters below as of 19 Apr 2023 10:43  Patient On (Oxygen Delivery Method): nasal cannula        GENERAL: The patient is awake and alert in no apparent distress.     LUNGS: Clear to auscultation without wheezing, rales or rhonchi; respirations unlabored    HEART: Regular rate and rhythm without murmur.                            10.8   8.70  )-----------( 247      ( 19 Apr 2023 05:45 )             39.2       04-19    136  |  91<L>  |  21  ----------------------------<  238<H>  3.6   |  32<H>  |  0.91    Ca    9.7      19 Apr 2023 05:45  Phos  3.0     04-19  Mg     2.20     04-19      >>> <<<    PROBLEM LIST:  78y Female with HEALTH ISSUES - PROBLEM Dx:  Syncope    Multifocal pneumonia    DM2 (diabetes mellitus, type 2)    Benign essential HTN    Hypothyroidism    Prophylactic measure    Iron deficiency anemia    Pleural effusion    Hyperkalemia    Goals of care, counseling/discussion    Acute respiratory failure with hypoxia    Dyspnea    ACP (advance care planning)    Debility    Palliative care encounter    Acute hypoxemic respiratory failure              RECS:        Please call with any questions.    Dede Flynn DO  [facility] Pulmonary/Sleep Medicine  [phone]   PULMONARY PROGRESS NOTE    MARTIR VU  MRN-[id number]    Patient is a 78y old  Female who presents with a chief complaint of syncope (19 Apr 2023 12:36)      HPI:  awake  on 5L 97%  tapered her down to 3.5- 94-97%  cough unchanged      ROS:   -    ACTIVE MEDICATION LIST:  MEDICATIONS  (STANDING):  albuterol/ipratropium for Nebulization 3 milliLiter(s) Nebulizer every 6 hours  budesonide 160 MICROgram(s)/formoterol 4.5 MICROgram(s) Inhaler 2 Puff(s) Inhalation two times a day  dextrose 5%. 1000 milliLiter(s) (50 mL/Hr) IV Continuous <Continuous>  dextrose 5%. 1000 milliLiter(s) (100 mL/Hr) IV Continuous <Continuous>  dextrose 50% Injectable 25 Gram(s) IV Push once  dextrose 50% Injectable 12.5 Gram(s) IV Push once  dextrose 50% Injectable 25 Gram(s) IV Push once  furosemide    Tablet 40 milliGRAM(s) Oral two times a day  glucagon  Injectable 1 milliGRAM(s) IntraMuscular once  heparin   Injectable 5000 Unit(s) SubCutaneous every 8 hours  insulin glargine Injectable (LANTUS) 6 Unit(s) SubCutaneous at bedtime  insulin lispro (ADMELOG) corrective regimen sliding scale   SubCutaneous at bedtime  insulin lispro (ADMELOG) corrective regimen sliding scale   SubCutaneous three times a day before meals  insulin lispro Injectable (ADMELOG) 3 Unit(s) SubCutaneous three times a day before meals  levothyroxine 50 MICROGram(s) Oral daily  pantoprazole    Tablet 40 milliGRAM(s) Oral two times a day  polyethylene glycol 3350 17 Gram(s) Oral daily  senna 2 Tablet(s) Oral at bedtime  simvastatin 20 milliGRAM(s) Oral at bedtime  sodium chloride 7% Inhalation 4 milliLiter(s) Inhalation every 12 hours  tiotropium 2.5 MICROgram(s) Inhaler 2 Puff(s) Inhalation daily    MEDICATIONS  (PRN):  acetaminophen     Tablet .. 650 milliGRAM(s) Oral every 6 hours PRN Temp greater or equal to 38C (100.4F), Mild Pain (1 - 3)  albuterol    90 MICROgram(s) HFA Inhaler 2 Puff(s) Inhalation every 6 hours PRN Shortness of Breath and/or Wheezing  bisacodyl Suppository 10 milliGRAM(s) Rectal daily PRN Constipation  dextrose Oral Gel 15 Gram(s) Oral once PRN Blood Glucose LESS THAN 70 milliGRAM(s)/deciliter  guaiFENesin Oral Liquid (Sugar-Free) 100 milliGRAM(s) Oral every 6 hours PRN Cough  melatonin 3 milliGRAM(s) Oral at bedtime PRN Insomnia      EXAM:  Vital Signs Last 24 Hrs  T(C): 36.5 (19 Apr 2023 09:25), Max: 37.2 (18 Apr 2023 17:35)  T(F): 97.7 (19 Apr 2023 09:25), Max: 98.9 (18 Apr 2023 17:35)  HR: 92 (19 Apr 2023 10:43) (85 - 95)  BP: 155/78 (19 Apr 2023 09:25) (138/63 - 155/78)  BP(mean): --  RR: 18 (19 Apr 2023 09:25) (18 - 18)  SpO2: 100% (19 Apr 2023 09:25) (96% - 100%)    Parameters below as of 19 Apr 2023 10:43  Patient On (Oxygen Delivery Method): nasal cannula        GENERAL: The patient is awake and alert in no apparent distress.     LUNGS: without wheezing, r  respirations unlabored                             10.8   8.70  )-----------( 247      ( 19 Apr 2023 05:45 )             39.2       04-19    136  |  91<L>  |  21  ----------------------------<  238<H>  3.6   |  32<H>  |  0.91    Ca    9.7      19 Apr 2023 05:45  Phos  3.0     04-19  Mg     2.20     04-19       < from: Xray Chest 1 View- PORTABLE-Routine (Xray Chest 1 View- PORTABLE-Routine in AM.) (04.17.23 @ 07:54) >    ACC: [id number] EXAM:  XR CHEST PORTABLE ROUTINE 1V   ORDERED BY: IVONNE ROLDAN     PROCEDURE DATE:  04/17/2023          INTERPRETATION:  Chest one view    HISTORY: Follow-up effusions    COMPARISON STUDY: 4/12/2023    Frontal expiratory view of the chest shows the heart to be similar in   size. The lungs show slight progression of bilateral infiltrates with   small left effusion and there is no evidence of pneumothorax nor right   pleural effusion.    IMPRESSION:  Infiltrates and small left effusion as noted.        Thank you for the courtesy of this referral.    --- End of Report ---            EBEN ROBLEDO MD; Attending Interventional Radiologist  This document has been electronically signed. Apr 17 2023  2:51PM    < end of copied text >      PROBLEM LIST:  78y Female with HEALTH ISSUES - PROBLEM Dx:  Syncope    Multifocal pneumonia    DM2 (diabetes mellitus, type 2)    Benign essential HTN    Hypothyroidism    Prophylactic measure    Iron deficiency anemia    Pleural effusion    Hyperkalemia    Goals of care, counseling/discussion    Acute respiratory failure with hypoxia    Dyspnea    ACP (advance care planning)    Debility    Palliative care encounter    Acute hypoxemic respiratory failure              RECS:  tapered to home 02 and doing ok  diuretics per cardi  inhalers  oob/chair /pt        Please call with any questions.    Dede Roldan DO  [facility] Pulmonary/Sleep Medicine  [phone]

## 2023-04-19 NOTE — PROGRESS NOTE ADULT - SUBJECTIVE AND OBJECTIVE BOX
Forrest Rosenthal MD  Interventional Cardiology / Advance Heart Failure and Cardiac Transplant Specialist  [place] Office : [address] [place] [address]  Tel:   [place] Office : [address] [facility] [address]  Tel: [phone]      Subjective/Overnight events: Pt is lying in bed somnolent no acute distress  	  MEDICATIONS:  furosemide    Tablet 40 milliGRAM(s) Oral two times a day  heparin   Injectable 5000 Unit(s) SubCutaneous every 8 hours      albuterol    90 MICROgram(s) HFA Inhaler 2 Puff(s) Inhalation every 6 hours PRN  albuterol/ipratropium for Nebulization 3 milliLiter(s) Nebulizer every 6 hours  budesonide 160 MICROgram(s)/formoterol 4.5 MICROgram(s) Inhaler 2 Puff(s) Inhalation two times a day  guaiFENesin Oral Liquid (Sugar-Free) 100 milliGRAM(s) Oral every 6 hours PRN  sodium chloride 7% Inhalation 4 milliLiter(s) Inhalation every 12 hours  tiotropium 2.5 MICROgram(s) Inhaler 2 Puff(s) Inhalation daily    acetaminophen     Tablet .. 650 milliGRAM(s) Oral every 6 hours PRN  melatonin 3 milliGRAM(s) Oral at bedtime PRN    bisacodyl Suppository 10 milliGRAM(s) Rectal daily PRN  pantoprazole    Tablet 40 milliGRAM(s) Oral two times a day  polyethylene glycol 3350 17 Gram(s) Oral daily  senna 2 Tablet(s) Oral at bedtime    dextrose 50% Injectable 25 Gram(s) IV Push once  dextrose 50% Injectable 12.5 Gram(s) IV Push once  dextrose 50% Injectable 25 Gram(s) IV Push once  dextrose Oral Gel 15 Gram(s) Oral once PRN  glucagon  Injectable 1 milliGRAM(s) IntraMuscular once  insulin glargine Injectable (LANTUS) 6 Unit(s) SubCutaneous at bedtime  insulin lispro (ADMELOG) corrective regimen sliding scale   SubCutaneous at bedtime  insulin lispro (ADMELOG) corrective regimen sliding scale   SubCutaneous three times a day before meals  levothyroxine 50 MICROGram(s) Oral daily  simvastatin 20 milliGRAM(s) Oral at bedtime    dextrose 5%. 1000 milliLiter(s) IV Continuous <Continuous>  dextrose 5%. 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      Cataract of left eye          SOCIAL HISTORY: Substance Use (street drugs): ( x ) never used  (  ) other:    FAMILY HISTORY:  Family history of heart attack (Mother)  mother          PHYSICAL EXAM:  T(C): 36.3 (04-19-23 @ 06:25), Max: 37.2 (04-18-23 @ 17:35)  HR: 92 (04-19-23 @ 10:43) (85 - 95)  BP: 138/82 (04-19-23 @ 06:25) (138/63 - 148/87)  RR: 18 (04-19-23 @ 07:37) (18 - 18)  SpO2: 96% (04-19-23 @ 07:37) (96% - 100%)  Wt(kg): --  I&O's Summary    18 Apr 2023 07:01  -  19 Apr 2023 07:00  --------------------------------------------------------  IN: 490 mL / OUT: 1050 mL / NET: -560 mL    19 Apr 2023 07:01  -  19 Apr 2023 11:43  --------------------------------------------------------  IN: 0 mL / OUT: 200 mL / NET: -200 mL        Weight (kg): 67.4 (04-19 @ 06:25)    GENERAL: NAD  EYES: EOMI, PERRLA, conjunctiva and sclera clear  ENMT: No tonsillar erythema, exudates, or enlargement  Cardiovascular: Normal S1 S2, No JVD, No murmurs, No edema  Respiratory: Lungs clear to auscultation	  Gastrointestinal:  Soft, Non-tender, + BS	  Extremities: No edema                                     10.8   8.70  )-----------( 247      ( 19 Apr 2023 05:45 )             39.2     04-19    136  |  91<L>  |  21  ----------------------------<  238<H>  3.6   |  32<H>  |  0.91    Ca    9.7      19 Apr 2023 05:45  Phos  3.0     04-19  Mg     2.20     04-19      proBNP:   Lipid Profile:   HgA1c:   TSH:     Consultant(s) Notes Reviewed:  [x ] YES  [ ] NO    Care Discussed with Consultants/Other Providers [ x] YES  [ ] NO    Imaging Personally Reviewed independently:  [x] YES  [ ] NO    All labs, radiologic studies, vitals, orders and medications list reviewed. Patient is seen and examined at bedside. Case discussed with medical team.

## 2023-04-19 NOTE — DISCHARGE NOTE PROVIDER - NSDCMRMEDTOKEN_GEN_ALL_CORE_FT
Basaglar KwikPen 100 units/mL subcutaneous solution: 12 unit(s) subcutaneous once a day (at bedtime)   benzonatate 100 mg oral capsule: 1 cap(s) orally 3 times a day, As Needed   budesonide-formoterol 160 mcg-4.5 mcg/inh inhalation aerosol: 2 puff(s) inhaled 2 times a day   cholecalciferol oral tablet: 1000 unit(s) orally once a day  ipratropium-albuterol 0.5 mg-2.5 mg/3 mL inhalation solution: 3 milliliter(s) inhaled every 12 hours  levothyroxine 50 mcg (0.05 mg) oral tablet: 1 tab(s) orally once a day  metFORMIN 500 mg oral tablet: 1 tab(s) orally 2 times a day   metoprolol succinate 25 mg oral tablet, extended release: 1 tab(s) orally once a day  NovoLOG FlexPen 100 units/mL injectable solution: 6 unit(s) injectable 3 times a day (before meals)   pantoprazole 40 mg oral delayed release tablet: 1 tab(s) orally once a day (before a meal)  simvastatin 20 mg oral tablet: 1 tab(s) orally once a day (at bedtime)  sodium chloride 0.65% nasal spray: 1 spray(s) nasal 4 times a day   Basaglar KwikPen 100 units/mL subcutaneous solution: 6 unit(s) subcutaneous once a day (at bedtime)  benzonatate 100 mg oral capsule: 1 cap(s) orally 3 times a day, As Needed   budesonide-formoterol 160 mcg-4.5 mcg/inh inhalation aerosol: 2 puff(s) inhaled 2 times a day   cholecalciferol oral tablet: 1000 unit(s) orally once a day  furosemide 40 mg oral tablet: 1 tab(s) orally 2 times a day  ipratropium-albuterol 0.5 mg-2.5 mg/3 mL inhalation solution: 3 milliliter(s) inhaled every 12 hours  levothyroxine 50 mcg (0.05 mg) oral tablet: 1 tab(s) orally once a day  melatonin 3 mg oral tablet: 1 tab(s) orally once a day (at bedtime) As needed Insomnia  metFORMIN 500 mg oral tablet: 1 tab(s) orally 2 times a day   metoprolol succinate 25 mg oral tablet, extended release: 1 tab(s) orally once a day  NovoLOG FlexPen 100 units/mL injectable solution: 3 unit(s) injectable 3 times a day (before meals)  pantoprazole 40 mg oral delayed release tablet: 1 tab(s) orally once a day (before a meal)  polyethylene glycol 3350 oral powder for reconstitution: 17 gram(s) orally once a day  senna leaf extract oral tablet: 2 tab(s) orally once a day (at bedtime)  simvastatin 20 mg oral tablet: 1 tab(s) orally once a day (at bedtime)  sodium chloride 0.65% nasal spray: 1 spray(s) nasal 4 times a day  tiotropium 2.5 mcg/inh inhalation aerosol: 2 puff(s) inhaled once a day   Albuterol (Eqv-ProAir HFA) 90 mcg/inh inhalation aerosol: 2 puff(s) inhaled  Basaglar KwikPen 100 units/mL subcutaneous solution: 6 unit(s) subcutaneous once a day (at bedtime)  furosemide 40 mg oral tablet: 1 tab(s) orally 2 times a day  levothyroxine 50 mcg (0.05 mg) oral tablet: 1 tab(s) orally once a day  metFORMIN 500 mg oral tablet: 1 tab(s) orally 2 times a day  metoprolol succinate 25 mg oral tablet, extended release: 1 tab(s) orally once a day  NovoLOG FlexPen 100 units/mL injectable solution: 3 unit(s) injectable 3 times a day (before meals)  pantoprazole 40 mg oral delayed release tablet: 1 tab(s) orally once a day (before a meal)  polyethylene glycol 3350 oral powder for reconstitution: 17 gram(s) orally once a day  senna leaf extract oral tablet: 2 tab(s) orally once a day (at bedtime)  simvastatin 20 mg oral tablet: 1 tab(s) orally once a day (at bedtime)  Symbicort 160 mcg-4.5 mcg/inh inhalation aerosol: 2 puff(s) inhaled 2 times a day

## 2023-04-19 NOTE — PROGRESS NOTE ADULT - ASSESSMENT
78F w/ PMHx of HTN, DM2, Asthma, presented to the ED for new onset syncope. Found to have anemia, with possible nonresolving PNA. Admit for IV abx and syncope work up.    1. Syncope  - CT head negative   - echo with sever PHTN and RV dysfunction   - monitor on tele     2. Hypoxia   - CXR with pulm edema   - Prob np elevated  -  lasix 40 IV TID volume status improving switched to PO lasix 40mg BID  -family refused further workup for CT chest findings in past   -palliative on board, family deciding for hospice     3. HTN  -controlled  -c/w metoprolol  -continue to monitor BP     4. HLD   - on zocor    5. DVT prophylaxis  -lovenox subq

## 2023-04-19 NOTE — DISCHARGE NOTE PROVIDER - NSFOLLOWUPCLINICS_GEN_ALL_ED_FT
MALACHI Smith [facility] [place]  Urology  [address] [place] [address], [place] [address]  Phone: [phone]  Fax:   Follow Up Time: Routine

## 2023-04-19 NOTE — DISCHARGE NOTE PROVIDER - CARE PROVIDER_API CALL
Alfredo Hannah)  Internal Medicine  [address] [place] [address]  Phone: [phone]  Fax: [phone]  Follow Up Time: 1 week

## 2023-04-19 NOTE — DISCHARGE NOTE PROVIDER - NSDCCPCAREPLAN_GEN_ALL_CORE_FT
PRINCIPAL DISCHARGE DIAGNOSIS  Diagnosis: Syncope  Assessment and Plan of Treatment:   You are being discharged home with Hospice services and it is recommended to focus on comfort measures and supportive care. Continue with medications prescribed for pain and other symptom control. If you have questions or concerns you may contact the Hospice service line by calling [phone].        SECONDARY DISCHARGE DIAGNOSES  Diagnosis: Multifocal pneumonia  Assessment and Plan of Treatment: You completed antibiotics. You are being discharged home with Hospice services and it is recommended to focus on comfort measures and supportive care. Continue with medications prescribed for pain and other symptom control. If you have questions or concerns you may contact the Hospice service line by calling [phone].

## 2023-04-19 NOTE — PROGRESS NOTE ADULT - SUBJECTIVE AND OBJECTIVE BOX
SUBJECTIVE / OVERNIGHT EVENTS:pt seen and examined,, pts family next to pts bed  04-19-23       MEDICATIONS  (STANDING):  albuterol/ipratropium for Nebulization 3 milliLiter(s) Nebulizer every 6 hours  budesonide 160 MICROgram(s)/formoterol 4.5 MICROgram(s) Inhaler 2 Puff(s) Inhalation two times a day  dextrose 5%. 1000 milliLiter(s) (50 mL/Hr) IV Continuous <Continuous>  dextrose 5%. 1000 milliLiter(s) (100 mL/Hr) IV Continuous <Continuous>  dextrose 50% Injectable 25 Gram(s) IV Push once  dextrose 50% Injectable 12.5 Gram(s) IV Push once  dextrose 50% Injectable 25 Gram(s) IV Push once  furosemide    Tablet 40 milliGRAM(s) Oral two times a day  glucagon  Injectable 1 milliGRAM(s) IntraMuscular once  heparin   Injectable 5000 Unit(s) SubCutaneous every 8 hours  insulin glargine Injectable (LANTUS) 6 Unit(s) SubCutaneous at bedtime  insulin lispro (ADMELOG) corrective regimen sliding scale   SubCutaneous at bedtime  insulin lispro (ADMELOG) corrective regimen sliding scale   SubCutaneous three times a day before meals  insulin lispro Injectable (ADMELOG) 3 Unit(s) SubCutaneous three times a day before meals  levothyroxine 50 MICROGram(s) Oral daily  pantoprazole    Tablet 40 milliGRAM(s) Oral two times a day  polyethylene glycol 3350 17 Gram(s) Oral daily  senna 2 Tablet(s) Oral at bedtime  simvastatin 20 milliGRAM(s) Oral at bedtime  sodium chloride 7% Inhalation 4 milliLiter(s) Inhalation every 12 hours  tiotropium 2.5 MICROgram(s) Inhaler 2 Puff(s) Inhalation daily    MEDICATIONS  (PRN):  acetaminophen     Tablet .. 650 milliGRAM(s) Oral every 6 hours PRN Temp greater or equal to 38C (100.4F), Mild Pain (1 - 3)  albuterol    90 MICROgram(s) HFA Inhaler 2 Puff(s) Inhalation every 6 hours PRN Shortness of Breath and/or Wheezing  bisacodyl Suppository 10 milliGRAM(s) Rectal daily PRN Constipation  dextrose Oral Gel 15 Gram(s) Oral once PRN Blood Glucose LESS THAN 70 milliGRAM(s)/deciliter  guaiFENesin Oral Liquid (Sugar-Free) 100 milliGRAM(s) Oral every 6 hours PRN Cough  melatonin 3 milliGRAM(s) Oral at bedtime PRN Insomnia    Vital Signs Last 24 Hrs  T(C): 36.6 (04-19-23 @ 22:30), Max: 37.2 (04-19-23 @ 17:10)  T(F): 97.9 (04-19-23 @ 22:30), Max: 99 (04-19-23 @ 17:10)  HR: 90 (04-19-23 @ 22:30) (85 - 93)  BP: 140/72 (04-19-23 @ 22:30) (137/70 - 155/78)  BP(mean): --  RR: 18 (04-19-23 @ 22:30) (18 - 18)  SpO2: 99% (04-19-23 @ 22:30) (96% - 100%)        PHYSICAL EXAM:  GENERAL: NAD, on hi guillaume  EYES: EOMI, PERRLA  NECK: Supple, No JVD  CHEST/LUNG: dec breath sounds at bases  HEART:  S1 , S2 +  ABDOMEN: soft , bs+  EXTREMITIES:  trace edema+  NEUROLOGY:alert awake  jesus+    LABS:  04-19    136  |  91<L>  |  21  ----------------------------<  238<H>  3.6   |  32<H>  |  0.91    Ca    9.7      19 Apr 2023 05:45  Phos  3.0     04-19  Mg     2.20     04-19      Creatinine Trend: 0.91 <--, 1.04 <--, 1.10 <--, 1.12 <--, 1.14 <--, 0.99 <--, 0.88 <--                        10.8   8.70  )-----------( 247      ( 19 Apr 2023 05:45 )             39.2     Urine Studies:                                                RADIOLOGY & ADDITIONAL TESTS:    Imaging Personally Reviewed:yes    Consultant(s) Notes Reviewed:  yes    Care Discussed with Consultants/Other Providers:yes

## 2023-04-19 NOTE — PROGRESS NOTE ADULT - SUBJECTIVE AND OBJECTIVE BOX
Indication of Geriatrics and Palliative Medicine Services:  [ X ] Complex Medical Decision Making   [  ] Symptom/Pain management     DNR on chart:  Yes    INTERVAL EVENTS: Patient seen this AM, frail but comfortable. Son Tish at bedside. Son considering hospice, reiterated services again. Son is concerned about caring for patient at home. Says his wife already has back problems from caring for patient. Agreed to hospice referral for more information but also needs help with finding an aide.     -------------------------------------------------------------------------------------------------------    PRESENT SYMPTOMS:     [X ] No     [ ] Unable to self-report      [ ] CPOT (ICU)     [ ] PAINADs     [ ] RDOS    [ ] Yes     Source if other than patient:  [ ]Family   [ ]Team     PAIN:   If blank, patient unable to specify   [ ]yes [ ]no  QOL impact-   Location -                    Aggravating factors -  Quality -  Radiation -  Timing-  Pain at most severe level (0-10 scale):  Pain at minimal acceptable level/Pain Goal (0-10 scale):     SYMPTOMS:   Dyspnea:                           [ ]Mild [ ]Moderate [ ]Severe  Anxiety:                             [ ]Mild [ ]Moderate [ ]Severe  Fatigue:                             [ ]Mild [ ]Moderate [ ]Severe  Nausea/Vomiting:              [ ]Mild [ ]Moderate [ ]Severe  Loss of appetite:                [ ]Mild [ ]Moderate [ ]Severe  Constipation:                     [ ]Mild [ ]Moderate [ ]Severe    Other Symptoms:  [X ]All other review of systems negative     Home Medications for symptoms if any:  I-Stop Reference No:(from initial)     -------------------------------------------------------------------------------------------------------    ITEMS UNCHECKED ARE NOT PRESENT    PHYSICAL:  Vital Signs Last 24 Hrs  T(C): 36.3 (19 Apr 2023 06:25), Max: 37.2 (18 Apr 2023 17:35)  T(F): 97.4 (19 Apr 2023 06:25), Max: 98.9 (18 Apr 2023 17:35)  HR: 92 (19 Apr 2023 10:43) (85 - 95)  BP: 138/82 (19 Apr 2023 06:25) (138/63 - 148/87)  BP(mean): --  RR: 18 (19 Apr 2023 07:37) (18 - 18)  SpO2: 96% (19 Apr 2023 07:37) (96% - 100%)    Parameters below as of 19 Apr 2023 10:43  Patient On (Oxygen Delivery Method): nasal cannula      GENERAL:  [X ]Cachexia  [X ] Frail  [X ]Awake  [X ]Oriented x 2  [ ]Lethargic  [ ]Unarousable  [X ]Verbal  [ ]Non-Verbal    BEHAVIORAL:   [ ] Anxiety  [ ] Delirium [ ] Agitation [ ] Other    HEENT:   [ ]Normal   [ ]Dry mouth   [ ]ET Tube/Trach  [ ]Oral lesions    PULMONARY:   [ ]Clear [ ]Tachypnea  [ ]Audible excessive secretions   [ ]Rhonchi        [ ]Right [ ]Left [ ]Bilateral  [ ]Crackles        [ ]Right [ ]Left [ ]Bilateral  [ ]Wheezing     [ ]Right [ ]Left [ ]Bilateral  [X ]Diminished breath sounds [ ]right [ ]left [X ]bilateral    CARDIOVASCULAR:    [X ]Regular [ ]Irregular [ ]Tachy  [ ]Arvind [ ]Murmur [ ]Other    GASTROINTESTINAL:  [X ]Soft  [ ]Distended   [ ]+BS  [X ]Non tender [ ]Tender  [ ]Other [ ]PEG [ ]OGT/ NGT      GENITOURINARY:  [ ]Normal [X ] Incontinent   [ ]Oliguria/Anuria   [ ]Rasmussen    MUSCULOSKELETAL:   [ ]Normal   [ ]Weakness  [X ]Bed/Wheelchair bound [ ]Edema    NEUROLOGIC:   [X ]No focal deficits  [ ]Cognitive impairment  [ ]Dysphagia [ ]Dysarthria [ ]Paresis [ ]Other     SKIN:   [X ]Normal  [ ]Rash  [ ]Other  [ ]Pressure ulcer(s)       Present on admission [ ]y [ ]n    -------------------------------------------------------------------------------------------------------    LABS:                          10.8   8.70  )-----------( 247      ( 19 Apr 2023 05:45 )             39.2     04-19    136  |  91<L>  |  21  ----------------------------<  238<H>  3.6   |  32<H>  |  0.91    Ca    9.7      19 Apr 2023 05:45  Phos  3.0     04-19  Mg     2.20     04-19      -------------------------------------------------------------------------------------------------------    CRITICAL CARE:  [ ]Shock Present  [ ]Septic [ ]Cardiogenic [ ]Neurologic [ ]Hypovolemic [ ]Undifferentiated    [ ]Vasopressors [ ]Inotropes    [ ]Respiratory failure present [ ]Acute  [ ]Chronic [ ]Hypoxic  [ ]Hypercarbic [ ]Mixed   [ ]Mechanical Ventilation  [ ]Trach collar   [ ]Non-invasive ventilatory support   [ ]High-Flow   [ ]Oxygen mask/venti     [ ]Other organ failure   -------------------------------------------------------------------------------------------------------    RADIOLOGY & ADDITIONAL STUDIES:     < from: Xray Chest 1 View- PORTABLE-Urgent (Xray Chest 1 View- PORTABLE-Urgent .) (04.12.23 @ 14:59) >  Frontal expiratory view of the chest shows the heart to be similar in   size. The lungs show progression of pulmonary infiltrates, left larger   than right and there is no evidence of pneumothorax nor pleural effusion.    IMPRESSION:  Progression of infiltrates.    < end of copied text >    < from: CT Head No Cont (04.09.23 @ 11:56) >    There is sulcal and ventricular prominence consistent with age   appropriate involutional change. There are periventricular and   subcortical white matter hypodensities, consistent with mild   microvascular changes.    There is no acute intracranial hemorrhage, mass effect, hydrocephalus, or   midline shift.  There areno extra-axial collections.    The visualized paranasal sinuses and mastoid air cells are clear.    The calvarium is intact. Bilateral lens replacements.    IMPRESSION:  No acute intra-cranial hemorrhage, mass effect, or midline shift.    < end of copied text >    < from: CT Abdomen and Pelvis w/ IV Cont (04.09.23 @ 11:56) >  IMPRESSION:  Lower lobe parenchymal abnormality is not significantly changed compared   to prior studies. There is a new left pleural effusion.    No acute intra-abdominal pathology seen to explain patient's pain.    < end of copied text >    < from: CT Chest No Cont (04.11.23 @ 19:59) >  IMPRESSION:    1.  Since 2/14/2023, the bilateral opacities are unchanged and are of   uncertain etiology.    < end of copied text >    < from: Transthoracic Echocardiogram (04.10.23 @ 13:49) >  CONCLUSIONS:  1. Calcified trileaflet aortic valve with mildly decreased  opening.  2. Normal left ventricular systolic function. No segmental  wall motion abnormalities. Septal motion consistent with  right ventricular overload.  3. Right ventricular enlargement with decreased right  ventricular systolic function.  4. Normal tricuspid valve. Severe tricuspid regurgitation.    < end of copied text >    -------------------------------------------------------------------------------------------------------  MEDICATIONS:     MEDICATIONS  (STANDING):  albuterol/ipratropium for Nebulization 3 milliLiter(s) Nebulizer every 6 hours  budesonide 160 MICROgram(s)/formoterol 4.5 MICROgram(s) Inhaler 2 Puff(s) Inhalation two times a day  dextrose 5%. 1000 milliLiter(s) (100 mL/Hr) IV Continuous <Continuous>  dextrose 5%. 1000 milliLiter(s) (50 mL/Hr) IV Continuous <Continuous>  dextrose 50% Injectable 25 Gram(s) IV Push once  dextrose 50% Injectable 25 Gram(s) IV Push once  dextrose 50% Injectable 12.5 Gram(s) IV Push once  furosemide   Injectable 40 milliGRAM(s) IV Push three times a day  glucagon  Injectable 1 milliGRAM(s) IntraMuscular once  heparin   Injectable 5000 Unit(s) SubCutaneous every 8 hours  insulin glargine Injectable (LANTUS) 6 Unit(s) SubCutaneous at bedtime  insulin lispro (ADMELOG) corrective regimen sliding scale   SubCutaneous at bedtime  insulin lispro (ADMELOG) corrective regimen sliding scale   SubCutaneous three times a day before meals  levothyroxine 50 MICROGram(s) Oral daily  pantoprazole    Tablet 40 milliGRAM(s) Oral two times a day  polyethylene glycol 3350 17 Gram(s) Oral daily  senna 2 Tablet(s) Oral at bedtime  simvastatin 20 milliGRAM(s) Oral at bedtime  sodium chloride 7% Inhalation 4 milliLiter(s) Inhalation every 12 hours  tiotropium 2.5 MICROgram(s) Inhaler 2 Puff(s) Inhalation daily    MEDICATIONS  (PRN):  acetaminophen     Tablet .. 650 milliGRAM(s) Oral every 6 hours PRN Temp greater or equal to 38C (100.4F), Mild Pain (1 - 3)  albuterol    90 MICROgram(s) HFA Inhaler 2 Puff(s) Inhalation every 6 hours PRN Shortness of Breath and/or Wheezing  bisacodyl Suppository 10 milliGRAM(s) Rectal daily PRN Constipation  dextrose Oral Gel 15 Gram(s) Oral once PRN Blood Glucose LESS THAN 70 milliGRAM(s)/deciliter  guaiFENesin Oral Liquid (Sugar-Free) 100 milliGRAM(s) Oral every 6 hours PRN Cough  melatonin 3 milliGRAM(s) Oral at bedtime PRN Insomnia

## 2023-04-19 NOTE — DISCHARGE NOTE PROVIDER - NSDCQMSTAIRS_GEN_ALL_CORE
Service Date: 01/26/2018      HISTORY OF PRESENT ILLNESS:  Ms. Kelley is a pleasant 53-year-old woman who is known to me.  I met her in 10/2017.  Please see that note for complete details.  She has a past medical history significant for congenital pulmonary stenosis, atrial septal defect, patent ductus arteriosus.  She underwent pulmonary valvuloplasty in 1962, 1964 and 1983 through a median sternotomy and has known severe pulmonary insufficiency but with normal right ventricular chamber size and function.  She also has a history of atrial arrhythmias, history of patent ductus arteriosus that was ligated in 1968 and then an atrial septal defect that was closed in 1983.      When I met Ms. Kelley, we had discussed her history and her testing and her symptoms and discussed proceeding with a cardiopulmonary stress test, and we did a cardiac MRI and MRA.  The cardiac MRI and MRA were not complete at the time of her visit with me but subsequently were, and I called her with those results.  She was found to have severe focal stenosis of the left proximal pulmonary artery with the lumen measuring 1 x 0.8 cm with moderate to severe pulmonary insufficiency due to restrictive closure of the right pulmonic cusp, normal RV systolic function and LV systolic function.  Her right ventricular end-diastolic volume was just mildly elevated at 102.  She returns for followup.  With her followup today we did do an exercise study in which she went 62% of predicted.  Her VE/VCO2 slope was 27.2 with an RVR of 1.29.  She also underwent a nuclear medicine quantitative perfusion study that showed that she has 72% of her lung flow going to the right lung compared to 27% in the left.  She is a bit perplexed as to why [place] did not find any of this.  She is otherwise feeling well.  She has had no return of her atrial arrhythmias.      IMPRESSION, REPORT, PLAN:   1.  Congenital pulmonary valve stenosis status post pulmonary valvuloplasty in 1962,   and  through a median sternotomy, now with severe pulmonary insufficiency and normal right ventricular size and function.   2.  Atrial arrhythmias, initial event 2016 with atrial fibrillation/flutter and subsequent event 2016, now on Xarelto and labetalol without recurrence.   3.  History of PDA, status post ligation in  through a lateral thoracotomy at the [facility].   4.  Atrial septal defect, status post closure in  at the [facility].   5.  Hypertension, well controlled.   6.  Left pulmonary artery stenosis with this lung perfusion study 2018 showing 72%, going to the right lung compared to 27% to the left lung.      DISCUSSION:  It was a pleasure to see Ms. Rapp in followup.  Today I reviewed the results of her testing with her in detail including her cardiopulmonary stress test and her MRA and her lung perfusion scan.  We discussed that we would recommend that something be done in this regard, and usually it is pulmonary stenting, and then I would like to discuss her at our Adult Congenital Conference.  We also discussed the possibility of potentially an Hoyt valve that could be done at the same time.  She understands and is in agreement with this plan.  We will call once we have further information.  All questions were answered.  It was a pleasure to see her.  Please do not hesitate to contact me with any questions or concerns.         JAY SAUCEDO MD             D: 2018   T: 2018   MT: ZANE      Name:     EDWIN RAPP   MRN:      [id number]        Account:      [id number]   :      1964           Service Date: 2018      Document: [id number]     Yes

## 2023-04-19 NOTE — DISCHARGE NOTE PROVIDER - HOSPITAL COURSE
77 y/o F with pmhx of HTN, DM2, Asthma, presented to the ED for new onset syncope. Found to have anemia, with possible nonresolving pneumonia. Admit for IV abx and syncope work up.    Acute respiratory failure with hypoxia.   - cont oxygen suppl - wean as tolerated  -continue bronchodilators  -not wheezing now and if she she does- may be due to pulm edema- would hold on steroids for now  -multiple admissions for resp issues/ does not want any  diagnostic workup for her pulm lesions    Syncope.   - patient presented for new onset syncope  - likely secondary to orthostatic hypotension vs. underlying infection.    Pleural effusion.   -diuretic: lasix 40 bid    Iron deficiency anemia.   - patient found to have anemia with hb 7.7  - no obvious reports of bleeding, however was having a small nose bleed from the nasal canula  - Hb has been at baseline  - Iron panel shows iron deficiency anemia  - GI rec: no inpt work up since pt medically active    DM2 (diabetes mellitus, type 2).    - patient taking basaglar 12 U QD and novolog 6U TID at home    Benign essential HTN.    - hold BP meds for now.    Hypothyroidism.   - c/w synthroid.    Urinary Retention  -d/c home with lewis jesus failed TOV      Goals of care, counseling/discussion.   - patient is DNR/DNI, MOLST form discussed and in the chart.     77 y/o F with pmhx of HTN, DM2, Asthma, presented to the ED for new onset syncope. Found to have anemia, with possible nonresolving pneumonia. Admit for IV abx and syncope work up.    Acute respiratory failure with hypoxia.   - cont oxygen suppl - wean as tolerated  -continue bronchodilators  -not wheezing now and if she she does- may be due to pulm edema- would hold on steroids for now  -multiple admissions for resp issues/ does not want any  diagnostic workup for her pulm lesions  - plan for home hospice.     Syncope.   - patient presented for new onset syncope  - likely secondary to orthostatic hypotension vs. underlying infection.    Pleural effusion.   -diuretic: lasix 40 bid    Iron deficiency anemia.   - patient found to have anemia with hb 7.7  - no obvious reports of bleeding, however was having a small nose bleed from the nasal canula  - Hb has been at baseline  - Iron panel shows iron deficiency anemia  - GI rec: no inpt work up since pt medically active    DM2 (diabetes mellitus, type 2).    - patient taking basaglar 12 U QD and novolog 6U TID at home    Benign essential HTN.    - hold BP meds for now.    Hypothyroidism.   - c/w synthroid.    Urinary Retention  -d/c home with jesus, pt failed TOV      Goals of care, counseling/discussion.   - patient is DNR/DNI, MOLST form discussed and in the chart.    dispo: home hospice     Case discussed with Dr. Gonzalez on 4/21/23 and patient cleared for discharge. Reviewed discharge medications with patient; All new medications requiring new prescription sent to pharmacy of patients choice. Reviewed need for prescription for previous home medications and new prescriptions sent if requested. Patient in agreement and understands.

## 2023-04-19 NOTE — PROGRESS NOTE ADULT - PROBLEM SELECTOR PLAN 1
- refused workup in the past for prior CT lung findings, possible superimposed pulmonary edema or infection   - was on HFNC, now weaned to NC   - Comfortable currently

## 2023-04-19 NOTE — PROGRESS NOTE ADULT - PROBLEM SELECTOR PLAN 2
- DM/HTN, history of TB, unknown lung pathology now leading to acute hypoxemic respiratory failure, anemia   - Multiple recent hospitalizations (5 since Nov 2022)   - PPS currently 20%

## 2023-04-20 LAB
A1C WITH ESTIMATED AVERAGE GLUCOSE RESULT: 7.5 % — HIGH (ref 4–5.6)
ANION GAP SERPL CALC-SCNC: 15 MMOL/L — HIGH (ref 7–14)
BUN SERPL-MCNC: 22 MG/DL — SIGNIFICANT CHANGE UP (ref 7–23)
CALCIUM SERPL-MCNC: 10.1 MG/DL — SIGNIFICANT CHANGE UP (ref 8.4–10.5)
CHLORIDE SERPL-SCNC: 89 MMOL/L — LOW (ref 98–107)
CO2 SERPL-SCNC: 31 MMOL/L — SIGNIFICANT CHANGE UP (ref 22–31)
CREAT SERPL-MCNC: 1.06 MG/DL — SIGNIFICANT CHANGE UP (ref 0.5–1.3)
EGFR: 53 ML/MIN/1.73M2 — LOW
ESTIMATED AVERAGE GLUCOSE: 169 — SIGNIFICANT CHANGE UP
GLUCOSE BLDC GLUCOMTR-MCNC: 228 MG/DL — HIGH (ref 70–99)
GLUCOSE BLDC GLUCOMTR-MCNC: 233 MG/DL — HIGH (ref 70–99)
GLUCOSE BLDC GLUCOMTR-MCNC: 250 MG/DL — HIGH (ref 70–99)
GLUCOSE BLDC GLUCOMTR-MCNC: 258 MG/DL — HIGH (ref 70–99)
GLUCOSE SERPL-MCNC: 194 MG/DL — HIGH (ref 70–99)
HCT VFR BLD CALC: 36.5 % — SIGNIFICANT CHANGE UP (ref 34.5–45)
HGB BLD-MCNC: 10.1 G/DL — LOW (ref 11.5–15.5)
MAGNESIUM SERPL-MCNC: 2 MG/DL — SIGNIFICANT CHANGE UP (ref 1.6–2.6)
MCHC RBC-ENTMCNC: 20.5 PG — LOW (ref 27–34)
MCHC RBC-ENTMCNC: 27.7 GM/DL — LOW (ref 32–36)
MCV RBC AUTO: 74 FL — LOW (ref 80–100)
NRBC # BLD: 0 /100 WBCS — SIGNIFICANT CHANGE UP (ref 0–0)
NRBC # FLD: 0 K/UL — SIGNIFICANT CHANGE UP (ref 0–0)
PHOSPHATE SERPL-MCNC: 3.4 MG/DL — SIGNIFICANT CHANGE UP (ref 2.5–4.5)
PLATELET # BLD AUTO: 260 K/UL — SIGNIFICANT CHANGE UP (ref 150–400)
POTASSIUM SERPL-MCNC: 3.2 MMOL/L — LOW (ref 3.5–5.3)
POTASSIUM SERPL-SCNC: 3.2 MMOL/L — LOW (ref 3.5–5.3)
RBC # BLD: 4.93 M/UL — SIGNIFICANT CHANGE UP (ref 3.8–5.2)
RBC # FLD: 20.2 % — HIGH (ref 10.3–14.5)
SODIUM SERPL-SCNC: 135 MMOL/L — SIGNIFICANT CHANGE UP (ref 135–145)
WBC # BLD: 8.51 K/UL — SIGNIFICANT CHANGE UP (ref 3.8–10.5)
WBC # FLD AUTO: 8.51 K/UL — SIGNIFICANT CHANGE UP (ref 3.8–10.5)

## 2023-04-20 RX ORDER — POTASSIUM CHLORIDE 20 MEQ
40 PACKET (EA) ORAL ONCE
Refills: 0 | Status: COMPLETED | OUTPATIENT
Start: 2023-04-20 | End: 2023-04-20

## 2023-04-20 RX ADMIN — Medication 50 MICROGRAM(S): at 05:12

## 2023-04-20 RX ADMIN — Medication 2: at 12:06

## 2023-04-20 RX ADMIN — POLYETHYLENE GLYCOL 3350 17 GRAM(S): 17 POWDER, FOR SOLUTION ORAL at 13:41

## 2023-04-20 RX ADMIN — BUDESONIDE AND FORMOTEROL FUMARATE DIHYDRATE 2 PUFF(S): 160; 4.5 AEROSOL RESPIRATORY (INHALATION) at 09:48

## 2023-04-20 RX ADMIN — INSULIN GLARGINE 6 UNIT(S): 100 INJECTION, SOLUTION SUBCUTANEOUS at 22:11

## 2023-04-20 RX ADMIN — Medication 3 MILLILITER(S): at 21:20

## 2023-04-20 RX ADMIN — Medication 40 MILLIGRAM(S): at 06:25

## 2023-04-20 RX ADMIN — SIMVASTATIN 20 MILLIGRAM(S): 20 TABLET, FILM COATED ORAL at 22:19

## 2023-04-20 RX ADMIN — Medication 2: at 17:14

## 2023-04-20 RX ADMIN — SENNA PLUS 2 TABLET(S): 8.6 TABLET ORAL at 22:17

## 2023-04-20 RX ADMIN — Medication 1: at 22:13

## 2023-04-20 RX ADMIN — Medication 2: at 07:50

## 2023-04-20 RX ADMIN — Medication 3 MILLILITER(S): at 11:19

## 2023-04-20 RX ADMIN — Medication 3 UNIT(S): at 12:06

## 2023-04-20 RX ADMIN — Medication 3 MILLILITER(S): at 15:34

## 2023-04-20 RX ADMIN — HEPARIN SODIUM 5000 UNIT(S): 5000 INJECTION INTRAVENOUS; SUBCUTANEOUS at 13:42

## 2023-04-20 RX ADMIN — HEPARIN SODIUM 5000 UNIT(S): 5000 INJECTION INTRAVENOUS; SUBCUTANEOUS at 22:15

## 2023-04-20 RX ADMIN — Medication 40 MILLIGRAM(S): at 13:41

## 2023-04-20 RX ADMIN — HEPARIN SODIUM 5000 UNIT(S): 5000 INJECTION INTRAVENOUS; SUBCUTANEOUS at 06:24

## 2023-04-20 RX ADMIN — PANTOPRAZOLE SODIUM 40 MILLIGRAM(S): 20 TABLET, DELAYED RELEASE ORAL at 17:18

## 2023-04-20 RX ADMIN — BUDESONIDE AND FORMOTEROL FUMARATE DIHYDRATE 2 PUFF(S): 160; 4.5 AEROSOL RESPIRATORY (INHALATION) at 22:14

## 2023-04-20 RX ADMIN — Medication 3 UNIT(S): at 17:16

## 2023-04-20 RX ADMIN — PANTOPRAZOLE SODIUM 40 MILLIGRAM(S): 20 TABLET, DELAYED RELEASE ORAL at 06:25

## 2023-04-20 RX ADMIN — SODIUM CHLORIDE 4 MILLILITER(S): 9 INJECTION INTRAMUSCULAR; INTRAVENOUS; SUBCUTANEOUS at 11:18

## 2023-04-20 RX ADMIN — Medication 40 MILLIEQUIVALENT(S): at 13:41

## 2023-04-20 RX ADMIN — SODIUM CHLORIDE 4 MILLILITER(S): 9 INJECTION INTRAMUSCULAR; INTRAVENOUS; SUBCUTANEOUS at 21:20

## 2023-04-20 RX ADMIN — Medication 3 UNIT(S): at 07:51

## 2023-04-20 RX ADMIN — TIOTROPIUM BROMIDE 2 PUFF(S): 18 CAPSULE ORAL; RESPIRATORY (INHALATION) at 09:48

## 2023-04-20 NOTE — PROGRESS NOTE ADULT - NS ATTEND AMEND GEN_ALL_CORE FT
I reviewed the overnight course of events on the unit, re-confirming the patient history. I discussed the care with the patient and their family. The plan of care was discussed with the ACP team and modifications were made to the notation where appropriate. Differential diagnosis and plan of care discussed with patient after the evaluation. Advanced care planning was discussed with patient and family.  Advanced care planning forms were reviewed and discussed.  Risks, benefits and alternatives of cardiac procedures were discussed in detail and all questions were answered. 35 minutes spent on total encounter of which more than fifty percent of the encounter was spent counseling and/or coordinating care by the attending physician.
No overt bleeding reported. Still with ongoing dyspnea and currently placed on NRB.    # CHEO, dark stools x 3-4 weeks  # Acute on chronic dyspnea on home O2, recent admission for AHRF 2/2 viral PNA (2/2023)  # Recurrent syncopal episodes  # History of pulmonary TB  # Recent NSAID use    --Given current respiratory status, stable Hgb and absence of overt bleeding during hospitalization, would defer endoscopic workup at this time as risks likely outweigh benefits. Can be considered if/when medically optimized, but would clarify overall GOC as patient currently DNR/DNI and has deferred further pulmonary workup. If overt bleeding, can readdress possible evaluation/interventions.   --PPI BID for now, should remain on PPI daily for GI protection upon eventual discharge  --Monitor CBC and for recurrent overt bleeding    Additional recommendations as above. Please reach out if questions, concerns or acute change in clinical status as more urgent endoscopic evaluation may be warranted if within GOC.

## 2023-04-20 NOTE — PROGRESS NOTE ADULT - PROBLEM SELECTOR PLAN 2
- patient presented for new onset syncope  - likely secondary to orthostatic hypotension vs. underlying infection

## 2023-04-20 NOTE — PROGRESS NOTE ADULT - ASSESSMENT
78F w/ PMHx of HTN, DM2, Asthma, presented to the ED for new onset syncope. Found to have anemia, with possible nonresolving PNA. Admit for IV abx and syncope work up.    1. Syncope  - CT head negative   - echo with sever PHTN and RV dysfunction   - monitor on tele     2. Hypoxia   - CXR with pulm edema   - Prob np elevated  -  lasix 40 IV TID volume status improving switched to PO lasix 40mg BID  -family refused further workup for CT chest findings in past   -palliative on board, plan for home hospice    3. HTN  -controlled  -c/w metoprolol  -continue to monitor BP     4. HLD   - on zocor    5. DVT prophylaxis  -lovenox subq

## 2023-04-20 NOTE — PROGRESS NOTE ADULT - PROBLEM SELECTOR PLAN 1
-AS PER PULM ,  diuresis  - cont oxygen suppl -  continue bronchodilators    multiple admissions for resp issues/ does not want any  diagnostic workup for her pulm lesions,

## 2023-04-20 NOTE — PROGRESS NOTE ADULT - SUBJECTIVE AND OBJECTIVE BOX
Forrest Rosenthal MD  Interventional Cardiology / Advance Heart Failure and Cardiac Transplant Specialist  [place] Office : [phone] [place] [id number]  Tel:   [place] Office : [phone] [facility] [address]  Tel: [phone]      Subjective/Overnight events: Pt is lying in bed not in distress  	  MEDICATIONS:  furosemide    Tablet 40 milliGRAM(s) Oral two times a day  heparin   Injectable 5000 Unit(s) SubCutaneous every 8 hours      albuterol    90 MICROgram(s) HFA Inhaler 2 Puff(s) Inhalation every 6 hours PRN  albuterol/ipratropium for Nebulization 3 milliLiter(s) Nebulizer every 6 hours  budesonide 160 MICROgram(s)/formoterol 4.5 MICROgram(s) Inhaler 2 Puff(s) Inhalation two times a day  guaiFENesin Oral Liquid (Sugar-Free) 100 milliGRAM(s) Oral every 6 hours PRN  sodium chloride 7% Inhalation 4 milliLiter(s) Inhalation every 12 hours  tiotropium 2.5 MICROgram(s) Inhaler 2 Puff(s) Inhalation daily    acetaminophen     Tablet .. 650 milliGRAM(s) Oral every 6 hours PRN  melatonin 3 milliGRAM(s) Oral at bedtime PRN    bisacodyl Suppository 10 milliGRAM(s) Rectal daily PRN  pantoprazole    Tablet 40 milliGRAM(s) Oral two times a day  polyethylene glycol 3350 17 Gram(s) Oral daily  senna 2 Tablet(s) Oral at bedtime    dextrose 50% Injectable 25 Gram(s) IV Push once  dextrose 50% Injectable 12.5 Gram(s) IV Push once  dextrose 50% Injectable 25 Gram(s) IV Push once  dextrose Oral Gel 15 Gram(s) Oral once PRN  glucagon  Injectable 1 milliGRAM(s) IntraMuscular once  insulin glargine Injectable (LANTUS) 6 Unit(s) SubCutaneous at bedtime  insulin lispro (ADMELOG) corrective regimen sliding scale   SubCutaneous at bedtime  insulin lispro (ADMELOG) corrective regimen sliding scale   SubCutaneous three times a day before meals  insulin lispro Injectable (ADMELOG) 3 Unit(s) SubCutaneous three times a day before meals  levothyroxine 50 MICROGram(s) Oral daily  simvastatin 20 milliGRAM(s) Oral at bedtime    dextrose 5%. 1000 milliLiter(s) IV Continuous <Continuous>  dextrose 5%. 1000 milliLiter(s) IV Continuous <Continuous>  potassium chloride   Powder 40 milliEquivalent(s) Oral once      PAST MEDICAL/SURGICAL HISTORY  PAST MEDICAL & SURGICAL HISTORY:  HTN (Hypertension)      Dyslipidemia      DM (Diabetes Mellitus)      Hypothyroidism      Pulmonary TB  Dx 2019, completed 10 month treatment      Cataract of left eye          SOCIAL HISTORY: Substance Use (street drugs): ( x ) never used  (  ) other:    FAMILY HISTORY:  Family history of heart attack (Mother)  mother    c  PHYSICAL EXAM:  T(C): 36.8 (04-20-23 @ 09:25), Max: 37.2 (04-19-23 @ 17:10)  HR: 75 (04-20-23 @ 11:50) (75 - 90)  BP: 154/81 (04-20-23 @ 09:25) (137/70 - 154/81)  RR: 15 (04-20-23 @ 09:25) (15 - 18)  SpO2: 95% (04-20-23 @ 11:50) (94% - 100%)  Wt(kg): --  I&O's Summary    19 Apr 2023 07:01  -  20 Apr 2023 07:00  --------------------------------------------------------  IN: 0 mL / OUT: 900 mL / NET: -900 mL    20 Apr 2023 07:01  -  20 Apr 2023 13:05  --------------------------------------------------------  IN: 0 mL / OUT: 500 mL / NET: -500 mL          GENERAL: NAD  EYES: EOMI, PERRLA, conjunctiva and sclera clear  ENMT: No tonsillar erythema, exudates, or enlargement  Cardiovascular: Normal S1 S2, No JVD, No murmurs, No edema  Respiratory: Lungs clear to auscultation	  Gastrointestinal:  Soft, Non-tender, + BS	  Extremities: No edema                                     10.1   8.51  )-----------( 260      ( 20 Apr 2023 05:37 )             36.5     04-20    135  |  89<L>  |  22  ----------------------------<  194<H>  3.2<L>   |  31  |  1.06    Ca    10.1      20 Apr 2023 05:37  Phos  3.4     04-20  Mg     2.00     04-20      proBNP:   Lipid Profile:   HgA1c:   TSH:     Consultant(s) Notes Reviewed:  [x ] YES  [ ] NO    Care Discussed with Consultants/Other Providers [ x] YES  [ ] NO    Imaging Personally Reviewed independently:  [x] YES  [ ] NO    All labs, radiologic studies, vitals, orders and medications list reviewed. Patient is seen and examined at bedside. Case discussed with medical team.

## 2023-04-20 NOTE — PROGRESS NOTE ADULT - SUBJECTIVE AND OBJECTIVE BOX
SUBJECTIVE / OVERNIGHT EVENTS:pt seen and examined,, pts family next to pts bed  04-20-23     MEDICATIONS  (STANDING):  albuterol/ipratropium for Nebulization 3 milliLiter(s) Nebulizer every 6 hours  budesonide 160 MICROgram(s)/formoterol 4.5 MICROgram(s) Inhaler 2 Puff(s) Inhalation two times a day  dextrose 5%. 1000 milliLiter(s) (100 mL/Hr) IV Continuous <Continuous>  dextrose 5%. 1000 milliLiter(s) (50 mL/Hr) IV Continuous <Continuous>  dextrose 50% Injectable 25 Gram(s) IV Push once  dextrose 50% Injectable 12.5 Gram(s) IV Push once  dextrose 50% Injectable 25 Gram(s) IV Push once  furosemide    Tablet 40 milliGRAM(s) Oral two times a day  glucagon  Injectable 1 milliGRAM(s) IntraMuscular once  heparin   Injectable 5000 Unit(s) SubCutaneous every 8 hours  insulin glargine Injectable (LANTUS) 6 Unit(s) SubCutaneous at bedtime  insulin lispro (ADMELOG) corrective regimen sliding scale   SubCutaneous three times a day before meals  insulin lispro (ADMELOG) corrective regimen sliding scale   SubCutaneous at bedtime  insulin lispro Injectable (ADMELOG) 3 Unit(s) SubCutaneous three times a day before meals  levothyroxine 50 MICROGram(s) Oral daily  pantoprazole    Tablet 40 milliGRAM(s) Oral two times a day  polyethylene glycol 3350 17 Gram(s) Oral daily  senna 2 Tablet(s) Oral at bedtime  simvastatin 20 milliGRAM(s) Oral at bedtime  sodium chloride 7% Inhalation 4 milliLiter(s) Inhalation every 12 hours  tiotropium 2.5 MICROgram(s) Inhaler 2 Puff(s) Inhalation daily    MEDICATIONS  (PRN):  acetaminophen     Tablet .. 650 milliGRAM(s) Oral every 6 hours PRN Temp greater or equal to 38C (100.4F), Mild Pain (1 - 3)  albuterol    90 MICROgram(s) HFA Inhaler 2 Puff(s) Inhalation every 6 hours PRN Shortness of Breath and/or Wheezing  bisacodyl Suppository 10 milliGRAM(s) Rectal daily PRN Constipation  dextrose Oral Gel 15 Gram(s) Oral once PRN Blood Glucose LESS THAN 70 milliGRAM(s)/deciliter  guaiFENesin Oral Liquid (Sugar-Free) 100 milliGRAM(s) Oral every 6 hours PRN Cough  melatonin 3 milliGRAM(s) Oral at bedtime PRN Insomnia    Vital Signs Last 24 Hrs  T(C): 36.6 (04-20-23 @ 22:30), Max: 36.8 (04-20-23 @ 09:25)  T(F): 97.8 (04-20-23 @ 22:30), Max: 98.2 (04-20-23 @ 09:25)  HR: 90 (04-20-23 @ 22:30) (71 - 90)  BP: 133/69 (04-20-23 @ 22:30) (130/69 - 154/81)  BP(mean): --  RR: 16 (04-20-23 @ 22:30) (15 - 17)  SpO2: 98% (04-20-23 @ 22:30) (94% - 100%)            PHYSICAL EXAM:  GENERAL: NAD, on hi guillaume  EYES: EOMI, PERRLA  NECK: Supple, No JVD  CHEST/LUNG: dec breath sounds at bases  HEART:  S1 , S2 +  ABDOMEN: soft , bs+  EXTREMITIES:  trace edema+  NEUROLOGY:alert awake  jesus+    LABS:  04-20    135  |  89<L>  |  22  ----------------------------<  194<H>  3.2<L>   |  31  |  1.06    Ca    10.1      20 Apr 2023 05:37  Phos  3.4     04-20  Mg     2.00     04-20      Creatinine Trend: 1.06 <--, 0.91 <--, 1.04 <--, 1.10 <--, 1.12 <--, 1.14 <--, 0.99 <--                        10.1   8.51  )-----------( 260      ( 20 Apr 2023 05:37 )             36.5     Urine Studies:                                                            RADIOLOGY & ADDITIONAL TESTS:    Imaging Personally Reviewed:yes    Consultant(s) Notes Reviewed:  yes    Care Discussed with Consultants/Other Providers:yes

## 2023-04-21 ENCOUNTER — TRANSCRIPTION ENCOUNTER (OUTPATIENT)
Age: 78
End: 2023-04-21

## 2023-04-21 VITALS
SYSTOLIC BLOOD PRESSURE: 125 MMHG | HEART RATE: 88 BPM | DIASTOLIC BLOOD PRESSURE: 64 MMHG | TEMPERATURE: 98 F | RESPIRATION RATE: 19 BRPM | OXYGEN SATURATION: 100 %

## 2023-04-21 LAB
GLUCOSE BLDC GLUCOMTR-MCNC: 164 MG/DL — HIGH (ref 70–99)
GLUCOSE BLDC GLUCOMTR-MCNC: 213 MG/DL — HIGH (ref 70–99)
GLUCOSE BLDC GLUCOMTR-MCNC: 219 MG/DL — HIGH (ref 70–99)

## 2023-04-21 RX ORDER — SENNA PLUS 8.6 MG/1
2 TABLET ORAL
Qty: 60 | Refills: 0
Start: 2023-04-21 | End: 2023-05-20

## 2023-04-21 RX ORDER — INSULIN ASPART 100 [IU]/ML
3 INJECTION, SOLUTION SUBCUTANEOUS
Qty: 1 | Refills: 0
Start: 2023-04-21 | End: 2023-05-20

## 2023-04-21 RX ORDER — METFORMIN HYDROCHLORIDE 850 MG/1
1 TABLET ORAL
Qty: 60 | Refills: 0
Start: 2023-04-21 | End: 2023-05-20

## 2023-04-21 RX ORDER — SIMVASTATIN 20 MG/1
1 TABLET, FILM COATED ORAL
Qty: 30 | Refills: 0
Start: 2023-04-21 | End: 2023-05-20

## 2023-04-21 RX ORDER — TIOTROPIUM BROMIDE 18 UG/1
2 CAPSULE ORAL; RESPIRATORY (INHALATION)
Qty: 1 | Refills: 0
Start: 2023-04-21 | End: 2023-05-20

## 2023-04-21 RX ORDER — LANOLIN ALCOHOL/MO/W.PET/CERES
1 CREAM (GRAM) TOPICAL
Qty: 0 | Refills: 0 | DISCHARGE
Start: 2023-04-21

## 2023-04-21 RX ORDER — BUDESONIDE AND FORMOTEROL FUMARATE DIHYDRATE 160; 4.5 UG/1; UG/1
2 AEROSOL RESPIRATORY (INHALATION)
Qty: 1 | Refills: 0
Start: 2023-04-21 | End: 2023-05-20

## 2023-04-21 RX ORDER — LEVOTHYROXINE SODIUM 125 MCG
1 TABLET ORAL
Qty: 30 | Refills: 0
Start: 2023-04-21 | End: 2023-05-20

## 2023-04-21 RX ORDER — FUROSEMIDE 40 MG
1 TABLET ORAL
Qty: 6 | Refills: 0
Start: 2023-04-21 | End: 2023-04-23

## 2023-04-21 RX ORDER — FUROSEMIDE 40 MG
1 TABLET ORAL
Qty: 60 | Refills: 0
Start: 2023-04-21 | End: 2023-05-20

## 2023-04-21 RX ORDER — ALBUTEROL 90 UG/1
2 AEROSOL, METERED ORAL
Qty: 1 | Refills: 0
Start: 2023-04-21 | End: 2023-05-20

## 2023-04-21 RX ORDER — METOPROLOL TARTRATE 50 MG
1 TABLET ORAL
Qty: 30 | Refills: 0
Start: 2023-04-21 | End: 2023-05-20

## 2023-04-21 RX ORDER — POLYETHYLENE GLYCOL 3350 17 G/17G
17 POWDER, FOR SOLUTION ORAL
Qty: 510 | Refills: 0
Start: 2023-04-21 | End: 2023-05-20

## 2023-04-21 RX ORDER — INSULIN GLARGINE 100 [IU]/ML
6 INJECTION, SOLUTION SUBCUTANEOUS
Qty: 1 | Refills: 0
Start: 2023-04-21 | End: 2023-05-20

## 2023-04-21 RX ORDER — PANTOPRAZOLE SODIUM 20 MG/1
1 TABLET, DELAYED RELEASE ORAL
Qty: 30 | Refills: 0
Start: 2023-04-21 | End: 2023-05-20

## 2023-04-21 RX ADMIN — Medication 2: at 17:11

## 2023-04-21 RX ADMIN — Medication 40 MILLIGRAM(S): at 05:53

## 2023-04-21 RX ADMIN — PANTOPRAZOLE SODIUM 40 MILLIGRAM(S): 20 TABLET, DELAYED RELEASE ORAL at 05:53

## 2023-04-21 RX ADMIN — SODIUM CHLORIDE 4 MILLILITER(S): 9 INJECTION INTRAMUSCULAR; INTRAVENOUS; SUBCUTANEOUS at 11:42

## 2023-04-21 RX ADMIN — Medication 3 UNIT(S): at 17:11

## 2023-04-21 RX ADMIN — Medication 50 MICROGRAM(S): at 05:53

## 2023-04-21 RX ADMIN — Medication 40 MILLIGRAM(S): at 17:13

## 2023-04-21 RX ADMIN — TIOTROPIUM BROMIDE 2 PUFF(S): 18 CAPSULE ORAL; RESPIRATORY (INHALATION) at 08:44

## 2023-04-21 RX ADMIN — Medication 3 UNIT(S): at 12:01

## 2023-04-21 RX ADMIN — PANTOPRAZOLE SODIUM 40 MILLIGRAM(S): 20 TABLET, DELAYED RELEASE ORAL at 17:13

## 2023-04-21 RX ADMIN — Medication 2: at 07:59

## 2023-04-21 RX ADMIN — Medication 1: at 12:01

## 2023-04-21 RX ADMIN — HEPARIN SODIUM 5000 UNIT(S): 5000 INJECTION INTRAVENOUS; SUBCUTANEOUS at 05:54

## 2023-04-21 RX ADMIN — HEPARIN SODIUM 5000 UNIT(S): 5000 INJECTION INTRAVENOUS; SUBCUTANEOUS at 13:59

## 2023-04-21 RX ADMIN — Medication 3 MILLILITER(S): at 11:42

## 2023-04-21 RX ADMIN — BUDESONIDE AND FORMOTEROL FUMARATE DIHYDRATE 2 PUFF(S): 160; 4.5 AEROSOL RESPIRATORY (INHALATION) at 08:45

## 2023-04-21 RX ADMIN — Medication 3 UNIT(S): at 07:59

## 2023-04-21 RX ADMIN — Medication 3 MILLILITER(S): at 17:48

## 2023-04-21 NOTE — PROGRESS NOTE ADULT - PROBLEM SELECTOR PROBLEM 4
Iron deficiency anemia
Palliative care encounter
Iron deficiency anemia
Palliative care encounter
Iron deficiency anemia

## 2023-04-21 NOTE — PROGRESS NOTE ADULT - SUBJECTIVE AND OBJECTIVE BOX
SUBJECTIVE / OVERNIGHT EVENTS:pt seen and examined,, pts family next to pts bed  04-21-23     MEDICATIONS  (STANDING):  albuterol/ipratropium for Nebulization 3 milliLiter(s) Nebulizer every 6 hours  budesonide 160 MICROgram(s)/formoterol 4.5 MICROgram(s) Inhaler 2 Puff(s) Inhalation two times a day  dextrose 5%. 1000 milliLiter(s) (100 mL/Hr) IV Continuous <Continuous>  dextrose 5%. 1000 milliLiter(s) (50 mL/Hr) IV Continuous <Continuous>  dextrose 50% Injectable 25 Gram(s) IV Push once  dextrose 50% Injectable 25 Gram(s) IV Push once  dextrose 50% Injectable 12.5 Gram(s) IV Push once  furosemide    Tablet 40 milliGRAM(s) Oral two times a day  glucagon  Injectable 1 milliGRAM(s) IntraMuscular once  heparin   Injectable 5000 Unit(s) SubCutaneous every 8 hours  insulin glargine Injectable (LANTUS) 6 Unit(s) SubCutaneous at bedtime  insulin lispro (ADMELOG) corrective regimen sliding scale   SubCutaneous three times a day before meals  insulin lispro (ADMELOG) corrective regimen sliding scale   SubCutaneous at bedtime  insulin lispro Injectable (ADMELOG) 3 Unit(s) SubCutaneous three times a day before meals  levothyroxine 50 MICROGram(s) Oral daily  pantoprazole    Tablet 40 milliGRAM(s) Oral two times a day  polyethylene glycol 3350 17 Gram(s) Oral daily  senna 2 Tablet(s) Oral at bedtime  simvastatin 20 milliGRAM(s) Oral at bedtime  sodium chloride 7% Inhalation 4 milliLiter(s) Inhalation every 12 hours  tiotropium 2.5 MICROgram(s) Inhaler 2 Puff(s) Inhalation daily    MEDICATIONS  (PRN):  acetaminophen     Tablet .. 650 milliGRAM(s) Oral every 6 hours PRN Temp greater or equal to 38C (100.4F), Mild Pain (1 - 3)  albuterol    90 MICROgram(s) HFA Inhaler 2 Puff(s) Inhalation every 6 hours PRN Shortness of Breath and/or Wheezing  bisacodyl Suppository 10 milliGRAM(s) Rectal daily PRN Constipation  dextrose Oral Gel 15 Gram(s) Oral once PRN Blood Glucose LESS THAN 70 milliGRAM(s)/deciliter  guaiFENesin Oral Liquid (Sugar-Free) 100 milliGRAM(s) Oral every 6 hours PRN Cough  melatonin 3 milliGRAM(s) Oral at bedtime PRN Insomnia    Vital Signs Last 24 Hrs  T(C): 36.6 (04-21-23 @ 08:40), Max: 36.6 (04-20-23 @ 22:30)  T(F): 97.9 (04-21-23 @ 08:40), Max: 97.9 (04-21-23 @ 06:00)  HR: 87 (04-21-23 @ 11:42) (79 - 90)  BP: 120/63 (04-21-23 @ 08:40) (120/63 - 145/74)  BP(mean): --  RR: 18 (04-21-23 @ 08:40) (16 - 18)  SpO2: 100% (04-21-23 @ 11:42) (97% - 100%)              PHYSICAL EXAM:  GENERAL: NAD, on hi guillaume  EYES: EOMI, PERRLA  NECK: Supple, No JVD  CHEST/LUNG: dec breath sounds at bases  HEART:  S1 , S2 +  ABDOMEN: soft , bs+  EXTREMITIES:  trace edema+  NEUROLOGY:alert awake  jesus+    LABS:  04-20    135  |  89<L>  |  22  ----------------------------<  194<H>  3.2<L>   |  31  |  1.06    Ca    10.1      20 Apr 2023 05:37  Phos  3.4     04-20  Mg     2.00     04-20      Creatinine Trend: 1.06 <--, 0.91 <--, 1.04 <--, 1.10 <--, 1.12 <--, 1.14 <--                        10.1   8.51  )-----------( 260      ( 20 Apr 2023 05:37 )             36.5     Urine Studies:                                                                      RADIOLOGY & ADDITIONAL TESTS:    Imaging Personally Reviewed:yes    Consultant(s) Notes Reviewed:  yes    Care Discussed with Consultants/Other Providers:yes

## 2023-04-21 NOTE — PROGRESS NOTE ADULT - PROBLEM SELECTOR PROBLEM 1
Acute hypoxemic respiratory failure
Acute hypoxemic respiratory failure
Acute respiratory failure with hypoxia
Syncope
Acute respiratory failure with hypoxia
Syncope
Syncope
Acute respiratory failure with hypoxia

## 2023-04-21 NOTE — PROGRESS NOTE ADULT - PROBLEM SELECTOR PROBLEM 9
Goals of care, counseling/discussion
Prophylactic measure
Goals of care, counseling/discussion
Goals of care, counseling/discussion
Prophylactic measure
Goals of care, counseling/discussion
Prophylactic measure

## 2023-04-21 NOTE — PROGRESS NOTE ADULT - PROBLEM SELECTOR PROBLEM 3
Pleural effusion
ACP (advance care planning)
Pleural effusion
ACP (advance care planning)
Pleural effusion

## 2023-04-21 NOTE — PROGRESS NOTE ADULT - PROBLEM SELECTOR PLAN 6
- hold BP meds for now
- patient taking basaglar 12 U QD and novolog 6U TID at home  - will start with 6U QHs and low dose sliding scale  - iss
- hold BP meds for now
- patient taking basaglar 12 U QD and novolog 6U TID at home  - will start with 6U QHs and low dose sliding scale  - iss
- hold BP meds for now
- hold BP meds for now
- patient taking basaglar 12 U QD and novolog 6U TID at home  - will start with 6U QHs and low dose sliding scale  - iss

## 2023-04-21 NOTE — PROGRESS NOTE ADULT - PROBLEM SELECTOR PROBLEM 5
DM2 (diabetes mellitus, type 2)
Multifocal pneumonia
DM2 (diabetes mellitus, type 2)
Multifocal pneumonia
DM2 (diabetes mellitus, type 2)
Multifocal pneumonia

## 2023-04-21 NOTE — PROGRESS NOTE ADULT - PROBLEM SELECTOR PLAN 8
- patient is high risk for bleeding, hold heparin subq for now  - venodyne boots
heparin sq
heparin sq
- c/w synthroid
heparin sq
- patient is high risk for bleeding, hold heparin subq for now  - venodyne boots
- c/w synthroid
heparin sq
- patient is high risk for bleeding, hold heparin subq for now  - venodyne boots
- c/w synthroid
- patient is high risk for bleeding, hold heparin subq for now  - venodyne boots
heparin sq

## 2023-04-21 NOTE — DISCHARGE NOTE NURSING/CASE MANAGEMENT/SOCIAL WORK - PATIENT PORTAL LINK FT
You can access the FollowMyHealth Patient Portal offered by [facility] by registering at the following website: [email or web address]. By joining [employer]’s FollowMyHealth portal, you will also be able to view your health information using other applications (apps) compatible with our system.

## 2023-04-21 NOTE — PROGRESS NOTE ADULT - PROBLEM SELECTOR PLAN 9
- patient is high risk for bleeding, hold heparin subq for now  - venodyne boots
- patient is DNR/DNI, MOLST form discussed and in the chart.  pos sdc home with home hospice as per pts family request
- patient is DNR/DNI, MOLST form discussed and in the chart.
- patient is DNR/DNI, MOLST form discussed and in the chart. Please refer to GOC note
- patient is DNR/DNI, MOLST form discussed and in the chart.
- patient is DNR/DNI, MOLST form discussed and in the chart. Please refer to GOC note
- patient is high risk for bleeding, hold heparin subq for now  - venodyne boots
- patient is DNR/DNI, MOLST form discussed and in the chart. Please refer to GOC note
- patient is DNR/DNI, MOLST form discussed and in the chart. Please refer to GOC note
- patient is high risk for bleeding, hold heparin subq for now  - venodyne boots
- patient is DNR/DNI, MOLST form discussed and in the chart.
- patient is DNR/DNI, MOLST form discussed and in the chart.  pos sdc home with home hospice as per pts family request

## 2023-04-21 NOTE — PROGRESS NOTE ADULT - PROVIDER SPECIALTY LIST ADULT
Cardiology
Cardiology
Gastroenterology
Infectious Disease
Internal Medicine
Palliative Care
Pulmonology
Pulmonology
Cardiology
Infectious Disease
Internal Medicine
Internal Medicine
Pulmonology
Pulmonology
Cardiology
Infectious Disease
Internal Medicine
Pulmonology
Pulmonology
Cardiology
Cardiology
Gastroenterology
Internal Medicine
Palliative Care
Internal Medicine

## 2023-04-21 NOTE — PROGRESS NOTE ADULT - PROBLEM SELECTOR PROBLEM 6
Benign essential HTN
DM2 (diabetes mellitus, type 2)
Benign essential HTN
DM2 (diabetes mellitus, type 2)
Benign essential HTN
DM2 (diabetes mellitus, type 2)
Benign essential HTN
Benign essential HTN

## 2023-04-21 NOTE — PROGRESS NOTE ADULT - PROBLEM SELECTOR PLAN 5
- patient taking basaglar 12 U QD and novolog 6U TID at home  - will start with 6U QHs and low dose sliding scale  - iss
Assessment:  - patient found to have b/l opacities on CXR  - appears to have worsened compared to previous  - known to have old findings, concerning for malignancy however patient refusing work up at this time  - no leukocytosis, however had hypothermia    - abx, ID f/u
Assessment:  - patient found to have b/l opacities on CXR  - appears to have worsened compared to previous  - known to have old findings, concerning for malignancy however patient refusing work up at this time  - no leukocytosis, however had hypothermia    - abx, ID f/u
- patient taking basaglar 12 U QD and novolog 6U TID at home  - will start with 6U QHs and low dose sliding scale  - iss
- patient taking basaglar 12 U QD and novolog 6U TID at home  - will start with 6U QHs and low dose sliding scale  - iss
Assessment:  - patient found to have b/l opacities on CXR  - appears to have worsened compared to previous  - known to have old findings, concerning for malignancy however patient refusing work up at this time  - no leukocytosis, however had hypothermia    - abx, ID f/u
- patient taking basaglar 12 U QD and novolog 6U TID at home  - will start with 6U QHs and low dose sliding scale  - iss

## 2023-04-21 NOTE — PROGRESS NOTE ADULT - PROBLEM SELECTOR PLAN 7
- hold BP meds for now
- c/w synthroid
- hold BP meds for now
- c/w synthroid
- hold BP meds for now

## 2023-04-21 NOTE — PROGRESS NOTE ADULT - PROBLEM SELECTOR PROBLEM 2
Syncope
Debility
Debility
Syncope
Hyperkalemia
Hyperkalemia
Syncope
Hyperkalemia
Syncope
Syncope

## 2023-04-21 NOTE — DISCHARGE NOTE NURSING/CASE MANAGEMENT/SOCIAL WORK - NSDCPEFALRISK_GEN_ALL_CORE
For information on Fall & Injury Prevention, visit: [email or web address] OR  [email or web address] OR  https://www.cdc.gov/steadi/patient.html

## 2023-04-21 NOTE — PROGRESS NOTE ADULT - PROBLEM SELECTOR PROBLEM 7
Hypothyroidism
Benign essential HTN
Hypothyroidism
Benign essential HTN
Benign essential HTN
Hypothyroidism

## 2023-04-21 NOTE — PROGRESS NOTE ADULT - PROBLEM SELECTOR PLAN 1
-AS PER PULM ,  diuresis  - cont oxygen suppl -  continue bronchodilators    multiple admissions for resp issues/ does not want any  diagnostic workup for her pulm lesions,  palliative f/u

## 2023-04-21 NOTE — PROGRESS NOTE ADULT - PROBLEM SELECTOR PLAN 4
Assessment:  - patient found to have anemia with hb 7.7  - no obvious reports of bleeding, however was having a small nose bleed from the nasal canula  - Hb has been at baseline  - Iron panel shows iron deficiency anemia    - s/p prbc , GI f/u
Palliative following for complex medical decision making in the setting of serious illness.  High risk of complications, further morbidity and mortality
Assessment:  - patient found to have anemia with hb 7.7  - no obvious reports of bleeding, however was having a small nose bleed from the nasal canula  - Hb has been at baseline  - Iron panel shows iron deficiency anemia    - s/p prbc , GI f/u
Palliative following for complex medical decision making in the setting of serious illness.  High risk of complications, further morbidity and mortality  Pending home hospice referral
Assessment:  - patient found to have anemia with hb 7.7  - no obvious reports of bleeding, however was having a small nose bleed from the nasal canula  - Hb has been at baseline  - Iron panel shows iron deficiency anemia    - s/p prbc , GI f/u

## 2023-04-21 NOTE — DISCHARGE NOTE NURSING/CASE MANAGEMENT/SOCIAL WORK - NSSCNAMETXT_GEN_ALL_CORE
[facility]- this agency will send a nurse to your home for continuation of care. Please call this agency with any concerns.

## 2023-04-22 ENCOUNTER — TRANSCRIPTION ENCOUNTER (OUTPATIENT)
Age: 78
End: 2023-04-22

## 2023-06-01 NOTE — H&P ADULT - NSCORESITESY/N_GEN_A_CORE_RD
Outreach attempt was made to schedule a Medicare Wellness Visit. This was the first attempt. Contact was made, [facility] appointment scheduled.   No

## 2023-06-07 ENCOUNTER — TRANSCRIPTION ENCOUNTER (OUTPATIENT)
Age: 78
End: 2023-06-07

## 2023-07-17 NOTE — ED PROVIDER NOTE - CPE EDP NEURO NORM
[FreeTextEntry1] : Impression: Bursitis left hip.\par \par The bursa has been injected she will continue conservatively normal...

## 2023-07-20 NOTE — ED PROVIDER NOTE - PROGRESS NOTE DETAILS
Golden Machuca, PGY-2: discussed with daughter and patient results of CT, that mass was increased and was likely the source of her cough. Patient still refusing biopsy and further work up. Want to go home. Discussed follow up with pulm and palliative. Will d/c with tylenol and codeine and follow up numbers for pulm.
calm

## 2023-08-06 NOTE — PATIENT PROFILE ADULT - ARRIVAL FROM
[id number] [ Msg Id [id number] ]    5A- [id number]. F. M.  Hi,  Pt had emesis of 100 ml. Naomi Cummings, #[id number]   Home

## 2023-08-31 NOTE — DIETITIAN INITIAL EVALUATION ADULT - TIME FRAME
Quality 226: Preventive Care And Screening: Tobacco Use: Screening And Cessation Intervention: Patient screened for tobacco use and is an ex/non-smoker Detail Level: Detailed 1 month

## 2023-09-26 NOTE — ED PROVIDER NOTE - NS_EDPROVIDERDISPOUSERTYPE_ED_A_ED
Case Management Discharge Planning    Admission Date: 9/23/2023  GMLOS: 2.9  ALOS: 3    6-Clicks ADL Score: 13  6-Clicks Mobility Score: 6  PT and/or OT Eval ordered: Yes  Post-acute Referrals Ordered: Yes  Post-acute Choice Obtained: NA  Has referral(s) been sent to post-acute provider:  Yes      Anticipated Discharge Dispo: Discharge Disposition: D/T to SNF with Medicare cert in anticipation of skilled care (03)    DME Needed: No    Action(s) Taken: Chart review completed. Patient discussed during IDT rounds. PT/OT recommending SNF placement; referrals sent to [facility].    Escalations Completed: None    Medically Clear: Yes    Next Steps: CM to follow up on SNF referrals and continue to assist with any additional discharge planning needs.     Barriers to Discharge: Pending Placement            Attending Attestation (For Attendings USE Only)...

## 2023-11-20 NOTE — CHART NOTE - NSCHARTNOTEFT_GEN_A_CORE
Notified by RN patient vomiting while after eating.   Patient was seen bedside. Patient appears comfortable. Family is bedside helped with interpretation.   Family states patient drank a lot of milk which after she ate breakfast that contributed to her nausea and vomiting. Patient now asymptomatic. Denies Nausea, abdomen pain, chest pain, palpitation , and shortness of breath.   Patient abdomen was soft non-tender non-distended.   Patient appears comfortable, will continue to monitor.      update :  patient ate lunch without any nausea or difficulty tolerating food. Post-Care Instructions: I reviewed with the patient in detail post-care instructions. Patient is not to engage in any heavy lifting, exercise, or swimming for the next 14 days. Should the patient develop any fevers, chills, bleeding, severe pain patient will contact the office immediately.

## 2023-12-12 NOTE — CONSULT NOTE ADULT - REASON FOR ADMISSION
Per Dr. Claudean Au:  Please call patient's insurance to assess if they'll cover VEGF and folliculin gene tests - need PA? CPT code for VEGF:  [address] [facility] to get code for Folliculin Gene and spoke with Rodolfo Rutherford. States she will look into this and call back. Did not receive call back and called again. Rodolfo Rutherford will not be back in the office until Monday.
syncope
syncope, dark stools

## 2024-05-20 NOTE — CONSULT NOTE ADULT - MUSCULOSKELETAL
History Of Present Illness  Angelica Craven is a 66 y.o. female presenting with hx of tA.     Past Medical History  Past Medical History:   Diagnosis Date    CHF (congestive heart failure) (Multi)     Dental disease     Hearing aid worn     HL (hearing loss)     Hyperlipidemia     Pulmonary arterial hypertension (Multi)     Pulmonary fibrosis (Multi)     Scleroderma (Multi)     Shortness of breath     Stroke (Multi)     Vision loss        Surgical History  Past Surgical History:   Procedure Laterality Date    BREAST BIOPSY          Social History  She reports that she has never smoked. She has never used smokeless tobacco. She reports that she does not drink alcohol and does not use drugs.    Family History  Family History   Problem Relation Name Age of Onset    Coronary artery disease Mother          CABG 3 V    Other (pacemaker) Father      Diabetes Sister          Allergies  Patient has no known allergies.    Review of Systems     Physical Exam  Vitals reviewed.   Constitutional:       General: She is awake.      Appearance: Normal appearance.   HENT:      Head: Normocephalic.      Mouth/Throat:      Mouth: Mucous membranes are moist.   Eyes:      Extraocular Movements: Extraocular movements intact.   Cardiovascular:      Rate and Rhythm: Normal rate.      Heart sounds: Normal heart sounds.   Pulmonary:      Effort: Pulmonary effort is normal.      Breath sounds: Normal breath sounds.   Abdominal:      General: Bowel sounds are normal.      Palpations: Abdomen is soft.      Tenderness: There is no abdominal tenderness. There is no guarding or rebound.      Hernia: No hernia is present.   Musculoskeletal:         General: Normal range of motion.      Cervical back: Neck supple.   Skin:     General: Skin is warm and dry.   Neurological:      General: No focal deficit present.      Mental Status: She is alert.   Psychiatric:         Attention and Perception: Attention and perception normal.         Mood and Affect:  Mood normal.         Behavior: Behavior normal.          Last Recorded Vitals  There were no vitals taken for this visit.    Relevant Results        Current Outpatient Medications   Medication Instructions    albuterol (Ventolin HFA) 90 mcg/actuation inhaler 2 puffs, inhalation, Every 6 hours PRN    alendronate (FOSAMAX) 70 mg, oral, Every 7 days, Take in the morning with a full glass of water, on an empty stomach, and do not take anything else by mouth or lie down for the next 30 min.    amLODIPine (NORVASC) 10 mg, oral, Daily    aspirin 81 mg, oral, Daily RT    atorvastatin (LIPITOR) 40 mg, oral, Daily RT    FLUoxetine (PROZAC) 10 mg, oral, Daily    levothyroxine (SYNTHROID, LEVOXYL) 137 mcg, oral, Daily    macitentan (OPSUMIT) 10 mg, oral, Daily RT    multivitamin with minerals tablet 1 tablet, oral, Daily    omega-3 fatty acids-fish oil 340-1,000 mg capsule 1 capsule, oral    pantoprazole (PROTONIX) 40 mg, oral, Daily RT    tadalafil (CIALIS) 40 mg, oral, Daily RT          Assessment/Plan   Active Problems:  There are no active Hospital Problems.      Colon for eval hx of polyp     Risk and benefits of the endoscopic procedure including bleeding perforation and infection were discussed with patient and they wish to proceed        Orville Rehman DO     negative

## 2024-06-17 RX ORDER — LOSARTAN POTASSIUM 100 MG/1
1 TABLET, FILM COATED ORAL
Qty: 0 | Refills: 0 | DISCHARGE

## 2024-06-17 RX ORDER — AMLODIPINE BESYLATE 2.5 MG/1
1 TABLET ORAL
Qty: 0 | Refills: 0 | DISCHARGE

## 2024-06-17 RX ORDER — REPAGLINIDE 1 MG/1
1 TABLET ORAL
Qty: 0 | Refills: 0 | DISCHARGE

## 2024-06-17 RX ORDER — LEVOTHYROXINE SODIUM 125 MCG
1 TABLET ORAL
Qty: 0 | Refills: 0 | DISCHARGE

## 2024-06-17 RX ORDER — SITAGLIPTIN AND METFORMIN HYDROCHLORIDE 500; 50 MG/1; MG/1
1 TABLET, FILM COATED ORAL
Qty: 0 | Refills: 0 | DISCHARGE

## 2024-06-17 RX ORDER — SIMVASTATIN 20 MG/1
1 TABLET, FILM COATED ORAL
Qty: 0 | Refills: 0 | DISCHARGE

## 2024-06-17 RX ORDER — FERROUS SULFATE 325(65) MG
1 TABLET ORAL
Qty: 0 | Refills: 0 | DISCHARGE

## 2024-10-22 NOTE — H&P ADULT - PROBLEM SELECTOR PROBLEM 5
What Type Of Note Output Would You Prefer (Optional)?: Bullet Format
How Severe Is Your Acne?: moderate
Is This A New Presentation, Or A Follow-Up?: Acne
Hypothyroidism

## 2024-10-31 NOTE — DISCHARGE NOTE PROVIDER - NSDCQMAMI_CARD_ALL_CORE
No Detail Level: Simple Otc Regimen: Color Science tinted sunscreen Plan: F/u 3 mo Otc Regimen: Can also try Tea tree oil re: natural recs Continue Regimen: Vit D RX and iron supplements, per PCP Otc Regimen: Can try Rosemary oil re: Natural recs\\nNutrafol Womens

## 2025-04-14 NOTE — PATIENT PROFILE ADULT - FUNCTIONAL SCREEN CURRENT LEVEL: SWALLOWING (IF SCORE 2 OR MORE FOR ANY ITEM, CONSULT REHAB SERVICES), MLM)
OP SW reviewed chart and reached out to mom to follow up with counseling resources. OP SW left a message, requesting a return call. OP SW will follow up if no response.   
2 = difficulty swallowing liquids/foods

## 2025-07-18 NOTE — DIETITIAN INITIAL EVALUATION ADULT. - NUTRITON FOCUSED PHYSICAL EXAM
Home-going Instructions After Device Implant    FOR NEXT 24 HOURS  - Upon discharge, you should return home and rest for the remainder of the day and evening. You do not have to stay on bed rest but should not be very active.  It is recommended a responsible adult be with you for the first 24 hours after the procedure.    -Please resume your blood thinning medication the evening of your procedure.      - Do not drive, operate machinery, or use power tools for 24 hours after your procedure.     - Do not make any legal decisions for 24 hours after your procedure.     - Do not drink alcoholic beverages for 24 hours after your procedure.    Incision:  If you have an occlusive dressing like Aquacel (tan-colored bandage) or Mepilex border, please leave on for one week.  Do not remove the steri strips, they will fall off on their own.     May shower tomorrow.   Do not submerge site in bath/swimming for 2 weeks, until incision is healed.       Call the [facility] at [phone] If you have any questions about your instructions, Monday-Friday between the hours of 7:00 am - 4:30 pm. After hours please call your physician's office.   After one week, you may wash the site with soap and water.   Inspect your incision each day.  If you note increased redness, swelling, or drainage, or if you develop a fever, please call your doctor IMMEDIATELY.     Your Doctor:  Dr. LOPEZ    Telephone: [phone].    Pain:  It is normal for the area around the incision to be tender for a few weeks following surgery.  Pain relievers such as Tylenol or Motrin are usually sufficient for pain relief.  The pain should get better each day, if it gets worse, please contact your doctor.    Activity Restrictions: new implant 4 weeks (date: 8/18/2025).  Avoid gross arm movement on the side of your new implant.  Do not raise or stretch your arm above shoulder level.  Do not pick-up weights greater than 15 lbs.  Avoid activities such as golf or  swimming for six weeks.      Driving: Please do not resume driving for 1 week(s) date: 7/28/2025.     ID Card:  It is important that you carry your device ID card with you at all times.  Inform all doctors and healthcare providers that you have a pacemaker or defibrillator.  Until the lead wires are completely healed in your heart, a prophylactic antibiotic may need to be given to you prior to procedures such as deep cleaning or extraction of teeth.    Electromagnetic Interference:  Microwave ovens are safe to use.  Cellular telephones should be held to the ear that is opposite your device.  If you are scheduled for a MRI, please notify the [facility] to check if you have a compatible device.  Present your device ID card to the airport .  The detector wand may be used below waist level and to inspect your shoes.  Read the patient booklet for more information.  You may call the device clinic or the patient services department of the device  with questions about specific electrical appliances and interference problems.          [facility]: [phone] (this is Not an emergency number)    Can I Travel?  -Always carry your pacemaker identification card.  -Ask to be hand searched at security checkpoints, such as at the airport or your local courthouse.    Hand wands are not a substitute for a “pat down” search. Surveillance wands may be used  below your waist and on your shoes.  -If you are making an extended trip to another city, your pacemaker doctor may recommend a  physician or hospital that you can contact.    Can I Drive?  -Your doctor will specify any driving restrictions.    Other Points:  -Always notify healthcare providers, such as dentists and podiatrists, that you have an pacemaker. If  any surgery is planned for you, the anesthesia personnel must know in advance about your pacemaker.    Other Sources of Information:  Device Clinic Nurses: [phone]  Patient Services  Department of the  of your pacemaker  The Internet holds an abundance of information. Some internet sites of interest:  www.[employer]tronic.[employer]  www.[employer]  www.[employer].com  www.[employer].com  www.[employer]    This info is a general resource. It is not meant to replace your health care provider’s advice.  Ask your doctor or health care team any questions. Always follow their instructions.     no...